# Patient Record
Sex: FEMALE | Race: WHITE | NOT HISPANIC OR LATINO | Employment: OTHER | URBAN - METROPOLITAN AREA
[De-identification: names, ages, dates, MRNs, and addresses within clinical notes are randomized per-mention and may not be internally consistent; named-entity substitution may affect disease eponyms.]

---

## 2017-03-14 ENCOUNTER — OFFICE VISIT (OUTPATIENT)
Dept: FAMILY MEDICINE | Facility: CLINIC | Age: 62
End: 2017-03-14
Payer: COMMERCIAL

## 2017-03-14 VITALS
WEIGHT: 293 LBS | HEART RATE: 84 BPM | SYSTOLIC BLOOD PRESSURE: 178 MMHG | DIASTOLIC BLOOD PRESSURE: 79 MMHG | BODY MASS INDEX: 45.99 KG/M2 | HEIGHT: 67 IN | TEMPERATURE: 97.6 F

## 2017-03-14 DIAGNOSIS — J30.1 SEASONAL ALLERGIC RHINITIS DUE TO POLLEN: Primary | ICD-10-CM

## 2017-03-14 DIAGNOSIS — R03.0 ELEVATED BLOOD PRESSURE READING WITHOUT DIAGNOSIS OF HYPERTENSION: ICD-10-CM

## 2017-03-14 DIAGNOSIS — H10.11 ALLERGIC CONJUNCTIVITIS, RIGHT: ICD-10-CM

## 2017-03-14 PROCEDURE — 99213 OFFICE O/P EST LOW 20 MIN: CPT | Performed by: INTERNAL MEDICINE

## 2017-03-14 NOTE — MR AVS SNAPSHOT
After Visit Summary   3/14/2017    Kaylene Diaz    MRN: [id number]           Patient Information     Date Of Birth          1955        Visit Information        Provider Department      3/14/2017 3:40 PM Lyndsey Washington, DO [facility]        Today's Diagnoses     Seasonal allergic rhinitis due to pollen    -  1    Allergic conjunctivitis, right        Elevated blood pressure reading without diagnosis of hypertension          Care Instructions          Thank you for choosing [facility] (formerly [facility]).  You may be receiving a survey in the mail from Rodrick Oreilly regarding your visit today.  Please take a few minutes to complete and return the survey to let us know how we are doing.      If you have questions or concerns, please contact us via [employer] or you can contact your care team at [phone].    Our Clinic hours are:  Monday 6:40 am  to 7:00 pm  Tuesday -Friday 6:40 am to 5:00 pm    The [place] outpatient lab hours are:  Monday - Friday 6:10 am to 4:45 pm  Saturdays 7:00 am to 11:00 am  Appointments are required, call [phone]    If you have clinical questions after hours or would like to schedule an appointment,  call the clinic at [phone].       [facility] - [place], [place] - [address]      1. You are due for a pap test.  Please schedule appointment before you leave today  2. You are due for a mammogram.  Please call [phone] to schedule.  3. You are due for fasting blood work (cholesterol test)  4. Your blood pressure was very high today and has been for a while.  You may need blood pressure medications.  Last visit with PCP Dr. Washington was 12/2015.    Ideally, you should schedule physical with him, and all these issues could be addressed at the visit.      BP Readings from Last 6 Encounters:   03/14/17 178/79   12/14/16 180/82   10/10/16 151/70   10/03/16 155/81   01/22/16 132/72   12/07/15 130/80       For the eye - try Artificial Tears.  For the throat - try  Flonase 1 spray each nostril twice daily.  This will help decrease the post-nasal mucus draining, therefore decreasing the irritation/pain in the throat.  For the allergies - try Zyrtec 10 mg once daily.  This will also decrease allergy symptoms overall, helping the throat heal  For throat pain - try Aleve 1-2 pills twice daily x 1 week.  Can alternate with Tylenol 1000 mg 3x day.  Try cough drops.  There is no numbing medication that will help much        Follow-ups after your visit        Your next 10 appointments already scheduled     Mar 20, 2017 10:30 AM CDT   [facility] with Rl Coleman MD   [facility]    [address] [address] [phone]              Who to contact     If you have questions or need follow up information about today's clinic visit or your schedule please contact [facility] directly at [phone].  Normal or non-critical lab and imaging results will be communicated to you by [employer]hart, letter or phone within 4 business days after the clinic has received the results. If you do not hear from us within 7 days, please contact the clinic through [employer] or phone. If you have a critical or abnormal lab result, we will notify you by phone as soon as possible.  Submit refill requests through [facility] or call your pharmacy and they will forward the refill request to us. Please allow 3 business days for your refill to be completed.          Additional Information About Your Visit        [facility] Information     [facility] gives you secure access to your electronic health record. If you see a primary care provider, you can also send messages to your care team and make appointments. If you have questions, please call your primary care clinic.  If you do not have a primary care provider, please call [phone] and they will assist you.        Care EveryWhere ID     This is your Care EveryWhere ID. This could be  "used by other organizations to access your [place] medical records  [id number]        Your Vitals Were     Pulse Temperature Height BMI (Body Mass Index)          84 97.6  F (36.4  C) (Tympanic) 5' 7\" (1.702 m) 47.77 kg/m2         Blood Pressure from Last 3 Encounters:   03/14/17 178/79   12/14/16 180/82   10/10/16 151/70    Weight from Last 3 Encounters:   03/14/17 (!) 305 lb (138.3 kg)   12/14/16 (!) 319 lb (144.7 kg)   01/19/16 283 lb 15.2 oz (128.8 kg)              Today, you had the following     No orders found for display       Primary Care Provider Office Phone # Fax #    Hitesh Washington [phone]       [facility] [address] [address]        Thank you!     Thank you for choosing [facility]  for your care. Our goal is always to provide you with excellent care. Hearing back from our patients is one way we can continue to improve our services. Please take a few minutes to complete the written survey that you may receive in the mail after your visit with us. Thank you!             Your Updated Medication List - Protect others around you: Learn how to safely use, store and throw away your medicines at www.disposemymeds.org.          This list is accurate as of: 3/14/17  4:00 PM.  Always use your most recent med list.                   Brand Name Dispense Instructions for use    * ADULT GUMMY PO      Take 1 chew tab by mouth daily VitaCrave       * HAIR/SKIN/NAILS/BIOTIN PO     100 tablet    Take 1 capsule by mouth daily.       hydroxychloroquine 200 MG tablet    PLAQUENIL    180 tablet    Take 2 tablets (400 mg) by mouth daily       lactobacillus rhamnosus (GG) capsule     60 capsule    Take 1 capsule by mouth 2 times daily       methotrexate 2.5 MG tablet CHEMO     120 tablet    Take 9 tablets (22.5 mg) by mouth once a week       vitamin B complex with vitamin C Tabs tablet      Take 1 tablet by mouth daily       * Notice:  This list has 2 " medication(s) that are the same as other medications prescribed for you. Read the directions carefully, and ask your doctor or other care provider to review them with you.

## 2017-03-14 NOTE — PROGRESS NOTES
"  SUBJECTIVE:                                                    Kaylene Diaz is a 61 year old female who presents to clinic today for the following health issues:      ENT Symptoms             Symptoms: cc Present Absent Comment   Fever/Chills   x    Fatigue  x     Muscle Aches   x    Eye Irritation  x     Sneezing   x    Nasal Arnaldo/Drg   x    Sinus Pressure/Pain   x    Loss of smell   x    Dental pain   x    Sore Throat  x     Swollen Glands  x  Left side more   Ear Pain/Fullness  x     Cough   x    Wheeze   x    Chest Pain   x    Shortness of breath   x    Rash   x    Other   x      Symptom duration: Since 2/24/17   Symptom severity:  worse just doesn't feel well.  Finished antibiotic end of Feb.   Treatments tried:  eye drops and antibiotic, allergy pill   Contacts:  brother     Chief Complaint   Patient presents with     Pharyngitis     has had several weeks.       Was visiting brother who was also sick with URI.  Symptoms started 18 days ago.  Was seen in Urgent Care in [place], given amoxicillin x 10 days, 'an antibiotic shot', and antibiotic eye ointment.  She does not think the symptoms improved with this.  She was not tested for strep, b/c provider thought it was NOT strep.  She reports symptoms are similar to allergies.  Has constant post-nasal drip, that she feels are causing her sore throat.  The antibiotic ointment did not help her eye drainage.  Has been using De-Hist and this is not helping.  Seasonal allergies are typical trees and pollen.  She believes pine trees, she often has similar symptoms in early spring.  No fevers.  No worsening of symptoms.  Eye is not stuck shut.  Mild tearing throughout the day, and just slightly redder than normal.    She wants to know if she is contagious.  She does feel fatigued.  She wants a \"numbing medicine\"      Current Outpatient Prescriptions   Medication Sig Dispense Refill     hydroxychloroquine (PLAQUENIL) 200 MG tablet Take 2 tablets (400 mg) by mouth daily " "180 tablet 3     methotrexate 2.5 MG tablet CHEMO Take 9 tablets (22.5 mg) by mouth once a week 120 tablet 3     Multiple Vitamins-Minerals (ADULT GUMMY PO) Take 1 chew tab by mouth daily VitaCrave       vitamin  B complex with vitamin C (VITAMIN  B COMPLEX) TABS Take 1 tablet by mouth daily  0     Multiple Vitamins-Minerals (HAIR/SKIN/NAILS/BIOTIN PO) Take 1 capsule by mouth daily.  100 tablet 3     Lactobacillus Rhamnosus, GG, (CULTURELL) capsule Take 1 capsule by mouth 2 times daily 60 capsule 0       Reviewed and updated as needed this visit by clinical staff  Tobacco  Allergies  Meds  Problems       Reviewed and updated as needed this visit by Provider  Allergies  Meds  Problems         ROS:  Constitutional, HEENT, cardiovascular, pulmonary, gi and gu systems are negative, except as otherwise noted.    OBJECTIVE:                                                    /79 (BP Location: Right arm, Patient Position: Chair, Cuff Size: Adult Regular)  Pulse 84  Temp 97.6  F (36.4  C) (Tympanic)  Ht 5' 7\" (1.702 m)  Wt (!) 305 lb (138.3 kg)  BMI 47.77 kg/m2  Body mass index is 47.77 kg/(m^2).  GENERAL APPEARANCE: healthy, alert, no distress and over weight  EYES: Eyes grossly normal to inspection, PERRL, conjunctivae and sclerae normal and right eye with mild conjunctival injection, no mattering  HENT: ear canals and TM's normal, tonsillar hypertrophy and tonsillar erythema, no exudate  NECK: no adenopathy, no asymmetry, masses, or scars and thyroid normal to palpation  RESP: lungs clear to auscultation - no rales, rhonchi or wheezes  CV: regular rates and rhythm, normal S1 S2, no S3 or S4 and no murmur, click or rub       ASSESSMENT/PLAN:                                                        ICD-10-CM    1. Seasonal allergic rhinitis due to pollen J30.1    2. Allergic conjunctivitis, right H10.11    3. Elevated blood pressure reading without diagnosis of hypertension R03.0          1. You are due for a pap " test.  Please schedule appointment before you leave today  2. You are due for a mammogram.  Please call [phone] to schedule.  3. You are due for fasting blood work (cholesterol test)  4. Your blood pressure was very high today and has been for a while.  You may need blood pressure medications.  Last visit with PCP Dr. Washington was 12/2015.    Ideally, you should schedule physical with him, and all these issues could be addressed at the visit.      BP Readings from Last 6 Encounters:   03/14/17 178/79   12/14/16 180/82   10/10/16 151/70   10/03/16 155/81   01/22/16 132/72   12/07/15 130/80       For the eye - try Artificial Tears.  For the throat - try Flonase 1 spray each nostril twice daily.  This will help decrease the post-nasal mucus draining, therefore decreasing the irritation/pain in the throat.  For the allergies - try Zyrtec 10 mg once daily.  This will also decrease allergy symptoms overall, helping the throat heal  For throat pain - try Aleve 1-2 pills twice daily x 1 week.  Can alternate with Tylenol 1000 mg 3x day.  Try cough drops.  There is no numbing medication that will help much      Lyndsey Washington, DO  [facility]

## 2017-03-14 NOTE — PATIENT INSTRUCTIONS
Thank you for choosing [facility].  You may be receiving a survey in the mail from Rodrick Oreilly regarding your visit today.  Please take a few minutes to complete and return the survey to let us know how we are doing.      If you have questions or concerns, please contact us via "[employer]" or you can contact your care team at [phone].    Our Clinic hours are:  Monday 6:40 am  to 7:00 pm  Tuesday -Friday 6:40 am to 5:00 pm    The [place] outpatient lab hours are:  Monday - Friday 6:10 am to 4:45 pm  Saturdays 7:00 am to 11:00 am  Appointments are required, call [phone]    If you have clinical questions after hours or would like to schedule an appointment,  call the clinic at [phone].       [facility] - [place], [place] - [address]      1. You are due for a pap test.  Please schedule appointment before you leave today  2. You are due for a mammogram.  Please call [phone] to schedule.  3. You are due for fasting blood work (cholesterol test)  4. Your blood pressure was very high today and has been for a while.  You may need blood pressure medications.  Last visit with PCP Dr. Washington was 12/2015.    Ideally, you should schedule physical with him, and all these issues could be addressed at the visit.      BP Readings from Last 6 Encounters:   03/14/17 178/79   12/14/16 180/82   10/10/16 151/70   10/03/16 155/81   01/22/16 132/72   12/07/15 130/80       For the eye - try Artificial Tears.  For the throat - try Flonase 1 spray each nostril twice daily.  This will help decrease the post-nasal mucus draining, therefore decreasing the irritation/pain in the throat.  For the allergies - try Zyrtec 10 mg once daily.  This will also decrease allergy symptoms overall, helping the throat heal  For throat pain - try Aleve 1-2 pills twice daily x 1 week.  Can alternate with Tylenol 1000 mg 3x day.  Try cough drops.  There is no numbing medication that will help much

## 2017-03-20 ENCOUNTER — OFFICE VISIT (OUTPATIENT)
Dept: RHEUMATOLOGY | Facility: CLINIC | Age: 62
End: 2017-03-20
Payer: COMMERCIAL

## 2017-03-20 VITALS
TEMPERATURE: 97.6 F | BODY MASS INDEX: 47.77 KG/M2 | SYSTOLIC BLOOD PRESSURE: 162 MMHG | RESPIRATION RATE: 16 BRPM | HEART RATE: 73 BPM | DIASTOLIC BLOOD PRESSURE: 99 MMHG | WEIGHT: 293 LBS

## 2017-03-20 DIAGNOSIS — M77.8 SHOULDER TENDINITIS, LEFT: Primary | ICD-10-CM

## 2017-03-20 DIAGNOSIS — M06.9 RHEUMATOID ARTHRITIS OF HAND, UNSPECIFIED LATERALITY, UNSPECIFIED RHEUMATOID FACTOR PRESENCE: ICD-10-CM

## 2017-03-20 LAB
ALBUMIN SERPL-MCNC: 3.9 G/DL (ref 3.4–5)
ALP SERPL-CCNC: 122 U/L (ref 40–150)
ALT SERPL W P-5'-P-CCNC: 21 U/L (ref 0–50)
ANION GAP SERPL CALCULATED.3IONS-SCNC: 7 MMOL/L (ref 3–14)
AST SERPL W P-5'-P-CCNC: 13 U/L (ref 0–45)
BASOPHILS # BLD AUTO: 0 10E9/L (ref 0–0.2)
BASOPHILS NFR BLD AUTO: 0.5 %
BILIRUB SERPL-MCNC: 0.5 MG/DL (ref 0.2–1.3)
BUN SERPL-MCNC: 16 MG/DL (ref 7–30)
CALCIUM SERPL-MCNC: 9.2 MG/DL (ref 8.5–10.1)
CHLORIDE SERPL-SCNC: 108 MMOL/L (ref 94–109)
CO2 SERPL-SCNC: 26 MMOL/L (ref 20–32)
CREAT SERPL-MCNC: 0.85 MG/DL (ref 0.52–1.04)
DIFFERENTIAL METHOD BLD: ABNORMAL
EOSINOPHIL # BLD AUTO: 0.2 10E9/L (ref 0–0.7)
EOSINOPHIL NFR BLD AUTO: 2.3 %
ERYTHROCYTE [DISTWIDTH] IN BLOOD BY AUTOMATED COUNT: 12.9 % (ref 10–15)
GFR SERPL CREATININE-BSD FRML MDRD: 68 ML/MIN/1.7M2
GLUCOSE SERPL-MCNC: 85 MG/DL (ref 70–99)
HCT VFR BLD AUTO: 40.1 % (ref 35–47)
HGB BLD-MCNC: 14.3 G/DL (ref 11.7–15.7)
LYMPHOCYTES # BLD AUTO: 1.2 10E9/L (ref 0.8–5.3)
LYMPHOCYTES NFR BLD AUTO: 14.7 %
MCH RBC QN AUTO: 33.2 PG (ref 26.5–33)
MCHC RBC AUTO-ENTMCNC: 35.7 G/DL (ref 31.5–36.5)
MCV RBC AUTO: 93 FL (ref 78–100)
MONOCYTES # BLD AUTO: 0.5 10E9/L (ref 0–1.3)
MONOCYTES NFR BLD AUTO: 6.3 %
NEUTROPHILS # BLD AUTO: 6.4 10E9/L (ref 1.6–8.3)
NEUTROPHILS NFR BLD AUTO: 76.2 %
PLATELET # BLD AUTO: 281 10E9/L (ref 150–450)
POTASSIUM SERPL-SCNC: 4.4 MMOL/L (ref 3.4–5.3)
PROT SERPL-MCNC: 8 G/DL (ref 6.8–8.8)
RBC # BLD AUTO: 4.31 10E12/L (ref 3.8–5.2)
SODIUM SERPL-SCNC: 141 MMOL/L (ref 133–144)
WBC # BLD AUTO: 8.4 10E9/L (ref 4–11)

## 2017-03-20 PROCEDURE — 85025 COMPLETE CBC W/AUTO DIFF WBC: CPT | Performed by: INTERNAL MEDICINE

## 2017-03-20 PROCEDURE — 80053 COMPREHEN METABOLIC PANEL: CPT | Performed by: INTERNAL MEDICINE

## 2017-03-20 PROCEDURE — 36415 COLL VENOUS BLD VENIPUNCTURE: CPT | Performed by: INTERNAL MEDICINE

## 2017-03-20 PROCEDURE — 20610 DRAIN/INJ JOINT/BURSA W/O US: CPT | Mod: LT | Performed by: INTERNAL MEDICINE

## 2017-03-20 NOTE — NURSING NOTE
The following medication was given:     MEDICATION: Kenalog 40  ROUTE: Intra-articular  SITE: left shoulder  DOSE: 1ML  LOT #: [id number]  :  [employer]Quan  EXPIRATION DATE:  06/2018  NDC#: 8358-4155-82  Drawn by Louisa Goldberg LPN  / Administered by Dr. Coleman/ Documented by Louisa Goldberg LPN    The following medication was given:     MEDICATION: 1% lidocaine  ROUTE: Intra-articular (given with steroid)  SITE: LEFT SHOULDER  DOSE: 2ML  LOT: [id number]  :  [email or web address]  EXPIRATION DATE:  07/2017  NDC#: 74296-266-99  Drawn by Louisa Goldberg LPN  / Administered by Dr. Coleman/ Documented by Louisa Goldberg LPN

## 2017-03-20 NOTE — NURSING NOTE
"Initial BP (!) 162/99 (BP Location: Other (Comment), Patient Position: Chair, Cuff Size: Adult Regular)  Pulse 73  Temp 97.6  F (36.4  C) (Oral)  Resp 16  Wt (!) 305 lb (138.3 kg)  BMI 47.77 kg/m2 Estimated body mass index is 47.77 kg/(m^2) as calculated from the following:    Height as of 3/14/17: 5' 7\" (1.702 m).    Weight as of this encounter: 305 lb (138.3 kg). .    Eber Melendez, URMILA    "

## 2017-03-20 NOTE — MR AVS SNAPSHOT
After Visit Summary   3/20/2017    Kaylene Diaz    MRN: [id number]           Patient Information     Date Of Birth          1955        Visit Information        Provider Department      3/20/2017 10:30 AM Rl Coleman MD [facility]        Today's Diagnoses     Shoulder tendinitis, left    -  1    Rheumatoid arthritis of hand, unspecified laterality, unspecified rheumatoid factor presence (H)           Follow-ups after your visit        Who to contact     If you have questions or need follow up information about today's clinic visit or your schedule please contact [facility] directly at [phone].  Normal or non-critical lab and imaging results will be communicated to you by [employer], letter or phone within 4 business days after the clinic has received the results. If you do not hear from us within 7 days, please contact the clinic through [employer] or phone. If you have a critical or abnormal lab result, we will notify you by phone as soon as possible.  Submit refill requests through [employer] or call your pharmacy and they will forward the refill request to us. Please allow 3 business days for your refill to be completed.          Additional Information About Your Visit        MyChart Information     [employer] gives you secure access to your electronic health record. If you see a primary care provider, you can also send messages to your care team and make appointments. If you have questions, please call your primary care clinic.  If you do not have a primary care provider, please call [phone] and they will assist you.        Care EveryWhere ID     This is your Care EveryWhere ID. This could be used by other organizations to access your [place] medical records  [id number]        Your Vitals Were     Pulse Temperature Respirations BMI (Body Mass Index)          73 97.6  F (36.4  C) (Oral) 16 47.77 kg/m2         Blood Pressure from Last 3 Encounters:    03/20/17 (!) 162/99   03/14/17 178/79   12/14/16 180/82    Weight from Last 3 Encounters:   03/20/17 (!) 138.3 kg (305 lb)   03/14/17 (!) 138.3 kg (305 lb)   12/14/16 (!) 144.7 kg (319 lb)              We Performed the Following     CBC with platelets and differential     Comprehensive metabolic panel     Kenalog 40 MG  []     Large Joint/Bursa injection and/or drainage (Shoulder, Knee)     Lidocaine 1% or 2%     []          Today's Medication Changes          These changes are accurate as of: 3/20/17 11:59 PM.  If you have any questions, ask your nurse or doctor.               Start taking these medicines.        Dose/Directions    lidocaine 1 % injection   Used for:  Shoulder tendinitis, left   Started by:  Rl Coleman MD        Dose:  2 mL   2 mLs by INTRA-ARTICULAR route once for 1 dose   Quantity:  2 mL   Refills:  0       triamcinolone acetonide 40 MG/ML injection   Commonly known as:  KENALOG   Used for:  Shoulder tendinitis, left   Started by:  Rl Coleman MD        Dose:  40 mg   1 mL (40 mg) by INTRA-ARTICULAR route once for 1 dose   Quantity:  1 mL   Refills:  0            Where to get your medicines      Some of these will need a paper prescription and others can be bought over the counter.  Ask your nurse if you have questions.     You don't need a prescription for these medications     lidocaine 1 % injection    triamcinolone acetonide 40 MG/ML injection                Primary Care Provider Office Phone # Fax #    Hitesh Washington [phone]       [facility] [address] [address]        Thank you!     Thank you for choosing [facility]  for your care. Our goal is always to provide you with excellent care. Hearing back from our patients is one way we can continue to improve our services. Please take a few minutes to complete the written survey that you may receive in the mail after your visit with us.  Thank you!             Your Updated Medication List - Protect others around you: Learn how to safely use, store and throw away your medicines at www.disposemymeds.org.          This list is accurate as of: 3/20/17 11:59 PM.  Always use your most recent med list.                   Brand Name Dispense Instructions for use    CLARITHROMYCIN PO          fluticasone 27.5 MCG/SPRAY spray    VERAMYST     Spray 1 spray into both nostrils 2 times daily       * ADULT GUMMY PO      Take 1 chew tab by mouth daily VitaCrave       * HAIR/SKIN/NAILS/BIOTIN PO     100 tablet    Take 1 capsule by mouth daily.       hydroxychloroquine 200 MG tablet    PLAQUENIL    180 tablet    Take 2 tablets (400 mg) by mouth daily       lidocaine 1 % injection     2 mL    2 mLs by INTRA-ARTICULAR route once for 1 dose       methotrexate 2.5 MG tablet CHEMO     120 tablet    Take 9 tablets (22.5 mg) by mouth once a week       triamcinolone acetonide 40 MG/ML injection    KENALOG    1 mL    1 mL (40 mg) by INTRA-ARTICULAR route once for 1 dose       vitamin B complex with vitamin C Tabs tablet      Take 1 tablet by mouth daily       * Notice:  This list has 2 medication(s) that are the same as other medications prescribed for you. Read the directions carefully, and ask your doctor or other care provider to review them with you.

## 2017-03-21 RX ORDER — LIDOCAINE HYDROCHLORIDE 10 MG/ML
2 INJECTION, SOLUTION INFILTRATION; PERINEURAL ONCE
Qty: 2 ML | Refills: 0 | OUTPATIENT
Start: 2017-03-21 | End: 2017-03-21

## 2017-03-21 RX ORDER — TRIAMCINOLONE ACETONIDE 40 MG/ML
40 INJECTION, SUSPENSION INTRA-ARTICULAR; INTRAMUSCULAR ONCE
Qty: 1 ML | Refills: 0 | OUTPATIENT
Start: 2017-03-21 | End: 2017-03-21

## 2017-04-19 ENCOUNTER — OFFICE VISIT (OUTPATIENT)
Dept: DERMATOLOGY | Facility: CLINIC | Age: 62
End: 2017-04-19
Payer: COMMERCIAL

## 2017-04-19 VITALS — OXYGEN SATURATION: 99 % | SYSTOLIC BLOOD PRESSURE: 147 MMHG | DIASTOLIC BLOOD PRESSURE: 78 MMHG | HEART RATE: 76 BPM

## 2017-04-19 DIAGNOSIS — Z85.828 HISTORY OF SKIN CANCER: Primary | ICD-10-CM

## 2017-04-19 DIAGNOSIS — L81.4 LENTIGO: ICD-10-CM

## 2017-04-19 DIAGNOSIS — L82.1 SK (SEBORRHEIC KERATOSIS): ICD-10-CM

## 2017-04-19 DIAGNOSIS — L57.0 AK (ACTINIC KERATOSIS): ICD-10-CM

## 2017-04-19 PROCEDURE — 99213 OFFICE O/P EST LOW 20 MIN: CPT | Performed by: DERMATOLOGY

## 2017-04-19 NOTE — NURSING NOTE
"Initial /78  Pulse 76  SpO2 99% Estimated body mass index is 47.77 kg/(m^2) as calculated from the following:    Height as of 3/14/17: 1.702 m (5' 7\").    Weight as of 3/20/17: 138.3 kg (305 lb). .      "

## 2017-04-19 NOTE — PATIENT INSTRUCTIONS
Proper skin care from Dr. Springer- [facility]     Eliminate harsh soaps, i.e. Dial, Zest, Mery Spring;   Use mild soaps such as Cetaphil or Dove Sensitive Skin   Avoid hot or cold showers   After showering, lightly dry off.    Aggressive use of a moisturizer (including Vanicream, Cetaphil, Aquafor or Cerave)   We recommend using a tub that needs to be scooped out, not a pump. This has more of an oil base. It will hold moisture in your skin much better than a water base moisturizer. The ones recommended are non- pore clogging.       If you have any questions call [phone] and follow the prompts to Dr. Springer's office.

## 2017-04-19 NOTE — MR AVS SNAPSHOT
After Visit Summary   4/19/2017    Kaylene Diaz    MRN: [id number]           Patient Information     Date Of Birth          1955        Visit Information        Provider Department      4/19/2017 10:45 AM Cipriano Springer MD [facility]        Today's Diagnoses     History of skin cancer    -  1    Lentigo        SK (seborrheic keratosis)          Care Instructions    Proper skin care from Dr. Springer- [facility]     Eliminate harsh soaps, i.e. Dial, Zest, Mongolian Spring;   Use mild soaps such as Cetaphil or Dove Sensitive Skin   Avoid hot or cold showers   After showering, lightly dry off.    Aggressive use of a moisturizer (including Vanicream, Cetaphil, Aquafor or Cerave)   We recommend using a tub that needs to be scooped out, not a pump. This has more of an oil base. It will hold moisture in your skin much better than a water base moisturizer. The ones recommended are non- pore clogging.       If you have any questions call [phone] and follow the prompts to Dr. Springer's office.           Follow-ups after your visit        Who to contact     If you have questions or need follow up information about today's clinic visit or your schedule please contact [facility] directly at [phone].  Normal or non-critical lab and imaging results will be communicated to you by [employer], letter or phone within 4 business days after the clinic has received the results. If you do not hear from us within 7 days, please contact the clinic through [employer] or phone. If you have a critical or abnormal lab result, we will notify you by phone as soon as possible.  Submit refill requests through [employer] or call your pharmacy and they will forward the refill request to us. Please allow 3 business days for your refill to be completed.          Additional Information About Your Visit        [employer] Information     [employer] gives you secure access to your electronic health  record. If you see a primary care provider, you can also send messages to your care team and make appointments. If you have questions, please call your primary care clinic.  If you do not have a primary care provider, please call [phone] and they will assist you.        Care EveryWhere ID     This is your Care EveryWhere ID. This could be used by other organizations to access your [facility] medical records  [id number]        Your Vitals Were     Pulse Pulse Oximetry                76 99%           Blood Pressure from Last 3 Encounters:   04/19/17 147/78   03/20/17 (!) 162/99   03/14/17 178/79    Weight from Last 3 Encounters:   03/20/17 (!) 138.3 kg (305 lb)   03/14/17 (!) 138.3 kg (305 lb)   12/14/16 (!) 144.7 kg (319 lb)              Today, you had the following     No orders found for display       Primary Care Provider Office Phone # Fax #    Hitesh Washington [phone]       [facility] [address] [address]        Thank you!     Thank you for choosing [facility]  for your care. Our goal is always to provide you with excellent care. Hearing back from our patients is one way we can continue to improve our services. Please take a few minutes to complete the written survey that you may receive in the mail after your visit with us. Thank you!             Your Updated Medication List - Protect others around you: Learn how to safely use, store and throw away your medicines at www.disposemymeds.org.          This list is accurate as of: 4/19/17 10:51 AM.  Always use your most recent med list.                   Brand Name Dispense Instructions for use    CLARITHROMYCIN PO          fluticasone 27.5 MCG/SPRAY spray    VERAMYST     Spray 1 spray into both nostrils 2 times daily       * ADULT GUMMY PO      Take 1 chew tab by mouth daily VitaCrave       * HAIR/SKIN/NAILS/BIOTIN PO     100 tablet    Take 1 capsule by mouth daily.       hydroxychloroquine 200  MG tablet    PLAQUENIL    180 tablet    Take 2 tablets (400 mg) by mouth daily       methotrexate 2.5 MG tablet CHEMO     120 tablet    Take 9 tablets (22.5 mg) by mouth once a week       vitamin B complex with vitamin C Tabs tablet      Take 1 tablet by mouth daily       * Notice:  This list has 2 medication(s) that are the same as other medications prescribed for you. Read the directions carefully, and ask your doctor or other care provider to review them with you.

## 2017-04-19 NOTE — PROGRESS NOTES
Kaylene Diaz is a 61 year old year old female patient here today for f/u hx of non-melanoma skin cancer.   .Today she notes rough spots on hands.    Patient states this has been present for months.  Patient reports the following symptoms:  none .  Patient reports the following previous treatments none.  Patient reports the following modifying factors none.  Associated symptoms: none.  Patient has no other skin complaints today.  Remainder of the HPI, Meds, PMH, Allergies, FH, and SH was reviewed in chart.    Pertinent Hx:   Non-melanoma skin cancer   Past Medical History:   Diagnosis Date     Basal cell carcinoma      RA (rheumatoid arthritis) (H)      Small bowel obstruction (H)        Past Surgical History:   Procedure Laterality Date     ARTHROSCOPY KNEE  12/10/2010    ARTHROSCOPY KNEE performed by NAVID FIERRO at [facility]     SURGICAL HISTORY OF -       IUD removal     SURGICAL HISTORY OF -       Ovarian cyst     SURGICAL HISTORY OF -       thorn removal from foot     SURGICAL HISTORY OF -       knee surgery, scope, right knee        Family History   Problem Relation Age of Onset     Prostate Cancer Father      Eye Disorder Father      mac degen     HEART DISEASE Father      HEART DISEASE Mother      a-fib     Eye Disorder Mother      C.A.D. Mother      Hypertension Mother      Alcohol/Drug Brother      Alcohol/Drug Sister      Alcohol/Drug Brother      Alcohol/Drug Brother      Alcohol/Drug Brother      Alcohol/Drug Sister      Obesity Sister      Alcohol/Drug Sister      Prostate Cancer Sister      CANCER Other      Melanoma No family hx of        Social History     Social History     Marital status:      Spouse name: N/A     Number of children: N/A     Years of education: N/A     Occupational History      Unemployed     Social History Main Topics     Smoking status: Former Smoker     Years: 30.00     Quit date: 4/18/2000     Smokeless tobacco: Never Used     Alcohol use Yes      Comment: VERY RARE      Drug use: No     Sexual activity: Yes     Partners: Male     Other Topics Concern     Parent/Sibling W/ Cabg, Mi Or Angioplasty Before 65f 55m? No     Social History Narrative       Outpatient Encounter Prescriptions as of 4/19/2017   Medication Sig Dispense Refill     CLARITHROMYCIN PO        fluticasone (VERAMYST) 27.5 MCG/SPRAY spray Spray 1 spray into both nostrils 2 times daily       hydroxychloroquine (PLAQUENIL) 200 MG tablet Take 2 tablets (400 mg) by mouth daily 180 tablet 3     methotrexate 2.5 MG tablet CHEMO Take 9 tablets (22.5 mg) by mouth once a week 120 tablet 3     Multiple Vitamins-Minerals (ADULT GUMMY PO) Take 1 chew tab by mouth daily VitaCrave       vitamin  B complex with vitamin C (VITAMIN  B COMPLEX) TABS Take 1 tablet by mouth daily  0     Multiple Vitamins-Minerals (HAIR/SKIN/NAILS/BIOTIN PO) Take 1 capsule by mouth daily.  100 tablet 3     No facility-administered encounter medications on file as of 4/19/2017.              Review Of Systems  Skin: As above  Eyes: negative  Ears/Nose/Throat: negative  Respiratory: No shortness of breath, dyspnea on exertion, cough, or hemoptysis  Cardiovascular: negative  Gastrointestinal: negative  Genitourinary: negative  Musculoskeletal: negative  Neurologic: negative  Psychiatric: negative  Hematologic/Lymphatic/Immunologic: negative  Endocrine: negative      O:   NAD, WDWN, Alert & Oriented, Mood & Affect wnl, Vitals stable   Here today alone   /78  Pulse 76  SpO2 99%   General appearance normal   Vitals stable   Alert, oriented and in no acute distress      Following lymph nodes palpated: Occipital, Cervical, Supraclavicular no lad   Stuck on papules and brown macules on trunk and ext    Gritty papule sonhands        The remainder of the full exam was unremarkable; the following areas were examined:  conjunctiva/lids, oral mucosa, neck, peripheral vascular system, abdomen, lymph nodes, digits/nails, eccrine and apocrine glands, scalp/hair,  face, neck, chest, abdomen, buttocks, back, RUE, LUE, RLE, LLE       Eyes: Conjunctivae/lids:Normal     ENT: Lips, buccal mucosa, tongue: normal    MSK:Normal    Cardiovascular: peripheral edema none    Pulm: Breathing Normal    Lymph Nodes: No Head and Neck Lymphadenopathy     Neuro/Psych: Orientation:Normal; Mood/Affect:Normal      A/P:  1. Hx of non-melanoma skin cancer, seborrheic keratosis, lentigo    2. Actinic keratosis on hands  pdt and efudex and cryo discussed with patient   She will think about  Return to clinic 6 monthsBENIGN LESIONS DISCUSSED WITH PATIENT:  I discussed the specifics of tumor, prognosis, and genetics of benign lesions.  I explained that treatment of these lesions would be purely cosmetic and not medically neccessary.  I discussed with patient different removal options including excision, cautery and /or laser.      Nature and genetics of benign skin lesions dicussed with patient.  Signs and Symptoms of skin cancer discussed with patient.  ABCDEs of melanoma reviewed with patient.  Patient encouraged to perform monthly skin exams.  UV precautions reviewed with patient.  Skin care regimen reviewed with patient: Eliminate harsh soaps, i.e. Dial, zest, irsih spring; Mild soaps such as Cetaphil or Dove sensitive skin, avoid hot or cold showers, aggressive use of emollients including vanicream, cetaphil or cerave discussed with patient.    Risks of non-melanoma skin cancer discussed with patient   Return to clinic 6 months

## 2017-04-20 ENCOUNTER — APPOINTMENT (OUTPATIENT)
Dept: CT IMAGING | Facility: CLINIC | Age: 62
DRG: 389 | End: 2017-04-20
Attending: EMERGENCY MEDICINE
Payer: COMMERCIAL

## 2017-04-20 ENCOUNTER — HOSPITAL ENCOUNTER (INPATIENT)
Facility: CLINIC | Age: 62
LOS: 1 days | Discharge: HOME | DRG: 389 | End: 2017-04-21
Attending: EMERGENCY MEDICINE | Admitting: FAMILY MEDICINE
Payer: COMMERCIAL

## 2017-04-20 DIAGNOSIS — N28.9 RENAL INSUFFICIENCY: ICD-10-CM

## 2017-04-20 DIAGNOSIS — R73.9 HYPERGLYCEMIA: ICD-10-CM

## 2017-04-20 DIAGNOSIS — K56.609 SMALL BOWEL OBSTRUCTION (H): ICD-10-CM

## 2017-04-20 PROBLEM — R73.09 ELEVATED GLUCOSE: Status: ACTIVE | Noted: 2017-04-20

## 2017-04-20 PROBLEM — N17.9 AKI (ACUTE KIDNEY INJURY) (H): Status: ACTIVE | Noted: 2017-04-20

## 2017-04-20 PROBLEM — R03.0 ELEVATED BLOOD PRESSURE READING WITHOUT DIAGNOSIS OF HYPERTENSION: Chronic | Status: ACTIVE | Noted: 2017-03-14

## 2017-04-20 LAB
ALBUMIN SERPL-MCNC: 4 G/DL (ref 3.4–5)
ALP SERPL-CCNC: 120 U/L (ref 40–150)
ALT SERPL W P-5'-P-CCNC: 24 U/L (ref 0–50)
ANION GAP SERPL CALCULATED.3IONS-SCNC: 9 MMOL/L (ref 3–14)
AST SERPL W P-5'-P-CCNC: 13 U/L (ref 0–45)
BASOPHILS # BLD AUTO: 0 10E9/L (ref 0–0.2)
BASOPHILS NFR BLD AUTO: 0.1 %
BILIRUB SERPL-MCNC: 0.4 MG/DL (ref 0.2–1.3)
BUN SERPL-MCNC: 27 MG/DL (ref 7–30)
CALCIUM SERPL-MCNC: 9.8 MG/DL (ref 8.5–10.1)
CHLORIDE SERPL-SCNC: 107 MMOL/L (ref 94–109)
CO2 SERPL-SCNC: 26 MMOL/L (ref 20–32)
CREAT SERPL-MCNC: 1.38 MG/DL (ref 0.52–1.04)
DIFFERENTIAL METHOD BLD: ABNORMAL
EOSINOPHIL # BLD AUTO: 0 10E9/L (ref 0–0.7)
EOSINOPHIL NFR BLD AUTO: 0.1 %
ERYTHROCYTE [DISTWIDTH] IN BLOOD BY AUTOMATED COUNT: 13.6 % (ref 10–15)
GFR SERPL CREATININE-BSD FRML MDRD: 39 ML/MIN/1.7M2
GLUCOSE SERPL-MCNC: 206 MG/DL (ref 70–99)
HBA1C MFR BLD: 4.8 % (ref 4.3–6)
HCT VFR BLD AUTO: 45 % (ref 35–47)
HGB BLD-MCNC: 15.3 G/DL (ref 11.7–15.7)
IMM GRANULOCYTES # BLD: 0 10E9/L (ref 0–0.4)
IMM GRANULOCYTES NFR BLD: 0.2 %
LACTATE BLD-SCNC: 1.5 MMOL/L (ref 0.7–2.1)
LIPASE SERPL-CCNC: 217 U/L (ref 73–393)
LYMPHOCYTES # BLD AUTO: 0.3 10E9/L (ref 0.8–5.3)
LYMPHOCYTES NFR BLD AUTO: 2.3 %
MCH RBC QN AUTO: 31.4 PG (ref 26.5–33)
MCHC RBC AUTO-ENTMCNC: 34 G/DL (ref 31.5–36.5)
MCV RBC AUTO: 92 FL (ref 78–100)
MONOCYTES # BLD AUTO: 1.3 10E9/L (ref 0–1.3)
MONOCYTES NFR BLD AUTO: 8.5 %
NEUTROPHILS # BLD AUTO: 13.1 10E9/L (ref 1.6–8.3)
NEUTROPHILS NFR BLD AUTO: 88.8 %
PLATELET # BLD AUTO: 343 10E9/L (ref 150–450)
POTASSIUM SERPL-SCNC: 4.6 MMOL/L (ref 3.4–5.3)
PROT SERPL-MCNC: 8.6 G/DL (ref 6.8–8.8)
RBC # BLD AUTO: 4.87 10E12/L (ref 3.8–5.2)
SODIUM SERPL-SCNC: 142 MMOL/L (ref 133–144)
WBC # BLD AUTO: 14.7 10E9/L (ref 4–11)

## 2017-04-20 PROCEDURE — 85025 COMPLETE CBC W/AUTO DIFF WBC: CPT | Performed by: EMERGENCY MEDICINE

## 2017-04-20 PROCEDURE — 83605 ASSAY OF LACTIC ACID: CPT | Performed by: EMERGENCY MEDICINE

## 2017-04-20 PROCEDURE — 25000128 H RX IP 250 OP 636: Performed by: EMERGENCY MEDICINE

## 2017-04-20 PROCEDURE — 25000128 H RX IP 250 OP 636: Performed by: PHYSICIAN ASSISTANT

## 2017-04-20 PROCEDURE — 96375 TX/PRO/DX INJ NEW DRUG ADDON: CPT

## 2017-04-20 PROCEDURE — 25000132 ZZH RX MED GY IP 250 OP 250 PS 637: Performed by: PHYSICIAN ASSISTANT

## 2017-04-20 PROCEDURE — 99285 EMERGENCY DEPT VISIT HI MDM: CPT | Performed by: EMERGENCY MEDICINE

## 2017-04-20 PROCEDURE — 12000000 ZZH R&B MED SURG/OB

## 2017-04-20 PROCEDURE — 96376 TX/PRO/DX INJ SAME DRUG ADON: CPT

## 2017-04-20 PROCEDURE — 83690 ASSAY OF LIPASE: CPT | Performed by: EMERGENCY MEDICINE

## 2017-04-20 PROCEDURE — 80053 COMPREHEN METABOLIC PANEL: CPT | Performed by: EMERGENCY MEDICINE

## 2017-04-20 PROCEDURE — 99223 1ST HOSP IP/OBS HIGH 75: CPT | Mod: AI | Performed by: FAMILY MEDICINE

## 2017-04-20 PROCEDURE — 25000125 ZZHC RX 250: Performed by: EMERGENCY MEDICINE

## 2017-04-20 PROCEDURE — 99285 EMERGENCY DEPT VISIT HI MDM: CPT | Mod: 25

## 2017-04-20 PROCEDURE — 96361 HYDRATE IV INFUSION ADD-ON: CPT

## 2017-04-20 PROCEDURE — 25800025 ZZH RX 258: Performed by: EMERGENCY MEDICINE

## 2017-04-20 PROCEDURE — 96374 THER/PROPH/DIAG INJ IV PUSH: CPT | Mod: 59

## 2017-04-20 PROCEDURE — 74177 CT ABD & PELVIS W/CONTRAST: CPT

## 2017-04-20 PROCEDURE — 83036 HEMOGLOBIN GLYCOSYLATED A1C: CPT | Performed by: EMERGENCY MEDICINE

## 2017-04-20 PROCEDURE — 25000131 ZZH RX MED GY IP 250 OP 636 PS 637: Performed by: PHYSICIAN ASSISTANT

## 2017-04-20 PROCEDURE — 25500064 ZZH RX 255 OP 636: Performed by: EMERGENCY MEDICINE

## 2017-04-20 RX ORDER — MORPHINE SULFATE 4 MG/ML
4 INJECTION, SOLUTION INTRAMUSCULAR; INTRAVENOUS
Status: DISCONTINUED | OUTPATIENT
Start: 2017-04-20 | End: 2017-04-21 | Stop reason: HOSPADM

## 2017-04-20 RX ORDER — FLUTICASONE PROPIONATE 50 MCG
1 SPRAY, SUSPENSION (ML) NASAL 2 TIMES DAILY PRN
Status: DISCONTINUED | OUTPATIENT
Start: 2017-04-20 | End: 2017-04-21 | Stop reason: HOSPADM

## 2017-04-20 RX ORDER — ACETAMINOPHEN 325 MG/1
650 TABLET ORAL EVERY 4 HOURS PRN
Status: DISCONTINUED | OUTPATIENT
Start: 2017-04-20 | End: 2017-04-21 | Stop reason: HOSPADM

## 2017-04-20 RX ORDER — HYDROXYCHLOROQUINE SULFATE 200 MG/1
400 TABLET, FILM COATED ORAL DAILY
Status: DISCONTINUED | OUTPATIENT
Start: 2017-04-20 | End: 2017-04-21 | Stop reason: HOSPADM

## 2017-04-20 RX ORDER — DEXTROSE, SODIUM CHLORIDE, SODIUM LACTATE, POTASSIUM CHLORIDE, AND CALCIUM CHLORIDE 5; .6; .31; .03; .02 G/100ML; G/100ML; G/100ML; G/100ML; G/100ML
INJECTION, SOLUTION INTRAVENOUS CONTINUOUS
Status: DISCONTINUED | OUTPATIENT
Start: 2017-04-20 | End: 2017-04-21 | Stop reason: CLARIF

## 2017-04-20 RX ORDER — ONDANSETRON 2 MG/ML
4 INJECTION INTRAMUSCULAR; INTRAVENOUS EVERY 6 HOURS PRN
Status: DISCONTINUED | OUTPATIENT
Start: 2017-04-20 | End: 2017-04-21 | Stop reason: HOSPADM

## 2017-04-20 RX ORDER — PROCHLORPERAZINE MALEATE 5 MG
5-10 TABLET ORAL EVERY 6 HOURS PRN
Status: DISCONTINUED | OUTPATIENT
Start: 2017-04-20 | End: 2017-04-21 | Stop reason: HOSPADM

## 2017-04-20 RX ORDER — NALOXONE HYDROCHLORIDE 0.4 MG/ML
.1-.4 INJECTION, SOLUTION INTRAMUSCULAR; INTRAVENOUS; SUBCUTANEOUS
Status: DISCONTINUED | OUTPATIENT
Start: 2017-04-20 | End: 2017-04-20

## 2017-04-20 RX ORDER — FLUTICASONE PROPIONATE 50 MCG
1 SPRAY, SUSPENSION (ML) NASAL 2 TIMES DAILY
Status: DISCONTINUED | OUTPATIENT
Start: 2017-04-20 | End: 2017-04-20

## 2017-04-20 RX ORDER — HYDROXYCHLOROQUINE SULFATE 200 MG/1
400 TABLET, FILM COATED ORAL DAILY
Status: DISCONTINUED | OUTPATIENT
Start: 2017-04-20 | End: 2017-04-20

## 2017-04-20 RX ORDER — PROCHLORPERAZINE 25 MG
25 SUPPOSITORY, RECTAL RECTAL EVERY 12 HOURS PRN
Status: DISCONTINUED | OUTPATIENT
Start: 2017-04-20 | End: 2017-04-21 | Stop reason: HOSPADM

## 2017-04-20 RX ORDER — SODIUM CHLORIDE, SODIUM LACTATE, POTASSIUM CHLORIDE, CALCIUM CHLORIDE 600; 310; 30; 20 MG/100ML; MG/100ML; MG/100ML; MG/100ML
INJECTION, SOLUTION INTRAVENOUS CONTINUOUS
Status: DISCONTINUED | OUTPATIENT
Start: 2017-04-20 | End: 2017-04-20

## 2017-04-20 RX ORDER — ONDANSETRON 2 MG/ML
4 INJECTION INTRAMUSCULAR; INTRAVENOUS ONCE
Status: COMPLETED | OUTPATIENT
Start: 2017-04-20 | End: 2017-04-20

## 2017-04-20 RX ORDER — NALOXONE HYDROCHLORIDE 0.4 MG/ML
.1-.4 INJECTION, SOLUTION INTRAMUSCULAR; INTRAVENOUS; SUBCUTANEOUS
Status: DISCONTINUED | OUTPATIENT
Start: 2017-04-20 | End: 2017-04-21 | Stop reason: HOSPADM

## 2017-04-20 RX ORDER — MORPHINE SULFATE 4 MG/ML
4 INJECTION, SOLUTION INTRAMUSCULAR; INTRAVENOUS EVERY 30 MIN PRN
Status: DISCONTINUED | OUTPATIENT
Start: 2017-04-20 | End: 2017-04-20

## 2017-04-20 RX ORDER — IOPAMIDOL 755 MG/ML
100 INJECTION, SOLUTION INTRAVASCULAR ONCE
Status: COMPLETED | OUTPATIENT
Start: 2017-04-20 | End: 2017-04-20

## 2017-04-20 RX ADMIN — MORPHINE SULFATE 4 MG: 4 INJECTION, SOLUTION INTRAMUSCULAR; INTRAVENOUS at 10:05

## 2017-04-20 RX ADMIN — SODIUM CHLORIDE, SODIUM LACTATE, POTASSIUM CHLORIDE, CALCIUM CHLORIDE AND DEXTROSE MONOHYDRATE: 5; 600; 310; 30; 20 INJECTION, SOLUTION INTRAVENOUS at 22:45

## 2017-04-20 RX ADMIN — MORPHINE SULFATE 4 MG: 4 INJECTION, SOLUTION INTRAMUSCULAR; INTRAVENOUS at 04:51

## 2017-04-20 RX ADMIN — SODIUM CHLORIDE, SODIUM LACTATE, POTASSIUM CHLORIDE, CALCIUM CHLORIDE AND DEXTROSE MONOHYDRATE: 5; 600; 310; 30; 20 INJECTION, SOLUTION INTRAVENOUS at 14:37

## 2017-04-20 RX ADMIN — ONDANSETRON 4 MG: 2 INJECTION INTRAMUSCULAR; INTRAVENOUS at 04:48

## 2017-04-20 RX ADMIN — IOPAMIDOL 100 ML: 755 INJECTION, SOLUTION INTRAVENOUS at 05:00

## 2017-04-20 RX ADMIN — HYDROXYCHLOROQUINE SULFATE 400 MG: 200 TABLET, FILM COATED ENTERAL at 11:20

## 2017-04-20 RX ADMIN — SODIUM CHLORIDE, POTASSIUM CHLORIDE, SODIUM LACTATE AND CALCIUM CHLORIDE 125 ML/HR: 600; 310; 30; 20 INJECTION, SOLUTION INTRAVENOUS at 06:04

## 2017-04-20 RX ADMIN — SODIUM CHLORIDE 74 ML: 9 INJECTION, SOLUTION INTRAVENOUS at 05:00

## 2017-04-20 RX ADMIN — SODIUM CHLORIDE, SODIUM LACTATE, POTASSIUM CHLORIDE, CALCIUM CHLORIDE AND DEXTROSE MONOHYDRATE 1000 ML: 5; 600; 310; 30; 20 INJECTION, SOLUTION INTRAVENOUS at 07:42

## 2017-04-20 RX ADMIN — ONDANSETRON 4 MG: 2 INJECTION INTRAMUSCULAR; INTRAVENOUS at 06:03

## 2017-04-20 RX ADMIN — SODIUM CHLORIDE, POTASSIUM CHLORIDE, SODIUM LACTATE AND CALCIUM CHLORIDE 1000 ML: 600; 310; 30; 20 INJECTION, SOLUTION INTRAVENOUS at 04:42

## 2017-04-20 RX ADMIN — PROCHLORPERAZINE EDISYLATE 10 MG: 5 INJECTION INTRAMUSCULAR; INTRAVENOUS at 11:09

## 2017-04-20 RX ADMIN — METHOTREXATE 22.5 MG: 2.5 TABLET ORAL at 12:53

## 2017-04-20 RX ADMIN — MORPHINE SULFATE 4 MG: 4 INJECTION, SOLUTION INTRAMUSCULAR; INTRAVENOUS at 06:43

## 2017-04-20 ASSESSMENT — ENCOUNTER SYMPTOMS
SHORTNESS OF BREATH: 0
LIGHT-HEADEDNESS: 0
DIARRHEA: 1
FEVER: 0
ABDOMINAL DISTENTION: 1
ABDOMINAL PAIN: 1
CHEST TIGHTNESS: 0
COUGH: 0
FATIGUE: 0
APPETITE CHANGE: 1
NAUSEA: 1
CHILLS: 0
VOMITING: 1
HEADACHES: 0

## 2017-04-20 ASSESSMENT — PAIN DESCRIPTION - DESCRIPTORS: DESCRIPTORS: CRAMPING;PRESSURE

## 2017-04-20 NOTE — PLAN OF CARE
"Problem: Goal Outcome Summary  Goal: Goal Outcome Summary  Outcome: Improving  No diarrhea or stools this day shift. Pt had return of \"pressure pain to abdomen\" and nausea. With relief of both after morphine and compazine. States comfort. P.A. Updated to pt is on plaquenil 400 mg daily and only knew this drug by hydroxychloroquine.      "

## 2017-04-20 NOTE — PROGRESS NOTES
Updated Carla GRAY to pt stating she takes Methotrexate, not plaquenil and flonase PRN. Pt. sleeping quietly with easy regular resp.

## 2017-04-20 NOTE — PLAN OF CARE
Problem: Goal Outcome Summary  Goal: Goal Outcome Summary  Outcome: Improving  Denies pain/abdominal pain, nausea and diarrhea. Bowel sounds active x4 quads. States comfort.

## 2017-04-20 NOTE — IP AVS SNAPSHOT
[facility]    [address] [address]    Phone:  [phone]    Fax:  [phone]                                       After Visit Summary   4/20/2017    Kaylene Diaz    MRN: [id number]           After Visit Summary Signature Page     I have received my discharge instructions, and my questions have been answered. I have discussed any challenges I see with this plan with the nurse or doctor.    ..........................................................................................................................................  Patient/Patient Representative Signature      ..........................................................................................................................................  Patient Representative Print Name and Relationship to Patient    ..................................................               ................................................  Date                                            Time    ..........................................................................................................................................  Reviewed by Signature/Title    ...................................................              ..............................................  Date                                                            Time

## 2017-04-20 NOTE — PROGRESS NOTES
"[place] [facility] ADMISSION NOTE    Patient admitted to room ICU 1010 at approximately 0650 via cart from emergency room. Patient was accompanied by spouse.     Verbal SBAR report received from Pat RN prior to patient arrival.     Patient ambulated to bed with stand-by assist. Patient alert and oriented X 3. The patient is not having any pain. 0-10 Pain Scale: 6. Admission vital signs: Blood pressure 140/70, temperature 98.1  F (36.7  C), temperature source Oral, resp. rate 18, height 1.702 m (5' 7\"), weight (!) 137.2 kg (302 lb 7.5 oz), SpO2 96 %. Patient was oriented to plan of care, call light, bed controls, tv, bathroom and visiting hours.     The following safety risks were identified during admission: none. Yellow risk band applied: [place] [facility]    "

## 2017-04-20 NOTE — H&P
[facility]    History and Physical  Hospital Medicine       Date of Admission:  4/20/2017  Date of Service: 4/20/2017     Assessment & Plan   Kaylene Diaz is a 61 year old female with PMH significant for recurrent SBO, hx of peritonitis s/p placement of IUD, elevated BP, HLD and RA who now presents with acute abdominal pain and nausea.      Small bowel obstruction (H)    Recurrent.  Has SBO January 2015, January 2016.  Never required surgery.  Hx of peritonitis secondary to IUD placement.  CT reveals mid to distal obstruction with proximal filling of the small bowel and distal compression  - NPO, except for meds  - not NG tube at this point in time; will need to place should vomiting become recurrent  - IVF at 125mL/hr  - encourage ambulation  - nausea control: Zofran, compazine  - pain control: dilaudid  - consider IP surgical consult should SBO not resolve hospital day 2 or 3.  Patient OK with consultation here, but should surgery become neccessary, would like to be transferred to a larger facility    BLANCA (acute kidney injury) (H)  Baseline creatinine 0.7 - 0.9.  1.38 on admission.  Likely a result of frequent emesis x8 hours.  Given 1L fluid bolus of LR in ED  - continue 0.9 NS at 125cc/hr    Leukocytosis   WBC 14.7 on arrival.  Heel strike test is negative.  There is no rebound tenderness on physical exam.  Likely secondary to stress as the patient is afebrile and does not complain of headache, cough, or dysuria.  - AM CBC with platelets    Elevated blood pressure reading without diagnosis of hypertension  Mildly hypertensive when admitted (140/70).    - recommend outpatient follow up    Elevated glucose  Glucose 206 on arrival.  Hemoglobin a1c 4.8%.   Likely secondary to stress      Hyperlipidemia LDL goal <160  Not on medication.      RA (rheumatoid arthritis) (H)  Uses methotrexate on Wednesdays.  Was unable to do so given nausea yesterday.   - attempt to get in weekly methotrexate  "today  - continue PTA plaquenil if tolerated       F: 125cc/hr 0.9NS  E: BMP in AM  N: NPO  DVT Prophylaxis: ambulatory, low risk    Code Status: Full Code    Disposition: Anticipate discharge in 2-3 days. Appropriate for inpatient care.    Case discussed with Dr. Cipriano De Santiago.  Assessment and plan as written above.    Carla Rosa PA-C  [facility]    History is obtained from the patient and review of the EMR.    Past Medical History    Past Medical History:   Diagnosis Date     Basal cell carcinoma      RA (rheumatoid arthritis) (H)      Small bowel obstruction (H)        Past Surgical History   Past Surgical History:   Procedure Laterality Date     ARTHROSCOPY KNEE  12/10/2010    ARTHROSCOPY KNEE performed by NAVID FIERRO at [facility]     SURGICAL HISTORY OF -       IUD removal     SURGICAL HISTORY OF -       Ovarian cyst     SURGICAL HISTORY OF -       thorn removal from foot     SURGICAL HISTORY OF -       knee surgery, scope, right knee       Family History    Family History   Problem Relation Age of Onset     Prostate Cancer Father      Eye Disorder Father      mac degen     HEART DISEASE Father      HEART DISEASE Mother      a-fib     Eye Disorder Mother      C.A.D. Mother      Hypertension Mother      Alcohol/Drug Brother      Alcohol/Drug Sister      Alcohol/Drug Brother      Alcohol/Drug Brother      Alcohol/Drug Brother      Alcohol/Drug Sister      Obesity Sister      Alcohol/Drug Sister      Prostate Cancer Sister      CANCER Other      Melanoma No family hx of        History of Present Illness   Kaylene Diaz is a 61 year old female with PMH significant for recurrent SBO, hx of peritonitis s/p placement of IUD, elevated BP, HLD and RA who now presents with acute abdominal pain and nausea.    The patient reports she was eating a bowel of carrots at 7pm yesterday evening.  She noted immediate onset of bloating and abdominal pain.  She reports her abdomen was \"8x too big.\"  She " "immediately attempted to take charcoal pills and water (her usual remedy for such abdominal pain and bloating), but felt very nauseated when attempting to get down the large glass of water.  At 9pm, the patient noted acute onset of nausea and emesis.  Emesis consisted of her dinner.  She denies blood.  She had one, relatively normal BM at that time. She attempted to go to sleep and was able to do so for a few hours.  At 1am, the patient woke up and was again, acutely nauseated.  She reports she threw up \"buckets\" of yellow/brown/black/grey material.  She again, threw up \"buckets\" at 2am.  At 2am, she also had one episodes of loose, watery brown diarrhea.  At 3am, the patient again had acute emesis and elected to present to ED for further evaluation.  The patient has had similar episodes in 2015 and 2016 which were deemed small bowel obstructions.  She has never needed surgery for these.  She had peritonitis which required major surgery x2 in the 1970s.  She still has her appendix, gallbladder, and uterus.  She denies EtOH.  She denies tobacco use.  She otherwise denies fever, chills, myalgias, cold-like symptoms (congestion, pharyngitis, sinus pressure, rhinorrhea), swollen glands, headaches, lightheadedness, dizziness, chest pain, palpitations/flutters, SOB, cough, wheezes, dysuria, hematuria, joint swelling, joint pain, leg swelling, and rashes within the last two weeks.      Prior to Admission Medications   Prior to Admission Medications   Prescriptions Last Dose Informant Patient Reported? Taking?   Multiple Vitamins-Minerals (ADULT GUMMY PO) Past Week at Unknown time Self Yes Yes   Sig: Take 1 chew tab by mouth daily VitaCrave   Multiple Vitamins-Minerals (HAIR/SKIN/NAILS/BIOTIN PO) Past Week at Unknown time Self Yes Yes   Sig: Take 1 capsule by mouth daily.    fluticasone (VERAMYST) 27.5 MCG/SPRAY spray Past Month at Unknown time Self Yes Yes   Sig: Spray 1 spray into both nostrils 2 times daily " "  hydroxychloroquine (PLAQUENIL) 200 MG tablet Not Taking at Unknown time Self No No   Sig: Take 2 tablets (400 mg) by mouth daily   Patient not taking: Reported on 4/20/2017   methotrexate 2.5 MG tablet CHEMO 4/12/2017 Self No Yes   Sig: Take 9 tablets (22.5 mg) by mouth once a week   vitamin  B complex with vitamin C (VITAMIN  B COMPLEX) TABS Past Week at Unknown time Self Yes Yes   Sig: Take 1 tablet by mouth daily      Facility-Administered Medications: None       Allergies   Allergies   Allergen Reactions     Metal [Yaneli] Swelling     Gold, silver  Has no problems with platinum     Latex Rash     Not all latex products       Social History   Social History     Social History     Marital status:      Spouse name: N/A     Number of children: N/A     Years of education: N/A     Occupational History      Unemployed     Social History Main Topics     Smoking status: Former Smoker     Years: 30.00     Quit date: 4/18/2000     Smokeless tobacco: Never Used     Alcohol use Yes      Comment: VERY RARE     Drug use: No     Sexual activity: Yes     Partners: Male     Other Topics Concern     Parent/Sibling W/ Cabg, Mi Or Angioplasty Before 65f 55m? No     Social History Narrative    Lives in the area.  Works a [employer].    ROS: 10 point ROS neg other than the symptoms noted above in the HPI.    Physical Exam     /70 (BP Location: Left arm)  Temp 98.1  F (36.7  C) (Oral)  Resp 18  Ht 1.702 m (5' 7\")  Wt (!) 137.2 kg (302 lb 7.5 oz)  SpO2 96%  BMI 47.37 kg/m2     Weight: 302 lbs 7.54 oz Body mass index is 47.37 kg/(m^2).     Constitutional: Alert and oriented x4.  Cooperative.  Appears older than stated age.  Appears in no acute distress. Morbidly obese  HEENT: Oropharynx is clear and moist. No evidence of cranial trauma.  Lymph/Hematologic: No occipital, submental, submandibular, anterior or posterior cervical, or supraclavicular lymphadenopathy is appreciated.  Cardiovascular: Regular rate/ rhythm.  " S1 and S2 grossly normal.  No appreciable murmur, rub, gallop.  JVP is not appreciable.  No lower extremity edema.  Respiratory: Clear to auscultation bilaterally. Equal chest expansion.  GI: Soft, no hepatosplenomegaly. Tympanic to percussion.  Largely non-tender; mild tenderness with very deep palpation just superior to the umbilicus.  Negative heel strike test.  Negative bed-shake test.  No appreciable rebound tenderness.  Bowel sounds are present, but infrequent.  Genitourinary: Deferred  Musculoskeletal: Normal muscle bulk and tone.  Skin: Warm and dry, no rashes.   Neurologic: Neck supple. Cranial nerves 3-12 are grossly intact.  is symmetric.     Data   Data reviewed today:     Recent Labs  Lab 04/20/17  0435   WBC 14.7*   HGB 15.3   MCV 92         POTASSIUM 4.6   CHLORIDE 107   CO2 26   BUN 27   CR 1.38*   ANIONGAP 9   DEAN 9.8   *   ALBUMIN 4.0   PROTTOTAL 8.6   BILITOTAL 0.4   ALKPHOS 120   ALT 24   AST 13   LIPASE 217       Recent Results (from the past 24 hour(s))   CT Abdomen Pelvis w Contrast    Narrative    CT ABDOMEN AND PELVIS WITH CONTRAST  4/20/2017 5:08 AM     HISTORY: Abdominal pain and vomiting.    COMPARISON: 1/19/2016.    TECHNIQUE: Following the uneventful administration of 100 mL  Isovue-370 intravenous contrast, helical sections were acquired from  the top of the diaphragm through the pubic symphysis. Coronal  reconstructions were generated. Radiation dose for this scan was  reduced using automated exposure control, adjustment of the mA and/or  kV according to the patient's size, or iterative reconstruction  technique.    FINDINGS:  Abdomen: 0.5 cm low attenuation lesion in the lateral segment of the  left lobe of the liver, too small to characterize. The spleen,  pancreas, adrenal glands and right kidney are unremarkable. A few  subcentimeter low attenuation lesions in the left kidney, too small to  characterize. A few small gallstones in the gallbladder. No  enlarged  lymph nodes or free fluid in the upper abdomen. Atherosclerotic  calcification in the abdominal aorta.    Scan through the lower chest is unremarkable.    Pelvis: A moderately long segment of fluid-filled mildly dilated small  bowel in the mid right hemiabdomen. The more distal small bowel and  colon are relatively decompressed. The transition from dilated to  nondilated small bowel is in the lateral aspect of the mid right  hemiabdomen. No bowel wall thickening, pneumatosis or free  intraperitoneal gas. The appendix is not definitely visualized. The  uterus is present. No enlarged lymph nodes or free fluid in the  pelvis. Large broad-based protrusion of the lateral aspect of the mid  right abdominal wall. There is a broad-based focal hernia within the  protrusion that contains small bowel and cecum. The hernia does not  cause the bowel obstruction.      Impression    IMPRESSION:   1. Mid to distal small bowel obstruction: There is a long segment of  fluid-filled mildly dilated small bowel in the right hemiabdomen and  the more distal bowel is relatively decompressed.  2. Cholelithiasis.    NOREEN YORK MD       I personally reviewed no images or EKG's today.    Carla Rosa PA-C  [facility]

## 2017-04-20 NOTE — PROGRESS NOTES
[facility] TRANSPORT NOTE  Data:   Reason for Transport:  Med/Surg pt with SBO    Kaylene Diaz was transported to [facility]00 via wheel chair at 1548.  Patient was accompanied by NST. Equipment used for transport: None. Family was aware of reason for transport: yes, called  and left a voicemail.    Action:  Report: given to Donita GONZALEZ    Response:  Patient's condition at time of transfer was: pt denies pain/abdominal pain and nausea. KAMALAS.    Denise Mcdowell

## 2017-04-20 NOTE — ED NOTES
"Ate dinner 5040-3069  approx 1900 hrs began having some abd discomfort around the mid abd  Vomiting began around 2100 hrs had a BM around 2100 then started with loose stools after that last at 0130  Has had pain and nausea/vomiting \"in waves since\"   Pt somewhat difficult to assess she does not give direct answers to questions and no expansion on statements  Presents with bag of emesis that smells strongly of stool   "

## 2017-04-20 NOTE — PLAN OF CARE
Problem: Goal Outcome Summary  Goal: Goal Outcome Summary  Outcome: Improving  Pt cont's to deny abd. pain and nausea since relief with IV compazine and IV morphine this morning and after taking po meds with sips of water. Is sleeping quietly with easy regular resp. VSS

## 2017-04-20 NOTE — ED PROVIDER NOTES
"  History     Chief Complaint   Patient presents with     Abdominal Pain     possible bowel obs     HPI  Kaylene Diaz is a 61 year old female with a history of 2 previous bowel obstructions presents for evaluation of abdominal pain with bloating, nausea, and recurrent vomiting since around 9 PM last night.  Patient reports symptoms are similar to previous bowel obstructions.  Patient reports she had moderate loose stool around 9 PM and a few episodes since but reports this has stopped and she is no longer passing stool or flatus.  Patient denies fever or chills.  Patient denies chest pain or difficulty breathing.    I have reviewed the Medications, Allergies, Past Medical and Surgical History, and Social History in the Epic system.    Review of Systems   Constitutional: Positive for appetite change. Negative for chills, fatigue and fever.   HENT: Negative for congestion.    Respiratory: Negative for cough, chest tightness and shortness of breath.    Cardiovascular: Negative for chest pain.   Gastrointestinal: Positive for abdominal distention, abdominal pain, diarrhea, nausea and vomiting.   Genitourinary: Negative for decreased urine volume.   Skin: Negative for rash.   Neurological: Negative for light-headedness and headaches.   All other systems reviewed and are negative.      Physical Exam   BP: 119/72  Heart Rate: 88  Temp: 98.1  F (36.7  C)  Resp: 20  Height: 170.2 cm (5' 7\")  Weight: 133.8 kg (295 lb)  SpO2: 93 %  Physical Exam   Constitutional: She is oriented to person, place, and time. She appears well-developed and well-nourished. No distress.   HENT:   Head: Normocephalic and atraumatic.   Eyes: Conjunctivae are normal.   Cardiovascular: Normal rate and regular rhythm.    Pulmonary/Chest: Effort normal. No respiratory distress.   Abdominal: Soft. She exhibits distension. There is no tenderness. There is no rebound and no guarding.   Musculoskeletal: Normal range of motion.   Neurological: She is alert " and oriented to person, place, and time.   Skin: Skin is warm and dry.   Psychiatric: She has a normal mood and affect.   Nursing note and vitals reviewed.      ED Course     ED Course     Procedures        Results for orders placed or performed during the hospital encounter of 04/20/17   CT Abdomen Pelvis w Contrast    Narrative    CT ABDOMEN AND PELVIS WITH CONTRAST  4/20/2017 5:08 AM     HISTORY: Abdominal pain and vomiting.    COMPARISON: 1/19/2016.    TECHNIQUE: Following the uneventful administration of 100 mL  Isovue-370 intravenous contrast, helical sections were acquired from  the top of the diaphragm through the pubic symphysis. Coronal  reconstructions were generated. Radiation dose for this scan was  reduced using automated exposure control, adjustment of the mA and/or  kV according to the patient's size, or iterative reconstruction  technique.    FINDINGS:  Abdomen: 0.5 cm low attenuation lesion in the lateral segment of the  left lobe of the liver, too small to characterize. The spleen,  pancreas, adrenal glands and right kidney are unremarkable. A few  subcentimeter low attenuation lesions in the left kidney, too small to  characterize. A few small gallstones in the gallbladder. No enlarged  lymph nodes or free fluid in the upper abdomen. Atherosclerotic  calcification in the abdominal aorta.    Scan through the lower chest is unremarkable.    Pelvis: A moderately long segment of fluid-filled mildly dilated small  bowel in the mid right hemiabdomen. The more distal small bowel and  colon are relatively decompressed. The transition from dilated to  nondilated small bowel is in the lateral aspect of the mid right  hemiabdomen. No bowel wall thickening, pneumatosis or free  intraperitoneal gas. The appendix is not definitely visualized. The  uterus is present. No enlarged lymph nodes or free fluid in the  pelvis. Large broad-based protrusion of the lateral aspect of the mid  right abdominal wall. There  is a broad-based focal hernia within the  protrusion that contains small bowel and cecum. The hernia does not  cause the bowel obstruction.      Impression    IMPRESSION:   1. Mid to distal small bowel obstruction: There is a long segment of  fluid-filled mildly dilated small bowel in the right hemiabdomen and  the more distal bowel is relatively decompressed.  2. Cholelithiasis.    NOREEN YORK MD   CBC with platelets differential   Result Value Ref Range    WBC 14.7 (H) 4.0 - 11.0 10e9/L    RBC Count 4.87 3.8 - 5.2 10e12/L    Hemoglobin 15.3 11.7 - 15.7 g/dL    Hematocrit 45.0 35.0 - 47.0 %    MCV 92 78 - 100 fl    MCH 31.4 26.5 - 33.0 pg    MCHC 34.0 31.5 - 36.5 g/dL    RDW 13.6 10.0 - 15.0 %    Platelet Count 343 150 - 450 10e9/L    Diff Method Automated Method     % Neutrophils 88.8 %    % Lymphocytes 2.3 %    % Monocytes 8.5 %    % Eosinophils 0.1 %    % Basophils 0.1 %    % Immature Granulocytes 0.2 %    Absolute Neutrophil 13.1 (H) 1.6 - 8.3 10e9/L    Absolute Lymphocytes 0.3 (L) 0.8 - 5.3 10e9/L    Absolute Monocytes 1.3 0.0 - 1.3 10e9/L    Absolute Eosinophils 0.0 0.0 - 0.7 10e9/L    Absolute Basophils 0.0 0.0 - 0.2 10e9/L    Abs Immature Granulocytes 0.0 0 - 0.4 10e9/L   Comprehensive metabolic panel   Result Value Ref Range    Sodium 142 133 - 144 mmol/L    Potassium 4.6 3.4 - 5.3 mmol/L    Chloride 107 94 - 109 mmol/L    Carbon Dioxide 26 20 - 32 mmol/L    Anion Gap 9 3 - 14 mmol/L    Glucose 206 (H) 70 - 99 mg/dL    Urea Nitrogen 27 7 - 30 mg/dL    Creatinine 1.38 (H) 0.52 - 1.04 mg/dL    GFR Estimate 39 (L) >60 mL/min/1.7m2    GFR Estimate If Black 47 (L) >60 mL/min/1.7m2    Calcium 9.8 8.5 - 10.1 mg/dL    Bilirubin Total 0.4 0.2 - 1.3 mg/dL    Albumin 4.0 3.4 - 5.0 g/dL    Protein Total 8.6 6.8 - 8.8 g/dL    Alkaline Phosphatase 120 40 - 150 U/L    ALT 24 0 - 50 U/L    AST 13 0 - 45 U/L   Lipase   Result Value Ref Range    Lipase 217 73 - 393 U/L   Lactic acid whole blood   Result Value Ref Range     Lactic Acid 1.5 0.7 - 2.1 mmol/L   Hemaglobin A1C   Result Value Ref Range    Hemoglobin A1C 4.8 4.3 - 6.0 %         5:11 AM: I personally reviewed the ct images - suggestive of acute small bowel obstruction. Formal read pending. Lactic acid normal patient does have a leukocytosis with left shift.    6:00 AM: Radiology read of CT confirms presence of a small bowel obstruction.  Paged hospitalist for admission.    6:00 AM: Discussed case with Dr Aguilera.     Assessments & Plan (with Medical Decision Making)  61-year-old female with a history of previous abdominal surgeries and 2 subsequent bowel obstructions presents for evaluation of abrupt onset of abdominal pain with nausea, vomiting, and several episodes of loose stool tonight.  Symptoms similar to previous bowel obstruction per report.  CT confirmed presence of a small bowel obstruction.  Lactic acid normal suggesting no ischemic bowel.  Creatinine slightly elevated at 1.3 suggestive renal insufficiency.  Patient hydrated with IV fluids.  Patient admitted hospital floor for continued monitoring, nothing by mouth, and IV hydration.  No NG tube was placed as patient's nausea was controlled with IV Zofran.       I have reviewed the nursing notes.    I have reviewed the findings, diagnosis, plan and need for follow up with the patient.    New Prescriptions    No medications on file       Final diagnoses:   Small bowel obstruction (H)   Renal insufficiency   Hyperglycemia       4/20/2017   [facility] EMERGENCY DEPARTMENT     Day, Daniel Cline MD  04/20/17 0606

## 2017-04-20 NOTE — IP AVS SNAPSHOT
MRN:[id number]                      After Visit Summary   4/20/2017    Kaylene Diaz    MRN: [id number]           Thank you!     Thank you for choosing [facility] for your care. Our goal is always to provide you with excellent care. Hearing back from our patients is one way we can continue to improve our services. Please take a few minutes to complete the written survey that you may receive in the mail after you visit with us. Thank you!        Patient Information     Date Of Birth          1955        Designated Caregiver       Most Recent Value    Caregiver    Will someone help with your care after discharge? yes    Name of designated caregiver Denilson Diaz    Phone number of caregiver [phone]    Caregiver address [id number] [place] [address]      About your hospital stay     You were admitted on:  April 20, 2017 You last received care in the:  [facility]    You were discharged on:  April 21, 2017       Who to Call     For medical emergencies, please call 911.  For non-urgent questions about your medical care, please call your primary care provider or clinic, [phone]          Attending Provider     Provider Specialty    Day, Daniel Cline MD Emergency Medicine    [facility], Gabriel Aj MD [facility]    [facility], Cipriano MYERS MD [facility]       Primary Care Provider Office Phone # Fax #    Hitesh Washington [phone]       [facility] [address] [place] [address]        Your next 10 appointments already scheduled     May 08, 2017 11:00 AM CDT   Office Visit with Hitesh Washington MD   [facility] ([facility]    [address] [facility] [place] [address] [phone]           Bring a current list of meds and any records pertaining to this visit.  For Physicals, please bring immunization records and any forms needing to be filled out.  Please arrive 10 minutes early to  "complete paperwork.              Further instructions from your care team       Would  follow up in clinic in the next 7 days.   Go up slowly on diet.      Pending Results     No orders found for last 3 day(s).            Statement of Approval     Ordered          04/21/17 1051  I have reviewed and agree with all the recommendations and orders detailed in this document.  EFFECTIVE NOW     Approved and electronically signed by:  Cipriano De Santiago MD             Admission Information     Date & Time Provider Department Dept. Phone    4/20/2017 Cipriano De Santiago MD [facility] [phone]      Your Vitals Were     Blood Pressure Pulse Temperature Respirations Height Weight    121/69 (BP Location: Right arm) 85 98.5  F (36.9  C) (Oral) 18 1.702 m (5' 7\") 140.4 kg (309 lb 8.4 oz)    Pulse Oximetry BMI (Body Mass Index)                93% 48.48 kg/m2          MyChart Information     [employer] gives you secure access to your electronic health record. If you see a primary care provider, you can also send messages to your care team and make appointments. If you have questions, please call your primary care clinic.  If you do not have a primary care provider, please call [phone] and they will assist you.        Care EveryWhere ID     This is your Care EveryWhere ID. This could be used by other organizations to access your [facility] medical records  [id number]           Review of your medicines      CONTINUE these medicines which have NOT CHANGED        Dose / Directions    fluticasone 27.5 MCG/SPRAY spray   Commonly known as:  VERAMYST        Dose:  1 spray   Spray 1 spray into both nostrils 2 times daily   Refills:  0       * ADULT GUMMY PO        Dose:  1 chew tab   Take 1 chew tab by mouth daily VitaCrave   Refills:  0       * HAIR/SKIN/NAILS/BIOTIN PO   Used for:  RA (rheumatoid arthritis) (H)        Dose:  1 capsule   Take 1 capsule by mouth daily.   Quantity:  100 tablet   Refills:  3       " hydroxychloroquine 200 MG tablet   Commonly known as:  PLAQUENIL   Used for:  Rheumatoid arthritis of hand, unspecified laterality, unspecified rheumatoid factor presence (H)        Dose:  400 mg   Take 2 tablets (400 mg) by mouth daily   Quantity:  180 tablet   Refills:  3       methotrexate 2.5 MG tablet CHEMO   Used for:  Rheumatoid arthritis of multiple sites without rheumatoid factor (H)        Dose:  22.5 mg   Take 9 tablets (22.5 mg) by mouth once a week   Quantity:  120 tablet   Refills:  3       vitamin B complex with vitamin C Tabs tablet   Used for:  RA (rheumatoid arthritis) (H)        Dose:  1 tablet   Take 1 tablet by mouth daily   Refills:  0       * Notice:  This list has 2 medication(s) that are the same as other medications prescribed for you. Read the directions carefully, and ask your doctor or other care provider to review them with you.             Protect others around you: Learn how to safely use, store and throw away your medicines at www.disposemymeds.org.             Medication List: This is a list of all your medications and when to take them. Check marks below indicate your daily home schedule. Keep this list as a reference.      Medications           Morning Afternoon Evening Bedtime As Needed    fluticasone 27.5 MCG/SPRAY spray   Commonly known as:  VERAMYST   Spray 1 spray into both nostrils 2 times daily                                      * ADULT GUMMY PO   Take 1 chew tab by mouth daily VitaCrave                                   * HAIR/SKIN/NAILS/BIOTIN PO   Take 1 capsule by mouth daily.                                   hydroxychloroquine 200 MG tablet   Commonly known as:  PLAQUENIL   Take 2 tablets (400 mg) by mouth daily   Last time this was given:  400 mg on 4/21/2017  8:17 AM                                   methotrexate 2.5 MG tablet CHEMO   Take 9 tablets (22.5 mg) by mouth once a week   Last time this was given:  22.5 mg on 4/20/2017 12:53 PM                                 vitamin B complex with vitamin C Tabs tablet   Take 1 tablet by mouth daily                                   * Notice:  This list has 2 medication(s) that are the same as other medications prescribed for you. Read the directions carefully, and ask your doctor or other care provider to review them with you.

## 2017-04-21 VITALS
WEIGHT: 293 LBS | TEMPERATURE: 98.5 F | BODY MASS INDEX: 45.99 KG/M2 | HEART RATE: 85 BPM | HEIGHT: 67 IN | RESPIRATION RATE: 18 BRPM | DIASTOLIC BLOOD PRESSURE: 69 MMHG | SYSTOLIC BLOOD PRESSURE: 121 MMHG | OXYGEN SATURATION: 93 %

## 2017-04-21 LAB
ANION GAP SERPL CALCULATED.3IONS-SCNC: 7 MMOL/L (ref 3–14)
BASOPHILS # BLD AUTO: 0 10E9/L (ref 0–0.2)
BASOPHILS NFR BLD AUTO: 0.2 %
BUN SERPL-MCNC: 18 MG/DL (ref 7–30)
CALCIUM SERPL-MCNC: 8.3 MG/DL (ref 8.5–10.1)
CHLORIDE SERPL-SCNC: 111 MMOL/L (ref 94–109)
CO2 SERPL-SCNC: 27 MMOL/L (ref 20–32)
CREAT SERPL-MCNC: 0.92 MG/DL (ref 0.52–1.04)
DIFFERENTIAL METHOD BLD: NORMAL
EOSINOPHIL # BLD AUTO: 0.2 10E9/L (ref 0–0.7)
EOSINOPHIL NFR BLD AUTO: 4.5 %
ERYTHROCYTE [DISTWIDTH] IN BLOOD BY AUTOMATED COUNT: 13.5 % (ref 10–15)
GFR SERPL CREATININE-BSD FRML MDRD: 62 ML/MIN/1.7M2
GLUCOSE SERPL-MCNC: 109 MG/DL (ref 70–99)
HCT VFR BLD AUTO: 37.8 % (ref 35–47)
HGB BLD-MCNC: 12.3 G/DL (ref 11.7–15.7)
IMM GRANULOCYTES # BLD: 0 10E9/L (ref 0–0.4)
IMM GRANULOCYTES NFR BLD: 0.2 %
LYMPHOCYTES # BLD AUTO: 1.5 10E9/L (ref 0.8–5.3)
LYMPHOCYTES NFR BLD AUTO: 35.5 %
MCH RBC QN AUTO: 31.1 PG (ref 26.5–33)
MCHC RBC AUTO-ENTMCNC: 32.5 G/DL (ref 31.5–36.5)
MCV RBC AUTO: 96 FL (ref 78–100)
MONOCYTES # BLD AUTO: 0.5 10E9/L (ref 0–1.3)
MONOCYTES NFR BLD AUTO: 12.2 %
NEUTROPHILS # BLD AUTO: 2 10E9/L (ref 1.6–8.3)
NEUTROPHILS NFR BLD AUTO: 47.4 %
PLATELET # BLD AUTO: 245 10E9/L (ref 150–450)
POTASSIUM SERPL-SCNC: 4 MMOL/L (ref 3.4–5.3)
RBC # BLD AUTO: 3.96 10E12/L (ref 3.8–5.2)
SODIUM SERPL-SCNC: 145 MMOL/L (ref 133–144)
WBC # BLD AUTO: 4.3 10E9/L (ref 4–11)

## 2017-04-21 PROCEDURE — 85025 COMPLETE CBC W/AUTO DIFF WBC: CPT | Performed by: PHYSICIAN ASSISTANT

## 2017-04-21 PROCEDURE — 80048 BASIC METABOLIC PNL TOTAL CA: CPT | Performed by: PHYSICIAN ASSISTANT

## 2017-04-21 PROCEDURE — 99238 HOSP IP/OBS DSCHRG MGMT 30/<: CPT | Performed by: FAMILY MEDICINE

## 2017-04-21 PROCEDURE — 36415 COLL VENOUS BLD VENIPUNCTURE: CPT | Performed by: PHYSICIAN ASSISTANT

## 2017-04-21 PROCEDURE — 25000132 ZZH RX MED GY IP 250 OP 250 PS 637: Performed by: PHYSICIAN ASSISTANT

## 2017-04-21 RX ADMIN — HYDROXYCHLOROQUINE SULFATE 400 MG: 200 TABLET, FILM COATED ENTERAL at 08:17

## 2017-04-21 NOTE — DISCHARGE SUMMARY
[facility] Discharge Summary    Kaylene Diaz MRN# [id number]   Age: 61 year old YOB: 1955     Date of Admission:  4/20/2017  Date of Discharge::  4/21/2017 12:17 PM  Admitting Physician:  Cipriano De Santiago MD  Discharge Physician:  Cipriano De Santiago MD  Primary Physician: Hitesh Washington       Home clinic: [facility]               Discharge Diagnosis:   Principle diagnosis: SBO due to adhesions     Secondary diagnoses:  BLANCA  RA      Discharge Instructions:   Would  follow up in clinic in the next 7 days.   Go up slowly on diet.      Follow up with primary care provider in 7 days        Procedures:       Results for orders placed or performed during the hospital encounter of 04/20/17   CT Abdomen Pelvis w Contrast    Narrative    CT ABDOMEN AND PELVIS WITH CONTRAST  4/20/2017 5:08 AM     HISTORY: Abdominal pain and vomiting.    COMPARISON: 1/19/2016.    TECHNIQUE: Following the uneventful administration of 100 mL  Isovue-370 intravenous contrast, helical sections were acquired from  the top of the diaphragm through the pubic symphysis. Coronal  reconstructions were generated. Radiation dose for this scan was  reduced using automated exposure control, adjustment of the mA and/or  kV according to the patient's size, or iterative reconstruction  technique.    FINDINGS:  Abdomen: 0.5 cm low attenuation lesion in the lateral segment of the  left lobe of the liver, too small to characterize. The spleen,  pancreas, adrenal glands and right kidney are unremarkable. A few  subcentimeter low attenuation lesions in the left kidney, too small to  characterize. A few small gallstones in the gallbladder. No enlarged  lymph nodes or free fluid in the upper abdomen. Atherosclerotic  calcification in the abdominal aorta.    Scan through the lower chest is unremarkable.    Pelvis: A moderately long segment of fluid-filled mildly dilated small  bowel in the mid right hemiabdomen. The more  distal small bowel and  colon are relatively decompressed. The transition from dilated to  nondilated small bowel is in the lateral aspect of the mid right  hemiabdomen. No bowel wall thickening, pneumatosis or free  intraperitoneal gas. The appendix is not definitely visualized. The  uterus is present. No enlarged lymph nodes or free fluid in the  pelvis. Large broad-based protrusion of the lateral aspect of the mid  right abdominal wall. There is a broad-based focal hernia within the  protrusion that contains small bowel and cecum. The hernia does not  cause the bowel obstruction.      Impression    IMPRESSION:   1. Mid to distal small bowel obstruction: There is a long segment of  fluid-filled mildly dilated small bowel in the right hemiabdomen and  the more distal bowel is relatively decompressed.  2. Cholelithiasis.    NOREEN YORK MD                    Allergies:      Allergies   Allergen Reactions     Metal [Yaneli] Swelling     Gold, silver  Has no problems with platinum     Latex Rash     Not all latex products                  Discharge Medications:     Current Discharge Medication List      CONTINUE these medications which have NOT CHANGED    Details   fluticasone (VERAMYST) 27.5 MCG/SPRAY spray Spray 1 spray into both nostrils 2 times daily      methotrexate 2.5 MG tablet CHEMO Take 9 tablets (22.5 mg) by mouth once a week  Qty: 120 tablet, Refills: 3    Associated Diagnoses: Rheumatoid arthritis of multiple sites without rheumatoid factor (H)      Multiple Vitamins-Minerals (ADULT GUMMY PO) Take 1 chew tab by mouth daily VitaCrave      vitamin  B complex with vitamin C (VITAMIN  B COMPLEX) TABS Take 1 tablet by mouth daily  Refills: 0    Associated Diagnoses: RA (rheumatoid arthritis) (H)      Multiple Vitamins-Minerals (HAIR/SKIN/NAILS/BIOTIN PO) Take 1 capsule by mouth daily.   Qty: 100 tablet, Refills: 3    Associated Diagnoses: RA (rheumatoid arthritis) (H)      hydroxychloroquine (PLAQUENIL)  "200 MG tablet Take 2 tablets (400 mg) by mouth daily  Qty: 180 tablet, Refills: 3    Associated Diagnoses: Rheumatoid arthritis of hand, unspecified laterality, unspecified rheumatoid factor presence (H)                   Consultations:   None           Brief History of Presenting Illness:   Kaylene Diaz is a 61 year old female with PMH significant for recurrent SBO, hx of peritonitis s/p placement of IUD, elevated BP, HLD and RA who now presents with acute abdominal pain and nausea.     The patient reports she was eating a bowel of carrots at 7pm yesterday evening. She noted immediate onset of bloating and abdominal pain. She reports her abdomen was \"8x too big.\" She immediately attempted to take charcoal pills and water (her usual remedy for such abdominal pain and bloating), but felt very nauseated when attempting to get down the large glass of water. At 9pm, the patient noted acute onset of nausea and emesis. Emesis consisted of her dinner. She denies blood. She had one, relatively normal BM at that time. She attempted to go to sleep and was able to do so for a few hours. At 1am, the patient woke up and was again, acutely nauseated. She reports she threw up \"buckets\" of yellow/brown/black/grey material. She again, threw up \"buckets\" at 2am. At 2am, she also had one episodes of loose, watery brown diarrhea. At 3am, the patient again had acute emesis and elected to present to ED for further evaluation. The patient has had similar episodes in 2015 and 2016 which were deemed small bowel obstructions. She has never needed surgery for these. She had peritonitis which required major surgery x2 in the 1970s. She still has her appendix, gallbladder, and uterus. She denies EtOH. She denies tobacco use. She otherwise denies fever, chills, myalgias, cold-like symptoms (congestion, pharyngitis, sinus pressure, rhinorrhea), swollen glands, headaches, lightheadedness, dizziness, chest pain, palpitations/flutters, SOB, cough, " "wheezes, dysuria, hematuria, joint swelling, joint pain, leg swelling, and rashes within the last two weeks.             Hospital Course:   Small bowel obstruction (H)   Recurrent. Has SBO January 2015, January 2016. Never required surgery. Hx of peritonitis secondary to IUD placement. CT reveals mid to distal obstruction with proximal filling of the small bowel and distal compression  - resolved much more quickly than she has ever done in the past.    -was able to be discharged after 36 hrs.       BLANCA (acute kidney injury) (H)  Baseline creatinine 0.7 - 0.9. 1.38 on admission. Likely a result of frequent emesis x8 hours. Given 1L fluid bolus of LR in ED  - continue 0.9 NS at 125cc/hr--resolved      Leukocytosis  WBC 14.7 on arrival. Heel strike test is negative. There is no rebound tenderness on physical exam. Likely secondary to stress as the patient is afebrile and does not complain of headache, cough, or dysuria.  - AM CBC with platelets--resolved.      Elevated blood pressure reading without diagnosis of hypertension  Mildly hypertensive when admitted (140/70).   - recommend outpatient follow up     Elevated glucose  Glucose 206 on arrival. Hemoglobin a1c 4.8%. Likely secondary to stress      Hyperlipidemia LDL goal <160  Not on medication.      RA (rheumatoid arthritis) (H)  Uses methotrexate on Wednesdays. Was unable to do so given nausea yesterday.   - attempt to get in weekly methotrexate today  - continue PTA plaquenil if tolerated            Discharge Exam:   OBJECTIVE:   /69 (BP Location: Right arm)  Pulse 85  Temp 98.5  F (36.9  C) (Oral)  Resp 18  Ht 1.702 m (5' 7\")  Wt (!) 140.4 kg (309 lb 8.4 oz)  SpO2 93%  BMI 48.48 kg/m2    GENERAL APPEARANCE:  Alert, NAD, Ox3     RESP:clear      CV: regular rates and rhythm,no murmur, no click or rub - no edema     Abdomen: soft, nontender, no liver or spleen enlargement, no masses, BSs normal   Skin: no cyanosis, pallor, or jaundice            " Pending Tests at Discharge:     Unresulted Labs Ordered in the Past 30 Days of this Admission     No orders found for last 61 day(s).                   Discharge Disposition:   Discharged to home      Attestation:  Amount of time performed on this discharge : 30 minutes.    Cipriano De Santiago MD

## 2017-04-21 NOTE — PROGRESS NOTES
[facility] DISCHARGE NOTE    Patient discharged to home at 12:20 PM via ambulation. Accompanied by spouse and staff. Discharge instructions reviewed with patient, opportunity offered to ask questions. Prescriptions - None ordered for discharge. All belongings sent with patient.    Zulay Alva

## 2017-04-21 NOTE — PROGRESS NOTES
"Pt tolerated full liquids for breakfast as well as some toast- Denies pain. Reports \"mild bloating\". BM noted this morning. Will continue to monitor.  Vandana Alva RN BSN    "

## 2017-04-21 NOTE — PLAN OF CARE
Problem: Pain, Acute (Adult)  Goal: Acceptable Pain Control/Comfort Level  Patient will demonstrate the desired outcomes by discharge/transition of care.   Outcome: Improving  Took in broth last night and drinking coffee this am. Pt voices, pain has been gone since having the BM last scot and feels that her symptoms have resolved.   +flatus this am.

## 2017-04-21 NOTE — PLAN OF CARE
Problem: Goal Outcome Summary  Goal: Goal Outcome Summary  Outcome: Improving  Pt passing some gas early this evening, then had a good bowel movement (per her report), followed by another small bowel movement.  No complaints of pain or nausea.  Pt was started on clear liquids, drinking mostly water but also had broth and lemon ice and tolerated those without complaints of pain or nausea.  Pt is up ad clari and ambulated in the rivers independently.

## 2017-04-24 ENCOUNTER — TELEPHONE (OUTPATIENT)
Dept: FAMILY MEDICINE | Facility: CLINIC | Age: 62
End: 2017-04-24

## 2017-04-24 NOTE — TELEPHONE ENCOUNTER
ED/UC/IP follow up phone call:   04/21/17  Small Bowel Obstruction    RN please call to follow up    Number of ED visits in past 12 mths:   0    Terrie [facility] [place]

## 2017-04-24 NOTE — TELEPHONE ENCOUNTER
ED / Discharge Outreach Protocol    Patient Contact    Attempt # 1    Was call answered?  No.  Left message on voicemail with information to call me back.    Oksana LEAHY Rn

## 2017-04-28 ENCOUNTER — OFFICE VISIT (OUTPATIENT)
Dept: FAMILY MEDICINE | Facility: CLINIC | Age: 62
End: 2017-04-28
Payer: COMMERCIAL

## 2017-04-28 VITALS
OXYGEN SATURATION: 95 % | DIASTOLIC BLOOD PRESSURE: 80 MMHG | TEMPERATURE: 98.2 F | SYSTOLIC BLOOD PRESSURE: 164 MMHG | WEIGHT: 293 LBS | BODY MASS INDEX: 47.09 KG/M2 | HEIGHT: 66 IN

## 2017-04-28 DIAGNOSIS — R63.5 WEIGHT GAIN: ICD-10-CM

## 2017-04-28 DIAGNOSIS — H61.23 BILATERAL IMPACTED CERUMEN: ICD-10-CM

## 2017-04-28 DIAGNOSIS — J20.9 ACUTE BRONCHITIS, UNSPECIFIED ORGANISM: Primary | ICD-10-CM

## 2017-04-28 LAB — TSH SERPL DL<=0.005 MIU/L-ACNC: 2.52 MU/L (ref 0.4–4)

## 2017-04-28 PROCEDURE — 84443 ASSAY THYROID STIM HORMONE: CPT | Performed by: INTERNAL MEDICINE

## 2017-04-28 PROCEDURE — 99214 OFFICE O/P EST MOD 30 MIN: CPT | Performed by: INTERNAL MEDICINE

## 2017-04-28 PROCEDURE — 36415 COLL VENOUS BLD VENIPUNCTURE: CPT | Performed by: INTERNAL MEDICINE

## 2017-04-28 RX ORDER — ALBUTEROL SULFATE 90 UG/1
2 AEROSOL, METERED RESPIRATORY (INHALATION) EVERY 6 HOURS PRN
Qty: 1 INHALER | Refills: 0 | Status: SHIPPED | OUTPATIENT
Start: 2017-04-28 | End: 2018-01-16

## 2017-04-28 RX ORDER — PREDNISONE 20 MG/1
40 TABLET ORAL DAILY
Qty: 10 TABLET | Refills: 0 | Status: SHIPPED | OUTPATIENT
Start: 2017-04-28 | End: 2017-05-03

## 2017-04-28 NOTE — PATIENT INSTRUCTIONS

## 2017-04-28 NOTE — NURSING NOTE
"Chief Complaint   Patient presents with     Cough     x 1 week, no fever associated      Blood Draw     patient requesting thyroid tests       Initial /80 (BP Location: Left arm, Patient Position: Chair, Cuff Size: Adult Large)  Temp 98.2  F (36.8  C) (Tympanic)  Ht 5' 6.25\" (1.683 m)  Wt (!) 305 lb (138.3 kg)  SpO2 95%  BMI 48.86 kg/m2 Estimated body mass index is 48.86 kg/(m^2) as calculated from the following:    Height as of this encounter: 5' 6.25\" (1.683 m).    Weight as of this encounter: 305 lb (138.3 kg).  Medication Reconciliation: complete   Yoselyn FARR CMA ([facility])    "

## 2017-04-28 NOTE — PROGRESS NOTES
SUBJECTIVE:                                                    Kaylene Diaz is a 61 year old female who presents to clinic today for the following health issues:  Chief Complaint   Patient presents with     Cough     x 1 week, no fever associated      Blood Draw     patient requesting thyroid tests     ENT Symptoms             Symptoms: cc Present Absent Comment   Fever/Chills   x    Fatigue  x     Muscle Aches   x    Eye Irritation   x    Sneezing   x    Nasal Arnaldo/Drg  x     Sinus Pressure/Pain   x    Loss of smell   x    Dental pain   x    Sore Throat   x    Swollen Glands   x    Ear Pain/Fullness   x    Cough x x  Coughing fits and unable to breath, productive of clear mucus   Wheeze  x     Chest Pain  x  Pressure not pain    Shortness of breath  x     Rash       Other    Having RA flare     Symptom duration:  x 1 week- started 2 days after discharge from hospital   Symptom severity:     Treatments tried:  OTC cough medication, Flonase and Claritin    Contacts:   w/ sinus infection and patient  just got out of hospital,        She is also concerned about her thyroid function since she is having weight gain.  Also has fatigue, chronic dry skin.  Reports trouble with temp regulation.  No constipation, diarrhea, numbness, or tingling.        Problem list and histories reviewed & adjusted, as indicated.  Additional history: as documented    Current Outpatient Prescriptions   Medication Sig Dispense Refill     Loratadine (CLARITIN PO)        predniSONE (DELTASONE) 20 MG tablet Take 2 tablets (40 mg) by mouth daily for 5 days 10 tablet 0     albuterol (PROAIR HFA/PROVENTIL HFA/VENTOLIN HFA) 108 (90 BASE) MCG/ACT Inhaler Inhale 2 puffs into the lungs every 6 hours as needed for shortness of breath / dyspnea or wheezing 1 Inhaler 0     fluticasone (VERAMYST) 27.5 MCG/SPRAY spray Spray 1 spray into both nostrils 2 times daily       hydroxychloroquine (PLAQUENIL) 200 MG tablet Take 2 tablets (400 mg) by mouth  "daily 180 tablet 3     methotrexate 2.5 MG tablet CHEMO Take 9 tablets (22.5 mg) by mouth once a week 120 tablet 3     Multiple Vitamins-Minerals (ADULT GUMMY PO) Take 1 chew tab by mouth daily VitaCrave       vitamin  B complex with vitamin C (VITAMIN  B COMPLEX) TABS Take 1 tablet by mouth daily  0     Multiple Vitamins-Minerals (HAIR/SKIN/NAILS/BIOTIN PO) Take 1 capsule by mouth daily.  100 tablet 3     Allergies   Allergen Reactions     Gold Rash     Latex Rash     Not all latex products       Reviewed and updated as needed this visit by clinical staff  Tobacco  Allergies  Med Hx  Surg Hx  Fam Hx  Soc Hx      Reviewed and updated as needed this visit by Provider         ROS:  Constitutional, HEENT, pulmonary, endo systems are negative, except as otherwise noted.    OBJECTIVE:                                                    /80 (BP Location: Left arm, Patient Position: Chair, Cuff Size: Adult Large)  Temp 98.2  F (36.8  C) (Tympanic)  Ht 5' 6.25\" (1.683 m)  Wt (!) 305 lb (138.3 kg)  SpO2 95%  BMI 48.86 kg/m2  Body mass index is 48.86 kg/(m^2).    GENERAL: alert and no distress  EYES: Eyes grossly normal to inspection, PERRL and conjunctivae and sclerae normal  HENT: ear canals occluded by wax, sinuses non tender to percussion, nose and mouth without ulcers or lesions, oropharynx red but no tonsillar exudates  NECK: no adenopathy, no asymmetry, masses, or scars  RESP: lungs clear to auscultation - no rales, rhonchi or wheezes  CV: regular rate and rhythm, normal S1 S2, no S3 or S4, no murmur, click or rub         ASSESSMENT/PLAN:                                                        1. Acute bronchitis, unspecified organism    Vital signs are stable and lungs are clear, so low suspicion of pneumonia.  Symptoms likely viral.  She is reporting having SOB and wheezes, so will do a course of prednisone and albuterol.  Incidentally, she is having a RA flare, so pred will hopefully be beneficial for " that as well.      - predniSONE (DELTASONE) 20 MG tablet; Take 2 tablets (40 mg) by mouth daily for 5 days  Dispense: 10 tablet; Refill: 0  - albuterol (PROAIR HFA/PROVENTIL HFA/VENTOLIN HFA) 108 (90 BASE) MCG/ACT Inhaler; Inhale 2 puffs into the lungs every 6 hours as needed for shortness of breath / dyspnea or wheezing  Dispense: 1 Inhaler; Refill: 0    2. Weight gain    She presents with some symptoms consistent with hypothyroidism, so will check TSH.      - TSH with free T4 reflex    3. Bilateral impacted cerumen    Both canals contained a fair amount of cerumen as visualized by otoscope.  The MA irrigated the canal, with good results.   Procedure time 10 minutes.       Follow-up as needed.      Darius Sanchez MD  [facility]

## 2017-04-28 NOTE — MR AVS SNAPSHOT
After Visit Summary   4/28/2017    Kaylene Diaz    MRN: [id number]           Patient Information     Date Of Birth          1955        Visit Information        Provider Department      4/28/2017 8:40 AM Darius Sanchez MD [facility]        Today's Diagnoses     Weight gain    -  1    Acute bronchitis, unspecified organism          Care Instructions      Bronchitis, Viral (Adult)    You have a viral bronchitis. Bronchitis is inflammation and swelling of the lining of the lungs. This is often caused by an infection. Symptoms include a dry, hacking cough that is worse at night. The cough may bring up yellow-green mucus. You may also feel short of breath or wheeze. Other symptoms may include tiredness, chest discomfort, and chills.  Bronchitis that is caused by a virus is not treated with antibiotics. Instead, medicines may be given to help relieve symptoms. Symptoms can last up to 2 weeks, although the cough may last much longer.  This illness is contagious during the first few days and is spread through the air by coughing and sneezing, or by direct contact (touching the sick person and then touching your own eyes, nose, or mouth).  Most viral illnesses resolve within 10 to 14 days with rest and simple home remedies, although they may sometimes last for several weeks.  Home care    If symptoms are severe, rest at home for the first 2 to 3 days. When you go back to your usual activities, don't let yourself get too tired.    Do not smoke. Also avoid being exposed to secondhand smoke.    You may use over-the-counter medicine to control fever or pain, unless another pain medicine was prescribed. (Note: If you have chronic liver or kidney disease or have ever had a stomach ulcer or gastrointestinal bleeding, talk with your healthcare provider before using these medicines. Also talk to your provider if you are taking medicine to prevent blood clots.) Aspirin should never be given to anyone  younger than 18 years of age who is ill with a viral infection or fever. It may cause severe liver or brain damage.    Your appetite may be poor, so a light diet is fine. Avoid dehydration by drinking 6 to 8 glasses of fluids per day (such as water, soft drinks, sports drinks, juices, tea, or soup). Extra fluids will help loosen secretions in the nose and lungs.    Over-the-counter cough, cold, and sore-throat medicines will not shorten the length of the illness, but they may help to reduce symptoms. (Note: Do not use decongestants if you have high blood pressure.)  Follow-up care  Follow up with your healthcare provider, or as advised.  If you had an X-ray or ECG (electrocardiogram), a specialist will review it. You will be notified of any new findings that may affect your care.  Note: If you are age 65 or older, or if you have a chronic lung disease or condition that affects your immune system, or you smoke, talk to your healthcare provider about having pneumococcal vaccinations and a yearly influenza vaccination (flu shot).  When to seek medical advice   Call your healthcare provider right away if any of these occur:    Fever of 100.4 F (38 C) or higher    Coughing up increased amounts of colored sputum    Weakness, drowsiness, headache, facial pain, ear pain, or a stiff neck  Call 911, or get immediate medical care  Contact emergency services right away if any of these occur:    Coughing up blood    Worsening weakness, drowsiness, headache, or stiff neck    Trouble breathing, wheezing, or pain with breathing    0177-8438 [employer]. [address] [place] [address]. All rights reserved. This information is not intended as a substitute for professional medical care. Always follow your healthcare professional's instructions.              Follow-ups after your visit        Your next 10 appointments already scheduled     May 08, 2017 11:00 AM CDT   Office Visit with Hitesh Washington MD  "  [facility]    [address]  [facility] [id number] [phone]           Bring a current list of meds and any records pertaining to this visit.  For Physicals, please bring immunization records and any forms needing to be filled out.  Please arrive 10 minutes early to complete paperwork.              Who to contact     If you have questions or need follow up information about today's clinic visit or your schedule please contact [facility] directly at [phone].  Normal or non-critical lab and imaging results will be communicated to you by [facility], letter or phone within 4 business days after the clinic has received the results. If you do not hear from us within 7 days, please contact the clinic through [employer] or phone. If you have a critical or abnormal lab result, we will notify you by phone as soon as possible.  Submit refill requests through [employer] or call your pharmacy and they will forward the refill request to us. Please allow 3 business days for your refill to be completed.          Additional Information About Your Visit        [facility] [employer] Information     [employer] gives you secure access to your electronic health record. If you see a primary care provider, you can also send messages to your care team and make appointments. If you have questions, please call your primary care clinic.  If you do not have a primary care provider, please call [phone] and they will assist you.        Care EveryWhere ID     This is your Care EveryWhere ID. This could be used by other organizations to access your [place] medical records  [id number]        Your Vitals Were     Temperature Height Pulse Oximetry BMI (Body Mass Index)          98.2  F (36.8  C) (Tympanic) 5' 6.25\" (1.683 m) 95% 48.86 kg/m2         Blood Pressure from Last 3 Encounters:   04/28/17 164/80   04/21/17 121/69   04/19/17 147/78    Weight from Last 3 Encounters:   04/28/17 (!) 305 " lb (138.3 kg)   04/21/17 (!) 309 lb 8.4 oz (140.4 kg)   03/20/17 (!) 305 lb (138.3 kg)              We Performed the Following     TSH with free T4 reflex          Today's Medication Changes          These changes are accurate as of: 4/28/17  9:18 AM.  If you have any questions, ask your nurse or doctor.               Start taking these medicines.        Dose/Directions    albuterol 108 (90 BASE) MCG/ACT Inhaler   Commonly known as:  PROAIR HFA/PROVENTIL HFA/VENTOLIN HFA   Used for:  Acute bronchitis, unspecified organism   Started by:  Darius Sanchez MD        Dose:  2 puff   Inhale 2 puffs into the lungs every 6 hours as needed for shortness of breath / dyspnea or wheezing   Quantity:  1 Inhaler   Refills:  0       predniSONE 20 MG tablet   Commonly known as:  DELTASONE   Used for:  Acute bronchitis, unspecified organism   Started by:  Darius Sanchez MD        Dose:  40 mg   Take 2 tablets (40 mg) by mouth daily for 5 days   Quantity:  10 tablet   Refills:  0            Where to get your medicines      These medications were sent to [facility] - [place] - [address]  [address] [place] [address]     Phone:  [phone]     albuterol 108 (90 BASE) MCG/ACT Inhaler    predniSONE 20 MG tablet                Primary Care Provider Office Phone # Fax #    Hitesh Washington [phone]       [facility] [address] [facility] [address]        Thank you!     Thank you for choosing [facility]  for your care. Our goal is always to provide you with excellent care. Hearing back from our patients is one way we can continue to improve our services. Please take a few minutes to complete the written survey that you may receive in the mail after your visit with us. Thank you!             Your Updated Medication List - Protect others around you: Learn how to safely use, store and throw away your medicines at www.disposemymeds.org.          This list is accurate as of:  4/28/17  9:18 AM.  Always use your most recent med list.                   Brand Name Dispense Instructions for use    albuterol 108 (90 BASE) MCG/ACT Inhaler    PROAIR HFA/PROVENTIL HFA/VENTOLIN HFA    1 Inhaler    Inhale 2 puffs into the lungs every 6 hours as needed for shortness of breath / dyspnea or wheezing       CLARITIN PO          fluticasone 27.5 MCG/SPRAY spray    VERAMYST     Spray 1 spray into both nostrils 2 times daily       * ADULT GUMMY PO      Take 1 chew tab by mouth daily VitaCrave       * HAIR/SKIN/NAILS/BIOTIN PO     100 tablet    Take 1 capsule by mouth daily.       hydroxychloroquine 200 MG tablet    PLAQUENIL    180 tablet    Take 2 tablets (400 mg) by mouth daily       methotrexate 2.5 MG tablet CHEMO     120 tablet    Take 9 tablets (22.5 mg) by mouth once a week       predniSONE 20 MG tablet    DELTASONE    10 tablet    Take 2 tablets (40 mg) by mouth daily for 5 days       vitamin B complex with vitamin C Tabs tablet      Take 1 tablet by mouth daily       * Notice:  This list has 2 medication(s) that are the same as other medications prescribed for you. Read the directions carefully, and ask your doctor or other care provider to review them with you.

## 2017-05-01 ENCOUNTER — MYC MEDICAL ADVICE (OUTPATIENT)
Dept: FAMILY MEDICINE | Facility: CLINIC | Age: 62
End: 2017-05-01

## 2017-05-01 DIAGNOSIS — J20.9 ACUTE BRONCHITIS WITH SYMPTOMS > 10 DAYS: Primary | ICD-10-CM

## 2017-05-01 RX ORDER — AZITHROMYCIN 250 MG/1
TABLET, FILM COATED ORAL
Qty: 6 TABLET | Refills: 0 | Status: SHIPPED | OUTPATIENT
Start: 2017-05-01 | End: 2017-05-08

## 2017-05-03 ENCOUNTER — MYC MEDICAL ADVICE (OUTPATIENT)
Dept: RHEUMATOLOGY | Facility: CLINIC | Age: 62
End: 2017-05-03

## 2017-05-03 DIAGNOSIS — J20.9 ACUTE BRONCHITIS, UNSPECIFIED ORGANISM: ICD-10-CM

## 2017-05-03 RX ORDER — PREDNISONE 10 MG/1
30 TABLET ORAL DAILY
Qty: 40 TABLET | Refills: 0 | Status: SHIPPED | OUTPATIENT
Start: 2017-05-03 | End: 2017-05-08

## 2017-05-08 ENCOUNTER — TELEPHONE (OUTPATIENT)
Dept: FAMILY MEDICINE | Facility: CLINIC | Age: 62
End: 2017-05-08

## 2017-05-08 ENCOUNTER — OFFICE VISIT (OUTPATIENT)
Dept: FAMILY MEDICINE | Facility: CLINIC | Age: 62
End: 2017-05-08
Payer: COMMERCIAL

## 2017-05-08 VITALS
HEART RATE: 68 BPM | DIASTOLIC BLOOD PRESSURE: 84 MMHG | SYSTOLIC BLOOD PRESSURE: 158 MMHG | BODY MASS INDEX: 45.99 KG/M2 | TEMPERATURE: 97.7 F | HEIGHT: 67 IN | WEIGHT: 293 LBS | OXYGEN SATURATION: 97 %

## 2017-05-08 DIAGNOSIS — E78.5 HYPERLIPIDEMIA LDL GOAL <160: Chronic | ICD-10-CM

## 2017-05-08 DIAGNOSIS — R05.9 COUGH: Primary | ICD-10-CM

## 2017-05-08 DIAGNOSIS — E66.01 MORBID OBESITY DUE TO EXCESS CALORIES (H): ICD-10-CM

## 2017-05-08 DIAGNOSIS — J31.0 CHRONIC RHINITIS: ICD-10-CM

## 2017-05-08 PROCEDURE — 99214 OFFICE O/P EST MOD 30 MIN: CPT | Performed by: FAMILY MEDICINE

## 2017-05-08 NOTE — PROGRESS NOTES
SUBJECTIVE:                                                    Kaylene Diaz is a 61 year old female who presents to clinic today for the following health issues:  Chief Complaint   Patient presents with     Hospital F/U     4/20 admit, discharge 4/21 for Small bowel obstruction. Those symptom are gone.     URI     has had severe cough with phlegm since being discharged. Dr. Sanchez put her on Prednisone. Also Z-pack finished 3 days ago. But still has clear to yellow phelgm and post nasal drainage.       Hospital Follow-up Visit:      Hospital/Nursing Home/IP Rehab Facility: [facility]  Date of Admission: 4/20/2017  Date of Discharge: 4/21/2017  Reason(s) for Admission: SBO            Problems taking medications regularly:  None       Medication changes since discharge: Has been put on Prednisone for URI, finished Z-Pack 3 days agp       Problems adhering to non-medication therapy:  None    Summary of hospitalization:  [facility] discharge summary reviewed  Diagnostic Tests/Treatments reviewed.  Follow up needed: none  Other Healthcare Providers Involved in Patient s Care:         None  Update since discharge: improved. Still has cough and pnd    Post Discharge Medication Reconciliation: discharge medications reconciled and changed, per note/orders (see AVS).  Plan of care communicated with patient     Coding guidelines for this visit:  Type of Medical   Decision Making Face-to-Face Visit       within 7 Days of discharge Face-to-Face Visit        within 14 days of discharge   Moderate Complexity 78095 [id number]   High Complexity 52379 37600                Problem list and histories reviewed & adjusted, as indicated.  Additional history: as documented        Reviewed and updated as needed this visit by clinical staff  Tobacco  Allergies  Med Hx  Surg Hx  Fam Hx  Soc Hx      Reviewed and updated as needed this visit by Provider         ROS:  CONSTITUTIONAL:NEGATIVE for fever, chills, change in  "weight  INTEGUMENTARY/SKIN: NEGATIVE for worrisome rashes, moles or lesions  ENT/MOUTH: as above  RESP:NEGATIVE for significant cough or SOB  CV: NEGATIVE for chest pain, palpitations or peripheral edema  MUSCULOSKELETAL: NEGATIVE for significant arthralgias or myalgia  NEURO: NEGATIVE for weakness, dizziness or paresthesias  PSYCHIATRIC: NEGATIVE for changes in mood or affect    OBJECTIVE:                                                    /84 (Cuff Size: Adult Large)  Pulse 68  Temp 97.7  F (36.5  C) (Tympanic)  Ht 5' 7\" (1.702 m)  Wt (!) 307 lb (139.3 kg)  SpO2 97%  BMI 48.08 kg/m2  Body mass index is 48.08 kg/(m^2).  GENERAL APPEARANCE: alert, no distress, cooperative and Nourishment morbidly obese   HENT: ear canals and TM's normal and nose and mouth without ulcers or lesions,  Has off white pnd noted on exam  NECK: no adenopathy, no asymmetry, masses, or scars and thyroid normal to palpation  RESP: lungs clear to auscultation - no rales, rhonchi or wheezes  CV: regular rates and rhythm, normal S1 S2, no S3 or S4 and no murmur, click or rub  SKIN: no suspicious lesions or rashes  NEURO: Normal strength and tone, mentation intact and speech normal  PSYCH: mentation appears normal and affect normal/bright         ASSESSMENT/PLAN:                                                    1. Cough  Patient Instructions   You are doing better from your small bowel obstruction.    Continue to drink water and high fiber diet.    You are on the maximum dose regarding allergies.    Your cough will likely need time to improve.    If you still have a cough with sputum and some post nasal drip in one week please let me known I would consider doxycycline 100 mg taking one tab twice a day for 10 days this would be for sinus infection.          Thank you for choosing [facility].  You may be receiving a survey in the mail from [employer] regarding your visit today.  Please take a few minutes to complete and return " the survey to let us know how we are doing.      If you have questions or concerns, please contact us via [employer] or you can contact your care team at [phone].    Our Clinic hours are:  Monday 6:40 am  to 7:00 pm  Tuesday -Friday 6:40 am to 5:00 pm    The [place] outpatient lab hours are:  Monday - Friday 6:10 am to 4:45 pm  Saturdays 7:00 am to 11:00 am  Appointments are required, call [phone]    If you have clinical questions after hours or would like to schedule an appointment,  call the clinic at [phone].        2. Morbid obesity due to excess calories (H)  Recommend to lose weight, stay active    3. Hyperlipidemia LDL goal <160  Need to check labs  - Lipid panel reflex to direct LDL; Future    4. Chronic rhinitis  Already on max meds with abx, nasal steroid, and oral steroids      See Patient Instructions    Hitesh Washington MD  [facility]

## 2017-05-08 NOTE — TELEPHONE ENCOUNTER
Call Regarding Preventive Health Screening Mammogram    Attempt 1    Message     Comments: patient will call back when she has time      Outreach   Tata Barajas

## 2017-05-08 NOTE — NURSING NOTE
"Chief Complaint   Patient presents with     Hospital F/U     4/20 admit, discharge 4/21 for Small bowel obstruction. Those symptom are gone.     URI     has had severe cough with phlegm since being discharged. Dr. Sanchez put her on Prednisone. Also Z-pack finished 3 days ago. But still has clear to yellow phelgm and post nasal drainage.       Initial /84 (Cuff Size: Adult Large)  Pulse 68  Temp 97.7  F (36.5  C) (Tympanic)  Ht 5' 7\" (1.702 m)  Wt (!) 307 lb (139.3 kg)  SpO2 97%  BMI 48.08 kg/m2 Estimated body mass index is 48.08 kg/(m^2) as calculated from the following:    Height as of this encounter: 5' 7\" (1.702 m).    Weight as of this encounter: 307 lb (139.3 kg).  Medication Reconciliation: complete  "

## 2017-05-08 NOTE — PATIENT INSTRUCTIONS
You are doing better from your small bowel obstruction.    Continue to drink water and high fiber diet.    You are on the maximum dose regarding allergies.    Your cough will likely need time to improve.    If you still have a cough with sputum and some post nasal drip in one week please let me known I would consider doxycycline 100 mg taking one tab twice a day for 10 days this would be for sinus infection.          Thank you for choosing [facility].  You may be receiving a survey in the mail from [facility] regarding your visit today.  Please take a few minutes to complete and return the survey to let us know how we are doing.      If you have questions or concerns, please contact us via [employer] or you can contact your care team at [phone].    Our Clinic hours are:  Monday 6:40 am  to 7:00 pm  Tuesday -Friday 6:40 am to 5:00 pm    The [place] outpatient lab hours are:  Monday - Friday 6:10 am to 4:45 pm  Saturdays 7:00 am to 11:00 am  Appointments are required, call [phone]    If you have clinical questions after hours or would like to schedule an appointment,  call the clinic at [phone].

## 2017-05-08 NOTE — MR AVS SNAPSHOT
After Visit Summary   5/8/2017    Kaylene Diaz    MRN: [id number]           Patient Information     Date Of Birth          1955        Visit Information        Provider Department      5/8/2017 11:00 AM Hitesh Washington MD [facility]        Today's Diagnoses     Cough    -  1    Chronic allergic conjunctivitis        Hyperlipidemia LDL goal <160          Care Instructions    You are doing better from your small bowel obstruction.    Continue to drink water and high fiber diet.    You are on the maximum dose regarding allergies.    Your cough will likely need time to improve.    If you still have a cough with sputum and some post nasal drip in one week please let me known I would consider doxycycline 100 mg taking one tab twice a day for 10 days this would be for sinus infection.          Thank you for choosing [facility].  You may be receiving a survey in the mail from [employer]re[employer] regarding your visit today.  Please take a few minutes to complete and return the survey to let us know how we are doing.      If you have questions or concerns, please contact us via [employer] or you can contact your care team at [phone].    Our Clinic hours are:  Monday 6:40 am  to 7:00 pm  Tuesday -Friday 6:40 am to 5:00 pm    The [place] outpatient lab hours are:  Monday - Friday 6:10 am to 4:45 pm  Saturdays 7:00 am to 11:00 am  Appointments are required, call [phone]    If you have clinical questions after hours or would like to schedule an appointment,  call the clinic at [phone].          Follow-ups after your visit        Future tests that were ordered for you today     Open Future Orders        Priority Expected Expires Ordered    Lipid panel reflex to direct LDL Routine  11/6/2017 5/8/2017            Who to contact     If you have questions or need follow up information about today's clinic visit or your schedule please contact [facility] directly at  "[phone].  Normal or non-critical lab and imaging results will be communicated to you by MyChart, letter or phone within 4 business days after the clinic has received the results. If you do not hear from us within 7 days, please contact the clinic through [facility] or phone. If you have a critical or abnormal lab result, we will notify you by phone as soon as possible.  Submit refill requests through [employer] or call your pharmacy and they will forward the refill request to us. Please allow 3 business days for your refill to be completed.          Additional Information About Your Visit        [facility] Information     [employer] gives you secure access to your electronic health record. If you see a primary care provider, you can also send messages to your care team and make appointments. If you have questions, please call your primary care clinic.  If you do not have a primary care provider, please call [phone] and they will assist you.        Care EveryWhere ID     This is your Care EveryWhere ID. This could be used by other organizations to access your [place] medical records  [id number]        Your Vitals Were     Pulse Temperature Height Pulse Oximetry BMI (Body Mass Index)       68 97.7  F (36.5  C) (Tympanic) 5' 7\" (1.702 m) 97% 48.08 kg/m2        Blood Pressure from Last 3 Encounters:   05/08/17 158/84   04/28/17 164/80   04/21/17 121/69    Weight from Last 3 Encounters:   05/08/17 (!) 307 lb (139.3 kg)   04/28/17 (!) 305 lb (138.3 kg)   04/21/17 (!) 309 lb 8.4 oz (140.4 kg)               Primary Care Provider Office Phone # Fax #    Hitesh Washington [phone]       [facility] [address] [address]        Thank you!     Thank you for choosing [facility]  for your care. Our goal is always to provide you with excellent care. Hearing back from our patients is one way we can continue to improve our services. Please take a few minutes to complete " the written survey that you may receive in the mail after your visit with us. Thank you!             Your Updated Medication List - Protect others around you: Learn how to safely use, store and throw away your medicines at www.disposemymeds.org.          This list is accurate as of: 5/8/17 11:21 AM.  Always use your most recent med list.                   Brand Name Dispense Instructions for use    albuterol 108 (90 BASE) MCG/ACT Inhaler    PROAIR HFA/PROVENTIL HFA/VENTOLIN HFA    1 Inhaler    Inhale 2 puffs into the lungs every 6 hours as needed for shortness of breath / dyspnea or wheezing       CLARITIN PO          fluticasone 27.5 MCG/SPRAY spray    VERAMYST     Spray 1 spray into both nostrils 2 times daily       * ADULT GUMMY PO      Take 1 chew tab by mouth daily VitaCrave       * HAIR/SKIN/NAILS/BIOTIN PO     100 tablet    Take 1 capsule by mouth daily.       hydroxychloroquine 200 MG tablet    PLAQUENIL    180 tablet    Take 2 tablets (400 mg) by mouth daily       methotrexate 2.5 MG tablet CHEMO     120 tablet    Take 9 tablets (22.5 mg) by mouth once a week       predniSONE 10 MG tablet    DELTASONE    40 tablet    Take 3 tablets (30 mg) by mouth daily for 5 days Then 2 tablets (20mg) daily X 5 days, and then begin to reduce by 5mg every 5 days.       vitamin B complex with vitamin C Tabs tablet      Take 1 tablet by mouth daily       * Notice:  This list has 2 medication(s) that are the same as other medications prescribed for you. Read the directions carefully, and ask your doctor or other care provider to review them with you.

## 2017-05-15 ENCOUNTER — MYC MEDICAL ADVICE (OUTPATIENT)
Dept: FAMILY MEDICINE | Facility: CLINIC | Age: 62
End: 2017-05-15

## 2017-05-15 NOTE — TELEPHONE ENCOUNTER
Patient has [employer] message for you  Looks like FYI    Routing to provider.    Oksana LEAHY Rn

## 2017-05-18 NOTE — TELEPHONE ENCOUNTER
Noted improvement,  I will hold on antibiotic usage per my last AVS handout.  Hitesh Washington MD  Family Medicine

## 2017-06-23 DIAGNOSIS — M06.9 RHEUMATOID ARTHRITIS OF HAND, UNSPECIFIED LATERALITY, UNSPECIFIED RHEUMATOID FACTOR PRESENCE: ICD-10-CM

## 2017-06-23 LAB
ALBUMIN SERPL-MCNC: 3.8 G/DL (ref 3.4–5)
ALP SERPL-CCNC: 120 U/L (ref 40–150)
ALT SERPL W P-5'-P-CCNC: 26 U/L (ref 0–50)
ANION GAP SERPL CALCULATED.3IONS-SCNC: 9 MMOL/L (ref 3–14)
AST SERPL W P-5'-P-CCNC: 22 U/L (ref 0–45)
BASOPHILS # BLD AUTO: 0 10E9/L (ref 0–0.2)
BASOPHILS NFR BLD AUTO: 0.3 %
BILIRUB SERPL-MCNC: 0.4 MG/DL (ref 0.2–1.3)
BUN SERPL-MCNC: 14 MG/DL (ref 7–30)
CALCIUM SERPL-MCNC: 9 MG/DL (ref 8.5–10.1)
CHLORIDE SERPL-SCNC: 106 MMOL/L (ref 94–109)
CO2 SERPL-SCNC: 24 MMOL/L (ref 20–32)
CREAT SERPL-MCNC: 0.75 MG/DL (ref 0.52–1.04)
DIFFERENTIAL METHOD BLD: NORMAL
EOSINOPHIL # BLD AUTO: 0.1 10E9/L (ref 0–0.7)
EOSINOPHIL NFR BLD AUTO: 1.6 %
ERYTHROCYTE [DISTWIDTH] IN BLOOD BY AUTOMATED COUNT: 13.8 % (ref 10–15)
GFR SERPL CREATININE-BSD FRML MDRD: 78 ML/MIN/1.7M2
GLUCOSE SERPL-MCNC: 115 MG/DL (ref 70–99)
HCT VFR BLD AUTO: 41.5 % (ref 35–47)
HGB BLD-MCNC: 14 G/DL (ref 11.7–15.7)
LYMPHOCYTES # BLD AUTO: 1.3 10E9/L (ref 0.8–5.3)
LYMPHOCYTES NFR BLD AUTO: 17.4 %
MCH RBC QN AUTO: 32.3 PG (ref 26.5–33)
MCHC RBC AUTO-ENTMCNC: 33.7 G/DL (ref 31.5–36.5)
MCV RBC AUTO: 96 FL (ref 78–100)
MONOCYTES # BLD AUTO: 0.5 10E9/L (ref 0–1.3)
MONOCYTES NFR BLD AUTO: 7 %
NEUTROPHILS # BLD AUTO: 5.5 10E9/L (ref 1.6–8.3)
NEUTROPHILS NFR BLD AUTO: 73.7 %
PLATELET # BLD AUTO: 274 10E9/L (ref 150–450)
POTASSIUM SERPL-SCNC: 4.4 MMOL/L (ref 3.4–5.3)
PROT SERPL-MCNC: 7.5 G/DL (ref 6.8–8.8)
RBC # BLD AUTO: 4.34 10E12/L (ref 3.8–5.2)
SODIUM SERPL-SCNC: 139 MMOL/L (ref 133–144)
WBC # BLD AUTO: 7.4 10E9/L (ref 4–11)

## 2017-06-23 PROCEDURE — 36415 COLL VENOUS BLD VENIPUNCTURE: CPT | Performed by: INTERNAL MEDICINE

## 2017-06-23 PROCEDURE — 80053 COMPREHEN METABOLIC PANEL: CPT | Performed by: INTERNAL MEDICINE

## 2017-06-23 PROCEDURE — 85025 COMPLETE CBC W/AUTO DIFF WBC: CPT | Performed by: INTERNAL MEDICINE

## 2017-08-07 ENCOUNTER — OFFICE VISIT (OUTPATIENT)
Dept: RHEUMATOLOGY | Facility: CLINIC | Age: 62
End: 2017-08-07
Payer: COMMERCIAL

## 2017-08-07 VITALS
DIASTOLIC BLOOD PRESSURE: 77 MMHG | WEIGHT: 293 LBS | RESPIRATION RATE: 16 BRPM | SYSTOLIC BLOOD PRESSURE: 142 MMHG | HEART RATE: 84 BPM | TEMPERATURE: 97.8 F | BODY MASS INDEX: 49.65 KG/M2

## 2017-08-07 DIAGNOSIS — M06.09 RHEUMATOID ARTHRITIS OF MULTIPLE SITES WITHOUT RHEUMATOID FACTOR (H): ICD-10-CM

## 2017-08-07 DIAGNOSIS — M06.9 RHEUMATOID ARTHRITIS OF HAND, UNSPECIFIED LATERALITY, UNSPECIFIED RHEUMATOID FACTOR PRESENCE: ICD-10-CM

## 2017-08-07 PROCEDURE — 99214 OFFICE O/P EST MOD 30 MIN: CPT | Performed by: INTERNAL MEDICINE

## 2017-08-07 RX ORDER — PREDNISONE 5 MG/1
TABLET ORAL
Qty: 50 TABLET | Refills: 1 | Status: SHIPPED | OUTPATIENT
Start: 2017-08-07 | End: 2018-03-26

## 2017-08-07 RX ORDER — HYDROXYCHLOROQUINE SULFATE 200 MG/1
400 TABLET, FILM COATED ORAL DAILY
Qty: 180 TABLET | Refills: 3 | Status: SHIPPED | OUTPATIENT
Start: 2017-08-07 | End: 2018-07-23

## 2017-08-07 NOTE — NURSING NOTE
"Chief Complaint   Patient presents with     Recheck Medication     RA       Initial /77 (BP Location: Left arm, Patient Position: Chair)  Pulse 84  Temp 97.8  F (36.6  C) (Oral)  Resp 16  Wt (!) 317 lb (143.8 kg)  BMI 49.65 kg/m2 Estimated body mass index is 49.65 kg/(m^2) as calculated from the following:    Height as of 5/8/17: 5' 7\" (1.702 m).    Weight as of this encounter: 317 lb (143.8 kg).  Medication Reconciliation: complete   Louisa Goldberg LPN    "

## 2017-08-07 NOTE — PROGRESS NOTES
SUBJECTIVE:  Mark Diaz is seen back in followup for her seropositive rheumatoid arthritis, for the last 6 weeks she has been having more pain and swelling involving her hands, primarily at the right third MCP joint and several of the PIP joints.  Prior to this, she did have an upper respiratory infection that lasted for quite some time and required ongoing treatment.  Otherwise, she has not missed any doses of methotrexate or Plaquenil and remains on stable doses of these medications.  At this point, she was stiff for over an hour in the morning and has difficulty with fine motor skills, manipulation types of task with her hands due to the ongoing pain, swelling and stiffness.      PHYSICAL EXAMINATION:   VITAL SIGNS:  Blood pressure 142/77, pulse 84.   LUNGS:  Clear.   HEART:  Regular.   MUCULOSKELETAL:  There is moderate to severe synovitis of the right third MCP joint, mild to moderate synovitis of bilateral 2, 3, 4 PIP joints.  Mild synovitis of the left second and third MCP joint.  No synovitis of the elbows, shoulders, knees or ankles.   SKIN:  Without lesions.      IMPRESSION:  Seropositive rheumatoid arthritis flareup.      RECOMMENDATIONS:   1.  Continue methotrexate 22.5 mg weekly.   2.  Continue Plaquenil 400 mg daily.   3.  Course of prednisone 20 mg daily for 5 days, then taper by 5 mg every 5 days.  If not improved by 5 days let me know, if the symptoms return or she is tapering then unfortunately she will need to move on to a biologic such as Humira.  Educational material provided regarding Humira.  She will let me know if things do not improve and do not stay improved.  If they do, she can remain on current regimen and she will need followup in 3 months.         RUBEN CAMARGO MD             D: 2017 14:41   T: 2017 15:51   MT: MARCUS#[id number]      Name:     MARK DIAZ   MRN:      [id number]        Account:      [id number]   :      1955           Visit Date:   2017       Document: [id number]

## 2017-08-26 ENCOUNTER — HEALTH MAINTENANCE LETTER (OUTPATIENT)
Age: 62
End: 2017-08-26

## 2017-10-31 ENCOUNTER — TELEPHONE (OUTPATIENT)
Dept: DERMATOLOGY | Facility: CLINIC | Age: 62
End: 2017-10-31

## 2017-10-31 ENCOUNTER — OFFICE VISIT (OUTPATIENT)
Dept: DERMATOLOGY | Facility: CLINIC | Age: 62
End: 2017-10-31
Payer: COMMERCIAL

## 2017-10-31 VITALS — DIASTOLIC BLOOD PRESSURE: 91 MMHG | SYSTOLIC BLOOD PRESSURE: 157 MMHG | HEART RATE: 77 BPM | HEIGHT: 67 IN

## 2017-10-31 DIAGNOSIS — C44.319 BASAL CELL CARCINOMA, FOREHEAD: ICD-10-CM

## 2017-10-31 DIAGNOSIS — L82.1 SK (SEBORRHEIC KERATOSIS): ICD-10-CM

## 2017-10-31 DIAGNOSIS — C44.519 BASAL CELL CARCINOMA OF SCAPULAR REGION: ICD-10-CM

## 2017-10-31 DIAGNOSIS — C44.01 BASAL CELL CARCINOMA, LIP: ICD-10-CM

## 2017-10-31 DIAGNOSIS — L81.4 LENTIGO: ICD-10-CM

## 2017-10-31 DIAGNOSIS — Z85.828 HISTORY OF BASAL CELL CANCER: Primary | ICD-10-CM

## 2017-10-31 DIAGNOSIS — L57.0 AK (ACTINIC KERATOSIS): ICD-10-CM

## 2017-10-31 DIAGNOSIS — M06.9 RHEUMATOID ARTHRITIS OF HAND, UNSPECIFIED LATERALITY, UNSPECIFIED RHEUMATOID FACTOR PRESENCE: ICD-10-CM

## 2017-10-31 DIAGNOSIS — C44.319 BASAL CELL CARCINOMA OF RIGHT CHEEK: ICD-10-CM

## 2017-10-31 LAB
ALBUMIN SERPL-MCNC: 3.7 G/DL (ref 3.4–5)
ALP SERPL-CCNC: 131 U/L (ref 40–150)
ALT SERPL W P-5'-P-CCNC: 24 U/L (ref 0–50)
ANION GAP SERPL CALCULATED.3IONS-SCNC: 5 MMOL/L (ref 3–14)
AST SERPL W P-5'-P-CCNC: 17 U/L (ref 0–45)
BASOPHILS # BLD AUTO: 0 10E9/L (ref 0–0.2)
BASOPHILS NFR BLD AUTO: 0.4 %
BILIRUB SERPL-MCNC: 0.4 MG/DL (ref 0.2–1.3)
BUN SERPL-MCNC: 22 MG/DL (ref 7–30)
CALCIUM SERPL-MCNC: 9.2 MG/DL (ref 8.5–10.1)
CHLORIDE SERPL-SCNC: 108 MMOL/L (ref 94–109)
CO2 SERPL-SCNC: 24 MMOL/L (ref 20–32)
CREAT SERPL-MCNC: 0.88 MG/DL (ref 0.52–1.04)
DIFFERENTIAL METHOD BLD: NORMAL
EOSINOPHIL # BLD AUTO: 0.2 10E9/L (ref 0–0.7)
EOSINOPHIL NFR BLD AUTO: 1.7 %
ERYTHROCYTE [DISTWIDTH] IN BLOOD BY AUTOMATED COUNT: 13.7 % (ref 10–15)
GFR SERPL CREATININE-BSD FRML MDRD: 65 ML/MIN/1.7M2
GLUCOSE SERPL-MCNC: 84 MG/DL (ref 70–99)
HCT VFR BLD AUTO: 42.7 % (ref 35–47)
HGB BLD-MCNC: 14.2 G/DL (ref 11.7–15.7)
LYMPHOCYTES # BLD AUTO: 1.8 10E9/L (ref 0.8–5.3)
LYMPHOCYTES NFR BLD AUTO: 19.7 %
MCH RBC QN AUTO: 31.6 PG (ref 26.5–33)
MCHC RBC AUTO-ENTMCNC: 33.3 G/DL (ref 31.5–36.5)
MCV RBC AUTO: 95 FL (ref 78–100)
MONOCYTES # BLD AUTO: 0.8 10E9/L (ref 0–1.3)
MONOCYTES NFR BLD AUTO: 8.3 %
NEUTROPHILS # BLD AUTO: 6.4 10E9/L (ref 1.6–8.3)
NEUTROPHILS NFR BLD AUTO: 69.9 %
PLATELET # BLD AUTO: 293 10E9/L (ref 150–450)
POTASSIUM SERPL-SCNC: 4.5 MMOL/L (ref 3.4–5.3)
PROT SERPL-MCNC: 8 G/DL (ref 6.8–8.8)
RBC # BLD AUTO: 4.5 10E12/L (ref 3.8–5.2)
SODIUM SERPL-SCNC: 137 MMOL/L (ref 133–144)
WBC # BLD AUTO: 9.1 10E9/L (ref 4–11)

## 2017-10-31 PROCEDURE — 85025 COMPLETE CBC W/AUTO DIFF WBC: CPT | Performed by: INTERNAL MEDICINE

## 2017-10-31 PROCEDURE — 36415 COLL VENOUS BLD VENIPUNCTURE: CPT | Performed by: INTERNAL MEDICINE

## 2017-10-31 PROCEDURE — 99213 OFFICE O/P EST LOW 20 MIN: CPT | Mod: 25 | Performed by: DERMATOLOGY

## 2017-10-31 PROCEDURE — 11101 HC BIOPSY SKIN/SUBQ/MUC MEM, EACH ADDTL LESION: CPT | Performed by: DERMATOLOGY

## 2017-10-31 PROCEDURE — 11100 HC BIOPSY SKIN/SUBQ/MUC MEM, SINGLE LESION: CPT | Performed by: DERMATOLOGY

## 2017-10-31 PROCEDURE — 88331 PATH CONSLTJ SURG 1 BLK 1SPC: CPT | Performed by: DERMATOLOGY

## 2017-10-31 PROCEDURE — 80053 COMPREHEN METABOLIC PANEL: CPT | Performed by: INTERNAL MEDICINE

## 2017-10-31 NOTE — PROGRESS NOTES
Kaylene Diaz is a 61 year old year old female patient here today for hx of non-melanoma skin cancer.  Today she notes non healing spots on forehea,d lip, cheek and hand.   .  Patient states this has been present for ?.  Patient reports the following symptoms:  Not healing./  .  Patient reports the following previous treatments none.  Patient reports the following modifying factors none.  Associated symptoms: none.  Patient has no other skin complaints today.  Remainder of the HPI, Meds, PMH, Allergies, FH, and SH was reviewed in chart.    Pertinent Hx:   Non-melanoma skin cancer   Past Medical History:   Diagnosis Date     Basal cell carcinoma      RA (rheumatoid arthritis) (H)      Small bowel obstruction        Past Surgical History:   Procedure Laterality Date     ARTHROSCOPY KNEE  12/10/2010    ARTHROSCOPY KNEE performed by NAVID FIERRO at [place] [facility]     SURGICAL HISTORY OF -       IUD removal     SURGICAL HISTORY OF -       Ovarian cyst     SURGICAL HISTORY OF -       thorn removal from foot     SURGICAL HISTORY OF -       knee surgery, scope, right knee        Family History   Problem Relation Age of Onset     Prostate Cancer Father      Eye Disorder Father      mac degen     HEART DISEASE Father      HEART DISEASE Mother      a-fib     Eye Disorder Mother      C.A.D. Mother      Hypertension Mother      Alcohol/Drug Brother      Alcohol/Drug Sister      Alcohol/Drug Brother      Alcohol/Drug Brother      Alcohol/Drug Brother      Alcohol/Drug Sister      Obesity Sister      Alcohol/Drug Sister      Prostate Cancer Sister      CANCER Other      Melanoma No family hx of        Social History     Social History     Marital status:      Spouse name: N/A     Number of children: N/A     Years of education: N/A     Occupational History      Unemployed     Social History Main Topics     Smoking status: Former Smoker     Years: 30.00     Quit date: 4/18/2000     Smokeless tobacco: Never Used     Alcohol use Yes     "  Comment: VERY RARE     Drug use: No     Sexual activity: Yes     Partners: Male     Other Topics Concern     Parent/Sibling W/ Cabg, Mi Or Angioplasty Before 65f 55m? No     Social History Narrative       Outpatient Encounter Prescriptions as of 10/31/2017   Medication Sig Dispense Refill     hydroxychloroquine (PLAQUENIL) 200 MG tablet Take 2 tablets (400 mg) by mouth daily 180 tablet 3     methotrexate 2.5 MG tablet CHEMO Take 9 tablets (22.5 mg) by mouth once a week 120 tablet 3     predniSONE (DELTASONE) 5 MG tablet 20 mg for 5 days, taper by 5 mg every 5 days 50 tablet 1     Loratadine (CLARITIN PO)        albuterol (PROAIR HFA/PROVENTIL HFA/VENTOLIN HFA) 108 (90 BASE) MCG/ACT Inhaler Inhale 2 puffs into the lungs every 6 hours as needed for shortness of breath / dyspnea or wheezing 1 Inhaler 0     fluticasone (VERAMYST) 27.5 MCG/SPRAY spray Spray 1 spray into both nostrils 2 times daily       Multiple Vitamins-Minerals (ADULT GUMMY PO) Take 1 chew tab by mouth daily VitaCrave       vitamin  B complex with vitamin C (VITAMIN  B COMPLEX) TABS Take 1 tablet by mouth daily  0     Multiple Vitamins-Minerals (HAIR/SKIN/NAILS/BIOTIN PO) Take 1 capsule by mouth daily.  100 tablet 3     No facility-administered encounter medications on file as of 10/31/2017.              Review Of Systems  Skin: As above  Eyes: negative  Ears/Nose/Throat: negative  Respiratory: No shortness of breath, dyspnea on exertion, cough, or hemoptysis  Cardiovascular: negative  Gastrointestinal: negative  Genitourinary: negative  Musculoskeletal: negative  Neurologic: negative  Psychiatric: negative  Hematologic/Lymphatic/Immunologic: negative  Endocrine: negative      O:   NAD, WDWN, Alert & Oriented, Mood & Affect wnl, Vitals stable   Here today alone   BP (!) 157/91  Pulse 77  Ht 1.702 m (5' 7\")   General appearance normal   Vitals stable   Alert, oriented and in no acute distress      Following lymph nodes palpated: Occipital, Cervical, " Supraclavicular no lad   Stuck on papules and brown macules on trunk and ext    L scapula 1.2cm scaly papule    R upper cut lip 7mm scaly papule    R zygoma 1cm scaly papule    R lat forehead 8mm scaly papule    L medial forehead 1cm scaly papule         The remainder of the full exam was unremarkable; the following areas were examined:  conjunctiva/lids, oral mucosa, neck, peripheral vascular system, abdomen, lymph nodes, digits/nails, eccrine and apocrine glands, scalp/hair, face, neck, chest, abdomen, buttocks, back, RUE, LUE, RLE, LLE       Eyes: Conjunctivae/lids:Normal     ENT: Lips, buccal mucosa, tongue: normal    MSK:Normal    Cardiovascular: peripheral edema none    Pulm: Breathing Normal    Lymph Nodes: No Head and Neck Lymphadenopathy     Neuro/Psych: Orientation:Normal; Mood/Affect:Normal      MICRO:     L scapula:Orthokeratosis of epidermis with a proliferation of nests of basaloid cells, with peripheral palisading and a haphazard arrangement in the center extending into the dermis.  The tumor cells have hyperchromatic nuclei. Poor cytoplasm and intercellular bridging.    R cupper cut lip::Orthokeratosis of epidermis with a proliferation of nests of basaloid cells, with peripheral palisading and a haphazard arrangement in the center extending into the dermis.  The tumor cells have hyperchromatic nuclei. Poor cytoplasm and intercellular bridging.    R zygoma:.:Orthokeratosis of epidermis with a proliferation of nests of basaloid cells, with peripheral palisading and a haphazard arrangement in the center extending into the dermis.  The tumor cells have hyperchromatic nuclei. Poor cytoplasm and intercellular bridging.    R lat forehead::Orthokeratosis of epidermis with a proliferation of nests of basaloid cells, with peripheral palisading and a haphazard arrangement in the center extending into the dermis.  The tumor cells have hyperchromatic nuclei. Poor cytoplasm and intercellular bridging.      L  medial forearm :There is hyperkeratosis and focal parakeratosis of the epidermis, overlying atypical keratinocytes,  by areas of orthokeratosis, there are scattered basal atypical keratinocytes: with varying degrees of overlying loss of maturation, hyperchromatism, pleomorphism, increased and abnormal mitoses, dyskeratosis.  The dermis shows a variable inflammatory infiltrate.   A/P:  1. Hx of non-melanoma skin cancer, seborrheic keratosis, lentigo  2. R/o basal cell carcinoma   TANGENTIAL BIOPSY IN HOUSE:  After consent, anesthesia with LEC and prep, tangential excision performed and dx above confirmed with frozen section histology.  No complications and routine wound care.  Patient told result   L scapula basal cell carcinoma   R upper lip basal cell carcinoma   R zygoma basal cell carcinoma   R lat forehead basal cell carcinoma   L forearm actinic keratosis  cryo       Schedule excision for basal cell carcinoma     BENIGN LESIONS DISCUSSED WITH PATIENT:  I discussed the specifics of tumor, prognosis, and genetics of benign lesions.  I explained that treatment of these lesions would be purely cosmetic and not medically neccessary.  I discussed with patient different removal options including excision, cautery and /or laser.      Nature and genetics of benign skin lesions dicussed with patient.  Signs and Symptoms of skin cancer discussed with patient.  ABCDEs of melanoma reviewed with patient.  Patient encouraged to perform monthly skin exams.  UV precautions reviewed with patient.  Skin care regimen reviewed with patient: Eliminate harsh soaps, i.e. Dial, zest, irsih spring; Mild soaps such as Cetaphil or Dove sensitive skin, avoid hot or cold showers, aggressive use of emollients including vanicream, cetaphil or cerave discussed with patient.    Risks of non-melanoma skin cancer discussed with patient   Return to clinic 6 months

## 2017-10-31 NOTE — PATIENT INSTRUCTIONS
Wound Care Instructions     FOR SUPERFICIAL WOUNDS     [facility] [phone]    [facility] [phone]  Left upper back, left forearm, Right forehead, right cheek and right upper lip.                     AFTER 24 HOURS YOU SHOULD REMOVE THE BANDAGE AND BEGIN DAILY DRESSING CHANGES AS FOLLOWS:     1) Remove Dressing.     2) Clean and dry the area with tap water using a Q-tip or sterile gauze pad.     3) Apply Vaseline, Aquaphor, Polysporin ointment or Bacitracin ointment over entire wound.  Do NOT use Neosporin ointment.     4) Cover the wound with a band-aid, or a sterile non-stick gauze pad and micropore paper tape      REPEAT THESE INSTRUCTIONS AT LEAST ONCE A DAY UNTIL THE WOUND HAS COMPLETELY HEALED.    It is an old wives tale that a wound heals better when it is exposed to air and allowed to dry out. The wound will heal faster with a better cosmetic result if it is kept moist with ointment and covered with a bandage.    **Do not let the wound dry out.**      Supplies Needed:      *Cotton tipped applicators (Q-tips)    *Polysporin Ointment or Bacitracin Ointment (NOT NEOSPORIN)    *Band-aids or non-stick gauze pads and micropore paper tape.      PATIENT INFORMATION:    During the healing process you will notice a number of changes. All wounds develop a small halo of redness surrounding the wound.  This means healing is occurring. Severe itching with extensive redness usually indicates sensitivity to the ointment or bandage tape used to dress the wound.  You should call our office if this develops.      Swelling  and/or discoloration around your surgical site is common, particularly when performed around the eye.    All wounds normally drain.  The larger the wound the more drainage there will be.  After 7-10 days, you will notice the wound beginning to shrink and new skin will begin to grow.  The wound is healed when you can see skin has formed over the entire area.  A healed  wound has a healthy, shiny look to the surface and is red to dark pink in color to normalize.  Wounds may take approximately 4-6 weeks to heal.  Larger wounds may take 6-8 weeks.  After the wound is healed you may discontinue dressing changes.    You may experience a sensation of tightness as your wound heals. This is normal and will gradually subside.    Your healed wound may be sensitive to temperature changes. This sensitivity improves with time, but if you re having a lot of discomfort, try to avoid temperature extremes.    Patients frequently experience itching after their wound appears to have healed because of the continue healing under the skin.  Plain Vaseline will help relieve the itching.        POSSIBLE COMPLICATIONS    BLEEDIN. Leave the bandage in place.  2. Use tightly rolled up gauze or a cloth to apply direct pressure over the bandage for 30  minutes.  3. Reapply pressure for an additional 30 minutes if necessary  4. Use additional gauze and tape to maintain pressure once the bleeding has stopped.

## 2017-10-31 NOTE — LETTER
10/31/2017         RE: Kaylene Diaz  [address] [place] [address]        Dear Colleague,    Thank you for referring your patient, Kaylene Diaz, to the [facility]. Please see a copy of my visit note below.    Kaylene Diaz is a 61 year old year old female patient here today for hx of non-melanoma skin cancer.  Today she notes non healing spots on forehea,d lip, cheek and hand.   .  Patient states this has been present for ?.  Patient reports the following symptoms:  Not healing./  .  Patient reports the following previous treatments none.  Patient reports the following modifying factors none.  Associated symptoms: none.  Patient has no other skin complaints today.  Remainder of the HPI, Meds, PMH, Allergies, FH, and SH was reviewed in chart.    Pertinent Hx:   Non-melanoma skin cancer   Past Medical History:   Diagnosis Date     Basal cell carcinoma      RA (rheumatoid arthritis) (H)      Small bowel obstruction        Past Surgical History:   Procedure Laterality Date     ARTHROSCOPY KNEE  12/10/2010    ARTHROSCOPY KNEE performed by NAVID FIERRO at [place] [facility]     SURGICAL HISTORY OF -       IUD removal     SURGICAL HISTORY OF -       Ovarian cyst     SURGICAL HISTORY OF -       thorn removal from foot     SURGICAL HISTORY OF -       knee surgery, scope, right knee        Family History   Problem Relation Age of Onset     Prostate Cancer Father      Eye Disorder Father      mac degen     HEART DISEASE Father      HEART DISEASE Mother      a-fib     Eye Disorder Mother      C.A.D. Mother      Hypertension Mother      Alcohol/Drug Brother      Alcohol/Drug Sister      Alcohol/Drug Brother      Alcohol/Drug Brother      Alcohol/Drug Brother      Alcohol/Drug Sister      Obesity Sister      Alcohol/Drug Sister      Prostate Cancer Sister      CANCER Other      Melanoma No family hx of        Social History     Social History     Marital status:      Spouse name: N/A     Number of children: N/A      Years of education: N/A     Occupational History      Unemployed     Social History Main Topics     Smoking status: Former Smoker     Years: 30.00     Quit date: 4/18/2000     Smokeless tobacco: Never Used     Alcohol use Yes      Comment: VERY RARE     Drug use: No     Sexual activity: Yes     Partners: Male     Other Topics Concern     Parent/Sibling W/ Cabg, Mi Or Angioplasty Before 65f 55m? No     Social History Narrative       Outpatient Encounter Prescriptions as of 10/31/2017   Medication Sig Dispense Refill     hydroxychloroquine (PLAQUENIL) 200 MG tablet Take 2 tablets (400 mg) by mouth daily 180 tablet 3     methotrexate 2.5 MG tablet CHEMO Take 9 tablets (22.5 mg) by mouth once a week 120 tablet 3     predniSONE (DELTASONE) 5 MG tablet 20 mg for 5 days, taper by 5 mg every 5 days 50 tablet 1     Loratadine (CLARITIN PO)        albuterol (PROAIR HFA/PROVENTIL HFA/VENTOLIN HFA) 108 (90 BASE) MCG/ACT Inhaler Inhale 2 puffs into the lungs every 6 hours as needed for shortness of breath / dyspnea or wheezing 1 Inhaler 0     fluticasone (VERAMYST) 27.5 MCG/SPRAY spray Spray 1 spray into both nostrils 2 times daily       Multiple Vitamins-Minerals (ADULT GUMMY PO) Take 1 chew tab by mouth daily VitaCrave       vitamin  B complex with vitamin C (VITAMIN  B COMPLEX) TABS Take 1 tablet by mouth daily  0     Multiple Vitamins-Minerals (HAIR/SKIN/NAILS/BIOTIN PO) Take 1 capsule by mouth daily.  100 tablet 3     No facility-administered encounter medications on file as of 10/31/2017.              Review Of Systems  Skin: As above  Eyes: negative  Ears/Nose/Throat: negative  Respiratory: No shortness of breath, dyspnea on exertion, cough, or hemoptysis  Cardiovascular: negative  Gastrointestinal: negative  Genitourinary: negative  Musculoskeletal: negative  Neurologic: negative  Psychiatric: negative  Hematologic/Lymphatic/Immunologic: negative  Endocrine: negative      O:   NAD, WDWN, Alert & Oriented, Mood &  "Affect wnl, Vitals stable   Here today alone   BP (!) 157/91  Pulse 77  Ht 1.702 m (5' 7\")   General appearance normal   Vitals stable   Alert, oriented and in no acute distress      Following lymph nodes palpated: Occipital, Cervical, Supraclavicular no lad   Stuck on papules and brown macules on trunk and ext    L scapula 1.2cm scaly papule    R upper cut lip 7mm scaly papule    R zygoma 1cm scaly papule    R lat forehead 8mm scaly papule    L medial forehead 1cm scaly papule         The remainder of the full exam was unremarkable; the following areas were examined:  conjunctiva/lids, oral mucosa, neck, peripheral vascular system, abdomen, lymph nodes, digits/nails, eccrine and apocrine glands, scalp/hair, face, neck, chest, abdomen, buttocks, back, RUE, LUE, RLE, LLE       Eyes: Conjunctivae/lids:Normal     ENT: Lips, buccal mucosa, tongue: normal    MSK:Normal    Cardiovascular: peripheral edema none    Pulm: Breathing Normal    Lymph Nodes: No Head and Neck Lymphadenopathy     Neuro/Psych: Orientation:Normal; Mood/Affect:Normal      MICRO:     L scapula:Orthokeratosis of epidermis with a proliferation of nests of basaloid cells, with peripheral palisading and a haphazard arrangement in the center extending into the dermis.  The tumor cells have hyperchromatic nuclei. Poor cytoplasm and intercellular bridging.    R cupper cut lip::Orthokeratosis of epidermis with a proliferation of nests of basaloid cells, with peripheral palisading and a haphazard arrangement in the center extending into the dermis.  The tumor cells have hyperchromatic nuclei. Poor cytoplasm and intercellular bridging.    R zygoma:.:Orthokeratosis of epidermis with a proliferation of nests of basaloid cells, with peripheral palisading and a haphazard arrangement in the center extending into the dermis.  The tumor cells have hyperchromatic nuclei. Poor cytoplasm and intercellular bridging.    R lat forehead::Orthokeratosis of epidermis with a " proliferation of nests of basaloid cells, with peripheral palisading and a haphazard arrangement in the center extending into the dermis.  The tumor cells have hyperchromatic nuclei. Poor cytoplasm and intercellular bridging.      L medial forearm :There is hyperkeratosis and focal parakeratosis of the epidermis, overlying atypical keratinocytes,  by areas of orthokeratosis, there are scattered basal atypical keratinocytes: with varying degrees of overlying loss of maturation, hyperchromatism, pleomorphism, increased and abnormal mitoses, dyskeratosis.  The dermis shows a variable inflammatory infiltrate.   A/P:  1. Hx of non-melanoma skin cancer, seborrheic keratosis, lentigo  2. R/o basal cell carcinoma   TANGENTIAL BIOPSY IN HOUSE:  After consent, anesthesia with LEC and prep, tangential excision performed and dx above confirmed with frozen section histology.  No complications and routine wound care.  Patient told result   L scapula basal cell carcinoma   R upper lip basal cell carcinoma   R zygoma basal cell carcinoma   R lat forehead basal cell carcinoma   L forearm actinic keratosis  cryo       Schedule excision for basal cell carcinoma     BENIGN LESIONS DISCUSSED WITH PATIENT:  I discussed the specifics of tumor, prognosis, and genetics of benign lesions.  I explained that treatment of these lesions would be purely cosmetic and not medically neccessary.  I discussed with patient different removal options including excision, cautery and /or laser.      Nature and genetics of benign skin lesions dicussed with patient.  Signs and Symptoms of skin cancer discussed with patient.  ABCDEs of melanoma reviewed with patient.  Patient encouraged to perform monthly skin exams.  UV precautions reviewed with patient.  Skin care regimen reviewed with patient: Eliminate harsh soaps, i.e. Dial, zest, irsih spring; Mild soaps such as Cetaphil or Dove sensitive skin, avoid hot or cold showers, aggressive use of  emollients including vanicream, cetaphil or cerave discussed with patient.    Risks of non-melanoma skin cancer discussed with patient   Return to clinic 6 months      Again, thank you for allowing me to participate in the care of your patient.        Sincerely,        Cipriano Springer MD

## 2017-10-31 NOTE — TELEPHONE ENCOUNTER
----- Message from Cipriano Springer MD sent at 10/31/2017 11:26 AM CDT -----  L scapula basal cell carcinoma   R upper lip basal cell carcinoma   R zygoma basal cell carcinoma   R lat forehead basal cell carcinoma   L forearm actinic keratosis  cryo       Schedule excision for basal cell carcinoma

## 2017-10-31 NOTE — NURSING NOTE
"Chief Complaint   Patient presents with     Skin Check       Vitals:    10/31/17 1031   BP: (!) 157/91   Pulse: 77   Height: 1.702 m (5' 7\")     Wt Readings from Last 1 Encounters:   08/07/17 (!) 143.8 kg (317 lb)       Michelle Washington LPN.................10/31/2017    "

## 2017-10-31 NOTE — MR AVS SNAPSHOT
After Visit Summary   10/31/2017    Kaylene Diaz    MRN: [id number]           Patient Information     Date Of Birth          1955        Visit Information        Provider Department      10/31/2017 10:15 AM Cipriano Springer MD [facility]        Care Instructions          Wound Care Instructions     FOR SUPERFICIAL WOUNDS     [facility] [phone]    [facility] [phone]  Left upper back, left forearm, Right forehead, right cheek and right upper lip.                     AFTER 24 HOURS YOU SHOULD REMOVE THE BANDAGE AND BEGIN DAILY DRESSING CHANGES AS FOLLOWS:     1) Remove Dressing.     2) Clean and dry the area with tap water using a Q-tip or sterile gauze pad.     3) Apply Vaseline, Aquaphor, Polysporin ointment or Bacitracin ointment over entire wound.  Do NOT use Neosporin ointment.     4) Cover the wound with a band-aid, or a sterile non-stick gauze pad and micropore paper tape      REPEAT THESE INSTRUCTIONS AT LEAST ONCE A DAY UNTIL THE WOUND HAS COMPLETELY HEALED.    It is an old wives tale that a wound heals better when it is exposed to air and allowed to dry out. The wound will heal faster with a better cosmetic result if it is kept moist with ointment and covered with a bandage.    **Do not let the wound dry out.**      Supplies Needed:      *Cotton tipped applicators (Q-tips)    *Polysporin Ointment or Bacitracin Ointment (NOT NEOSPORIN)    *Band-aids or non-stick gauze pads and micropore paper tape.      PATIENT INFORMATION:    During the healing process you will notice a number of changes. All wounds develop a small halo of redness surrounding the wound.  This means healing is occurring. Severe itching with extensive redness usually indicates sensitivity to the ointment or bandage tape used to dress the wound.  You should call our office if this develops.      Swelling  and/or discoloration around your surgical site is common,  particularly when performed around the eye.    All wounds normally drain.  The larger the wound the more drainage there will be.  After 7-10 days, you will notice the wound beginning to shrink and new skin will begin to grow.  The wound is healed when you can see skin has formed over the entire area.  A healed wound has a healthy, shiny look to the surface and is red to dark pink in color to normalize.  Wounds may take approximately 4-6 weeks to heal.  Larger wounds may take 6-8 weeks.  After the wound is healed you may discontinue dressing changes.    You may experience a sensation of tightness as your wound heals. This is normal and will gradually subside.    Your healed wound may be sensitive to temperature changes. This sensitivity improves with time, but if you re having a lot of discomfort, try to avoid temperature extremes.    Patients frequently experience itching after their wound appears to have healed because of the continue healing under the skin.  Plain Vaseline will help relieve the itching.        POSSIBLE COMPLICATIONS    BLEEDIN. Leave the bandage in place.  2. Use tightly rolled up gauze or a cloth to apply direct pressure over the bandage for 30  minutes.  3. Reapply pressure for an additional 30 minutes if necessary  4. Use additional gauze and tape to maintain pressure once the bleeding has stopped.            Follow-ups after your visit        Who to contact     If you have questions or need follow up information about today's clinic visit or your schedule please contact [facility] directly at [phone].  Normal or non-critical lab and imaging results will be communicated to you by MyChart, letter or phone within 4 business days after the clinic has received the results. If you do not hear from us within 7 days, please contact the clinic through MyChart or phone. If you have a critical or abnormal lab result, we will notify you by phone as soon as possible.  Submit  "refill requests through [employer] or call your pharmacy and they will forward the refill request to us. Please allow 3 business days for your refill to be completed.          Additional Information About Your Visit        [employer] Information     [employer] gives you secure access to your electronic health record. If you see a primary care provider, you can also send messages to your care team and make appointments. If you have questions, please call your primary care clinic.  If you do not have a primary care provider, please call [phone] and they will assist you.        Care EveryWhere ID     This is your Care EveryWhere ID. This could be used by other organizations to access your [facility] medical records  [id number]        Your Vitals Were     Pulse Height                77 1.702 m (5' 7\")           Blood Pressure from Last 3 Encounters:   10/31/17 (!) 157/91   08/07/17 142/77   05/08/17 158/84    Weight from Last 3 Encounters:   08/07/17 (!) 143.8 kg (317 lb)   05/08/17 (!) 139.3 kg (307 lb)   04/28/17 (!) 138.3 kg (305 lb)              Today, you had the following     No orders found for display       Primary Care Provider Office Phone # Fax #    Hitesh Washington [phone] [address] [facility] [address]        Equal Access to Services     THELMA REYES : Hadii aad ku hadasho Soomaali, waaxda luqadaha, qaybta kaalmada adeegyada, waxirena luna. So [facility] [phone].    ATENCIÓN: Si habla español, tiene a zamora disposición servicios gratuitos de asistencia lingüística. Balwinder al [phone].    We comply with applicable federal civil rights laws and [place] laws. We do not discriminate on the basis of race, color, national origin, age, disability, sex, sexual orientation, or gender identity.            Thank you!     Thank you for choosing [facility]  for your care. Our goal is always to provide you with excellent care. Hearing back from our " patients is one way we can continue to improve our services. Please take a few minutes to complete the written survey that you may receive in the mail after your visit with us. Thank you!             Your Updated Medication List - Protect others around you: Learn how to safely use, store and throw away your medicines at www.disposemymeds.org.          This list is accurate as of: 10/31/17 11:00 AM.  Always use your most recent med list.                   Brand Name Dispense Instructions for use Diagnosis    albuterol 108 (90 BASE) MCG/ACT Inhaler    PROAIR HFA/PROVENTIL HFA/VENTOLIN HFA    1 Inhaler    Inhale 2 puffs into the lungs every 6 hours as needed for shortness of breath / dyspnea or wheezing    Acute bronchitis, unspecified organism       CLARITIN PO           fluticasone 27.5 MCG/SPRAY spray    VERAMYST     Spray 1 spray into both nostrils 2 times daily        * ADULT GUMMY PO      Take 1 chew tab by mouth daily VitaCrave        * HAIR/SKIN/NAILS/BIOTIN PO     100 tablet    Take 1 capsule by mouth daily.    RA (rheumatoid arthritis) (H)       hydroxychloroquine 200 MG tablet    PLAQUENIL    180 tablet    Take 2 tablets (400 mg) by mouth daily    Rheumatoid arthritis of hand, unspecified laterality, unspecified rheumatoid factor presence (H)       methotrexate 2.5 MG tablet CHEMO     120 tablet    Take 9 tablets (22.5 mg) by mouth once a week    Rheumatoid arthritis of multiple sites without rheumatoid factor (H)       predniSONE 5 MG tablet    DELTASONE    50 tablet    20 mg for 5 days, taper by 5 mg every 5 days    Rheumatoid arthritis of multiple sites without rheumatoid factor (H)       vitamin B complex with vitamin C Tabs tablet      Take 1 tablet by mouth daily    RA (rheumatoid arthritis) (H)       * Notice:  This list has 2 medication(s) that are the same as other medications prescribed for you. Read the directions carefully, and ask your doctor or other care provider to review them with you.

## 2017-11-01 NOTE — TELEPHONE ENCOUNTER
Spoke with patient. Told results scheduled 1 additional appt. Patient said she would schedule the other two spots when she is in clinic on 11/8

## 2017-11-08 ENCOUNTER — OFFICE VISIT (OUTPATIENT)
Dept: DERMATOLOGY | Facility: CLINIC | Age: 62
End: 2017-11-08
Payer: COMMERCIAL

## 2017-11-08 VITALS — HEIGHT: 67 IN | HEART RATE: 80 BPM | SYSTOLIC BLOOD PRESSURE: 156 MMHG | DIASTOLIC BLOOD PRESSURE: 85 MMHG

## 2017-11-08 DIAGNOSIS — C44.01 BASAL CELL CARCINOMA, LIP: ICD-10-CM

## 2017-11-08 DIAGNOSIS — L57.0 AK (ACTINIC KERATOSIS): Primary | ICD-10-CM

## 2017-11-08 DIAGNOSIS — E78.5 HYPERLIPIDEMIA LDL GOAL <160: Chronic | ICD-10-CM

## 2017-11-08 LAB
CHOLEST SERPL-MCNC: 194 MG/DL
HDLC SERPL-MCNC: 47 MG/DL
LDLC SERPL CALC-MCNC: 120 MG/DL
NONHDLC SERPL-MCNC: 147 MG/DL
TRIGL SERPL-MCNC: 135 MG/DL

## 2017-11-08 PROCEDURE — 13152 CMPLX RPR E/N/E/L 2.6-7.5 CM: CPT | Mod: 59 | Performed by: DERMATOLOGY

## 2017-11-08 PROCEDURE — 17311 MOHS 1 STAGE H/N/HF/G: CPT | Performed by: DERMATOLOGY

## 2017-11-08 PROCEDURE — 80061 LIPID PANEL: CPT | Performed by: FAMILY MEDICINE

## 2017-11-08 PROCEDURE — 36415 COLL VENOUS BLD VENIPUNCTURE: CPT | Performed by: FAMILY MEDICINE

## 2017-11-08 PROCEDURE — 17000 DESTRUCT PREMALG LESION: CPT | Mod: 51 | Performed by: DERMATOLOGY

## 2017-11-08 NOTE — PROGRESS NOTES
Kaylene Diaz is a 61 year old year old female patient here today for evaluation and managment of basal cell carcinoma and actinic keratosis. Patient reports the following modifying factors none.  Associated symptoms: none.  Patient has no other skin complaints today.  Remainder of the HPI, Meds, PMH, Allergies, FH, and SH was reviewed in chart.      Past Medical History:   Diagnosis Date     Basal cell carcinoma      RA (rheumatoid arthritis) (H)      Small bowel obstruction        Past Surgical History:   Procedure Laterality Date     ARTHROSCOPY KNEE  12/10/2010    ARTHROSCOPY KNEE performed by NAVID FIERRO at [facility]     SURGICAL HISTORY OF -       IUD removal     SURGICAL HISTORY OF -       Ovarian cyst     SURGICAL HISTORY OF -       thorn removal from foot     SURGICAL HISTORY OF -       knee surgery, scope, right knee        Family History   Problem Relation Age of Onset     Prostate Cancer Father      Eye Disorder Father      mac degen     HEART DISEASE Father      HEART DISEASE Mother      a-fib     Eye Disorder Mother      C.A.D. Mother      Hypertension Mother      Alcohol/Drug Brother      Alcohol/Drug Sister      Alcohol/Drug Brother      Alcohol/Drug Brother      Alcohol/Drug Brother      Alcohol/Drug Sister      Obesity Sister      Alcohol/Drug Sister      Prostate Cancer Sister      CANCER Other      Melanoma No family hx of        Social History     Social History     Marital status:      Spouse name: N/A     Number of children: N/A     Years of education: N/A     Occupational History      Unemployed     Social History Main Topics     Smoking status: Former Smoker     Years: 30.00     Quit date: 4/18/2000     Smokeless tobacco: Never Used     Alcohol use Yes      Comment: VERY RARE     Drug use: No     Sexual activity: Yes     Partners: Male     Other Topics Concern     Parent/Sibling W/ Cabg, Mi Or Angioplasty Before 65f 55m? No     Social History Narrative       Outpatient Encounter  "Prescriptions as of 11/8/2017   Medication Sig Dispense Refill     hydroxychloroquine (PLAQUENIL) 200 MG tablet Take 2 tablets (400 mg) by mouth daily 180 tablet 3     methotrexate 2.5 MG tablet CHEMO Take 9 tablets (22.5 mg) by mouth once a week 120 tablet 3     predniSONE (DELTASONE) 5 MG tablet 20 mg for 5 days, taper by 5 mg every 5 days 50 tablet 1     Loratadine (CLARITIN PO)        albuterol (PROAIR HFA/PROVENTIL HFA/VENTOLIN HFA) 108 (90 BASE) MCG/ACT Inhaler Inhale 2 puffs into the lungs every 6 hours as needed for shortness of breath / dyspnea or wheezing 1 Inhaler 0     fluticasone (VERAMYST) 27.5 MCG/SPRAY spray Spray 1 spray into both nostrils 2 times daily       Multiple Vitamins-Minerals (ADULT GUMMY PO) Take 1 chew tab by mouth daily VitaCrave       vitamin  B complex with vitamin C (VITAMIN  B COMPLEX) TABS Take 1 tablet by mouth daily  0     Multiple Vitamins-Minerals (HAIR/SKIN/NAILS/BIOTIN PO) Take 1 capsule by mouth daily.  100 tablet 3     No facility-administered encounter medications on file as of 11/8/2017.              Review Of Systems  Skin: As above  Eyes: negative  Ears/Nose/Throat: negative  Respiratory: No shortness of breath, dyspnea on exertion, cough, or hemoptysis  Cardiovascular: negative  Gastrointestinal: negative  Genitourinary: negative  Musculoskeletal: negative  Neurologic: negative  Psychiatric: negative  Hematologic/Lymphatic/Immunologic: negative  Endocrine: negative      O:   NAD, WDWN, Alert & Oriented, Mood & Affect wnl, Vitals stable   Here today alone   /85  Pulse 80  Ht 1.702 m (5' 7\")   General appearance normal   Vitals stable   Alert, oriented and in no acute distress     L forearm gritty papule   R upper cut lip 7mm scaly papule   Eyes: Conjunctivae/lids:Normal     ENT: Lips, buccal mucosa, tongue: normal    MSK:Normal    Cardiovascular: peripheral edema none    Pulm: Breathing Normal    Neuro/Psych: Orientation:Normal; Mood/Affect:Normal      A/P:  1. " L forearm actinic keratosis   LN2:  Treated with LN2 for 5s for 1-2 cycles. Warned risks of blistering, pain, pigment change, scarring, and incomplete resolution.  Advised patient to return if lesions do not completely resolve.  Wound care sheet given.  2. R uppe rlip basal cell carcinoma   MOHS:   Location    After PGACAC discussed with patient, decision for Mohs surgery was made. Indication for Mohs was Location. Patient confirmed the site with Dr. Springer.  After anesthesia with LEC, the tumor was excised using standard Mohs technique in 1 stages(s).  CLEAR MARGINS OBTAINED and Final defect size was 1.2 cm.       REPAIR COMPLEX: Because of the tightness of the surrounding skin and Because of the size and full thickness nature of the defect, a complex closure was planned. After LEC anesthesia and prep, Burow's triangles were excised in the relaxed skin tension lines. The wound edges were widely undermined by dissection in the subcutaneous plane until adequate tissue mobility was obtained. Hemostasis was obtained. The wound edges were closed in a layered fashion using Vicryl and Fast Absorbing Plain Gut sutures. Postoperative length was 3.3 cm.   EBL minimal; complications none; wound care routine.  The patient was discharged in good condition and will return in one week for wound evaluation.    BENIGN LESIONS DISCUSSED WITH PATIENT:  I discussed the specifics of tumor, prognosis, and genetics of benign lesions.  I explained that treatment of these lesions would be purely cosmetic and not medically neccessary.  I discussed with patient different removal options including excision, cautery and /or laser.      Nature and genetics of benign skin lesions dicussed with patient.  Signs and Symptoms of skin cancer discussed with patient.  Patient encouraged to perform monthly skin exams.  UV precautions reviewed with patient.  Skin care regimen reviewed with patient: Eliminate harsh soaps, i.e. Dial, zest, irsih spring;  Mild soaps such as Cetaphil or Dove sensitive skin, avoid hot or cold showers, aggressive use of emollients including vanicream, cetaphil or cerave discussed with patient.    Risks of non-melanoma skin cancer discussed with patient   Return to clinic 6 months

## 2017-11-08 NOTE — NURSING NOTE
"Chief Complaint   Patient presents with     Derm Problem     mohs       Vitals:    11/08/17 0817   BP: 156/85   Pulse: 80   Height: 1.702 m (5' 7\")     Wt Readings from Last 1 Encounters:   08/07/17 (!) 143.8 kg (317 lb)       Michelle Washington LPN.................11/8/2017    "

## 2017-11-08 NOTE — LETTER
11/8/2017         RE: Kaylene Diaz  [address] [place] [address]        Dear Colleague,    Thank you for referring your patient, Kaylene Diaz, to the [facility]. Please see a copy of my visit note below.    Kaylene Diaz is a 61 year old year old female patient here today for evaluation and managment of basal cell carcinoma and actinic keratosis. Patient reports the following modifying factors none.  Associated symptoms: none.  Patient has no other skin complaints today.  Remainder of the HPI, Meds, PMH, Allergies, FH, and SH was reviewed in chart.      Past Medical History:   Diagnosis Date     Basal cell carcinoma      RA (rheumatoid arthritis) (H)      Small bowel obstruction        Past Surgical History:   Procedure Laterality Date     ARTHROSCOPY KNEE  12/10/2010    ARTHROSCOPY KNEE performed by NAVID FIERRO at [facility]     SURGICAL HISTORY OF -       IUD removal     SURGICAL HISTORY OF -       Ovarian cyst     SURGICAL HISTORY OF -       thorn removal from foot     SURGICAL HISTORY OF -       knee surgery, scope, right knee        Family History   Problem Relation Age of Onset     Prostate Cancer Father      Eye Disorder Father      mac degen     HEART DISEASE Father      HEART DISEASE Mother      a-fib     Eye Disorder Mother      C.A.D. Mother      Hypertension Mother      Alcohol/Drug Brother      Alcohol/Drug Sister      Alcohol/Drug Brother      Alcohol/Drug Brother      Alcohol/Drug Brother      Alcohol/Drug Sister      Obesity Sister      Alcohol/Drug Sister      Prostate Cancer Sister      CANCER Other      Melanoma No family hx of        Social History     Social History     Marital status:      Spouse name: N/A     Number of children: N/A     Years of education: N/A     Occupational History      Unemployed     Social History Main Topics     Smoking status: Former Smoker     Years: 30.00     Quit date: 4/18/2000     Smokeless tobacco: Never Used     Alcohol use Yes       "Comment: VERY RARE     Drug use: No     Sexual activity: Yes     Partners: Male     Other Topics Concern     Parent/Sibling W/ Cabg, Mi Or Angioplasty Before 65f 55m? No     Social History Narrative       Outpatient Encounter Prescriptions as of 11/8/2017   Medication Sig Dispense Refill     hydroxychloroquine (PLAQUENIL) 200 MG tablet Take 2 tablets (400 mg) by mouth daily 180 tablet 3     methotrexate 2.5 MG tablet CHEMO Take 9 tablets (22.5 mg) by mouth once a week 120 tablet 3     predniSONE (DELTASONE) 5 MG tablet 20 mg for 5 days, taper by 5 mg every 5 days 50 tablet 1     Loratadine (CLARITIN PO)        albuterol (PROAIR HFA/PROVENTIL HFA/VENTOLIN HFA) 108 (90 BASE) MCG/ACT Inhaler Inhale 2 puffs into the lungs every 6 hours as needed for shortness of breath / dyspnea or wheezing 1 Inhaler 0     fluticasone (VERAMYST) 27.5 MCG/SPRAY spray Spray 1 spray into both nostrils 2 times daily       Multiple Vitamins-Minerals (ADULT GUMMY PO) Take 1 chew tab by mouth daily VitaCrave       vitamin  B complex with vitamin C (VITAMIN  B COMPLEX) TABS Take 1 tablet by mouth daily  0     Multiple Vitamins-Minerals (HAIR/SKIN/NAILS/BIOTIN PO) Take 1 capsule by mouth daily.  100 tablet 3     No facility-administered encounter medications on file as of 11/8/2017.              Review Of Systems  Skin: As above  Eyes: negative  Ears/Nose/Throat: negative  Respiratory: No shortness of breath, dyspnea on exertion, cough, or hemoptysis  Cardiovascular: negative  Gastrointestinal: negative  Genitourinary: negative  Musculoskeletal: negative  Neurologic: negative  Psychiatric: negative  Hematologic/Lymphatic/Immunologic: negative  Endocrine: negative      O:   NAD, WDWN, Alert & Oriented, Mood & Affect wnl, Vitals stable   Here today alone   /85  Pulse 80  Ht 1.702 m (5' 7\")   General appearance normal   Vitals stable   Alert, oriented and in no acute distress     L forearm gritty papule   R upper cut lip 7mm scaly papule "   Eyes: Conjunctivae/lids:Normal     ENT: Lips, buccal mucosa, tongue: normal    MSK:Normal    Cardiovascular: peripheral edema none    Pulm: Breathing Normal    Neuro/Psych: Orientation:Normal; Mood/Affect:Normal      A/P:  1. L forearm actinic keratosis   LN2:  Treated with LN2 for 5s for 1-2 cycles. Warned risks of blistering, pain, pigment change, scarring, and incomplete resolution.  Advised patient to return if lesions do not completely resolve.  Wound care sheet given.  2. R uppe rlip basal cell carcinoma   MOHS:   Location    After PGACAC discussed with patient, decision for Mohs surgery was made. Indication for Mohs was Location. Patient confirmed the site with Dr. Springer.  After anesthesia with LEC, the tumor was excised using standard Mohs technique in 1 stages(s).  CLEAR MARGINS OBTAINED and Final defect size was 1.2 cm.       REPAIR COMPLEX: Because of the tightness of the surrounding skin and Because of the size and full thickness nature of the defect, a complex closure was planned. After LEC anesthesia and prep, Burow's triangles were excised in the relaxed skin tension lines. The wound edges were widely undermined by dissection in the subcutaneous plane until adequate tissue mobility was obtained. Hemostasis was obtained. The wound edges were closed in a layered fashion using Vicryl and Fast Absorbing Plain Gut sutures. Postoperative length was 3.3 cm.   EBL minimal; complications none; wound care routine.  The patient was discharged in good condition and will return in one week for wound evaluation.    BENIGN LESIONS DISCUSSED WITH PATIENT:  I discussed the specifics of tumor, prognosis, and genetics of benign lesions.  I explained that treatment of these lesions would be purely cosmetic and not medically neccessary.  I discussed with patient different removal options including excision, cautery and /or laser.      Nature and genetics of benign skin lesions dicussed with patient.  Signs and  Symptoms of skin cancer discussed with patient.  Patient encouraged to perform monthly skin exams.  UV precautions reviewed with patient.  Skin care regimen reviewed with patient: Eliminate harsh soaps, i.e. Dial, zest, irsih spring; Mild soaps such as Cetaphil or Dove sensitive skin, avoid hot or cold showers, aggressive use of emollients including vanicream, cetaphil or cerave discussed with patient.    Risks of non-melanoma skin cancer discussed with patient   Return to clinic 6 months      Again, thank you for allowing me to participate in the care of your patient.        Sincerely,        Cipriano Springer MD

## 2017-11-08 NOTE — MR AVS SNAPSHOT
After Visit Summary   11/8/2017    Kaylene Diaz    MRN: [id number]           Patient Information     Date Of Birth          1955        Visit Information        Provider Department      11/8/2017 8:00 AM Cipriano Springer MD [facility]        Today's Diagnoses     AK (actinic keratosis)    -  1    Basal cell carcinoma, lip          Care Instructions          Sutured Wound Care Instructions  For Lips or Chin   Right upper lip      ? No strenuous activity for 48 hours. Resume moderate activity in 48 hours. No heavy exercising until you are seen for follow up in one week.     ? Take Tylenol as needed for discomfort.                         ? Do not drink alcoholic beverages for 48 hours.     ? Keep the pressure bandage in place for 24 hours. If the bandage becomes blood tinged or loose, reinforce it with gauze and tape.       (Refer to the reverse side of this page for management of bleeding).    ? Remove bandage in 24 hours     ? Leave the flat bandage in place until your follow up appointment.    ? Keep the bandage dry. Wash around it carefully.    ? If the tape becomes soiled or starts to come off, reinforce it with additional paper tape.    ? Do not smoke for 3 weeks; smoking is detrimental to wound healing.    ? It is normal to have swelling and bruising around the surgical site. The bruising will fade in approximately 10-14 days. Elevate the area to reduce swelling.    ? Try to keep your lips / chin as immobile as possible. Refrain from laughing, smiling and yawning for 3 weeks.    ? Eat soft foods for the first 24 hours and take small bites of food for the entire three weeks.    ? When brushing your teeth, you should use a child s toothbrush or use mouth wash to prevent stretching of surgery site.    ? Numbness, itchiness and sensitivity to temperature changes can occur after surgery and may take up to 18 months to normalize.          POSSIBLE  COMPLICATIONS      BLEEDIN. Leave the bandage in place.  2. Use tightly rolled up gauze or a cloth to apply direct pressure over the bandage for 20   minutes.  3. Reapply pressure for an additional 20 minutes if necessary  4. Call the office or go to the nearest emergency room if pressure fails to stop the bleeding.  5. Use additional gauze and tape to maintain pressure once the bleeding has stopped.        PAIN:    1. Post operative pain should slowly get better, never worse.  2. A severe increase in pain may indicate a problem. Call the office if this occurs.            In case of emergency phone:  [phone]  In case of emergency phone:Dr Springer [phone]    WOUND CARE INSTRUCTIONS   FOR CRYOSURGERY   This area treated with liquid nitrogen will form a blister. You do not need to bandage the area until after the blister forms and breaks (which may be a few days). When the blister breaks, begin daily dressing changes as follows:   1) Clean and dry the area with tap water using clean Q-tip or sterile gauze pad.   2) Apply Polysporin ointment or Bacitracin ointment over entire wound. Do NOT use Neosporin ointment.   3) Cover the wound with a band-aid or sterile non-stick gauze pad and micropore paper tape.   REPEAT THESE INSTRUCTIONS AT LEAST ONCE A DAY UNTIL THE WOUND HAS COMPLETELY HEALED.   It is an old wives tale that a wound heals better when it is exposed to air and allowed to dry out. The wound will heal faster with a better cosmetic result if it is kept moist with ointment and covered with a bandage.   Do not let the wound dry out.   IMPORTANT INFORMATION ON REVERSE SIDE   Supplies Needed:   *Cotton tipped applicators (Q-tips)   *Polysporin ointment or Bacitracin ointment (NOT NEOSPORIN)   *Band-aids, or non stick gauze pads and micropore paper tape   PATIENT INFORMATION   During the healing process you will notice a number of changes. All wounds develop a small halo of redness surrounding the  wound. This means healing is occurring. Severe itching with extensive redness usually indicates sensitivity to the ointment or bandage tape used to dress the wound. You should call our office if this develops.   Swelling and/or discoloration around your surgical site is common, particularly when performed around the eye.   All wounds normally drain. The larger the wound the more drainage there will be. After 7-10 days, you will notice the wound beginning to shrink and new skin will begin to grow. The wound is healed when you can see skin has formed over the entire area. A healed wound has a healthy, shiny look to the surface and is red to dark pink in color to normalize. Wounds may take approximately 4-6 weeks to heal. Larger wounds may take 6-8 weeks. After the wound is healed you may discontinue dressing changes.   You may experience a sensation of tightness as your wound heals. This is normal and will gradually subside.   Your healed wound may be sensitive to temperature changes. This sensitivity improves with time, but if you re having a lot of discomfort, try to avoid temperature extremes.   Patients frequently experience itching after their wound appears to have healed because of the continue healing under the skin. Plain Vaseline will help relieve the itching.                 Follow-ups after your visit        Your next 10 appointments already scheduled     Nov 15, 2017  8:00 AM CST   [facility] with Cipriano Springer MD   [facility]    [address] [address]   [phone]            Dec 04, 2017 10:45 AM CST   Return Visit with Rl Coleman MD   [facility]    [address] [address]   [phone]              Who to contact     If you have questions or need follow up information about today's clinic visit or your schedule please contact [facility]  "directly at [phone].  Normal or non-critical lab and imaging results will be communicated to you by MyChart, letter or phone within 4 business days after the clinic has received the results. If you do not hear from us within 7 days, please contact the clinic through [employer] or phone. If you have a critical or abnormal lab result, we will notify you by phone as soon as possible.  Submit refill requests through [email or web address] or call your pharmacy and they will forward the refill request to us. Please allow 3 business days for your refill to be completed.          Additional Information About Your Visit        [employer] Information     [email or web address] gives you secure access to your electronic health record. If you see a primary care provider, you can also send messages to your care team and make appointments. If you have questions, please call your primary care clinic.  If you do not have a primary care provider, please call [phone] and they will assist you.        Care EveryWhere ID     This is your Care EveryWhere ID. This could be used by other organizations to access your [facility] medical records  [id number]        Your Vitals Were     Pulse Height                80 1.702 m (5' 7\")           Blood Pressure from Last 3 Encounters:   11/08/17 156/85   10/31/17 (!) 157/91   08/07/17 142/77    Weight from Last 3 Encounters:   08/07/17 (!) 143.8 kg (317 lb)   05/08/17 (!) 139.3 kg (307 lb)   04/28/17 (!) 138.3 kg (305 lb)              We Performed the Following     DESTRUCT PREMALIGNANT LESION, FIRST     MOHS HEAD/NCK/HND/FT/GEN 1ST STAGE UP T0 5 BLOCKS     REPAIR COMPLEX, WOUND LID/NOSE/EAR/LIP 2.6-7.5 CM        Primary Care Provider Office Phone # Fax #    Hitesh Washington [phone] [address] [address]        Equal Access to Services     THELMA REYES : Hever Ayala, waaxda luqadaha, qaybta kaalmacristin buchanan, kaleb westbrook ah. So wa " [phone].    ATENCIÓN: Si ramiro haque, tiene a zamora disposición servicios gratuitos de asistencia lingüística. Balwinder campbell [phone].    We comply with applicable federal civil rights laws and [place] laws. We do not discriminate on the basis of race, color, national origin, age, disability, sex, sexual orientation, or gender identity.            Thank you!     Thank you for choosing [facility]  for your care. Our goal is always to provide you with excellent care. Hearing back from our patients is one way we can continue to improve our services. Please take a few minutes to complete the written survey that you may receive in the mail after your visit with us. Thank you!             Your Updated Medication List - Protect others around you: Learn how to safely use, store and throw away your medicines at www.disposemymeds.org.          This list is accurate as of: 11/8/17 11:38 AM.  Always use your most recent med list.                   Brand Name Dispense Instructions for use Diagnosis    albuterol 108 (90 BASE) MCG/ACT Inhaler    PROAIR HFA/PROVENTIL HFA/VENTOLIN HFA    1 Inhaler    Inhale 2 puffs into the lungs every 6 hours as needed for shortness of breath / dyspnea or wheezing    Acute bronchitis, unspecified organism       CLARITIN PO           fluticasone 27.5 MCG/SPRAY spray    VERAMYST     Spray 1 spray into both nostrils 2 times daily        * ADULT GUMMY PO      Take 1 chew tab by mouth daily VitaCrave        * HAIR/SKIN/NAILS/BIOTIN PO     100 tablet    Take 1 capsule by mouth daily.    RA (rheumatoid arthritis) (H)       hydroxychloroquine 200 MG tablet    PLAQUENIL    180 tablet    Take 2 tablets (400 mg) by mouth daily    Rheumatoid arthritis of hand, unspecified laterality, unspecified rheumatoid factor presence (H)       methotrexate 2.5 MG tablet CHEMO     120 tablet    Take 9 tablets (22.5 mg) by mouth once a week    Rheumatoid arthritis of multiple sites without rheumatoid  factor (H)       predniSONE 5 MG tablet    DELTASONE    50 tablet    20 mg for 5 days, taper by 5 mg every 5 days    Rheumatoid arthritis of multiple sites without rheumatoid factor (H)       vitamin B complex with vitamin C Tabs tablet      Take 1 tablet by mouth daily    RA (rheumatoid arthritis) (H)       * Notice:  This list has 2 medication(s) that are the same as other medications prescribed for you. Read the directions carefully, and ask your doctor or other care provider to review them with you.

## 2017-11-08 NOTE — PATIENT INSTRUCTIONS
Sutured Wound Care Instructions  For Lips or Chin   Right upper lip      ? No strenuous activity for 48 hours. Resume moderate activity in 48 hours. No heavy exercising until you are seen for follow up in one week.     ? Take Tylenol as needed for discomfort.                         ? Do not drink alcoholic beverages for 48 hours.     ? Keep the pressure bandage in place for 24 hours. If the bandage becomes blood tinged or loose, reinforce it with gauze and tape.       (Refer to the reverse side of this page for management of bleeding).    ? Remove bandage in 24 hours     ? Leave the flat bandage in place until your follow up appointment.    ? Keep the bandage dry. Wash around it carefully.    ? If the tape becomes soiled or starts to come off, reinforce it with additional paper tape.    ? Do not smoke for 3 weeks; smoking is detrimental to wound healing.    ? It is normal to have swelling and bruising around the surgical site. The bruising will fade in approximately 10-14 days. Elevate the area to reduce swelling.    ? Try to keep your lips / chin as immobile as possible. Refrain from laughing, smiling and yawning for 3 weeks.    ? Eat soft foods for the first 24 hours and take small bites of food for the entire three weeks.    ? When brushing your teeth, you should use a child s toothbrush or use mouth wash to prevent stretching of surgery site.    ? Numbness, itchiness and sensitivity to temperature changes can occur after surgery and may take up to 18 months to normalize.          POSSIBLE COMPLICATIONS      BLEEDIN. Leave the bandage in place.  2. Use tightly rolled up gauze or a cloth to apply direct pressure over the bandage for 20   minutes.  3. Reapply pressure for an additional 20 minutes if necessary  4. Call the office or go to the nearest emergency room if pressure fails to stop the bleeding.  5. Use additional gauze and tape to maintain pressure once the bleeding has  stopped.        PAIN:    1. Post operative pain should slowly get better, never worse.  2. A severe increase in pain may indicate a problem. Call the office if this occurs.            In case of emergency phone:  [phone]  In case of emergency phone:Dr Springer [phone]    WOUND CARE INSTRUCTIONS   FOR CRYOSURGERY   This area treated with liquid nitrogen will form a blister. You do not need to bandage the area until after the blister forms and breaks (which may be a few days). When the blister breaks, begin daily dressing changes as follows:   1) Clean and dry the area with tap water using clean Q-tip or sterile gauze pad.   2) Apply Polysporin ointment or Bacitracin ointment over entire wound. Do NOT use Neosporin ointment.   3) Cover the wound with a band-aid or sterile non-stick gauze pad and micropore paper tape.   REPEAT THESE INSTRUCTIONS AT LEAST ONCE A DAY UNTIL THE WOUND HAS COMPLETELY HEALED.   It is an old wives tale that a wound heals better when it is exposed to air and allowed to dry out. The wound will heal faster with a better cosmetic result if it is kept moist with ointment and covered with a bandage.   Do not let the wound dry out.   IMPORTANT INFORMATION ON REVERSE SIDE   Supplies Needed:   *Cotton tipped applicators (Q-tips)   *Polysporin ointment or Bacitracin ointment (NOT NEOSPORIN)   *Band-aids, or non stick gauze pads and micropore paper tape   PATIENT INFORMATION   During the healing process you will notice a number of changes. All wounds develop a small halo of redness surrounding the wound. This means healing is occurring. Severe itching with extensive redness usually indicates sensitivity to the ointment or bandage tape used to dress the wound. You should call our office if this develops.   Swelling and/or discoloration around your surgical site is common, particularly when performed around the eye.   All wounds normally drain. The larger the wound the more drainage there will  be. After 7-10 days, you will notice the wound beginning to shrink and new skin will begin to grow. The wound is healed when you can see skin has formed over the entire area. A healed wound has a healthy, shiny look to the surface and is red to dark pink in color to normalize. Wounds may take approximately 4-6 weeks to heal. Larger wounds may take 6-8 weeks. After the wound is healed you may discontinue dressing changes.   You may experience a sensation of tightness as your wound heals. This is normal and will gradually subside.   Your healed wound may be sensitive to temperature changes. This sensitivity improves with time, but if you re having a lot of discomfort, try to avoid temperature extremes.   Patients frequently experience itching after their wound appears to have healed because of the continue healing under the skin. Plain Vaseline will help relieve the itching.

## 2017-11-15 ENCOUNTER — OFFICE VISIT (OUTPATIENT)
Dept: DERMATOLOGY | Facility: CLINIC | Age: 62
End: 2017-11-15
Payer: COMMERCIAL

## 2017-11-15 VITALS — DIASTOLIC BLOOD PRESSURE: 72 MMHG | OXYGEN SATURATION: 100 % | SYSTOLIC BLOOD PRESSURE: 134 MMHG | HEART RATE: 75 BPM

## 2017-11-15 DIAGNOSIS — C44.319 BASAL CELL CARCINOMA, FOREHEAD: Primary | ICD-10-CM

## 2017-11-15 DIAGNOSIS — C44.519 BASAL CELL CARCINOMA OF BACK: ICD-10-CM

## 2017-11-15 PROCEDURE — 88331 PATH CONSLTJ SURG 1 BLK 1SPC: CPT | Mod: 59 | Performed by: DERMATOLOGY

## 2017-11-15 PROCEDURE — 17311 MOHS 1 STAGE H/N/HF/G: CPT | Mod: 79 | Performed by: DERMATOLOGY

## 2017-11-15 PROCEDURE — 13132 CMPLX RPR F/C/C/M/N/AX/G/H/F: CPT | Mod: 51 | Performed by: DERMATOLOGY

## 2017-11-15 PROCEDURE — 11603 EXC TR-EXT MAL+MARG 2.1-3 CM: CPT | Mod: 59 | Performed by: DERMATOLOGY

## 2017-11-15 NOTE — NURSING NOTE
"Initial /72  Pulse 75  SpO2 100% Estimated body mass index is 49.65 kg/(m^2) as calculated from the following:    Height as of 5/8/17: 1.702 m (5' 7\").    Weight as of 8/7/17: 143.8 kg (317 lb). .      "

## 2017-11-15 NOTE — PATIENT INSTRUCTIONS
WOUND CARE INSTRUCTIONS  for  ONE WEEK AFTER SURGERY              Lip          (forehead on 11/22 for 1 week)      1) Leave flat bandage on your skin for one week after today s bandage change.  2) In one week when you remove the bandage, you may resume your regular skin care routine, including washing with mild soap and water, applying moisturizer, make-up and sunscreen.    3) If there are any open or bleeding areas at the incision/graft site you should begin to cover the area with a bandage daily as follows:    1) Clean and dry the area with plain tap water using a Q-tip or sterile gauze pad.  2) Apply Polysporin or Bacitracin ointment to the open area.  3) Cover the wound with a band-aid or a sterile non-stick gauze pad and micropore paper tape.             *Once the bandages are removed, the scar will be red and firm (especially in the lip/chin area). This is normal and will fade in time. It might take 6-12 months for this to happen.     *Massaging the area will help the scar soften and fade quicker. Begin to massage the area one month after the bandages have been removed. To massage apply pressure directly and firmly over the scar with the fingertips and move in a circular motion. Massage the area for a few minutes several times a day. Continue to massage the site for several months.    *Approximately 6-8 weeks after surgery it is not uncommon to see the formation of  tender pimple-like  bump along the scar. This is normal. As the scar continues to mature and the stitches underneath the skin begin to dissolve, this might occur. Do not pick or squeeze, this will resolve on it s own. Should one break open producing a small amount of drainage, apply Polysporin or Bacitracin ointment a few times a day until the wound is completely healed.    *Numbness in the surgical area is expected. It might take 12-18 months for the feeling to return to normal. During this time sensations of itchiness, tingling and occasional  sharp pains might be noted. These feelings are normal and will subside once the nerves have completely healed.         IN CASE OF EMERGENCY: Dr Sprigner [phone]     If you were seen in [place] call: [phone]    If you were seen in [place] call: [phone]      On 17 you can removed the bandage on your right forehead. Clean with water and pat dry. Apply steri strips so they over lap each other along the incision line. Then apply paper tape on top of the steri strips to cover them.       On 17 you can remove the bandage and treat like normal skin.        Sutured Wound Care     [facility]: [phone]    [facility]: [phone]      forehead    ? No strenuous activity for 48 hours. Resume moderate activity in 48 hours. No heavy exercising until you are seen for follow up in one week.     ? Take Tylenol as needed for discomfort.                         ? Do not drink alcoholic beverages for 48 hours.     ? Keep the pressure bandage in place for 24 hours. If the bandage becomes blood tinged or loose, reinforce it with gauze and tape.        (Refer to the reverse side of this page for management of bleeding).    ? Remove pressure bandage in 24 hours     ? Leave the flat bandage in place until your follow up appointment.    ? Keep the bandage dry. Wash around it carefully.    ? If the tape becomes soiled or starts to come off, reinforce it with additional paper tape.    ? Do not smoke for 3 weeks; smoking is detrimental to wound healing.    ? It is normal to have swelling and bruising around the surgical site. The bruising will fade in approximately 10-14 days. Elevate the area to reduce swelling.    ? Numbness, itchiness and sensitivity to temperature changes can occur after surgery and may take up to 18 months to normalize.      POSSIBLE COMPLICATIONS    BLEEDIN. Leave the bandage in place.  2. Use tightly rolled up gauze or a cloth to apply direct pressure over the  bandage for 20   minutes.  3. Reapply pressure for an additional 20 minutes if necessary  4. Call the office or go to the nearest emergency room if pressure fails to stop the bleeding.  5. Use additional gauze and tape to maintain pressure once the bleeding has stopped.        PAIN:    1. Post operative pain should slowly get better, never worse.  2. A severe increase in pain may indicate a problem. Call the office if this occurs.    In case of emergency phone:Dr Springer [phone]

## 2017-11-15 NOTE — PROGRESS NOTES
Kaylene Diaz is a 61 year old year old female patient here today for evaluation and managment of basal cell carcinoma on forehead and l scapula.  Lip healing well. Associated symptoms: none.  Patient has no other skin complaints today.  Remainder of the HPI, Meds, PMH, Allergies, FH, and SH was reviewed in chart.      Past Medical History:   Diagnosis Date     Basal cell carcinoma      RA (rheumatoid arthritis) (H)      Small bowel obstruction        Past Surgical History:   Procedure Laterality Date     ARTHROSCOPY KNEE  12/10/2010    ARTHROSCOPY KNEE performed by NAVID FIERRO at [facility]     SURGICAL HISTORY OF -       IUD removal     SURGICAL HISTORY OF -       Ovarian cyst     SURGICAL HISTORY OF -       thorn removal from foot     SURGICAL HISTORY OF -       knee surgery, scope, right knee        Family History   Problem Relation Age of Onset     Prostate Cancer Father      Eye Disorder Father      mac degen     HEART DISEASE Father      HEART DISEASE Mother      a-fib     Eye Disorder Mother      C.A.D. Mother      Hypertension Mother      Alcohol/Drug Brother      Alcohol/Drug Sister      Alcohol/Drug Brother      Alcohol/Drug Brother      Alcohol/Drug Brother      Alcohol/Drug Sister      Obesity Sister      Alcohol/Drug Sister      Prostate Cancer Sister      CANCER Other      Melanoma No family hx of        Social History     Social History     Marital status:      Spouse name: N/A     Number of children: N/A     Years of education: N/A     Occupational History      Unemployed     Social History Main Topics     Smoking status: Former Smoker     Years: 30.00     Quit date: 4/18/2000     Smokeless tobacco: Never Used     Alcohol use Yes      Comment: VERY RARE     Drug use: No     Sexual activity: Yes     Partners: Male     Other Topics Concern     Parent/Sibling W/ Cabg, Mi Or Angioplasty Before 65f 55m? No     Social History Narrative       Outpatient Encounter Prescriptions as of 11/15/2017    Medication Sig Dispense Refill     hydroxychloroquine (PLAQUENIL) 200 MG tablet Take 2 tablets (400 mg) by mouth daily 180 tablet 3     methotrexate 2.5 MG tablet CHEMO Take 9 tablets (22.5 mg) by mouth once a week 120 tablet 3     predniSONE (DELTASONE) 5 MG tablet 20 mg for 5 days, taper by 5 mg every 5 days 50 tablet 1     Loratadine (CLARITIN PO)        albuterol (PROAIR HFA/PROVENTIL HFA/VENTOLIN HFA) 108 (90 BASE) MCG/ACT Inhaler Inhale 2 puffs into the lungs every 6 hours as needed for shortness of breath / dyspnea or wheezing 1 Inhaler 0     fluticasone (VERAMYST) 27.5 MCG/SPRAY spray Spray 1 spray into both nostrils 2 times daily       Multiple Vitamins-Minerals (ADULT GUMMY PO) Take 1 chew tab by mouth daily VitaCrave       vitamin  B complex with vitamin C (VITAMIN  B COMPLEX) TABS Take 1 tablet by mouth daily  0     Multiple Vitamins-Minerals (HAIR/SKIN/NAILS/BIOTIN PO) Take 1 capsule by mouth daily.  100 tablet 3     No facility-administered encounter medications on file as of 11/15/2017.              Review Of Systems  Skin: As above  Eyes: negative  Ears/Nose/Throat: negative  Respiratory: No shortness of breath, dyspnea on exertion, cough, or hemoptysis  Cardiovascular: negative  Gastrointestinal: negative  Genitourinary: negative  Musculoskeletal: negative  Neurologic: negative  Psychiatric: negative  Hematologic/Lymphatic/Immunologic: negative  Endocrine: negative      O:   NAD, WDWN, Alert & Oriented, Mood & Affect wnl, Vitals stable   Here today alone   /72  Pulse 75  SpO2 100%   General appearance normal   Vitals stable   Alert, oriented and in no acute distress     R lat forehead 8mm scaly papule    L scapula 1.2cm red plaque           Eyes: Conjunctivae/lids:Normal     ENT: Lips, buccal mucosa, tongue: normal    MSK:Normal    Cardiovascular: peripheral edema none    Pulm: Breathing Normal    Neuro/Psych: Orientation:Normal; Mood/Affect:Normal      MICRO:     L scapula:Orthokeratosis  of epidermis with a proliferation of nests of basaloid cells, with peripheral palisading and a haphazard arrangement in the center extending into the dermis, forming nodules.  The tumor cells have hyperchromatic nuclei. Poor cytoplasm and intercellular bridging.      L scapula second excision:Unremarkable epidermis with parallel bundles of cellular collagen within the superficial dermis.  No concerning areas for malignancy.     A/P:  1. L scapula basal cell carcinoma   EXCISION OF BASAL CELL CARCINOMA, Margins confirmed with FROZEN SECTIONS AND Second intent: After thorough discussion of PGACAC, consent obtained, anesthesia and prep, the margins of the lesion were identified and an elliptical incision was made encompassing the lesion with 4mm margin. The incisions were made through the skin and down to and including the superficial dermis.  The lesion was removed en bloc and submitted for frozen section pathologic review. Clear margins obtained (2.2cm).  Two excision first, excision positive margin   REPAIR SECOND INTENT: We discussed the options for wound management in full with the patient including risks/benefits/possible outcomes. Decision made to allow the wound to heal by second intention. EBL minimal; complications none; wound care routine.  The patient was discharged in good condition and will return in one month or prn for wound evaluation.     2. R forehead basal cell carcinoma  MOHS:   Location    After PGACAC discussed with patient, decision for Mohs surgery was made. Indication for Mohs was Location. Patient confirmed the site with Dr. Springer.  After anesthesia with LEC, the tumor was excised using standard Mohs technique in 1 stages(s).  CLEAR MARGINS OBTAINED and Final defect size was 1.3 cm.       REPAIR COMPLEX: Because of the tightness of the surrounding skin and Because of the size and full thickness nature of the defect, a complex closure was planned. After LEC anesthesia and prep, Burow's  triangles were excised in the relaxed skin tension lines. The wound edges were widely undermined by dissection in the subcutaneous plane until adequate tissue mobility was obtained. Hemostasis was obtained. The wound edges were closed in a layered fashion using Vicryl and Fast Absorbing Plain Gut sutures. Postoperative length was 3.6 cm.   EBL minimal; complications none; wound care routine.  The patient was discharged in good condition and will return in one week for wound evaluation.          BENIGN LESIONS DISCUSSED WITH PATIENT:  I discussed the specifics of tumor, prognosis, and genetics of benign lesions.  I explained that treatment of these lesions would be purely cosmetic and not medically neccessary.  I discussed with patient different removal options including excision, cautery and /or laser.      Nature and genetics of benign skin lesions dicussed with patient.  Signs and Symptoms of skin cancer discussed with patient.  Patient encouraged to perform monthly skin exams.  UV precautions reviewed with patient.  Skin care regimen reviewed with patient: Eliminate harsh soaps, i.e. Dial, zest, irsih spring; Mild soaps such as Cetaphil or Dove sensitive skin, avoid hot or cold showers, aggressive use of emollients including vanicream, cetaphil or cerave discussed with patient.    Risks of non-melanoma skin cancer discussed with patient   Return to clinic 6 months

## 2017-11-15 NOTE — MR AVS SNAPSHOT
After Visit Summary   11/15/2017    Kaylene Diaz    MRN: [id number]           Patient Information     Date Of Birth          1955        Visit Information        Provider Department      11/15/2017 8:00 AM Cipriano Springer MD [facility]        Care Instructions    WOUND CARE INSTRUCTIONS  for  ONE WEEK AFTER SURGERY              Lip          (forehead on 11/22 for 1 week)      1) Leave flat bandage on your skin for one week after today s bandage change.  2) In one week when you remove the bandage, you may resume your regular skin care routine, including washing with mild soap and water, applying moisturizer, make-up and sunscreen.    3) If there are any open or bleeding areas at the incision/graft site you should begin to cover the area with a bandage daily as follows:    1) Clean and dry the area with plain tap water using a Q-tip or sterile gauze pad.  2) Apply Polysporin or Bacitracin ointment to the open area.  3) Cover the wound with a band-aid or a sterile non-stick gauze pad and micropore paper tape.             *Once the bandages are removed, the scar will be red and firm (especially in the lip/chin area). This is normal and will fade in time. It might take 6-12 months for this to happen.     *Massaging the area will help the scar soften and fade quicker. Begin to massage the area one month after the bandages have been removed. To massage apply pressure directly and firmly over the scar with the fingertips and move in a circular motion. Massage the area for a few minutes several times a day. Continue to massage the site for several months.    *Approximately 6-8 weeks after surgery it is not uncommon to see the formation of  tender pimple-like  bump along the scar. This is normal. As the scar continues to mature and the stitches underneath the skin begin to dissolve, this might occur. Do not pick or squeeze, this will resolve on it s own. Should one break open  producing a small amount of drainage, apply Polysporin or Bacitracin ointment a few times a day until the wound is completely healed.    *Numbness in the surgical area is expected. It might take 12-18 months for the feeling to return to normal. During this time sensations of itchiness, tingling and occasional sharp pains might be noted. These feelings are normal and will subside once the nerves have completely healed.         IN CASE OF EMERGENCY: Dr Springer [phone]     If you were seen in [place] call: [phone]    If you were seen in [place] call: [phone]      On 17 you can removed the bandage on your right forehead. Clean with water and pat dry. Apply steri strips so they over lap each other along the incision line. Then apply paper tape on top of the steri strips to cover them.       On 17 you can remove the bandage and treat like normal skin.        Sutured Wound Care     [facility]: [phone]    [facility]: [phone]      forehead    ? No strenuous activity for 48 hours. Resume moderate activity in 48 hours. No heavy exercising until you are seen for follow up in one week.     ? Take Tylenol as needed for discomfort.                         ? Do not drink alcoholic beverages for 48 hours.     ? Keep the pressure bandage in place for 24 hours. If the bandage becomes blood tinged or loose, reinforce it with gauze and tape.        (Refer to the reverse side of this page for management of bleeding).    ? Remove pressure bandage in 24 hours     ? Leave the flat bandage in place until your follow up appointment.    ? Keep the bandage dry. Wash around it carefully.    ? If the tape becomes soiled or starts to come off, reinforce it with additional paper tape.    ? Do not smoke for 3 weeks; smoking is detrimental to wound healing.    ? It is normal to have swelling and bruising around the surgical site. The bruising will fade in approximately 10-14 days. Elevate  the area to reduce swelling.    ? Numbness, itchiness and sensitivity to temperature changes can occur after surgery and may take up to 18 months to normalize.      POSSIBLE COMPLICATIONS    BLEEDIN. Leave the bandage in place.  2. Use tightly rolled up gauze or a cloth to apply direct pressure over the bandage for 20   minutes.  3. Reapply pressure for an additional 20 minutes if necessary  4. Call the office or go to the nearest emergency room if pressure fails to stop the bleeding.  5. Use additional gauze and tape to maintain pressure once the bleeding has stopped.        PAIN:    1. Post operative pain should slowly get better, never worse.  2. A severe increase in pain may indicate a problem. Call the office if this occurs.    In case of emergency phone:Dr Springer [phone]                    Follow-ups after your visit        Your next 10 appointments already scheduled     Dec 04, 2017  8:00 AM CST   [facility] with Cipriano Springer MD   [facility]    [address] [address] [phone]            Dec 04, 2017 10:45 AM CST   [facility] Visit with Rl Coleman MD   [facility]    [address] [address] [phone]              Who to contact     If you have questions or need follow up information about today's clinic visit or your schedule please contact [facility] directly at [phone].  Normal or non-critical lab and imaging results will be communicated to you by MyChart, letter or phone within 4 business days after the clinic has received the results. If you do not hear from us within 7 days, please contact the clinic through [employer] or phone. If you have a critical or abnormal lab result, we will notify you by phone as soon as possible.  Submit refill requests through [employer] or call your pharmacy and they will forward the refill request to  us. Please allow 3 business days for your refill to be completed.          Additional Information About Your Visit        MyChart Information     [employer] gives you secure access to your electronic health record. If you see a primary care provider, you can also send messages to your care team and make appointments. If you have questions, please call your primary care clinic.  If you do not have a primary care provider, please call [phone] and they will assist you.        Care EveryWhere ID     This is your Care EveryWhere ID. This could be used by other organizations to access your [place] medical records  [id number]        Your Vitals Were     Pulse Pulse Oximetry                75 100%           Blood Pressure from Last 3 Encounters:   11/15/17 134/72   11/08/17 156/85   10/31/17 (!) 157/91    Weight from Last 3 Encounters:   08/07/17 (!) 143.8 kg (317 lb)   05/08/17 (!) 139.3 kg (307 lb)   04/28/17 (!) 138.3 kg (305 lb)              Today, you had the following     No orders found for display       Primary Care Provider Office Phone # Fax #    Hitesh Washington [phone] [address] [address]        Equal Access to Services     THELMA REYES : Hadii aad ku hadasho Soomaali, waaxda luqadaha, qaybta kaalmada adeegyada, kaleb davisin hayadamn jesus luna. So [facility] [phone].    ATENCIÓN: Si habla español, tiene a zamora disposición servicios gratuitos de asistencia lingüística. Llame al [phone].    We comply with applicable federal civil rights laws and [place] laws. We do not discriminate on the basis of race, color, national origin, age, disability, sex, sexual orientation, or gender identity.            Thank you!     Thank you for choosing [facility]  for your care. Our goal is always to provide you with excellent care. Hearing back from our patients is one way we can continue to improve our services. Please take a few minutes to complete  the written survey that you may receive in the mail after your visit with us. Thank you!             Your Updated Medication List - Protect others around you: Learn how to safely use, store and throw away your medicines at www.[email or web address].          This list is accurate as of: 11/15/17 10:46 AM.  Always use your most recent med list.                   Brand Name Dispense Instructions for use Diagnosis    albuterol 108 (90 BASE) MCG/ACT Inhaler    PROAIR HFA/PROVENTIL HFA/VENTOLIN HFA    1 Inhaler    Inhale 2 puffs into the lungs every 6 hours as needed for shortness of breath / dyspnea or wheezing    Acute bronchitis, unspecified organism       CLARITIN PO           fluticasone 27.5 MCG/SPRAY spray    VERAMYST     Spray 1 spray into both nostrils 2 times daily        * ADULT GUMMY PO      Take 1 chew tab by mouth daily VitaCrave        * HAIR/SKIN/NAILS/BIOTIN PO     100 tablet    Take 1 capsule by mouth daily.    RA (rheumatoid arthritis) (H)       hydroxychloroquine 200 MG tablet    PLAQUENIL    180 tablet    Take 2 tablets (400 mg) by mouth daily    Rheumatoid arthritis of hand, unspecified laterality, unspecified rheumatoid factor presence (H)       methotrexate 2.5 MG tablet CHEMO     120 tablet    Take 9 tablets (22.5 mg) by mouth once a week    Rheumatoid arthritis of multiple sites without rheumatoid factor (H)       predniSONE 5 MG tablet    DELTASONE    50 tablet    20 mg for 5 days, taper by 5 mg every 5 days    Rheumatoid arthritis of multiple sites without rheumatoid factor (H)       vitamin B complex with vitamin C Tabs tablet      Take 1 tablet by mouth daily    RA (rheumatoid arthritis) (H)       * Notice:  This list has 2 medication(s) that are the same as other medications prescribed for you. Read the directions carefully, and ask your doctor or other care provider to review them with you.

## 2017-11-15 NOTE — LETTER
11/15/2017         RE: Kaylene Diaz  [address] [place] [address]        Dear Colleague,    Thank you for referring your patient, Kaylene Diaz, to the [facility]. Please see a copy of my visit note below.    Kaylene Diaz is a 61 year old year old female patient here today for evaluation and managment of basal cell carcinoma on forehead and l scapula.  Lip healing well. Associated symptoms: none.  Patient has no other skin complaints today.  Remainder of the HPI, Meds, PMH, Allergies, FH, and SH was reviewed in chart.      Past Medical History:   Diagnosis Date     Basal cell carcinoma      RA (rheumatoid arthritis) (H)      Small bowel obstruction        Past Surgical History:   Procedure Laterality Date     ARTHROSCOPY KNEE  12/10/2010    ARTHROSCOPY KNEE performed by NAVID FIERRO at [facility]     SURGICAL HISTORY OF -       IUD removal     SURGICAL HISTORY OF -       Ovarian cyst     SURGICAL HISTORY OF -       thorn removal from foot     SURGICAL HISTORY OF -       knee surgery, scope, right knee        Family History   Problem Relation Age of Onset     Prostate Cancer Father      Eye Disorder Father      mac degen     HEART DISEASE Father      HEART DISEASE Mother      a-fib     Eye Disorder Mother      C.A.D. Mother      Hypertension Mother      Alcohol/Drug Brother      Alcohol/Drug Sister      Alcohol/Drug Brother      Alcohol/Drug Brother      Alcohol/Drug Brother      Alcohol/Drug Sister      Obesity Sister      Alcohol/Drug Sister      Prostate Cancer Sister      CANCER Other      Melanoma No family hx of        Social History     Social History     Marital status:      Spouse name: N/A     Number of children: N/A     Years of education: N/A     Occupational History      Unemployed     Social History Main Topics     Smoking status: Former Smoker     Years: 30.00     Quit date: 4/18/2000     Smokeless tobacco: Never Used     Alcohol use Yes      Comment: VERY RARE     Drug use:  No     Sexual activity: Yes     Partners: Male     Other Topics Concern     Parent/Sibling W/ Cabg, Mi Or Angioplasty Before 65f 55m? No     Social History Narrative       Outpatient Encounter Prescriptions as of 11/15/2017   Medication Sig Dispense Refill     hydroxychloroquine (PLAQUENIL) 200 MG tablet Take 2 tablets (400 mg) by mouth daily 180 tablet 3     methotrexate 2.5 MG tablet CHEMO Take 9 tablets (22.5 mg) by mouth once a week 120 tablet 3     predniSONE (DELTASONE) 5 MG tablet 20 mg for 5 days, taper by 5 mg every 5 days 50 tablet 1     Loratadine (CLARITIN PO)        albuterol (PROAIR HFA/PROVENTIL HFA/VENTOLIN HFA) 108 (90 BASE) MCG/ACT Inhaler Inhale 2 puffs into the lungs every 6 hours as needed for shortness of breath / dyspnea or wheezing 1 Inhaler 0     fluticasone (VERAMYST) 27.5 MCG/SPRAY spray Spray 1 spray into both nostrils 2 times daily       Multiple Vitamins-Minerals (ADULT GUMMY PO) Take 1 chew tab by mouth daily VitaCrave       vitamin  B complex with vitamin C (VITAMIN  B COMPLEX) TABS Take 1 tablet by mouth daily  0     Multiple Vitamins-Minerals (HAIR/SKIN/NAILS/BIOTIN PO) Take 1 capsule by mouth daily.  100 tablet 3     No facility-administered encounter medications on file as of 11/15/2017.              Review Of Systems  Skin: As above  Eyes: negative  Ears/Nose/Throat: negative  Respiratory: No shortness of breath, dyspnea on exertion, cough, or hemoptysis  Cardiovascular: negative  Gastrointestinal: negative  Genitourinary: negative  Musculoskeletal: negative  Neurologic: negative  Psychiatric: negative  Hematologic/Lymphatic/Immunologic: negative  Endocrine: negative      O:   NAD, WDWN, Alert & Oriented, Mood & Affect wnl, Vitals stable   Here today alone   /72  Pulse 75  SpO2 100%   General appearance normal   Vitals stable   Alert, oriented and in no acute distress     R lat forehead 8mm scaly papule    L scapula 1.2cm red plaque           Eyes:  Conjunctivae/lids:Normal     ENT: Lips, buccal mucosa, tongue: normal    MSK:Normal    Cardiovascular: peripheral edema none    Pulm: Breathing Normal    Neuro/Psych: Orientation:Normal; Mood/Affect:Normal      MICRO:     L scapula:Orthokeratosis of epidermis with a proliferation of nests of basaloid cells, with peripheral palisading and a haphazard arrangement in the center extending into the dermis, forming nodules.  The tumor cells have hyperchromatic nuclei. Poor cytoplasm and intercellular bridging.      L scapula second excision:Unremarkable epidermis with parallel bundles of cellular collagen within the superficial dermis.  No concerning areas for malignancy.     A/P:  1. L scapula basal cell carcinoma   EXCISION OF BASAL CELL CARCINOMA, Margins confirmed with FROZEN SECTIONS AND Second intent: After thorough discussion of PGACAC, consent obtained, anesthesia and prep, the margins of the lesion were identified and an elliptical incision was made encompassing the lesion with 4mm margin. The incisions were made through the skin and down to and including the superficial dermis.  The lesion was removed en bloc and submitted for frozen section pathologic review. Clear margins obtained (2.2cm).  Two excision first, excision positive margin   REPAIR SECOND INTENT: We discussed the options for wound management in full with the patient including risks/benefits/possible outcomes. Decision made to allow the wound to heal by second intention. EBL minimal; complications none; wound care routine.  The patient was discharged in good condition and will return in one month or prn for wound evaluation.     2. R forehead basal cell carcinoma  MOHS:   Location    After PGACAC discussed with patient, decision for Mohs surgery was made. Indication for Mohs was Location. Patient confirmed the site with Dr. Springer.  After anesthesia with LEC, the tumor was excised using standard Mohs technique in 1 stages(s).  CLEAR MARGINS  OBTAINED and Final defect size was 1.3 cm.       REPAIR COMPLEX: Because of the tightness of the surrounding skin and Because of the size and full thickness nature of the defect, a complex closure was planned. After LEC anesthesia and prep, Burow's triangles were excised in the relaxed skin tension lines. The wound edges were widely undermined by dissection in the subcutaneous plane until adequate tissue mobility was obtained. Hemostasis was obtained. The wound edges were closed in a layered fashion using Vicryl and Fast Absorbing Plain Gut sutures. Postoperative length was 3.6 cm.   EBL minimal; complications none; wound care routine.  The patient was discharged in good condition and will return in one week for wound evaluation.          BENIGN LESIONS DISCUSSED WITH PATIENT:  I discussed the specifics of tumor, prognosis, and genetics of benign lesions.  I explained that treatment of these lesions would be purely cosmetic and not medically neccessary.  I discussed with patient different removal options including excision, cautery and /or laser.      Nature and genetics of benign skin lesions dicussed with patient.  Signs and Symptoms of skin cancer discussed with patient.  Patient encouraged to perform monthly skin exams.  UV precautions reviewed with patient.  Skin care regimen reviewed with patient: Eliminate harsh soaps, i.e. Dial, zest, irsih spring; Mild soaps such as Cetaphil or Dove sensitive skin, avoid hot or cold showers, aggressive use of emollients including vanicream, cetaphil or cerave discussed with patient.    Risks of non-melanoma skin cancer discussed with patient   Return to clinic 6 months        Again, thank you for allowing me to participate in the care of your patient.        Sincerely,        Cipriano Springer MD

## 2017-12-04 ENCOUNTER — OFFICE VISIT (OUTPATIENT)
Dept: DERMATOLOGY | Facility: CLINIC | Age: 62
End: 2017-12-04
Payer: COMMERCIAL

## 2017-12-04 ENCOUNTER — OFFICE VISIT (OUTPATIENT)
Dept: RHEUMATOLOGY | Facility: CLINIC | Age: 62
End: 2017-12-04
Payer: COMMERCIAL

## 2017-12-04 VITALS — SYSTOLIC BLOOD PRESSURE: 157 MMHG | HEART RATE: 79 BPM | DIASTOLIC BLOOD PRESSURE: 87 MMHG | OXYGEN SATURATION: 100 %

## 2017-12-04 VITALS — SYSTOLIC BLOOD PRESSURE: 169 MMHG | RESPIRATION RATE: 16 BRPM | HEART RATE: 82 BPM | DIASTOLIC BLOOD PRESSURE: 82 MMHG

## 2017-12-04 DIAGNOSIS — M06.09 RHEUMATOID ARTHRITIS OF MULTIPLE SITES WITHOUT RHEUMATOID FACTOR (H): ICD-10-CM

## 2017-12-04 DIAGNOSIS — D04.39 SQUAMOUS CELL CARCINOMA IN SITU OF SKIN OF NASAL TIP: Primary | ICD-10-CM

## 2017-12-04 DIAGNOSIS — C44.319 BASAL CELL CARCINOMA OF RIGHT CHEEK: ICD-10-CM

## 2017-12-04 DIAGNOSIS — M06.9 RHEUMATOID ARTHRITIS INVOLVING MULTIPLE SITES, UNSPECIFIED RHEUMATOID FACTOR PRESENCE: Primary | Chronic | ICD-10-CM

## 2017-12-04 PROCEDURE — 13132 CMPLX RPR F/C/C/M/N/AX/G/H/F: CPT | Mod: 51 | Performed by: DERMATOLOGY

## 2017-12-04 PROCEDURE — 17311 MOHS 1 STAGE H/N/HF/G: CPT | Performed by: DERMATOLOGY

## 2017-12-04 PROCEDURE — 11100 HC BIOPSY SKIN/SUBQ/MUC MEM, SINGLE LESION: CPT | Mod: 59 | Performed by: DERMATOLOGY

## 2017-12-04 PROCEDURE — 99213 OFFICE O/P EST LOW 20 MIN: CPT | Performed by: INTERNAL MEDICINE

## 2017-12-04 PROCEDURE — 99213 OFFICE O/P EST LOW 20 MIN: CPT | Mod: 25 | Performed by: DERMATOLOGY

## 2017-12-04 PROCEDURE — 88331 PATH CONSLTJ SURG 1 BLK 1SPC: CPT | Mod: 59 | Performed by: DERMATOLOGY

## 2017-12-04 NOTE — NURSING NOTE
Surgical Office Location :   [facility]  [address] [place] [address]

## 2017-12-04 NOTE — NURSING NOTE
"Initial /87  Pulse 79  SpO2 100% Estimated body mass index is 49.65 kg/(m^2) as calculated from the following:    Height as of 5/8/17: 1.702 m (5' 7\").    Weight as of 8/7/17: 143.8 kg (317 lb). .      "

## 2017-12-04 NOTE — LETTER
12/4/2017         RE: Kaylene Diaz  [address] [place] [address]        Dear Colleague,    Thank you for referring your patient, Kaylene Diaz, to the [facility]. Please see a copy of my visit note below.    Kaylene Diaz is a 62 year old year old female patient here today for evaluation and managment of basal cell carcinoma onright cheek.  Today she note sbleeding spot on right nose.   .  Patient states this has been present for ?.  Patient reports the following symptoms:  bleeding.  Patient reports the following previous treatments none.  Patient reports the following modifying factors none.  Associated symptoms: none.  Patient has no other skin complaints today.  Remainder of the HPI, Meds, PMH, Allergies, FH, and SH was reviewed in chart.      Past Medical History:   Diagnosis Date     Basal cell carcinoma      RA (rheumatoid arthritis) (H)      Small bowel obstruction        Past Surgical History:   Procedure Laterality Date     ARTHROSCOPY KNEE  12/10/2010    ARTHROSCOPY KNEE performed by NAVID FIERRO at [facility]     SURGICAL HISTORY OF -       IUD removal     SURGICAL HISTORY OF -       Ovarian cyst     SURGICAL HISTORY OF -       thorn removal from foot     SURGICAL HISTORY OF -       knee surgery, scope, right knee        Family History   Problem Relation Age of Onset     Prostate Cancer Father      Eye Disorder Father      mac degen     HEART DISEASE Father      HEART DISEASE Mother      a-fib     Eye Disorder Mother      C.A.D. Mother      Hypertension Mother      Alcohol/Drug Brother      Alcohol/Drug Sister      Alcohol/Drug Brother      Alcohol/Drug Brother      Alcohol/Drug Brother      Alcohol/Drug Sister      Obesity Sister      Alcohol/Drug Sister      Prostate Cancer Sister      CANCER Other      Melanoma No family hx of        Social History     Social History     Marital status:      Spouse name: N/A     Number of children: N/A     Years of education: N/A      Occupational History      Unemployed     Social History Main Topics     Smoking status: Former Smoker     Years: 30.00     Quit date: 4/18/2000     Smokeless tobacco: Never Used     Alcohol use Yes      Comment: VERY RARE     Drug use: No     Sexual activity: Yes     Partners: Male     Other Topics Concern     Parent/Sibling W/ Cabg, Mi Or Angioplasty Before 65f 55m? No     Social History Narrative       Outpatient Encounter Prescriptions as of 12/4/2017   Medication Sig Dispense Refill     hydroxychloroquine (PLAQUENIL) 200 MG tablet Take 2 tablets (400 mg) by mouth daily 180 tablet 3     predniSONE (DELTASONE) 5 MG tablet 20 mg for 5 days, taper by 5 mg every 5 days (Patient not taking: Reported on 12/4/2017) 50 tablet 1     [DISCONTINUED] methotrexate 2.5 MG tablet CHEMO Take 9 tablets (22.5 mg) by mouth once a week 120 tablet 3     Loratadine (CLARITIN PO)        albuterol (PROAIR HFA/PROVENTIL HFA/VENTOLIN HFA) 108 (90 BASE) MCG/ACT Inhaler Inhale 2 puffs into the lungs every 6 hours as needed for shortness of breath / dyspnea or wheezing 1 Inhaler 0     fluticasone (VERAMYST) 27.5 MCG/SPRAY spray Spray 1 spray into both nostrils 2 times daily       Multiple Vitamins-Minerals (ADULT GUMMY PO) Take 1 chew tab by mouth daily VitaCrave       vitamin  B complex with vitamin C (VITAMIN  B COMPLEX) TABS Take 1 tablet by mouth daily  0     Multiple Vitamins-Minerals (HAIR/SKIN/NAILS/BIOTIN PO) Take 1 capsule by mouth daily.  100 tablet 3     No facility-administered encounter medications on file as of 12/4/2017.              Review Of Systems  Skin: As above  Eyes: negative  Ears/Nose/Throat: negative  Respiratory: No shortness of breath, dyspnea on exertion, cough, or hemoptysis  Cardiovascular: negative  Gastrointestinal: negative  Genitourinary: negative  Musculoskeletal: negative  Neurologic: negative  Psychiatric: negative  Hematologic/Lymphatic/Immunologic: negative  Endocrine: negative      O:   NAD, WDWN,  Alert & Oriented, Mood & Affect wnl, Vitals stable   Here today alone   /87  Pulse 79  SpO2 100%   General appearance normal   Vitals stable   Alert, oriented and in no acute distress     R zygoma 1cm pink pearly papule    R nasal tip  Ill-=defined red scaly papule 8mm         The remainder of expanded problem focused exam was unremarkable; the following areas were examined:  scalp/hair, conjunctiva/lids, face, neck, lips      Eyes: Conjunctivae/lids:Normal     ENT: Lips, buccal mucosa, tongue: normal    MSK:Normal    Cardiovascular: peripheral edema none    Pulm: Breathing Normal    Lymph Nodes: No Head and Neck Lymphadenopathy     Neuro/Psych: Orientation:Normal; Mood/Affect:Normal      MICRO:   R nasal tip:There is hyperkeratosis & parakeratosis of the epidermis, with full thickness epidermal involvement by atypical keratinocytes with rare pale vacuolated cells.  Unremarkable dermis.    A/P:  1. R nasal tip r/o squamous cell carcinoma   TANGENTIAL BIOPSY IN HOUSE:  After consent, anesthesia with LEC and prep, tangential excision performed and dx above confirmed with frozen section histology.  No complications and routine wound care.  Patient told result squamous cell carcinoma in situ   Schedule     2. Basal cell carcinoma r cheek   .      BENIGN LESIONS DISCUSSED WITH PATIENT:  I discussed the specifics of tumor, prognosis, and genetics of benign lesions.  I explained that treatment of these lesions would be purely cosmetic and not medically neccessary.  I discussed with patient different removal options including excision, cautery and /or laser.      Nature and genetics of benign skin lesions dicussed with patient.  Signs and Symptoms of skin cancer discussed with patient.  Patient encouraged to perform monthly skin exams.  UV precautions reviewed with patient.  Skin care regimen reviewed with patient: Eliminate harsh soaps, i.e. Dial, zest, irsih spring; Mild soaps such as Cetaphil or Dove sensitive  skin, avoid hot or cold showers, aggressive use of emollients including vanicream, cetaphil or cerave discussed with patient.    Risks of non-melanoma skin cancer discussed with patient   Return to clinic 6 months      PROCEDURE NOTE  R cheek basal cell carcinoma   MOHS:   Location    After PGACAC discussed with patient, decision for Mohs surgery was made. Indication for Mohs was Location. Patient confirmed the site with Dr. Springer.  After anesthesia with LEC, the tumor was excised using standard Mohs technique in 1 stages(s).  CLEAR MARGINS OBTAINED and Final defect size was 1.4 cm.       REPAIR COMPLEX: Because of the tightness of the surrounding skin and Because of the size and full thickness nature of the defect, a complex closure was planned. After LEC anesthesia and prep, Burow's triangles were excised in the relaxed skin tension lines. The wound edges were widely undermined by dissection in the subcutaneous plane until adequate tissue mobility was obtained. Hemostasis was obtained. The wound edges were closed in a layered fashion using Vicryl and Fast Absorbing Plain Gut sutures. Postoperative length was 4 cm.   EBL minimal; complications none; wound care routine.  The patient was discharged in good condition and will return in one week for wound evaluation.            Again, thank you for allowing me to participate in the care of your patient.        Sincerely,        Cipriano Springer MD

## 2017-12-04 NOTE — PATIENT INSTRUCTIONS
Sutured Wound Care     [facility]: [phone]    [facility]: [phone]    Right Cheek      ? No strenuous activity for 48 hours. Resume moderate activity in 48 hours. No heavy exercising until you are seen for follow up in one week.     ? Take Tylenol as needed for discomfort.                         ? Do not drink alcoholic beverages for 48 hours.     ? Keep the pressure bandage in place for 24 hours. If the bandage becomes blood tinged or loose, reinforce it with gauze and tape.        (Refer to the reverse side of this page for management of bleeding).    ? Remove pressure bandage in 24 hours     ? Leave the flat bandage in place until your follow up appointment.    ? Keep the bandage dry. Wash around it carefully.    ? If the tape becomes soiled or starts to come off, reinforce it with additional paper tape.    ? Do not smoke for 3 weeks; smoking is detrimental to wound healing.    ? It is normal to have swelling and bruising around the surgical site. The bruising will fade in approximately 10-14 days. Elevate the area to reduce swelling.    ? Numbness, itchiness and sensitivity to temperature changes can occur after surgery and may take up to 18 months to normalize.      POSSIBLE COMPLICATIONS    BLEEDIN. Leave the bandage in place.  2. Use tightly rolled up gauze or a cloth to apply direct pressure over the bandage for 20   minutes.  3. Reapply pressure for an additional 20 minutes if necessary  4. Call the office or go to the nearest emergency room if pressure fails to stop the bleeding.  5. Use additional gauze and tape to maintain pressure once the bleeding has stopped.        PAIN:    1. Post operative pain should slowly get better, never worse.  2. A severe increase in pain may indicate a problem. Call the office if this occurs.    In case of emergency phone:Dr Springer [phone]          Wound Care Instructions     FOR SUPERFICIAL WOUNDS - NASAL TIP    [facility]  [place] [phone]    [facility] [phone]                       AFTER 24 HOURS YOU SHOULD REMOVE THE BANDAGE AND BEGIN DAILY DRESSING CHANGES AS FOLLOWS:     1) Remove Dressing.     2) Clean and dry the area with tap water using a Q-tip or sterile gauze pad.     3) Apply Vaseline, Aquaphor, Polysporin ointment or Bacitracin ointment over entire wound.  Do NOT use Neosporin ointment.     4) Cover the wound with a band-aid, or a sterile non-stick gauze pad and micropore paper tape      REPEAT THESE INSTRUCTIONS AT LEAST ONCE A DAY UNTIL THE WOUND HAS COMPLETELY HEALED.    It is an old wives tale that a wound heals better when it is exposed to air and allowed to dry out. The wound will heal faster with a better cosmetic result if it is kept moist with ointment and covered with a bandage.    **Do not let the wound dry out.**      Supplies Needed:      *Cotton tipped applicators (Q-tips)    *Polysporin Ointment or Bacitracin Ointment (NOT NEOSPORIN)    *Band-aids or non-stick gauze pads and micropore paper tape.      PATIENT INFORMATION:    During the healing process you will notice a number of changes. All wounds develop a small halo of redness surrounding the wound.  This means healing is occurring. Severe itching with extensive redness usually indicates sensitivity to the ointment or bandage tape used to dress the wound.  You should call our office if this develops.      Swelling  and/or discoloration around your surgical site is common, particularly when performed around the eye.    All wounds normally drain.  The larger the wound the more drainage there will be.  After 7-10 days, you will notice the wound beginning to shrink and new skin will begin to grow.  The wound is healed when you can see skin has formed over the entire area.  A healed wound has a healthy, shiny look to the surface and is red to dark pink in color to normalize.  Wounds may take approximately 4-6 weeks to heal.  Larger  wounds may take 6-8 weeks.  After the wound is healed you may discontinue dressing changes.    You may experience a sensation of tightness as your wound heals. This is normal and will gradually subside.    Your healed wound may be sensitive to temperature changes. This sensitivity improves with time, but if you re having a lot of discomfort, try to avoid temperature extremes.    Patients frequently experience itching after their wound appears to have healed because of the continue healing under the skin.  Plain Vaseline will help relieve the itching.        POSSIBLE COMPLICATIONS    BLEEDIN. Leave the bandage in place.  7. Use tightly rolled up gauze or a cloth to apply direct pressure over the bandage for 30  minutes.  8. Reapply pressure for an additional 30 minutes if necessary  9. Use additional gauze and tape to maintain pressure once the bleeding has stopped.

## 2017-12-04 NOTE — MR AVS SNAPSHOT
After Visit Summary   2017    Kaylene Diaz    MRN: [id number]           Patient Information     Date Of Birth          1955        Visit Information        Provider Department      2017 8:00 AM Cipriano Springer MD [facility]        Care Instructions      Sutured Wound Care     [facility]: [phone]    [facility]: [phone]    Right Cheek      ? No strenuous activity for 48 hours. Resume moderate activity in 48 hours. No heavy exercising until you are seen for follow up in one week.     ? Take Tylenol as needed for discomfort.                         ? Do not drink alcoholic beverages for 48 hours.     ? Keep the pressure bandage in place for 24 hours. If the bandage becomes blood tinged or loose, reinforce it with gauze and tape.        (Refer to the reverse side of this page for management of bleeding).    ? Remove pressure bandage in 24 hours     ? Leave the flat bandage in place until your follow up appointment.    ? Keep the bandage dry. Wash around it carefully.    ? If the tape becomes soiled or starts to come off, reinforce it with additional paper tape.    ? Do not smoke for 3 weeks; smoking is detrimental to wound healing.    ? It is normal to have swelling and bruising around the surgical site. The bruising will fade in approximately 10-14 days. Elevate the area to reduce swelling.    ? Numbness, itchiness and sensitivity to temperature changes can occur after surgery and may take up to 18 months to normalize.      POSSIBLE COMPLICATIONS    BLEEDIN. Leave the bandage in place.  2. Use tightly rolled up gauze or a cloth to apply direct pressure over the bandage for 20   minutes.  3. Reapply pressure for an additional 20 minutes if necessary  4. Call the office or go to the nearest emergency room if pressure fails to stop the bleeding.  5. Use additional gauze and tape to maintain pressure once the bleeding has  stopped.        PAIN:    1. Post operative pain should slowly get better, never worse.  2. A severe increase in pain may indicate a problem. Call the office if this occurs.    In case of emergency phone:Dr Springer [phone]          Wound Care Instructions     FOR SUPERFICIAL WOUNDS - NASAL TIP    [facility], [place] [phone]    [facility] [phone]                       AFTER 24 HOURS YOU SHOULD REMOVE THE BANDAGE AND BEGIN DAILY DRESSING CHANGES AS FOLLOWS:     1) Remove Dressing.     2) Clean and dry the area with tap water using a Q-tip or sterile gauze pad.     3) Apply Vaseline, Aquaphor, Polysporin ointment or Bacitracin ointment over entire wound.  Do NOT use Neosporin ointment.     4) Cover the wound with a band-aid, or a sterile non-stick gauze pad and micropore paper tape      REPEAT THESE INSTRUCTIONS AT LEAST ONCE A DAY UNTIL THE WOUND HAS COMPLETELY HEALED.    It is an old wives tale that a wound heals better when it is exposed to air and allowed to dry out. The wound will heal faster with a better cosmetic result if it is kept moist with ointment and covered with a bandage.    **Do not let the wound dry out.**      Supplies Needed:      *Cotton tipped applicators (Q-tips)    *Polysporin Ointment or Bacitracin Ointment (NOT NEOSPORIN)    *Band-aids or non-stick gauze pads and micropore paper tape.      PATIENT INFORMATION:    During the healing process you will notice a number of changes. All wounds develop a small halo of redness surrounding the wound.  This means healing is occurring. Severe itching with extensive redness usually indicates sensitivity to the ointment or bandage tape used to dress the wound.  You should call our office if this develops.      Swelling  and/or discoloration around your surgical site is common, particularly when performed around the eye.    All wounds normally drain.  The larger the wound the more drainage there will be.  After 7-10 days, you  will notice the wound beginning to shrink and new skin will begin to grow.  The wound is healed when you can see skin has formed over the entire area.  A healed wound has a healthy, shiny look to the surface and is red to dark pink in color to normalize.  Wounds may take approximately 4-6 weeks to heal.  Larger wounds may take 6-8 weeks.  After the wound is healed you may discontinue dressing changes.    You may experience a sensation of tightness as your wound heals. This is normal and will gradually subside.    Your healed wound may be sensitive to temperature changes. This sensitivity improves with time, but if you re having a lot of discomfort, try to avoid temperature extremes.    Patients frequently experience itching after their wound appears to have healed because of the continue healing under the skin.  Plain Vaseline will help relieve the itching.        POSSIBLE COMPLICATIONS    BLEEDIN. Leave the bandage in place.  7. Use tightly rolled up gauze or a cloth to apply direct pressure over the bandage for 30  minutes.  8. Reapply pressure for an additional 30 minutes if necessary  9. Use additional gauze and tape to maintain pressure once the bleeding has stopped.            Follow-ups after your visit        Your next 10 appointments already scheduled     Dec 04, 2017 10:45 AM CST   [facility] Rl Coleman MD   [facility]    [address] [address]   [phone]            Dec 20, 2017  7:30 AM CST   MOHS with Cipriano Springer MD   [facility]    [address] [address]   [phone]            Mar 26, 2018 10:00 AM CDT   New Visit with Joseph Rooney MD   [facility]    [address]  [place] [id number] [phone]              Who to contact     If you have questions or need follow up  information about today's clinic visit or your schedule please contact [facility] directly at [phone].  Normal or non-critical lab and imaging results will be communicated to you by MyChart, letter or phone within 4 business days after the clinic has received the results. If you do not hear from us within 7 days, please contact the clinic through [employer] or phone. If you have a critical or abnormal lab result, we will notify you by phone as soon as possible.  Submit refill requests through [employer] or call your pharmacy and they will forward the refill request to us. Please allow 3 business days for your refill to be completed.          Additional Information About Your Visit        [employer] Information     [employer] gives you secure access to your electronic health record. If you see a primary care provider, you can also send messages to your care team and make appointments. If you have questions, please call your primary care clinic.  If you do not have a primary care provider, please call [phone] and they will assist you.        Care EveryWhere ID     This is your Care EveryWhere ID. This could be used by other organizations to access your [facility] medical records  [id number]        Your Vitals Were     Pulse Pulse Oximetry                79 100%           Blood Pressure from Last 3 Encounters:   12/04/17 157/87   11/15/17 134/72   11/08/17 156/85    Weight from Last 3 Encounters:   08/07/17 (!) 143.8 kg (317 lb)   05/08/17 (!) 139.3 kg (307 lb)   04/28/17 (!) 138.3 kg (305 lb)              Today, you had the following     No orders found for display       Primary Care Provider Office Phone # Fax #    Hitesh Washington [phone] [address] [address]        Equal Access to Services     THELMA REYES : Hever Ayala, roderick mckenzie, kaleb pacheco. So [facility] [phone].    ATENCIÓN:  Si habmerline haque, tiene a zamora disposición servicios gratuitos de asistencia lingüística. Balwinder campbell [phone].    We comply with applicable federal civil rights laws and [place] laws. We do not discriminate on the basis of race, color, national origin, age, disability, sex, sexual orientation, or gender identity.            Thank you!     Thank you for choosing [facility]  for your care. Our goal is always to provide you with excellent care. Hearing back from our patients is one way we can continue to improve our services. Please take a few minutes to complete the written survey that you may receive in the mail after your visit with us. Thank you!             Your Updated Medication List - Protect others around you: Learn how to safely use, store and throw away your medicines at www.disposemymeds.org.          This list is accurate as of: 12/4/17 10:15 AM.  Always use your most recent med list.                   Brand Name Dispense Instructions for use Diagnosis    albuterol 108 (90 BASE) MCG/ACT Inhaler    PROAIR HFA/PROVENTIL HFA/VENTOLIN HFA    1 Inhaler    Inhale 2 puffs into the lungs every 6 hours as needed for shortness of breath / dyspnea or wheezing    Acute bronchitis, unspecified organism       CLARITIN PO           fluticasone 27.5 MCG/SPRAY spray    VERAMYST     Spray 1 spray into both nostrils 2 times daily        * ADULT GUMMY PO      Take 1 chew tab by mouth daily VitaCrave        * HAIR/SKIN/NAILS/BIOTIN PO     100 tablet    Take 1 capsule by mouth daily.    RA (rheumatoid arthritis) (H)       hydroxychloroquine 200 MG tablet    PLAQUENIL    180 tablet    Take 2 tablets (400 mg) by mouth daily    Rheumatoid arthritis of hand, unspecified laterality, unspecified rheumatoid factor presence (H)       methotrexate 2.5 MG tablet CHEMO     120 tablet    Take 9 tablets (22.5 mg) by mouth once a week    Rheumatoid arthritis of multiple sites without rheumatoid factor (H)       predniSONE 5  MG tablet    DELTASONE    50 tablet    20 mg for 5 days, taper by 5 mg every 5 days    Rheumatoid arthritis of multiple sites without rheumatoid factor (H)       vitamin B complex with vitamin C Tabs tablet      Take 1 tablet by mouth daily    RA (rheumatoid arthritis) (H)       * Notice:  This list has 2 medication(s) that are the same as other medications prescribed for you. Read the directions carefully, and ask your doctor or other care provider to review them with you.

## 2017-12-04 NOTE — NURSING NOTE
"Chief Complaint   Patient presents with     RECHECK     Medication recheck       Initial Resp 16 Estimated body mass index is 49.65 kg/(m^2) as calculated from the following:    Height as of 5/8/17: 5' 7\" (1.702 m).    Weight as of 8/7/17: 317 lb (143.8 kg).  Medication Reconciliation: complete    "

## 2017-12-04 NOTE — PROGRESS NOTES
Kaylene Diaz is a 62 year old year old female patient here today for evaluation and managment of basal cell carcinoma onright cheek.  Today she note sbleeding spot on right nose.   .  Patient states this has been present for ?.  Patient reports the following symptoms:  bleeding.  Patient reports the following previous treatments none.  Patient reports the following modifying factors none.  Associated symptoms: none.  Patient has no other skin complaints today.  Remainder of the HPI, Meds, PMH, Allergies, FH, and SH was reviewed in chart.      Past Medical History:   Diagnosis Date     Basal cell carcinoma      RA (rheumatoid arthritis) (H)      Small bowel obstruction        Past Surgical History:   Procedure Laterality Date     ARTHROSCOPY KNEE  12/10/2010    ARTHROSCOPY KNEE performed by NAVID FIERRO at [facility]     SURGICAL HISTORY OF -       IUD removal     SURGICAL HISTORY OF -       Ovarian cyst     SURGICAL HISTORY OF -       thorn removal from foot     SURGICAL HISTORY OF -       knee surgery, scope, right knee        Family History   Problem Relation Age of Onset     Prostate Cancer Father      Eye Disorder Father      mac degen     HEART DISEASE Father      HEART DISEASE Mother      a-fib     Eye Disorder Mother      C.A.D. Mother      Hypertension Mother      Alcohol/Drug Brother      Alcohol/Drug Sister      Alcohol/Drug Brother      Alcohol/Drug Brother      Alcohol/Drug Brother      Alcohol/Drug Sister      Obesity Sister      Alcohol/Drug Sister      Prostate Cancer Sister      CANCER Other      Melanoma No family hx of        Social History     Social History     Marital status:      Spouse name: N/A     Number of children: N/A     Years of education: N/A     Occupational History      Unemployed     Social History Main Topics     Smoking status: Former Smoker     Years: 30.00     Quit date: 4/18/2000     Smokeless tobacco: Never Used     Alcohol use Yes      Comment: VERY RARE     Drug use: No      Sexual activity: Yes     Partners: Male     Other Topics Concern     Parent/Sibling W/ Cabg, Mi Or Angioplasty Before 65f 55m? No     Social History Narrative       Outpatient Encounter Prescriptions as of 12/4/2017   Medication Sig Dispense Refill     hydroxychloroquine (PLAQUENIL) 200 MG tablet Take 2 tablets (400 mg) by mouth daily 180 tablet 3     predniSONE (DELTASONE) 5 MG tablet 20 mg for 5 days, taper by 5 mg every 5 days (Patient not taking: Reported on 12/4/2017) 50 tablet 1     [DISCONTINUED] methotrexate 2.5 MG tablet CHEMO Take 9 tablets (22.5 mg) by mouth once a week 120 tablet 3     Loratadine (CLARITIN PO)        albuterol (PROAIR HFA/PROVENTIL HFA/VENTOLIN HFA) 108 (90 BASE) MCG/ACT Inhaler Inhale 2 puffs into the lungs every 6 hours as needed for shortness of breath / dyspnea or wheezing 1 Inhaler 0     fluticasone (VERAMYST) 27.5 MCG/SPRAY spray Spray 1 spray into both nostrils 2 times daily       Multiple Vitamins-Minerals (ADULT GUMMY PO) Take 1 chew tab by mouth daily VitaCrave       vitamin  B complex with vitamin C (VITAMIN  B COMPLEX) TABS Take 1 tablet by mouth daily  0     Multiple Vitamins-Minerals (HAIR/SKIN/NAILS/BIOTIN PO) Take 1 capsule by mouth daily.  100 tablet 3     No facility-administered encounter medications on file as of 12/4/2017.              Review Of Systems  Skin: As above  Eyes: negative  Ears/Nose/Throat: negative  Respiratory: No shortness of breath, dyspnea on exertion, cough, or hemoptysis  Cardiovascular: negative  Gastrointestinal: negative  Genitourinary: negative  Musculoskeletal: negative  Neurologic: negative  Psychiatric: negative  Hematologic/Lymphatic/Immunologic: negative  Endocrine: negative      O:   NAD, WDWN, Alert & Oriented, Mood & Affect wnl, Vitals stable   Here today alone   /87  Pulse 79  SpO2 100%   General appearance normal   Vitals stable   Alert, oriented and in no acute distress     R zygoma 1cm pink pearly papule    R nasal tip   Ill-=defined red scaly papule 8mm         The remainder of expanded problem focused exam was unremarkable; the following areas were examined:  scalp/hair, conjunctiva/lids, face, neck, lips      Eyes: Conjunctivae/lids:Normal     ENT: Lips, buccal mucosa, tongue: normal    MSK:Normal    Cardiovascular: peripheral edema none    Pulm: Breathing Normal    Lymph Nodes: No Head and Neck Lymphadenopathy     Neuro/Psych: Orientation:Normal; Mood/Affect:Normal      MICRO:   R nasal tip:There is hyperkeratosis & parakeratosis of the epidermis, with full thickness epidermal involvement by atypical keratinocytes with rare pale vacuolated cells.  Unremarkable dermis.    A/P:  1. R nasal tip r/o squamous cell carcinoma   TANGENTIAL BIOPSY IN HOUSE:  After consent, anesthesia with LEC and prep, tangential excision performed and dx above confirmed with frozen section histology.  No complications and routine wound care.  Patient told result squamous cell carcinoma in situ   Schedule     2. Basal cell carcinoma r cheek   .      BENIGN LESIONS DISCUSSED WITH PATIENT:  I discussed the specifics of tumor, prognosis, and genetics of benign lesions.  I explained that treatment of these lesions would be purely cosmetic and not medically neccessary.  I discussed with patient different removal options including excision, cautery and /or laser.      Nature and genetics of benign skin lesions dicussed with patient.  Signs and Symptoms of skin cancer discussed with patient.  Patient encouraged to perform monthly skin exams.  UV precautions reviewed with patient.  Skin care regimen reviewed with patient: Eliminate harsh soaps, i.e. Dial, zest, irsih spring; Mild soaps such as Cetaphil or Dove sensitive skin, avoid hot or cold showers, aggressive use of emollients including vanicream, cetaphil or cerave discussed with patient.    Risks of non-melanoma skin cancer discussed with patient   Return to clinic 6 months      PROCEDURE NOTE  R cheek  basal cell carcinoma   MOHS:   Location    After PGACAC discussed with patient, decision for Mohs surgery was made. Indication for Mohs was Location. Patient confirmed the site with Dr. Springer.  After anesthesia with LEC, the tumor was excised using standard Mohs technique in 1 stages(s).  CLEAR MARGINS OBTAINED and Final defect size was 1.4 cm.       REPAIR COMPLEX: Because of the tightness of the surrounding skin and Because of the size and full thickness nature of the defect, a complex closure was planned. After LEC anesthesia and prep, Burow's triangles were excised in the relaxed skin tension lines. The wound edges were widely undermined by dissection in the subcutaneous plane until adequate tissue mobility was obtained. Hemostasis was obtained. The wound edges were closed in a layered fashion using Vicryl and Fast Absorbing Plain Gut sutures. Postoperative length was 4 cm.   EBL minimal; complications none; wound care routine.  The patient was discharged in good condition and will return in one week for wound evaluation.

## 2017-12-05 NOTE — PROGRESS NOTES
SUBJECTIVE:  Ms. Mark Brown is seen back in followup for her rheumatoid arthritis.  She has recently had 5 basal cell carcinomas removed from her face by Dr. Springer and is recovering.  When I point out to her that there clearly is swelling in her hand, she does not think it is really a problem as nothing is hurting.  She thinks she is doing fairly well on the methotrexate 22.5 mg weekly and Plaquenil and prednisone, although after much discussion, she is amenable to increasing the methotrexate to 25 mg weekly.  She denies having any obvious side effects such as nausea, vomiting, diarrhea, stomatitis.  There is no progressive dyspnea or chronic cough.  She is not having any chest pain or shortness of breath.  There is no unexplained fevers, chills, or sweats.  No other real complaints are noted today.      PHYSICAL EXAMINATION:   VITAL SIGNS:  Blood pressure 169/82, pulse 82.   NECK:  Supple, without lymphadenopathy.   LUNGS:  Clear.   HEART:  Regular.   JOINT EXAM:  There is moderate synovitis of the right 3rd MCP region, moderate to severe.  There is mild synovitis of the right 2nd, 3rd and 4th PIP joint.   SKIN:  Without lesions.      IMPRESSION:  Seropositive rheumatoid arthritis.      RECOMMENDATIONS:   1.  Increase methotrexate to 25 mg weekly.   2.  Continue Plaquenil 400 mg daily.   3.  Continue prednisone 5 mg daily.   4.  Check labs every 3 months.   5.  Follow up with Dr. Rooney in 3-6 months.   6.  If no further improvement occurs, she really should consider being on a biologic.         RUBEN CAMARGO MD             D: 2017 12:23   T: 2017 22:04   MT:       Name:     MARK BROWN   MRN:      [id number]        Account:      [id number]   :      1955           Visit Date:   2017      Document: [id number]

## 2017-12-20 ENCOUNTER — OFFICE VISIT (OUTPATIENT)
Dept: DERMATOLOGY | Facility: CLINIC | Age: 62
End: 2017-12-20
Payer: COMMERCIAL

## 2017-12-20 VITALS — DIASTOLIC BLOOD PRESSURE: 82 MMHG | OXYGEN SATURATION: 98 % | SYSTOLIC BLOOD PRESSURE: 182 MMHG | HEART RATE: 74 BPM

## 2017-12-20 DIAGNOSIS — D04.39 SQUAMOUS CELL CARCINOMA IN SITU OF SKIN OF NASAL TIP: Primary | ICD-10-CM

## 2017-12-20 PROCEDURE — 11313 SHAVE SKIN LESION >2.0 CM: CPT | Mod: 51 | Performed by: DERMATOLOGY

## 2017-12-20 PROCEDURE — 17311 MOHS 1 STAGE H/N/HF/G: CPT | Performed by: DERMATOLOGY

## 2017-12-20 NOTE — MR AVS SNAPSHOT
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 directly at [phone].  Normal or non-critical lab and imaging results will be communicated to you by MyChart, letter or phone within 4 business days after the clinic has received the results. If you do not hear from us within 7 days, please contact the clinic through MyChart or phone. If you have a critical or abnormal lab result, we will notify you by phone as soon as possible.  Submit refill requests through [employer] or call your pharmacy and they will forward the refill request to us. Please allow 3 business days for your refill to be completed.          Additional Information About Your Visit        MyChart Information     [employer] gives you secure access to your electronic health record. If you see a primary care provider, you can also send messages to your care team and make appointments. If you have questions, please call your primary care clinic.  If you do not have a primary care provider, please call [phone] and they will assist you.        Care EveryWhere ID     This is your Care EveryWhere ID. This could be used by other organizations to access your [place] medical records  [id number]        Your Vitals  Were     Pulse Pulse Oximetry                74 98%           Blood Pressure from Last 3 Encounters:   12/20/17 182/82   12/04/17 169/82   12/04/17 157/87    Weight from Last 3 Encounters:   08/07/17 (!) 143.8 kg (317 lb)   05/08/17 (!) 139.3 kg (307 lb)   04/28/17 (!) 138.3 kg (305 lb)              We Performed the Following     MOHS HEAD/NCK/HND/FT/GEN 1ST STAGE UP T0 5 BLOCKS     SHAV SKIN LES >21MM FACE,FACIAL        Primary Care Provider Office Phone # Fax #    Hitesh Washington [phone] [address] [place] [address]        Equal Access to Services     THELMA REYES : Hadii aad ku hadasho Sohoney, waaxda luqadaha, qaybta kaalmada adeegyada, waxay ryanin sailaja luna. So [facility] [phone].    ATENCIÓN: Si habla español, tiene a zamora disposición servicios gratuitos de asistencia lingüística. Llame al [phone].    We comply with applicable federal civil rights laws and [place] laws. We do not discriminate on the basis of race, color, national origin, age, disability, sex, sexual orientation, or gender identity.            Thank you!     Thank you for choosing [facility]  for your care. Our goal is always to provide you with excellent care. Hearing back from our patients is one way we can continue to improve our services. Please take a few minutes to complete the written survey that you may receive in the mail after your visit with us. Thank you!             Your Updated Medication List - Protect others around you: Learn how to safely use, store and throw away your medicines at www.disposemymeds.org.          This list is accurate as of: 12/20/17  8:01 AM.  Always use your most recent med list.                   Brand Name Dispense Instructions for use Diagnosis    albuterol 108 (90 BASE) MCG/ACT Inhaler    PROAIR HFA/PROVENTIL HFA/VENTOLIN HFA    1 Inhaler    Inhale 2 puffs into the lungs every 6 hours as needed for shortness of breath / dyspnea  or wheezing    Acute bronchitis, unspecified organism       CLARITIN PO           fluticasone 27.5 MCG/SPRAY spray    VERAMYST     Spray 1 spray into both nostrils 2 times daily        * ADULT GUMMY PO      Take 1 chew tab by mouth daily VitaCrave        * HAIR/SKIN/NAILS/BIOTIN PO     100 tablet    Take 1 capsule by mouth daily.    RA (rheumatoid arthritis) (H)       hydroxychloroquine 200 MG tablet    PLAQUENIL    180 tablet    Take 2 tablets (400 mg) by mouth daily    Rheumatoid arthritis of hand, unspecified laterality, unspecified rheumatoid factor presence (H)       methotrexate 2.5 MG tablet CHEMO     120 tablet    Take 10 tablets (25 mg) by mouth once a week    Rheumatoid arthritis of multiple sites without rheumatoid factor (H)       predniSONE 5 MG tablet    DELTASONE    50 tablet    20 mg for 5 days, taper by 5 mg every 5 days    Rheumatoid arthritis of multiple sites without rheumatoid factor (H)       vitamin B complex with vitamin C Tabs tablet      Take 1 tablet by mouth daily    RA (rheumatoid arthritis) (H)       * Notice:  This list has 2 medication(s) that are the same as other medications prescribed for you. Read the directions carefully, and ask your doctor or other care provider to review them with you.

## 2017-12-20 NOTE — LETTER
12/20/2017         RE: Kaylene Diaz  [address] [place] [address]        Dear Colleague,    Thank you for referring your patient, Kaylene Diaz, to the [facility]. Please see a copy of my visit note below.    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  Patient reports the following modifying factors none.  Associated symptoms: none.  Patient has no other skin complaints today.  Remainder of the HPI, Meds, PMH, Allergies, FH, and SH was reviewed in chart.      Past Medical History:   Diagnosis Date     Basal cell carcinoma      RA (rheumatoid arthritis) (H)      Small bowel obstruction        Past Surgical History:   Procedure Laterality Date     ARTHROSCOPY KNEE  12/10/2010    ARTHROSCOPY KNEE performed by ANVID FIERRO at [facility]     SURGICAL HISTORY OF -       IUD removal     SURGICAL HISTORY OF -       Ovarian cyst     SURGICAL HISTORY OF -       thorn removal from foot     SURGICAL HISTORY OF -       knee surgery, scope, right knee        Family History   Problem Relation Age of Onset     Prostate Cancer Father      Eye Disorder Father      mac degen     HEART DISEASE Father      HEART DISEASE Mother      a-fib     Eye Disorder Mother      C.A.D. Mother      Hypertension Mother      Alcohol/Drug Brother      Alcohol/Drug Sister      Alcohol/Drug Brother      Alcohol/Drug Brother      Alcohol/Drug Brother      Alcohol/Drug Sister      Obesity Sister      Alcohol/Drug Sister      Prostate Cancer Sister      CANCER Other      Melanoma No family hx of        Social History     Social History     Marital status:      Spouse name: N/A     Number of children: N/A     Years of education: N/A     Occupational History      Unemployed     Social History Main Topics     Smoking status: Former Smoker     Years: 30.00     Quit date: 4/18/2000     Smokeless tobacco: Never Used     Alcohol use  Yes      Comment: VERY RARE     Drug use: No     Sexual activity: Yes     Partners: Male     Other Topics Concern     Parent/Sibling W/ Cabg, Mi Or Angioplasty Before 65f 55m? No     Social History Narrative       Outpatient Encounter Prescriptions as of 12/20/2017   Medication Sig Dispense Refill     methotrexate 2.5 MG tablet CHEMO Take 10 tablets (25 mg) by mouth once a week 120 tablet 3     hydroxychloroquine (PLAQUENIL) 200 MG tablet Take 2 tablets (400 mg) by mouth daily 180 tablet 3     Loratadine (CLARITIN PO)        albuterol (PROAIR HFA/PROVENTIL HFA/VENTOLIN HFA) 108 (90 BASE) MCG/ACT Inhaler Inhale 2 puffs into the lungs every 6 hours as needed for shortness of breath / dyspnea or wheezing 1 Inhaler 0     fluticasone (VERAMYST) 27.5 MCG/SPRAY spray Spray 1 spray into both nostrils 2 times daily       Multiple Vitamins-Minerals (ADULT GUMMY PO) Take 1 chew tab by mouth daily VitaCrave       vitamin  B complex with vitamin C (VITAMIN  B COMPLEX) TABS Take 1 tablet by mouth daily  0     Multiple Vitamins-Minerals (HAIR/SKIN/NAILS/BIOTIN PO) Take 1 capsule by mouth daily.  100 tablet 3     predniSONE (DELTASONE) 5 MG tablet 20 mg for 5 days, taper by 5 mg every 5 days (Patient not taking: Reported on 12/4/2017) 50 tablet 1     No facility-administered encounter medications on file as of 12/20/2017.              Review Of Systems  Skin: As above  Eyes: negative  Ears/Nose/Throat: negative  Respiratory: No shortness of breath, dyspnea on exertion, cough, or hemoptysis  Cardiovascular: negative  Gastrointestinal: negative  Genitourinary: negative  Musculoskeletal: negative  Neurologic: negative  Psychiatric: negative  Hematologic/Lymphatic/Immunologic: negative  Endocrine: negative      O:   NAD, WDWN, Alert & Oriented, Mood & Affect wnl, Vitals stable   Here today alone   /82  Pulse 74  SpO2 98%   General appearance normal   Vitals stable   Alert, oriented and in no acute distress      Following lymph  nodes palpated: Occipital, Cervical, Supraclavicular n olad   r nasal tip ill-defined 8mm scaly papule       Eyes: Conjunctivae/lids:Normal     ENT: Lips, buccal mucosa, tongue: normal    MSK:Normal    Cardiovascular: peripheral edema none    Pulm: Breathing Normal    Lymph Nodes: No Head and Neck Lymphadenopathy     Neuro/Psych: Orientation:Normal; Mood/Affect:Normal      A/P:  1. Nose squamous cell carcinoma in situ  MOHS:   Ill-defined margins    After PGACAC discussed with patient, decision for Mohs surgery was made. Indication for Mohs was Ill-defined margins. Patient confirmed the site with Dr. Springer.  After anesthesia with LEC, the tumor was excised using standard Mohs technique in 1 stages(s).  CLEAR MARGINS OBTAINED and Final defect size was 1.2 cm.       DERMABRASION: After PGACAC discussed with patient, decision for tangential excision and dermabrasion was made. After anesthesia with Lido/Epi/Clinda and prep with hibiclens, hypertrophic areas were tangentially excised and entire cosmetic unit was smoothed 2.3cm. Hemostasis was obtained with pressure. Patient tolerated procedure well. There were no complications and EBL minimal. Patient advised to keep abraded surfaces covered with generous ointment until healed, approximately 2 weeks. Return to office in 3 months or prn.    BENIGN LESIONS DISCUSSED WITH PATIENT:  I discussed the specifics of tumor, prognosis, and genetics of benign lesions.  I explained that treatment of these lesions would be purely cosmetic and not medically neccessary.  I discussed with patient different removal options including excision, cautery and /or laser.      Nature and genetics of benign skin lesions dicussed with patient.  Signs and Symptoms of skin cancer discussed with patient.  Patient encouraged to perform monthly skin exams.  UV precautions reviewed with patient.  Skin care regimen reviewed with patient: Eliminate harsh soaps, i.e. Dial, zest, irsih spring; Mild soaps  such as Cetaphil or Dove sensitive skin, avoid hot or cold showers, aggressive use of emollients including vanicream, cetaphil or cerave discussed with patient.    Risks of non-melanoma skin cancer discussed with patient   Return to clinic 6 months      Again, thank you for allowing me to participate in the care of your patient.        Sincerely,        Cipriano Sprigner MD

## 2017-12-20 NOTE — PROGRESS NOTES
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  Patient reports the following modifying factors none.  Associated symptoms: none.  Patient has no other skin complaints today.  Remainder of the HPI, Meds, PMH, Allergies, FH, and SH was reviewed in chart.      Past Medical History:   Diagnosis Date     Basal cell carcinoma      RA (rheumatoid arthritis) (H)      Small bowel obstruction        Past Surgical History:   Procedure Laterality Date     ARTHROSCOPY KNEE  12/10/2010    ARTHROSCOPY KNEE performed by NAVID FIERRO at [facility]     SURGICAL HISTORY OF -       IUD removal     SURGICAL HISTORY OF -       Ovarian cyst     SURGICAL HISTORY OF -       thorn removal from foot     SURGICAL HISTORY OF -       knee surgery, scope, right knee        Family History   Problem Relation Age of Onset     Prostate Cancer Father      Eye Disorder Father      mac degen     HEART DISEASE Father      HEART DISEASE Mother      a-fib     Eye Disorder Mother      C.A.D. Mother      Hypertension Mother      Alcohol/Drug Brother      Alcohol/Drug Sister      Alcohol/Drug Brother      Alcohol/Drug Brother      Alcohol/Drug Brother      Alcohol/Drug Sister      Obesity Sister      Alcohol/Drug Sister      Prostate Cancer Sister      CANCER Other      Melanoma No family hx of        Social History     Social History     Marital status:      Spouse name: N/A     Number of children: N/A     Years of education: N/A     Occupational History      Unemployed     Social History Main Topics     Smoking status: Former Smoker     Years: 30.00     Quit date: 4/18/2000     Smokeless tobacco: Never Used     Alcohol use Yes      Comment: VERY RARE     Drug use: No     Sexual activity: Yes     Partners: Male     Other Topics Concern     Parent/Sibling W/ Cabg, Mi Or Angioplasty Before 65f 55m? No     Social History Narrative       Outpatient  Encounter Prescriptions as of 12/20/2017   Medication Sig Dispense Refill     methotrexate 2.5 MG tablet CHEMO Take 10 tablets (25 mg) by mouth once a week 120 tablet 3     hydroxychloroquine (PLAQUENIL) 200 MG tablet Take 2 tablets (400 mg) by mouth daily 180 tablet 3     Loratadine (CLARITIN PO)        albuterol (PROAIR HFA/PROVENTIL HFA/VENTOLIN HFA) 108 (90 BASE) MCG/ACT Inhaler Inhale 2 puffs into the lungs every 6 hours as needed for shortness of breath / dyspnea or wheezing 1 Inhaler 0     fluticasone (VERAMYST) 27.5 MCG/SPRAY spray Spray 1 spray into both nostrils 2 times daily       Multiple Vitamins-Minerals (ADULT GUMMY PO) Take 1 chew tab by mouth daily VitaCrave       vitamin  B complex with vitamin C (VITAMIN  B COMPLEX) TABS Take 1 tablet by mouth daily  0     Multiple Vitamins-Minerals (HAIR/SKIN/NAILS/BIOTIN PO) Take 1 capsule by mouth daily.  100 tablet 3     predniSONE (DELTASONE) 5 MG tablet 20 mg for 5 days, taper by 5 mg every 5 days (Patient not taking: Reported on 12/4/2017) 50 tablet 1     No facility-administered encounter medications on file as of 12/20/2017.              Review Of Systems  Skin: As above  Eyes: negative  Ears/Nose/Throat: negative  Respiratory: No shortness of breath, dyspnea on exertion, cough, or hemoptysis  Cardiovascular: negative  Gastrointestinal: negative  Genitourinary: negative  Musculoskeletal: negative  Neurologic: negative  Psychiatric: negative  Hematologic/Lymphatic/Immunologic: negative  Endocrine: negative      O:   NAD, WDWN, Alert & Oriented, Mood & Affect wnl, Vitals stable   Here today alone   /82  Pulse 74  SpO2 98%   General appearance normal   Vitals stable   Alert, oriented and in no acute distress      Following lymph nodes palpated: Occipital, Cervical, Supraclavicular n olad   r nasal tip ill-defined 8mm scaly papule       Eyes: Conjunctivae/lids:Normal     ENT: Lips, buccal mucosa, tongue: normal    MSK:Normal    Cardiovascular:  peripheral edema none    Pulm: Breathing Normal    Lymph Nodes: No Head and Neck Lymphadenopathy     Neuro/Psych: Orientation:Normal; Mood/Affect:Normal      A/P:  1. Nose squamous cell carcinoma in situ  MOHS:   Ill-defined margins    After PGACAC discussed with patient, decision for Mohs surgery was made. Indication for Mohs was Ill-defined margins. Patient confirmed the site with Dr. Springer.  After anesthesia with LEC, the tumor was excised using standard Mohs technique in 1 stages(s).  CLEAR MARGINS OBTAINED and Final defect size was 1.2 cm.       DERMABRASION: After PGACAC discussed with patient, decision for tangential excision and dermabrasion was made. After anesthesia with Lido/Epi/Clinda and prep with hibiclens, hypertrophic areas were tangentially excised and entire cosmetic unit was smoothed 2.3cm. Hemostasis was obtained with pressure. Patient tolerated procedure well. There were no complications and EBL minimal. Patient advised to keep abraded surfaces covered with generous ointment until healed, approximately 2 weeks. Return to office in 3 months or prn.    BENIGN LESIONS DISCUSSED WITH PATIENT:  I discussed the specifics of tumor, prognosis, and genetics of benign lesions.  I explained that treatment of these lesions would be purely cosmetic and not medically neccessary.  I discussed with patient different removal options including excision, cautery and /or laser.      Nature and genetics of benign skin lesions dicussed with patient.  Signs and Symptoms of skin cancer discussed with patient.  Patient encouraged to perform monthly skin exams.  UV precautions reviewed with patient.  Skin care regimen reviewed with patient: Eliminate harsh soaps, i.e. Dial, zest, irsih spring; Mild soaps such as Cetaphil or Dove sensitive skin, avoid hot or cold showers, aggressive use of emollients including vanicream, cetaphil or cerave discussed with patient.    Risks of non-melanoma skin cancer discussed with  patient   Return to clinic 6 months

## 2018-01-07 ENCOUNTER — HOSPITAL ENCOUNTER (EMERGENCY)
Facility: CLINIC | Age: 63
Discharge: HOME | End: 2018-01-07
Attending: PHYSICIAN ASSISTANT | Admitting: PHYSICIAN ASSISTANT
Payer: COMMERCIAL

## 2018-01-07 ENCOUNTER — APPOINTMENT (OUTPATIENT)
Dept: GENERAL RADIOLOGY | Facility: CLINIC | Age: 63
End: 2018-01-07
Attending: PHYSICIAN ASSISTANT
Payer: COMMERCIAL

## 2018-01-07 VITALS
TEMPERATURE: 97.8 F | WEIGHT: 293 LBS | HEART RATE: 87 BPM | OXYGEN SATURATION: 98 % | SYSTOLIC BLOOD PRESSURE: 189 MMHG | RESPIRATION RATE: 14 BRPM | BODY MASS INDEX: 50.12 KG/M2 | DIASTOLIC BLOOD PRESSURE: 90 MMHG

## 2018-01-07 DIAGNOSIS — R09.81 NASAL CONGESTION: ICD-10-CM

## 2018-01-07 DIAGNOSIS — J20.9 ACUTE BRONCHITIS, UNSPECIFIED ORGANISM: ICD-10-CM

## 2018-01-07 PROCEDURE — 99213 OFFICE O/P EST LOW 20 MIN: CPT | Performed by: PHYSICIAN ASSISTANT

## 2018-01-07 PROCEDURE — 94640 AIRWAY INHALATION TREATMENT: CPT

## 2018-01-07 PROCEDURE — G0463 HOSPITAL OUTPT CLINIC VISIT: HCPCS | Mod: 25

## 2018-01-07 PROCEDURE — 71046 X-RAY EXAM CHEST 2 VIEWS: CPT

## 2018-01-07 PROCEDURE — 25000125 ZZHC RX 250: Performed by: PHYSICIAN ASSISTANT

## 2018-01-07 RX ORDER — BENZONATATE 200 MG/1
200 CAPSULE ORAL 3 TIMES DAILY PRN
Qty: 21 CAPSULE | Refills: 0 | Status: SHIPPED | OUTPATIENT
Start: 2018-01-07 | End: 2018-01-16

## 2018-01-07 RX ORDER — ALBUTEROL SULFATE 0.83 MG/ML
2.5 SOLUTION RESPIRATORY (INHALATION) ONCE
Status: COMPLETED | OUTPATIENT
Start: 2018-01-07 | End: 2018-01-07

## 2018-01-07 RX ORDER — FLUTICASONE PROPIONATE 50 MCG
1-2 SPRAY, SUSPENSION (ML) NASAL DAILY
Qty: 1 BOTTLE | Refills: 11 | Status: SHIPPED | OUTPATIENT
Start: 2018-01-07 | End: 2019-07-15

## 2018-01-07 RX ORDER — ALBUTEROL SULFATE 90 UG/1
1-2 AEROSOL, METERED RESPIRATORY (INHALATION) EVERY 4 HOURS PRN
Qty: 1 INHALER | Refills: 0 | Status: SHIPPED | OUTPATIENT
Start: 2018-01-07 | End: 2019-07-15

## 2018-01-07 RX ORDER — DOXYCYCLINE 100 MG/1
100 CAPSULE ORAL 2 TIMES DAILY
Qty: 20 CAPSULE | Refills: 0 | Status: SHIPPED | OUTPATIENT
Start: 2018-01-07 | End: 2018-01-17

## 2018-01-07 RX ORDER — PREDNISONE 20 MG/1
TABLET ORAL
Qty: 10 TABLET | Refills: 0 | Status: SHIPPED | OUTPATIENT
Start: 2018-01-07 | End: 2018-03-26

## 2018-01-07 RX ADMIN — ALBUTEROL SULFATE 2.5 MG: 2.5 SOLUTION RESPIRATORY (INHALATION) at 12:39

## 2018-01-07 NOTE — ED AVS SNAPSHOT
[facility] Emergency Department    [address] [address]    Phone:  [phone]    Fax:  [phone]                                       Kaylene Diaz   MRN: [id number]    Department:  [facility] Emergency Department   Date of Visit:  1/7/2018           After Visit Summary Signature Page     I have received my discharge instructions, and my questions have been answered. I have discussed any challenges I see with this plan with the nurse or doctor.    ..........................................................................................................................................  Patient/Patient Representative Signature      ..........................................................................................................................................  Patient Representative Print Name and Relationship to Patient    ..................................................               ................................................  Date                                            Time    ..........................................................................................................................................  Reviewed by Signature/Title    ...................................................              ..............................................  Date                                                            Time

## 2018-01-07 NOTE — ED PROVIDER NOTES
History     Chief Complaint   Patient presents with     Nasal Congestion     cough and cold sx for 2 days.      HPI  Kaylene Diaz is a 62 year old female who presents with ENT symptoms for the past 1 week the wheezing and worsening symptoms for the past 2 days.                Symptoms: cc Present Absent Comment   Fever/Chills   x    Fatigue   x    Muscle Aches   x    Eye Irritation   x    Sneezing   x    Nasal Arnaldo/Drg  x     Sinus Pressure/Pain   x    Loss of smell   x    Dental pain   x    Sore Throat  x  Scratchy    Swollen Glands   x    Ear Pain/Fullness  x  Right ear pressure    Cough  x     Wheeze  x     Chest Pain   x    Shortness of breath  x  With exertion    Rash   x    Other    x      Symptom duration:  1 week with respiratory symptoms worsening over past 2 days.    Symptom severity:  mild and getting worse    Treatments tried:  over the counter decongestants, tylenol    Contacts:  nephew with URI symptoms.      Patient has tried her inhaler last night with some improvement.     Problem list, Medication list, Allergies, and Medical/Social/Surgical histories reviewed in EPIC and updated as appropriate.      Problem List:    Patient Active Problem List    Diagnosis Date Noted     BLANCA (acute kidney injury) (H) 04/20/2017     Priority: Medium     Elevated glucose 04/20/2017     Priority: Medium     SBO (small bowel obstruction) 04/20/2017     Priority: Medium     Elevated blood pressure reading without diagnosis of hypertension 03/14/2017     Priority: Medium     Small bowel obstruction 01/19/2016     Priority: Medium     Morbid obesity due to excess calories (H) 12/07/2015     Priority: Medium     High risk medications (not anticoagulants) long-term use 11/07/2013     Priority: Medium     Kidney stone 10/15/2013     Priority: Medium     Seen at \[facility]\"" in [place], [place]       RA (rheumatoid arthritis) (H) 09/11/2013     Priority: Medium     Osteoarthritis 09/02/2013     Priority: Medium     Hyperlipidemia LDL goal  <160 01/04/2012     Priority: Medium     ASCUS on Pap smear 12/27/2011     Priority: Medium     12/27/11: Pap - ASCUS, Neg HPV. Plan cotesting in 3 yrs.   3/21/14: Pap - NIL, Neg HPV. Routine screening         Tear meniscus knee 12/08/2010     Priority: Medium     Osteopenia 12/08/2010     Priority: Medium     Testing done at chiropracter office - told she has decreased density       Carpal tunnel syndrome 05/16/2007     Priority: Medium     Advanced directives, counseling/discussion 12/27/2011     Priority: Low     Advance Directive Problem List Overview:   Name Relationship Phone    Primary Health Care Agent            Alternative Health Care Agent          Advance Directive received and scanned. Click on Code in the patient header to view. 12/27/2011           Past Medical History:    Past Medical History:   Diagnosis Date     Basal cell carcinoma      RA (rheumatoid arthritis) (H)      Small bowel obstruction        Past Surgical History:    Past Surgical History:   Procedure Laterality Date     ARTHROSCOPY KNEE  12/10/2010    ARTHROSCOPY KNEE performed by NAVID FIERRO at [facility]     SURGICAL HISTORY OF -       IUD removal     SURGICAL HISTORY OF -       Ovarian cyst     SURGICAL HISTORY OF -       thorn removal from foot     SURGICAL HISTORY OF -       knee surgery, scope, right knee       Family History:    Family History   Problem Relation Age of Onset     Prostate Cancer Father      Eye Disorder Father      mac degen     HEART DISEASE Father      HEART DISEASE Mother      a-fib     Eye Disorder Mother      C.A.D. Mother      Hypertension Mother      Alcohol/Drug Brother      Alcohol/Drug Sister      Alcohol/Drug Brother      Alcohol/Drug Brother      Alcohol/Drug Brother      Alcohol/Drug Sister      Obesity Sister      Alcohol/Drug Sister      Prostate Cancer Sister      CANCER Other      Melanoma No family hx of        Social History:  Marital Status:   [2]  Social History   Substance Use Topics      Smoking status: Former Smoker     Years: 30.00     Quit date: 4/18/2000     Smokeless tobacco: Never Used     Alcohol use Yes      Comment: VERY RARE        Medications:      predniSONE (DELTASONE) 20 MG tablet   benzonatate (TESSALON) 200 MG capsule   doxycycline (VIBRAMYCIN) 100 MG capsule   albuterol (PROAIR HFA/PROVENTIL HFA/VENTOLIN HFA) 108 (90 BASE) MCG/ACT Inhaler   fluticasone (FLONASE) 50 MCG/ACT spray   methotrexate 2.5 MG tablet CHEMO   hydroxychloroquine (PLAQUENIL) 200 MG tablet   predniSONE (DELTASONE) 5 MG tablet   Loratadine (CLARITIN PO)   albuterol (PROAIR HFA/PROVENTIL HFA/VENTOLIN HFA) 108 (90 BASE) MCG/ACT Inhaler   fluticasone (VERAMYST) 27.5 MCG/SPRAY spray   Multiple Vitamins-Minerals (ADULT GUMMY PO)   vitamin  B complex with vitamin C (VITAMIN  B COMPLEX) TABS   Multiple Vitamins-Minerals (HAIR/SKIN/NAILS/BIOTIN PO)         Review of Systems   Constitutional: Positive for activity change.   HENT: Positive for congestion, ear pain, rhinorrhea and sore throat.    Respiratory: Positive for cough and wheezing. Negative for chest tightness and stridor.    Cardiovascular: Negative for chest pain.   Gastrointestinal: Negative for abdominal pain.   Skin: Negative for rash.   All other systems reviewed and are negative.      Physical Exam   BP: 189/90  Pulse: 87  Temp: 97.8  F (36.6  C)  Resp: 14  Weight: (!) 145.2 kg (320 lb)  SpO2: 98 %      Physical Exam     Constitutional: healthy, alert, no distress and cooperative   Cardiovascular: RRR. No murmurs, clicks gallops or rub  Respiratory: positive findings: wheezing throughout bilaterally, with course breath sounds.   ENT: bilateral TM fluid noted, neck has bilateral anterior cervical nodes enlarged, throat normal without erythema or exudate, post nasal drip noted and nasal mucosa congested  Abdomen: Abdomen soft, non-tender. BS normal. No masses, organomegaly  SKIN: no suspicious lesions or rashes    Neb treatment done in office today; post neb  treatment increase air movement throughout, with decreased wheezing and course breath sounds, but due to slight course breath sounds present chest x-ray was obtained in office today.   Chest x-ray: negative     No results found for this or any previous visit (from the past 24 hour(s)).        ED Course     ED Course     Procedures              Critical Care time:  none               Labs Ordered and Resulted from Time of ED Arrival Up to the Time of Departure from the ED - No data to display    Assessments & Plan (with Medical Decision Making)     I have reviewed the nursing notes.    I have reviewed the findings, diagnosis, plan and need for follow up with the patient.    Use Medication as directed; fill doxycycline in 2 days if no improvement with prednisone, inhaler, and nasal spray (do not spray towards center of nose). If patient starts doxycycline patient to hold all Multivitamins while taking antibiotic.   Discussed with patient that symptoms appear viral at this time, but due to immunosuppressant medications that she takes she is at an increased risk for bacterial infections. Patient aware and will start antibiotic if no improvement in 2 days.   Patient to follow up with primary care provider for recheck in 4-5 days.     Chest x-ray negative in office today for pneumonia.   Increase fluids, rest, warm compresses over sinuses.   Monitor blood pressure and record: patient to return to Emergency Room if chest pain, worst headache, dizziness, visual changes occur.   Patient to avoid NSAIDs and decongestants that could elevate blood pressure.        Discharge Medication List as of 1/7/2018  1:35 PM      START taking these medications    Details   !! predniSONE (DELTASONE) 20 MG tablet Take two tablets (= 40mg) each day for 5 (five) days, Disp-10 tablet, R-0, E-Prescribe      benzonatate (TESSALON) 200 MG capsule Take 1 capsule (200 mg) by mouth 3 times daily as needed for cough, Disp-21 capsule, R-0, E-Prescribe       doxycycline (VIBRAMYCIN) 100 MG capsule Take 1 capsule (100 mg) by mouth 2 times daily for 10 days, Disp-20 capsule, R-0, Local Print      !! albuterol (PROAIR HFA/PROVENTIL HFA/VENTOLIN HFA) 108 (90 BASE) MCG/ACT Inhaler Inhale 1-2 puffs into the lungs every 4 hours as needed for shortness of breath / dyspnea or wheezing, Disp-1 Inhaler, R-0, E-Prescribe       !! - Potential duplicate medications found. Please discuss with provider.          Final diagnoses:   Acute bronchitis, unspecified organism   Nasal congestion       1/7/2018   [facility] EMERGENCY DEPARTMENT     Jena Hobbs PA-C  01/08/18 [id number]

## 2018-01-07 NOTE — DISCHARGE INSTRUCTIONS
Use Medication as directed; fill doxycycline in 2 days if no improvement with prednisone, inhaler, and nasal spray (do not spray towards center of nose). If patient starts doxycycline patient to hold all Multivitamins while taking antibiotic.   Patient to follow up with primary care provider for recheck in 4-5 days.     Chest x-ray negative in office today for pneumonia.   Increase fluids, rest, warm compresses over sinuses.   Monitor blood pressure and record: patient to return to Emergency Room if chest pain, worst headache, dizziness, visual changes occur.   Patient to avoid NSAIDs and decongestants that could elevate blood pressure.     Hydrate with fluids, rest, cool humidifier.  May use acetaminophen prn.    For your Cough   The Buckwheat Honey Dose: Given   hour Prior to Bedtime  For children age under 1 year -Do not use due to botulism risk     2-5 years -  tsp (2.5 mL)    6-11 years -1 tsp (5 mL)    12-18 years -2 tsp (10 mL)     Guaifenesin     Adult dose -Not to exceed 2.4 g (2400mg) per day    Child age 6-12 years -100 mg every 4 hr, not to exceed 1.2 g (1200mg) per day     Pediatric, 2-6 years -50 mg every 4 hr as needed, not to exceed 600 mg per day    Cough medications is not recommended in children under 2 years.  With use of cough medications have combination medications be aware of products in the cough medication you are using to avoid overdose    Follow up with PCP in 4-5 days.    Go to Emergency Room if sx worsen or change, Shortness of breath, chest pain, persistent fevers, or painful breathing occur.     Patient voiced understanding of instructions given.

## 2018-01-08 ASSESSMENT — ENCOUNTER SYMPTOMS
ACTIVITY CHANGE: 1
SORE THROAT: 1
RHINORRHEA: 1
WHEEZING: 1
STRIDOR: 0
COUGH: 1
ABDOMINAL PAIN: 0
CHEST TIGHTNESS: 0

## 2018-01-16 ENCOUNTER — OFFICE VISIT (OUTPATIENT)
Dept: FAMILY MEDICINE | Facility: CLINIC | Age: 63
End: 2018-01-16
Payer: COMMERCIAL

## 2018-01-16 VITALS
TEMPERATURE: 98.2 F | HEART RATE: 91 BPM | OXYGEN SATURATION: 96 % | SYSTOLIC BLOOD PRESSURE: 126 MMHG | HEIGHT: 66 IN | DIASTOLIC BLOOD PRESSURE: 63 MMHG | WEIGHT: 293 LBS | BODY MASS INDEX: 47.09 KG/M2

## 2018-01-16 DIAGNOSIS — R05.9 COUGH: Primary | ICD-10-CM

## 2018-01-16 DIAGNOSIS — J20.9 ACUTE BRONCHITIS, UNSPECIFIED ORGANISM: ICD-10-CM

## 2018-01-16 PROCEDURE — 99213 OFFICE O/P EST LOW 20 MIN: CPT | Performed by: FAMILY MEDICINE

## 2018-01-16 RX ORDER — BENZONATATE 200 MG/1
200 CAPSULE ORAL 3 TIMES DAILY PRN
Qty: 21 CAPSULE | Refills: 0 | Status: SHIPPED | OUTPATIENT
Start: 2018-01-16 | End: 2019-07-15

## 2018-01-16 NOTE — PROGRESS NOTES
SUBJECTIVE:   Kaylene Diaz is a 62 year old female who presents to clinic today for the following health issues:  Chief Complaint   Patient presents with     ER F/U     Pt here for recent e/r f/u.     ED/UC Followup:    Facility:  [facility]  Date of visit: 1/7/17  Reason for visit: cough, congestion  Current Status: better but not gone     ENT Symptoms             Symptoms: cc Present Absent Comment   Fever/Chills   x    Fatigue  x     Muscle Aches  x  Mid back   Eye Irritation   x    Sneezing   x    Nasal Arnaldo/Drg x      Sinus Pressure/Pain   x    Loss of smell   x    Dental pain   x    Sore Throat   x    Swollen Glands   x    Ear Pain/Fullness   x    Cough x      Wheeze  x     Chest Pain  x     Shortness of breath x      Rash   x    Other         Symptom duration:  16 days   Symptom severity:  moderate   Treatments tried:  doxycycline, prednisone, tessalon, abluterol inhaler    Contacts:  none over the past wk     Verified above history with patient.    Patient reports remaining symptoms include cough with some phlegm coming from throat, mild nasal congestion, and mild (much better) tightness in breathing.  Patient took 5 days of prednisone 40 mg.      Problem list and histories reviewed & adjusted, as indicated.  Additional history: as documented    Patient Active Problem List   Diagnosis     Carpal tunnel syndrome     Tear meniscus knee     Osteopenia     Advanced directives, counseling/discussion     Hyperlipidemia LDL goal <160     Osteoarthritis     RA (rheumatoid arthritis) (H)     High risk medications (not anticoagulants) long-term use     Kidney stone     ASCUS on Pap smear     Morbid obesity due to excess calories (H)     Small bowel obstruction     Elevated blood pressure reading without diagnosis of hypertension     BLANCA (acute kidney injury) (H)     Elevated glucose     SBO (small bowel obstruction)     Past Surgical History:   Procedure Laterality Date     ARTHROSCOPY KNEE  12/10/2010    ARTHROSCOPY  KNEE performed by NAVID FIERRO at [facility]     SURGICAL HISTORY OF -       IUD removal     SURGICAL HISTORY OF -       Ovarian cyst     SURGICAL HISTORY OF -       thorn removal from foot     SURGICAL HISTORY OF -       knee surgery, scope, right knee       Social History   Substance Use Topics     Smoking status: Former Smoker     Years: 30.00     Quit date: 4/18/2000     Smokeless tobacco: Never Used     Alcohol use Yes      Comment: VERY RARE     Family History   Problem Relation Age of Onset     Prostate Cancer Father      Eye Disorder Father      mac degen     HEART DISEASE Father      HEART DISEASE Mother      a-fib     Eye Disorder Mother      C.A.D. Mother      Hypertension Mother      Alcohol/Drug Brother      Alcohol/Drug Sister      Alcohol/Drug Brother      Alcohol/Drug Brother      Alcohol/Drug Brother      Alcohol/Drug Sister      Obesity Sister      Alcohol/Drug Sister      Prostate Cancer Sister      CANCER Other      Melanoma No family hx of          Current Outpatient Prescriptions   Medication Sig Dispense Refill     benzonatate (TESSALON) 200 MG capsule Take 1 capsule (200 mg) by mouth 3 times daily as needed for cough 21 capsule 0     doxycycline (VIBRAMYCIN) 100 MG capsule Take 1 capsule (100 mg) by mouth 2 times daily for 10 days 20 capsule 0     albuterol (PROAIR HFA/PROVENTIL HFA/VENTOLIN HFA) 108 (90 BASE) MCG/ACT Inhaler Inhale 1-2 puffs into the lungs every 4 hours as needed for shortness of breath / dyspnea or wheezing 1 Inhaler 0     fluticasone (FLONASE) 50 MCG/ACT spray Spray 1-2 sprays into both nostrils daily For 1 week then as needed 1 Bottle 11     methotrexate 2.5 MG tablet CHEMO Take 10 tablets (25 mg) by mouth once a week 120 tablet 3     hydroxychloroquine (PLAQUENIL) 200 MG tablet Take 2 tablets (400 mg) by mouth daily 180 tablet 3     Loratadine (CLARITIN PO)        fluticasone (VERAMYST) 27.5 MCG/SPRAY spray Spray 1 spray into both nostrils 2 times daily       vitamin  B  "complex with vitamin C (VITAMIN  B COMPLEX) TABS Take 1 tablet by mouth daily  0     predniSONE (DELTASONE) 20 MG tablet Take two tablets (= 40mg) each day for 5 (five) days (Patient not taking: Reported on 1/16/2018) 10 tablet 0     predniSONE (DELTASONE) 5 MG tablet 20 mg for 5 days, taper by 5 mg every 5 days (Patient not taking: Reported on 12/4/2017) 50 tablet 1     [DISCONTINUED] albuterol (PROAIR HFA/PROVENTIL HFA/VENTOLIN HFA) 108 (90 BASE) MCG/ACT Inhaler Inhale 2 puffs into the lungs every 6 hours as needed for shortness of breath / dyspnea or wheezing 1 Inhaler 0     Multiple Vitamins-Minerals (ADULT GUMMY PO) Take 1 chew tab by mouth daily [employer]       Multiple Vitamins-Minerals (HAIR/SKIN/NAILS/BIOTIN PO) Take 1 capsule by mouth daily.  100 tablet 3     Allergies   Allergen Reactions     Gold Rash     Latex Rash     Not all latex products         Reviewed and updated as needed this visit by clinical staff  Tobacco  Allergies  Meds  Problems  Med Hx  Surg Hx  Fam Hx  Soc Hx        Reviewed and updated as needed this visit by Provider  Allergies  Meds  Problems         ROS:  C: NEGATIVE for fever, chills, or change in weight  I: NEGATIVE for worrisome rashes, moles or lesions  E: NEGATIVE for vision changes or irritation  ENT/MOUTH: see above  RESP:as above  CV: NEGATIVE for chest pain, palpitations or peripheral edema  GI: NEGATIVE for nausea, abdominal pain, heartburn, or change in bowel habits  M: NEGATIVE for significant arthralgias or myalgia    OBJECTIVE:                                                    /63  Pulse 91  Temp 98.2  F (36.8  C) (Tympanic)  Ht 5' 6\" (1.676 m)  Wt (!) 325 lb 9.6 oz (147.7 kg)  SpO2 96%  BMI 52.55 kg/m2  Body mass index is 52.55 kg/(m^2).  GENERAL:  alert and no distress, coughs intermittently   EYES: pink conjunctivae, no icterus  NECK: mild cervical adenopathy, nontender  HEENT: tympanic membrane intact and pearly bilaterally, nose with mild " congestion, no sinus tenderness, throat mildly erythematous, no exudates, no oral ulcers  RESP: lungs clear to auscultation - no rales, no rhonchi, no wheezes  CV: regular rates and rhythm, normal S1 S2, no S3 or S4 and no murmur  SKIN:  Good turgor, no rashes    Diagnostic test results:  Diagnostic Test Results:  Results for orders placed or performed during the hospital encounter of 01/07/18   Chest XR,  PA & LAT    Narrative    XR CHEST 2 VW   1/7/2018 1:14 PM     HISTORY: cough, course breath sounds throughout; rule out; pneumonia.;      COMPARISON: Film dated 3/11/2015    FINDINGS: Patient has a large body habitus. The heart is negative.   The lungs are clear. The pulmonary vasculature is normal.  The bones  and soft tissues are unremarkable.      Impression    IMPRESSION: No active infiltrate.        TAMIKO MERCHANT MD          ASSESSMENT/PLAN:                                                        ICD-10-CM    1. Cough R05 benzonatate (TESSALON) 200 MG capsule - refilled for another week as patient still coughs frequent enough by her report. Discussed with her appropriate use.  Discussed expect gradual improvement over next several days.  Return precautions discussed and given to patient.     2. Acute bronchitis, unspecified organism J20.9 Resolving.  Complete doxy course by tomorrow.  No wheezing to indicate repeat of prednisone course.  Albuterol use discussed with patient in detail.  Push oral fluids.  Return precautions discussed and given to patient.         Follow up with Provider - 5-7 days if no improvement or worse.   Patient Instructions   No wheezing or crackles in lungs today.  No need to restart prednisone at this time.  Albuterol inhaler 2 puffs every 4 hrs as needed for wheezing or coughing spells or tightness in breathing while active.  Take benzonatate perles as prescribed only for coughing fits.        Thank you for choosing [facility].  You may be receiving a survey in the mail from [employer] regarding your visit today.  Please take a few minutes to complete and return the survey to let us know how we are doing.      If you have questions or concerns, please contact us via [facility] or you can contact your care team at [phone].    Our Clinic hours are:  Monday 6:40 am  to 7:00 pm  Tuesday -Friday 6:40 am to 5:00 pm    The [place] outpatient lab hours are:  Monday - Friday 6:10 am to 4:45 pm  Saturdays 7:00 am to 11:00 am  Appointments are required, call [phone]    If you have clinical questions after hours or would like to schedule an appointment,  call the clinic at [phone].    Bronchitis with Wheezing (Viral or Bacterial: Adult)    Bronchitis is an infection of the air passages. It often occurs during a cold and is usually caused by a virus. Symptoms include cough with mucus (phlegm) and low-grade fever. This illness is contagious during the first few days and is spread through the air by coughing and sneezing, or by direct contact (touching the sick person and then touching your own eyes, nose, or mouth).  If there is a lot of inflammation, air flow is restricted. The air passages may also go into spasm, especially if you have asthma. This causes wheezing and difficulty breathing even in people who do not have asthma.  Bronchitis usually lasts 7 to 14 days. The wheezing should improve with treatment during the first week. An inhaler is often prescribed to relax the air passages and stop wheezing. Antibiotics will be prescribed if your doctor thinks there is also a secondary bacterial infection.  Home care    If symptoms are severe, rest at home for the first 2 to 3 days. When you go back to your usual activities, don't let yourself get too tired.    Do not smoke. Also avoid being exposed to secondhand smoke.    You may use over-the-counter medicine to control fever or pain, unless another medicine was prescribed. Note: If you have chronic liver or kidney disease or have ever had a  stomach ulcer or gastrointestinal bleeding, talk with your healthcare provider before using these medicines. Also talk to your provider if you are taking medicine to prevent blood clots.) Aspirin should never be given to anyone younger than 18 years of age who is ill with a viral infection or fever. It may cause severe liver or brain damage.    Your appetite may be poor, so a light diet is fine. Avoid dehydration by drinking 6 to 8 glasses of fluids per day (such as water, soft drinks, sports drinks, juices, tea, or soup). Extra fluids will help loosen secretions in the nose and lungs.    Over-the-counter cough, cold, and sore-throat medicines will not shorten the length of the illness, but they may be helpful to reduce symptoms. (Note: Do not use decongestants if you have high blood pressure.)    If you were given an inhaler, use it exactly as directed. If you need to use it more often than prescribed, your condition may be worsening. If this happens, contact your healthcare provider.    If prescribed, finish all antibiotic medicine, even if you are feeling better after only a few days.  Follow-up care  Follow up with your healthcare provider, or as advised. If you had an X-ray or ECG (electrocardiogram), a specialist will review it. You will be notified of any new findings that may affect your care.  Note: If you are age 65 or older, or if you have a chronic lung disease or condition that affects your immune system, or you smoke, talk to your healthcare provider about having a pneumococcal vaccinations and a yearly influenza vaccination (flu shot).  When to seek medical advice  Call your healthcare provider right away if any of these occur:    Fever of 100.4 F (38 C) or higher    Coughing up increasing amounts of colored sputum    Weakness, drowsiness, headache, facial pain, ear pain, or a stiff neck  Call 911, or get immediate medical care  Contact emergency services right away if any of these occur.    Coughing  up blood    Worsening weakness, drowsiness, headache, or stiff neck    Increased wheezing not helped with medication, shortness of breath, or pain with breathing    Increase in phlegm after symptoms have improved  Date Last Reviewed: 9/13/2015 2000-2017 [employer]. [address] [place] [id number]. All rights reserved. This information is not intended as a substitute for professional medical care. Always follow your healthcare professional's instructions.            Jamshid Can MD  [facility]

## 2018-01-16 NOTE — MR AVS SNAPSHOT
After Visit Summary   1/16/2018    Kaylene Diaz    MRN: [id number]           Patient Information     Date Of Birth          1955        Visit Information        Provider Department      1/16/2018 1:00 PM Jamshid Can MD [facility]        Today's Diagnoses     Cough    -  1    Acute bronchitis, unspecified organism          Care Instructions    No wheezing or crackles in lungs today.  No need to restart prednisone at this time.  Albuterol inhaler 2 puffs every 4 hrs as needed for wheezing or coughing spells or tightness in breathing while active.  Take benzonatate perles as prescribed only for coughing fits.        Thank you for choosing [facility].  You may be receiving a survey in the mail from [employer] regarding your visit today.  Please take a few minutes to complete and return the survey to let us know how we are doing.      If you have questions or concerns, please contact us via [email or web address] or you can contact your care team at [phone].    Our Clinic hours are:  Monday 6:40 am  to 7:00 pm  Tuesday -Friday 6:40 am to 5:00 pm    The [place] outpatient lab hours are:  Monday - Friday 6:10 am to 4:45 pm  Saturdays 7:00 am to 11:00 am  Appointments are required, call [phone]    If you have clinical questions after hours or would like to schedule an appointment,  call the clinic at [phone].    Bronchitis with Wheezing (Viral or Bacterial: Adult)    Bronchitis is an infection of the air passages. It often occurs during a cold and is usually caused by a virus. Symptoms include cough with mucus (phlegm) and low-grade fever. This illness is contagious during the first few days and is spread through the air by coughing and sneezing, or by direct contact (touching the sick person and then touching your own eyes, nose, or mouth).  If there is a lot of inflammation, air flow is restricted. The air passages may also go into spasm, especially if you have  asthma. This causes wheezing and difficulty breathing even in people who do not have asthma.  Bronchitis usually lasts 7 to 14 days. The wheezing should improve with treatment during the first week. An inhaler is often prescribed to relax the air passages and stop wheezing. Antibiotics will be prescribed if your doctor thinks there is also a secondary bacterial infection.  Home care    If symptoms are severe, rest at home for the first 2 to 3 days. When you go back to your usual activities, don't let yourself get too tired.    Do not smoke. Also avoid being exposed to secondhand smoke.    You may use over-the-counter medicine to control fever or pain, unless another medicine was prescribed. Note: If you have chronic liver or kidney disease or have ever had a stomach ulcer or gastrointestinal bleeding, talk with your healthcare provider before using these medicines. Also talk to your provider if you are taking medicine to prevent blood clots.) Aspirin should never be given to anyone younger than 18 years of age who is ill with a viral infection or fever. It may cause severe liver or brain damage.    Your appetite may be poor, so a light diet is fine. Avoid dehydration by drinking 6 to 8 glasses of fluids per day (such as water, soft drinks, sports drinks, juices, tea, or soup). Extra fluids will help loosen secretions in the nose and lungs.    Over-the-counter cough, cold, and sore-throat medicines will not shorten the length of the illness, but they may be helpful to reduce symptoms. (Note: Do not use decongestants if you have high blood pressure.)    If you were given an inhaler, use it exactly as directed. If you need to use it more often than prescribed, your condition may be worsening. If this happens, contact your healthcare provider.    If prescribed, finish all antibiotic medicine, even if you are feeling better after only a few days.  Follow-up care  Follow up with your healthcare provider, or as advised. If  you had an X-ray or ECG (electrocardiogram), a specialist will review it. You will be notified of any new findings that may affect your care.  Note: If you are age 65 or older, or if you have a chronic lung disease or condition that affects your immune system, or you smoke, talk to your healthcare provider about having a pneumococcal vaccinations and a yearly influenza vaccination (flu shot).  When to seek medical advice  Call your healthcare provider right away if any of these occur:    Fever of 100.4 F (38 C) or higher    Coughing up increasing amounts of colored sputum    Weakness, drowsiness, headache, facial pain, ear pain, or a stiff neck  Call 911, or get immediate medical care  Contact emergency services right away if any of these occur.    Coughing up blood    Worsening weakness, drowsiness, headache, or stiff neck    Increased wheezing not helped with medication, shortness of breath, or pain with breathing    Increase in phlegm after symptoms have improved  Date Last Reviewed: 9/13/2015 2000-2017 [employer]. [address] [place] [address]. All rights reserved. This information is not intended as a substitute for professional medical care. Always follow your healthcare professional's instructions.                Follow-ups after your visit        Follow-up notes from your care team     Return if symptoms worsen or fail to improve.      Your next 10 appointments already scheduled     Mar 26, 2018 10:00 AM CDT   New Visit with Joseph Rooney MD   [facility] ([facility])    [address] [address]   [phone]            Jun 20, 2018  9:00 AM CDT   Return Visit with Cipriano Springer MD   [facility] ([facility]    [address] [address]   [phone]              Who to contact     If you have questions or need follow up information about today's clinic visit or your  "schedule please contact [facility] directly at [phone].  Normal or non-critical lab and imaging results will be communicated to you by MyChart, letter or phone within 4 business days after the clinic has received the results. If you do not hear from us within 7 days, please contact the clinic through [email or web address] or phone. If you have a critical or abnormal lab result, we will notify you by phone as soon as possible.  Submit refill requests through [employer] or call your pharmacy and they will forward the refill request to us. Please allow 3 business days for your refill to be completed.          Additional Information About Your Visit        [email or web address] Information     [employer] gives you secure access to your electronic health record. If you see a primary care provider, you can also send messages to your care team and make appointments. If you have questions, please call your primary care clinic.  If you do not have a primary care provider, please call [phone] and they will assist you.        Care EveryWhere ID     This is your Care EveryWhere ID. This could be used by other organizations to access your [facility] medical records  [id number]        Your Vitals Were     Pulse Temperature Height Pulse Oximetry BMI (Body Mass Index)       91 98.2  F (36.8  C) (Tympanic) 5' 6\" (1.676 m) 96% 52.55 kg/m2        Blood Pressure from Last 3 Encounters:   01/16/18 126/63   01/07/18 189/90   12/20/17 182/82    Weight from Last 3 Encounters:   01/16/18 (!) 325 lb 9.6 oz (147.7 kg)   01/07/18 (!) 320 lb (145.2 kg)   08/07/17 (!) 317 lb (143.8 kg)              Today, you had the following     No orders found for display         Where to get your medicines      These medications were sent to [facility] [place] - [address]  [address] [address]     Phone:  [phone]     benzonatate 200 MG capsule          Primary Care Provider Office Phone # Fax #    Hitesh Washington [phone] " [phone]       [address] [address]        Equal Access to Services     THELMA REYES : Hadii aad ku hadraúleliz Sohoney, waaxda luqadaha, qaybta kaalmada mario albertodanelle, waxirena pamella gildafrancis llaneslala deckerjevon luna. So [facility] [phone].    ATENCIÓN: Si habla español, tiene a zamora disposición servicios gratuitos de asistencia lingüística. Llame al [phone].    We comply with applicable federal civil rights laws and [place] laws. We do not discriminate on the basis of race, color, national origin, age, disability, sex, sexual orientation, or gender identity.            Thank you!     Thank you for choosing [facility]  for your care. Our goal is always to provide you with excellent care. Hearing back from our patients is one way we can continue to improve our services. Please take a few minutes to complete the written survey that you may receive in the mail after your visit with us. Thank you!             Your Updated Medication List - Protect others around you: Learn how to safely use, store and throw away your medicines at www.disposemymeds.org.          This list is accurate as of: 1/16/18  1:39 PM.  Always use your most recent med list.                   Brand Name Dispense Instructions for use Diagnosis    albuterol 108 (90 BASE) MCG/ACT Inhaler    PROAIR HFA/PROVENTIL HFA/VENTOLIN HFA    1 Inhaler    Inhale 1-2 puffs into the lungs every 4 hours as needed for shortness of breath / dyspnea or wheezing        benzonatate 200 MG capsule    TESSALON    21 capsule    Take 1 capsule (200 mg) by mouth 3 times daily as needed for cough    Cough       CLARITIN PO           doxycycline 100 MG capsule    VIBRAMYCIN    20 capsule    Take 1 capsule (100 mg) by mouth 2 times daily for 10 days        fluticasone 27.5 MCG/SPRAY spray    VERAMYST     Spray 1 spray into both nostrils 2 times daily        fluticasone 50 MCG/ACT spray    FLONASE    1 Bottle    Spray 1-2 sprays into both nostrils daily For  1 week then as needed        * ADULT GUMMY PO      Take 1 chew tab by mouth daily [employer]        * HAIR/SKIN/NAILS/BIOTIN PO     100 tablet    Take 1 capsule by mouth daily.    RA (rheumatoid arthritis) (H)       hydroxychloroquine 200 MG tablet    PLAQUENIL    180 tablet    Take 2 tablets (400 mg) by mouth daily    Rheumatoid arthritis of hand, unspecified laterality, unspecified rheumatoid factor presence (H)       methotrexate 2.5 MG tablet CHEMO     120 tablet    Take 10 tablets (25 mg) by mouth once a week    Rheumatoid arthritis of multiple sites without rheumatoid factor (H)       * predniSONE 5 MG tablet    DELTASONE    50 tablet    20 mg for 5 days, taper by 5 mg every 5 days    Rheumatoid arthritis of multiple sites without rheumatoid factor (H)       * predniSONE 20 MG tablet    DELTASONE    10 tablet    Take two tablets (= 40mg) each day for 5 (five) days        vitamin B complex with vitamin C Tabs tablet      Take 1 tablet by mouth daily    RA (rheumatoid arthritis) (H)       * Notice:  This list has 4 medication(s) that are the same as other medications prescribed for you. Read the directions carefully, and ask your doctor or other care provider to review them with you.

## 2018-01-16 NOTE — PATIENT INSTRUCTIONS
No wheezing or crackles in lungs today.  No need to restart prednisone at this time.  Albuterol inhaler 2 puffs every 4 hrs as needed for wheezing or coughing spells or tightness in breathing while active.  Take benzonatate perles as prescribed only for coughing fits.        Thank you for choosing [facility].  You may be receiving a survey in the mail from [employer] regarding your visit today.  Please take a few minutes to complete and return the survey to let us know how we are doing.      If you have questions or concerns, please contact us via [employer] or you can contact your care team at [phone].    Our Clinic hours are:  Monday 6:40 am  to 7:00 pm  Tuesday -Friday 6:40 am to 5:00 pm    The [place] outpatient lab hours are:  Monday - Friday 6:10 am to 4:45 pm  Saturdays 7:00 am to 11:00 am  Appointments are required, call [phone]    If you have clinical questions after hours or would like to schedule an appointment,  call the clinic at [phone].    Bronchitis with Wheezing (Viral or Bacterial: Adult)    Bronchitis is an infection of the air passages. It often occurs during a cold and is usually caused by a virus. Symptoms include cough with mucus (phlegm) and low-grade fever. This illness is contagious during the first few days and is spread through the air by coughing and sneezing, or by direct contact (touching the sick person and then touching your own eyes, nose, or mouth).  If there is a lot of inflammation, air flow is restricted. The air passages may also go into spasm, especially if you have asthma. This causes wheezing and difficulty breathing even in people who do not have asthma.  Bronchitis usually lasts 7 to 14 days. The wheezing should improve with treatment during the first week. An inhaler is often prescribed to relax the air passages and stop wheezing. Antibiotics will be prescribed if your doctor thinks there is also a secondary bacterial infection.  Home care    If  symptoms are severe, rest at home for the first 2 to 3 days. When you go back to your usual activities, don't let yourself get too tired.    Do not smoke. Also avoid being exposed to secondhand smoke.    You may use over-the-counter medicine to control fever or pain, unless another medicine was prescribed. Note: If you have chronic liver or kidney disease or have ever had a stomach ulcer or gastrointestinal bleeding, talk with your healthcare provider before using these medicines. Also talk to your provider if you are taking medicine to prevent blood clots.) Aspirin should never be given to anyone younger than 18 years of age who is ill with a viral infection or fever. It may cause severe liver or brain damage.    Your appetite may be poor, so a light diet is fine. Avoid dehydration by drinking 6 to 8 glasses of fluids per day (such as water, soft drinks, sports drinks, juices, tea, or soup). Extra fluids will help loosen secretions in the nose and lungs.    Over-the-counter cough, cold, and sore-throat medicines will not shorten the length of the illness, but they may be helpful to reduce symptoms. (Note: Do not use decongestants if you have high blood pressure.)    If you were given an inhaler, use it exactly as directed. If you need to use it more often than prescribed, your condition may be worsening. If this happens, contact your healthcare provider.    If prescribed, finish all antibiotic medicine, even if you are feeling better after only a few days.  Follow-up care  Follow up with your healthcare provider, or as advised. If you had an X-ray or ECG (electrocardiogram), a specialist will review it. You will be notified of any new findings that may affect your care.  Note: If you are age 65 or older, or if you have a chronic lung disease or condition that affects your immune system, or you smoke, talk to your healthcare provider about having a pneumococcal vaccinations and a yearly influenza vaccination (flu  shot).  When to seek medical advice  Call your healthcare provider right away if any of these occur:    Fever of 100.4 F (38 C) or higher    Coughing up increasing amounts of colored sputum    Weakness, drowsiness, headache, facial pain, ear pain, or a stiff neck  Call 911, or get immediate medical care  Contact emergency services right away if any of these occur.    Coughing up blood    Worsening weakness, drowsiness, headache, or stiff neck    Increased wheezing not helped with medication, shortness of breath, or pain with breathing    Increase in phlegm after symptoms have improved  Date Last Reviewed: 9/13/2015 2000-2017 [employer]. [address] [place] [address], [place] [address]. All rights reserved. This information is not intended as a substitute for professional medical care. Always follow your healthcare professional's instructions.

## 2018-03-26 ENCOUNTER — RADIANT APPOINTMENT (OUTPATIENT)
Dept: GENERAL RADIOLOGY | Facility: CLINIC | Age: 63
End: 2018-03-26
Attending: INTERNAL MEDICINE
Payer: COMMERCIAL

## 2018-03-26 ENCOUNTER — OFFICE VISIT (OUTPATIENT)
Dept: RHEUMATOLOGY | Facility: CLINIC | Age: 63
End: 2018-03-26
Payer: COMMERCIAL

## 2018-03-26 ENCOUNTER — TELEPHONE (OUTPATIENT)
Dept: RHEUMATOLOGY | Facility: CLINIC | Age: 63
End: 2018-03-26

## 2018-03-26 VITALS
TEMPERATURE: 97.8 F | RESPIRATION RATE: 14 BRPM | HEART RATE: 74 BPM | SYSTOLIC BLOOD PRESSURE: 165 MMHG | BODY MASS INDEX: 52.62 KG/M2 | DIASTOLIC BLOOD PRESSURE: 85 MMHG | OXYGEN SATURATION: 97 % | WEIGHT: 293 LBS

## 2018-03-26 DIAGNOSIS — L65.9 HAIR THINNING: ICD-10-CM

## 2018-03-26 DIAGNOSIS — E66.01 MORBID OBESITY WITH BMI OF 50.0-59.9, ADULT (H): ICD-10-CM

## 2018-03-26 DIAGNOSIS — Z79.899 HIGH RISK MEDICATION USE: ICD-10-CM

## 2018-03-26 DIAGNOSIS — M05.79 RHEUMATOID ARTHRITIS INVOLVING MULTIPLE SITES WITH POSITIVE RHEUMATOID FACTOR (H): Chronic | ICD-10-CM

## 2018-03-26 DIAGNOSIS — M05.79 RHEUMATOID ARTHRITIS INVOLVING MULTIPLE SITES WITH POSITIVE RHEUMATOID FACTOR (H): Primary | Chronic | ICD-10-CM

## 2018-03-26 DIAGNOSIS — M25.571 RIGHT ANKLE PAIN, UNSPECIFIED CHRONICITY: ICD-10-CM

## 2018-03-26 LAB
ALBUMIN SERPL-MCNC: 3.9 G/DL (ref 3.4–5)
ALP SERPL-CCNC: 122 U/L (ref 40–150)
ALT SERPL W P-5'-P-CCNC: 22 U/L (ref 0–50)
AST SERPL W P-5'-P-CCNC: 18 U/L (ref 0–45)
BASOPHILS # BLD AUTO: 0 10E9/L (ref 0–0.2)
BASOPHILS NFR BLD AUTO: 0.3 %
BILIRUB DIRECT SERPL-MCNC: <0.1 MG/DL (ref 0–0.2)
BILIRUB SERPL-MCNC: 0.4 MG/DL (ref 0.2–1.3)
CREAT SERPL-MCNC: 0.99 MG/DL (ref 0.52–1.04)
CRP SERPL-MCNC: 6.8 MG/L (ref 0–8)
DIFFERENTIAL METHOD BLD: NORMAL
EOSINOPHIL # BLD AUTO: 0.1 10E9/L (ref 0–0.7)
EOSINOPHIL NFR BLD AUTO: 2.2 %
ERYTHROCYTE [DISTWIDTH] IN BLOOD BY AUTOMATED COUNT: 13.6 % (ref 10–15)
ERYTHROCYTE [SEDIMENTATION RATE] IN BLOOD BY WESTERGREN METHOD: 14 MM/H (ref 0–30)
GFR SERPL CREATININE-BSD FRML MDRD: 57 ML/MIN/1.7M2
HCT VFR BLD AUTO: 40.1 % (ref 35–47)
HGB BLD-MCNC: 13.5 G/DL (ref 11.7–15.7)
LYMPHOCYTES # BLD AUTO: 1.3 10E9/L (ref 0.8–5.3)
LYMPHOCYTES NFR BLD AUTO: 21.6 %
MCH RBC QN AUTO: 32.1 PG (ref 26.5–33)
MCHC RBC AUTO-ENTMCNC: 33.7 G/DL (ref 31.5–36.5)
MCV RBC AUTO: 95 FL (ref 78–100)
MONOCYTES # BLD AUTO: 0.4 10E9/L (ref 0–1.3)
MONOCYTES NFR BLD AUTO: 6.6 %
NEUTROPHILS # BLD AUTO: 4 10E9/L (ref 1.6–8.3)
NEUTROPHILS NFR BLD AUTO: 69.3 %
PLATELET # BLD AUTO: 244 10E9/L (ref 150–450)
PROT SERPL-MCNC: 7.6 G/DL (ref 6.8–8.8)
RBC # BLD AUTO: 4.21 10E12/L (ref 3.8–5.2)
WBC # BLD AUTO: 5.8 10E9/L (ref 4–11)

## 2018-03-26 PROCEDURE — 36415 COLL VENOUS BLD VENIPUNCTURE: CPT | Performed by: INTERNAL MEDICINE

## 2018-03-26 PROCEDURE — 85025 COMPLETE CBC W/AUTO DIFF WBC: CPT | Performed by: INTERNAL MEDICINE

## 2018-03-26 PROCEDURE — 82565 ASSAY OF CREATININE: CPT | Performed by: INTERNAL MEDICINE

## 2018-03-26 PROCEDURE — 80076 HEPATIC FUNCTION PANEL: CPT | Performed by: INTERNAL MEDICINE

## 2018-03-26 PROCEDURE — 73630 X-RAY EXAM OF FOOT: CPT | Mod: RT

## 2018-03-26 PROCEDURE — 73130 X-RAY EXAM OF HAND: CPT | Mod: LT

## 2018-03-26 PROCEDURE — 86140 C-REACTIVE PROTEIN: CPT | Performed by: INTERNAL MEDICINE

## 2018-03-26 PROCEDURE — 85652 RBC SED RATE AUTOMATED: CPT | Performed by: INTERNAL MEDICINE

## 2018-03-26 PROCEDURE — 99214 OFFICE O/P EST MOD 30 MIN: CPT | Performed by: INTERNAL MEDICINE

## 2018-03-26 RX ORDER — PREDNISONE 5 MG/1
5 TABLET ORAL DAILY PRN
Qty: 30 TABLET | Refills: 0 | Status: SHIPPED | OUTPATIENT
Start: 2018-03-26 | End: 2019-07-15

## 2018-03-26 RX ORDER — METHOTREXATE 2.5 MG/1
25 TABLET ORAL WEEKLY
Qty: 140 TABLET | Refills: 2 | Status: SHIPPED | OUTPATIENT
Start: 2018-03-26 | End: 2018-12-03

## 2018-03-26 RX ORDER — FOLIC ACID 1 MG/1
1 TABLET ORAL DAILY
Qty: 100 TABLET | Refills: 3 | Status: SHIPPED | OUTPATIENT
Start: 2018-03-26 | End: 2018-07-23

## 2018-03-26 RX ORDER — METHOTREXATE 2.5 MG/1
25 TABLET ORAL WEEKLY
Qty: 140 TABLET | Refills: 2 | Status: SHIPPED | OUTPATIENT
Start: 2018-03-26 | End: 2018-03-26

## 2018-03-26 ASSESSMENT — PAIN SCALES - GENERAL: PAINLEVEL: MILD PAIN (3)

## 2018-03-26 NOTE — NURSING NOTE
"Chief Complaint   Patient presents with     Arthritis     Rheumatoid arthritis Dr. Coleman patient        Initial /85  Pulse 74  Temp 97.8  F (36.6  C) (Oral)  Resp 14  Wt 147.9 kg (326 lb)  SpO2 97%  BMI 52.62 kg/m2 Estimated body mass index is 52.62 kg/(m^2) as calculated from the following:    Height as of 1/16/18: 1.676 m (5' 6\").    Weight as of this encounter: 147.9 kg (326 lb).  Medication Reconciliation: complete     Selam Mercer MA      "

## 2018-03-26 NOTE — TELEPHONE ENCOUNTER
"Called pharmacy, spoke with Martha, that the directions on the script say to \"Take 25 mg by mouth once a week . Take all 8 tablets on the same day of each week.\"., but 8 tabs are not 25mg. To Dr. oRoney, please clarify.    Jyoti Harris, CMA on 3/26/2018 at 12:31 PM    "

## 2018-03-26 NOTE — TELEPHONE ENCOUNTER
1. Rheumatoid arthritis involving multiple sites with positive rheumatoid factor (H)  - methotrexate sodium 2.5 MG TABS; Take 25 mg by mouth once a week . Take all 10 tablets on the same day of each week.  Dispense: 140 tablet; Refill: 2    New Rx sent.  Please call pharmacy to confirm receipt of correction.    Joseph Rooney MD  3/26/2018 5:10 PM

## 2018-03-26 NOTE — MR AVS SNAPSHOT
After Visit Summary   3/26/2018    Kaylene Diaz    MRN: [id number]           Patient Information     Date Of Birth          1955        Visit Information        Provider Department      3/26/2018 10:00 AM Joseph Rooney MD [facility]        Today's Diagnoses     Rheumatoid arthritis involving multiple sites with positive rheumatoid factor (H)    -  1    High risk medication use           Follow-ups after your visit        Additional Services     OPHTHALMOLOGY ADULT REFERRAL       Your provider has referred you to:  FMG: [facility] [place] [phone]   [email or web address]    Reason for referral: Hydroxychloroquine (plaquenil) toxicity monitoring.     Please be aware that coverage of these services is subject to the terms and limitations of your health insurance plan.  Call member services at your health plan with any benefit or coverage questions.      Please bring the following to your appointment:  >>   Any x-rays, CTs or MRIs which have been performed.  Contact the facility where they were done to arrange for  prior to your scheduled appointment.  Any new CT, MRI or other procedures ordered by your specialist must be performed at a [place] facility or coordinated by your clinic's referral office.    >>   List of current medications   >>   This referral request   >>   Any documents/labs given to you for this referral                  Your next 10 appointments already scheduled     Jun 20, 2018  9:00 AM CDT   Return Visit with Cipriano Springer MD   [facility]    [address] [address]   [phone]            Jul 19, 2018  9:30 AM CDT   LAB with [facility]   [facility]    [address] [place] [id number] [phone]           Please do not eat 10-12 hours before your appointment if you are coming in  fasting for labs on lipids, cholesterol, or glucose (sugar). This does not apply to pregnant women. Water, hot tea and black coffee (with nothing added) are okay. Do not drink other fluids, diet soda or chew gum.            Jul 23, 2018 10:00 AM CDT   Return Visit with Joseph Rooney MD   [facility] ([facility] [place])    [address]  [place] [address]   [phone]              Future tests that were ordered for you today     Open Future Orders        Priority Expected Expires Ordered    XR Hand Bilateral G/E 3 Views Routine 3/26/2018 3/26/2019 3/26/2018    XR Foot Bilateral G/E 3 Views Routine 3/26/2018 3/26/2019 3/26/2018    Hepatic panel Routine 6/20/2018 7/9/2018 3/26/2018    Erythrocyte sedimentation rate auto Routine 6/20/2018 7/9/2018 3/26/2018    CRP inflammation Routine 6/20/2018 7/9/2018 3/26/2018    CBC with platelets differential Routine 6/20/2018 7/9/2018 3/26/2018    Creatinine Routine 6/20/2018 7/9/2018 3/26/2018            Who to contact     If you have questions or need follow up information about today's clinic visit or your schedule please contact [facility] directly at [phone].  Normal or non-critical lab and imaging results will be communicated to you by Xopikhart, letter or phone within 4 business days after the clinic has received the results. If you do not hear from us within 7 days, please contact the clinic through Xopikhart or phone. If you have a critical or abnormal lab result, we will notify you by phone as soon as possible.  Submit refill requests through RentJuice or call your pharmacy and they will forward the refill request to us. Please allow 3 business days for your refill to be completed.          Additional Information About Your Visit        Xopikhart Information     R[employer]ice gives you secure access to your electronic health record. If you see a primary care provider, you can also send messages to your care team and make  appointments. If you have questions, please call your primary care clinic.  If you do not have a primary care provider, please call [phone] and they will assist you.        Care EveryWhere ID     This is your Care EveryWhere ID. This could be used by other organizations to access your [facility] medical records  [id number]        Your Vitals Were     Pulse Temperature Respirations Pulse Oximetry BMI (Body Mass Index)       74 97.8  F (36.6  C) (Oral) 14 97% 52.62 kg/m2        Blood Pressure from Last 3 Encounters:   03/26/18 165/85   01/16/18 126/63   01/07/18 189/90    Weight from Last 3 Encounters:   03/26/18 147.9 kg (326 lb)   01/16/18 (!) 147.7 kg (325 lb 9.6 oz)   01/07/18 (!) 145.2 kg (320 lb)              We Performed the Following     CBC with platelets differential     Creatinine     CRP inflammation     Erythrocyte sedimentation rate auto     Hepatic panel     OPHTHALMOLOGY ADULT REFERRAL          Today's Medication Changes          These changes are accurate as of 3/26/18 10:26 AM.  If you have any questions, ask your nurse or doctor.               Start taking these medicines.        Dose/Directions    folic acid 1 MG tablet   Commonly known as:  FOLVITE   Used for:  Rheumatoid arthritis involving multiple sites with positive rheumatoid factor (H)   Started by:  [place] Rooney MD        Dose:  1 mg   Take 1 tablet (1 mg) by mouth daily   Quantity:  100 tablet   Refills:  3         These medicines have changed or have updated prescriptions.        Dose/Directions    methotrexate sodium 2.5 MG Tabs   This may have changed:  additional instructions   Used for:  Rheumatoid arthritis involving multiple sites with positive rheumatoid factor (H)   Changed by:  Joseph Rooney MD        Dose:  25 mg   Take 25 mg by mouth once a week . Take all 8 tablets on the same day of each week.   Quantity:  140 tablet   Refills:  2       predniSONE 5 MG tablet   Commonly known as:  DELTASONE   This may have changed:     - how much to take  - how to take this  - when to take this  - reasons to take this  - additional instructions  - Another medication with the same name was removed. Continue taking this medication, and follow the directions you see here.   Used for:  Rheumatoid arthritis involving multiple sites with positive rheumatoid factor (H)   Changed by:  [place] Rooney MD        Dose:  5 mg   Take 1 tablet (5 mg) by mouth daily as needed for rheumatoid arthritis; use sparingly   Quantity:  30 tablet   Refills:  0            Where to get your medicines      These medications were sent to [id number] [place] - [address]  [address] [place] [address]     Phone:  [phone]     folic acid 1 MG tablet    methotrexate sodium 2.5 MG Tabs    predniSONE 5 MG tablet                Primary Care Provider Office Phone # Fax #    Hitesh Washington [phone] [address] [facility] [facility] [address]        Equal Access to Services     [facility]: Hadii ninoska ku hadasho Soomaali, waaxda luqadaha, qaybta kaalmada adeegyada, waxay ryanin hayadamn [place] . So [facility] [phone].    ATENCIÓN: Si habla español, tiene a zamora disposición servicios gratuitos de asistencia lingüística. [facility] [phone].    We comply with applicable federal civil rights laws and [place] laws. We do not discriminate on the basis of race, color, national origin, age, disability, sex, sexual orientation, or gender identity.            Thank you!     Thank you for choosing [facility]  for your care. Our goal is always to provide you with excellent care. Hearing back from our patients is one way we can continue to improve our services. Please take a few minutes to complete the written survey that you may receive in the mail after your visit with us. Thank you!             Your Updated Medication List - Protect others around you: Learn how to safely use, store and throw away your medicines at  www.disposemymeds.org.          This list is accurate as of 3/26/18 10:26 AM.  Always use your most recent med list.                   Brand Name Dispense Instructions for use Diagnosis    albuterol 108 (90 BASE) MCG/ACT Inhaler    PROAIR HFA/PROVENTIL HFA/VENTOLIN HFA    1 Inhaler    Inhale 1-2 puffs into the lungs every 4 hours as needed for shortness of breath / dyspnea or wheezing        benzonatate 200 MG capsule    TESSALON    21 capsule    Take 1 capsule (200 mg) by mouth 3 times daily as needed for cough    Cough       CLARITIN PO           fluticasone 27.5 MCG/SPRAY spray    VERAMYST     Spray 1 spray into both nostrils 2 times daily        fluticasone 50 MCG/ACT spray    FLONASE    1 Bottle    Spray 1-2 sprays into both nostrils daily For 1 week then as needed        folic acid 1 MG tablet    FOLVITE    100 tablet    Take 1 tablet (1 mg) by mouth daily    Rheumatoid arthritis involving multiple sites with positive rheumatoid factor (H)       * ADULT GUMMY PO      Take 1 chew tab by mouth daily VitaCrave        * HAIR/SKIN/NAILS/BIOTIN PO     100 tablet    Take 1 capsule by mouth daily.    RA (rheumatoid arthritis) (H)       hydroxychloroquine 200 MG tablet    PLAQUENIL    180 tablet    Take 2 tablets (400 mg) by mouth daily    Rheumatoid arthritis of hand, unspecified laterality, unspecified rheumatoid factor presence (H)       methotrexate sodium 2.5 MG Tabs     140 tablet    Take 25 mg by mouth once a week . Take all 8 tablets on the same day of each week.    Rheumatoid arthritis involving multiple sites with positive rheumatoid factor (H)       predniSONE 5 MG tablet    DELTASONE    30 tablet    Take 1 tablet (5 mg) by mouth daily as needed for rheumatoid arthritis; use sparingly    Rheumatoid arthritis involving multiple sites with positive rheumatoid factor (H)       vitamin B complex with vitamin C Tabs tablet      Take 1 tablet by mouth daily    RA (rheumatoid arthritis) (H)       * Notice:  This  list has 2 medication(s) that are the same as other medications prescribed for you. Read the directions carefully, and ask your doctor or other care provider to review them with you.

## 2018-03-26 NOTE — PROGRESS NOTES
Rheumatology Clinic Visit      Kaylene Diaz MRN# [id number]   YOB: 1955 Age: 62 year old      Date of visit: 3/26/18   PCP: Dr. Hitesh [place]    Chief Complaint   Patient presents with:  Arthritis: Rheumatoid arthritis Dr. Coleman patient     Assessment and Plan     1.  Seropositive rheumatoid arthritis (RF 32, CCP >250): Currently on methotrexate 25 mg once weekly and hydroxychloroquine 400 mg daily.  She has prednisone on hand if needed for a flare but has not required prednisone for rheumatoid arthritis in the last year.  X-rays today to establish a new baseline in evaluating for erosive disease.  - Continue methotrexate 25 mg once weekly  - Continue hydroxychloroquine 400 mg daily  - Ophthalmology referral for hydroxychloroquine toxicity monitoring  - Start folic acid 1 mg daily  - Continue prednisone 5 mg daily as needed for rheumatoid arthritis symptoms; use sparingly.  - x-rays today: bilateral hands/feet  - Labs today and in 3 months: CBC, Creatinine, Hepatic Panel, ESR, CRP    2. Elevated blood pressure: Blood pressure checked this clinic visit and it was elevated. I advised that she f/u with her PCP for management.     3.  Morbid obesity, BMI 52.62: Encouraged weight loss and discussed the health benefits.    4.  Right ankle pain: Posterior to the lateral malleolus.  Suspect related to weight and would benefit from stretching exercises and weight loss.  Duration of about 3 weeks.  If this does not improve over time then may consider formal physical therapy.    5.  Basal cell carcinoma and squamous cell carcinoma: Several lesions removed by dermatology.  She follows with dermatology every 6 months    6.  Hair thinning: Possibly related to methotrexate.  Start folic acid as noted in #1.    Ms. Diaz verbalized agreement with and understanding of the rational for the diagnosis and treatment plan.  All questions were answered to best of my ability and the patient's satisfaction. Ms. Diaz was  advised to contact the clinic with any questions that may arise after the clinic visit.      Thank you for involving me in the care of the patient    Return to clinic: 3 months      HPI   Kaylene Diaz is a 62 year old female with a past medical history significant for hyperlipidemia, osteoarthritis, osteopenia, meniscal tear, squamous cell carcinoma, basal cell carcinoma, and rheumatoid arthritis who presents for follow-up of rheumatoid arthritis.  Note that this is Ms. Diaz's first clinic visit with me.      12/4/2017 rheumatology clinic note by Dr. Rl Coleman documents rheumatoid arthritis treated with methotrexate 22.5 mg once weekly, Plaquenil, and prednisone.  Methotrexate was increased to 25 mg once weekly.  Plaquenil was continued at 400 mg daily.      Today, Ms. Diaz reports that she has been doing well with methotrexate and hydroxychloroquine.  She has prednisone on hand in case of rheumatoid arthritis flare but has not required in the last year for rheumatoid arthritis.  Increased dose of methotrexate was effective.  Chronic swelling of her knuckles but there is no pain.  She does have some pain of the right ankle just posterior to the lateral malleolus.  Continues to get skin cancer cut off.    Denies fevers, chills, nausea, vomiting, constipation, diarrhea. No abdominal pain. No chest pain/pressure, palpitations, or shortness of breath. No LE swelling. No neck pain. No oral or nasal sores.  No rash. No sicca symptoms.       Tobacco: Quit in 2000  EtOH: Rare  Drugs: None    ROS   GEN: No fevers, chills, night sweats; trying to lose weight  SKIN: No itching, rashes, sores; recurrent skin cancer that is followed by dermatology  HEENT: No oral or nasal ulcers.  CV: No chest pain, pressure, palpitations, or dyspnea on exertion.  PULM: No SOB, wheeze, cough.  GI: No nausea, vomiting, constipation, diarrhea. No blood in stool. No abdominal pain.  : No blood in urine.  MSK: See HPI.  NEURO: No numbness,  tingling, or weakness.  EXT: No LE swelling  PSYCH: Negative    Active Problem List     Patient Active Problem List   Diagnosis     Carpal tunnel syndrome     Tear meniscus knee     Osteopenia     Advanced directives, counseling/discussion     Hyperlipidemia LDL goal <160     Osteoarthritis     RA (rheumatoid arthritis) (H)     High risk medications (not anticoagulants) long-term use     Kidney stone     ASCUS on Pap smear     Morbid obesity due to excess calories (H)     Small bowel obstruction     Elevated blood pressure reading without diagnosis of hypertension     BLANCA (acute kidney injury) (H)     Elevated glucose     SBO (small bowel obstruction)     Past Medical History     Past Medical History:   Diagnosis Date     Basal cell carcinoma      RA (rheumatoid arthritis) (H)      Small bowel obstruction      Past Surgical History     Past Surgical History:   Procedure Laterality Date     ARTHROSCOPY KNEE  12/10/2010    ARTHROSCOPY KNEE performed by NAVID FIERRO at [facility]     SURGICAL HISTORY OF -       IUD removal     SURGICAL HISTORY OF -       Ovarian cyst     SURGICAL HISTORY OF -       thorn removal from foot     SURGICAL HISTORY OF -       knee surgery, scope, right knee     Allergy     Allergies   Allergen Reactions     Gold Rash     Latex Rash     Not all latex products     Current Medication List     Current Outpatient Prescriptions   Medication Sig     benzonatate (TESSALON) 200 MG capsule Take 1 capsule (200 mg) by mouth 3 times daily as needed for cough     predniSONE (DELTASONE) 20 MG tablet Take two tablets (= 40mg) each day for 5 (five) days     albuterol (PROAIR HFA/PROVENTIL HFA/VENTOLIN HFA) 108 (90 BASE) MCG/ACT Inhaler Inhale 1-2 puffs into the lungs every 4 hours as needed for shortness of breath / dyspnea or wheezing     fluticasone (FLONASE) 50 MCG/ACT spray Spray 1-2 sprays into both nostrils daily For 1 week then as needed     hydroxychloroquine (PLAQUENIL) 200 MG tablet Take 2 tablets  "(400 mg) by mouth daily     Loratadine (CLARITIN PO)      fluticasone (VERAMYST) 27.5 MCG/SPRAY spray Spray 1 spray into both nostrils 2 times daily     Multiple Vitamins-Minerals (ADULT GUMMY PO) Take 1 chew tab by mouth daily VitaCrave     vitamin  B complex with vitamin C (VITAMIN  B COMPLEX) TABS Take 1 tablet by mouth daily     Multiple Vitamins-Minerals (HAIR/SKIN/NAILS/BIOTIN PO) Take 1 capsule by mouth daily.      No current facility-administered medications for this visit.          Social History   See HPI    Family History     Family History   Problem Relation Age of Onset     Prostate Cancer Father      Eye Disorder Father      mac degen     HEART DISEASE Father      HEART DISEASE Mother      a-fib     Eye Disorder Mother      C.A.D. Mother      Hypertension Mother      Alcohol/Drug Brother      Alcohol/Drug Sister      Alcohol/Drug Brother      Alcohol/Drug Brother      Alcohol/Drug Brother      Alcohol/Drug Sister      Obesity Sister      Alcohol/Drug Sister      Prostate Cancer Sister      CANCER Other      Melanoma No family hx of        Physical Exam     Temp Readings from Last 3 Encounters:   03/26/18 97.8  F (36.6  C) (Oral)   01/16/18 98.2  F (36.8  C) (Tympanic)   01/07/18 97.8  F (36.6  C) (Oral)     BP Readings from Last 5 Encounters:   03/26/18 165/85   01/16/18 126/63   01/07/18 189/90   12/20/17 182/82   12/04/17 169/82     Pulse Readings from Last 1 Encounters:   03/26/18 74     Resp Readings from Last 1 Encounters:   03/26/18 14     Estimated body mass index is 52.62 kg/(m^2) as calculated from the following:    Height as of 1/16/18: 1.676 m (5' 6\").    Weight as of this encounter: 147.9 kg (326 lb).    GEN: NAD; obese  HEENT: MMM. No oral lesions. Anicteric, noninjected sclera  CV: S1, S2. RRR. No m/r/g.  PULM: CTA bilaterally. No w/c.  ABD: +BS.  MSK: Synovial swelling of the bilateral second-fourth MCPs that were nontender to palpation.  PIPs, wrists, elbows, shoulders, knees, ankles, " and MTPs without swelling or tenderness to palpation.  Just posterior to the right lateral malleolus is where she says that she has some ankle pain but it is not hurting right now and there is no swelling or increased warmth.  Normal right ankle range of motion.     NEURO: UE and LE strengths 5/5 and equal bilaterally.   SKIN: No rash  EXT: No LE edema  PSYCH: Alert. Appropriate.    Labs / Imaging (select studies)     RF/CCP  Recent Labs   Lab Test  08/26/13   1611  07/12/13   1015  03/27/13   1351   CCPABY  >250 Strongly Positive*   --    --    RHF   --   32*  24*     HOMERO  Recent Labs   Lab Test  03/27/13   1351   TIFFANY  <1.0 Interpretation:  Negative     ANCA  Recent Labs   Lab Test  08/26/13   1611   ANCA  <1:20 Reference range: <1:20 (Note) The ANCA IFA is <1:20; therefore, no further testing will be performed. INTERPRETIVE INFORMATION: Anti-Neutrophil Cyto Ab, IgG  Neutrophil Cytoplasmic Antibodies (C-ANCA = granular cytoplasmic staining, P-ANCA = perinuclear staining) are found in the serum of over 90 percent of patients with certain necrotizing systemic vasculitides, and usually in less than 5 percent of patients with collagen vascular disease or arthritis. Performed by [facility], [address] [place] [id number] [phone] [email or web address], Kings Azevedo MD, Lab. Director     CBC  Recent Labs   Lab Test  10/31/17   1110  06/23/17   0817  04/21/17   0615   WBC  9.1  7.4  4.3   RBC  4.50  4.34  3.96   HGB  14.2  14.0  12.3   HCT  42.7  41.5  37.8   MCV  95  96  96   RDW  13.7  13.8  13.5   PLT  293  274  245   MCH  31.6  32.3  31.1   MCHC  33.3  33.7  32.5   NEUTROPHIL  69.9  73.7  47.4   LYMPH  19.7  17.4  35.5   MONOCYTE  8.3  7.0  12.2   EOSINOPHIL  1.7  1.6  4.5   BASOPHIL  0.4  0.3  0.2   ANEU  6.4  5.5  2.0   ALYM  1.8  1.3  1.5   PEEWEE  0.8  0.5  0.5   AEOS  0.2  0.1  0.2   ABAS  0.0  0.0  0.0     CMP  Recent Labs   Lab Test  10/31/17   1110  06/23/17   0817  04/21/17   0615  04/20/17   0435   03/20/17   1032  12/14/16   1144   NA  137  139  145*  142  141  139   POTASSIUM  4.5  4.4  4.0  4.6  4.4  4.1   CHLORIDE  108  106  111*  107  108  106   CO2  24  24  27  26  26  24   ANIONGAP  5  9  7  9  7  9   GLC  84  115*  109*  206*  85  86   BUN  22  14  18  27  16  17   CR  0.88  0.75  0.92  1.38*  0.85  0.77   GFRESTIMATED  65  78  62  39*  68  76   GFRESTBLACK  79  >90   GFR Calc    75  47*  82  >90   GFR Calc     DEAN  9.2  9.0  8.3*  9.8  9.2  8.9   BILITOTAL  0.4  0.4   --   0.4  0.5  0.5   ALBUMIN  3.7  3.8   --   4.0  3.9  3.7   PROTTOTAL  8.0  7.5   --   8.6  8.0  7.7   ALKPHOS  131  120   --   120  122  128   AST  17  22   --   13  13  17   ALT  24  26   --   24  21  23     HgA1c  Recent Labs   Lab Test  04/20/17   0435  01/04/12   0859   A1C  4.8  5.3     Calcium/VitaminD  Recent Labs   Lab Test  10/31/17   1110  06/23/17   0817  04/21/17   0615   DEAN  9.2  9.0  8.3*     ESR/CRP  Recent Labs   Lab Test  07/12/13   1015  03/27/13   1351   SED  19  13   CRP  13.8*  8.6*     TSH/T4  Recent Labs   Lab Test  04/28/17   0931  07/12/13   1015  01/04/12   0859   TSH  2.52  4.50  3.80     Hepatitis B  Recent Labs   Lab Test  09/11/13   1531   HBCAB  Negative   HEPBANG  Negative     Hepatitis C  Recent Labs   Lab Test  09/11/13   1531   HCVAB  Negative     Lyme confirmation testing by Western Blot  Recent Labs   Lab Test  07/12/13   1015   LYWG  Negative Reference range: Negative (Note) Band(s) present: NONE (Insufficient number of bands for positive result) INTERPRETIVE INFORMATION: Borrelia Burgdorferi Ab, IgG Western Blot  For this assay, a positive result is reported when any 5 or more of the following 10 bands are present: 18, 23, 28, 30, 39, 41, 45, 58, 66, or 93 kDa.  All other banding patterns are reported as negative.   LYWM  Negative Reference range: Negative (Note) Band(s) present: 41 kDa (Insufficient number of bands for positive result) INTERPRETIVE INFORMATION:  Borrelia Burgdorferi Antibody, IgM Western Blot  For this assay, a positive result is reported when any 2 or more of the following bands are present: 23, 39, or 41 kDa. All other banding patterns are reported as negative. Performed by [employer], [address] [place] [id number] [phone] [email or web address], Kings Azevedo MD, Lab. Director     Tuberculosis Screening  Recent Labs   Lab Test  09/11/13   1532   TBRSLT  Negative   TBAGN  0.00     Immunization History     Immunization History   Administered Date(s) Administered     Pneumococcal 23 valent 10/24/2013     TDAP Vaccine (Adacel) 06/16/2010          Chart documentation done in part with Dragon Voice recognition Software. Although reviewed after completion, some word and grammatical error may remain.    [place] Rooney MD

## 2018-03-27 NOTE — TELEPHONE ENCOUNTER
Called and verified with pharmacy that correct script has been received.     Arlyn Kulkarni, CMA

## 2018-06-11 ENCOUNTER — TELEPHONE (OUTPATIENT)
Dept: FAMILY MEDICINE | Facility: CLINIC | Age: 63
End: 2018-06-11

## 2018-06-11 NOTE — TELEPHONE ENCOUNTER
Panel Management Review        Composite cancer screening  Chart review shows that this patient is due/due soon for the following Pap Smear, Mammogram and Colonoscopy  Summary:    Patient is due/failing the following:   COLONOSCOPY, MAMMOGRAM and PAP    Action needed:   Patient needs office visit for pap smear. and Patient needs referral/order: mammogram, colonoscopy    Type of outreach:    Sent letter.    Questions for provider review:    None                                                                                                                                    Angela Avila CMA

## 2018-06-11 NOTE — LETTER
June 11, 2018      Kaylene Joe  [address] [place] [address]            Dr. Can's Care Team has been reviewing your chart and it appears that there are aspects of your care that could be improved. At this time you are due for a mammogram, pap smear and colonoscopy.  Your last pap smear was done on 3/21/14.  Last mammogram was done on 12/7/15 and last colonoscopy on 1/18/08.  If you could plan to do that in the near future it would be very helpful.  We are trying to help our patients achieve their health care goals.    If you have any questions, please feel free to call the clinic at [phone].              Sincerely,        Jamshid Can MD

## 2018-06-20 ENCOUNTER — OFFICE VISIT (OUTPATIENT)
Dept: DERMATOLOGY | Facility: CLINIC | Age: 63
End: 2018-06-20
Payer: COMMERCIAL

## 2018-06-20 VITALS — SYSTOLIC BLOOD PRESSURE: 165 MMHG | OXYGEN SATURATION: 99 % | DIASTOLIC BLOOD PRESSURE: 78 MMHG | HEART RATE: 64 BPM

## 2018-06-20 DIAGNOSIS — Z85.828 HISTORY OF SKIN CANCER: Primary | ICD-10-CM

## 2018-06-20 DIAGNOSIS — L81.4 LENTIGO: ICD-10-CM

## 2018-06-20 DIAGNOSIS — L82.1 SK (SEBORRHEIC KERATOSIS): ICD-10-CM

## 2018-06-20 DIAGNOSIS — D18.00 ANGIOMA: ICD-10-CM

## 2018-06-20 PROCEDURE — 99213 OFFICE O/P EST LOW 20 MIN: CPT | Performed by: DERMATOLOGY

## 2018-06-20 NOTE — MR AVS SNAPSHOT
After Visit Summary   6/20/2018    Kaylene Diaz    MRN: [id number]           Patient Information     Date Of Birth          1955        Visit Information        Provider Department      6/20/2018 9:00 AM Cipriano Springer MD [facility]        Today's Diagnoses     History of skin cancer    -  1    Lentigo        SK (seborrheic keratosis)        Angioma           Follow-ups after your visit        Your next 10 appointments already scheduled     Jun 29, 2018  9:00 AM CDT   New Visit with Luiz Powers MD   [facility]    [address] [address]   [phone]            Jul 18, 2018  9:00 AM CDT   LAB with [facility] ([facility])    [address] [address]   [phone]           Please do not eat 10-12 hours before your appointment if you are coming in fasting for labs on lipids, cholesterol, or glucose (sugar). This does not apply to pregnant women. Water, hot tea and black coffee (with nothing added) are okay. Do not drink other fluids, diet soda or chew gum.            Jul 18, 2018  9:30 AM CDT   MA SCREENING DIGITAL BILATERAL with [address] [facility] ([facility])    [address] [address]   [phone]           Do not use any powder, lotion or deodorant under your arms or on your breast. If you do, we will ask you to remove it before your exam.  Wear comfortable, two-piece clothing.  If you have any allergies, tell your care team.  Bring any previous mammograms from other facilities or have them mailed to the breast center. Three-dimensional (3D) mammograms are available at [place] locations in [facility], [facility], [facility], and [place]. -[facility] locations include [facility] in [place]. Benefits of 3D mammograms include:  "- Improved rate of cancer detection - Decreases your chance of having to go back for more tests, which means fewer: - \"False-positive\" results (This means that there is an abnormal area but it isn't cancer.) - Invasive testing procedures, such as a biopsy or surgery - Can provide clearer images of the breast if you have dense breast tissue. 3D mammography is an optional exam that anyone can have with a 2D mammogram. It doesn't replace or take the place of a 2D mammogram. 2D mammograms remain an effective screening test for all women.  Not all insurance companies cover the cost of a 3D mammogram. Check with your insurance.            Jul 23, 2018 10:00 AM CDT   Return Visit with Joseph Rooney MD   [facility] ([facility])    [address] [place] [address]   [phone]            Jun 18, 2019  9:45 AM CDT   Return Visit with Cipriano Springer MD   [facility] ([facility])    [address] [address] [phone]              Who to contact     If you have questions or need follow up information about today's clinic visit or your schedule please contact [facility] directly at [phone].  Normal or non-critical lab and imaging results will be communicated to you by MyChart, letter or phone within 4 business days after the clinic has received the results. If you do not hear from us within 7 days, please contact the clinic through MyChart or phone. If you have a critical or abnormal lab result, we will notify you by phone as soon as possible.  Submit refill requests through [employer] or call your pharmacy and they will forward the refill request to us. Please allow 3 business days for your refill to be completed.          Additional Information About Your Visit        [employer] Information     [employer] gives you secure access to your electronic health record. If you see a primary care provider, you can also " send messages to your care team and make appointments. If you have questions, please call your primary care clinic.  If you do not have a primary care provider, please call [phone] and they will assist you.        Care EveryWhere ID     This is your Care EveryWhere ID. This could be used by other organizations to access your [place] medical records  [id number]        Your Vitals Were     Pulse Pulse Oximetry                64 99%           Blood Pressure from Last 3 Encounters:   06/20/18 165/78   03/26/18 165/85   01/16/18 126/63    Weight from Last 3 Encounters:   03/26/18 147.9 kg (326 lb)   01/16/18 (!) 147.7 kg (325 lb 9.6 oz)   01/07/18 (!) 145.2 kg (320 lb)              Today, you had the following     No orders found for display       Primary Care Provider Office Phone # Fax #    Hitesh [place] [phone] [address] [address]        Equal Access to Services     THELMA REYES AH: Hadii aad ku hadasho Soomaali, waaxda luqadaha, qaybta kaalmada adeegyada, waxay idiin hayadamn jesus [place] . So [facility] [phone].    ATENCIÓN: Si habla español, tiene a zamora disposición servicios gratuitos de asistencia lingüística. [facility] [phone].    We comply with applicable federal civil rights laws and [place] laws. We do not discriminate on the basis of race, color, national origin, age, disability, sex, sexual orientation, or gender identity.            Thank you!     Thank you for choosing [facility]  for your care. Our goal is always to provide you with excellent care. Hearing back from our patients is one way we can continue to improve our services. Please take a few minutes to complete the written survey that you may receive in the mail after your visit with us. Thank you!             Your Updated Medication List - Protect others around you: Learn how to safely use, store and throw away your medicines at www.disposemymeds.org.          This list  is accurate as of 6/20/18  9:37 AM.  Always use your most recent med list.                   Brand Name Dispense Instructions for use Diagnosis    albuterol 108 (90 Base) MCG/ACT Inhaler    PROAIR HFA/PROVENTIL HFA/VENTOLIN HFA    1 Inhaler    Inhale 1-2 puffs into the lungs every 4 hours as needed for shortness of breath / dyspnea or wheezing        benzonatate 200 MG capsule    TESSALON    21 capsule    Take 1 capsule (200 mg) by mouth 3 times daily as needed for cough    Cough       CLARITIN PO           fluticasone 27.5 MCG/SPRAY spray    VERAMYST     Spray 1 spray into both nostrils 2 times daily        fluticasone 50 MCG/ACT spray    FLONASE    1 Bottle    Spray 1-2 sprays into both nostrils daily For 1 week then as needed        folic acid 1 MG tablet    FOLVITE    100 tablet    Take 1 tablet (1 mg) by mouth daily    Rheumatoid arthritis involving multiple sites with positive rheumatoid factor (H)       * ADULT GUMMY PO      Take 1 chew tab by mouth daily VitaCrave        * HAIR/SKIN/NAILS/BIOTIN PO     100 tablet    Take 1 capsule by mouth daily.    RA (rheumatoid arthritis) (H)       hydroxychloroquine 200 MG tablet    PLAQUENIL    180 tablet    Take 2 tablets (400 mg) by mouth daily    Rheumatoid arthritis of hand, unspecified laterality, unspecified rheumatoid factor presence (H)       methotrexate sodium 2.5 MG Tabs     140 tablet    Take 25 mg by mouth once a week . Take all 10 tablets on the same day of each week.    Rheumatoid arthritis involving multiple sites with positive rheumatoid factor (H)       predniSONE 5 MG tablet    DELTASONE    30 tablet    Take 1 tablet (5 mg) by mouth daily as needed for rheumatoid arthritis; use sparingly    Rheumatoid arthritis involving multiple sites with positive rheumatoid factor (H)       vitamin B complex with vitamin C Tabs tablet      Take 1 tablet by mouth daily    RA (rheumatoid arthritis) (H)       * Notice:  This list has 2 medication(s) that are the same  as other medications prescribed for you. Read the directions carefully, and ask your doctor or other care provider to review them with you.

## 2018-06-20 NOTE — LETTER
6/20/2018         RE: Kaylene Diaz  [address] [place] [address]        Dear Colleague,    Thank you for referring your patient, Kaylene Diaz, to the [facility]. Please see a copy of my visit note below.    Kaylene Diaz is a 62 year old year old female patient here today for f/u hx of non-melanoma skin cancer.  She dneies any new or changing skin lesions.  Patient reports the following modifying factors none.  Associated symptoms: none.  Patient has no other skin complaints today.  Remainder of the HPI, Meds, PMH, Allergies, FH, and SH was reviewed in chart.      Past Medical History:   Diagnosis Date     Basal cell carcinoma      RA (rheumatoid arthritis) (H)      Small bowel obstruction      Squamous cell carcinoma        Past Surgical History:   Procedure Laterality Date     ARTHROSCOPY KNEE  12/10/2010    ARTHROSCOPY KNEE performed by NAVID FIERRO at [facility]     SURGICAL HISTORY OF -       IUD removal     SURGICAL HISTORY OF -       Ovarian cyst     SURGICAL HISTORY OF -       thorn removal from foot     SURGICAL HISTORY OF -       knee surgery, scope, right knee        Family History   Problem Relation Age of Onset     Prostate Cancer Father      Eye Disorder Father      mac degen     HEART DISEASE Father      HEART DISEASE Mother      a-fib     Eye Disorder Mother      C.A.D. Mother      Hypertension Mother      Alcohol/Drug Brother      Alcohol/Drug Sister      Alcohol/Drug Brother      Alcohol/Drug Brother      Alcohol/Drug Brother      Alcohol/Drug Sister      Obesity Sister      Alcohol/Drug Sister      Prostate Cancer Sister      Cancer Other      Melanoma No family hx of        Social History     Social History     Marital status:      Spouse name: N/A     Number of children: N/A     Years of education: N/A     Occupational History      Unemployed     Social History Main Topics     Smoking status: Former Smoker     Years: 30.00     Quit date: 4/18/2000     Smokeless  tobacco: Never Used     Alcohol use Yes      Comment: VERY RARE     Drug use: No     Sexual activity: Yes     Partners: Male     Other Topics Concern     Parent/Sibling W/ Cabg, Mi Or Angioplasty Before 65f 55m? No     Social History Narrative       Outpatient Encounter Prescriptions as of 6/20/2018   Medication Sig Dispense Refill     albuterol (PROAIR HFA/PROVENTIL HFA/VENTOLIN HFA) 108 (90 BASE) MCG/ACT Inhaler Inhale 1-2 puffs into the lungs every 4 hours as needed for shortness of breath / dyspnea or wheezing 1 Inhaler 0     benzonatate (TESSALON) 200 MG capsule Take 1 capsule (200 mg) by mouth 3 times daily as needed for cough 21 capsule 0     fluticasone (FLONASE) 50 MCG/ACT spray Spray 1-2 sprays into both nostrils daily For 1 week then as needed 1 Bottle 11     fluticasone (VERAMYST) 27.5 MCG/SPRAY spray Spray 1 spray into both nostrils 2 times daily       folic acid (FOLVITE) 1 MG tablet Take 1 tablet (1 mg) by mouth daily 100 tablet 3     hydroxychloroquine (PLAQUENIL) 200 MG tablet Take 2 tablets (400 mg) by mouth daily 180 tablet 3     Loratadine (CLARITIN PO)        methotrexate sodium 2.5 MG TABS Take 25 mg by mouth once a week . Take all 10 tablets on the same day of each week. 140 tablet 2     Multiple Vitamins-Minerals (ADULT GUMMY PO) Take 1 chew tab by mouth daily VitaCrave       Multiple Vitamins-Minerals (HAIR/SKIN/NAILS/BIOTIN PO) Take 1 capsule by mouth daily.  100 tablet 3     predniSONE (DELTASONE) 5 MG tablet Take 1 tablet (5 mg) by mouth daily as needed for rheumatoid arthritis; use sparingly 30 tablet 0     vitamin  B complex with vitamin C (VITAMIN  B COMPLEX) TABS Take 1 tablet by mouth daily  0     No facility-administered encounter medications on file as of 6/20/2018.              Review Of Systems  Skin: As above  Eyes: negative  Ears/Nose/Throat: negative  Respiratory: No shortness of breath, dyspnea on exertion, cough, or hemoptysis  Cardiovascular: negative  Gastrointestinal:  negative  Genitourinary: negative  Musculoskeletal: negative  Neurologic: negative  Psychiatric: negative  Hematologic/Lymphatic/Immunologic: negative  Endocrine: negative      O:   NAD, WDWN, Alert & Oriented, Mood & Affect wnl, Vitals stable   Here today alone   /78 (BP Location: Left arm, Cuff Size: Adult Large)  Pulse 64  SpO2 99%   General appearance normal   Vitals stable   Alert, oriented and in no acute distress      Following lymph nodes palpated: Occipital, Cervical, Supraclavicular no lad   Stuck on papules and brown macules on trunk and ext    Red papules on trunk         The remainder of the full exam was unremarkable; the following areas were examined:  conjunctiva/lids, oral mucosa, neck, peripheral vascular system, abdomen, lymph nodes, digits/nails, eccrine and apocrine glands, scalp/hair, face, neck, chest, abdomen, buttocks, back, RUE, LUE, RLE, LLE       Eyes: Conjunctivae/lids:Normal     ENT: Lips, buccal mucosa, tongue: normal    MSK:Normal    Cardiovascular: peripheral edema none    Pulm: Breathing Normal    Lymph Nodes: No Head and Neck Lymphadenopathy     Neuro/Psych: Orientation:Normal; Mood/Affect:Normal      A/P:  1. Hx of non-melanoma skin cancer, seborrheic keratosis, eltnigo, angioma  BENIGN LESIONS DISCUSSED WITH PATIENT:  I discussed the specifics of tumor, prognosis, and genetics of benign lesions.  I explained that treatment of these lesions would be purely cosmetic and not medically neccessary.  I discussed with patient different removal options including excision, cautery and /or laser.      Nature and genetics of benign skin lesions dicussed with patient.  Signs and Symptoms of skin cancer discussed with patient.  ABCDEs of melanoma reviewed with patient.  Patient encouraged to perform monthly skin exams.  UV precautions reviewed with patient.  Patient to follow up with Primary Care provider regarding elevated blood pressure.  Skin care regimen reviewed with patient:  Eliminate harsh soaps, i.e. Dial, zest, irsih spring; Mild soaps such as Cetaphil or Dove sensitive skin, avoid hot or cold showers, aggressive use of emollients including vanicream, cetaphil or cerave discussed with patient.    Risks of non-melanoma skin cancer discussed with patient   Return to clinic 6 months      Again, thank you for allowing me to participate in the care of your patient.        Sincerely,        Cipriano Springer MD

## 2018-06-20 NOTE — NURSING NOTE
"Initial /78 (BP Location: Left arm, Cuff Size: Adult Large)  Pulse 64  SpO2 99% Estimated body mass index is 52.62 kg/(m^2) as calculated from the following:    Height as of 1/16/18: 1.676 m (5' 6\").    Weight as of 3/26/18: 147.9 kg (326 lb). .      "

## 2018-06-20 NOTE — PROGRESS NOTES
Kaylene Diaz is a 62 year old year old female patient here today for f/u hx of non-melanoma skin cancer.  She dneies any new or changing skin lesions.  Patient reports the following modifying factors none.  Associated symptoms: none.  Patient has no other skin complaints today.  Remainder of the HPI, Meds, PMH, Allergies, FH, and SH was reviewed in chart.      Past Medical History:   Diagnosis Date     Basal cell carcinoma      RA (rheumatoid arthritis) (H)      Small bowel obstruction      Squamous cell carcinoma        Past Surgical History:   Procedure Laterality Date     ARTHROSCOPY KNEE  12/10/2010    ARTHROSCOPY KNEE performed by NAVID FIERRO at [facility]     SURGICAL HISTORY OF -       IUD removal     SURGICAL HISTORY OF -       Ovarian cyst     SURGICAL HISTORY OF -       thorn removal from foot     SURGICAL HISTORY OF -       knee surgery, scope, right knee        Family History   Problem Relation Age of Onset     Prostate Cancer Father      Eye Disorder Father      mac degen     HEART DISEASE Father      HEART DISEASE Mother      a-fib     Eye Disorder Mother      C.A.D. Mother      Hypertension Mother      Alcohol/Drug Brother      Alcohol/Drug Sister      Alcohol/Drug Brother      Alcohol/Drug Brother      Alcohol/Drug Brother      Alcohol/Drug Sister      Obesity Sister      Alcohol/Drug Sister      Prostate Cancer Sister      Cancer Other      Melanoma No family hx of        Social History     Social History     Marital status:      Spouse name: N/A     Number of children: N/A     Years of education: N/A     Occupational History      Unemployed     Social History Main Topics     Smoking status: Former Smoker     Years: 30.00     Quit date: 4/18/2000     Smokeless tobacco: Never Used     Alcohol use Yes      Comment: VERY RARE     Drug use: No     Sexual activity: Yes     Partners: Male     Other Topics Concern     Parent/Sibling W/ Cabg, Mi Or Angioplasty Before 65f 55m? No     Social History  Narrative       Outpatient Encounter Prescriptions as of 6/20/2018   Medication Sig Dispense Refill     albuterol (PROAIR HFA/PROVENTIL HFA/VENTOLIN HFA) 108 (90 BASE) MCG/ACT Inhaler Inhale 1-2 puffs into the lungs every 4 hours as needed for shortness of breath / dyspnea or wheezing 1 Inhaler 0     benzonatate (TESSALON) 200 MG capsule Take 1 capsule (200 mg) by mouth 3 times daily as needed for cough 21 capsule 0     fluticasone (FLONASE) 50 MCG/ACT spray Spray 1-2 sprays into both nostrils daily For 1 week then as needed 1 Bottle 11     fluticasone (VERAMYST) 27.5 MCG/SPRAY spray Spray 1 spray into both nostrils 2 times daily       folic acid (FOLVITE) 1 MG tablet Take 1 tablet (1 mg) by mouth daily 100 tablet 3     hydroxychloroquine (PLAQUENIL) 200 MG tablet Take 2 tablets (400 mg) by mouth daily 180 tablet 3     Loratadine (CLARITIN PO)        methotrexate sodium 2.5 MG TABS Take 25 mg by mouth once a week . Take all 10 tablets on the same day of each week. 140 tablet 2     Multiple Vitamins-Minerals (ADULT GUMMY PO) Take 1 chew tab by mouth daily VitaCrave       Multiple Vitamins-Minerals (HAIR/SKIN/NAILS/BIOTIN PO) Take 1 capsule by mouth daily.  100 tablet 3     predniSONE (DELTASONE) 5 MG tablet Take 1 tablet (5 mg) by mouth daily as needed for rheumatoid arthritis; use sparingly 30 tablet 0     vitamin  B complex with vitamin C (VITAMIN  B COMPLEX) TABS Take 1 tablet by mouth daily  0     No facility-administered encounter medications on file as of 6/20/2018.              Review Of Systems  Skin: As above  Eyes: negative  Ears/Nose/Throat: negative  Respiratory: No shortness of breath, dyspnea on exertion, cough, or hemoptysis  Cardiovascular: negative  Gastrointestinal: negative  Genitourinary: negative  Musculoskeletal: negative  Neurologic: negative  Psychiatric: negative  Hematologic/Lymphatic/Immunologic: negative  Endocrine: negative      O:   NAD, WDWN, Alert & Oriented, Mood & Affect wnl, Vitals  stable   Here today alone   /78 (BP Location: Left arm, Cuff Size: Adult Large)  Pulse 64  SpO2 99%   General appearance normal   Vitals stable   Alert, oriented and in no acute distress      Following lymph nodes palpated: Occipital, Cervical, Supraclavicular no lad   Stuck on papules and brown macules on trunk and ext    Red papules on trunk         The remainder of the full exam was unremarkable; the following areas were examined:  conjunctiva/lids, oral mucosa, neck, peripheral vascular system, abdomen, lymph nodes, digits/nails, eccrine and apocrine glands, scalp/hair, face, neck, chest, abdomen, buttocks, back, RUE, LUE, RLE, LLE       Eyes: Conjunctivae/lids:Normal     ENT: Lips, buccal mucosa, tongue: normal    MSK:Normal    Cardiovascular: peripheral edema none    Pulm: Breathing Normal    Lymph Nodes: No Head and Neck Lymphadenopathy     Neuro/Psych: Orientation:Normal; Mood/Affect:Normal      A/P:  1. Hx of non-melanoma skin cancer, seborrheic keratosis, eltnigo, angioma  BENIGN LESIONS DISCUSSED WITH PATIENT:  I discussed the specifics of tumor, prognosis, and genetics of benign lesions.  I explained that treatment of these lesions would be purely cosmetic and not medically neccessary.  I discussed with patient different removal options including excision, cautery and /or laser.      Nature and genetics of benign skin lesions dicussed with patient.  Signs and Symptoms of skin cancer discussed with patient.  ABCDEs of melanoma reviewed with patient.  Patient encouraged to perform monthly skin exams.  UV precautions reviewed with patient.  Patient to follow up with Primary Care provider regarding elevated blood pressure.  Skin care regimen reviewed with patient: Eliminate harsh soaps, i.e. Dial, zest, irsih spring; Mild soaps such as Cetaphil or Dove sensitive skin, avoid hot or cold showers, aggressive use of emollients including vanicream, cetaphil or cerave discussed with patient.    Risks of  non-melanoma skin cancer discussed with patient   Return to clinic 6 months

## 2018-06-29 ENCOUNTER — OFFICE VISIT (OUTPATIENT)
Dept: OPHTHALMOLOGY | Facility: CLINIC | Age: 63
End: 2018-06-29
Payer: COMMERCIAL

## 2018-06-29 DIAGNOSIS — H43.813 POSTERIOR VITREOUS DETACHMENT OF BOTH EYES: ICD-10-CM

## 2018-06-29 DIAGNOSIS — M05.79 RHEUMATOID ARTHRITIS INVOLVING MULTIPLE SITES WITH POSITIVE RHEUMATOID FACTOR (H): ICD-10-CM

## 2018-06-29 DIAGNOSIS — Z79.899 HIGH RISK MEDICATIONS (NOT ANTICOAGULANTS) LONG-TERM USE: Primary | ICD-10-CM

## 2018-06-29 DIAGNOSIS — H52.4 PRESBYOPIA OF BOTH EYES: ICD-10-CM

## 2018-06-29 PROCEDURE — 92134 CPTRZ OPH DX IMG PST SGM RTA: CPT | Performed by: STUDENT IN AN ORGANIZED HEALTH CARE EDUCATION/TRAINING PROGRAM

## 2018-06-29 PROCEDURE — 92015 DETERMINE REFRACTIVE STATE: CPT | Performed by: STUDENT IN AN ORGANIZED HEALTH CARE EDUCATION/TRAINING PROGRAM

## 2018-06-29 PROCEDURE — 92083 EXTENDED VISUAL FIELD XM: CPT | Performed by: STUDENT IN AN ORGANIZED HEALTH CARE EDUCATION/TRAINING PROGRAM

## 2018-06-29 PROCEDURE — 92004 COMPRE OPH EXAM NEW PT 1/>: CPT | Performed by: STUDENT IN AN ORGANIZED HEALTH CARE EDUCATION/TRAINING PROGRAM

## 2018-06-29 ASSESSMENT — REFRACTION_MANIFEST
OS_ADD: +2.50
OS_CYLINDER: +0.50
OD_SPHERE: -0.75
OS_SPHERE: PLANO
OD_ADD: +2.50
OD_AXIS: 093
OD_CYLINDER: +1.25
OS_AXIS: 148

## 2018-06-29 ASSESSMENT — VISUAL ACUITY
OD_SC: 20/30
OS_SC+: -2
OD_SC+: +1
OS_SC: 20/20
METHOD: SNELLEN - LINEAR

## 2018-06-29 ASSESSMENT — TONOMETRY
OD_IOP_MMHG: 17
IOP_METHOD: APPLANATION
OS_IOP_MMHG: 17

## 2018-06-29 ASSESSMENT — CONF VISUAL FIELD
OS_NORMAL: 1
OD_NORMAL: 1

## 2018-06-29 ASSESSMENT — SLIT LAMP EXAM - LIDS
COMMENTS: 1+ DCH
COMMENTS: 1+ DCH

## 2018-06-29 ASSESSMENT — EXTERNAL EXAM - RIGHT EYE: OD_EXAM: NORMAL

## 2018-06-29 ASSESSMENT — CUP TO DISC RATIO
OD_RATIO: 0.3
OS_RATIO: 0.3

## 2018-06-29 NOTE — PROGRESS NOTES
Patient is on Plaquenil 400 mg once a day(2 200 mg tablets daily).  Has been taking this for about four years now for RA.  She just started seeing Dr. Rooney.  Previously had a VF in [place] with Dr. Ruiz about 2-3 years ago.  Lots of tiny eye movements with the left eye today while doing the VF.
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
## 2018-06-29 NOTE — MR AVS SNAPSHOT
After Visit Summary   6/29/2018    Kaylene Diaz    MRN: [id number]           Patient Information     Date Of Birth          1955        Visit Information        Provider Department      6/29/2018 9:00 AM Luiz Powers MD [facility]        Today's Diagnoses     High risk medications (not anticoagulants) long-term use    -  1    RA (rheumatoid arthritis) (H)        Presbyopia of both eyes          Care Instructions    Plaquenil eye tests are normal today  Continue over the counter readers    Luiz Powers MD  [phone]            Follow-ups after your visit        Follow-up notes from your care team     Return in about 1 year (around 6/29/2019) for Plaquenil 10-2 HVF and OCT.      Your next 10 appointments already scheduled     Jul 18, 2018  9:00 AM CDT   LAB with [facility] ([facility])    [address] [address]   [phone]           Please do not eat 10-12 hours before your appointment if you are coming in fasting for labs on lipids, cholesterol, or glucose (sugar). This does not apply to pregnant women. Water, hot tea and black coffee (with nothing added) are okay. Do not drink other fluids, diet soda or chew gum.            Jul 18, 2018  9:30 AM CDT   MA SCREENING DIGITAL BILATERAL with [address] [facility] ([facility])    [address] [address]   [phone]           Do not use any powder, lotion or deodorant under your arms or on your breast. If you do, we will ask you to remove it before your exam.  Wear comfortable, two-piece clothing.  If you have any allergies, tell your care team.  Bring any previous mammograms from other facilities or have them mailed to the breast center. Three-dimensional (3D) mammograms are available at [place] locations in [facility], [facility], [facility], [facility], and [place]. [employer]  "locations include [facility] in [place]. Benefits of 3D mammograms include: - Improved rate of cancer detection - Decreases your chance of having to go back for more tests, which means fewer: - \"False-positive\" results (This means that there is an abnormal area but it isn't cancer.) - Invasive testing procedures, such as a biopsy or surgery - Can provide clearer images of the breast if you have dense breast tissue. 3D mammography is an optional exam that anyone can have with a 2D mammogram. It doesn't replace or take the place of a 2D mammogram. 2D mammograms remain an effective screening test for all women.  Not all insurance companies cover the cost of a 3D mammogram. Check with your insurance.            Jul 23, 2018 10:00 AM CDT   [facility] Joseph [facility] MD   [facility]    [address] [address]   [phone]            Jun 18, 2019  9:45 AM CDT   Return Visit with Cipriano Springer MD   [facility] ([facility]    [address] [address] [phone]              Who to contact     If you have questions or need follow up information about today's clinic visit or your schedule please contact [facility] directly at [phone].  Normal or non-critical lab and imaging results will be communicated to you by MyChart, letter or phone within 4 business days after the clinic has received the results. If you do not hear from us within 7 days, please contact the clinic through [employer] or phone. If you have a critical or abnormal lab result, we will notify you by phone as soon as possible.  Submit refill requests through [employer] or call your pharmacy and they will forward the refill request to us. Please allow 3 business days for your refill to be completed.          Additional Information About Your Visit        MyChart Information     MyChart " gives you secure access to your electronic health record. If you see a primary care provider, you can also send messages to your care team and make appointments. If you have questions, please call your primary care clinic.  If you do not have a primary care provider, please call [phone] and they will assist you.        Care EveryWhere ID     This is your Care EveryWhere ID. This could be used by other organizations to access your [facility] medical records  [id number]         Blood Pressure from Last 3 Encounters:   06/20/18 165/78   03/26/18 165/85   01/16/18 126/63    Weight from Last 3 Encounters:   03/26/18 147.9 kg (326 lb)   01/16/18 (!) 147.7 kg (325 lb 9.6 oz)   01/07/18 (!) 145.2 kg (320 lb)              Today, you had the following     No orders found for display       Primary Care Provider Office Phone # Fax #    Hitesh Washington [phone] [address] [facility] [address]        Equal Access to Services     [facility]: Hadii aad ku hadasho Soomaali, waaxda luqadaha, qaybta kaalmada adeegyada, waxay pamella hayalexia [place] . So [facility] [phone].    ATENCIÓN: Si habla español, tiene a zamora disposición servicios gratuitos de asistencia lingüística. Llame al [phone].    We comply with applicable federal civil rights laws and [place] laws. We do not discriminate on the basis of race, color, national origin, age, disability, sex, sexual orientation, or gender identity.            Thank you!     Thank you for choosing [facility]  for your care. Our goal is always to provide you with excellent care. Hearing back from our patients is one way we can continue to improve our services. Please take a few minutes to complete the written survey that you may receive in the mail after your visit with us. Thank you!             Your Updated Medication List - Protect others around you: Learn how to safely use, store and throw away your medicines at  www.disposemymeds.org.          This list is accurate as of 6/29/18 10:40 AM.  Always use your most recent med list.                   Brand Name Dispense Instructions for use Diagnosis    albuterol 108 (90 Base) MCG/ACT Inhaler    PROAIR HFA/PROVENTIL HFA/VENTOLIN HFA    1 Inhaler    Inhale 1-2 puffs into the lungs every 4 hours as needed for shortness of breath / dyspnea or wheezing        benzonatate 200 MG capsule    TESSALON    21 capsule    Take 1 capsule (200 mg) by mouth 3 times daily as needed for cough    Cough       CLARITIN PO           fluticasone 27.5 MCG/SPRAY spray    VERAMYST     Spray 1 spray into both nostrils 2 times daily        fluticasone 50 MCG/ACT spray    FLONASE    1 Bottle    Spray 1-2 sprays into both nostrils daily For 1 week then as needed        folic acid 1 MG tablet    FOLVITE    100 tablet    Take 1 tablet (1 mg) by mouth daily    Rheumatoid arthritis involving multiple sites with positive rheumatoid factor (H)       * ADULT GUMMY PO      Take 1 chew tab by mouth daily VitaCrave        * HAIR/SKIN/NAILS/BIOTIN PO     100 tablet    Take 1 capsule by mouth daily.    RA (rheumatoid arthritis) (H)       hydroxychloroquine 200 MG tablet    PLAQUENIL    180 tablet    Take 2 tablets (400 mg) by mouth daily    Rheumatoid arthritis of hand, unspecified laterality, unspecified rheumatoid factor presence (H)       methotrexate sodium 2.5 MG Tabs     140 tablet    Take 25 mg by mouth once a week . Take all 10 tablets on the same day of each week.    Rheumatoid arthritis involving multiple sites with positive rheumatoid factor (H)       predniSONE 5 MG tablet    DELTASONE    30 tablet    Take 1 tablet (5 mg) by mouth daily as needed for rheumatoid arthritis; use sparingly    Rheumatoid arthritis involving multiple sites with positive rheumatoid factor (H)       vitamin B complex with vitamin C Tabs tablet      Take 1 tablet by mouth daily    RA (rheumatoid arthritis) (H)       * Notice:  This  list has 2 medication(s) that are the same as other medications prescribed for you. Read the directions carefully, and ask your doctor or other care provider to review them with you.

## 2018-06-29 NOTE — PATIENT INSTRUCTIONS
Plaquenil eye tests are normal today  Continue over the counter readers    Luiz Powers MD  [phone]
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
## 2018-07-05 ENCOUNTER — MYC MEDICAL ADVICE (OUTPATIENT)
Dept: FAMILY MEDICINE | Facility: CLINIC | Age: 63
End: 2018-07-05

## 2018-07-05 DIAGNOSIS — Z12.11 SPECIAL SCREENING FOR MALIGNANT NEOPLASMS, COLON: Primary | ICD-10-CM

## 2018-07-05 NOTE — TELEPHONE ENCOUNTER
Patient is requesting colonoscopy  Last done 2008, on Health maintenance list  Referral cued for review and completion if appropriate    Routing to provider.    Oksana LEAHY Rn

## 2018-07-05 NOTE — TELEPHONE ENCOUNTER
Patient notified of colonoscopy ordered  Patient requested prep instructions to be sent to her in the mail  Miralax prep instructions sent in the mail today to address in demographics, which was verified with patient  Transferred patient to appt line to schedule procedure    Oksana LEAHY Rn

## 2018-07-18 ENCOUNTER — HOSPITAL ENCOUNTER (OUTPATIENT)
Dept: MAMMOGRAPHY | Facility: CLINIC | Age: 63
Discharge: HOME | End: 2018-07-18
Attending: FAMILY MEDICINE | Admitting: FAMILY MEDICINE
Payer: COMMERCIAL

## 2018-07-18 DIAGNOSIS — M05.79 RHEUMATOID ARTHRITIS INVOLVING MULTIPLE SITES WITH POSITIVE RHEUMATOID FACTOR (H): Chronic | ICD-10-CM

## 2018-07-18 DIAGNOSIS — Z79.899 HIGH RISK MEDICATION USE: ICD-10-CM

## 2018-07-18 DIAGNOSIS — Z12.31 VISIT FOR SCREENING MAMMOGRAM: ICD-10-CM

## 2018-07-18 LAB
ALBUMIN SERPL-MCNC: 3.8 G/DL (ref 3.4–5)
ALP SERPL-CCNC: 121 U/L (ref 40–150)
ALT SERPL W P-5'-P-CCNC: 23 U/L (ref 0–50)
AST SERPL W P-5'-P-CCNC: 17 U/L (ref 0–45)
BASOPHILS # BLD AUTO: 0 10E9/L (ref 0–0.2)
BASOPHILS NFR BLD AUTO: 0.4 %
BILIRUB DIRECT SERPL-MCNC: <0.1 MG/DL (ref 0–0.2)
BILIRUB SERPL-MCNC: 0.5 MG/DL (ref 0.2–1.3)
CREAT SERPL-MCNC: 0.87 MG/DL (ref 0.52–1.04)
CRP SERPL-MCNC: 7.3 MG/L (ref 0–8)
DIFFERENTIAL METHOD BLD: NORMAL
EOSINOPHIL # BLD AUTO: 0.2 10E9/L (ref 0–0.7)
EOSINOPHIL NFR BLD AUTO: 2.9 %
ERYTHROCYTE [DISTWIDTH] IN BLOOD BY AUTOMATED COUNT: 13.4 % (ref 10–15)
ERYTHROCYTE [SEDIMENTATION RATE] IN BLOOD BY WESTERGREN METHOD: 12 MM/H (ref 0–30)
GFR SERPL CREATININE-BSD FRML MDRD: 66 ML/MIN/1.7M2
HCT VFR BLD AUTO: 40.2 % (ref 35–47)
HGB BLD-MCNC: 13.5 G/DL (ref 11.7–15.7)
LYMPHOCYTES # BLD AUTO: 1.3 10E9/L (ref 0.8–5.3)
LYMPHOCYTES NFR BLD AUTO: 18.5 %
MCH RBC QN AUTO: 31.8 PG (ref 26.5–33)
MCHC RBC AUTO-ENTMCNC: 33.6 G/DL (ref 31.5–36.5)
MCV RBC AUTO: 95 FL (ref 78–100)
MONOCYTES # BLD AUTO: 0.6 10E9/L (ref 0–1.3)
MONOCYTES NFR BLD AUTO: 8.2 %
NEUTROPHILS # BLD AUTO: 4.9 10E9/L (ref 1.6–8.3)
NEUTROPHILS NFR BLD AUTO: 70 %
PLATELET # BLD AUTO: 255 10E9/L (ref 150–450)
PROT SERPL-MCNC: 7.7 G/DL (ref 6.8–8.8)
RBC # BLD AUTO: 4.24 10E12/L (ref 3.8–5.2)
WBC # BLD AUTO: 6.9 10E9/L (ref 4–11)

## 2018-07-18 PROCEDURE — 86140 C-REACTIVE PROTEIN: CPT | Performed by: INTERNAL MEDICINE

## 2018-07-18 PROCEDURE — 85652 RBC SED RATE AUTOMATED: CPT | Performed by: INTERNAL MEDICINE

## 2018-07-18 PROCEDURE — 36415 COLL VENOUS BLD VENIPUNCTURE: CPT | Performed by: INTERNAL MEDICINE

## 2018-07-18 PROCEDURE — 82565 ASSAY OF CREATININE: CPT | Performed by: INTERNAL MEDICINE

## 2018-07-18 PROCEDURE — 80076 HEPATIC FUNCTION PANEL: CPT | Performed by: INTERNAL MEDICINE

## 2018-07-18 PROCEDURE — 77067 SCR MAMMO BI INCL CAD: CPT

## 2018-07-18 PROCEDURE — 85025 COMPLETE CBC W/AUTO DIFF WBC: CPT | Performed by: INTERNAL MEDICINE

## 2018-07-23 ENCOUNTER — OFFICE VISIT (OUTPATIENT)
Dept: RHEUMATOLOGY | Facility: CLINIC | Age: 63
End: 2018-07-23
Payer: COMMERCIAL

## 2018-07-23 VITALS
HEIGHT: 66 IN | HEART RATE: 70 BPM | DIASTOLIC BLOOD PRESSURE: 78 MMHG | RESPIRATION RATE: 16 BRPM | BODY MASS INDEX: 47.09 KG/M2 | SYSTOLIC BLOOD PRESSURE: 124 MMHG | WEIGHT: 293 LBS | OXYGEN SATURATION: 99 %

## 2018-07-23 DIAGNOSIS — G89.29 CHRONIC MIDLINE LOW BACK PAIN, WITH SCIATICA PRESENCE UNSPECIFIED: ICD-10-CM

## 2018-07-23 DIAGNOSIS — M54.5 CHRONIC MIDLINE LOW BACK PAIN, WITH SCIATICA PRESENCE UNSPECIFIED: ICD-10-CM

## 2018-07-23 DIAGNOSIS — M06.9 RHEUMATOID ARTHRITIS OF HAND, UNSPECIFIED LATERALITY, UNSPECIFIED RHEUMATOID FACTOR PRESENCE: Primary | ICD-10-CM

## 2018-07-23 PROCEDURE — 99214 OFFICE O/P EST MOD 30 MIN: CPT | Performed by: INTERNAL MEDICINE

## 2018-07-23 RX ORDER — SULFASALAZINE 500 MG/1
TABLET, DELAYED RELEASE ORAL
Qty: 120 TABLET | Refills: 3 | Status: SHIPPED | OUTPATIENT
Start: 2018-07-23 | End: 2018-11-26

## 2018-07-23 RX ORDER — LIDOCAINE 50 MG/G
PATCH TOPICAL
Qty: 30 PATCH | Refills: 0 | Status: SHIPPED | OUTPATIENT
Start: 2018-07-23 | End: 2019-07-15

## 2018-07-23 RX ORDER — HYDROXYCHLOROQUINE SULFATE 200 MG/1
400 TABLET, FILM COATED ORAL DAILY
Qty: 180 TABLET | Refills: 3 | Status: SHIPPED | OUTPATIENT
Start: 2018-07-23 | End: 2018-12-03

## 2018-07-23 NOTE — PROGRESS NOTES
Rheumatology Clinic Visit      Kaylene Diaz MRN# [id number]   YOB: 1955 Age: 62 year old      Date of visit: 7/23/18   PCP: Dr. Hitesh Washington    Chief Complaint   Patient presents with:  Arthritis: RA, patient states she is doing good.    Assessment and Plan     1.  Seropositive rheumatoid arthritis (RF 32, CCP >250): Currently on methotrexate 25 mg once weekly and hydroxychloroquine 400 mg daily.  Did not tolerate folic acid because of associated increased sunburns.  She has prednisone on hand if needed for a flare but has not required prednisone for rheumatoid arthritis in the last year.  Arthritis is doing well except for the bilateral Achilles tendon insertion sites that could be RA related and will therefore add sulfasalazine.  - Continue methotrexate 25 mg once weekly  - Continue hydroxychloroquine 400 mg daily (last eye exam on 6/29/2018)  - Continue prednisone 5 mg daily as needed for rheumatoid arthritis symptoms; use sparingly.  - Start sulfasalazine 500mg BID x7days, then 1000mg BID thereafter  - labs monthly x2mo: CBC, Cr, hepatic panel  - Labs in 3 months: CBC, Creatinine, Hepatic Panel, ESR, CRP    # Sulfasalazine Risks and Benefits: The risks and benefits of sulfasalazine were discussed in detail and the patient verbalized understanding.  The risks discussed include, but are not limited to, the risk for hypersensitivity, anaphylaxis, anaphylactoid reactions, infections, bone marrow suppression,  hepatotoxicity, nausea, vomiting, and GI upset.  Oligospermia may occur in males.  I encouraged reviewing the package insert and asking any questions about the medication.      2.  Basal cell carcinoma and squamous cell carcinoma: Several lesions removed by dermatology.  She follows with dermatology every 6 months    3.  Chronic lower back pain: Has significantly improved with lidocaine patches that have been used no more than 10 times in the past 1 year.  She has been using her mother's  lidocaine patches.  Okay to continue lidocaine patches as needed, using infrequently.  Defer further management to her PCP.    Ms. Diaz verbalized agreement with and understanding of the rational for the diagnosis and treatment plan.  All questions were answered to best of my ability and the patient's satisfaction. Ms. Diaz was advised to contact the clinic with any questions that may arise after the clinic visit.      Thank you for involving me in the care of the patient    Return to clinic: 3 months      HPI   Kaylene Diaz is a 62 year old female with a past medical history significant for hyperlipidemia, osteoarthritis, osteopenia, meniscal tear, squamous cell carcinoma, basal cell carcinoma, and rheumatoid arthritis who presents for follow-up of rheumatoid arthritis.    Today, Ms. Diaz reports that she is doing well except for pain at the bilateral Achilles entheses.  No other joint pain or morning stiffness.  Bilateral Achilles theses pain is present in the morning, worse with activity, but also better with activity.  Did not tolerate folic acid because of associated increased sunburns.  She would like Lidocaine patches for her back because she has chronic back pain that resolves with lidocaine patches that she gets from her mother; has used no more than 10 in the last 1 year.    Denies fevers, chills, nausea, vomiting, constipation, diarrhea. No abdominal pain. No chest pain/pressure, palpitations, or shortness of breath. No LE swelling. No neck pain. No oral or nasal sores.  No rash. No sicca symptoms.       Tobacco: Quit in 2000  EtOH: Rare  Drugs: None    ROS   GEN: No fevers, chills, night sweats; trying to lose weight  SKIN: No itching, rashes, sores; recurrent skin cancer that is followed by dermatology  HEENT: No oral or nasal ulcers.  CV: No chest pain, pressure, palpitations, or dyspnea on exertion.  PULM: No SOB, wheeze, cough.  GI: No nausea, vomiting, constipation, diarrhea. No blood in stool.  No abdominal pain.  : No blood in urine.  MSK: See HPI.  NEURO: No numbness, tingling, or weakness.  EXT: No LE swelling  PSYCH: Negative    Active Problem List     Patient Active Problem List   Diagnosis     Carpal tunnel syndrome     Tear meniscus knee     Osteopenia     Advanced directives, counseling/discussion     Hyperlipidemia LDL goal <160     Osteoarthritis     RA (rheumatoid arthritis) (H)     High risk medications (not anticoagulants) long-term use     Kidney stone     ASCUS on Pap smear     Morbid obesity due to excess calories (H)     Small bowel obstruction     Elevated blood pressure reading without diagnosis of hypertension     BLANCA (acute kidney injury) (H)     Elevated glucose     SBO (small bowel obstruction)     Rheumatoid arthritis involving multiple sites with positive rheumatoid factor (H)     Past Medical History     Past Medical History:   Diagnosis Date     Basal cell carcinoma      RA (rheumatoid arthritis) (H)      Small bowel obstruction      Squamous cell carcinoma      Past Surgical History     Past Surgical History:   Procedure Laterality Date     ARTHROSCOPY KNEE  12/10/2010    ARTHROSCOPY KNEE performed by NAVID FIERRO at [facility]     SURGICAL HISTORY OF -       IUD removal     SURGICAL HISTORY OF -       Ovarian cyst     SURGICAL HISTORY OF -       thorn removal from foot     SURGICAL HISTORY OF -       knee surgery, scope, right knee     Allergy     Allergies   Allergen Reactions     Gold Rash     Latex Rash     Not all latex products     Current Medication List     Current Outpatient Prescriptions   Medication Sig     albuterol (PROAIR HFA/PROVENTIL HFA/VENTOLIN HFA) 108 (90 BASE) MCG/ACT Inhaler Inhale 1-2 puffs into the lungs every 4 hours as needed for shortness of breath / dyspnea or wheezing     fluticasone (FLONASE) 50 MCG/ACT spray Spray 1-2 sprays into both nostrils daily For 1 week then as needed     hydroxychloroquine (PLAQUENIL) 200 MG tablet Take 2 tablets (400 mg) by  mouth daily     Loratadine (CLARITIN PO)      methotrexate sodium 2.5 MG TABS Take 25 mg by mouth once a week . Take all 10 tablets on the same day of each week.     Multiple Vitamins-Minerals (ADULT GUMMY PO) Take 1 chew tab by mouth daily VitaCrave     Multiple Vitamins-Minerals (HAIR/SKIN/NAILS/BIOTIN PO) Take 1 capsule by mouth daily.      predniSONE (DELTASONE) 5 MG tablet Take 1 tablet (5 mg) by mouth daily as needed for rheumatoid arthritis; use sparingly     vitamin  B complex with vitamin C (VITAMIN  B COMPLEX) TABS Take 1 tablet by mouth daily     benzonatate (TESSALON) 200 MG capsule Take 1 capsule (200 mg) by mouth 3 times daily as needed for cough (Patient not taking: Reported on 7/23/2018)     fluticasone (VERAMYST) 27.5 MCG/SPRAY spray Spray 1 spray into both nostrils 2 times daily     folic acid (FOLVITE) 1 MG tablet Take 1 tablet (1 mg) by mouth daily (Patient not taking: Reported on 7/23/2018)     No current facility-administered medications for this visit.          Social History   See HPI    Family History     Family History   Problem Relation Age of Onset     Prostate Cancer Father      Eye Disorder Father      mac degen     HEART DISEASE Father      Macular Degeneration Father      HEART DISEASE Mother      a-fib     Eye Disorder Mother      C.A.D. Mother      Hypertension Mother      Alcohol/Drug Brother      Alcohol/Drug Sister      Alcohol/Drug Brother      Alcohol/Drug Brother      Alcohol/Drug Brother      Alcohol/Drug Sister      Obesity Sister      Alcohol/Drug Sister      Prostate Cancer Sister      Cancer Other      Melanoma No family hx of        Physical Exam     Temp Readings from Last 3 Encounters:   03/26/18 97.8  F (36.6  C) (Oral)   01/16/18 98.2  F (36.8  C) (Tympanic)   01/07/18 97.8  F (36.6  C) (Oral)     BP Readings from Last 5 Encounters:   07/23/18 124/78   06/20/18 165/78   03/26/18 165/85   01/16/18 126/63   01/07/18 189/90     Pulse Readings from Last 1 Encounters:  "  07/23/18 70     Resp Readings from Last 1 Encounters:   07/23/18 16     Estimated body mass index is 49.58 kg/(m^2) as calculated from the following:    Height as of this encounter: 1.676 m (5' 6\").    Weight as of this encounter: 139.3 kg (307 lb 3.2 oz).    GEN: NAD; obese  HEENT: MMM. No oral lesions. Anicteric, noninjected sclera  CV: S1, S2. RRR. No m/r/g.  PULM: CTA bilaterally. No w/c.  ABD: +BS.  MSK: MCPs, PIPs, wrists, elbows, shoulders, knees, ankles, and MTPs without swelling or tenderness to palpation.  Bony hypertrophy and tenderness to palpation but without increased warmth, overlying erythema, or soft tissue swelling at the bilateral Achilles entheses.      NEURO: UE and LE strengths 5/5 and equal bilaterally.   SKIN: No rash  EXT: No LE edema  PSYCH: Alert. Appropriate.    Labs / Imaging (select studies)   RF/CCP  Recent Labs   Lab Test  08/26/13   1611  07/12/13   1015  03/27/13   1351   CCPABY  >250 Strongly Positive*   --    --    RHF   --   32*  24*     CBC  Recent Labs   Lab Test  07/18/18   0849  03/26/18   1034  10/31/17   1110   WBC  6.9  5.8  9.1   RBC  4.24  4.21  4.50   HGB  13.5  13.5  14.2   HCT  40.2  40.1  42.7   MCV  95  95  95   RDW  13.4  13.6  13.7   PLT  255  244  293   MCH  31.8  32.1  31.6   MCHC  33.6  33.7  33.3   NEUTROPHIL  70.0  69.3  69.9   LYMPH  18.5  21.6  19.7   MONOCYTE  8.2  6.6  8.3   EOSINOPHIL  2.9  2.2  1.7   BASOPHIL  0.4  0.3  0.4   ANEU  4.9  4.0  6.4   ALYM  1.3  1.3  1.8   PEEWEE  0.6  0.4  0.8   AEOS  0.2  0.1  0.2   ABAS  0.0  0.0  0.0     CMP  Recent Labs   Lab Test  07/18/18   0849  03/26/18   1034  10/31/17   1110  06/23/17   0817  04/21/17   0615   NA   --    --   137  139  145*   POTASSIUM   --    --   4.5  4.4  4.0   CHLORIDE   --    --   108  106  111*   CO2   --    --   24  24  27   ANIONGAP   --    --   5  9  7   GLC   --    --   84  115*  109*   BUN   --    --   22  14  18   CR  0.87  0.99  0.88  0.75  0.92   GFRESTIMATED  66  57*  65  78  62 "   GFRESTBLACK  79  68  79  >90   GFR Calc    75   DEAN   --    --   9.2  9.0  8.3*   BILITOTAL  0.5  0.4  0.4  0.4   --    ALBUMIN  3.8  3.9  3.7  3.8   --    PROTTOTAL  7.7  7.6  8.0  7.5   --    ALKPHOS  121  122  131  120   --    AST  17  18  17  22   --    ALT  23  22  24  26   --      Calcium/VitaminD  Recent Labs   Lab Test  10/31/17   1110  06/23/17   0817  04/21/17   0615   DEAN  9.2  9.0  8.3*     ESR/CRP  Recent Labs   Lab Test  07/18/18   0849  03/26/18   1034  07/12/13   1015   SED  12  14  19   CRP  7.3  6.8  13.8*     Hepatitis B  Recent Labs   Lab Test  09/11/13   1531   HBCAB  Negative   HEPBANG  Negative     Hepatitis C  Recent Labs   Lab Test  09/11/13   1531   HCVAB  Negative     Lyme confirmation testing by Western Blot  Recent Labs   Lab Test  07/12/13   1015   LYWG  Negative Reference range: Negative (Note) Band(s) present: NONE (Insufficient number of bands for positive result) INTERPRETIVE INFORMATION: Borrelia Burgdorferi Ab, IgG Western Blot  For this assay, a positive result is reported when any 5 or more of the following 10 bands are present: 18, 23, 28, 30, 39, 41, 45, 58, 66, or 93 kDa.  All other banding patterns are reported as negative.   LYWM  Negative Reference range: Negative (Note) Band(s) present: 41 kDa (Insufficient number of bands for positive result) INTERPRETIVE INFORMATION: Borrelia Burgdorferi Antibody, IgM Western Blot  For this assay, a positive result is reported when any 2 or more of the following bands are present: 23, 39, or 41 kDa. All other banding patterns are reported as negative. Performed by [employer] [facility] [address] [place] [id number] [phone] [email or web address], Kings Azevedo MD, Lab. Director     Tuberculosis Screening  Recent Labs   Lab Test  09/11/13   1532   TBRSLT  Negative   TBAGN  0.00     Immunization History     Immunization History   Administered Date(s) Administered     Pneumococcal 23 valent 10/24/2013     TDAP Vaccine  (Adacel) 06/16/2010          Chart documentation done in part with Dragon Voice recognition Software. Although reviewed after completion, some word and grammatical error may remain.    Joseph Rooney MD

## 2018-07-23 NOTE — MR AVS SNAPSHOT
After Visit Summary   7/23/2018    Kaylene Diaz    MRN: [id number]           Patient Information     Date Of Birth          1955        Visit Information        Provider Department      7/23/2018 10:00 AM Joseph Rooney MD [facility]        Today's Diagnoses     Rheumatoid arthritis of hand, unspecified laterality, unspecified rheumatoid factor presence (H)    -  1    Chronic midline low back pain, with sciatica presence unspecified          Care Instructions    Rheumatology    [facility]   (Monday)  [address]  [place] [address]       [facility]   (Tuesday)  [address] [place] [address]    [facility]   (Wed., Thurs., and Friday)  [address]  [place] [address]    Phone number: [phone]  Thank you for choosing [facility].  Keke Finnegan CMA            Follow-ups after your visit        Your next 10 appointments already scheduled     Aug 09, 2018   Procedure with Luke Kaye MD   [facility] ([facility])    [address] [place] [address]   [phone]           The medical center is located at [address] [place] [address]. (between [address] in [place], [place] north of [place]).            Aug 21, 2018 10:00 AM CDT   LAB with [facility] ([facility])    [address] [place] [address]   [phone]           Please do not eat 10-12 hours before your appointment if you are coming in fasting for labs on lipids, cholesterol, or glucose (sugar). This does not apply to pregnant women. Water, hot tea and black coffee (with nothing added) are okay. Do not drink other fluids, diet soda or chew gum.            Sep 18, 2018 10:00 AM CDT   LAB with [facility] ([facility])    [facility] [address]   [phone]           Please do not eat 10-12 hours  before your appointment if you are coming in fasting for labs on lipids, cholesterol, or glucose (sugar). This does not apply to pregnant women. Water, hot tea and black coffee (with nothing added) are okay. Do not drink other fluids, diet soda or chew gum.            Oct 23, 2018 10:05 AM CDT   LAB with [facility] ([facility])    [address] [facility] [address]   [phone]           Please do not eat 10-12 hours before your appointment if you are coming in fasting for labs on lipids, cholesterol, or glucose (sugar). This does not apply to pregnant women. Water, hot tea and black coffee (with nothing added) are okay. Do not drink other fluids, diet soda or chew gum.            Dec 03, 2018 11:20 AM CST   [facility]    [address] [place] [address]   [phone]            Jun 18, 2019  9:45 AM CDT   Return Visit with Cipriano Springer MD   [facility] ([facility])    [address] [facility] [address]   [phone]              Future tests that were ordered for you today     Open Standing Orders        Priority Remaining Interval Expires Ordered    CBC with platelets differential Routine 2/2 Every 4 Weeks 1/19/2019 7/23/2018    Creatinine Routine 2/2 Every 4 Weeks 1/19/2019 7/23/2018    Hepatic panel Routine 2/2 Every 4 Weeks 1/19/2019 7/23/2018          Open Future Orders        Priority Expected Expires Ordered    CBC with platelets differential Routine 10/17/2018 11/20/2018 7/23/2018    Creatinine Routine 10/17/2018 11/20/2018 7/23/2018    Erythrocyte sedimentation rate auto Routine 10/17/2018 11/20/2018 7/23/2018    CRP inflammation Routine 10/17/2018 11/20/2018 7/23/2018    Hepatic panel Routine 10/17/2018 11/20/2018 7/23/2018            Who to contact     If you have questions or need follow up information about  "today's clinic visit or your schedule please contact [facility] directly at [phone].  Normal or non-critical lab and imaging results will be communicated to you by [employer], letter or phone within 4 business days after the clinic has received the results. If you do not hear from us within 7 days, please contact the clinic through [employer] or phone. If you have a critical or abnormal lab result, we will notify you by phone as soon as possible.  Submit refill requests through [employer] or call your pharmacy and they will forward the refill request to us. Please allow 3 business days for your refill to be completed.          Additional Information About Your Visit        [employer] Information     [employer] gives you secure access to your electronic health record. If you see a primary care provider, you can also send messages to your care team and make appointments. If you have questions, please call your primary care clinic.  If you do not have a primary care provider, please call [phone] and they will assist you.        Care EveryWhere ID     This is your Care EveryWhere ID. This could be used by other organizations to access your [facility] medical records  [id number]        Your Vitals Were     Pulse Respirations Height Pulse Oximetry BMI (Body Mass Index)       70 16 1.676 m (5' 6\") 99% 49.58 kg/m2        Blood Pressure from Last 3 Encounters:   07/23/18 124/78   06/20/18 165/78   03/26/18 165/85    Weight from Last 3 Encounters:   07/23/18 139.3 kg (307 lb 3.2 oz)   03/26/18 147.9 kg (326 lb)   01/16/18 (!) 147.7 kg (325 lb 9.6 oz)                 Today's Medication Changes          These changes are accurate as of 7/23/18 10:26 AM.  If you have any questions, ask your nurse or doctor.               Start taking these medicines.        Dose/Directions    lidocaine 5 % Patch   Commonly known as:  LIDODERM   Used for:  Chronic midline low back pain, with sciatica presence unspecified   Started by:  " Joseph Rooney MD        Apply to the skin for lower back pain as needed   Quantity:  30 patch   Refills:  0       sulfaSALAzine  MG EC tablet   Commonly known as:  AZULFIDINE EN   Used for:  Rheumatoid arthritis of hand, unspecified laterality, unspecified rheumatoid factor presence (H)   Started by:  Joseph Rooney MD        500mg BID for 7 days, then increase to 1000mg BID and continue 1000mg BID thereafter.   Quantity:  120 tablet   Refills:  3            Where to get your medicines      These medications were sent to [id number] - [place], [place] - [address]  [address] [place] [address]     Phone:  [phone]     hydroxychloroquine 200 MG tablet    lidocaine 5 % Patch    sulfaSALAzine  MG EC tablet                Primary Care Provider Office Phone # Fax #    Hitesh Washington [phone] [address] [facility] [address]        Equal Access to Services     [facility]: Hadii aad ku hadasho Soomaali, waaxda luqadaha, qaybta kaalmada adeegyada, waxay ryanin hayaan jesus [place] . So [facility] [phone].    ATENCIÓN: Si habla español, tiene a zamora disposición servicios gratuitos de asistencia lingüística. Llame al [phone].    We comply with applicable federal civil rights laws and [place] laws. We do not discriminate on the basis of race, color, national origin, age, disability, sex, sexual orientation, or gender identity.            Thank you!     Thank you for choosing [facility]  for your care. Our goal is always to provide you with excellent care. Hearing back from our patients is one way we can continue to improve our services. Please take a few minutes to complete the written survey that you may receive in the mail after your visit with us. Thank you!             Your Updated Medication List - Protect others around you: Learn how to safely use, store and throw away your medicines at www.disposemymeds.org.          This list is accurate  as of 7/23/18 10:26 AM.  Always use your most recent med list.                   Brand Name Dispense Instructions for use Diagnosis    albuterol 108 (90 Base) MCG/ACT Inhaler    PROAIR HFA/PROVENTIL HFA/VENTOLIN HFA    1 Inhaler    Inhale 1-2 puffs into the lungs every 4 hours as needed for shortness of breath / dyspnea or wheezing        benzonatate 200 MG capsule    TESSALON    21 capsule    Take 1 capsule (200 mg) by mouth 3 times daily as needed for cough    Cough       CLARITIN PO           fluticasone 27.5 MCG/SPRAY spray    VERAMYST     Spray 1 spray into both nostrils 2 times daily        fluticasone 50 MCG/ACT spray    FLONASE    1 Bottle    Spray 1-2 sprays into both nostrils daily For 1 week then as needed        folic acid 1 MG tablet    FOLVITE    100 tablet    Take 1 tablet (1 mg) by mouth daily    Rheumatoid arthritis involving multiple sites with positive rheumatoid factor (H)       * ADULT GUMMY PO      Take 1 chew tab by mouth daily [employer]        * HAIR/SKIN/NAILS/BIOTIN PO     100 tablet    Take 1 capsule by mouth daily.    RA (rheumatoid arthritis) (H)       hydroxychloroquine 200 MG tablet    PLAQUENIL    180 tablet    Take 2 tablets (400 mg) by mouth daily    Rheumatoid arthritis of hand, unspecified laterality, unspecified rheumatoid factor presence (H)       lidocaine 5 % Patch    LIDODERM    30 patch    Apply to the skin for lower back pain as needed    Chronic midline low back pain, with sciatica presence unspecified       methotrexate sodium 2.5 MG Tabs     140 tablet    Take 25 mg by mouth once a week . Take all 10 tablets on the same day of each week.    Rheumatoid arthritis involving multiple sites with positive rheumatoid factor (H)       predniSONE 5 MG tablet    DELTASONE    30 tablet    Take 1 tablet (5 mg) by mouth daily as needed for rheumatoid arthritis; use sparingly    Rheumatoid arthritis involving multiple sites with positive rheumatoid factor (H)       sulfaSALAzine ER  500 MG EC tablet    AZULFIDINE EN    120 tablet    500mg BID for 7 days, then increase to 1000mg BID and continue 1000mg BID thereafter.    Rheumatoid arthritis of hand, unspecified laterality, unspecified rheumatoid factor presence (H)       vitamin B complex with vitamin C Tabs tablet      Take 1 tablet by mouth daily    RA (rheumatoid arthritis) (H)       * Notice:  This list has 2 medication(s) that are the same as other medications prescribed for you. Read the directions carefully, and ask your doctor or other care provider to review them with you.

## 2018-07-23 NOTE — PATIENT INSTRUCTIONS
Rheumatology    Dr. Joseph Rooney         [place] [facility]   (Monday)  [address]  [place] [address]       [facility]   (Tuesday)  [address]  [place] [address]    [facility]   (Wed., Thurs., and Friday)  [address] [place] [address]    Phone number: [phone]  Thank you for choosing [facility].  Keke Finnegan CMA

## 2018-08-06 RX ORDER — FOLIC ACID 1 MG/1
1000 TABLET ORAL DAILY
Refills: 3 | COMMUNITY
Start: 2018-04-18 | End: 2018-12-03

## 2018-08-06 RX ORDER — PREDNISONE 20 MG/1
TABLET ORAL
COMMUNITY
Start: 2018-01-07 | End: 2018-12-03

## 2018-08-06 RX ORDER — HYDROXYCHLOROQUINE SULFATE 200 MG/1
TABLET, FILM COATED ORAL
COMMUNITY
Start: 2016-03-18 | End: 2018-12-03

## 2018-08-07 ENCOUNTER — ANESTHESIA EVENT (OUTPATIENT)
Dept: GASTROENTEROLOGY | Facility: CLINIC | Age: 63
End: 2018-08-07
Payer: COMMERCIAL

## 2018-08-07 ASSESSMENT — LIFESTYLE VARIABLES: TOBACCO_USE: 1

## 2018-08-07 NOTE — ANESTHESIA PREPROCEDURE EVALUATION
Anesthesia Evaluation     . Pt has had prior anesthetic. Type: General and MAC    No history of anesthetic complications          ROS/MED HX    ENT/Pulmonary:     (+)tobacco use, Past use , . .    Neurologic:  - neg neurologic ROS     Cardiovascular:     (+) Dyslipidemia, hypertension----. : . . . :. . Previous cardiac testing date:results:date: results:ECG reviewed date: results:Sinus Rhythm   WITHIN NORMAL LIMITS   date: results:          METS/Exercise Tolerance:  3 - Able to walk 1-2 blocks without stopping   Hematologic:  - neg hematologic  ROS       Musculoskeletal:   (+) arthritis, , , other musculoskeletal- rheumatoid      GI/Hepatic:  - neg GI/hepatic ROS       Renal/Genitourinary:     (+) Nephrolithiasis ,       Endo:     (+) Obesity, Other Endocrine Disorder mobid obesity.      Psychiatric:  - neg psychiatric ROS       Infectious Disease:  - neg infectious disease ROS       Malignancy:   (+) Malignancy History of Skin  Skin CA Remission status post Surgery,         Other:    - neg other ROS                 Physical Exam  Normal systems: cardiovascular, pulmonary and dental    Airway   Mallampati: II  TM distance: >3 FB  Neck ROM: full    Dental     Cardiovascular       Pulmonary                     Anesthesia Plan      History & Physical Review  History and physical reviewed and following examination; no interval change.    ASA Status:  3 .    NPO Status:  > 4 hours    Plan for MAC with Propofol induction. Maintenance will be Balanced.  Reason for MAC:  Deep or markedly invasive procedure (G8)         Postoperative Care      Consents  Anesthetic plan, risks, benefits and alternatives discussed with:  Patient and Spouse..                          .

## 2018-08-09 ENCOUNTER — ANESTHESIA (OUTPATIENT)
Dept: GASTROENTEROLOGY | Facility: CLINIC | Age: 63
End: 2018-08-09
Payer: COMMERCIAL

## 2018-08-09 ENCOUNTER — SURGERY (OUTPATIENT)
Age: 63
End: 2018-08-09

## 2018-08-09 ENCOUNTER — HOSPITAL ENCOUNTER (OUTPATIENT)
Facility: CLINIC | Age: 63
Discharge: HOME | End: 2018-08-09
Attending: SURGERY | Admitting: SURGERY
Payer: COMMERCIAL

## 2018-08-09 VITALS
SYSTOLIC BLOOD PRESSURE: 103 MMHG | BODY MASS INDEX: 45.99 KG/M2 | WEIGHT: 293 LBS | HEART RATE: 77 BPM | DIASTOLIC BLOOD PRESSURE: 56 MMHG | HEIGHT: 67 IN | TEMPERATURE: 97.8 F | RESPIRATION RATE: 16 BRPM | OXYGEN SATURATION: 97 %

## 2018-08-09 LAB — COLONOSCOPY: NORMAL

## 2018-08-09 PROCEDURE — 25000125 ZZHC RX 250: Performed by: NURSE ANESTHETIST, CERTIFIED REGISTERED

## 2018-08-09 PROCEDURE — 25000128 H RX IP 250 OP 636: Performed by: NURSE ANESTHETIST, CERTIFIED REGISTERED

## 2018-08-09 PROCEDURE — 25000128 H RX IP 250 OP 636: Performed by: SURGERY

## 2018-08-09 PROCEDURE — G0121 COLON CA SCRN NOT HI RSK IND: HCPCS | Performed by: SURGERY

## 2018-08-09 PROCEDURE — 45378 DIAGNOSTIC COLONOSCOPY: CPT | Performed by: SURGERY

## 2018-08-09 PROCEDURE — 37000008 ZZH ANESTHESIA TECHNICAL FEE, 1ST 30 MIN: Performed by: SURGERY

## 2018-08-09 RX ORDER — LIDOCAINE HYDROCHLORIDE 10 MG/ML
INJECTION, SOLUTION INFILTRATION; PERINEURAL PRN
Status: DISCONTINUED | OUTPATIENT
Start: 2018-08-09 | End: 2018-08-09

## 2018-08-09 RX ORDER — LIDOCAINE 40 MG/G
CREAM TOPICAL
Status: DISCONTINUED | OUTPATIENT
Start: 2018-08-09 | End: 2018-08-09 | Stop reason: HOSPADM

## 2018-08-09 RX ORDER — PROPOFOL 10 MG/ML
INJECTION, EMULSION INTRAVENOUS PRN
Status: DISCONTINUED | OUTPATIENT
Start: 2018-08-09 | End: 2018-08-09

## 2018-08-09 RX ORDER — GLYCOPYRROLATE 0.2 MG/ML
INJECTION, SOLUTION INTRAMUSCULAR; INTRAVENOUS PRN
Status: DISCONTINUED | OUTPATIENT
Start: 2018-08-09 | End: 2018-08-09

## 2018-08-09 RX ORDER — ONDANSETRON 2 MG/ML
4 INJECTION INTRAMUSCULAR; INTRAVENOUS
Status: DISCONTINUED | OUTPATIENT
Start: 2018-08-09 | End: 2018-08-09 | Stop reason: HOSPADM

## 2018-08-09 RX ORDER — SODIUM CHLORIDE, SODIUM LACTATE, POTASSIUM CHLORIDE, CALCIUM CHLORIDE 600; 310; 30; 20 MG/100ML; MG/100ML; MG/100ML; MG/100ML
INJECTION, SOLUTION INTRAVENOUS CONTINUOUS
Status: DISCONTINUED | OUTPATIENT
Start: 2018-08-09 | End: 2018-08-09 | Stop reason: HOSPADM

## 2018-08-09 RX ORDER — PROPOFOL 10 MG/ML
INJECTION, EMULSION INTRAVENOUS CONTINUOUS PRN
Status: DISCONTINUED | OUTPATIENT
Start: 2018-08-09 | End: 2018-08-09

## 2018-08-09 RX ADMIN — PROPOFOL 20 MG: 10 INJECTION, EMULSION INTRAVENOUS at 09:16

## 2018-08-09 RX ADMIN — GLYCOPYRROLATE 0.2 MG: 0.2 INJECTION, SOLUTION INTRAMUSCULAR; INTRAVENOUS at 09:08

## 2018-08-09 RX ADMIN — PROPOFOL 50 MG: 10 INJECTION, EMULSION INTRAVENOUS at 09:09

## 2018-08-09 RX ADMIN — LIDOCAINE HYDROCHLORIDE 50 MG: 10 INJECTION, SOLUTION INFILTRATION; PERINEURAL at 09:08

## 2018-08-09 RX ADMIN — PROPOFOL 200 MCG/KG/MIN: 10 INJECTION, EMULSION INTRAVENOUS at 09:08

## 2018-08-09 RX ADMIN — SODIUM CHLORIDE, POTASSIUM CHLORIDE, SODIUM LACTATE AND CALCIUM CHLORIDE: 600; 310; 30; 20 INJECTION, SOLUTION INTRAVENOUS at 09:07

## 2018-08-09 NOTE — BRIEF OP NOTE
[facility]   Brief Operative Note    Pre-operative diagnosis: screening   Post-operative diagnosis normal colon   Procedure: Procedure(s):  colonoscopy - Wound Class: II-Clean Contaminated   Surgeon(s): Surgeon(s) and Role:     * Luke Kaye MD - Primary   Estimated blood loss: * No values recorded between 8/9/2018 12:00 AM and 8/9/2018  9:21 AM *    Specimens: * No specimens in log *   Findings: Normal colon

## 2018-08-09 NOTE — ANESTHESIA CARE TRANSFER NOTE
Patient: Kaylene Diaz    Procedure(s):  colonoscopy - Wound Class: II-Clean Contaminated    Diagnosis: screening  Diagnosis Additional Information: No value filed.    Anesthesia Type:   MAC     Note:  Airway :Room Air  Patient transferred to:Phase II  Handoff Report: Identifed the Patient, Identified the Reponsible Provider, Reviewed the pertinent medical history, Discussed the surgical course, Reviewed Intra-OP anesthesia mangement and issues during anesthesia, Set expectations for post-procedure period and Allowed opportunity for questions and acknowledgement of understanding      Vitals: (Last set prior to Anesthesia Care Transfer)    CRNA VITALS  8/9/2018 0855 - 8/9/2018 0925      8/9/2018             Pulse: 89    SpO2: (!)  75 %                Electronically Signed By: TYRA Garza CRNA  August 9, 2018  9:25 AM

## 2018-08-09 NOTE — ANESTHESIA POSTPROCEDURE EVALUATION
Patient: Kaylene Diaz    Procedure(s):  colonoscopy - Wound Class: II-Clean Contaminated    Diagnosis:screening  Diagnosis Additional Information: No value filed.    Anesthesia Type:  MAC    Note:  Anesthesia Post Evaluation    Patient location during evaluation: Phase 2  Patient participation: Able to fully participate in evaluation  Level of consciousness: awake and alert  Pain management: adequate  Airway patency: patent  Cardiovascular status: acceptable and hemodynamically stable  Respiratory status: acceptable, room air and spontaneous ventilation  Hydration status: acceptable  PONV: none     Anesthetic complications: None          Last vitals:  Vitals:    08/09/18 0732   BP: 138/64   Pulse: 70   Resp: 18   Temp: 36.6  C (97.8  F)   SpO2: 100%         Electronically Signed By: TYRA Garza CRNA  August 9, 2018  9:26 AM

## 2018-08-09 NOTE — ADDENDUM NOTE
Addendum  created 08/09/18 1400 by Nidia Shrestha APRN CRNA    Anesthesia Event edited, Procedure Event Log accessed

## 2018-08-21 DIAGNOSIS — M06.9 RHEUMATOID ARTHRITIS OF HAND, UNSPECIFIED LATERALITY, UNSPECIFIED RHEUMATOID FACTOR PRESENCE: ICD-10-CM

## 2018-08-21 LAB
ALBUMIN SERPL-MCNC: 3.6 G/DL (ref 3.4–5)
ALP SERPL-CCNC: 127 U/L (ref 40–150)
ALT SERPL W P-5'-P-CCNC: 24 U/L (ref 0–50)
AST SERPL W P-5'-P-CCNC: 26 U/L (ref 0–45)
BASOPHILS # BLD AUTO: 0 10E9/L (ref 0–0.2)
BASOPHILS NFR BLD AUTO: 0.3 %
BILIRUB DIRECT SERPL-MCNC: <0.1 MG/DL (ref 0–0.2)
BILIRUB SERPL-MCNC: 0.5 MG/DL (ref 0.2–1.3)
CREAT SERPL-MCNC: 0.79 MG/DL (ref 0.52–1.04)
DIFFERENTIAL METHOD BLD: NORMAL
EOSINOPHIL # BLD AUTO: 0.1 10E9/L (ref 0–0.7)
EOSINOPHIL NFR BLD AUTO: 1.9 %
ERYTHROCYTE [DISTWIDTH] IN BLOOD BY AUTOMATED COUNT: 13.5 % (ref 10–15)
GFR SERPL CREATININE-BSD FRML MDRD: 73 ML/MIN/1.7M2
HCT VFR BLD AUTO: 40.6 % (ref 35–47)
HGB BLD-MCNC: 13.4 G/DL (ref 11.7–15.7)
LYMPHOCYTES # BLD AUTO: 1.3 10E9/L (ref 0.8–5.3)
LYMPHOCYTES NFR BLD AUTO: 19 %
MCH RBC QN AUTO: 31.8 PG (ref 26.5–33)
MCHC RBC AUTO-ENTMCNC: 33 G/DL (ref 31.5–36.5)
MCV RBC AUTO: 96 FL (ref 78–100)
MONOCYTES # BLD AUTO: 0.5 10E9/L (ref 0–1.3)
MONOCYTES NFR BLD AUTO: 7.4 %
NEUTROPHILS # BLD AUTO: 4.9 10E9/L (ref 1.6–8.3)
NEUTROPHILS NFR BLD AUTO: 71.4 %
PLATELET # BLD AUTO: 194 10E9/L (ref 150–450)
PROT SERPL-MCNC: 7.6 G/DL (ref 6.8–8.8)
RBC # BLD AUTO: 4.21 10E12/L (ref 3.8–5.2)
WBC # BLD AUTO: 6.8 10E9/L (ref 4–11)

## 2018-08-21 PROCEDURE — 82565 ASSAY OF CREATININE: CPT | Performed by: INTERNAL MEDICINE

## 2018-08-21 PROCEDURE — 85025 COMPLETE CBC W/AUTO DIFF WBC: CPT | Performed by: INTERNAL MEDICINE

## 2018-08-21 PROCEDURE — 36415 COLL VENOUS BLD VENIPUNCTURE: CPT | Performed by: INTERNAL MEDICINE

## 2018-08-21 PROCEDURE — 80076 HEPATIC FUNCTION PANEL: CPT | Performed by: INTERNAL MEDICINE

## 2018-09-17 DIAGNOSIS — M06.9 RHEUMATOID ARTHRITIS OF HAND, UNSPECIFIED LATERALITY, UNSPECIFIED RHEUMATOID FACTOR PRESENCE: ICD-10-CM

## 2018-09-17 LAB
ALBUMIN SERPL-MCNC: 3.8 G/DL (ref 3.4–5)
ALP SERPL-CCNC: 127 U/L (ref 40–150)
ALT SERPL W P-5'-P-CCNC: 26 U/L (ref 0–50)
AST SERPL W P-5'-P-CCNC: 17 U/L (ref 0–45)
BASOPHILS # BLD AUTO: 0 10E9/L (ref 0–0.2)
BASOPHILS NFR BLD AUTO: 0.5 %
BILIRUB DIRECT SERPL-MCNC: 0.1 MG/DL (ref 0–0.2)
BILIRUB SERPL-MCNC: 0.4 MG/DL (ref 0.2–1.3)
CREAT SERPL-MCNC: 0.99 MG/DL (ref 0.52–1.04)
DIFFERENTIAL METHOD BLD: NORMAL
EOSINOPHIL # BLD AUTO: 0.1 10E9/L (ref 0–0.7)
EOSINOPHIL NFR BLD AUTO: 1.8 %
ERYTHROCYTE [DISTWIDTH] IN BLOOD BY AUTOMATED COUNT: 13.7 % (ref 10–15)
GFR SERPL CREATININE-BSD FRML MDRD: 57 ML/MIN/1.7M2
HCT VFR BLD AUTO: 39.8 % (ref 35–47)
HGB BLD-MCNC: 13.5 G/DL (ref 11.7–15.7)
LYMPHOCYTES # BLD AUTO: 1.3 10E9/L (ref 0.8–5.3)
LYMPHOCYTES NFR BLD AUTO: 21 %
MCH RBC QN AUTO: 33 PG (ref 26.5–33)
MCHC RBC AUTO-ENTMCNC: 33.9 G/DL (ref 31.5–36.5)
MCV RBC AUTO: 97 FL (ref 78–100)
MONOCYTES # BLD AUTO: 0.4 10E9/L (ref 0–1.3)
MONOCYTES NFR BLD AUTO: 6.4 %
NEUTROPHILS # BLD AUTO: 4.3 10E9/L (ref 1.6–8.3)
NEUTROPHILS NFR BLD AUTO: 70.3 %
PLATELET # BLD AUTO: 276 10E9/L (ref 150–450)
PROT SERPL-MCNC: 8 G/DL (ref 6.8–8.8)
RBC # BLD AUTO: 4.09 10E12/L (ref 3.8–5.2)
WBC # BLD AUTO: 6.1 10E9/L (ref 4–11)

## 2018-09-17 PROCEDURE — 80076 HEPATIC FUNCTION PANEL: CPT | Performed by: INTERNAL MEDICINE

## 2018-09-17 PROCEDURE — 85025 COMPLETE CBC W/AUTO DIFF WBC: CPT | Performed by: INTERNAL MEDICINE

## 2018-09-17 PROCEDURE — 82565 ASSAY OF CREATININE: CPT | Performed by: INTERNAL MEDICINE

## 2018-09-17 PROCEDURE — 36415 COLL VENOUS BLD VENIPUNCTURE: CPT | Performed by: INTERNAL MEDICINE

## 2018-10-23 DIAGNOSIS — M06.9 RHEUMATOID ARTHRITIS OF HAND, UNSPECIFIED LATERALITY, UNSPECIFIED RHEUMATOID FACTOR PRESENCE: ICD-10-CM

## 2018-10-23 LAB
ALBUMIN SERPL-MCNC: 3.6 G/DL (ref 3.4–5)
ALP SERPL-CCNC: 121 U/L (ref 40–150)
ALT SERPL W P-5'-P-CCNC: 24 U/L (ref 0–50)
AST SERPL W P-5'-P-CCNC: 17 U/L (ref 0–45)
BASOPHILS # BLD AUTO: 0 10E9/L (ref 0–0.2)
BASOPHILS NFR BLD AUTO: 0.3 %
BILIRUB DIRECT SERPL-MCNC: <0.1 MG/DL (ref 0–0.2)
BILIRUB SERPL-MCNC: 0.5 MG/DL (ref 0.2–1.3)
CREAT SERPL-MCNC: 0.81 MG/DL (ref 0.52–1.04)
CRP SERPL-MCNC: 4.7 MG/L (ref 0–8)
DIFFERENTIAL METHOD BLD: ABNORMAL
EOSINOPHIL # BLD AUTO: 0.1 10E9/L (ref 0–0.7)
EOSINOPHIL NFR BLD AUTO: 1.5 %
ERYTHROCYTE [DISTWIDTH] IN BLOOD BY AUTOMATED COUNT: 13.6 % (ref 10–15)
ERYTHROCYTE [SEDIMENTATION RATE] IN BLOOD BY WESTERGREN METHOD: 14 MM/H (ref 0–30)
GFR SERPL CREATININE-BSD FRML MDRD: 71 ML/MIN/1.7M2
HCT VFR BLD AUTO: 38.2 % (ref 35–47)
HGB BLD-MCNC: 12.7 G/DL (ref 11.7–15.7)
LYMPHOCYTES # BLD AUTO: 1.1 10E9/L (ref 0.8–5.3)
LYMPHOCYTES NFR BLD AUTO: 18.7 %
MCH RBC QN AUTO: 33.4 PG (ref 26.5–33)
MCHC RBC AUTO-ENTMCNC: 33.2 G/DL (ref 31.5–36.5)
MCV RBC AUTO: 101 FL (ref 78–100)
MONOCYTES # BLD AUTO: 0.6 10E9/L (ref 0–1.3)
MONOCYTES NFR BLD AUTO: 9.8 %
NEUTROPHILS # BLD AUTO: 4.3 10E9/L (ref 1.6–8.3)
NEUTROPHILS NFR BLD AUTO: 69.7 %
PLATELET # BLD AUTO: 273 10E9/L (ref 150–450)
PROT SERPL-MCNC: 7.8 G/DL (ref 6.8–8.8)
RBC # BLD AUTO: 3.8 10E12/L (ref 3.8–5.2)
WBC # BLD AUTO: 6.1 10E9/L (ref 4–11)

## 2018-10-23 PROCEDURE — 82565 ASSAY OF CREATININE: CPT | Performed by: INTERNAL MEDICINE

## 2018-10-23 PROCEDURE — 80076 HEPATIC FUNCTION PANEL: CPT | Performed by: INTERNAL MEDICINE

## 2018-10-23 PROCEDURE — 36415 COLL VENOUS BLD VENIPUNCTURE: CPT | Performed by: INTERNAL MEDICINE

## 2018-10-23 PROCEDURE — 85652 RBC SED RATE AUTOMATED: CPT | Performed by: INTERNAL MEDICINE

## 2018-10-23 PROCEDURE — 85025 COMPLETE CBC W/AUTO DIFF WBC: CPT | Performed by: INTERNAL MEDICINE

## 2018-10-23 PROCEDURE — 86140 C-REACTIVE PROTEIN: CPT | Performed by: INTERNAL MEDICINE

## 2018-11-26 ENCOUNTER — MYC MEDICAL ADVICE (OUTPATIENT)
Dept: RHEUMATOLOGY | Facility: CLINIC | Age: 63
End: 2018-11-26

## 2018-11-26 DIAGNOSIS — M06.9 RHEUMATOID ARTHRITIS OF HAND, UNSPECIFIED LATERALITY, UNSPECIFIED RHEUMATOID FACTOR PRESENCE: ICD-10-CM

## 2018-11-26 NOTE — TELEPHONE ENCOUNTER
Routing refill request to provider for review/approval because:  Drug not on the [facility] refill protocol       Rachelle Mahan RN - BC

## 2018-11-26 NOTE — TELEPHONE ENCOUNTER
Requested Prescriptions   Pending Prescriptions Disp Refills     sulfaSALAzine ER (AZULFIDINE EN) 500 MG EC tablet 120 tablet 3     Simg BID for 7 days, then increase to 1000mg BID and continue 1000mg BID thereafter.    There is no refill protocol information for this order        Last Written Prescription Date:  2018  Last Fill Quantity: 120,  # refills: 3   Last office visit: 2018 with prescribing provider:  Pamella   Future Office Visit:   Next 5 appointments (look out 90 days)     Dec 03, 2018 11:20 AM CST   Return Visit with Joseph Rooney MD   [facility] [place] ([facility] [place])    [address] [facility]  [place] [address] [phone]

## 2018-11-27 RX ORDER — SULFASALAZINE 500 MG/1
1000 TABLET, DELAYED RELEASE ORAL 2 TIMES DAILY
Qty: 360 TABLET | Refills: 0 | Status: SHIPPED | OUTPATIENT
Start: 2018-11-27 | End: 2018-12-03

## 2018-11-28 NOTE — TELEPHONE ENCOUNTER
Rheumatology team: Please call to notify Ms. Diaz that sulfasalazine has been refilled.     Joseph Rooney MD  11/27/2018 11:07 PM

## 2018-12-03 ENCOUNTER — OFFICE VISIT (OUTPATIENT)
Dept: RHEUMATOLOGY | Facility: CLINIC | Age: 63
End: 2018-12-03
Payer: COMMERCIAL

## 2018-12-03 VITALS
WEIGHT: 293 LBS | OXYGEN SATURATION: 96 % | BODY MASS INDEX: 50.5 KG/M2 | DIASTOLIC BLOOD PRESSURE: 82 MMHG | SYSTOLIC BLOOD PRESSURE: 136 MMHG | TEMPERATURE: 97.8 F | HEART RATE: 78 BPM

## 2018-12-03 DIAGNOSIS — Z23 NEED FOR PNEUMOCOCCAL VACCINATION: ICD-10-CM

## 2018-12-03 DIAGNOSIS — Z23 NEED FOR SHINGLES VACCINE: ICD-10-CM

## 2018-12-03 DIAGNOSIS — M05.79 RHEUMATOID ARTHRITIS INVOLVING MULTIPLE SITES WITH POSITIVE RHEUMATOID FACTOR (H): Primary | Chronic | ICD-10-CM

## 2018-12-03 PROCEDURE — 99213 OFFICE O/P EST LOW 20 MIN: CPT | Mod: 25 | Performed by: INTERNAL MEDICINE

## 2018-12-03 PROCEDURE — 90670 PCV13 VACCINE IM: CPT | Performed by: INTERNAL MEDICINE

## 2018-12-03 PROCEDURE — 90750 HZV VACC RECOMBINANT IM: CPT | Performed by: INTERNAL MEDICINE

## 2018-12-03 PROCEDURE — 90472 IMMUNIZATION ADMIN EACH ADD: CPT | Performed by: INTERNAL MEDICINE

## 2018-12-03 PROCEDURE — 90471 IMMUNIZATION ADMIN: CPT | Performed by: INTERNAL MEDICINE

## 2018-12-03 RX ORDER — METHOTREXATE 2.5 MG/1
25 TABLET ORAL WEEKLY
Qty: 120 TABLET | Refills: 2 | Status: SHIPPED | OUTPATIENT
Start: 2018-12-03 | End: 2019-06-03

## 2018-12-03 RX ORDER — SULFASALAZINE 500 MG/1
1000 TABLET, DELAYED RELEASE ORAL 2 TIMES DAILY
Qty: 360 TABLET | Refills: 2 | Status: SHIPPED | OUTPATIENT
Start: 2018-12-03 | End: 2019-06-03

## 2018-12-03 RX ORDER — HYDROXYCHLOROQUINE SULFATE 200 MG/1
400 TABLET, FILM COATED ORAL DAILY
Qty: 180 TABLET | Refills: 3 | Status: SHIPPED | OUTPATIENT
Start: 2018-12-03 | End: 2019-06-03

## 2018-12-03 ASSESSMENT — PAIN SCALES - GENERAL: PAINLEVEL: NO PAIN (0)

## 2018-12-03 NOTE — MR AVS SNAPSHOT
After Visit Summary   12/3/2018    Kaylene Diaz    MRN: [id number]           Patient Information     Date Of Birth          1955        Visit Information        Provider Department      12/3/2018 11:20 AM Joseph Rooney MD [facility] [place]        Today's Diagnoses     Rheumatoid arthritis involving multiple sites with positive rheumatoid factor (H)    -  1      Care Instructions    Please call to schedule your next labs to be in late January and then every 3 months thereafter          Follow-ups after your visit        Your next 10 appointments already scheduled     Jun 03, 2019  9:20 AM CDT   Return Visit with Joseph Rooney MD   [facility] ([facility])    [address]  [place] [address] [phone]            Jun 18, 2019  9:45 AM CDT   Return Visit with Cipriano Springer MD   [facility] ([facility]    [address] [address] [phone]              Future tests that were ordered for you today     Open Standing Orders        Priority Remaining Interval Expires Ordered    CBC with platelets differential Routine 4/4 Every 3 Months 11/28/2019 12/3/2018    Creatinine Routine 4/4 Every 3 Months 11/28/2019 12/3/2018    Hepatic panel Routine 4/4 Every 3 Months 11/28/2019 12/3/2018            Who to contact     If you have questions or need follow up information about today's clinic visit or your schedule please contact [facility] directly at [phone].  Normal or non-critical lab and imaging results will be communicated to you by MyChart, letter or phone within 4 business days after the clinic has received the results. If you do not hear from us within 7 days, please contact the clinic through [employer] or phone. If you have a critical or abnormal lab result, we will notify you by phone as soon as possible.  Submit refill requests through [employer] or call your pharmacy and  they will forward the refill request to us. Please allow 3 business days for your refill to be completed.          Additional Information About Your Visit        [employer] Information     [facility] gives you secure access to your electronic health record. If you see a primary care provider, you can also send messages to your care team and make appointments. If you have questions, please call your primary care clinic.  If you do not have a primary care provider, please call [phone] and they will assist you.        Care EveryWhere ID     This is your Care EveryWhere ID. This could be used by other organizations to access your [facility] medical records  [id number]        Your Vitals Were     Pulse Temperature Pulse Oximetry BMI (Body Mass Index)          78 97.8  F (36.6  C) (Oral) 96% 50.5 kg/m2         Blood Pressure from Last 3 Encounters:   12/03/18 136/82   08/09/18 103/56   07/23/18 124/78    Weight from Last 3 Encounters:   12/03/18 146.2 kg (322 lb 6.6 oz)   08/09/18 135.6 kg (299 lb)   07/23/18 139.3 kg (307 lb 3.2 oz)                 Today's Medication Changes          These changes are accurate as of 12/3/18 12:06 PM.  If you have any questions, ask your nurse or doctor.               These medicines have changed or have updated prescriptions.        Dose/Directions    hydroxychloroquine 200 MG tablet   Commonly known as:  PLAQUENIL   This may have changed:  Another medication with the same name was removed. Continue taking this medication, and follow the directions you see here.   Used for:  Rheumatoid arthritis involving multiple sites with positive rheumatoid factor (H)   Changed by:  Joseph Rooney MD        Dose:  400 mg   Take 2 tablets (400 mg) by mouth daily   Quantity:  180 tablet   Refills:  3       methotrexate sodium 2.5 MG Tabs   This may have changed:  Another medication with the same name was removed. Continue taking this medication, and follow the directions you see here.   Used for:   Rheumatoid arthritis involving multiple sites with positive rheumatoid factor (H)   Changed by:  Joseph Rooney MD        Dose:  25 mg   Take 10 tablets (25 mg) by mouth once a week . Take all 10 tablets on the same day of each week.   Quantity:  120 tablet   Refills:  2       predniSONE 5 MG tablet   Commonly known as:  DELTASONE   This may have changed:  Another medication with the same name was removed. Continue taking this medication, and follow the directions you see here.   Used for:  Rheumatoid arthritis involving multiple sites with positive rheumatoid factor (H)   Changed by:  Joseph Rooney MD        Dose:  5 mg   Take 1 tablet (5 mg) by mouth daily as needed for rheumatoid arthritis; use sparingly   Quantity:  30 tablet   Refills:  0         Stop taking these medicines if you haven't already. Please contact your care team if you have questions.     folic acid 1 MG tablet   Commonly known as:  FOLVITE   Stopped by:  Joseph Rooney MD                Where to get your medicines      These medications were sent to [id number] - [place], [place] - [address]  [address] [place] [address]     Phone:  [phone]     hydroxychloroquine 200 MG tablet    methotrexate sodium 2.5 MG Tabs    sulfaSALAzine  MG EC tablet                Primary Care Provider Office Phone # Fax #    Hitesh Washington [phone] [address] [address]        Equal Access to Services     [facility] AH: Hadii ninoska ku hadasho Soomaali, waaxda luqadaha, qaybta kaalmada adeegyada, kaleb luna. So [facility] [phone].    ATENCIÓN: Si habla español, tiene a zamora disposición servicios gratuitos de asistencia lingüística. Llame al [phone].    We comply with applicable federal civil rights laws and [place] laws. We do not discriminate on the basis of race, color, national origin, age, disability, sex, sexual orientation, or gender identity.            Thank you!      Thank you for choosing [facility]  for your care. Our goal is always to provide you with excellent care. Hearing back from our patients is one way we can continue to improve our services. Please take a few minutes to complete the written survey that you may receive in the mail after your visit with us. Thank you!             Your Updated Medication List - Protect others around you: Learn how to safely use, store and throw away your medicines at www.disposemymeds.org.          This list is accurate as of 12/3/18 12:06 PM.  Always use your most recent med list.                   Brand Name Dispense Instructions for use Diagnosis    albuterol 108 (90 Base) MCG/ACT inhaler    PROAIR HFA/PROVENTIL HFA/VENTOLIN HFA    1 Inhaler    Inhale 1-2 puffs into the lungs every 4 hours as needed for shortness of breath / dyspnea or wheezing        benzonatate 200 MG capsule    TESSALON    21 capsule    Take 1 capsule (200 mg) by mouth 3 times daily as needed for cough    Cough       fluticasone 27.5 MCG/SPRAY nasal spray    VERAMYST     Spray 1 spray into both nostrils 2 times daily        fluticasone 50 MCG/ACT nasal spray    FLONASE    1 Bottle    Spray 1-2 sprays into both nostrils daily For 1 week then as needed        * ADULT GUMMY PO      Take 1 chew tab by mouth daily [employer]ve        * HAIR/SKIN/NAILS/BIOTIN PO     100 tablet    Take 1 capsule by mouth daily.    RA (rheumatoid arthritis) (H)       hydroxychloroquine 200 MG tablet    PLAQUENIL    180 tablet    Take 2 tablets (400 mg) by mouth daily    Rheumatoid arthritis involving multiple sites with positive rheumatoid factor (H)       lidocaine 5 % patch    LIDODERM    30 patch    Apply to the skin for lower back pain as needed    Chronic midline low back pain, with sciatica presence unspecified       methotrexate sodium 2.5 MG Tabs     120 tablet    Take 10 tablets (25 mg) by mouth once a week . Take all 10 tablets on the same day of each week.    Rheumatoid  arthritis involving multiple sites with positive rheumatoid factor (H)       predniSONE 5 MG tablet    DELTASONE    30 tablet    Take 1 tablet (5 mg) by mouth daily as needed for rheumatoid arthritis; use sparingly    Rheumatoid arthritis involving multiple sites with positive rheumatoid factor (H)       sulfaSALAzine  MG EC tablet    AZULFIDINE EN    360 tablet    Take 2 tablets (1,000 mg) by mouth 2 times daily    Rheumatoid arthritis involving multiple sites with positive rheumatoid factor (H)       vitamin B complex with vitamin C tablet      Take 1 tablet by mouth daily    RA (rheumatoid arthritis) (H)       * Notice:  This list has 2 medication(s) that are the same as other medications prescribed for you. Read the directions carefully, and ask your doctor or other care provider to review them with you.

## 2018-12-03 NOTE — PROGRESS NOTES
"Rheumatology Clinic Visit      Kaylene Diaz MRN# [id number]   YOB: 1955 Age: 63 year old      Date of visit: 12/03/18   PCP: Dr. Hitesh Washington    Chief Complaint   Patient presents with:  Arthritis: things going \"good\". No flare ups. Asking for standing orders for lab work.     Assessment and Plan     1.  Seropositive rheumatoid arthritis (RF 32, CCP >250): Currently on methotrexate 25 mg once weekly, hydroxychloroquine 400 mg daily, and sulfasalazine 1000 mg twice daily.  Does not tolerate folic acid because of associated increased sunburns.  Not requiring prednisone since adding sulfasalazine.  Doing well today.  No change to her treatment plan at this time.    - Continue methotrexate 25 mg once weekly  - Continue hydroxychloroquine 400 mg daily (last eye exam on 6/29/2018)  - Continue prednisone 5 mg daily as needed for rheumatoid arthritis symptoms; use sparingly.  - Continue sulfasalazine 1000mg BID thereafter  - Labs every 3 months: CBC, Creatinine, Hepatic Panel    2.  Basal cell carcinoma and squamous cell carcinoma: Several lesions removed by dermatology.  She follows with dermatology every 6 months.  Advised that she see dermatology for the scaly lesion on the dorsal aspect of her right hand    3.  Chronic lower back pain: Has significantly improved with lidocaine patches that have been used no more than 10 times in the past 1 year.  She has been using her mother's lidocaine patches.  Defer further management to her PCP.    4.  Vaccinations: Vaccinations reviewed with Ms. Diaz.  Risks and benefits of vaccinations were discussed. Data and lack of data for shingrix reviewed.  CDC stance on shingrix when on moderate to high immunosuppression reviewed.  I explained that Shingrix is used off label when under 50 years old and that the safety and efficacy of the vaccine has not been tested in people younger than 50 years old.   - Influenza: encouraged yearly vaccination  - [id number]: will " receive today  - [id number]: to receive at least 8 weeks after [id number] is administered  - Shingrix: 1st dose today; second dose to be in 2-6 months; 2nd dose may be done at a follow-up clinic visit, at the pharmacy, with her PCP, or as an ancillary visit     Ms. Diaz verbalized agreement with and understanding of the rational for the diagnosis and treatment plan.  All questions were answered to best of my ability and the patient's satisfaction. Ms. Diaz was advised to contact the clinic with any questions that may arise after the clinic visit.      Thank you for involving me in the care of the patient    Return to clinic: 3-4 months      HPI   Kaylene Diaz is a 63 year old female with a past medical history significant for hyperlipidemia, osteoarthritis, osteopenia, meniscal tear, squamous cell carcinoma, basal cell carcinoma, and rheumatoid arthritis who presents for follow-up of rheumatoid arthritis.    Today, Ms. Diaz reports that she is doing well.  Minimal-no joint pain at this time.  She is not sure if the sulfasalazine was helping or if the lack of gardening was helping.  Tolerating medications well.  Happy with how she is doing.  Morning stiffness for less than 30 minutes.      Denies fevers, chills, nausea, vomiting, constipation, diarrhea. No abdominal pain. No chest pain/pressure, palpitations, or shortness of breath. No LE swelling. No neck pain. No oral or nasal sores.  No rash. No sicca symptoms.       Tobacco: Quit in 2000  EtOH: Rare  Drugs: None    ROS   GEN: No fevers, chills, night sweats; trying to lose weight  SKIN: No itching, rashes, sores; recurrent skin cancer that is followed by dermatology  HEENT: No oral or nasal ulcers.  CV: No chest pain, pressure, palpitations, or dyspnea on exertion.  PULM: No SOB, wheeze, cough.  GI: No nausea, vomiting, constipation, diarrhea. No blood in stool. No abdominal pain.  : No blood in urine.  MSK: See HPI.  NEURO: No numbness, tingling, or  weakness.  EXT: No LE swelling  PSYCH: Negative    Active Problem List     Patient Active Problem List   Diagnosis     Carpal tunnel syndrome     Tear meniscus knee     Osteopenia     Advanced directives, counseling/discussion     Hyperlipidemia LDL goal <160     Osteoarthritis     RA (rheumatoid arthritis) (H)     High risk medications (not anticoagulants) long-term use     Kidney stone     ASCUS on Pap smear     Morbid obesity due to excess calories (H)     Small bowel obstruction (H)     Elevated blood pressure reading without diagnosis of hypertension     LBANCA (acute kidney injury) (H)     Elevated glucose     SBO (small bowel obstruction) (H)     Rheumatoid arthritis involving multiple sites with positive rheumatoid factor (H)     Past Medical History     Past Medical History:   Diagnosis Date     Basal cell carcinoma      RA (rheumatoid arthritis) (H)      Small bowel obstruction (H)      Squamous cell carcinoma      Past Surgical History     Past Surgical History:   Procedure Laterality Date     ARTHROSCOPY KNEE  12/10/2010    ARTHROSCOPY KNEE performed by NAVID FIERRO at [facility]     COLONOSCOPY N/A 8/9/2018    Procedure: COLONOSCOPY;  colonoscopy;  Surgeon: Luke Kaye MD;  Location: [facility]     SURGICAL HISTORY OF -       IUD removal     SURGICAL HISTORY OF -       Ovarian cyst     SURGICAL HISTORY OF -       thorn removal from foot     SURGICAL HISTORY OF -       knee surgery, scope, right knee     Allergy     Allergies   Allergen Reactions     Folic Acid      Gold Rash     Latex Rash     Not all latex products     Current Medication List     Current Outpatient Prescriptions   Medication Sig     hydroxychloroquine (PLAQUENIL) 200 MG tablet Take 2 tablets (400 mg) by mouth daily     methotrexate sodium 2.5 MG TABS Take 25 mg by mouth once a week . Take all 10 tablets on the same day of each week.     Multiple Vitamins-Minerals (ADULT GUMMY PO) Take 1 chew tab by mouth daily VitaCrave     Multiple  Vitamins-Minerals (HAIR/SKIN/NAILS/BIOTIN PO) Take 1 capsule by mouth daily.      sulfaSALAzine ER (AZULFIDINE EN) 500 MG EC tablet Take 2 tablets (1,000 mg) by mouth 2 times daily     vitamin  B complex with vitamin C (VITAMIN  B COMPLEX) TABS Take 1 tablet by mouth daily     albuterol (PROAIR HFA/PROVENTIL HFA/VENTOLIN HFA) 108 (90 BASE) MCG/ACT Inhaler Inhale 1-2 puffs into the lungs every 4 hours as needed for shortness of breath / dyspnea or wheezing (Patient not taking: Reported on 12/3/2018)     benzonatate (TESSALON) 200 MG capsule Take 1 capsule (200 mg) by mouth 3 times daily as needed for cough (Patient not taking: Reported on 7/23/2018)     fluticasone (FLONASE) 50 MCG/ACT spray Spray 1-2 sprays into both nostrils daily For 1 week then as needed (Patient not taking: Reported on 12/3/2018)     fluticasone (VERAMYST) 27.5 MCG/SPRAY spray Spray 1 spray into both nostrils 2 times daily     folic acid (FOLVITE) 1 MG tablet Take 1,000 mcg by mouth daily     hydroxychloroquine (PLAQUENIL) 200 MG tablet      lidocaine (LIDODERM) 5 % Patch Apply to the skin for lower back pain as needed (Patient not taking: Reported on 12/3/2018)     methotrexate 2.5 MG tablet CHEMO Take 2.5 mg by mouth     predniSONE (DELTASONE) 20 MG tablet      predniSONE (DELTASONE) 5 MG tablet Take 1 tablet (5 mg) by mouth daily as needed for rheumatoid arthritis; use sparingly (Patient not taking: Reported on 12/3/2018)     No current facility-administered medications for this visit.          Social History   See HPI    Family History     Family History   Problem Relation Age of Onset     Prostate Cancer Father      Eye Disorder Father      mac degen     Heart Disease Father      Macular Degeneration Father      Heart Disease Mother      a-fib     Eye Disorder Mother      C.A.D. Mother      Hypertension Mother      Alcohol/Drug Brother      Alcohol/Drug Sister      Alcohol/Drug Brother      Alcohol/Drug Brother      Alcohol/Drug Brother   "    Alcohol/Drug Sister      Obesity Sister      Alcohol/Drug Sister      Prostate Cancer Sister      Cancer Other      Melanoma No family hx of        Physical Exam     Temp Readings from Last 3 Encounters:   12/03/18 97.8  F (36.6  C) (Oral)   08/09/18 97.8  F (36.6  C) (Oral)   03/26/18 97.8  F (36.6  C) (Oral)     BP Readings from Last 5 Encounters:   12/03/18 169/88   08/09/18 103/56   07/23/18 124/78   06/20/18 165/78   03/26/18 165/85     Pulse Readings from Last 1 Encounters:   12/03/18 78     Resp Readings from Last 1 Encounters:   08/09/18 16     Estimated body mass index is 50.5 kg/(m^2) as calculated from the following:    Height as of 8/9/18: 1.702 m (5' 7\").    Weight as of this encounter: 146.2 kg (322 lb 6.6 oz).    GEN: NAD; obese  HEENT: MMM. No oral lesions. Anicteric, noninjected sclera  CV: S1, S2. RRR. No m/r/g.  PULM: CTA bilaterally. No w/c.  MSK: Right MCPs look swollen by visual inspection, but the joint spaces are easily palpated and the joints are nontender to palpation and without increased warmth or overlying erythema.  Other MCPs, PIPs, wrists, elbows, shoulders, knees, ankles, and MTPs without swelling or tenderness to palpation.  Achilles entheses nontender to palpation.       NEURO: UE and LE strengths 5/5 and equal bilaterally.   SKIN: Dime-sized scaly lesion without surrounding erythema on the dorsal aspect of her right hand  EXT: No LE edema  PSYCH: Alert. Appropriate.    Labs / Imaging (select studies)   RF/CCP  Recent Labs   Lab Test  08/26/13   1611  07/12/13   1015  03/27/13   1351   CCPABY  >250 Strongly Positive*   --    --    RHF   --   32*  24*     CBC  Recent Labs   Lab Test  10/23/18   1000  09/17/18   0936  08/21/18   0941   WBC  6.1  6.1  6.8   RBC  3.80  4.09  4.21   HGB  12.7  13.5  13.4   HCT  38.2  39.8  40.6   MCV  101*  97  96   RDW  13.6  13.7  13.5   PLT  273  276  194   MCH  33.4*  33.0  31.8   MCHC  33.2  33.9  33.0   NEUTROPHIL  69.7  70.3  71.4   LYMPH  " 18.7  21.0  19.0   MONOCYTE  9.8  6.4  7.4   EOSINOPHIL  1.5  1.8  1.9   BASOPHIL  0.3  0.5  0.3   ANEU  4.3  4.3  4.9   ALYM  1.1  1.3  1.3   PEEWEE  0.6  0.4  0.5   AEOS  0.1  0.1  0.1   ABAS  0.0  0.0  0.0     CMP  Recent Labs   Lab Test  10/23/18   1000  09/17/18   0936  08/21/18   0941   10/31/17   1110  06/23/17   0817  04/21/17   0615   NA   --    --    --    --   137  139  145*   POTASSIUM   --    --    --    --   4.5  4.4  4.0   CHLORIDE   --    --    --    --   108  106  111*   CO2   --    --    --    --   24  24  27   ANIONGAP   --    --    --    --   5  9  7   GLC   --    --    --    --   84  115*  109*   BUN   --    --    --    --   22  14  18   CR  0.81  0.99  0.79   < >  0.88  0.75  0.92   GFRESTIMATED  71  57*  73   < >  65  78  62   GFRESTBLACK  86  69  89   < >  79  >90   GFR Calc    75   DEAN   --    --    --    --   9.2  9.0  8.3*   BILITOTAL  0.5  0.4  0.5   < >  0.4  0.4   --    ALBUMIN  3.6  3.8  3.6   < >  3.7  3.8   --    PROTTOTAL  7.8  8.0  7.6   < >  8.0  7.5   --    ALKPHOS  121  127  127   < >  131  120   --    AST  17  17  26   < >  17  22   --    ALT  24  26  24   < >  24  26   --     < > = values in this interval not displayed.     Calcium/VitaminD  Recent Labs   Lab Test  10/31/17   1110  06/23/17   0817  04/21/17   0615   DEAN  9.2  9.0  8.3*     ESR/CRP  Recent Labs   Lab Test  10/23/18   1000  07/18/18   0849  03/26/18   1034   SED  14  12  14   CRP  4.7  7.3  6.8     Lipid Panel  Recent Labs   Lab Test  11/08/17   0726  01/04/12   0859  12/08/10   1032   CHOL  194  296*  218*   TRIG  135  130   --    HDL  47*  41*  39*   LDL  120*  229*  152*   VLDL   --   26   --    CHOLHDLRATIO   --   7.0*   --    NHDL  147*   --    --      Hepatitis B  Recent Labs   Lab Test  09/11/13   1531   HBCAB  Negative   HEPBANG  Negative     Hepatitis C  Recent Labs   Lab Test  09/11/13   1531   HCVAB  Negative     Lyme confirmation testing by Western Blot  Recent Labs   Lab Test   07/12/13   1015   LYWG  Negative Reference range: Negative (Note) Band(s) present: NONE (Insufficient number of bands for positive result) INTERPRETIVE INFORMATION: Borrelia Burgdorferi Ab, IgG Western Blot  For this assay, a positive result is reported when any 5 or more of the following 10 bands are present: 18, 23, 28, 30, 39, 41, 45, 58, 66, or 93 kDa.  All other banding patterns are reported as negative.   LYWM  Negative Reference range: Negative (Note) Band(s) present: 41 kDa (Insufficient number of bands for positive result) INTERPRETIVE INFORMATION: Borrelia Burgdorferi Antibody, IgM Western Blot  For this assay, a positive result is reported when any 2 or more of the following bands are present: 23, 39, or 41 kDa. All other banding patterns are reported as negative. Performed by [facility], [address] [place] [address] [phone] [email or web address] Kings Azevedo MD, Lab. Director     Tuberculosis Screening  Recent Labs   Lab Test  09/11/13   1532   TBRSLT  Negative   TBAGN  0.00     Immunization History     Immunization History   Administered Date(s) Administered     Pneumo Conj 13-V (2010&after) 12/03/2018     Pneumococcal 23 valent 10/24/2013     TDAP Vaccine (Adacel) 06/16/2010     Zoster vaccine recombinant adjuvanted (SHINGRIX) 12/03/2018          Chart documentation done in part with Dragon Voice recognition Software. Although reviewed after completion, some word and grammatical error may remain.    Joseph Rooney MD

## 2019-01-15 DIAGNOSIS — M05.79 RHEUMATOID ARTHRITIS INVOLVING MULTIPLE SITES WITH POSITIVE RHEUMATOID FACTOR (H): Chronic | ICD-10-CM

## 2019-01-15 LAB
ALBUMIN SERPL-MCNC: 4.1 G/DL (ref 3.4–5)
ALP SERPL-CCNC: 122 U/L (ref 40–150)
ALT SERPL W P-5'-P-CCNC: 23 U/L (ref 0–50)
AST SERPL W P-5'-P-CCNC: 20 U/L (ref 0–45)
BASOPHILS # BLD AUTO: 0 10E9/L (ref 0–0.2)
BASOPHILS NFR BLD AUTO: 0.4 %
BILIRUB DIRECT SERPL-MCNC: 0.1 MG/DL (ref 0–0.2)
BILIRUB SERPL-MCNC: 0.4 MG/DL (ref 0.2–1.3)
CREAT SERPL-MCNC: 0.94 MG/DL (ref 0.52–1.04)
DIFFERENTIAL METHOD BLD: NORMAL
EOSINOPHIL # BLD AUTO: 0.1 10E9/L (ref 0–0.7)
EOSINOPHIL NFR BLD AUTO: 1.6 %
ERYTHROCYTE [DISTWIDTH] IN BLOOD BY AUTOMATED COUNT: 12.4 % (ref 10–15)
GFR SERPL CREATININE-BSD FRML MDRD: 64 ML/MIN/{1.73_M2}
HCT VFR BLD AUTO: 41.3 % (ref 35–47)
HGB BLD-MCNC: 13.6 G/DL (ref 11.7–15.7)
LYMPHOCYTES # BLD AUTO: 1 10E9/L (ref 0.8–5.3)
LYMPHOCYTES NFR BLD AUTO: 18.3 %
MCH RBC QN AUTO: 32.9 PG (ref 26.5–33)
MCHC RBC AUTO-ENTMCNC: 32.9 G/DL (ref 31.5–36.5)
MCV RBC AUTO: 100 FL (ref 78–100)
MONOCYTES # BLD AUTO: 0.5 10E9/L (ref 0–1.3)
MONOCYTES NFR BLD AUTO: 9 %
NEUTROPHILS # BLD AUTO: 3.9 10E9/L (ref 1.6–8.3)
NEUTROPHILS NFR BLD AUTO: 70.7 %
PLATELET # BLD AUTO: 240 10E9/L (ref 150–450)
PROT SERPL-MCNC: 8.1 G/DL (ref 6.8–8.8)
RBC # BLD AUTO: 4.13 10E12/L (ref 3.8–5.2)
WBC # BLD AUTO: 5.5 10E9/L (ref 4–11)

## 2019-01-15 PROCEDURE — 36415 COLL VENOUS BLD VENIPUNCTURE: CPT | Performed by: INTERNAL MEDICINE

## 2019-01-15 PROCEDURE — 85025 COMPLETE CBC W/AUTO DIFF WBC: CPT | Performed by: INTERNAL MEDICINE

## 2019-01-15 PROCEDURE — 80076 HEPATIC FUNCTION PANEL: CPT | Performed by: INTERNAL MEDICINE

## 2019-01-15 PROCEDURE — 82565 ASSAY OF CREATININE: CPT | Performed by: INTERNAL MEDICINE

## 2019-05-28 DIAGNOSIS — M05.79 RHEUMATOID ARTHRITIS INVOLVING MULTIPLE SITES WITH POSITIVE RHEUMATOID FACTOR (H): Chronic | ICD-10-CM

## 2019-05-28 LAB
ALBUMIN SERPL-MCNC: 3.6 G/DL (ref 3.4–5)
ALP SERPL-CCNC: 125 U/L (ref 40–150)
ALT SERPL W P-5'-P-CCNC: 26 U/L (ref 0–50)
AST SERPL W P-5'-P-CCNC: 19 U/L (ref 0–45)
BASOPHILS # BLD AUTO: 0 10E9/L (ref 0–0.2)
BASOPHILS NFR BLD AUTO: 0.5 %
BILIRUB DIRECT SERPL-MCNC: 0.1 MG/DL (ref 0–0.2)
BILIRUB SERPL-MCNC: 0.5 MG/DL (ref 0.2–1.3)
CREAT SERPL-MCNC: 0.84 MG/DL (ref 0.52–1.04)
DIFFERENTIAL METHOD BLD: NORMAL
EOSINOPHIL # BLD AUTO: 0.1 10E9/L (ref 0–0.7)
EOSINOPHIL NFR BLD AUTO: 1.7 %
ERYTHROCYTE [DISTWIDTH] IN BLOOD BY AUTOMATED COUNT: 12.6 % (ref 10–15)
GFR SERPL CREATININE-BSD FRML MDRD: 73 ML/MIN/{1.73_M2}
HCT VFR BLD AUTO: 37.5 % (ref 35–47)
HGB BLD-MCNC: 12.5 G/DL (ref 11.7–15.7)
LYMPHOCYTES # BLD AUTO: 1.2 10E9/L (ref 0.8–5.3)
LYMPHOCYTES NFR BLD AUTO: 19.1 %
MCH RBC QN AUTO: 31.8 PG (ref 26.5–33)
MCHC RBC AUTO-ENTMCNC: 33.3 G/DL (ref 31.5–36.5)
MCV RBC AUTO: 95 FL (ref 78–100)
MONOCYTES # BLD AUTO: 0.5 10E9/L (ref 0–1.3)
MONOCYTES NFR BLD AUTO: 7.5 %
NEUTROPHILS # BLD AUTO: 4.6 10E9/L (ref 1.6–8.3)
NEUTROPHILS NFR BLD AUTO: 71.2 %
PLATELET # BLD AUTO: 232 10E9/L (ref 150–450)
PROT SERPL-MCNC: 7.2 G/DL (ref 6.8–8.8)
RBC # BLD AUTO: 3.93 10E12/L (ref 3.8–5.2)
WBC # BLD AUTO: 6.5 10E9/L (ref 4–11)

## 2019-05-28 PROCEDURE — 36415 COLL VENOUS BLD VENIPUNCTURE: CPT | Performed by: INTERNAL MEDICINE

## 2019-05-28 PROCEDURE — 80076 HEPATIC FUNCTION PANEL: CPT | Performed by: INTERNAL MEDICINE

## 2019-05-28 PROCEDURE — 85025 COMPLETE CBC W/AUTO DIFF WBC: CPT | Performed by: INTERNAL MEDICINE

## 2019-05-28 PROCEDURE — 82565 ASSAY OF CREATININE: CPT | Performed by: INTERNAL MEDICINE

## 2019-06-03 ENCOUNTER — OFFICE VISIT (OUTPATIENT)
Dept: RHEUMATOLOGY | Facility: CLINIC | Age: 64
End: 2019-06-03
Payer: COMMERCIAL

## 2019-06-03 VITALS
HEART RATE: 83 BPM | BODY MASS INDEX: 45.99 KG/M2 | HEIGHT: 67 IN | DIASTOLIC BLOOD PRESSURE: 82 MMHG | SYSTOLIC BLOOD PRESSURE: 138 MMHG | OXYGEN SATURATION: 97 % | WEIGHT: 293 LBS

## 2019-06-03 DIAGNOSIS — M05.79 RHEUMATOID ARTHRITIS INVOLVING MULTIPLE SITES WITH POSITIVE RHEUMATOID FACTOR (H): Primary | Chronic | ICD-10-CM

## 2019-06-03 DIAGNOSIS — Z79.899 HIGH RISK MEDICATIONS (NOT ANTICOAGULANTS) LONG-TERM USE: ICD-10-CM

## 2019-06-03 DIAGNOSIS — M25.571 RIGHT ANKLE PAIN, UNSPECIFIED CHRONICITY: ICD-10-CM

## 2019-06-03 DIAGNOSIS — M62.838 MUSCLE SPASM: ICD-10-CM

## 2019-06-03 PROCEDURE — 99214 OFFICE O/P EST MOD 30 MIN: CPT | Performed by: INTERNAL MEDICINE

## 2019-06-03 RX ORDER — CYCLOBENZAPRINE HCL 5 MG
5 TABLET ORAL
Qty: 30 TABLET | Refills: 2 | Status: SHIPPED | OUTPATIENT
Start: 2019-06-03 | End: 2019-10-28

## 2019-06-03 RX ORDER — METHOTREXATE 2.5 MG/1
25 TABLET ORAL WEEKLY
Qty: 120 TABLET | Refills: 2 | Status: SHIPPED | OUTPATIENT
Start: 2019-06-03 | End: 2019-10-28

## 2019-06-03 RX ORDER — HYDROXYCHLOROQUINE SULFATE 200 MG/1
400 TABLET, FILM COATED ORAL DAILY
Qty: 180 TABLET | Refills: 3 | Status: SHIPPED | OUTPATIENT
Start: 2019-06-03 | End: 2020-03-09

## 2019-06-03 ASSESSMENT — MIFFLIN-ST. JEOR: SCORE: 2026.43

## 2019-06-03 NOTE — PATIENT INSTRUCTIONS
Rheumatology    Dr. Joseph Rooney         [place] [facility]   (Monday)  [address]  [place] [address]       [facility]   (Tuesday)  [address]  [place] [address]    [facility]   (Wed., Thurs., and Friday)  [address] [place] [address]    Phone number: [phone]  Thank you for choosing [place].  Keke Finnegan CMA

## 2019-06-03 NOTE — PROGRESS NOTES
"Rheumatology Clinic Visit      Kaylene Diaz MRN# [id number]   YOB: 1955 Age: 63 year old      Date of visit: 6/03/19   PCP: Dr. Hitesh Washington    Chief Complaint   Patient presents with:  Arthritis: RA, patient is having stiffness all over.  Right ankle is painful, would like x-ray. Hips are not good either. Prednisone for 3 days 25mg, 20 mg then 15mg (did on her own) helped swelling in right hand and hips then.    Assessment and Plan     1.  Seropositive rheumatoid arthritis (RF 32, CCP >250): Currently on methotrexate 25 mg once weekly, hydroxychloroquine 400 mg daily.  Previously on sulfasalazine that was discontinued when she found no benefit from it; but she also did not require prednisone while on sulfasalazine; questioning if the \"benefit\" from sulfasalazine was actually the \"benefit\" of not gardening.  Does not tolerate folic acid because of associated increased sunburns.    Most of her current joint pain is related to her gardening activities and is not present when she is not gardening.  She verbalized understanding of this relation.  No synovitis on exam today.  We discussed the option of going to physical therapy but she was not interested in doing so  - Continue methotrexate 25 mg once weekly  - Continue hydroxychloroquine 400 mg daily (last eye exam on 6/29/2018)  - Continue prednisone 5 mg daily as needed for rheumatoid arthritis symptoms; use sparingly.  - Labs every 3 months: CBC, Creatinine, Hepatic Panel    2.  Basal cell carcinoma and squamous cell carcinoma: Several lesions removed by dermatology.  She follows with dermatology every 6 months.  Advised that she see dermatology for the scaly lesion on the dorsal aspect of her right hand    3.  Chronic lower back pain: Has significantly improved with lidocaine patches that have been used no more than 10 times in the past 1 year.  She has been using her mother's lidocaine patches.  Management per PCP    4.  Right ankle pain: She " says that on occasion it hurts.  No reproducible pain on exam today.  Normal range of motion.  No swelling.  She asked for an x-ray but I do not see an indication to get an x-ray at this time.  We discussed the option of physical therapy or potentially seeing podiatry.  She would like to see podiatry; and in fact had already researched which podiatrist she would like to see.  - Podiatry referral    5.  Muscle spasms: She took a friend's muscle relaxant and found that it helped with muscle spasms at night when they were present and to help to sleep quality improve dramatically.  We discussed the risks and side effects of cyclobenzaprine in detail today and she would like to use it as needed at night.  - Start cyclobenzaprine 5 mg nightly as needed for muscle spasms    6.  Vaccinations: Vaccinations reviewed with Ms. Diaz.  Risks and benefits of vaccinations were discussed.   CDC stance on shingrix when on moderate to high immunosuppression reviewed.   - Influenza: encouraged yearly vaccination  - Fibgieb90: up to date  - [id number]: She plans to receive when she is 64 years old  - Shingrix: Second dose as needed and I advised that she go to the pharmacy to receive as this clinic is currently out of stock    Ms. Diaz verbalized agreement with and understanding of the rational for the diagnosis and treatment plan.  All questions were answered to best of my ability and the patient's satisfaction. Ms. Diaz was advised to contact the clinic with any questions that may arise after the clinic visit.      Thank you for involving me in the care of the patient    Return to clinic: 3-4 months      HPI   Kaylene Diaz is a 63 year old female with a past medical history significant for hyperlipidemia, osteoarthritis, osteopenia, meniscal tear, squamous cell carcinoma, basal cell carcinoma, and rheumatoid arthritis who presents for follow-up of rheumatoid arthritis.    Today, Ms. Diaz reports that she is doing okay.  She did  great until she started gardening again and after gardening she has pain in her hands and some in her knees.  As long she does not garden she feels okay.  She is not on sulfasalazine.  She would like to continue her current medications because they are working well for her.  Morning stiffness for less than 30 minutes.  No joint swelling.  She then notes that her right ankle feels like it has been pulled and sometimes bothers her when she is walking but currently does not bother her.  No swelling of the right ankle.  She reports having looked up different podiatrist and would like to see a podiatrist.  She also has for an x-ray of her right ankle to look for soft tissue injury.    Denies fevers, chills, nausea, vomiting, constipation, diarrhea. No abdominal pain. No chest pain/pressure, palpitations, or shortness of breath. No LE swelling. No neck pain. No oral or nasal sores.  No rash. No sicca symptoms.       Tobacco: Quit in 2000  EtOH: Rare  Drugs: None    ROS   GEN: No fevers, chills, night sweats; trying to lose weight  SKIN: No itching, rashes, sores; recurrent skin cancer that is followed by dermatology  HEENT: No oral or nasal ulcers.  CV: No chest pain, pressure, palpitations, or dyspnea on exertion.  PULM: No SOB, wheeze, cough.  GI: No nausea, vomiting, constipation, diarrhea. No blood in stool. No abdominal pain.  : No blood in urine.  MSK: See HPI.  NEURO: No numbness, tingling, or weakness.  EXT: No LE swelling  PSYCH: Negative    Active Problem List     Patient Active Problem List   Diagnosis     Carpal tunnel syndrome     Tear meniscus knee     Osteopenia     Advanced directives, counseling/discussion     Hyperlipidemia LDL goal <160     Osteoarthritis     RA (rheumatoid arthritis) (H)     High risk medications (not anticoagulants) long-term use     Kidney stone     ASCUS on Pap smear     Morbid obesity due to excess calories (H)     Small bowel obstruction (H)     Elevated blood pressure reading  without diagnosis of hypertension     BLANCA (acute kidney injury) (H)     Elevated glucose     SBO (small bowel obstruction) (H)     Rheumatoid arthritis involving multiple sites with positive rheumatoid factor (H)     Past Medical History     Past Medical History:   Diagnosis Date     Basal cell carcinoma      RA (rheumatoid arthritis) (H)      Small bowel obstruction (H)      Squamous cell carcinoma      Past Surgical History     Past Surgical History:   Procedure Laterality Date     ARTHROSCOPY KNEE  12/10/2010    ARTHROSCOPY KNEE performed by NAVID FIERRO at [facility]     COLONOSCOPY N/A 8/9/2018    Procedure: COLONOSCOPY;  colonoscopy;  Surgeon: Luke Kaye MD;  Location: [facility]     SURGICAL HISTORY OF -       IUD removal     SURGICAL HISTORY OF -       Ovarian cyst     SURGICAL HISTORY OF -       thorn removal from foot     SURGICAL HISTORY OF -       knee surgery, scope, right knee     Allergy     Allergies   Allergen Reactions     Folic Acid      Gold Rash     Latex Rash     Not all latex products     Current Medication List     Current Outpatient Medications   Medication Sig     fluticasone (VERAMYST) 27.5 MCG/SPRAY spray Spray 1 spray into both nostrils 2 times daily     hydroxychloroquine (PLAQUENIL) 200 MG tablet Take 2 tablets (400 mg) by mouth daily     methotrexate sodium 2.5 MG TABS Take 10 tablets (25 mg) by mouth once a week . Take all 10 tablets on the same day of each week.     Multiple Vitamins-Minerals (ADULT GUMMY PO) Take 1 chew tab by mouth daily VitaCrave     Multiple Vitamins-Minerals (HAIR/SKIN/NAILS/BIOTIN PO) Take 1 capsule by mouth daily.      predniSONE (DELTASONE) 5 MG tablet Take 1 tablet (5 mg) by mouth daily as needed for rheumatoid arthritis; use sparingly     vitamin  B complex with vitamin C (VITAMIN  B COMPLEX) TABS Take 1 tablet by mouth daily     albuterol (PROAIR HFA/PROVENTIL HFA/VENTOLIN HFA) 108 (90 BASE) MCG/ACT Inhaler Inhale 1-2 puffs into the lungs every 4 hours as  "needed for shortness of breath / dyspnea or wheezing (Patient not taking: Reported on 12/3/2018)     benzonatate (TESSALON) 200 MG capsule Take 1 capsule (200 mg) by mouth 3 times daily as needed for cough (Patient not taking: Reported on 7/23/2018)     fluticasone (FLONASE) 50 MCG/ACT spray Spray 1-2 sprays into both nostrils daily For 1 week then as needed (Patient not taking: Reported on 12/3/2018)     lidocaine (LIDODERM) 5 % Patch Apply to the skin for lower back pain as needed (Patient not taking: Reported on 12/3/2018)     sulfaSALAzine ER (AZULFIDINE EN) 500 MG EC tablet Take 2 tablets (1,000 mg) by mouth 2 times daily (Patient not taking: Reported on 6/3/2019)     No current facility-administered medications for this visit.          Social History   See HPI    Family History     Family History   Problem Relation Age of Onset     Prostate Cancer Father      Eye Disorder Father         mac degen     Heart Disease Father      Macular Degeneration Father      Heart Disease Mother         a-fib     Eye Disorder Mother      C.A.D. Mother      Hypertension Mother      Alcohol/Drug Brother      Alcohol/Drug Sister      Alcohol/Drug Brother      Alcohol/Drug Brother      Alcohol/Drug Brother      Alcohol/Drug Sister      Obesity Sister      Alcohol/Drug Sister      Prostate Cancer Sister      Cancer Other      Melanoma No family hx of        Physical Exam     Temp Readings from Last 3 Encounters:   12/03/18 97.8  F (36.6  C) (Oral)   08/09/18 97.8  F (36.6  C) (Oral)   03/26/18 97.8  F (36.6  C) (Oral)     BP Readings from Last 5 Encounters:   12/03/18 136/82   08/09/18 103/56   07/23/18 124/78   06/20/18 165/78   03/26/18 165/85     Pulse Readings from Last 1 Encounters:   06/03/19 83     Resp Readings from Last 1 Encounters:   08/09/18 16     Estimated body mass index is 49.85 kg/m  as calculated from the following:    Height as of this encounter: 1.702 m (5' 7\").    Weight as of this encounter: 144.4 kg (318 lb " 4.8 oz).    GEN: NAD; obese  HEENT: MMM. No oral lesions. Anicteric, noninjected sclera  CV: S1, S2. RRR. No m/r/g.  PULM: CTA bilaterally. No w/c.  MSK: Fullness of the right second-fourth MCPs that were nontender to palpation and without increased warmth or overlying erythema.   Other MCPs, PIPs, wrists, elbows, shoulders, knees, ankles, and MTPs without swelling or tenderness to palpation.  Achilles entheses nontender to palpation.     Right ankle without swelling; normal range of motion; nontender to palpation.  NEURO: UE and LE strengths 5/5 and equal bilaterally.   SKIN: Dime-sized scaly lesion without surrounding erythema on the dorsal aspect of her right wrist; when examining this she tells me that she is planning to see her dermatologist soon to have this looked at   EXT: No LE edema  PSYCH: Alert. Appropriate.    Labs / Imaging (select studies)   RF/CCP  Recent Labs   Lab Test 08/26/13  1611 07/12/13  1015 03/27/13  1351   CCPABY >250 Strongly Positive*  --   --    RHF  --  32* 24*     CBC  Recent Labs   Lab Test 05/28/19  1057 01/15/19  1059 10/23/18  1000   WBC 6.5 5.5 6.1   RBC 3.93 4.13 3.80   HGB 12.5 13.6 12.7   HCT 37.5 41.3 38.2   MCV 95 100 101*   RDW 12.6 12.4 13.6    240 273   MCH 31.8 32.9 33.4*   MCHC 33.3 32.9 33.2   NEUTROPHIL 71.2 70.7 69.7   LYMPH 19.1 18.3 18.7   MONOCYTE 7.5 9.0 9.8   EOSINOPHIL 1.7 1.6 1.5   BASOPHIL 0.5 0.4 0.3   ANEU 4.6 3.9 4.3   ALYM 1.2 1.0 1.1   PEEWEE 0.5 0.5 0.6   AEOS 0.1 0.1 0.1   ABAS 0.0 0.0 0.0     CMP  Recent Labs   Lab Test 05/28/19  1057 01/15/19  1059 10/23/18  1000  10/31/17  1110 06/23/17  0817 04/21/17  0615   NA  --   --   --   --  137 139 145*   POTASSIUM  --   --   --   --  4.5 4.4 4.0   CHLORIDE  --   --   --   --  108 106 111*   CO2  --   --   --   --  24 24 27   ANIONGAP  --   --   --   --  5 9 7   GLC  --   --   --   --  84 115* 109*   BUN  --   --   --   --  22 14 18   CR 0.84 0.94 0.81   < > 0.88 0.75 0.92   GFRESTIMATED 73 64 71   < >  65 78 62   GFRESTBLACK 85 75 86   < > 79 >90   GFR Calc   75   DEAN  --   --   --   --  9.2 9.0 8.3*   BILITOTAL 0.5 0.4 0.5   < > 0.4 0.4  --    ALBUMIN 3.6 4.1 3.6   < > 3.7 3.8  --    PROTTOTAL 7.2 8.1 7.8   < > 8.0 7.5  --    ALKPHOS 125 122 121   < > 131 120  --    AST 19 20 17   < > 17 22  --    ALT 26 23 24   < > 24 26  --     < > = values in this interval not displayed.     Calcium/VitaminD  Recent Labs   Lab Test 10/31/17  1110 06/23/17  0817 04/21/17  0615   DEAN 9.2 9.0 8.3*     ESR/CRP  Recent Labs   Lab Test 10/23/18  1000 07/18/18  0849 03/26/18  1034   SED 14 12 14   CRP 4.7 7.3 6.8       Hepatitis B  Recent Labs   Lab Test 09/11/13  1531   HBCAB Negative   HEPBANG Negative     Hepatitis C  Recent Labs   Lab Test 09/11/13  1531   HCVAB Negative       Lyme confirmation testing by Western Blot  Recent Labs   Lab Test 07/12/13  1015   LYWG Negative Reference range: Negative (Note) Band(s) present: NONE (Insufficient number of bands for positive result) INTERPRETIVE INFORMATION: Borrelia Burgdorferi Ab, IgG Western Blot  For this assay, a positive result is reported when any 5 or more of the following 10 bands are present: 18, 23, 28, 30, 39, 41, 45, 58, 66, or 93 kDa.  All other banding patterns are reported as negative.   LYWM Negative Reference range: Negative (Note) Band(s) present: 41 kDa (Insufficient number of bands for positive result) INTERPRETIVE INFORMATION: Borrelia Burgdorferi Antibody, IgM Western Blot  For this assay, a positive result is reported when any 2 or more of the following bands are present: 23, 39, or 41 kDa. All other banding patterns are reported as negative. Performed by [facility], [address] [place] [id number] [phone] [email or web address], Kings Azevedo MD, Lab. Director     Tuberculosis Screening  Recent Labs   Lab Test 09/11/13  1532   TBRSLT Negative   TBAGN 0.00     Immunization History     Immunization History   Administered Date(s) Administered      Pneumo Conj 13-V (2010&after) 12/03/2018     Pneumococcal 23 valent 10/24/2013     TDAP Vaccine (Adacel) 06/16/2010     Zoster vaccine recombinant adjuvanted (SHINGRIX) 12/03/2018          Chart documentation done in part with Dragon Voice recognition Software. Although reviewed after completion, some word and grammatical error may remain.    Joseph Rooney MD

## 2019-06-04 ENCOUNTER — TELEPHONE (OUTPATIENT)
Dept: FAMILY MEDICINE | Facility: CLINIC | Age: 64
End: 2019-06-04

## 2019-06-04 DIAGNOSIS — M25.571 RIGHT ANKLE PAIN, UNSPECIFIED CHRONICITY: Primary | ICD-10-CM

## 2019-06-04 NOTE — TELEPHONE ENCOUNTER
I have not seen her in over 2 years.  I will need to see her for documentation and why.  Hitesh Washington MD  Family Medicine

## 2019-06-04 NOTE — TELEPHONE ENCOUNTER
Reason for Call:  Other referral    Detailed comments: Patient is asking for a referral to see Podiatry in [facility].    Phone Number Patient can be reached at: Home number on file [phone] (home)    Best Time: any    Can we leave a detailed message on this number? YES    Call taken on 6/4/2019 at 9:34 AM by Tabitha Adames

## 2019-06-04 NOTE — TELEPHONE ENCOUNTER
Patient notified. Patient verbalizes understanding and agreement. Scheduled clinic visit.      ROÁMN DuffN, RN

## 2019-06-04 NOTE — TELEPHONE ENCOUNTER
"Patient asking for referral for Podiatry from PCP. Dr. Rooney wrote referral yesterday for \"Right ankle pain, unspecified chronicity\" but patient needs from PCP.     Advised patient that her last appointment with Dr. Washington was 2017. Advised that she would need visit prior to referral. Patient states \"frankly, I think he would sign if he knew it was coming from me\".      ROMÁN DfufN, RN    "

## 2019-06-18 ENCOUNTER — OFFICE VISIT (OUTPATIENT)
Dept: DERMATOLOGY | Facility: CLINIC | Age: 64
End: 2019-06-18
Payer: COMMERCIAL

## 2019-06-18 VITALS — SYSTOLIC BLOOD PRESSURE: 149 MMHG | OXYGEN SATURATION: 97 % | HEART RATE: 78 BPM | DIASTOLIC BLOOD PRESSURE: 74 MMHG

## 2019-06-18 DIAGNOSIS — C44.629 SQUAMOUS CELL CANCER OF SKIN OF LEFT HAND: ICD-10-CM

## 2019-06-18 DIAGNOSIS — L57.0 AK (ACTINIC KERATOSIS): Primary | ICD-10-CM

## 2019-06-18 DIAGNOSIS — L82.1 SEBORRHEIC KERATOSIS: ICD-10-CM

## 2019-06-18 DIAGNOSIS — L81.4 LENTIGO: ICD-10-CM

## 2019-06-18 DIAGNOSIS — D18.01 HEMANGIOMA OF SKIN: ICD-10-CM

## 2019-06-18 DIAGNOSIS — Z85.828 HISTORY OF SKIN CANCER: ICD-10-CM

## 2019-06-18 PROCEDURE — 99213 OFFICE O/P EST LOW 20 MIN: CPT | Mod: 25 | Performed by: DERMATOLOGY

## 2019-06-18 PROCEDURE — 17000 DESTRUCT PREMALG LESION: CPT | Mod: 59 | Performed by: DERMATOLOGY

## 2019-06-18 PROCEDURE — 17003 DESTRUCT PREMALG LES 2-14: CPT | Performed by: DERMATOLOGY

## 2019-06-18 PROCEDURE — 88331 PATH CONSLTJ SURG 1 BLK 1SPC: CPT | Mod: 59 | Performed by: DERMATOLOGY

## 2019-06-18 PROCEDURE — 11106 INCAL BX SKN SINGLE LES: CPT | Mod: 59 | Performed by: DERMATOLOGY

## 2019-06-18 PROCEDURE — 17311 MOHS 1 STAGE H/N/HF/G: CPT | Performed by: DERMATOLOGY

## 2019-06-18 NOTE — PROGRESS NOTES
Kaylene Diaz is a 63 year old year old female patient here today for spot on left hand .  Patient states this has been present for a while.  Patient reports the following symptoms:  tender.  Patient reports the following previous treatments none.  Patient reports the following modifying factors none.  Associated symptoms: none.  Patient has no other skin complaints today.  Remainder of the HPI, Meds, PMH, Allergies, FH, and SH was reviewed in chart.      Past Medical History:   Diagnosis Date     Basal cell carcinoma      RA (rheumatoid arthritis) (H)      Small bowel obstruction (H)      Squamous cell carcinoma        Past Surgical History:   Procedure Laterality Date     ARTHROSCOPY KNEE  12/10/2010    ARTHROSCOPY KNEE performed by NAVID FIERRO at [facility]     COLONOSCOPY N/A 8/9/2018    Procedure: COLONOSCOPY;  colonoscopy;  Surgeon: Luke Kaye MD;  Location: [facility]     SURGICAL HISTORY OF -       IUD removal     SURGICAL HISTORY OF -       Ovarian cyst     SURGICAL HISTORY OF -       thorn removal from foot     SURGICAL HISTORY OF -       knee surgery, scope, right knee        Family History   Problem Relation Age of Onset     Prostate Cancer Father      Eye Disorder Father         mac degen     Heart Disease Father      Macular Degeneration Father      Heart Disease Mother         a-fib     Eye Disorder Mother      C.A.D. Mother      Hypertension Mother      Alcohol/Drug Brother      Alcohol/Drug Sister      Alcohol/Drug Brother      Alcohol/Drug Brother      Alcohol/Drug Brother      Alcohol/Drug Sister      Obesity Sister      Alcohol/Drug Sister      Prostate Cancer Sister      Cancer Other      Melanoma No family hx of        Social History     Socioeconomic History     Marital status:      Spouse name: Not on file     Number of children: Not on file     Years of education: Not on file     Highest education level: Not on file   Occupational History     Employer: UNEMPLOYED   Social Needs      Financial resource strain: Not on file     Food insecurity:     Worry: Not on file     Inability: Not on file     Transportation needs:     Medical: Not on file     Non-medical: Not on file   Tobacco Use     Smoking status: Former Smoker     Years: 30.00     Last attempt to quit: 2000     Years since quittin.1     Smokeless tobacco: Never Used   Substance and Sexual Activity     Alcohol use: Yes     Comment: VERY RARE     Drug use: No     Sexual activity: Yes     Partners: Male   Lifestyle     Physical activity:     Days per week: Not on file     Minutes per session: Not on file     Stress: Not on file   Relationships     Social connections:     Talks on phone: Not on file     Gets together: Not on file     Attends Muslim service: Not on file     Active member of club or organization: Not on file     Attends meetings of clubs or organizations: Not on file     Relationship status: Not on file     Intimate partner violence:     Fear of current or ex partner: Not on file     Emotionally abused: Not on file     Physically abused: Not on file     Forced sexual activity: Not on file   Other Topics Concern     Parent/sibling w/ CABG, MI or angioplasty before 65F 55M? No   Social History Narrative     Not on file       Outpatient Encounter Medications as of 2019   Medication Sig Dispense Refill     albuterol (PROAIR HFA/PROVENTIL HFA/VENTOLIN HFA) 108 (90 BASE) MCG/ACT Inhaler Inhale 1-2 puffs into the lungs every 4 hours as needed for shortness of breath / dyspnea or wheezing (Patient not taking: Reported on 12/3/2018) 1 Inhaler 0     benzonatate (TESSALON) 200 MG capsule Take 1 capsule (200 mg) by mouth 3 times daily as needed for cough (Patient not taking: Reported on 2018) 21 capsule 0     cyclobenzaprine (FLEXERIL) 5 MG tablet Take 1 tablet (5 mg) by mouth nightly as needed for muscle spasms 30 tablet 2     fluticasone (FLONASE) 50 MCG/ACT spray Spray 1-2 sprays into both nostrils daily For 1  week then as needed (Patient not taking: Reported on 12/3/2018) 1 Bottle 11     fluticasone (VERAMYST) 27.5 MCG/SPRAY spray Spray 1 spray into both nostrils 2 times daily       hydroxychloroquine (PLAQUENIL) 200 MG tablet Take 2 tablets (400 mg) by mouth daily 180 tablet 3     lidocaine (LIDODERM) 5 % Patch Apply to the skin for lower back pain as needed (Patient not taking: Reported on 12/3/2018) 30 patch 0     methotrexate sodium 2.5 MG TABS Take 10 tablets (25 mg) by mouth once a week . Take all 10 tablets on the same day of each week. 120 tablet 2     Multiple Vitamins-Minerals (ADULT GUMMY PO) Take 1 chew tab by mouth daily VitaCrave       Multiple Vitamins-Minerals (HAIR/SKIN/NAILS/BIOTIN PO) Take 1 capsule by mouth daily.  100 tablet 3     predniSONE (DELTASONE) 5 MG tablet Take 1 tablet (5 mg) by mouth daily as needed for rheumatoid arthritis; use sparingly 30 tablet 0     vitamin  B complex with vitamin C (VITAMIN  B COMPLEX) TABS Take 1 tablet by mouth daily  0     No facility-administered encounter medications on file as of 6/18/2019.              Review Of Systems  Skin: As above  Eyes: negative  Ears/Nose/Throat: negative  Respiratory: No shortness of breath, dyspnea on exertion, cough, or hemoptysis  Cardiovascular: negative  Gastrointestinal: negative  Genitourinary: negative  Musculoskeletal: negative  Neurologic: negative  Psychiatric: negative  Hematologic/Lymphatic/Immunologic: negative  Endocrine: negative      O:   NAD, WDWN, Alert & Oriented, Mood & Affect wnl, Vitals stable   Here today alone   /74   Pulse 78   SpO2 97%    General appearance normal   Vitals stable   Alert, oriented and in no acute distress      Following lymph nodes palpated: Occipital, Cervical, Supraclavicular n lad   R upper lip crusty scaly papule , Chest scaly papule , L cheek scaly papule    L dorsal hand 6mm      Stuck on papules and brown macules on trunk and ext   Red papules on trunk  Flesh colored papules on  trunk     The remainder of the full exam was unremarkable; the following areas were examined:  conjunctiva/lids, oral mucosa, neck, peripheral vascular system, abdomen, lymph nodes, digits/nails, eccrine and apocrine glands, scalp/hair, face, neck, chest, abdomen, buttocks, back, RUE, LUE, RLE, LLE       Eyes: Conjunctivae/lids:Normal     ENT: Lips, buccal mucosa, tongue: normal    MSK:Normal    Cardiovascular: peripheral edema none    Pulm: Breathing Normal    Lymph Nodes: No Head and Neck Lymphadenopathy     Neuro/Psych: Orientation:Normal; Mood/Affect:Normal      MICRO:   L dorsal hand:There is hyperkeratosis & parakeratosis of the epidermis, with full thickness epidermal involvement by atypical keratinocytes with rare pale vacuolated cells invading dermis.    A/P:  1. Seborrheic keratosis, lentigo, angioma, dermal nevus, hx of non-melanoma skin cancer   2. Actinic keratosis   LN2:  Treated with LN2 for 5s for 1-2 cycles. Warned risks of blistering, pain, pigment change, scarring, and incomplete resolution.  Advised patient to return if lesions do not completely resolve.  Wound care sheet given.  R upper lip crusty scaly papule , Chest scaly papule , L cheek scaly papule   3. L dorsal hand r/o squamous cell carcinoma   Incisional BIOPSY IN HOUSE:  After consent, anesthesia with LEC and prep, incision performed and dx above confirmed with frozen section histology.  No complications and routine wound care.  Patient told result squamous cell carcinoma .      BENIGN LESIONS DISCUSSED WITH PATIENT:  I discussed the specifics of tumor, prognosis, and genetics of benign lesions.  I explained that treatment of these lesions would be purely cosmetic and not medically neccessary.  I discussed with patient different removal options including excision, cautery and /or laser.      Nature and genetics of benign skin lesions dicussed with patient.  Signs and Symptoms of skin cancer discussed with patient.  ABCDEs of melanoma  reviewed with patient.  Patient encouraged to perform monthly skin exams.  UV precautions reviewed with patient.  Patient to follow up with Primary Care provider regarding elevated blood pressure.  Skin care regimen reviewed with patient: Eliminate harsh soaps, i.e. Dial, zest, irsih spring; Mild soaps such as Cetaphil or Dove sensitive skin, avoid hot or cold showers, aggressive use of emollients including vanicream, cetaphil or cerave discussed with patient.    Risks of non-melanoma skin cancer discussed with patient   Return to clinic 6 months    PROCEDURE NOTe  L dorsal hand squamous cell carcinoma   MOHS:   Location    After PGACAC discussed with patient, decision for Mohs surgery was made. Indication for Mohs was Location. Patient confirmed the site with Dr. Springer.  After anesthesia with LEC, the tumor was excised using standard Mohs technique in 1 stages(s).  CLEAR MARGINS OBTAINED and Final defect size was 1.1 x 1.1 cm.       REPAIR SECOND INTENT: We discussed the options for wound management in full with the patient including risks/benefits/possible outcomes. Decision made to allow the wound to heal by second intention. EBL minimal; complications none; wound care routine.  The patient was discharged in good condition and will return in one month or prn for wound evaluation.

## 2019-06-18 NOTE — LETTER
6/18/2019         RE: Kaylene Diaz  [id number] [place] [address]        Dear Colleague,    Thank you for referring your patient, Kaylene Diaz, to the [facility]. Please see a copy of my visit note below.    Kaylene Diaz is a 63 year old year old female patient here today for spot on left hand .  Patient states this has been present for a while.  Patient reports the following symptoms:  tender.  Patient reports the following previous treatments none.  Patient reports the following modifying factors none.  Associated symptoms: none.  Patient has no other skin complaints today.  Remainder of the HPI, Meds, PMH, Allergies, FH, and SH was reviewed in chart.      Past Medical History:   Diagnosis Date     Basal cell carcinoma      RA (rheumatoid arthritis) (H)      Small bowel obstruction (H)      Squamous cell carcinoma        Past Surgical History:   Procedure Laterality Date     ARTHROSCOPY KNEE  12/10/2010    ARTHROSCOPY KNEE performed by NAVID FIERRO at [facility]     COLONOSCOPY N/A 8/9/2018    Procedure: COLONOSCOPY;  colonoscopy;  Surgeon: Luke Kaye MD;  Location: [facility]     SURGICAL HISTORY OF -       IUD removal     SURGICAL HISTORY OF -       Ovarian cyst     SURGICAL HISTORY OF -       thorn removal from foot     SURGICAL HISTORY OF -       knee surgery, scope, right knee        Family History   Problem Relation Age of Onset     Prostate Cancer Father      Eye Disorder Father         mac degen     Heart Disease Father      Macular Degeneration Father      Heart Disease Mother         a-fib     Eye Disorder Mother      C.A.D. Mother      Hypertension Mother      Alcohol/Drug Brother      Alcohol/Drug Sister      Alcohol/Drug Brother      Alcohol/Drug Brother      Alcohol/Drug Brother      Alcohol/Drug Sister      Obesity Sister      Alcohol/Drug Sister      Prostate Cancer Sister      Cancer Other      Melanoma No family hx of        Social History     Socioeconomic History      Marital status:      Spouse name: Not on file     Number of children: Not on file     Years of education: Not on file     Highest education level: Not on file   Occupational History     Employer: UNEMPLOYED   Social Needs     Financial resource strain: Not on file     Food insecurity:     Worry: Not on file     Inability: Not on file     Transportation needs:     Medical: Not on file     Non-medical: Not on file   Tobacco Use     Smoking status: Former Smoker     Years: 30.00     Last attempt to quit: 2000     Years since quittin.1     Smokeless tobacco: Never Used   Substance and Sexual Activity     Alcohol use: Yes     Comment: VERY RARE     Drug use: No     Sexual activity: Yes     Partners: Male   Lifestyle     Physical activity:     Days per week: Not on file     Minutes per session: Not on file     Stress: Not on file   Relationships     Social connections:     Talks on phone: Not on file     Gets together: Not on file     Attends Uatsdin service: Not on file     Active member of club or organization: Not on file     Attends meetings of clubs or organizations: Not on file     Relationship status: Not on file     Intimate partner violence:     Fear of current or ex partner: Not on file     Emotionally abused: Not on file     Physically abused: Not on file     Forced sexual activity: Not on file   Other Topics Concern     Parent/sibling w/ CABG, MI or angioplasty before 65F 55M? No   Social History Narrative     Not on file       Outpatient Encounter Medications as of 2019   Medication Sig Dispense Refill     albuterol (PROAIR HFA/PROVENTIL HFA/VENTOLIN HFA) 108 (90 BASE) MCG/ACT Inhaler Inhale 1-2 puffs into the lungs every 4 hours as needed for shortness of breath / dyspnea or wheezing (Patient not taking: Reported on 12/3/2018) 1 Inhaler 0     benzonatate (TESSALON) 200 MG capsule Take 1 capsule (200 mg) by mouth 3 times daily as needed for cough (Patient not taking: Reported on  7/23/2018) 21 capsule 0     cyclobenzaprine (FLEXERIL) 5 MG tablet Take 1 tablet (5 mg) by mouth nightly as needed for muscle spasms 30 tablet 2     fluticasone (FLONASE) 50 MCG/ACT spray Spray 1-2 sprays into both nostrils daily For 1 week then as needed (Patient not taking: Reported on 12/3/2018) 1 Bottle 11     fluticasone (VERAMYST) 27.5 MCG/SPRAY spray Spray 1 spray into both nostrils 2 times daily       hydroxychloroquine (PLAQUENIL) 200 MG tablet Take 2 tablets (400 mg) by mouth daily 180 tablet 3     lidocaine (LIDODERM) 5 % Patch Apply to the skin for lower back pain as needed (Patient not taking: Reported on 12/3/2018) 30 patch 0     methotrexate sodium 2.5 MG TABS Take 10 tablets (25 mg) by mouth once a week . Take all 10 tablets on the same day of each week. 120 tablet 2     Multiple Vitamins-Minerals (ADULT GUMMY PO) Take 1 chew tab by mouth daily VitaCrave       Multiple Vitamins-Minerals (HAIR/SKIN/NAILS/BIOTIN PO) Take 1 capsule by mouth daily.  100 tablet 3     predniSONE (DELTASONE) 5 MG tablet Take 1 tablet (5 mg) by mouth daily as needed for rheumatoid arthritis; use sparingly 30 tablet 0     vitamin  B complex with vitamin C (VITAMIN  B COMPLEX) TABS Take 1 tablet by mouth daily  0     No facility-administered encounter medications on file as of 6/18/2019.              Review Of Systems  Skin: As above  Eyes: negative  Ears/Nose/Throat: negative  Respiratory: No shortness of breath, dyspnea on exertion, cough, or hemoptysis  Cardiovascular: negative  Gastrointestinal: negative  Genitourinary: negative  Musculoskeletal: negative  Neurologic: negative  Psychiatric: negative  Hematologic/Lymphatic/Immunologic: negative  Endocrine: negative      O:   NAD, WDWN, Alert & Oriented, Mood & Affect wnl, Vitals stable   Here today alone   /74   Pulse 78   SpO2 97%    General appearance normal   Vitals stable   Alert, oriented and in no acute distress      Following lymph nodes palpated: Occipital,  Cervical, Supraclavicular n lad   R upper lip crusty scaly papule , Chest scaly papule , L cheek scaly papule    L dorsal hand 6mm      Stuck on papules and brown macules on trunk and ext   Red papules on trunk  Flesh colored papules on trunk     The remainder of the full exam was unremarkable; the following areas were examined:  conjunctiva/lids, oral mucosa, neck, peripheral vascular system, abdomen, lymph nodes, digits/nails, eccrine and apocrine glands, scalp/hair, face, neck, chest, abdomen, buttocks, back, RUE, LUE, RLE, LLE       Eyes: Conjunctivae/lids:Normal     ENT: Lips, buccal mucosa, tongue: normal    MSK:Normal    Cardiovascular: peripheral edema none    Pulm: Breathing Normal    Lymph Nodes: No Head and Neck Lymphadenopathy     Neuro/Psych: Orientation:Normal; Mood/Affect:Normal      MICRO:   L dorsal hand:There is hyperkeratosis & parakeratosis of the epidermis, with full thickness epidermal involvement by atypical keratinocytes with rare pale vacuolated cells invading dermis.    A/P:  1. Seborrheic keratosis, lentigo, angioma, dermal nevus, hx of non-melanoma skin cancer   2. Actinic keratosis   LN2:  Treated with LN2 for 5s for 1-2 cycles. Warned risks of blistering, pain, pigment change, scarring, and incomplete resolution.  Advised patient to return if lesions do not completely resolve.  Wound care sheet given.  R upper lip crusty scaly papule , Chest scaly papule , L cheek scaly papule   3. L dorsal hand r/o squamous cell carcinoma   Incisional BIOPSY IN HOUSE:  After consent, anesthesia with LEC and prep, incision performed and dx above confirmed with frozen section histology.  No complications and routine wound care.  Patient told result squamous cell carcinoma .      BENIGN LESIONS DISCUSSED WITH PATIENT:  I discussed the specifics of tumor, prognosis, and genetics of benign lesions.  I explained that treatment of these lesions would be purely cosmetic and not medically neccessary.  I  discussed with patient different removal options including excision, cautery and /or laser.      Nature and genetics of benign skin lesions dicussed with patient.  Signs and Symptoms of skin cancer discussed with patient.  ABCDEs of melanoma reviewed with patient.  Patient encouraged to perform monthly skin exams.  UV precautions reviewed with patient.  Patient to follow up with Primary Care provider regarding elevated blood pressure.  Skin care regimen reviewed with patient: Eliminate harsh soaps, i.e. Dial, zest, irsih spring; Mild soaps such as Cetaphil or Dove sensitive skin, avoid hot or cold showers, aggressive use of emollients including vanicream, cetaphil or cerave discussed with patient.    Risks of non-melanoma skin cancer discussed with patient   Return to clinic 6 months    PROCEDURE NOTe  L dorsal hand squamous cell carcinoma   MOHS:   Location    After PGACAC discussed with patient, decision for Mohs surgery was made. Indication for Mohs was Location. Patient confirmed the site with Dr. Springer.  After anesthesia with LEC, the tumor was excised using standard Mohs technique in 1 stages(s).  CLEAR MARGINS OBTAINED and Final defect size was 1.1 x 1.1 cm.       REPAIR SECOND INTENT: We discussed the options for wound management in full with the patient including risks/benefits/possible outcomes. Decision made to allow the wound to heal by second intention. EBL minimal; complications none; wound care routine.  The patient was discharged in good condition and will return in one month or prn for wound evaluation.      Again, thank you for allowing me to participate in the care of your patient.        Sincerely,        Cipriano Springer MD

## 2019-06-18 NOTE — PATIENT INSTRUCTIONS
Open Wound Care     for __HAND____________        ? No strenuous activity for 48 hours    ? Take Tylenol as needed for discomfort.                                                .         ? Do not drink alcoholic beverages for 48 hours.    ? Keep the pressure bandage in place for 24 hours. If the bandage becomes blood tinged or loose, reinforce it with gauze and tape.        (Refer to the reverse side of this page for management of bleeding).    ? Remove bandage in 24 hours and begin wound care as follows:     1. Clean area with tap water using a Q tip or gauze pad, (shower / bathe normally)  2. Dry wound with Q tip or gauze pad  3. Apply Aquaphor, Vaseline, Polysporin or Bacitracin Ointment with a Q tip    Do NOT use Neosporin Ointment *  4. Cover the wound with a band-aid or nonstick gauze pad and paper tape.  5. Repeat wound care once a day until wound is completely healed.    It is an old wives tale that a wound heals better when it is exposed to air and allowed to dry out. The wound will heal faster with a better cosmetic result if it is kept moist with ointment and covered with a bandage.  Do not let the wound dry out.      Supplies Needed:                Qtips or gauze pads                Polysporin or Bacitracin Ointment                Bandaids or nonstick gauze pads and paper tape    Wound care kits and brown paper tape are available for purchase at   the pharmacy.    BLEEDIN. Use tightly rolled up gauze or cloth to apply direct pressure over the bandage for 20   minutes.  2. Reapply pressure for an additional 20 minutes if necessary  3. Call the office or go to the nearest emergency room if pressure fails to stop the bleeding.  4. Use additional gauze and tape to maintain pressure once the bleeding has stopped.  5. Begin wound care 24 hours after surgery as directed.                  WOUND HEALING    1. One week after surgery a pink / red halo will form around the outside of the wound.   This is new  skin.  2. The center of the wound will appear yellowish white and produce some drainage.  3. The pink halo will slowly migrate in toward the center of the wound until the wound is covered with new shiny pink skin.  4. There will be no more drainage when the wound is completely healed.  5. It will take six months to one year for the redness to fade.  6. The scar may be itchy, tight and sensitive to extreme temperatures for a year after the surgery.  7. Massaging the area several times a day for several minutes after the wound is completely healed will help the scar soften and normalize faster. Begin massage only after healing is complete.      In case of emergency call: Dr Springer: [phone]     [facility]: [phone]    [facility]: [phone]      WOUND CARE INSTRUCTIONS   FOR CRYOSURGERY   This area treated with liquid nitrogen should form a blister (areas treated may or may not blister-skin may just turn dark and slough off). You do not need to bandage the area unless a blister forms and breaks (which may be a few days). When the blister breaks, begin daily dressing changes as follows:  1) Clean and dry the area with tap water using clean Q-tip or sterile gauze pad.   2) Apply Polysporin ointment or Bacitracin ointment over entire wound. Do NOT use Neosporin ointment.   3) Cover the wound with a band-aid or sterile non-stick gauze pad and micropore paper tape.   REPEAT THESE INSTRUCTIONS AT LEAST ONCE A DAY UNTIL THE WOUND HAS COMPLETELY HEALED.   It is an old wives tale that a wound heals better when it is exposed to air and allowed to dry out. The wound will heal faster with a better cosmetic result if it is kept moist with ointment and covered with a bandage.   Do not let the wound dry out.   IMPORTANT INFORMATION ON REVERSE SIDE   Supplies Needed:   *Cotton tipped applicators (Q-tips)   *Polysporin ointment or Bacitracin ointment (NOT NEOSPORIN)   *Band-aids, or non stick gauze pads  and micropore paper tape   PATIENT INFORMATION   During the healing process you will notice a number of changes. All wounds develop a small halo of redness surrounding the wound. This means healing is occurring. Severe itching with extensive redness usually indicates sensitivity to the ointment or bandage tape used to dress the wound. You should call our office if this develops.   Swelling and/or discoloration around your surgical site is common, particularly when performed around the eye.   All wounds normally drain. The larger the wound the more drainage there will be. After 7-10 days, you will notice the wound beginning to shrink and new skin will begin to grow. The wound is healed when you can see skin has formed over the entire area. A healed wound has a healthy, shiny look to the surface and is red to dark pink in color to normalize. Wounds may take approximately 4-6 weeks to heal. Larger wounds may take 6-8 weeks. After the wound is healed you may discontinue dressing changes.   You may experience a sensation of tightness as your wound heals. This is normal and will gradually subside.   Your healed wound may be sensitive to temperature changes. This sensitivity improves with time, but if you re having a lot of discomfort, try to avoid temperature extremes.   Patients frequently experience itching after their wound appears to have healed because of the continue healing under the skin. Plain Vaseline will help relieve the itching.

## 2019-06-18 NOTE — NURSING NOTE
"Initial /74   Pulse 78   SpO2 97%  Estimated body mass index is 49.85 kg/m  as calculated from the following:    Height as of 6/3/19: 1.702 m (5' 7\").    Weight as of 6/3/19: 144.4 kg (318 lb 4.8 oz). .    Dayan Boland LPN    "

## 2019-06-19 ENCOUNTER — ALLIED HEALTH/NURSE VISIT (OUTPATIENT)
Dept: FAMILY MEDICINE | Facility: OTHER | Age: 64
End: 2019-06-19
Payer: COMMERCIAL

## 2019-06-19 ENCOUNTER — OFFICE VISIT (OUTPATIENT)
Dept: PODIATRY | Facility: OTHER | Age: 64
End: 2019-06-19
Attending: INTERNAL MEDICINE
Payer: COMMERCIAL

## 2019-06-19 VITALS
SYSTOLIC BLOOD PRESSURE: 130 MMHG | WEIGHT: 293 LBS | HEIGHT: 67 IN | BODY MASS INDEX: 45.99 KG/M2 | DIASTOLIC BLOOD PRESSURE: 76 MMHG

## 2019-06-19 DIAGNOSIS — Z23 NEED FOR VACCINATION: Primary | ICD-10-CM

## 2019-06-19 DIAGNOSIS — M77.31 CALCANEAL SPUR OF RIGHT FOOT: ICD-10-CM

## 2019-06-19 DIAGNOSIS — M05.79 RHEUMATOID ARTHRITIS INVOLVING MULTIPLE SITES WITH POSITIVE RHEUMATOID FACTOR (H): Primary | ICD-10-CM

## 2019-06-19 DIAGNOSIS — M76.61 ACHILLES TENDINITIS OF RIGHT LOWER EXTREMITY: ICD-10-CM

## 2019-06-19 PROCEDURE — 90471 IMMUNIZATION ADMIN: CPT

## 2019-06-19 PROCEDURE — 90750 HZV VACC RECOMBINANT IM: CPT

## 2019-06-19 PROCEDURE — 99243 OFF/OP CNSLTJ NEW/EST LOW 30: CPT | Performed by: PODIATRIST

## 2019-06-19 ASSESSMENT — MIFFLIN-ST. JEOR: SCORE: 2011.46

## 2019-06-19 ASSESSMENT — PAIN SCALES - GENERAL: PAINLEVEL: MODERATE PAIN (5)

## 2019-06-19 NOTE — PROGRESS NOTES
HPI:  Kaylene Diaz is a 63 year old female who is seen in consultation at the request of Joseph Rooney MD    Pt presents for eval of:   (Onset, Location, L/R, Character, Treatments, Injury if yes)    XR Left and Right foot 3/26/2018    4/2013 - Left achilles tendonitis, calcaneal spur, treated by Gabriel Norman DPM     1. Onset early April 2019, posterior Right achilles pain and lump. No injury noted. Presents today with unsupportive slip on shoes.   Constant dull ache   Intermittent, sharp, stabbing, throbbing, tightness with first steps after lying or sitting, pain 5-9 worse when stretching in garden.  Stretching, elevate    2. Ongoing Left and Right discolored, thick toenails and dry feet.    Retired from [employer].    Weight management plan: Patient was referred to their PCP to discuss a diet and exercise plan.     Patient to follow up with Primary Care provider regarding elevated blood pressure.    ROS:  10 point ROS neg other than the symptoms noted above in the HPI.    Patient Active Problem List   Diagnosis     Carpal tunnel syndrome     Tear meniscus knee     Osteopenia     Advanced directives, counseling/discussion     Hyperlipidemia LDL goal <160     Osteoarthritis     RA (rheumatoid arthritis) (H)     High risk medications (not anticoagulants) long-term use     Kidney stone     ASCUS on Pap smear     Morbid obesity due to excess calories (H)     Small bowel obstruction (H)     Elevated blood pressure reading without diagnosis of hypertension     BLANCA (acute kidney injury) (H)     Elevated glucose     SBO (small bowel obstruction) (H)     Rheumatoid arthritis involving multiple sites with positive rheumatoid factor (H)       PAST MEDICAL HISTORY:   Past Medical History:   Diagnosis Date     Basal cell carcinoma      RA (rheumatoid arthritis) (H)      Small bowel obstruction (H)      Squamous cell carcinoma         PAST SURGICAL HISTORY:   Past Surgical History:   Procedure Laterality Date     ARTHROSCOPY KNEE   12/10/2010    ARTHROSCOPY KNEE performed by NAVID FIERRO at [facility]     COLONOSCOPY N/A 8/9/2018    Procedure: COLONOSCOPY;  colonoscopy;  Surgeon: Luke Kaye MD;  Location: [facility]     SURGICAL HISTORY OF -       IUD removal     SURGICAL HISTORY OF -       Ovarian cyst     SURGICAL HISTORY OF -       thorn removal from foot     SURGICAL HISTORY OF -       knee surgery, scope, right knee        MEDICATIONS:   Current Outpatient Medications:      albuterol (PROAIR HFA/PROVENTIL HFA/VENTOLIN HFA) 108 (90 BASE) MCG/ACT Inhaler, Inhale 1-2 puffs into the lungs every 4 hours as needed for shortness of breath / dyspnea or wheezing, Disp: 1 Inhaler, Rfl: 0     cyclobenzaprine (FLEXERIL) 5 MG tablet, Take 1 tablet (5 mg) by mouth nightly as needed for muscle spasms, Disp: 30 tablet, Rfl: 2     fluticasone (FLONASE) 50 MCG/ACT spray, Spray 1-2 sprays into both nostrils daily For 1 week then as needed, Disp: 1 Bottle, Rfl: 11     fluticasone (VERAMYST) 27.5 MCG/SPRAY spray, Spray 1 spray into both nostrils 2 times daily, Disp: , Rfl:      hydroxychloroquine (PLAQUENIL) 200 MG tablet, Take 2 tablets (400 mg) by mouth daily, Disp: 180 tablet, Rfl: 3     lidocaine (LIDODERM) 5 % Patch, Apply to the skin for lower back pain as needed, Disp: 30 patch, Rfl: 0     methotrexate sodium 2.5 MG TABS, Take 10 tablets (25 mg) by mouth once a week . Take all 10 tablets on the same day of each week., Disp: 120 tablet, Rfl: 2     Multiple Vitamins-Minerals (ADULT GUMMY PO), Take 1 chew tab by mouth daily VitaCrave, Disp: , Rfl:      Multiple Vitamins-Minerals (HAIR/SKIN/NAILS/BIOTIN PO), Take 1 capsule by mouth daily. , Disp: 100 tablet, Rfl: 3     predniSONE (DELTASONE) 5 MG tablet, Take 1 tablet (5 mg) by mouth daily as needed for rheumatoid arthritis; use sparingly, Disp: 30 tablet, Rfl: 0     vitamin  B complex with vitamin C (VITAMIN  B COMPLEX) TABS, Take 1 tablet by mouth daily, Disp: , Rfl: 0     benzonatate (TESSALON) 200 MG  capsule, Take 1 capsule (200 mg) by mouth 3 times daily as needed for cough, Disp: 21 capsule, Rfl: 0     ALLERGIES:    Allergies   Allergen Reactions     Folic Acid      Gold Rash     Latex Rash     Not all latex products        SOCIAL HISTORY:   Social History     Socioeconomic History     Marital status:      Spouse name: Not on file     Number of children: Not on file     Years of education: Not on file     Highest education level: Not on file   Occupational History     Employer: UNEMPLOYED   Social Needs     Financial resource strain: Not on file     Food insecurity:     Worry: Not on file     Inability: Not on file     Transportation needs:     Medical: Not on file     Non-medical: Not on file   Tobacco Use     Smoking status: Former Smoker     Years: 30     Last attempt to quit: 2000     Years since quittin.1     Smokeless tobacco: Never Used   Substance and Sexual Activity     Alcohol use: Yes     Comment: VERY RARE     Drug use: No     Sexual activity: Yes     Partners: Male   Lifestyle     Physical activity:     Days per week: Not on file     Minutes per session: Not on file     Stress: Not on file   Relationships     Social connections:     Talks on phone: Not on file     Gets together: Not on file     Attends [employer] service: Not on file     Active member of club or organization: Not on file     Attends meetings of clubs or organizations: Not on file     Relationship status: Not on file     Intimate partner violence:     Fear of current or ex partner: Not on file     Emotionally abused: Not on file     Physically abused: Not on file     Forced sexual activity: Not on file   Other Topics Concern     Parent/sibling w/ CABG, MI or angioplasty before 65F 55M? No   Social History Narrative     Not on file        FAMILY HISTORY:   Family History   Problem Relation Age of Onset     Prostate Cancer Father      Eye Disorder Father         mac degen     Heart Disease Father      Macular  "Degeneration Father      Heart Disease Mother         a-fib     Eye Disorder Mother      CARL Mother      Hypertension Mother      Alcohol/Drug Brother      Alcohol/Drug Sister      Alcohol/Drug Brother      Alcohol/Drug Brother      Alcohol/Drug Brother      Alcohol/Drug Sister      Obesity Sister      Alcohol/Drug Sister      Prostate Cancer Sister      Cancer Other      Melanoma No family hx of         EXAM:Vitals: /76 (BP Location: Right arm, Cuff Size: Adult Large)   Ht 1.702 m (5' 7\")   Wt 142.9 kg (315 lb)   BMI 49.34 kg/m    BMI= Body mass index is 49.34 kg/m .    General appearance: Patient is alert and fully cooperative with history & exam.  No sign of distress is noted during the visit.     Psychiatric: Affect is pleasant & appropriate.  Patient appears motivated to improve health.     Respiratory: Breathing is regular & unlabored while sitting.     HEENT: Hearing is intact to spoken word.  Speech is clear.  No gross evidence of visual impairment that would impact ambulation.     Vascular: DP & PT pulses are intact & regular bilaterally.  No significant edema or varicosities noted.  CFT and skin temperature is normal to both lower extremities.     Neurologic: Lower extremity sensation is intact to light touch.  No evidence of weakness or contracture in the lower extremities.  No evidence of neuropathy.    Dermatologic: Skin is intact to both lower extremities with adequate texture, turgor and tone about the integument.  No paronychia or evidence of soft tissue infection is noted.     Musculoskeletal: Patient is ambulatory without assistive device or brace.  Gross prominence is noted about the posterior calcaneus at the insertion of the Achilles tendon bilateral right much greater than left.  Bony prominence is noted not just edema.  There is also edema throughout the insertion.  Some discomfort throughout the insertion and watershed.  No erythema or heat.  About 0 degrees of ankle joint " dorsiflexion noted on the right and slightly more on the left.  No painful range of motion throughout the ankle subtalar midtarsal metatarsal phalangeal joints.  More than 0 degrees ankle joint dorsiflexion noted with flexion of the knee.    Radiographs: Previous imaging demonstrates insertional osteophyte about the Achilles tendon without degenerative changes of the ankle and subtalar joint.     ASSESSMENT:       ICD-10-CM    1. Rheumatoid arthritis involving multiple sites with positive rheumatoid factor (H) M05.79 PHYSICAL THERAPY REFERRAL   2. Achilles tendinitis of right lower extremity M76.61 PHYSICAL THERAPY REFERRAL   3. Calcaneal spur of right foot M77.31 PHYSICAL THERAPY REFERRAL        PLAN:  Reviewed patient's chart in [facility].      6/19/2019   Conservative options include sturdy supportive shoe gear that do not compress or aggravate the abnormal bone morphology of the posterior calcaneus.  However flexible flimsy shoes will increase recruitment of the Achilles tendon and exacerbate her condition therefore recommended appropriate shoe fitters that could accommodate her deformity.  Recommended custom molded orthotics to provide more support and keep her foot more square throughout ambulation and reduce recruitment of the Achilles tendon.  Most important tool for her at this time would be night splint to reduce post static dyskinesia.  This was dispensed today and instructed how to use it.  Also order to begin physical therapy today in [place], multiple modalities to begin regular stretching routine progressing to home routines as well as ultrasound or and or iontophoresis and other modalities to decrease symptoms and edema.  Long-term goal for physical therapy should include home stretching routine for this patient daily.    Recommended weight loss and discussed how current weight increases load and recruitment of the Achilles tendon and will worsen this problem.  Motivational interviewing today regarding  weight loss.    Also discussed surgical reconstruction.  We reviewed expected outcome and postoperative needs.  This would provide more definitive treatment for her however due to her age she would likely have some level of recurrence over time.  Also this would require mostly nonweightbearing during the first month or very minimal activities followed by another month or 2 of the restrictions.  All questions were answered.    Follow-up in a couple of months after maximizing the conservative options above.    Written instructions dispensed today.    Jacky Guallpa DPM

## 2019-06-19 NOTE — LETTER
6/19/2019         RE: Kaylene Diaz  [id number] [place] [id number]        Dear Colleague,    Thank you for referring your patient, Kaylene Diaz, to the [facility]. Please see a copy of my visit note below.    HPI:  Kaylene Diaz is a 63 year old female who is seen in consultation at the request of Joseph Rooney MD    Pt presents for eval of:   (Onset, Location, L/R, Character, Treatments, Injury if yes)    XR Left and Right foot 3/26/2018    4/2013 - Left achilles tendonitis, calcaneal spur, treated by Gabriel Norman DPM     1. Onset early April 2019, posterior Right achilles pain and lump. No injury noted. Presents today with unsupportive slip on shoes.   Constant dull ache   Intermittent, sharp, stabbing, throbbing, tightness with first steps after lying or sitting, pain 5-9 worse when stretching in garden.  Stretching, elevate    2. Ongoing Left and Right discolored, thick toenails and dry feet.    Retired from [employer].    Weight management plan: Patient was referred to their PCP to discuss a diet and exercise plan.     Patient to follow up with Primary Care provider regarding elevated blood pressure.    ROS:  10 point ROS neg other than the symptoms noted above in the HPI.    Patient Active Problem List   Diagnosis     Carpal tunnel syndrome     Tear meniscus knee     Osteopenia     Advanced directives, counseling/discussion     Hyperlipidemia LDL goal <160     Osteoarthritis     RA (rheumatoid arthritis) (H)     High risk medications (not anticoagulants) long-term use     Kidney stone     ASCUS on Pap smear     Morbid obesity due to excess calories (H)     Small bowel obstruction (H)     Elevated blood pressure reading without diagnosis of hypertension     BLANCA (acute kidney injury) (H)     Elevated glucose     SBO (small bowel obstruction) (H)     Rheumatoid arthritis involving multiple sites with positive rheumatoid factor (H)       PAST MEDICAL HISTORY:   Past Medical History:   Diagnosis  Date     Basal cell carcinoma      RA (rheumatoid arthritis) (H)      Small bowel obstruction (H)      Squamous cell carcinoma         PAST SURGICAL HISTORY:   Past Surgical History:   Procedure Laterality Date     ARTHROSCOPY KNEE  12/10/2010    ARTHROSCOPY KNEE performed by NAVID FIERRO at [facility]     COLONOSCOPY N/A 8/9/2018    Procedure: COLONOSCOPY;  colonoscopy;  Surgeon: Luke Kaye MD;  Location: [facility]     SURGICAL HISTORY OF -       IUD removal     SURGICAL HISTORY OF -       Ovarian cyst     SURGICAL HISTORY OF -       thorn removal from foot     SURGICAL HISTORY OF -       knee surgery, scope, right knee        MEDICATIONS:   Current Outpatient Medications:      albuterol (PROAIR HFA/PROVENTIL HFA/VENTOLIN HFA) 108 (90 BASE) MCG/ACT Inhaler, Inhale 1-2 puffs into the lungs every 4 hours as needed for shortness of breath / dyspnea or wheezing, Disp: 1 Inhaler, Rfl: 0     cyclobenzaprine (FLEXERIL) 5 MG tablet, Take 1 tablet (5 mg) by mouth nightly as needed for muscle spasms, Disp: 30 tablet, Rfl: 2     fluticasone (FLONASE) 50 MCG/ACT spray, Spray 1-2 sprays into both nostrils daily For 1 week then as needed, Disp: 1 Bottle, Rfl: 11     fluticasone (VERAMYST) 27.5 MCG/SPRAY spray, Spray 1 spray into both nostrils 2 times daily, Disp: , Rfl:      hydroxychloroquine (PLAQUENIL) 200 MG tablet, Take 2 tablets (400 mg) by mouth daily, Disp: 180 tablet, Rfl: 3     lidocaine (LIDODERM) 5 % Patch, Apply to the skin for lower back pain as needed, Disp: 30 patch, Rfl: 0     methotrexate sodium 2.5 MG TABS, Take 10 tablets (25 mg) by mouth once a week . Take all 10 tablets on the same day of each week., Disp: 120 tablet, Rfl: 2     Multiple Vitamins-Minerals (ADULT GUMMY PO), Take 1 chew tab by mouth daily VitaCrave, Disp: , Rfl:      Multiple Vitamins-Minerals (HAIR/SKIN/NAILS/BIOTIN PO), Take 1 capsule by mouth daily. , Disp: 100 tablet, Rfl: 3     predniSONE (DELTASONE) 5 MG tablet, Take 1 tablet (5 mg) by  mouth daily as needed for rheumatoid arthritis; use sparingly, Disp: 30 tablet, Rfl: 0     vitamin  B complex with vitamin C (VITAMIN  B COMPLEX) TABS, Take 1 tablet by mouth daily, Disp: , Rfl: 0     benzonatate (TESSALON) 200 MG capsule, Take 1 capsule (200 mg) by mouth 3 times daily as needed for cough, Disp: 21 capsule, Rfl: 0     ALLERGIES:    Allergies   Allergen Reactions     Folic Acid      Gold Rash     Latex Rash     Not all latex products        SOCIAL HISTORY:   Social History     Socioeconomic History     Marital status:      Spouse name: Not on file     Number of children: Not on file     Years of education: Not on file     Highest education level: Not on file   Occupational History     Employer: UNEMPLOYED   Social Needs     Financial resource strain: Not on file     Food insecurity:     Worry: Not on file     Inability: Not on file     Transportation needs:     Medical: Not on file     Non-medical: Not on file   Tobacco Use     Smoking status: Former Smoker     Years: 30.00     Last attempt to quit: 2000     Years since quittin.1     Smokeless tobacco: Never Used   Substance and Sexual Activity     Alcohol use: Yes     Comment: VERY RARE     Drug use: No     Sexual activity: Yes     Partners: Male   Lifestyle     Physical activity:     Days per week: Not on file     Minutes per session: Not on file     Stress: Not on file   Relationships     Social connections:     Talks on phone: Not on file     Gets together: Not on file     Attends [employer] service: Not on file     Active member of club or organization: Not on file     Attends meetings of clubs or organizations: Not on file     Relationship status: Not on file     Intimate partner violence:     Fear of current or ex partner: Not on file     Emotionally abused: Not on file     Physically abused: Not on file     Forced sexual activity: Not on file   Other Topics Concern     Parent/sibling w/ CABG, MI or angioplasty before 65F 55M?  "No   Social History Narrative     Not on file        FAMILY HISTORY:   Family History   Problem Relation Age of Onset     Prostate Cancer Father      Eye Disorder Father         mac degen     Heart Disease Father      Macular Degeneration Father      Heart Disease Mother         a-fib     Eye Disorder Mother      C.A.D. Mother      Hypertension Mother      Alcohol/Drug Brother      Alcohol/Drug Sister      Alcohol/Drug Brother      Alcohol/Drug Brother      Alcohol/Drug Brother      Alcohol/Drug Sister      Obesity Sister      Alcohol/Drug Sister      Prostate Cancer Sister      Cancer Other      Melanoma No family hx of         EXAM:Vitals: /76 (BP Location: Right arm, Cuff Size: Adult Large)   Ht 1.702 m (5' 7\")   Wt 142.9 kg (315 lb)   BMI 49.34 kg/m     BMI= Body mass index is 49.34 kg/m .    General appearance: Patient is alert and fully cooperative with history & exam.  No sign of distress is noted during the visit.     Psychiatric: Affect is pleasant & appropriate.  Patient appears motivated to improve health.     Respiratory: Breathing is regular & unlabored while sitting.     HEENT: Hearing is intact to spoken word.  Speech is clear.  No gross evidence of visual impairment that would impact ambulation.     Vascular: DP & PT pulses are intact & regular bilaterally.  No significant edema or varicosities noted.  CFT and skin temperature is normal to both lower extremities.     Neurologic: Lower extremity sensation is intact to light touch.  No evidence of weakness or contracture in the lower extremities.  No evidence of neuropathy.    Dermatologic: Skin is intact to both lower extremities with adequate texture, turgor and tone about the integument.  No paronychia or evidence of soft tissue infection is noted.     Musculoskeletal: Patient is ambulatory without assistive device or brace.  Gross prominence is noted about the posterior calcaneus at the insertion of the Achilles tendon bilateral right much " greater than left.  Bony prominence is noted not just edema.  There is also edema throughout the insertion.  Some discomfort throughout the insertion and watershed.  No erythema or heat.  About 0 degrees of ankle joint dorsiflexion noted on the right and slightly more on the left.  No painful range of motion throughout the ankle subtalar midtarsal metatarsal phalangeal joints.  More than 0 degrees ankle joint dorsiflexion noted with flexion of the knee.    Radiographs: Previous imaging demonstrates insertional osteophyte about the Achilles tendon without degenerative changes of the ankle and subtalar joint.     ASSESSMENT:       ICD-10-CM    1. Rheumatoid arthritis involving multiple sites with positive rheumatoid factor (H) M05.79 PHYSICAL THERAPY REFERRAL   2. Achilles tendinitis of right lower extremity M76.61 PHYSICAL THERAPY REFERRAL   3. Calcaneal spur of right foot M77.31 PHYSICAL THERAPY REFERRAL        PLAN:  Reviewed patient's chart in [facility].      6/19/2019   Conservative options include sturdy supportive shoe gear that do not compress or aggravate the abnormal bone morphology of the posterior calcaneus.  However flexible flimsy shoes will increase recruitment of the Achilles tendon and exacerbate her condition therefore recommended appropriate shoe fitters that could accommodate her deformity.  Recommended custom molded orthotics to provide more support and keep her foot more square throughout ambulation and reduce recruitment of the Achilles tendon.  Most important tool for her at this time would be night splint to reduce post static dyskinesia.  This was dispensed today and instructed how to use it.  Also order to begin physical therapy today in [place], multiple modalities to begin regular stretching routine progressing to home routines as well as ultrasound or and or iontophoresis and other modalities to decrease symptoms and edema.  Long-term goal for physical therapy should include home stretching  routine for this patient daily.    Recommended weight loss and discussed how current weight increases load and recruitment of the Achilles tendon and will worsen this problem.  Motivational interviewing today regarding weight loss.    Also discussed surgical reconstruction.  We reviewed expected outcome and postoperative needs.  This would provide more definitive treatment for her however due to her age she would likely have some level of recurrence over time.  Also this would require mostly nonweightbearing during the first month or very minimal activities followed by another month or 2 of the restrictions.  All questions were answered.    Follow-up in a couple of months after maximizing the conservative options above.    Written instructions dispensed today.    Jacky Guallpa DPM      Again, thank you for allowing me to participate in the care of your patient.        Sincerely,        Jacky Guallpa DPM

## 2019-06-19 NOTE — NURSING NOTE
Dispensed 1 Dorsal (Anterior) Night Splint, Size S/M, with [facility] agreement signed by patient. Miya Montelongo CMA, June 19, 2019

## 2019-06-19 NOTE — PROGRESS NOTES
Screening Questionnaire for Adult Immunization    Are you sick today?   No   Do you have allergies to medications, food, a vaccine component or latex?   Yes- Folic acid, latex and gold   Have you ever had a serious reaction after receiving a vaccination?   No   Do you have a long-term health problem with heart disease, lung disease, asthma, kidney disease, metabolic disease (e.g. diabetes), anemia, or other blood disorder?   No   Do you have cancer, leukemia, HIV/AIDS, or any other immune system problem?   Yes- Skin cancer   In the past 3 months, have you taken medications that affect  your immune system, such as prednisone, other steroids, or anticancer drugs; drugs for the treatment of rheumatoid arthritis, Crohn s disease, or psoriasis; or have you had radiation treatments?   Yes- Methotrexate and prednisone.    Have you had a seizure, or a brain or other nervous system problem?   No   During the past year, have you received a transfusion of blood or blood     products, or been given immune (gamma) globulin or antiviral drug?   No   For women: Are you pregnant or is there a chance you could become        pregnant during the next month?   No   Have you received any vaccinations in the past 4 weeks?   No     Immunization questionnaire was positive for at least one answer.  Notified Veronica Lloyd .        Per orders of Veronica Lloyd , injection of Shingrix  given by Gabriella Mccain. Patient instructed to remain in clinic for 15 minutes afterwards, and to report any adverse reaction to me immediately.       Screening performed by Gabriella Mccain on 6/19/2019 at 10:33 AM.

## 2019-06-19 NOTE — PATIENT INSTRUCTIONS
Reliable shoe stores: To maximize your experience and provide the best possible fit.  Be sure to show them your foot concerns and tell them Dr. Guallpa sent you.      Stores listed in bold have only athletic shoes, and stores that are not bold are mostly casual or variety of shoes    [employer]  [address] [place] [address]  [phone]    [facility] [employer] [facility] [place]  [address] [place] [address]  [phone]     [facility]  [address] [place] [address]  [phone]    [employer]  [address] [facility]  [facility] [address]  [phone]    [employer]  [address] [place], [place] [address] [phone]    [employer]  [address] [place] [address]  [phone]                         [employer] Locations:     [address] [place] [address]   [phone]     [address] [place] [address] [address] [place] [address]   [phone]     [address] [place] [address]   [phone]     [address] [facility] [place] [address]   [phone]     [address] [facility] [place] [place] [address]   [phone]     [address] [place] [address]   [phone]     [address] [facility] [address]   [place] [address]   [phone]     [address] [facility]   [place] [address]   [phone]             How to find reasonable shoes          The correct width    Correct Fitting    Correct Length      Foot Distortion    Posture Distortion                          Torsional Rigidity      Grasp behind the heel and underneath the foot and twist      Bad    Excessive torsion/twist in midfoot     Less torsion/twist in midfoot is better                   Heel Counter Rigidity      Grasp just above   midsole and squeeze      Bad    Soft heel counter      Good    Rigid Heel Counter      Flexion Rigidity      Grasp shoe and bend from forefoot to rearfoot                Nail Debridement    A high quality instrument  "makes trimming toenails MUCH easier.  Search ebay for any 5\" nail nipper manufactured by reliable brands such as Miltex, Integra or Jarit as these quality instruments will help manage difficult nails more effectively and comfortably. We use Miltex -SS.  A physician is not necessary to trim nails even if you are taking blood thinners or are diabetic.  Your family or care givers may help manage your toenails.      Trim or sand the nails once weekly.  Do not wait until they are long and painful or trimming will become too difficult and painful and will increase your risk of complications or infection.  A course file or 120 grit sandpaper on a sanding block can be helpful.  For very thick nails many people prefer battery operated mackey such as an Amope', Personal Pedi and Emjoi for regular use or heavy painful callouses or thick toenails.    Trim or skive any portion of nail that is thick, loose, crumbling, or not well attached. Do not tear the nail away, but rather cut them with a nail nipperor sand or sand them down.  You may follow up with your Podiatric Physician if you have pain, bleeding, infection, questions or other concerns.      You may also contact the following Registered Nurses for further help with nail debridement and minor hygiene concnerns.  They may come to your home or meet them at their clinic to trim your toenails and soak your feet, as well as monitor for any complications that would require evaluation by a Physician.      [facility]  Fidelia Galvin RN  Office [phone]    [facility]  Terri Luna RN  [phone]   Call or text for appointment  Some home visits and has a clinic at:  [address] [place] [id number]    [employer] [facility]  Eze Zavala RN  [phone]    [employer]  [phone]  [place]    [employer] [facility] [facility]  [phone]  [email or web address] " [facility]    For up to date list and to find foot care nurses in other communities visit American [employer] Nurses Association website:  afcna.org.     Calluses, Corns, IPKs, Porokeratosis    When there is excessive friction or pressure on the skin, the body responds by making the skin thicker.  While this may protect the deeper structures, the thickened skin can take up more space and thus increase pressure over a bony prominence or become an open sore or skin ulcer as this skin becomes less flexible.    Flat, diffuse thickening are simple calluses and they are usually caused by friction.  Often these are the result of rubbing on a shoe or going barefoot.    Calluses with a central core between the toes are called corns.  These often result from prominent joints on adjacent toes rubbing together.  Theses are often a symptom of bone malalignment and will usually recur unless the underlying bones are addressed.    Many of these lesions can be kept comfortable with routine maintenance. This consists of filing them with a Ped Egg, callus file, or 120 grit sandpaper on a block, every day during your bath or shower.  Most people prefer battery operated mackey such as an Amope', Personal Pedi and Emjoi for regular use or heavy painful callouses.  Heavy creams or ointments can be applied 1-2 times every day to keep them soft. Toe spacers can be used for corns, gel pads can be used for other lesions on the bottom of the foot. If there is a deformity noted, such as a prominent bone, often this can be addressed to minimize recurrence. However, sometimes the pressure and lesion simply migrates to another spot after surgery, so it is not a guaranteed cure.     If you have severe callouses and cracking, you may apply heavy ointments that you scoop up such as Cetaphil cream, Eucerin, Aquaphor or Vaseline.  Be sure to obtain cream or ointment in these brands and not lotion (lotion is water based and not durable enough for  feet). For more aggressive help apply heavy creams or ointment under occlusive dressings such as Saran Wrap or Jelly Feet while sleeping.   Jelly Feet can be obtained at [email or web address].     To be successful with managing hyperkeratotic skin, you must manage hygiene daily.  Apply the cream once or twice EVERY day.  At your bath or shower time is the easiest time to work on this when skin is most soft.  There is no medical or surgical treatment that will absolutely eliminate many of these symptoms.      [employer] is a reliable source for all sorts of foot pads, cushions, or interdigital spacers and foot appliances. Go to www.[email or web address] or request a catalog at 6-178-Violet Grey.        Please call with any additional questions.

## 2019-06-20 ENCOUNTER — TELEPHONE (OUTPATIENT)
Dept: PODIATRY | Facility: CLINIC | Age: 64
End: 2019-06-20

## 2019-06-20 NOTE — TELEPHONE ENCOUNTER
[facility]  @ [phone]. Miya Montelongo CMA, June 20, 2019      Sp[facility]. We agreed to fax the current order to them and they will let us know if there is anything further they need to get services covered for this patient.   [facility]  Phone: [phone]  Fax: [phone]  Miya Montelongo CMA, June 20, 2019

## 2019-06-20 NOTE — TELEPHONE ENCOUNTER
Reason for Call: Request for an order or referral:    Order or referral being requested: order/referral, pt would like to go to [facility] for PT as it is closer to home for her.     Date needed: as soon as possible, pt will call back with fax #    Has the patient been seen by the PCP for this problem? YES    Additional comments: this needs to state PT orders for, how many , ect........ Please call pt with any questions     Phone number Patient can be reached at:  Cell number on file:    Telephone Information:   Mobile [phone]       Best Time:  any    Can we leave a detailed message on this number?  YES    Call taken on 6/20/2019 at 8:58 AM by Elli Ryan

## 2019-07-15 ENCOUNTER — OFFICE VISIT (OUTPATIENT)
Dept: FAMILY MEDICINE | Facility: CLINIC | Age: 64
End: 2019-07-15
Payer: COMMERCIAL

## 2019-07-15 VITALS
DIASTOLIC BLOOD PRESSURE: 80 MMHG | WEIGHT: 293 LBS | SYSTOLIC BLOOD PRESSURE: 134 MMHG | RESPIRATION RATE: 16 BRPM | BODY MASS INDEX: 47.09 KG/M2 | HEART RATE: 81 BPM | OXYGEN SATURATION: 99 % | TEMPERATURE: 98.6 F | HEIGHT: 66 IN

## 2019-07-15 DIAGNOSIS — J02.9 PHARYNGITIS, UNSPECIFIED ETIOLOGY: ICD-10-CM

## 2019-07-15 DIAGNOSIS — J01.90 ACUTE SINUSITIS WITH SYMPTOMS > 10 DAYS: Primary | ICD-10-CM

## 2019-07-15 LAB
DEPRECATED S PYO AG THROAT QL EIA: NORMAL
SPECIMEN SOURCE: NORMAL

## 2019-07-15 PROCEDURE — 99213 OFFICE O/P EST LOW 20 MIN: CPT | Performed by: NURSE PRACTITIONER

## 2019-07-15 PROCEDURE — 87880 STREP A ASSAY W/OPTIC: CPT | Performed by: NURSE PRACTITIONER

## 2019-07-15 PROCEDURE — 87081 CULTURE SCREEN ONLY: CPT | Performed by: NURSE PRACTITIONER

## 2019-07-15 ASSESSMENT — MIFFLIN-ST. JEOR: SCORE: 1924.82

## 2019-07-15 NOTE — PROGRESS NOTES
"Kaylene Diaz is a 63 year old female who presents to clinic today for the following health issues:    HPI   RESPIRATORY SYMPTOMS      Duration: X 5 days    Description  rhinorrhea, sore throat, cough, fever, fatigue/malaise and hoarse voice    Severity: moderate    Accompanying signs and symptoms: None    History (predisposing factors):  none    Precipitating or alleviating factors: None    Therapies tried and outcome:  Gargling with salt water, \"is better than nothing\"    Mild congestion, phlegm is a lot, sore throat like a razor blade since it started, fatigued, low grade fever but is usually lower than normal, and cough that is mild.   Phlegm was green at first and the last day is clear but still green.  No shortness of breath or chest pain.  No rashes on skin. Eating and drinking okay.      Patient Active Problem List   Diagnosis     Carpal tunnel syndrome     Tear meniscus knee     Osteopenia     Advanced directives, counseling/discussion     Hyperlipidemia LDL goal <160     Osteoarthritis     RA (rheumatoid arthritis) (H)     High risk medications (not anticoagulants) long-term use     Kidney stone     ASCUS on Pap smear     Morbid obesity due to excess calories (H)     Small bowel obstruction (H)     Elevated blood pressure reading without diagnosis of hypertension     BLANCA (acute kidney injury) (H)     Elevated glucose     SBO (small bowel obstruction) (H)     Rheumatoid arthritis involving multiple sites with positive rheumatoid factor (H)     Past Surgical History:   Procedure Laterality Date     ARTHROSCOPY KNEE  12/10/2010    ARTHROSCOPY KNEE performed by NAVID FIERRO at [facility]     COLONOSCOPY N/A 8/9/2018    Procedure: COLONOSCOPY;  colonoscopy;  Surgeon: Luke Kaye MD;  Location: [facility]     SURGICAL HISTORY OF -       IUD removal     SURGICAL HISTORY OF -       Ovarian cyst     SURGICAL HISTORY OF -       thorn removal from foot     SURGICAL HISTORY OF -       knee surgery, scope, right knee     "   Social History     Tobacco Use     Smoking status: Former Smoker     Years: 30.00     Last attempt to quit: 2000     Years since quittin.2     Smokeless tobacco: Never Used   Substance Use Topics     Alcohol use: Yes     Comment: VERY RARE     Family History   Problem Relation Age of Onset     Prostate Cancer Father      Eye Disorder Father         mac degen     Heart Disease Father      Macular Degeneration Father      Heart Disease Mother         a-fib     Eye Disorder Mother      C.A.D. Mother      Hypertension Mother      Alcohol/Drug Brother      Alcohol/Drug Sister      Alcohol/Drug Brother      Alcohol/Drug Brother      Alcohol/Drug Brother      Alcohol/Drug Sister      Obesity Sister      Alcohol/Drug Sister      Prostate Cancer Sister      Cancer Other      Melanoma No family hx of          Current Outpatient Medications   Medication Sig Dispense Refill     cyclobenzaprine (FLEXERIL) 5 MG tablet Take 1 tablet (5 mg) by mouth nightly as needed for muscle spasms 30 tablet 2     hydroxychloroquine (PLAQUENIL) 200 MG tablet Take 2 tablets (400 mg) by mouth daily 180 tablet 3     methotrexate sodium 2.5 MG TABS Take 10 tablets (25 mg) by mouth once a week . Take all 10 tablets on the same day of each week. 120 tablet 2     Multiple Vitamins-Minerals (ADULT GUMMY PO) Take 1 chew tab by mouth daily VitaCrave       Multiple Vitamins-Minerals (HAIR/SKIN/NAILS/BIOTIN PO) Take 1 capsule by mouth daily.  100 tablet 3     vitamin  B complex with vitamin C (VITAMIN  B COMPLEX) TABS Take 1 tablet by mouth daily  0     fluticasone (VERAMYST) 27.5 MCG/SPRAY spray Spray 1 spray into both nostrils 2 times daily       Allergies   Allergen Reactions     Folic Acid      Gold Rash     Latex Rash     Not all latex products       Reviewed and updated as needed this visit by Provider  Tobacco  Allergies  Meds  Problems  Med Hx  Surg Hx  Fam Hx         Review of Systems   ROS COMP: CONSTITUTIONAL:POSITIVE  for  "fever reported low grade fevers; lightheaded when bending over  INTEGUMENTARY/SKIN: NEGATIVE for worrisome rashes, moles or lesions  ENT/MOUTH: POSITIVE for mild nasal congestion, postnasal drainage which has been going on longer for about a month and sore throat on and off per patient  RESP:POSITIVE for cough-productive and sputum clear/green  CV: NEGATIVE for chest pain, palpitations or peripheral edema  GI: NEGATIVE for nausea, abdominal pain, heartburn, or change in bowel habits  PSYCHIATRIC: NEGATIVE for changes in mood or affect  ROS otherwise negative      Objective    /80   Pulse 81   Temp 98.6  F (37  C) (Tympanic)   Resp 16   Ht 1.676 m (5' 6\")   Wt 135.8 kg (299 lb 6.4 oz)   SpO2 99%   BMI 48.32 kg/m    Body mass index is 48.32 kg/m .  Physical Exam   GENERAL: healthy, alert and no distress  EYES: Eyes grossly normal to inspection, PERRL and conjunctivae and sclerae normal  HENT: normal cephalic/atraumatic, ear canals and TM's normal, nasal mucosa edematous , oropharynx clear, oral mucous membranes moist and peripheral oropharyngeal erythema  NECK: no adenopathy and no asymmetry, masses, or scars  RESP: lungs clear to auscultation - no rales, rhonchi or wheezes  CV: regular rate and rhythm, normal S1 S2, no S3 or S4, no murmur, click or rub, no peripheral edema and peripheral pulses strong  PSYCH: mentation appears normal, affect normal/bright    Diagnostic Test Results:  Labs reviewed in Epic        Assessment & Plan     1. Acute sinusitis with symptoms > 10 days  Ordering Augmentin for treatment of sinus infection.  Recommended dayquil/nyquil or Mucinex/Robitussin as needed for symptoms.  Information provided on symptom management for sinuses and sore throat.  Recommend follow-up in 1 week if any persistent symptoms despite treatment or sooner if worsening.  - amoxicillin-clavulanate (AUGMENTIN) 875-125 MG tablet; Take 1 tablet by mouth 2 times daily for 7 days  Dispense: 14 tablet; Refill: " 0    2. Pharyngitis, unspecified etiology  Rapid strep negative, culture pending.  Discussed with patient that if culture is positive she would be contacted.  She verbalized understanding.  - Strep, Rapid Screen  - Beta strep group A culture     See Patient Instructions    Return in about 1 week (around 7/22/2019), or if symptoms worsen or fail to improve.    Ariana Freed NP  [facility] [place]

## 2019-07-15 NOTE — PATIENT INSTRUCTIONS
1.  Try dayquil/nyquil or Mucinex/Robitussin for symptoms  2.  Information provided on sinusitis and sore throat symptom management.  3.  Take antibiotic as directed.  4.  Follow-up in 1 week if no improvement in symptoms.    Sinusitis (Antibiotic Treatment)    The sinuses are air-filled spaces within the bones of the face. They connect to the inside of the nose. Sinusitis is an inflammation of the tissue that lines the sinuses. Sinusitis can occur during a cold. It can also happen due to allergies to pollens and other particles in the air. Sinusitis can cause symptoms of sinus congestion and a feeling of fullness. A sinus infection causes fever, headache, and facial pain. There is often green or yellow fluid draining from the nose or into the back of the throat (post-nasal drip). You have been given antibiotics to treat this condition.  Home care    Take the full course of antibiotics as instructed. Do not stop taking them, even when you feel better.    Drink plenty of water, hot tea, and other liquids. This may help thin nasal mucus. It also may help your sinuses drain fluids.    Heat may help soothe painful areas of your face. Use a towel soaked in hot water. Or,  the shower and direct the warm spray onto your face. Using a vaporizer along with a menthol rub at night may also help soothe symptoms.     An expectorant with guaifenesin may help thin nasal mucus and help your sinuses drain fluids.    You can use an over-the-counter decongestant, unless a similar medicine was prescribed to you. Nasal sprays work the fastest. Use one that contains phenylephrine or oxymetazoline. First blow your nose gently. Then use the spray. Do not use these medicines more often than directed on the label. If you do, your symptoms may get worse. You may also take pills that contain pseudoephedrine. Don t use products that combine multiple medicines. This is because side effects may be increased. Read labels. You can also ask  the pharmacist for help. (People with high blood pressure should not use decongestants. They can raise blood pressure.)    Over-the-counter antihistamines may help if allergies contributed to your sinusitis.      Do not use nasal rinses or irrigation during an acute sinus infection, unless your healthcare provider tells you to. Rinsing may spread the infection to other areas in your sinuses.    Use acetaminophen or ibuprofen to control pain, unless another pain medicine was prescribed to you. If you have chronic liver or kidney disease or ever had a stomach ulcer, talk with your healthcare provider before using these medicines. (Aspirin should never be taken by anyone under age 18 who is ill with a fever. It may cause severe liver damage.)    Don't smoke. This can make symptoms worse.  Follow-up care  Follow up with your healthcare provider or our staff if you are not better in 1 week.  When to seek medical advice  Call your healthcare provider if any of these occur:    Facial pain or headache that gets worse    Stiff neck    Unusual drowsiness or confusion    Swelling of your forehead or eyelids    Vision problems, such as blurred or double vision    Fever of 100.4 F (38 C) or higher, or as directed by your healthcare provider    Seizure    Breathing problems    Symptoms don't go away in 10 days  Prevention  Here are steps you can take to help prevent an infection:    Keep good hand washing habits.    Don t have close contact with people who have sore throats, colds, or other upper respiratory infections.    Don t smoke, and stay away from secondhand smoke.    Stay up to date with of your vaccines.  Date Last Reviewed: 11/1/2017 2000-2018 [employer]. [address] [place] [address], [place] [address]. All rights reserved. This information is not intended as a substitute for professional medical care. Always follow your healthcare professional's instructions.           Patient Education     Self-Care for  Sore Throats    Sore throats happen for many reasons, such as colds, allergies, and infections caused by viruses or bacteria. In any case, your throat becomes red and sore. Your goal for self-care is to reduce your discomfort while giving your throat a chance to heal.  Moisten and soothe your throat  Tips include the following:    Try a sip of water first thing after waking up.    Keep your throat moist by drinking 6 or more glasses of clear liquids every day.    Run a cool-air humidifier in your room overnight.    Avoid cigarette smoke.     Suck on throat lozenges, cough drops, hard candy, ice chips, or frozen fruit-juice bars. Use the sugar-free versions if your diet or medical condition requires them.  Gargle to ease irritation  Gargling every hour or 2 can ease irritation. Try gargling with 1 of these solutions:    1/4 teaspoon of salt in 1/2 cup of warm water    An over-the-counter anesthetic gargle  Use medicine for more relief  Over-the-counter medicine can reduce sore throat symptoms. Ask your pharmacist if you have questions about which medicine to use:    Ease pain with anesthetic sprays. Aspirin or an aspirin substitute also helps. Remember, never give aspirin to anyone 18 or younger, or if you are already taking blood thinners.     For sore throats caused by allergies, try antihistamines to block the allergic reaction.    Remember: unless a sore throat is caused by a bacterial infection, antibiotics won t help you.  Prevent future sore throats  Prevention tips include the following:    Stop smoking or reduce contact with secondhand smoke. Smoke irritates the tender throat lining.    Limit contact with pets and with allergy-causing substances, such as pollen and mold.    When you re around someone with a sore throat or cold, wash your hands often to keep viruses or bacteria from spreading.    Don t strain your vocal cords.  Call your healthcare provider  Contact your healthcare provider if you have:    A  temperature over 101 F (38.3 C)    White spots on the throat    Great difficulty swallowing    Trouble breathing    A skin rash    Recent exposure to someone else with strep bacteria    Severe hoarseness and swollen glands in the neck or jaw   Date Last Reviewed: 8/1/2016 2000-2018 [employer]. [address] [place] [address]. All rights reserved. This information is not intended as a substitute for professional medical care. Always follow your healthcare professional's instructions.

## 2019-07-16 LAB
BACTERIA SPEC CULT: NORMAL
SPECIMEN SOURCE: NORMAL

## 2019-07-23 ENCOUNTER — OFFICE VISIT (OUTPATIENT)
Dept: FAMILY MEDICINE | Facility: CLINIC | Age: 64
End: 2019-07-23
Payer: COMMERCIAL

## 2019-07-23 VITALS
HEIGHT: 66 IN | WEIGHT: 293 LBS | BODY MASS INDEX: 47.09 KG/M2 | OXYGEN SATURATION: 99 % | HEART RATE: 96 BPM | RESPIRATION RATE: 20 BRPM | TEMPERATURE: 97.9 F | SYSTOLIC BLOOD PRESSURE: 132 MMHG | DIASTOLIC BLOOD PRESSURE: 68 MMHG

## 2019-07-23 DIAGNOSIS — J32.9 SINOBRONCHITIS: Primary | ICD-10-CM

## 2019-07-23 DIAGNOSIS — J40 SINOBRONCHITIS: Primary | ICD-10-CM

## 2019-07-23 PROCEDURE — 99214 OFFICE O/P EST MOD 30 MIN: CPT | Performed by: PHYSICIAN ASSISTANT

## 2019-07-23 RX ORDER — PREDNISONE 20 MG/1
20 TABLET ORAL DAILY
Qty: 5 TABLET | Refills: 0 | Status: SHIPPED | OUTPATIENT
Start: 2019-07-23 | End: 2019-07-28

## 2019-07-23 RX ORDER — DOXYCYCLINE 100 MG/1
100 CAPSULE ORAL 2 TIMES DAILY
Qty: 20 CAPSULE | Refills: 0 | Status: SHIPPED | OUTPATIENT
Start: 2019-07-23 | End: 2019-08-02

## 2019-07-23 RX ORDER — ALBUTEROL SULFATE 90 UG/1
AEROSOL, METERED RESPIRATORY (INHALATION)
Qty: 18 G | Refills: 0 | Status: SHIPPED | OUTPATIENT
Start: 2019-07-23 | End: 2020-07-29

## 2019-07-23 RX ORDER — CODEINE PHOSPHATE AND GUAIFENESIN 10; 100 MG/5ML; MG/5ML
1-2 SOLUTION ORAL EVERY 6 HOURS PRN
Qty: 120 ML | Refills: 0 | Status: SHIPPED | OUTPATIENT
Start: 2019-07-23 | End: 2020-04-22

## 2019-07-23 ASSESSMENT — ENCOUNTER SYMPTOMS
DIARRHEA: 0
MYALGIAS: 0
VOMITING: 0
HEADACHES: 0
COUGH: 1
BLURRED VISION: 0
SINUS PAIN: 1
CHILLS: 0
FEVER: 0
WHEEZING: 1
ABDOMINAL PAIN: 0
SHORTNESS OF BREATH: 0
EYE REDNESS: 0
SORE THROAT: 0
PALPITATIONS: 0
NAUSEA: 0
EYE DISCHARGE: 0

## 2019-07-23 ASSESSMENT — MIFFLIN-ST. JEOR: SCORE: 1909.4

## 2019-07-23 NOTE — PROGRESS NOTES
Subjective     Kaylene Diaz is a 63 year old female who presents to clinic today for the following health issues:    HPI   Sinus problem       Duration: 1 month     Description (location/character/radiation): Sinus problem     Intensity:  moderate    Accompanying signs and symptoms: sore throat, thick phlegm, cough is productive with thick mucus, cough sounds deep, shortness of breath when walking, fatigue, no ear pain, sinus pressure and headache.     History (similar episodes/previous evaluation): 7/15/19- diagnosed with sinusitis     Precipitating or alleviating factors: None    Therapies tried and outcome: amoxicillin- finished yesterday. Nyquil         Patient Active Problem List   Diagnosis     Carpal tunnel syndrome     Tear meniscus knee     Osteopenia     Advanced directives, counseling/discussion     Hyperlipidemia LDL goal <160     Osteoarthritis     RA (rheumatoid arthritis) (H)     High risk medications (not anticoagulants) long-term use     Kidney stone     ASCUS on Pap smear     Morbid obesity due to excess calories (H)     Small bowel obstruction (H)     Elevated blood pressure reading without diagnosis of hypertension     BLANCA (acute kidney injury) (H)     Elevated glucose     SBO (small bowel obstruction) (H)     Rheumatoid arthritis involving multiple sites with positive rheumatoid factor (H)     Past Surgical History:   Procedure Laterality Date     ARTHROSCOPY KNEE  12/10/2010    ARTHROSCOPY KNEE performed by NAVID FIERRO at [facility]     COLONOSCOPY N/A 8/9/2018    Procedure: COLONOSCOPY;  colonoscopy;  Surgeon: Luke Kaye MD;  Location: [facility]     SURGICAL HISTORY OF -       IUD removal     SURGICAL HISTORY OF -       Ovarian cyst     SURGICAL HISTORY OF -       thorn removal from foot     SURGICAL HISTORY OF -       knee surgery, scope, right knee       Social History     Tobacco Use     Smoking status: Former Smoker     Years: 30.00     Last attempt to quit: 4/18/2000     Years since  quittin.2     Smokeless tobacco: Never Used   Substance Use Topics     Alcohol use: Yes     Comment: VERY RARE     Family History   Problem Relation Age of Onset     Prostate Cancer Father      Eye Disorder Father         mac degen     Heart Disease Father      Macular Degeneration Father      Heart Disease Mother         a-fib     Eye Disorder Mother      C.A.D. Mother      Hypertension Mother      Alcohol/Drug Brother      Alcohol/Drug Sister      Alcohol/Drug Brother      Alcohol/Drug Brother      Alcohol/Drug Brother      Alcohol/Drug Sister      Obesity Sister      Alcohol/Drug Sister      Prostate Cancer Sister      Cancer Other      Melanoma No family hx of          Current Outpatient Medications   Medication Sig Dispense Refill     albuterol (PROAIR HFA/PROVENTIL HFA/VENTOLIN HFA) 108 (90 Base) MCG/ACT inhaler Inhale 2 puffs every 4-6 hours as needed for cough, wheezing, or shortness of breath 18 g 0     cyclobenzaprine (FLEXERIL) 5 MG tablet Take 1 tablet (5 mg) by mouth nightly as needed for muscle spasms 30 tablet 2     doxycycline monohydrate (MONODOX) 100 MG capsule Take 1 capsule (100 mg) by mouth 2 times daily for 10 days 20 capsule 0     fluticasone (VERAMYST) 27.5 MCG/SPRAY spray Spray 1 spray into both nostrils 2 times daily       guaiFENesin-codeine (ROBITUSSIN AC) 100-10 MG/5ML solution Take 5-10 mLs by mouth every 6 hours as needed for cough 120 mL 0     hydroxychloroquine (PLAQUENIL) 200 MG tablet Take 2 tablets (400 mg) by mouth daily 180 tablet 3     methotrexate sodium 2.5 MG TABS Take 10 tablets (25 mg) by mouth once a week . Take all 10 tablets on the same day of each week. 120 tablet 2     Multiple Vitamins-Minerals (ADULT GUMMY PO) Take 1 chew tab by mouth daily VitaCrave       Multiple Vitamins-Minerals (HAIR/SKIN/NAILS/BIOTIN PO) Take 1 capsule by mouth daily.  100 tablet 3     predniSONE (DELTASONE) 20 MG tablet Take 1 tablet (20 mg) by mouth daily for 5 days 5 tablet 0      "vitamin  B complex with vitamin C (VITAMIN  B COMPLEX) TABS Take 1 tablet by mouth daily  0     Allergies   Allergen Reactions     Folic Acid      Gold Rash     Latex Rash     Not all latex products         Reviewed and updated as needed this visit by Provider  Tobacco  Allergies  Meds  Problems  Med Hx  Surg Hx  Fam Hx         Review of Systems   Constitutional: Positive for malaise/fatigue. Negative for chills and fever.   HENT: Positive for congestion and sinus pain. Negative for ear pain and sore throat.    Eyes: Negative for blurred vision, discharge and redness.   Respiratory: Positive for cough and wheezing. Negative for shortness of breath.    Cardiovascular: Negative for chest pain and palpitations.   Gastrointestinal: Negative for abdominal pain, diarrhea, nausea and vomiting.   Musculoskeletal: Negative for joint pain and myalgias.   Skin: Negative for rash.   Neurological: Negative for headaches.           Objective    /68   Pulse 96   Temp 97.9  F (36.6  C) (Tympanic)   Resp 20   Ht 1.676 m (5' 6\")   Wt 134.3 kg (296 lb)   SpO2 99%   BMI 47.78 kg/m    Body mass index is 47.78 kg/m .    Physical Exam   Constitutional: She appears well-developed and well-nourished.   HENT:   Head: Normocephalic.   Right Ear: Tympanic membrane and ear canal normal.   Left Ear: Tympanic membrane and ear canal normal.   Nose: Mucosal edema present.   Mouth/Throat: Posterior oropharyngeal erythema present.   Eyes: Pupils are equal, round, and reactive to light. Conjunctivae are normal.   Cardiovascular: Normal rate and normal heart sounds.   Pulmonary/Chest: Effort normal and breath sounds normal.   Frequent productive sounding cough   Skin: Skin is warm and dry. No rash noted.   Psychiatric: She has a normal mood and affect. Her behavior is normal.         Diagnostic Test Results:  none         Assessment & Plan       1. Sinobronchitis  Will treat with doxycyline 100mg two times daily x 10 days. Also " prescribed prednisone 20mg once daily x 5 days, albuterol 2 puffs every 4-6hrs as needed, and guaifenesin/codeine  5-10mL every 6hrs as needed. Discussed how to take/use these medications and what to expect. Get plenty of rest and push fluids. Can use Tylenol and/or ibuprofen as needed for pain and/or fever control. Return to clinic if symptoms worsen or do not improve; otherwise follow up as needed.        - doxycycline monohydrate (MONODOX) 100 MG capsule; Take 1 capsule (100 mg) by mouth 2 times daily for 10 days  Dispense: 20 capsule; Refill: 0  - predniSONE (DELTASONE) 20 MG tablet; Take 1 tablet (20 mg) by mouth daily for 5 days  Dispense: 5 tablet; Refill: 0  - albuterol (PROAIR HFA/PROVENTIL HFA/VENTOLIN HFA) 108 (90 Base) MCG/ACT inhaler; Inhale 2 puffs every 4-6 hours as needed for cough, wheezing, or shortness of breath  Dispense: 18 g; Refill: 0  - guaiFENesin-codeine (ROBITUSSIN AC) 100-10 MG/5ML solution; Take 5-10 mLs by mouth every 6 hours as needed for cough  Dispense: 120 mL; Refill: 0     Sadaf Gillis PA-C  [facility]

## 2019-08-13 ENCOUNTER — MYC MEDICAL ADVICE (OUTPATIENT)
Dept: FAMILY MEDICINE | Facility: CLINIC | Age: 64
End: 2019-08-13

## 2019-08-29 DIAGNOSIS — M05.79 RHEUMATOID ARTHRITIS INVOLVING MULTIPLE SITES WITH POSITIVE RHEUMATOID FACTOR (H): Chronic | ICD-10-CM

## 2019-08-29 LAB
ALBUMIN SERPL-MCNC: 3.6 G/DL (ref 3.4–5)
ALP SERPL-CCNC: 118 U/L (ref 40–150)
ALT SERPL W P-5'-P-CCNC: 23 U/L (ref 0–50)
AST SERPL W P-5'-P-CCNC: 21 U/L (ref 0–45)
BASOPHILS # BLD AUTO: 0 10E9/L (ref 0–0.2)
BASOPHILS NFR BLD AUTO: 0.3 %
BILIRUB DIRECT SERPL-MCNC: 0.1 MG/DL (ref 0–0.2)
BILIRUB SERPL-MCNC: 0.5 MG/DL (ref 0.2–1.3)
CREAT SERPL-MCNC: 0.75 MG/DL (ref 0.52–1.04)
CRP SERPL-MCNC: 7.3 MG/L (ref 0–8)
DIFFERENTIAL METHOD BLD: NORMAL
EOSINOPHIL # BLD AUTO: 0.1 10E9/L (ref 0–0.7)
EOSINOPHIL NFR BLD AUTO: 1.9 %
ERYTHROCYTE [DISTWIDTH] IN BLOOD BY AUTOMATED COUNT: 14 % (ref 10–15)
ERYTHROCYTE [SEDIMENTATION RATE] IN BLOOD BY WESTERGREN METHOD: 11 MM/H (ref 0–30)
GFR SERPL CREATININE-BSD FRML MDRD: 84 ML/MIN/{1.73_M2}
HCT VFR BLD AUTO: 43.2 % (ref 35–47)
HGB BLD-MCNC: 14.2 G/DL (ref 11.7–15.7)
LYMPHOCYTES # BLD AUTO: 0.9 10E9/L (ref 0.8–5.3)
LYMPHOCYTES NFR BLD AUTO: 15.9 %
MCH RBC QN AUTO: 30.9 PG (ref 26.5–33)
MCHC RBC AUTO-ENTMCNC: 32.9 G/DL (ref 31.5–36.5)
MCV RBC AUTO: 94 FL (ref 78–100)
MONOCYTES # BLD AUTO: 0.5 10E9/L (ref 0–1.3)
MONOCYTES NFR BLD AUTO: 8.7 %
NEUTROPHILS # BLD AUTO: 4.3 10E9/L (ref 1.6–8.3)
NEUTROPHILS NFR BLD AUTO: 73.2 %
PLATELET # BLD AUTO: 240 10E9/L (ref 150–450)
PROT SERPL-MCNC: 7.6 G/DL (ref 6.8–8.8)
RBC # BLD AUTO: 4.6 10E12/L (ref 3.8–5.2)
WBC # BLD AUTO: 5.8 10E9/L (ref 4–11)

## 2019-08-29 PROCEDURE — 80076 HEPATIC FUNCTION PANEL: CPT | Performed by: INTERNAL MEDICINE

## 2019-08-29 PROCEDURE — 82565 ASSAY OF CREATININE: CPT | Performed by: INTERNAL MEDICINE

## 2019-08-29 PROCEDURE — 85652 RBC SED RATE AUTOMATED: CPT | Performed by: INTERNAL MEDICINE

## 2019-08-29 PROCEDURE — 85025 COMPLETE CBC W/AUTO DIFF WBC: CPT | Performed by: INTERNAL MEDICINE

## 2019-08-29 PROCEDURE — 86140 C-REACTIVE PROTEIN: CPT | Performed by: INTERNAL MEDICINE

## 2019-08-29 PROCEDURE — 36415 COLL VENOUS BLD VENIPUNCTURE: CPT | Performed by: INTERNAL MEDICINE

## 2019-10-28 ENCOUNTER — OFFICE VISIT (OUTPATIENT)
Dept: RHEUMATOLOGY | Facility: CLINIC | Age: 64
End: 2019-10-28
Payer: COMMERCIAL

## 2019-10-28 VITALS
HEART RATE: 87 BPM | DIASTOLIC BLOOD PRESSURE: 74 MMHG | HEIGHT: 66 IN | BODY MASS INDEX: 43.49 KG/M2 | OXYGEN SATURATION: 100 % | SYSTOLIC BLOOD PRESSURE: 132 MMHG | WEIGHT: 270.6 LBS

## 2019-10-28 DIAGNOSIS — Z79.899 HIGH RISK MEDICATION USE: ICD-10-CM

## 2019-10-28 DIAGNOSIS — M05.79 RHEUMATOID ARTHRITIS INVOLVING MULTIPLE SITES WITH POSITIVE RHEUMATOID FACTOR (H): Primary | Chronic | ICD-10-CM

## 2019-10-28 LAB
ALBUMIN SERPL-MCNC: 3.7 G/DL (ref 3.4–5)
ALP SERPL-CCNC: 109 U/L (ref 40–150)
ALT SERPL W P-5'-P-CCNC: 28 U/L (ref 0–50)
AST SERPL W P-5'-P-CCNC: 19 U/L (ref 0–45)
BASOPHILS # BLD AUTO: 0 10E9/L (ref 0–0.2)
BASOPHILS NFR BLD AUTO: 0.2 %
BILIRUB DIRECT SERPL-MCNC: 0.1 MG/DL (ref 0–0.2)
BILIRUB SERPL-MCNC: 0.5 MG/DL (ref 0.2–1.3)
CREAT SERPL-MCNC: 0.72 MG/DL (ref 0.52–1.04)
CRP SERPL-MCNC: 5.1 MG/L (ref 0–8)
DIFFERENTIAL METHOD BLD: NORMAL
EOSINOPHIL # BLD AUTO: 0.1 10E9/L (ref 0–0.7)
EOSINOPHIL NFR BLD AUTO: 1.7 %
ERYTHROCYTE [DISTWIDTH] IN BLOOD BY AUTOMATED COUNT: 14.4 % (ref 10–15)
ERYTHROCYTE [SEDIMENTATION RATE] IN BLOOD BY WESTERGREN METHOD: 15 MM/H (ref 0–30)
GFR SERPL CREATININE-BSD FRML MDRD: 88 ML/MIN/{1.73_M2}
HCT VFR BLD AUTO: 42.1 % (ref 35–47)
HGB BLD-MCNC: 13.9 G/DL (ref 11.7–15.7)
LYMPHOCYTES # BLD AUTO: 1.1 10E9/L (ref 0.8–5.3)
LYMPHOCYTES NFR BLD AUTO: 19.9 %
MCH RBC QN AUTO: 31.3 PG (ref 26.5–33)
MCHC RBC AUTO-ENTMCNC: 33 G/DL (ref 31.5–36.5)
MCV RBC AUTO: 95 FL (ref 78–100)
MONOCYTES # BLD AUTO: 0.4 10E9/L (ref 0–1.3)
MONOCYTES NFR BLD AUTO: 7.2 %
NEUTROPHILS # BLD AUTO: 3.8 10E9/L (ref 1.6–8.3)
NEUTROPHILS NFR BLD AUTO: 71 %
PLATELET # BLD AUTO: 225 10E9/L (ref 150–450)
PROT SERPL-MCNC: 7.6 G/DL (ref 6.8–8.8)
RBC # BLD AUTO: 4.44 10E12/L (ref 3.8–5.2)
WBC # BLD AUTO: 5.3 10E9/L (ref 4–11)

## 2019-10-28 PROCEDURE — 80076 HEPATIC FUNCTION PANEL: CPT | Performed by: INTERNAL MEDICINE

## 2019-10-28 PROCEDURE — 85652 RBC SED RATE AUTOMATED: CPT | Performed by: INTERNAL MEDICINE

## 2019-10-28 PROCEDURE — 36415 COLL VENOUS BLD VENIPUNCTURE: CPT | Performed by: INTERNAL MEDICINE

## 2019-10-28 PROCEDURE — 86140 C-REACTIVE PROTEIN: CPT | Performed by: INTERNAL MEDICINE

## 2019-10-28 PROCEDURE — 82565 ASSAY OF CREATININE: CPT | Performed by: INTERNAL MEDICINE

## 2019-10-28 PROCEDURE — 99213 OFFICE O/P EST LOW 20 MIN: CPT | Performed by: INTERNAL MEDICINE

## 2019-10-28 PROCEDURE — 85025 COMPLETE CBC W/AUTO DIFF WBC: CPT | Performed by: INTERNAL MEDICINE

## 2019-10-28 RX ORDER — METHOTREXATE 2.5 MG/1
22.5 TABLET ORAL WEEKLY
Qty: 108 TABLET | Refills: 2 | Status: SHIPPED | OUTPATIENT
Start: 2019-10-28 | End: 2020-03-09

## 2019-10-28 ASSESSMENT — MIFFLIN-ST. JEOR: SCORE: 1794.18

## 2019-10-28 NOTE — PROGRESS NOTES
"Rheumatology Clinic Visit      Kaylene Diaz MRN# [id number]   YOB: 1955 Age: 63 year old      Date of visit: 10/28/19   PCP: Dr. Hitesh Washington    Chief Complaint   Patient presents with:  Arthritis: RA. Patient is doing great. Patient is doing 9 tablets of methotrexte (not 10)     Assessment and Plan     1.  Seropositive rheumatoid arthritis (RF 32, CCP >250): Currently on methotrexate 22.5 mg once weekly, hydroxychloroquine 400 mg daily.  Previously on sulfasalazine that was discontinued when she found no benefit from it; but she also did not require prednisone while on sulfasalazine; questioning if the \"benefit\" from sulfasalazine was actually the \"benefit\" of not gardening.  Does not tolerate folic acid because of associated increased sunburns.    Currently doing well.   - Continue methotrexate 22.5 mg once weekly (okay to reduce to 20mg once weekly if symptoms tolerate)   - Continue hydroxychloroquine 400 mg daily (last eye exam on 6/29/2018; reminded her to get a yearly eye exam)  - Continue prednisone 5 mg daily as needed for rheumatoid arthritis symptoms; use sparingly (she says that she hasn't used for several months)  - Labs today and in 3 months: CBC, Creatinine, Hepatic Panel, ESR, CRP (printed orders for the labs in 3 months to be done at an outside facility)    2.  Basal cell carcinoma and squamous cell carcinoma: Several lesions removed by dermatology in the past.  Continue to follow with dermatology.    3.  Chronic lower back pain: Has significantly improved with lidocaine patches that have been used no more than 10 times in the past 1 year.  She has been using her mother's lidocaine patches.  Management per PCP    4.  Right Achilles tendinitis history: Was evaluated by podiatry who diagnosed her with Achilles tendinitis and her symptoms resolved with a boot that she wore at night.     5.  Muscle spasms: She took a friend's muscle relaxant and found that it helped with muscle spasms " at night when they were present and to help to sleep quality improve dramatically.  Therefore previously she was given cyclobenzaprine but she found that this was ineffective.  She still does not recall what her friend's muscle relaxant was.  She says that she will follow-up with her PCP regarding this issue.     6.  Vaccinations: Vaccinations reviewed with Ms. Diaz.  Risks and benefits of vaccinations were discussed.     - Influenza: Refused by patient because she has significant illness after every flu shot  - [id number]: up to date  - [id number]: She plans to receive when she is 64 years old  - Shingrix: up to date    Ms. Diaz verbalized agreement with and understanding of the rational for the diagnosis and treatment plan.  All questions were answered to best of my ability and the patient's satisfaction. Ms. Diaz was advised to contact the clinic with any questions that may arise after the clinic visit.      Thank you for involving me in the care of the patient    Return to clinic: 3-4 months      HPI   Kaylene Diaz is a 63 year old female with a past medical history significant for hyperlipidemia, osteoarthritis, osteopenia, meniscal tear, squamous cell carcinoma, basal cell carcinoma, and rheumatoid arthritis who presents for follow-up of rheumatoid arthritis.    Today, Ms. Diaz reports that she is doing great.  She saw a podiatrist who diagnosed her with right Achilles tendinitis and gave her a boot to wear at night.  Her symptoms resolved so she reduce methotrexate to 9 tablets once weekly and has felt the same.  She says that she has some chronic swelling at her right MCPs that is usually worse when she has been doing a lot of pruning.  No change in symptoms since reducing the methotrexate dose.  Morning stiffness for no more than 5 minutes.  No pain.  Able to do all of her daily activities without issue.    Denies fevers, chills, nausea, vomiting, constipation, diarrhea. No abdominal pain. No chest  pain/pressure, palpitations, or shortness of breath. No LE swelling. No neck pain. No oral or nasal sores.  No rash. No sicca symptoms.       Tobacco: Quit in 2000  EtOH: Rare  Drugs: None    ROS   GEN: No fevers, chills, night sweats; intentionally losing weight  SKIN: No itching or rashes.  Follows with dermatology regularly  HEENT: No oral or nasal ulcers.  CV: No chest pain, pressure, palpitations, or dyspnea on exertion.  PULM: No SOB, wheeze, cough.  GI: No nausea, vomiting, constipation, diarrhea. No blood in stool. No abdominal pain.  : No blood in urine.  MSK: See HPI.  NEURO: No numbness, tingling, or weakness.  EXT: No LE swelling  PSYCH: Negative    Active Problem List     Patient Active Problem List   Diagnosis     Carpal tunnel syndrome     Tear meniscus knee     Osteopenia     Advanced directives, counseling/discussion     Hyperlipidemia LDL goal <160     Osteoarthritis     RA (rheumatoid arthritis) (H)     High risk medications (not anticoagulants) long-term use     Kidney stone     ASCUS on Pap smear     Morbid obesity due to excess calories (H)     Small bowel obstruction (H)     Elevated blood pressure reading without diagnosis of hypertension     BLANCA (acute kidney injury) (H)     Elevated glucose     SBO (small bowel obstruction) (H)     Rheumatoid arthritis involving multiple sites with positive rheumatoid factor (H)     Past Medical History     Past Medical History:   Diagnosis Date     Basal cell carcinoma      RA (rheumatoid arthritis) (H)      Small bowel obstruction (H)      Squamous cell carcinoma      Past Surgical History     Past Surgical History:   Procedure Laterality Date     ARTHROSCOPY KNEE  12/10/2010    ARTHROSCOPY KNEE performed by NAVID FIERRO at [facility]     COLONOSCOPY N/A 8/9/2018    Procedure: COLONOSCOPY;  colonoscopy;  Surgeon: Luke Kaye MD;  Location: [facility]     SURGICAL HISTORY OF -       IUD removal     SURGICAL HISTORY OF -       Ovarian cyst     SURGICAL  HISTORY OF -       thorn removal from foot     SURGICAL HISTORY OF -       knee surgery, scope, right knee     Allergy     Allergies   Allergen Reactions     Folic Acid      Gold Rash     Latex Rash     Not all latex products     Current Medication List     Current Outpatient Medications   Medication Sig     albuterol (PROAIR HFA/PROVENTIL HFA/VENTOLIN HFA) 108 (90 Base) MCG/ACT inhaler Inhale 2 puffs every 4-6 hours as needed for cough, wheezing, or shortness of breath     hydroxychloroquine (PLAQUENIL) 200 MG tablet Take 2 tablets (400 mg) by mouth daily     methotrexate sodium 2.5 MG TABS Take 9 tablets (22.5 mg) by mouth once a week . Take all 9 tablets on the same day of each week.     Multiple Vitamins-Minerals (ADULT GUMMY PO) Take 1 chew tab by mouth daily VitaCrave     Multiple Vitamins-Minerals (HAIR/SKIN/NAILS/BIOTIN PO) Take 1 capsule by mouth daily.      vitamin  B complex with vitamin C (VITAMIN  B COMPLEX) TABS Take 1 tablet by mouth daily     fluticasone (VERAMYST) 27.5 MCG/SPRAY spray Spray 1 spray into both nostrils 2 times daily     guaiFENesin-codeine (ROBITUSSIN AC) 100-10 MG/5ML solution Take 5-10 mLs by mouth every 6 hours as needed for cough (Patient not taking: Reported on 10/28/2019)     No current facility-administered medications for this visit.          Social History   See HPI    Family History     Family History   Problem Relation Age of Onset     Prostate Cancer Father      Eye Disorder Father         mac degen     Heart Disease Father      Macular Degeneration Father      Heart Disease Mother         a-fib     Eye Disorder Mother      C.A.D. Mother      Hypertension Mother      Alcohol/Drug Brother      Alcohol/Drug Sister      Alcohol/Drug Brother      Alcohol/Drug Brother      Alcohol/Drug Brother      Alcohol/Drug Sister      Obesity Sister      Alcohol/Drug Sister      Prostate Cancer Sister      Cancer Other      Melanoma No family hx of        Physical Exam     Temp Readings  "from Last 3 Encounters:   07/23/19 97.9  F (36.6  C) (Tympanic)   07/15/19 98.6  F (37  C) (Tympanic)   12/03/18 97.8  F (36.6  C) (Oral)     BP Readings from Last 5 Encounters:   10/28/19 132/74   07/23/19 132/68   07/15/19 134/80   06/19/19 130/76   06/18/19 149/74     Pulse Readings from Last 1 Encounters:   10/28/19 87     Resp Readings from Last 1 Encounters:   07/23/19 20     Estimated body mass index is 43.68 kg/m  as calculated from the following:    Height as of this encounter: 1.676 m (5' 6\").    Weight as of this encounter: 122.7 kg (270 lb 9.6 oz).    GEN: NAD; obese  HEENT: MMM. No oral lesions. Anicteric, noninjected sclera  CV: S1, S2. RRR. No m/r/g.  PULM: CTA bilaterally. No w/c.  MSK: Fullness of the right second-fourth MCPs that were nontender to palpation and without increased warmth or overlying erythema.   Other MCPs, PIPs, wrists, elbows, shoulders, knees, ankles, and MTPs without swelling or tenderness to palpation.  Achilles entheses nontender to palpation.       NEURO: UE and LE strengths 5/5 and equal bilaterally.   SKIN: No rash  EXT: No LE edema  PSYCH: Alert. Appropriate.    Labs / Imaging (select studies)   RF/CCP  Recent Labs   Lab Test 08/26/13  1611 07/12/13  1015 03/27/13  1351   CCPABY >250 Strongly Positive*  --   --    RHF  --  32* 24*     CBC  Recent Labs   Lab Test 08/29/19  0855 05/28/19  1057 01/15/19  1059   WBC 5.8 6.5 5.5   RBC 4.60 3.93 4.13   HGB 14.2 12.5 13.6   HCT 43.2 37.5 41.3   MCV 94 95 100   RDW 14.0 12.6 12.4    232 240   MCH 30.9 31.8 32.9   MCHC 32.9 33.3 32.9   NEUTROPHIL 73.2 71.2 70.7   LYMPH 15.9 19.1 18.3   MONOCYTE 8.7 7.5 9.0   EOSINOPHIL 1.9 1.7 1.6   BASOPHIL 0.3 0.5 0.4   ANEU 4.3 4.6 3.9   ALYM 0.9 1.2 1.0   PEEWEE 0.5 0.5 0.5   AEOS 0.1 0.1 0.1   ABAS 0.0 0.0 0.0     CMP  Recent Labs   Lab Test 08/29/19  0855 05/28/19  1057 01/15/19  1059  10/31/17  1110 06/23/17  0817 04/21/17  0615   NA  --   --   --   --  137 139 145*   POTASSIUM  --   --  "  --   --  4.5 4.4 4.0   CHLORIDE  --   --   --   --  108 106 111*   CO2  --   --   --   --  24 24 27   ANIONGAP  --   --   --   --  5 9 7   GLC  --   --   --   --  84 115* 109*   BUN  --   --   --   --  22 14 18   CR 0.75 0.84 0.94   < > 0.88 0.75 0.92   GFRESTIMATED 84 73 64   < > 65 78 62   GFRESTBLACK >90 85 75   < > 79 >90   GFR Calc   75   DEAN  --   --   --   --  9.2 9.0 8.3*   BILITOTAL 0.5 0.5 0.4   < > 0.4 0.4  --    ALBUMIN 3.6 3.6 4.1   < > 3.7 3.8  --    PROTTOTAL 7.6 7.2 8.1   < > 8.0 7.5  --    ALKPHOS 118 125 122   < > 131 120  --    AST 21 19 20   < > 17 22  --    ALT 23 26 23   < > 24 26  --     < > = values in this interval not displayed.     Calcium/VitaminD  Recent Labs   Lab Test 10/31/17  1110 06/23/17  0817 04/21/17  0615   DEAN 9.2 9.0 8.3*     ESR/CRP  Recent Labs   Lab Test 08/29/19  0855 10/23/18  1000 07/18/18  0849   SED 11 14 12   CRP 7.3 4.7 7.3     Hepatitis B  Recent Labs   Lab Test 09/11/13  1531   HBCAB Negative   HEPBANG Negative     Hepatitis C  Recent Labs   Lab Test 09/11/13  1531   HCVAB Negative     Tuberculosis Screening  Recent Labs   Lab Test 09/11/13  1532   TBRSLT Negative   TBAGN 0.00       Immunization History     Immunization History   Administered Date(s) Administered     Pneumo Conj 13-V (2010&after) 12/03/2018     Pneumococcal 23 valent 10/24/2013     TDAP Vaccine (Adacel) 06/16/2010     Zoster vaccine recombinant adjuvanted (SHINGRIX) 12/03/2018, 06/19/2019          Chart documentation done in part with Dragon Voice recognition Software. Although reviewed after completion, some word and grammatical error may remain.    Joseph Rooney MD

## 2019-10-28 NOTE — NURSING NOTE
RAPID3 (0-30) Cumulative Score  1.3          RAPID3 Weighted Score (divide #4 by 3 and that is the weighted score)  0.33     Keke Finnegan [facility] Rheumatology  10/28/2019 10:31 AM

## 2019-11-07 ENCOUNTER — HEALTH MAINTENANCE LETTER (OUTPATIENT)
Age: 64
End: 2019-11-07

## 2020-02-17 ENCOUNTER — HEALTH MAINTENANCE LETTER (OUTPATIENT)
Age: 65
End: 2020-02-17

## 2020-03-04 ENCOUNTER — TELEPHONE (OUTPATIENT)
Dept: RHEUMATOLOGY | Facility: CLINIC | Age: 65
End: 2020-03-04

## 2020-03-04 DIAGNOSIS — M05.79 RHEUMATOID ARTHRITIS INVOLVING MULTIPLE SITES WITH POSITIVE RHEUMATOID FACTOR (H): ICD-10-CM

## 2020-03-04 DIAGNOSIS — Z79.899 HIGH RISK MEDICATION USE: Primary | ICD-10-CM

## 2020-03-04 NOTE — TELEPHONE ENCOUNTER
Reason for call:  Order   Order or referral being requested: labs   Reason for request: labs  Date needed: as soon as possible  Has the patient been seen by the PCP for this problem? YES    Additional comments: has appt to see you on Monday. Lab order is . Please reissue.     Phone number to reach patient:  Home number on file [phone] (home)    Best Time:   Any     Can we leave a detailed message on this number?  YES    Travel screening: Not Applicable

## 2020-03-04 NOTE — TELEPHONE ENCOUNTER
Per Dr. Rooney's last office visit notes from 10/28/19, patient was to get a CBC, Creatinine, Hepatic Panel, ESR, and CRP in 3 months. These orders have  as patient is overdue for her labs. Placed new orders and faxed them to [facility]/clinic in [place], f: [phone] per patient's request.    Kenney Stokes RN....3/4/2020 9:35 AM

## 2020-03-05 ENCOUNTER — TRANSFERRED RECORDS (OUTPATIENT)
Dept: HEALTH INFORMATION MANAGEMENT | Facility: CLINIC | Age: 65
End: 2020-03-05

## 2020-03-05 LAB
ALT SERPL-CCNC: 28 IU/L (ref 12–65)
AST SERPL-CCNC: 18 IU/L (ref 15–37)
CREAT SERPL-MCNC: 0.94 MG/DL (ref 0.6–1.3)
GFR SERPL CREATININE-BSD FRML MDRD: 60 ML/MIN/1.73M2

## 2020-03-09 ENCOUNTER — OFFICE VISIT (OUTPATIENT)
Dept: RHEUMATOLOGY | Facility: CLINIC | Age: 65
End: 2020-03-09
Payer: MEDICARE

## 2020-03-09 VITALS
DIASTOLIC BLOOD PRESSURE: 77 MMHG | SYSTOLIC BLOOD PRESSURE: 133 MMHG | OXYGEN SATURATION: 98 % | WEIGHT: 270 LBS | HEART RATE: 73 BPM | BODY MASS INDEX: 43.39 KG/M2 | HEIGHT: 66 IN

## 2020-03-09 DIAGNOSIS — Z23 NEED FOR VACCINATION: ICD-10-CM

## 2020-03-09 DIAGNOSIS — Z79.899 HIGH RISK MEDICATION USE: ICD-10-CM

## 2020-03-09 DIAGNOSIS — M05.79 RHEUMATOID ARTHRITIS INVOLVING MULTIPLE SITES WITH POSITIVE RHEUMATOID FACTOR (H): Primary | Chronic | ICD-10-CM

## 2020-03-09 PROCEDURE — 99213 OFFICE O/P EST LOW 20 MIN: CPT | Mod: 25 | Performed by: INTERNAL MEDICINE

## 2020-03-09 PROCEDURE — G0009 ADMIN PNEUMOCOCCAL VACCINE: HCPCS | Performed by: INTERNAL MEDICINE

## 2020-03-09 PROCEDURE — 90732 PPSV23 VACC 2 YRS+ SUBQ/IM: CPT | Performed by: INTERNAL MEDICINE

## 2020-03-09 RX ORDER — METHOTREXATE 2.5 MG/1
22.5 TABLET ORAL WEEKLY
Qty: 108 TABLET | Refills: 2 | Status: SHIPPED | OUTPATIENT
Start: 2020-03-09 | End: 2020-10-05

## 2020-03-09 RX ORDER — HYDROXYCHLOROQUINE SULFATE 200 MG/1
400 TABLET, FILM COATED ORAL DAILY
Qty: 180 TABLET | Refills: 3 | Status: SHIPPED | OUTPATIENT
Start: 2020-03-09 | End: 2020-10-05

## 2020-03-09 ASSESSMENT — MIFFLIN-ST. JEOR: SCORE: 1786.46

## 2020-03-09 NOTE — PROGRESS NOTES
"Rheumatology Clinic Visit      Kaylene Diaz MRN# [id number]   YOB: 1955 Age: 64 year old      Date of visit: 3/09/20   PCP: Dr. Hitesh Washington    Chief Complaint   Patient presents with:  RECHECK: f/u    Assessment and Plan     1.  Seropositive rheumatoid arthritis (RF 32, CCP >250): Currently on methotrexate 22.5 mg once weekly, hydroxychloroquine 400 mg daily.  Previously on sulfasalazine that was discontinued when she found no benefit from it; but she also did not require prednisone while on sulfasalazine; questioning if the \"benefit\" from sulfasalazine was actually the \"benefit\" of not gardening.  Does not tolerate folic acid because of associated increased sunburns.    Currently doing well.   - Continue methotrexate 22.5 mg once weekly (okay to reduce to 20mg once weekly if symptoms tolerate)   - Continue hydroxychloroquine 400 mg daily (last eye exam on 6/29/2018; reminded her to get a yearly eye exam)  - Continue prednisone 5 mg daily as needed for rheumatoid arthritis symptoms; use sparingly (she says that she hasn't used for over 6 months)  - Labs in 3 months: CBC, Creatinine, Hepatic Panel  (printed orders for the labs in 3 months to be done at an outside facility)  - Labs in 6 months: CBC, Creatinine, Hepatic Panel, ESR, CRP  (printed orders for the labs in 3 months to be done at an outside facility)    2.  Basal cell carcinoma and squamous cell carcinoma: Several lesions removed by dermatology in the past.  Continue to follow with dermatology.  Planning to have Dr. Springer assess the scaly lesion on the dorsal hand    3.  Chronic lower back pain: Has significantly improved with lidocaine patches that have been used no more than 10 times in the past 1 year.  She has been using her mother's lidocaine patches.  Management per PCP    4.  Right Achilles tendinitis history: Was evaluated by podiatry who diagnosed her with Achilles tendinitis and her symptoms resolved with a boot that she wore " at night. Mild recurrence recently so advised that she wear the boot that was previously effective, and do stretching exercises.    5.  Vaccinations: Vaccinations reviewed with Ms. Diaz.  Risks and benefits of vaccinations were discussed.     - Influenza: Refused by patient because she has significant illness after every flu shot  - [id number]: up to date  - [id number]: Will receive today  - Shingrix: up to date  - Tetanus: plans to receive from her PCP    Ms. Diaz verbalized agreement with and understanding of the rational for the diagnosis and treatment plan.  All questions were answered to best of my ability and the patient's satisfaction. Ms. Diaz was advised to contact the clinic with any questions that may arise after the clinic visit.      Thank you for involving me in the care of the patient    Return to clinic: 6 months      HPI   Kaylene Diaz is a 64 year old female with a past medical history significant for hyperlipidemia, osteoarthritis, osteopenia, meniscal tear, squamous cell carcinoma, basal cell carcinoma, and rheumatoid arthritis who presents for follow-up of rheumatoid arthritis.    Today, Ms. Diaz reports that she is doing great with regard to rheumatoid arthritis.  Tolerating medications well.  Morning stiffness for no more than 5 minutes.  No gelling phenomenon.  Right Achilles tendinitis has recurred, less severe than what it was in the past; plans to wear the boot from podiatry that was previously helpful.    Denies fevers, chills, nausea, vomiting, constipation, diarrhea. No abdominal pain. No chest pain/pressure, palpitations, or shortness of breath. No LE swelling. No neck pain. No oral or nasal sores.  No rash. No sicca symptoms.       Tobacco: Quit in 2000  EtOH: Rare  Drugs: None    ROS   GEN: No fevers, chills, night sweats; intentionally losing weight  SKIN: No itching or rashes.  Follows with dermatology regularly  HEENT: No oral or nasal ulcers.  CV: No chest pain, pressure,  palpitations, or dyspnea on exertion.  PULM: No SOB, wheeze, cough.  GI: No nausea, vomiting, constipation, diarrhea. No blood in stool. No abdominal pain.  : No blood in urine.  MSK: See HPI.  NEURO: No numbness, tingling, or weakness.  EXT: No LE swelling  PSYCH: Negative    Active Problem List     Patient Active Problem List   Diagnosis     Carpal tunnel syndrome     Tear meniscus knee     Osteopenia     Advanced directives, counseling/discussion     Hyperlipidemia LDL goal <160     Osteoarthritis     RA (rheumatoid arthritis) (H)     High risk medications (not anticoagulants) long-term use     Kidney stone     ASCUS on Pap smear     Morbid obesity due to excess calories (H)     Small bowel obstruction (H)     Elevated blood pressure reading without diagnosis of hypertension     BLANCA (acute kidney injury) (H)     Elevated glucose     SBO (small bowel obstruction) (H)     Rheumatoid arthritis involving multiple sites with positive rheumatoid factor (H)     Past Medical History     Past Medical History:   Diagnosis Date     Basal cell carcinoma      RA (rheumatoid arthritis) (H)      Small bowel obstruction (H)      Squamous cell carcinoma      Past Surgical History     Past Surgical History:   Procedure Laterality Date     ARTHROSCOPY KNEE  12/10/2010    ARTHROSCOPY KNEE performed by NAVID FIERRO at [facility]     COLONOSCOPY N/A 8/9/2018    Procedure: COLONOSCOPY;  colonoscopy;  Surgeon: Luke Kaye MD;  Location: [facility]     SURGICAL HISTORY OF -       IUD removal     SURGICAL HISTORY OF -       Ovarian cyst     SURGICAL HISTORY OF -       thorn removal from foot     SURGICAL HISTORY OF -       knee surgery, scope, right knee     Allergy     Allergies   Allergen Reactions     Folic Acid      Gold Rash     Latex Rash     Not all latex products     Current Medication List     Current Outpatient Medications   Medication Sig     albuterol (PROAIR HFA/PROVENTIL HFA/VENTOLIN HFA) 108 (90 Base) MCG/ACT inhaler Inhale 2  puffs every 4-6 hours as needed for cough, wheezing, or shortness of breath     fluticasone (VERAMYST) 27.5 MCG/SPRAY spray Spray 1 spray into both nostrils 2 times daily     guaiFENesin-codeine (ROBITUSSIN AC) 100-10 MG/5ML solution Take 5-10 mLs by mouth every 6 hours as needed for cough (Patient not taking: Reported on 10/28/2019)     hydroxychloroquine (PLAQUENIL) 200 MG tablet Take 2 tablets (400 mg) by mouth daily     methotrexate sodium 2.5 MG TABS Take 9 tablets (22.5 mg) by mouth once a week . Take all 9 tablets on the same day of each week.     Multiple Vitamins-Minerals (ADULT GUMMY PO) Take 1 chew tab by mouth daily VitaCrave     Multiple Vitamins-Minerals (HAIR/SKIN/NAILS/BIOTIN PO) Take 1 capsule by mouth daily.      vitamin  B complex with vitamin C (VITAMIN  B COMPLEX) TABS Take 1 tablet by mouth daily     No current facility-administered medications for this visit.          Social History   See HPI    Family History     Family History   Problem Relation Age of Onset     Prostate Cancer Father      Eye Disorder Father         mac degen     Heart Disease Father      Macular Degeneration Father      Heart Disease Mother         a-fib     Eye Disorder Mother      C.A.D. Mother      Hypertension Mother      Alcohol/Drug Brother      Alcohol/Drug Sister      Alcohol/Drug Brother      Alcohol/Drug Brother      Alcohol/Drug Brother      Alcohol/Drug Sister      Obesity Sister      Alcohol/Drug Sister      Prostate Cancer Sister      Cancer Other      Melanoma No family hx of        Physical Exam     Temp Readings from Last 3 Encounters:   07/23/19 97.9  F (36.6  C) (Tympanic)   07/15/19 98.6  F (37  C) (Tympanic)   12/03/18 97.8  F (36.6  C) (Oral)     BP Readings from Last 5 Encounters:   03/09/20 133/77   10/28/19 132/74   07/23/19 132/68   07/15/19 134/80   06/19/19 130/76     Pulse Readings from Last 1 Encounters:   03/09/20 73     Resp Readings from Last 1 Encounters:   07/23/19 20     Estimated body  "mass index is 43.58 kg/m  as calculated from the following:    Height as of this encounter: 1.676 m (5' 6\").    Weight as of this encounter: 122.5 kg (270 lb).    GEN: NAD; obese  HEENT: MMM. No oral lesions. Anicteric, noninjected sclera  CV: S1, S2. RRR. No m/r/g.  PULM: CTA bilaterally. No w/c.  MSK: Fullness of the right second-fourth MCPs that were nontender to palpation and without increased warmth or overlying erythema.   Other MCPs, PIPs, wrists, elbows, shoulders, knees, ankles, and MTPs without swelling or tenderness to palpation.  Achilles entheses nontender to palpation.       NEURO: UE and LE strengths 5/5 and equal bilaterally.   SKIN: Scaly lesion on the dorsal hand that she says she plans to see Dr. Springer for soon  EXT: No LE edema  PSYCH: Alert. Appropriate.    Labs / Imaging (select studies)     3/5/2020 [facility] labs reviewed    RF/CCP  Recent Labs   Lab Test 08/26/13  1611 07/12/13  1015 03/27/13  1351   CCPABY >250 Strongly Positive*  --   --    RHF  --  32* 24*     CBC  Recent Labs   Lab Test 10/28/19  1052 08/29/19  0855 05/28/19  1057   WBC 5.3 5.8 6.5   RBC 4.44 4.60 3.93   HGB 13.9 14.2 12.5   HCT 42.1 43.2 37.5   MCV 95 94 95   RDW 14.4 14.0 12.6    240 232   MCH 31.3 30.9 31.8   MCHC 33.0 32.9 33.3   NEUTROPHIL 71.0 73.2 71.2   LYMPH 19.9 15.9 19.1   MONOCYTE 7.2 8.7 7.5   EOSINOPHIL 1.7 1.9 1.7   BASOPHIL 0.2 0.3 0.5   ANEU 3.8 4.3 4.6   ALYM 1.1 0.9 1.2   PEEWEE 0.4 0.5 0.5   AEOS 0.1 0.1 0.1   ABAS 0.0 0.0 0.0     CMP  Recent Labs   Lab Test 10/28/19  1052 08/29/19  0855 05/28/19  1057  10/31/17  1110 06/23/17  0817 04/21/17  0615   NA  --   --   --   --  137 139 145*   POTASSIUM  --   --   --   --  4.5 4.4 4.0   CHLORIDE  --   --   --   --  108 106 111*   CO2  --   --   --   --  24 24 27   ANIONGAP  --   --   --   --  5 9 7   GLC  --   --   --   --  84 115* 109*   BUN  --   --   --   --  22 14 18   CR 0.72 0.75 0.84   < > 0.88 0.75 0.92   GFRESTIMATED 88 84 73   < > 65 78 62 "   GFRESTBLACK >90 >90 85   < > 79 >90   GFR Calc   75   DEAN  --   --   --   --  9.2 9.0 8.3*   BILITOTAL 0.5 0.5 0.5   < > 0.4 0.4  --    ALBUMIN 3.7 3.6 3.6   < > 3.7 3.8  --    PROTTOTAL 7.6 7.6 7.2   < > 8.0 7.5  --    ALKPHOS 109 118 125   < > 131 120  --    AST 19 21 19   < > 17 22  --    ALT 28 23 26   < > 24 26  --     < > = values in this interval not displayed.     Calcium/VitaminD  Recent Labs   Lab Test 10/31/17  1110 06/23/17  0817 04/21/17  0615   DEAN 9.2 9.0 8.3*     ESR/CRP  Recent Labs   Lab Test 10/28/19  1052 08/29/19  0855 10/23/18  1000   SED 15 11 14   CRP 5.1 7.3 4.7     Lipid Panel  Recent Labs   Lab Test 11/08/17  0726 01/04/12  0859   CHOL 194 296*   TRIG 135 130   HDL 47* 41*   * 229*   VLDL  --  26   CHOLHDLRATIO  --  7.0*   NHDL 147*  --      Hepatitis B  Recent Labs   Lab Test 09/11/13  1531   HBCAB Negative   HEPBANG Negative     Hepatitis C  Recent Labs   Lab Test 09/11/13  1531   HCVAB Negative     Lyme ab screening  No results for input(s): LYMEGM in the last 22744 hours.  Lyme confirmation testing by Western Blot  Recent Labs   Lab Test 07/12/13  1015   LYWG Negative Reference range: Negative (Note) Band(s) present: NONE (Insufficient number of bands for positive result) INTERPRETIVE INFORMATION: Borrelia Burgdorferi Ab, IgG Western Blot  For this assay, a positive result is reported when any 5 or more of the following 10 bands are present: 18, 23, 28, 30, 39, 41, 45, 58, 66, or 93 kDa.  All other banding patterns are reported as negative.   LYWM Negative Reference range: Negative (Note) Band(s) present: 41 kDa (Insufficient number of bands for positive result) INTERPRETIVE INFORMATION: Borrelia Burgdorferi Antibody, IgM Western Blot  For this assay, a positive result is reported when any 2 or more of the following bands are present: 23, 39, or 41 kDa. All other banding patterns are reported as negative. Performed by [facility], [address] [place] [address]  [id number] [phone] [email or web address], Kings Azevedo MD, Lab. Director     Tuberculosis Screening  Recent Labs   Lab Test 09/11/13  1532   TBRSLT Negative   TBAGN 0.00       Immunization History     Immunization History   Administered Date(s) Administered     Pneumo Conj 13-V (2010&after) 12/03/2018     Pneumococcal 23 valent 10/24/2013, 03/09/2020     TDAP Vaccine (Adacel) 06/16/2010     Zoster vaccine recombinant adjuvanted (SHINGRIX) 12/03/2018, 06/19/2019          Chart documentation done in part with Dragon Voice recognition Software. Although reviewed after completion, some word and grammatical error may remain.    Joseph Rooney MD

## 2020-03-16 ENCOUNTER — OFFICE VISIT (OUTPATIENT)
Dept: DERMATOLOGY | Facility: CLINIC | Age: 65
End: 2020-03-16
Payer: MEDICARE

## 2020-03-16 VITALS — SYSTOLIC BLOOD PRESSURE: 142 MMHG | DIASTOLIC BLOOD PRESSURE: 78 MMHG | OXYGEN SATURATION: 98 % | HEART RATE: 78 BPM

## 2020-03-16 DIAGNOSIS — D18.01 ANGIOMA OF SKIN: ICD-10-CM

## 2020-03-16 DIAGNOSIS — C44.622 SQUAMOUS CELL CANCER OF SKIN OF RIGHT HAND: Primary | ICD-10-CM

## 2020-03-16 DIAGNOSIS — L81.4 LENTIGO: ICD-10-CM

## 2020-03-16 DIAGNOSIS — Z85.828 HISTORY OF SKIN CANCER: ICD-10-CM

## 2020-03-16 DIAGNOSIS — D23.9 DERMAL NEVUS: ICD-10-CM

## 2020-03-16 DIAGNOSIS — L82.1 SEBORRHEIC KERATOSIS: ICD-10-CM

## 2020-03-16 PROCEDURE — 11102 TANGNTL BX SKIN SINGLE LES: CPT | Mod: 59 | Performed by: DERMATOLOGY

## 2020-03-16 PROCEDURE — 17311 MOHS 1 STAGE H/N/HF/G: CPT | Performed by: DERMATOLOGY

## 2020-03-16 PROCEDURE — 88331 PATH CONSLTJ SURG 1 BLK 1SPC: CPT | Mod: 59 | Performed by: DERMATOLOGY

## 2020-03-16 PROCEDURE — 99214 OFFICE O/P EST MOD 30 MIN: CPT | Mod: 25 | Performed by: DERMATOLOGY

## 2020-03-16 NOTE — LETTER
3/16/2020         RE: Kaylene Diaz  [id number] [place] [id number]        Dear Colleague,    Thank you for referring your patient, Kaylene Diaz, to the [facility]. Please see a copy of my visit note below.    Kaylene Diaz is a 64 year old year old female patient here today for growth on right hand.   .  Patient states this has been present for a while.  Patient reports the following symptoms:  painful.  Patient reports the following previous treatments grew after cryo.  These treatments did not work.  Patient reports the following modifying factors none.  Associated symptoms: none.  Patient has no other skin complaints today.  Remainder of the HPI, Meds, PMH, Allergies, FH, and SH was reviewed in chart.      Past Medical History:   Diagnosis Date     Basal cell carcinoma      RA (rheumatoid arthritis) (H)      Small bowel obstruction (H)      Squamous cell carcinoma        Past Surgical History:   Procedure Laterality Date     ARTHROSCOPY KNEE  12/10/2010    ARTHROSCOPY KNEE performed by NAVID FIERRO at [facility]     COLONOSCOPY N/A 8/9/2018    Procedure: COLONOSCOPY;  colonoscopy;  Surgeon: Luke Kaye MD;  Location: [facility]     SURGICAL HISTORY OF -       IUD removal     SURGICAL HISTORY OF -       Ovarian cyst     SURGICAL HISTORY OF -       thorn removal from foot     SURGICAL HISTORY OF -       knee surgery, scope, right knee        Family History   Problem Relation Age of Onset     Prostate Cancer Father      Eye Disorder Father         mac degen     Heart Disease Father      Macular Degeneration Father      Heart Disease Mother         a-fib     Eye Disorder Mother      C.A.D. Mother      Hypertension Mother      Alcohol/Drug Brother      Alcohol/Drug Sister      Alcohol/Drug Brother      Alcohol/Drug Brother      Alcohol/Drug Brother      Alcohol/Drug Sister      Obesity Sister      Alcohol/Drug Sister      Prostate Cancer Sister      Cancer Other      Melanoma No family hx of         Social History     Socioeconomic History     Marital status:      Spouse name: Not on file     Number of children: Not on file     Years of education: Not on file     Highest education level: Not on file   Occupational History     Employer: UNEMPLOYED   Social Needs     Financial resource strain: Not on file     Food insecurity     Worry: Not on file     Inability: Not on file     Transportation needs     Medical: Not on file     Non-medical: Not on file   Tobacco Use     Smoking status: Former Smoker     Years: 30.00     Last attempt to quit: 2000     Years since quittin.9     Smokeless tobacco: Never Used   Substance and Sexual Activity     Alcohol use: Yes     Comment: VERY RARE     Drug use: No     Sexual activity: Yes     Partners: Male   Lifestyle     Physical activity     Days per week: Not on file     Minutes per session: Not on file     Stress: Not on file   Relationships     Social connections     Talks on phone: Not on file     Gets together: Not on file     Attends [employer] service: Not on file     Active member of club or organization: Not on file     Attends meetings of clubs or organizations: Not on file     Relationship status: Not on file     Intimate partner violence     Fear of current or ex partner: Not on file     Emotionally abused: Not on file     Physically abused: Not on file     Forced sexual activity: Not on file   Other Topics Concern     Parent/sibling w/ CABG, MI or angioplasty before 65F 55M? No   Social History Narrative     Not on file       Outpatient Encounter Medications as of 3/16/2020   Medication Sig Dispense Refill     albuterol (PROAIR HFA/PROVENTIL HFA/VENTOLIN HFA) 108 (90 Base) MCG/ACT inhaler Inhale 2 puffs every 4-6 hours as needed for cough, wheezing, or shortness of breath 18 g 0     fluticasone (VERAMYST) 27.5 MCG/SPRAY spray Spray 1 spray into both nostrils 2 times daily       guaiFENesin-codeine (ROBITUSSIN AC) 100-10 MG/5ML solution Take  5-10 mLs by mouth every 6 hours as needed for cough 120 mL 0     hydroxychloroquine (PLAQUENIL) 200 MG tablet Take 2 tablets (400 mg) by mouth daily 180 tablet 3     methotrexate sodium 2.5 MG TABS Take 9 tablets (22.5 mg) by mouth once a week . Take all 9 tablets on the same day of each week. 108 tablet 2     Multiple Vitamins-Minerals (ADULT GUMMY PO) Take 1 chew tab by mouth daily VitaCrave       Multiple Vitamins-Minerals (HAIR/SKIN/NAILS/BIOTIN PO) Take 1 capsule by mouth daily.  100 tablet 3     vitamin  B complex with vitamin C (VITAMIN  B COMPLEX) TABS Take 1 tablet by mouth daily  0     No facility-administered encounter medications on file as of 3/16/2020.              Review Of Systems  Skin: As above  Eyes: negative  Ears/Nose/Throat: negative  Respiratory: No shortness of breath, dyspnea on exertion, cough, or hemoptysis  Cardiovascular: negative  Gastrointestinal: negative  Genitourinary: negative  Musculoskeletal: negative  Neurologic: negative  Psychiatric: negative  Hematologic/Lymphatic/Immunologic: negative  Endocrine: negative      O:   NAD, WDWN, Alert & Oriented, Mood & Affect wnl, Vitals stable   Here today alone   BP (!) 142/78   Pulse 78   SpO2 98%    General appearance normal   Vitals stable   Alert, oriented and in no acute distress      Following lymph nodes palpated: Occipital, Cervical, Supraclavicular , antecubital no lad   R dorsal hand 1.3cm keratotic nodule       Stuck on papules and brown macules on trunk and ext   Red papules on trunk  Flesh colored papules on trunk     The remainder of the full exam was normal; the following areas were examined:  conjunctiva/lids, oral mucosa, neck, peripheral vascular system, abdomen, lymph nodes, digits/nails, eccrine and apocrine glands, scalp/hair, face, neck, chest, abdomen, buttocks, back, RUE, LUE, RLE, LLE       Eyes: Conjunctivae/lids:Normal     ENT: Lips, buccal mucosa, tongue: normal    MSK:Normal    Cardiovascular: peripheral edema  none    Pulm: Breathing Normal    Lymph Nodes: No Head and Neck Lymphadenopathy     Neuro/Psych: Orientation:Alert and Orientedx3 ; Mood/Affect:normal       MICRO:   R dorsal hand:There is a proliferation of irregular nests of abnormal squamous cells arising from the epidermis and invading the dermis. These are well differentiated. The dermis shows a variable superficial perivascular inflammatory infiltrate.   A/P:  1. Seborrheic keratosis, lentigo, angioma, dermal nevus, hx of non-melanoma skin cancer   2. R dorsal hand r/o squamous cell carcinoma   TANGENTIAL BIOPSY IN HOUSE:  After consent, anesthesia with LEC and prep, tangential excision performed and dx above confirmed with frozen section histology.  No complications and routine wound care.      I have personally reviewed all specimens and/or slides and used them with my medical judgement to determine or confirm the final diagnosis.     Patient told result squamous cell carcinoma .      BENIGN LESIONS DISCUSSED WITH PATIENT:  I discussed the specifics of tumor, prognosis, and genetics of benign lesions.  I explained that treatment of these lesions would be purely cosmetic and not medically neccessary.  I discussed with patient different removal options including excision, cautery and /or laser.      Nature and genetics of benign skin lesions dicussed with patient.  Signs and Symptoms of skin cancer discussed with patient.  ABCDEs of melanoma reviewed with patient.  Patient encouraged to perform monthly skin exams.  UV precautions reviewed with patient.  Patient to follow up with Primary Care provider regarding elevated blood pressure.  Skin care regimen reviewed with patient: Eliminate harsh soaps, i.e. Dial, zest, irsih spring; Mild soaps such as Cetaphil or Dove sensitive skin, avoid hot or cold showers, aggressive use of emollients including vanicream, cetaphil or cerave discussed with patient.    Risks of non-melanoma skin cancer discussed with patient    Return to clinic 6 months  PROCEDURE NOTE  R hand squamous cell carcinoma   MOHS:   Location    After PGACAC discussed with patient, decision for Mohs surgery was made. Indication for Mohs was Location. Patient confirmed the site with Dr. Springer.  After anesthesia with LEC, the tumor was excised using standard Mohs technique in 1 stages(s).  CLEAR MARGINS OBTAINED and Final defect size was 1.6 x 1.7 cm.       REPAIR SECOND INTENT: We discussed the options for wound management in full with the patient including risks/benefits/possible outcomes. Decision made to allow the wound to heal by second intention. EBL minimal; complications none; wound care routine.  The patient was discharged in good condition and will return in one month or prn for wound evaluation.      Again, thank you for allowing me to participate in the care of your patient.        Sincerely,        Cipriano Springer MD

## 2020-03-16 NOTE — PROGRESS NOTES
Kaylene Diaz is a 64 year old year old female patient here today for growth on right hand.   .  Patient states this has been present for a while.  Patient reports the following symptoms:  painful.  Patient reports the following previous treatments grew after cryo.  These treatments did not work.  Patient reports the following modifying factors none.  Associated symptoms: none.  Patient has no other skin complaints today.  Remainder of the HPI, Meds, PMH, Allergies, FH, and SH was reviewed in chart.      Past Medical History:   Diagnosis Date     Basal cell carcinoma      RA (rheumatoid arthritis) (H)      Small bowel obstruction (H)      Squamous cell carcinoma        Past Surgical History:   Procedure Laterality Date     ARTHROSCOPY KNEE  12/10/2010    ARTHROSCOPY KNEE performed by NAVID FIERRO at [facility]     COLONOSCOPY N/A 8/9/2018    Procedure: COLONOSCOPY;  colonoscopy;  Surgeon: Luke Kaye MD;  Location: [facility]     SURGICAL HISTORY OF -       IUD removal     SURGICAL HISTORY OF -       Ovarian cyst     SURGICAL HISTORY OF -       thorn removal from foot     SURGICAL HISTORY OF -       knee surgery, scope, right knee        Family History   Problem Relation Age of Onset     Prostate Cancer Father      Eye Disorder Father         mac degen     Heart Disease Father      Macular Degeneration Father      Heart Disease Mother         a-fib     Eye Disorder Mother      C.A.D. Mother      Hypertension Mother      Alcohol/Drug Brother      Alcohol/Drug Sister      Alcohol/Drug Brother      Alcohol/Drug Brother      Alcohol/Drug Brother      Alcohol/Drug Sister      Obesity Sister      Alcohol/Drug Sister      Prostate Cancer Sister      Cancer Other      Melanoma No family hx of        Social History     Socioeconomic History     Marital status:      Spouse name: Not on file     Number of children: Not on file     Years of education: Not on file     Highest education level: Not on file   Occupational History      Employer: UNEMPLOYED   Social Needs     Financial resource strain: Not on file     Food insecurity     Worry: Not on file     Inability: Not on file     Transportation needs     Medical: Not on file     Non-medical: Not on file   Tobacco Use     Smoking status: Former Smoker     Years: 30.00     Last attempt to quit: 2000     Years since quittin.9     Smokeless tobacco: Never Used   Substance and Sexual Activity     Alcohol use: Yes     Comment: VERY RARE     Drug use: No     Sexual activity: Yes     Partners: Male   Lifestyle     Physical activity     Days per week: Not on file     Minutes per session: Not on file     Stress: Not on file   Relationships     Social connections     Talks on phone: Not on file     Gets together: Not on file     Attends Mandaen service: Not on file     Active member of club or organization: Not on file     Attends meetings of clubs or organizations: Not on file     Relationship status: Not on file     Intimate partner violence     Fear of current or ex partner: Not on file     Emotionally abused: Not on file     Physically abused: Not on file     Forced sexual activity: Not on file   Other Topics Concern     Parent/sibling w/ CABG, MI or angioplasty before 65F 55M? No   Social History Narrative     Not on file       Outpatient Encounter Medications as of 3/16/2020   Medication Sig Dispense Refill     albuterol (PROAIR HFA/PROVENTIL HFA/VENTOLIN HFA) 108 (90 Base) MCG/ACT inhaler Inhale 2 puffs every 4-6 hours as needed for cough, wheezing, or shortness of breath 18 g 0     fluticasone (VERAMYST) 27.5 MCG/SPRAY spray Spray 1 spray into both nostrils 2 times daily       guaiFENesin-codeine (ROBITUSSIN AC) 100-10 MG/5ML solution Take 5-10 mLs by mouth every 6 hours as needed for cough 120 mL 0     hydroxychloroquine (PLAQUENIL) 200 MG tablet Take 2 tablets (400 mg) by mouth daily 180 tablet 3     methotrexate sodium 2.5 MG TABS Take 9 tablets (22.5 mg) by mouth once a week  . Take all 9 tablets on the same day of each week. 108 tablet 2     Multiple Vitamins-Minerals (ADULT GUMMY PO) Take 1 chew tab by mouth daily VitaCrave       Multiple Vitamins-Minerals (HAIR/SKIN/NAILS/BIOTIN PO) Take 1 capsule by mouth daily.  100 tablet 3     vitamin  B complex with vitamin C (VITAMIN  B COMPLEX) TABS Take 1 tablet by mouth daily  0     No facility-administered encounter medications on file as of 3/16/2020.              Review Of Systems  Skin: As above  Eyes: negative  Ears/Nose/Throat: negative  Respiratory: No shortness of breath, dyspnea on exertion, cough, or hemoptysis  Cardiovascular: negative  Gastrointestinal: negative  Genitourinary: negative  Musculoskeletal: negative  Neurologic: negative  Psychiatric: negative  Hematologic/Lymphatic/Immunologic: negative  Endocrine: negative      O:   NAD, WDWN, Alert & Oriented, Mood & Affect wnl, Vitals stable   Here today alone   BP (!) 142/78   Pulse 78   SpO2 98%    General appearance normal   Vitals stable   Alert, oriented and in no acute distress      Following lymph nodes palpated: Occipital, Cervical, Supraclavicular , antecubital no lad   R dorsal hand 1.3cm keratotic nodule       Stuck on papules and brown macules on trunk and ext   Red papules on trunk  Flesh colored papules on trunk     The remainder of the full exam was normal; the following areas were examined:  conjunctiva/lids, oral mucosa, neck, peripheral vascular system, abdomen, lymph nodes, digits/nails, eccrine and apocrine glands, scalp/hair, face, neck, chest, abdomen, buttocks, back, RUE, LUE, RLE, LLE       Eyes: Conjunctivae/lids:Normal     ENT: Lips, buccal mucosa, tongue: normal    MSK:Normal    Cardiovascular: peripheral edema none    Pulm: Breathing Normal    Lymph Nodes: No Head and Neck Lymphadenopathy     Neuro/Psych: Orientation:Alert and Orientedx3 ; Mood/Affect:normal       MICRO:   R dorsal hand:There is a proliferation of irregular nests of abnormal squamous  cells arising from the epidermis and invading the dermis. These are well differentiated. The dermis shows a variable superficial perivascular inflammatory infiltrate.   A/P:  1. Seborrheic keratosis, lentigo, angioma, dermal nevus, hx of non-melanoma skin cancer   2. R dorsal hand r/o squamous cell carcinoma   TANGENTIAL BIOPSY IN HOUSE:  After consent, anesthesia with LEC and prep, tangential excision performed and dx above confirmed with frozen section histology.  No complications and routine wound care.      I have personally reviewed all specimens and/or slides and used them with my medical judgement to determine or confirm the final diagnosis.     Patient told result squamous cell carcinoma .      BENIGN LESIONS DISCUSSED WITH PATIENT:  I discussed the specifics of tumor, prognosis, and genetics of benign lesions.  I explained that treatment of these lesions would be purely cosmetic and not medically neccessary.  I discussed with patient different removal options including excision, cautery and /or laser.      Nature and genetics of benign skin lesions dicussed with patient.  Signs and Symptoms of skin cancer discussed with patient.  ABCDEs of melanoma reviewed with patient.  Patient encouraged to perform monthly skin exams.  UV precautions reviewed with patient.  Patient to follow up with Primary Care provider regarding elevated blood pressure.  Skin care regimen reviewed with patient: Eliminate harsh soaps, i.e. Dial, zest, irsih spring; Mild soaps such as Cetaphil or Dove sensitive skin, avoid hot or cold showers, aggressive use of emollients including vanicream, cetaphil or cerave discussed with patient.    Risks of non-melanoma skin cancer discussed with patient   Return to clinic 6 months  PROCEDURE NOTE  R hand squamous cell carcinoma   MOHS:   Location    After PGACAC discussed with patient, decision for Mohs surgery was made. Indication for Mohs was Location. Patient confirmed the site with   Gian.  After anesthesia with LEC, the tumor was excised using standard Mohs technique in 1 stages(s).  CLEAR MARGINS OBTAINED and Final defect size was 1.6 x 1.7 cm.       REPAIR SECOND INTENT: We discussed the options for wound management in full with the patient including risks/benefits/possible outcomes. Decision made to allow the wound to heal by second intention. EBL minimal; complications none; wound care routine.  The patient was discharged in good condition and will return in one month or prn for wound evaluation.

## 2020-03-16 NOTE — PATIENT INSTRUCTIONS
Open Wound Care     for __right hand____        ? No strenuous activity for 48 hours    ? Take Tylenol as needed for discomfort.                                                .         ? Do not drink alcoholic beverages for 48 hours.    ? Keep the pressure bandage in place for 24 hours. If the bandage becomes blood tinged or loose, reinforce it with gauze and tape.        (Refer to the reverse side of this page for management of bleeding).    ? Remove bandage in 24 hours and begin wound care as follows:     1. Clean area with tap water using a Q tip or gauze pad, (shower / bathe normally)  2. Dry wound with Q tip or gauze pad  3. Apply Aquaphor, Vaseline, Polysporin or Bacitracin Ointment with a Q tip    Do NOT use Neosporin Ointment *  4. Cover the wound with a band-aid or nonstick gauze pad and paper tape.  5. Repeat wound care once a day until wound is completely healed.    It is an old wives tale that a wound heals better when it is exposed to air and allowed to dry out. The wound will heal faster with a better cosmetic result if it is kept moist with ointment and covered with a bandage.  Do not let the wound dry out.      Supplies Needed:                Qtips or gauze pads                Polysporin or Bacitracin Ointment                Bandaids or nonstick gauze pads and paper tape    Wound care kits and brown paper tape are available for purchase at   the pharmacy.    BLEEDIN. Use tightly rolled up gauze or cloth to apply direct pressure over the bandage for 20   minutes.  2. Reapply pressure for an additional 20 minutes if necessary  3. Call the office or go to the nearest emergency room if pressure fails to stop the bleeding.  4. Use additional gauze and tape to maintain pressure once the bleeding has stopped.  5. Begin wound care 24 hours after surgery as directed.                  WOUND HEALING    1. One week after surgery a pink / red halo will form around the outside of the wound.   This is new  skin.  2. The center of the wound will appear yellowish white and produce some drainage.  3. The pink halo will slowly migrate in toward the center of the wound until the wound is covered with new shiny pink skin.  4. There will be no more drainage when the wound is completely healed.  5. It will take six months to one year for the redness to fade.  6. The scar may be itchy, tight and sensitive to extreme temperatures for a year after the surgery.  7. Massaging the area several times a day for several minutes after the wound is completely healed will help the scar soften and normalize faster. Begin massage only after healing is complete.      In case of emergency call: Dr Springer: [phone]     [facility]: [phone]    [facility]: [phone]

## 2020-03-25 NOTE — NURSING NOTE
"Chief Complaint   Patient presents with     Pharyngitis     has had several weeks.         Initial /79 (BP Location: Right arm, Patient Position: Chair, Cuff Size: Adult Regular)  Pulse 84  Temp 97.6  F (36.4  C) (Tympanic)  Ht 5' 7\" (1.702 m)  Wt (!) 305 lb (138.3 kg)  BMI 47.77 kg/m2 Estimated body mass index is 47.77 kg/(m^2) as calculated from the following:    Height as of this encounter: 5' 7\" (1.702 m).    Weight as of this encounter: 305 lb (138.3 kg).  Medication Reconciliation: complete  " 

## 2020-04-18 ENCOUNTER — TELEPHONE (OUTPATIENT)
Dept: FAMILY MEDICINE | Facility: CLINIC | Age: 65
End: 2020-04-18

## 2020-04-19 ENCOUNTER — MYC MEDICAL ADVICE (OUTPATIENT)
Dept: FAMILY MEDICINE | Facility: CLINIC | Age: 65
End: 2020-04-19

## 2020-04-19 NOTE — TELEPHONE ENCOUNTER
Reason for call:  Other   Patient called regarding (reason for call): appointment  Additional comments: Patient was advised by ER doctor to follow up with her pcp on Monday for some issues with her lungs. She would like to see if Dr. Washington can work her in for a telephone visit on Monday.    Phone number to reach patient:  Home number on file [phone] (home)    Best Time:  As soon as possible    Can we leave a detailed message on this number?  YES    Travel screening: Not Applicable     Reema GUIDO  Central Scheduler

## 2020-04-20 NOTE — TELEPHONE ENCOUNTER
I see in this next message she needs to come in.  Please do a double book on Wednesday at 10:40 am, I can see her in person at [place], we will get a chest xray per the note.  I will plan on seeing her at 11:00 am.  Hitesh Washington MD  Family Medicine

## 2020-04-20 NOTE — TELEPHONE ENCOUNTER
Patient notified. Patient verbalizes understanding and agreement. Appointment scheduled.      Lia SNELL, BSN, RN

## 2020-04-20 NOTE — TELEPHONE ENCOUNTER
Dr. Washington,    This is the second message about an appt. With you.  Patient seen in ER on 4/18/2020.  Dx with RLL Emboli and started on Xarelto.  Also Left lung pleural effusion.  See you or someone else?  Please advise. Sissy BLAND RN

## 2020-04-20 NOTE — TELEPHONE ENCOUNTER
You could double book her at 10:40 on Wednesday letter her know that her appt will be at 11:00 since I am doing a double book.  Hitesh Washington MD  Family Medicine

## 2020-04-20 NOTE — TELEPHONE ENCOUNTER
Dr. Washington,    Patient calls wanting or needing a VV and would like it to be with you.  Was seen in the ER on 4/18/2020.  Dx RLL Emboli and Left lung  pleural effusion.  Started on Xarelto.  You have no openings all week.  Please advise. Sissy BLAND RN

## 2020-04-22 ENCOUNTER — ANCILLARY PROCEDURE (OUTPATIENT)
Dept: GENERAL RADIOLOGY | Facility: CLINIC | Age: 65
End: 2020-04-22
Attending: FAMILY MEDICINE
Payer: MEDICARE

## 2020-04-22 ENCOUNTER — OFFICE VISIT (OUTPATIENT)
Dept: FAMILY MEDICINE | Facility: CLINIC | Age: 65
End: 2020-04-22
Payer: MEDICARE

## 2020-04-22 VITALS
TEMPERATURE: 97.1 F | BODY MASS INDEX: 45.34 KG/M2 | SYSTOLIC BLOOD PRESSURE: 127 MMHG | WEIGHT: 282.13 LBS | OXYGEN SATURATION: 98 % | DIASTOLIC BLOOD PRESSURE: 62 MMHG | HEART RATE: 86 BPM | RESPIRATION RATE: 18 BRPM | HEIGHT: 66 IN

## 2020-04-22 DIAGNOSIS — I26.99 OTHER ACUTE PULMONARY EMBOLISM, UNSPECIFIED WHETHER ACUTE COR PULMONALE PRESENT (H): ICD-10-CM

## 2020-04-22 DIAGNOSIS — J90 PLEURAL EFFUSION: ICD-10-CM

## 2020-04-22 DIAGNOSIS — K80.80 BILIARY CALCULUS OF OTHER SITE WITHOUT OBSTRUCTION: ICD-10-CM

## 2020-04-22 DIAGNOSIS — I26.99 OTHER ACUTE PULMONARY EMBOLISM, UNSPECIFIED WHETHER ACUTE COR PULMONALE PRESENT (H): Primary | ICD-10-CM

## 2020-04-22 DIAGNOSIS — E66.01 MORBID OBESITY WITH BMI OF 50.0-59.9, ADULT (H): ICD-10-CM

## 2020-04-22 DIAGNOSIS — R14.2 BURPING: ICD-10-CM

## 2020-04-22 LAB
ALBUMIN SERPL-MCNC: 3.6 G/DL (ref 3.4–5)
ALP SERPL-CCNC: 118 U/L (ref 40–150)
ALT SERPL W P-5'-P-CCNC: 22 U/L (ref 0–50)
ANION GAP SERPL CALCULATED.3IONS-SCNC: 4 MMOL/L (ref 3–14)
AST SERPL W P-5'-P-CCNC: 13 U/L (ref 0–45)
BASOPHILS # BLD AUTO: 0 10E9/L (ref 0–0.2)
BASOPHILS NFR BLD AUTO: 0.4 %
BILIRUB SERPL-MCNC: 0.4 MG/DL (ref 0.2–1.3)
BUN SERPL-MCNC: 22 MG/DL (ref 7–30)
CALCIUM SERPL-MCNC: 9.6 MG/DL (ref 8.5–10.1)
CHLORIDE SERPL-SCNC: 107 MMOL/L (ref 94–109)
CO2 SERPL-SCNC: 27 MMOL/L (ref 20–32)
CREAT SERPL-MCNC: 0.74 MG/DL (ref 0.52–1.04)
DIFFERENTIAL METHOD BLD: NORMAL
EOSINOPHIL # BLD AUTO: 0.1 10E9/L (ref 0–0.7)
EOSINOPHIL NFR BLD AUTO: 1.5 %
ERYTHROCYTE [DISTWIDTH] IN BLOOD BY AUTOMATED COUNT: 12.8 % (ref 10–15)
GFR SERPL CREATININE-BSD FRML MDRD: 85 ML/MIN/{1.73_M2}
GLUCOSE SERPL-MCNC: 81 MG/DL (ref 70–99)
HCT VFR BLD AUTO: 44.1 % (ref 35–47)
HGB BLD-MCNC: 14.4 G/DL (ref 11.7–15.7)
LYMPHOCYTES # BLD AUTO: 1.3 10E9/L (ref 0.8–5.3)
LYMPHOCYTES NFR BLD AUTO: 16.3 %
MCH RBC QN AUTO: 31.4 PG (ref 26.5–33)
MCHC RBC AUTO-ENTMCNC: 32.7 G/DL (ref 31.5–36.5)
MCV RBC AUTO: 96 FL (ref 78–100)
MONOCYTES # BLD AUTO: 0.9 10E9/L (ref 0–1.3)
MONOCYTES NFR BLD AUTO: 11.2 %
NEUTROPHILS # BLD AUTO: 5.8 10E9/L (ref 1.6–8.3)
NEUTROPHILS NFR BLD AUTO: 70.6 %
PLATELET # BLD AUTO: 312 10E9/L (ref 150–450)
POTASSIUM SERPL-SCNC: 4.2 MMOL/L (ref 3.4–5.3)
PROT SERPL-MCNC: 8.1 G/DL (ref 6.8–8.8)
RBC # BLD AUTO: 4.59 10E12/L (ref 3.8–5.2)
SODIUM SERPL-SCNC: 138 MMOL/L (ref 133–144)
WBC # BLD AUTO: 8.2 10E9/L (ref 4–11)

## 2020-04-22 PROCEDURE — 36415 COLL VENOUS BLD VENIPUNCTURE: CPT | Performed by: FAMILY MEDICINE

## 2020-04-22 PROCEDURE — 85025 COMPLETE CBC W/AUTO DIFF WBC: CPT | Performed by: FAMILY MEDICINE

## 2020-04-22 PROCEDURE — 71046 X-RAY EXAM CHEST 2 VIEWS: CPT

## 2020-04-22 PROCEDURE — 80053 COMPREHEN METABOLIC PANEL: CPT | Performed by: FAMILY MEDICINE

## 2020-04-22 PROCEDURE — 99215 OFFICE O/P EST HI 40 MIN: CPT | Performed by: FAMILY MEDICINE

## 2020-04-22 RX ORDER — RIVAROXABAN 15 MG-20MG
15 KIT ORAL DAILY
COMMUNITY
Start: 2020-04-19 | End: 2020-07-29

## 2020-04-22 ASSESSMENT — MIFFLIN-ST. JEOR: SCORE: 1841.46

## 2020-04-22 NOTE — PROGRESS NOTES
"Subjective     Kaylene Diaz is a 64 year old female who presents to clinic today for the following health issues:    HPI   ED/UC Followup:    Facility:  [facility]   Date of visit: 4/18/2020  Reason for visit: left lung PE, pleural effusion; opacity of lung on imaging study  Current Status: today, Kaylene feels pretty good after starting Tums chewables yesterday and today.  Still c/o burping.       Following up for a few items.    Went to ED due to burping and pain in chest.  Seemed to associated with food.  She was there and had evaluation done, chest CT noted PE in right low lobe.  Interestingly her sister had a blood clot last summer. No other family h/o of blood clots or sudden death.  She had no provoking issues.  Patient also has known cholelithiasis.  She has been taking some tums for gerd and she feels much better.    She was to have a chest xray due to some fluid in her lungs and to recheck her bp since she was hypertensive.  She feels good.    She has started the xarelto she knows how to take the medication and when to go to the 20 mg a day and she reports no side effects          Reviewed and updated as needed this visit by Provider  Tobacco  Allergies  Meds  Problems  Med Hx  Surg Hx  Fam Hx         Review of Systems   Review Of Systems  Skin: negative  Eyes:   Ears/Nose/Throat: negative  Respiratory: No shortness of breath, dyspnea on exertion, cough, or hemoptysis  Cardiovascular: negative  Gastrointestinal: no further gerd symptoms at this time  Genitourinary:   Musculoskeletal: negative  Neurologic: negative  Psychiatric: negative  Hematologic/Lymphatic/Immunologic: negative  Endocrine:         Objective    /62   Pulse 86   Temp 97.1  F (36.2  C) (Tympanic)   Resp 18   Ht 1.676 m (5' 6\")   Wt 128 kg (282 lb 2 oz)   SpO2 98%   BMI 45.54 kg/m    Body mass index is 45.54 kg/m .  Physical Exam   GENERAL APPEARANCE: alert, no distress and cooperative  EYES: Eyes grossly normal to " inspection, PERRL and conjunctivae and sclerae normal  RESP: lungs clear to auscultation - no rales, rhonchi or wheezes  CV: regular rates and rhythm, normal S1 S2, no S3 or S4 and no murmur, click or rub  ABDOMEN: soft, nontender, without hepatosplenomegaly or masses and bowel sounds normal  MS: extremities normal- no gross deformities noted  SKIN: no suspicious lesions or rashes  NEURO: Normal strength and tone, mentation intact and speech normal  PSYCH: mentation appears normal and affect normal/bright    I have personally reviewed the xray and my interpretation is the following:  CXR is clear for the most part          Assessment & Plan     (I26.99) Other acute pulmonary embolism, unspecified whether acute cor pulmonale present (H)  (primary encounter diagnosis)  Comment: discussed med and follow up plan with repeat CT scan, if negative for PE get hematology consult, if positive continue med for 3 more months and recheck CT scan.  Hematology referral will be to check for genetic factors to see if she needs to have life long therapy  Plan: rivaroxaban ANTICOAGULANT (XARELTO         ANTICOAGULANT) 20 MG TABS tablet, XR Chest 2         Views, CT Chest w Contrast            (E66.01,  Z68.43) Morbid obesity with BMI of 50.0-59.9, adult (H)  Comment: working on diet  Plan:     (J90) Pleural effusion  Comment: resolved  Plan: XR Chest 2 Views            (R14.2) Burping  Comment: could be due to biliary colic that actually set off her ED visit.  The PE could be a secondary finding due to family history now.  Get a general surgery consult for cholelithiasis  Plan: Comprehensive metabolic panel, CBC with         platelets differential, GENERAL SURG ADULT         REFERRAL            (K80.80) Biliary calculus of other site without obstruction  Comment:   Plan: Comprehensive metabolic panel, CBC with         platelets differential, GENERAL SURG ADULT         REFERRAL               BMI:   Estimated body mass index is 45.54  "kg/m  as calculated from the following:    Height as of this encounter: 1.676 m (5' 6\").    Weight as of this encounter: 128 kg (282 lb 2 oz).   Weight management plan: diet        See Patient Instructions    Return in about 3 months (around 7/22/2020) for get a CT scan for your blood clot in your lung.    Hitesh Washington MD  [facility]    Counseling/Coordination of care is over 20 min in 40 min appt.    "

## 2020-05-08 ENCOUNTER — TELEPHONE (OUTPATIENT)
Dept: PHARMACY | Facility: CLINIC | Age: 65
End: 2020-05-08

## 2020-05-08 NOTE — TELEPHONE ENCOUNTER
Med Rec:                                                    Chart audited for medication reconciliation regarding the following medications:   Xarelto starter pack      Pharmacy contacted: No  Patient contacted: No  Discrepancies found: No  Action taken: Reviewed chart - [facility] Pharmacist verified coverage on 4/19 and order was placed under Fadi Winters. Xarelto 20 mg dose ordered by patient's PCP (Dr. Washington) on 4/22/20.     No discrepancies/changes    Time spent: 5 minutes    Iglesia Briceño, SantosD, BCACP  Medication Therapy Management Pharmacist  Pager: [phone]

## 2020-07-20 ENCOUNTER — HOSPITAL ENCOUNTER (OUTPATIENT)
Dept: CT IMAGING | Facility: CLINIC | Age: 65
Discharge: HOME | End: 2020-07-20
Attending: FAMILY MEDICINE | Admitting: FAMILY MEDICINE
Payer: MEDICARE

## 2020-07-20 DIAGNOSIS — I26.99 OTHER ACUTE PULMONARY EMBOLISM, UNSPECIFIED WHETHER ACUTE COR PULMONALE PRESENT (H): ICD-10-CM

## 2020-07-20 PROCEDURE — 25000125 ZZHC RX 250: Performed by: RADIOLOGY

## 2020-07-20 PROCEDURE — 25000128 H RX IP 250 OP 636: Performed by: RADIOLOGY

## 2020-07-20 PROCEDURE — 71275 CT ANGIOGRAPHY CHEST: CPT

## 2020-07-20 RX ORDER — IOPAMIDOL 755 MG/ML
96 INJECTION, SOLUTION INTRAVASCULAR ONCE
Status: COMPLETED | OUTPATIENT
Start: 2020-07-20 | End: 2020-07-20

## 2020-07-20 RX ADMIN — SODIUM CHLORIDE 100 ML: 9 INJECTION, SOLUTION INTRAVENOUS at 09:23

## 2020-07-20 RX ADMIN — IOPAMIDOL 96 ML: 755 INJECTION, SOLUTION INTRAVENOUS at 09:22

## 2020-07-28 ASSESSMENT — ENCOUNTER SYMPTOMS
NERVOUS/ANXIOUS: 0
CHILLS: 0
HEARTBURN: 0
PALPITATIONS: 0
PARESTHESIAS: 0
EYE PAIN: 0
HEMATURIA: 0
SORE THROAT: 0
JOINT SWELLING: 0
BREAST MASS: 0
COUGH: 0
HEADACHES: 0
DYSURIA: 0
CONSTIPATION: 0
ARTHRALGIAS: 0
SHORTNESS OF BREATH: 0
FEVER: 0
ABDOMINAL PAIN: 0
HEMATOCHEZIA: 0
FREQUENCY: 0
DIARRHEA: 0
MYALGIAS: 0
DIZZINESS: 0
NAUSEA: 0
WEAKNESS: 0

## 2020-07-28 ASSESSMENT — ACTIVITIES OF DAILY LIVING (ADL): CURRENT_FUNCTION: NO ASSISTANCE NEEDED

## 2020-07-29 ENCOUNTER — OFFICE VISIT (OUTPATIENT)
Dept: FAMILY MEDICINE | Facility: CLINIC | Age: 65
End: 2020-07-29
Payer: MEDICARE

## 2020-07-29 VITALS
RESPIRATION RATE: 17 BRPM | HEIGHT: 66 IN | DIASTOLIC BLOOD PRESSURE: 70 MMHG | WEIGHT: 293 LBS | BODY MASS INDEX: 47.09 KG/M2 | OXYGEN SATURATION: 98 % | HEART RATE: 68 BPM | SYSTOLIC BLOOD PRESSURE: 110 MMHG | TEMPERATURE: 98 F

## 2020-07-29 DIAGNOSIS — Z00.00 WELCOME TO MEDICARE PREVENTIVE VISIT: Primary | ICD-10-CM

## 2020-07-29 DIAGNOSIS — Z82.49 FAMILY HISTORY OF BLOOD CLOTS: ICD-10-CM

## 2020-07-29 DIAGNOSIS — E78.5 HYPERLIPIDEMIA LDL GOAL <100: ICD-10-CM

## 2020-07-29 DIAGNOSIS — I26.99 PULMONARY EMBOLISM, UNSPECIFIED CHRONICITY, UNSPECIFIED PULMONARY EMBOLISM TYPE, UNSPECIFIED WHETHER ACUTE COR PULMONALE PRESENT (H): ICD-10-CM

## 2020-07-29 DIAGNOSIS — Z12.31 ENCOUNTER FOR SCREENING MAMMOGRAM FOR BREAST CANCER: ICD-10-CM

## 2020-07-29 LAB
CHOLEST SERPL-MCNC: 191 MG/DL
HDLC SERPL-MCNC: 53 MG/DL
LDLC SERPL CALC-MCNC: 119 MG/DL
NONHDLC SERPL-MCNC: 138 MG/DL
TRIGL SERPL-MCNC: 95 MG/DL

## 2020-07-29 PROCEDURE — G0402 INITIAL PREVENTIVE EXAM: HCPCS | Performed by: FAMILY MEDICINE

## 2020-07-29 PROCEDURE — 80061 LIPID PANEL: CPT | Performed by: FAMILY MEDICINE

## 2020-07-29 PROCEDURE — 99213 OFFICE O/P EST LOW 20 MIN: CPT | Mod: 25 | Performed by: FAMILY MEDICINE

## 2020-07-29 PROCEDURE — 36415 COLL VENOUS BLD VENIPUNCTURE: CPT | Performed by: FAMILY MEDICINE

## 2020-07-29 ASSESSMENT — ENCOUNTER SYMPTOMS
HEADACHES: 0
COUGH: 0
ARTHRALGIAS: 0
WEAKNESS: 0
DIZZINESS: 0
HEMATURIA: 0
JOINT SWELLING: 0
HEMATOCHEZIA: 0
NAUSEA: 0
FREQUENCY: 0
SHORTNESS OF BREATH: 0
FEVER: 0
PARESTHESIAS: 0
DIARRHEA: 0
DYSURIA: 0
PALPITATIONS: 0
MYALGIAS: 0
ABDOMINAL PAIN: 0
EYE PAIN: 0
CONSTIPATION: 0
NERVOUS/ANXIOUS: 0
BREAST MASS: 0
HEARTBURN: 0
CHILLS: 0
SORE THROAT: 0

## 2020-07-29 ASSESSMENT — ACTIVITIES OF DAILY LIVING (ADL): CURRENT_FUNCTION: NO ASSISTANCE NEEDED

## 2020-07-29 ASSESSMENT — MIFFLIN-ST. JEOR: SCORE: 1945.22

## 2020-07-29 NOTE — PROGRESS NOTES
"SUBJECTIVE:   Kaylene Diaz is a 64 year old female who presents for Preventive Visit.    Are you in the first 12 months of your Medicare coverage?  Yes,  Visual Acuity:  Right Eye: 20/40   Left Eye: 20/32  Both Eyes: 20/32    Healthy Habits:     In general, how would you rate your overall health?  Good    Frequency of exercise:  4-5 days/week    Duration of exercise:  N/A    Do you usually eat at least 4 servings of fruit and vegetables a day, include whole grains    & fiber and avoid regularly eating high fat or \"junk\" foods?  Yes    Taking medications regularly:  Yes    Medication side effects:  Not applicable    Ability to successfully perform activities of daily living:  No assistance needed    Home Safety:  No safety concerns identified    Hearing Impairment:  No hearing concerns    In the past 6 months, have you been bothered by leaking of urine?  No    In general, how would you rate your overall mental or emotional health?  Excellent      PHQ-2 Total Score: 0    Additional concerns today:  No    Do you feel safe in your environment? Yes    Have you ever done Advance Care Planning? (For example, a Health Directive, POLST, or a discussion with a medical provider or your loved ones about your wishes): Yes, advance care planning is on file.      Fall risk  Fallen 2 or more times in the past year?: No  Any fall with injury in the past year?: No    Cognitive Screening   1) Repeat 3 items (Leader, Season, Table)    2) Clock draw: NORMAL  3) 3 item recall: Recalls 1 object   Results: NORMAL clock, 1-2 items recalled: COGNITIVE IMPAIRMENT LESS LIKELY    Mini-CogTM Copyright RONALDO Huertas. Licensed by the author for use in [facility]; reprinted with permission (morro@.[facility]). All rights reserved.      Do you have sleep apnea, excessive snoring or daytime drowsiness?: yes, excessive snoring and daytime drowsiness    Reviewed and updated as needed this visit by clinical staff  Tobacco  Allergies  Med Hx  Surg " Hx  Fam Hx  Soc Hx      We discussed her h/o pap smears, recommendation to stop at age 64 if they are normal and appropriate time frame.  She is late on her last pap.  She has declined to get one today since all of her paps have been normal throughout her life.    Reviewed and updated as needed this visit by Provider        Social History     Tobacco Use     Smoking status: Former Smoker     Years: 30.00     Last attempt to quit: 2000     Years since quittin.2     Smokeless tobacco: Never Used   Substance Use Topics     Alcohol use: Yes     Comment: VERY RARE     If you drink alcohol do you typically have >3 drinks per day or >7 drinks per week? No    Alcohol Use 2020   Prescreen: >3 drinks/day or >7 drinks/week? -   Prescreen: >3 drinks/day or >7 drinks/week? No           Current providers sharing in care for this patient include:   Patient Care Team:  Hitesh Washington MD as PCP - General  Hitesh Washington MD as Assigned PCP  Mitch Briceño RPH as Pharmacist (Pharmacist Clinician- Clinical Pharmacy Specialist)    The following health maintenance items are reviewed in Epic and correct as of today:  Health Maintenance   Topic Date Due     DEXA  1955     HIV SCREENING  1969     ADVANCE CARE PLANNING  2016     MEDICARE ANNUAL WELLNESS VISIT  2019     FALL RISK ASSESSMENT  2019     DTAP/TDAP/TD IMMUNIZATION (2 - Td) 2020     MAMMO SCREENING  2020     INFLUENZA VACCINE (1) 2020     LIPID  2022     COLORECTAL CANCER SCREENING  2028     HEPATITIS C SCREENING  Completed     PHQ-2  Completed     PNEUMOCOCCAL IMMUNIZATION 65+ LOW/MEDIUM RISK  Completed     ZOSTER IMMUNIZATION  Completed     IPV IMMUNIZATION  Aged Out     MENINGITIS IMMUNIZATION  Aged Out     HEPATITIS B IMMUNIZATION  Aged Out           Review of Systems   Constitutional: Negative for chills and fever.   HENT: Negative for congestion, ear pain, hearing loss and sore  "throat.    Eyes: Negative for pain and visual disturbance.   Respiratory: Negative for cough and shortness of breath.    Cardiovascular: Negative for chest pain, palpitations and peripheral edema.   Gastrointestinal: Negative for abdominal pain, constipation, diarrhea, heartburn, hematochezia and nausea.   Breasts:  Negative for tenderness, breast mass and discharge.   Genitourinary: Positive for urgency and vaginal discharge. Negative for dysuria, frequency, genital sores, hematuria, pelvic pain and vaginal bleeding.   Musculoskeletal: Negative for arthralgias, joint swelling and myalgias.   Skin: Negative for rash.   Neurological: Negative for dizziness, weakness, headaches and paresthesias.   Psychiatric/Behavioral: Negative for mood changes. The patient is not nervous/anxious.      Has slight urinary leakage.   Not bothering.    OBJECTIVE:   /70   Pulse 68   Temp 98  F (36.7  C) (Tympanic)   Resp 17   Ht 1.676 m (5' 6\")   Wt 138.3 kg (305 lb)   SpO2 98%   BMI 49.23 kg/m   Estimated body mass index is 49.23 kg/m  as calculated from the following:    Height as of this encounter: 1.676 m (5' 6\").    Weight as of this encounter: 138.3 kg (305 lb).  Physical Exam  GENERAL: healthy, alert and no distress  EYES: Eyes grossly normal to inspection, PERRL and conjunctivae and sclerae normal  HENT: ear canals and TM's normal, nose and mouth without ulcers or lesions  NECK: no adenopathy, no asymmetry, masses, or scars and thyroid normal to palpation  RESP: lungs clear to auscultation - no rales, rhonchi or wheezes  CV: regular rate and rhythm, normal S1 S2, no S3 or S4, no murmur, click or rub, no peripheral edema and peripheral pulses strong  ABDOMEN: soft, nontender, no hepatosplenomegaly, no masses and bowel sounds normal  MS: no gross musculoskeletal defects noted, no edema  SKIN: no suspicious lesions or rashes  NEURO: Normal strength and tone, mentation intact and speech normal  PSYCH: mentation appears " "normal, affect normal/bright  LYMPH: anterior cervical: no adenopathy  posterior cervical: no adenopathy        ASSESSMENT / PLAN:   (Z00.00) Encounter for Medicare annual wellness exam  (primary encounter diagnosis)  Comment: Discussed healthy lifestyle and preventative cares.    Plan:     (Z00.00) Welcome to Medicare preventive visit  Comment: Discussed healthy lifestyle and preventative cares.    Plan:     (I26.99) Pulmonary embolism, unspecified chronicity, unspecified pulmonary embolism type, unspecified whether acute cor pulmonale present (H)  Comment: needs to have follow up with hematology  Plan: Oncology/Hematology Adult Referral            (Z82.49) Family history of blood clots  Comment:   Plan: Oncology/Hematology Adult Referral            (Z12.31) Encounter for screening mammogram for breast cancer  Comment: needs to get this done  Plan: MA Screen Bilateral w/Santi            (E78.5) Hyperlipidemia LDL goal <100  Comment: check lab today  Plan: Lipid panel reflex to direct LDL Fasting              COUNSELING:  Reviewed preventive health counseling, as reflected in patient instructions       Regular exercise       Healthy diet/nutrition       Vision screening       Hearing screening    Estimated body mass index is 49.23 kg/m  as calculated from the following:    Height as of this encounter: 1.676 m (5' 6\").    Weight as of this encounter: 138.3 kg (305 lb).    Weight management plan: diet     reports that she quit smoking about 20 years ago. She quit after 30.00 years of use. She has never used smokeless tobacco.      Appropriate preventive services were discussed with this patient, including applicable screening as appropriate for cardiovascular disease, diabetes, osteopenia/osteoporosis, and glaucoma.  As appropriate for age/gender, discussed screening for colorectal cancer, prostate cancer, breast cancer, and cervical cancer. Checklist reviewing preventive services available has been given to the " patient.    Reviewed patients plan of care and provided an AVS. The Basic Care Plan (routine screening as documented in Health Maintenance) for Kaylene meets the Care Plan requirement. This Care Plan has been established and reviewed with the Patient.    Counseling Resources:  ATP IV Guidelines  Pooled Cohorts Equation Calculator  Breast Cancer Risk Calculator  FRAX Risk Assessment  ICSI Preventive Guidelines  Dietary Guidelines for Americans, 2010  [employer] MyPlate  ASA Prophylaxis  Lung CA Screening    Hitesh Washington MD  [facility]    Identified Health Risks:

## 2020-07-29 NOTE — PATIENT INSTRUCTIONS
Please go to lab.    Please get your mammogram done.    Please see hematology to discuss your blood clots and plan due to your family history.    Please see general surgery regarding you gallstones.            Thank you for choosing [facility].  You may be receiving an email and/or telephone survey request from [facility] Customer Experience regarding your visit today.  Please take a few minutes to respond to the survey to let us know how we are doing.      If you have questions or concerns, please contact us via S[employer] or you can contact your care team at [phone].    Our Clinic hours are:  Monday 6:40 am  to 7:00 pm  Tuesday -Friday 6:40 am to 5:00 pm    The [place] outpatient lab hours are:  Monday - Friday 6:10 am to 4:45 pm  Saturdays 7:00 am to 11:00 am  Appointments are required, call [phone]    If you have clinical questions after hours or would like to schedule an appointment,  call the clinic at [phone].      Patient Education   Personalized Prevention Plan  You are due for the preventive services outlined below.  Your care team is available to assist you in scheduling these services.  If you have already completed any of these items, please share that information with your care team to update in your medical record.  Health Maintenance Due   Topic Date Due     Osteoporosis Screening  1955     HIV Screening  11/22/1969     Discuss Advance Care Planning  12/27/2016     Annual Wellness Visit  11/22/2019     FALL RISK ASSESSMENT  11/22/2019     Diptheria Tetanus Pertussis (DTAP/TDAP/TD) Vaccine (2 - Td) 06/16/2020     Mammogram  07/18/2020        Patient Education   Personalized Prevention Plan  You are due for the preventive services outlined below.  Your care team is available to assist you in scheduling these services.  If you have already completed any of these items, please share that information with your care team to update in your medical record.  Health Maintenance Due    Topic Date Due     Osteoporosis Screening  1955     HIV Screening  11/22/1969     Discuss Advance Care Planning  12/27/2016     Annual Wellness Visit  11/22/2019     FALL RISK ASSESSMENT  11/22/2019     Diptheria Tetanus Pertussis (DTAP/TDAP/TD) Vaccine (2 - Td) 06/16/2020     Mammogram  07/18/2020        Patient Education   Personalized Prevention Plan  You are due for the preventive services outlined below.  Your care team is available to assist you in scheduling these services.  If you have already completed any of these items, please share that information with your care team to update in your medical record.  Health Maintenance Due   Topic Date Due     Osteoporosis Screening  1955     HIV Screening  11/22/1969     Discuss Advance Care Planning  12/27/2016     Annual Wellness Visit  11/22/2019     FALL RISK ASSESSMENT  11/22/2019     Diptheria Tetanus Pertussis (DTAP/TDAP/TD) Vaccine (2 - Td) 06/16/2020     Mammogram  07/18/2020

## 2020-07-30 NOTE — TELEPHONE ENCOUNTER
ONCOLOGY INTAKE: Records Information      APPT INFORMATION:  Referring provider:  Dr. Hitesh Washington MD  Referring provider s clinic:  [facility]  Reason for visit/diagnosis: Pulmonary embolism, unspecified chronicity, unspecified pulmonary embolism type,...  Family history of blood clots  Has patient been notified of appointment date and time?: Yes    RECORDS INFORMATION:  Were the records received with the referral (via Rightfax)? No,Internal Referral      Has patient been seen for any external appt for this diagnosis? No    If yes, where? NA      ADDITIONAL INFORMATION:  None

## 2020-07-31 NOTE — TELEPHONE ENCOUNTER
RECORDS STATUS - ALL OTHER DIAGNOSIS      RECORDS RECEIVED FROM: [facility]   DATE RECEIVED: 8/14/2020    NOTES STATUS DETAILS   OFFICE NOTE from referring provider Complete Hitesh Washington MD    OFFICE NOTE from medical oncologist N/A    DISCHARGE SUMMARY from hospital N/A    DISCHARGE REPORT from the ER Complete CE- [facility] 4/18/2020 PE    OPERATIVE REPORT N/A    MEDICATION LIST Complete [facility]   CLINICAL TRIAL TREATMENTS TO DATE     LABS     PATHOLOGY REPORTS N/A    ANYTHING RELATED TO DIAGNOSIS Complete Labs last updated on 7/29/2020    GENONOMIC TESTING     TYPE:     IMAGING (NEED IMAGES & REPORT)     CT SCANS Complete -2 CTs and one Xray Chest to be mailed on an [employer] disc     Ph: [phone] Opt 1  Fax: [phone]     Mailing Address:   [address] [place] [address] [address]   [place] [address]    Tracking Number:[id number]    CT Chest Pulmonary 7/20/2020     Requested- [facility]/ [facility]  CT abdomen pelvis 4/18/2020    CT Chest 4/18/2020    MRI     Xray Chest Complete- [facility] in [place], [place]  4/22/2020 more in PACS      Requested- [facility]   Xray Chest 4/18/2020    MAMMO     ULTRASOUND     PET       Action    Action Taken 7/31/2020 1:55pm   I called Tiffanie [phone] - no imaging with [facility]     [facility]  In [place], [place] Ph: [phone]- they are going to push three images to PACS.     8/5/2020 11:02am   I have been keeping an eye out for any imaging from [facility] but I don't see any imaging in PACs.     I called [facility] to follow up on image request from 7/31/2020 - I spoke to Darya in the IMG Dept and she said they can't push to PACS.   I faxed over a request for an [employer] disc to be mailed out.     I called pt Kaylene - her phone went to .     8/7/2020 3:09pm   I call kailash Maurice again - all outside records have been accounted for, imaging is in pACS.

## 2020-08-06 NOTE — TELEPHONE ENCOUNTER
Action 08/06/20 1:47 PM - [place]   Action Taken  Imaging disc received from [facility] and sent to [facility] to be uploaded into PACs.

## 2020-08-14 ENCOUNTER — PRE VISIT (OUTPATIENT)
Dept: ONCOLOGY | Facility: CLINIC | Age: 65
End: 2020-08-14

## 2020-08-26 ENCOUNTER — VIRTUAL VISIT (OUTPATIENT)
Dept: ONCOLOGY | Facility: CLINIC | Age: 65
End: 2020-08-26
Attending: FAMILY MEDICINE
Payer: MEDICARE

## 2020-08-26 DIAGNOSIS — Z82.49 FAMILY HISTORY OF BLOOD CLOTS: ICD-10-CM

## 2020-08-26 DIAGNOSIS — M05.79 RHEUMATOID ARTHRITIS INVOLVING MULTIPLE SITES WITH POSITIVE RHEUMATOID FACTOR (H): Chronic | ICD-10-CM

## 2020-08-26 DIAGNOSIS — I26.99 PULMONARY EMBOLISM, UNSPECIFIED CHRONICITY, UNSPECIFIED PULMONARY EMBOLISM TYPE, UNSPECIFIED WHETHER ACUTE COR PULMONALE PRESENT (H): Primary | ICD-10-CM

## 2020-08-26 DIAGNOSIS — R03.0 ELEVATED BLOOD PRESSURE READING WITHOUT DIAGNOSIS OF HYPERTENSION: Chronic | ICD-10-CM

## 2020-08-26 PROCEDURE — 99204 OFFICE O/P NEW MOD 45 MIN: CPT | Mod: 95 | Performed by: INTERNAL MEDICINE

## 2020-08-26 PROCEDURE — 40001009 ZZH VIDEO/TELEPHONE VISIT; NO CHARGE

## 2020-08-26 NOTE — LETTER
"    8/26/2020         RE: Kaylene Diaz  [id number] [place] [address]        Dear Colleague,    Thank you for referring your patient, Kaylene Diaz, to the [facility]. Please see a copy of my visit note below.    Kaylene Diaz is a 64 year old female who is being evaluated via a billable video visit.      The patient has been notified of following:     \"This video visit will be conducted via a call between you and your physician/provider. We have found that certain health care needs can be provided without the need for an in-person physical exam.  This service lets us provide the care you need with a video conversation.  If a prescription is necessary we can send it directly to your pharmacy.  If lab work is needed we can place an order for that and you can then stop by our lab to have the test done at a later time.    Video visits are billed at different rates depending on your insurance coverage.  Please reach out to your insurance provider with any questions.    If during the course of the call the physician/provider feels a video visit is not appropriate, you will not be charged for this service.\"    Patient has given verbal consent for Video visit? Yes  How would you like to obtain your AVS? MyChart  If you are dropped from the video visit, the video invite should be resent to: Send to e-mail at: [email or web address]       [phone]  Will anyone else be joining your video visit? No        Video-Visit Details    Type of service:  Video Visit    Originating Location (pt. Location): Home    Distant Location (provider location):  [facility]     Aliyah Carrasco CMA            Hematology/ Oncology virtual video visit:  Aug 26, 2020    Due to the concerns around COVID-19 and adhering to social distancing we conducted this visit via video visit.      Reason for Visit:   Chief Complaint   Patient presents with     Hematology     New patient - Pulmonary embolism, unspecified chronicity, unspecified " Occupational Health Nurse/MA/Tech visit only.      pulmonary embolism type, unspecified whether acute cor pulmonale present        History of present illness  Kaylene Diaz is a 64-year-old female patient who presented to the emergency room in April 2020 with increasing shortness of breath and left chest pain.  Subsequently she went on to have full work-up including a d-dimer which came back elevated.  A CT scan of the chest was done showing pulmonary embolism.  Patient was started on Xarelto.  She has been on Xarelto.  She has been having no recent chest pain or shortness of breath.  She denies any weight loss or night sweats or fever or chills.  She is here today to discuss further management plan.    Review of systems:  Pertinent positives have been included in HPI; remainder of detailed complete 20-point ROS was negative.    Past medical, social, surgical, and family histories reviewed.    Allergies:  Allergies as of 08/26/2020 - Reviewed 08/26/2020   Allergen Reaction Noted     Folic acid  12/03/2018     Gold Rash 04/28/2017     Latex Rash 03/20/2017       Current Medications:  Current Outpatient Medications   Medication Sig Dispense Refill     fluticasone (VERAMYST) 27.5 MCG/SPRAY spray Spray 1 spray into both nostrils 2 times daily       hydroxychloroquine (PLAQUENIL) 200 MG tablet Take 2 tablets (400 mg) by mouth daily 180 tablet 3     methotrexate sodium 2.5 MG TABS Take 9 tablets (22.5 mg) by mouth once a week . Take all 9 tablets on the same day of each week. 108 tablet 2     Multiple Vitamins-Minerals (ADULT GUMMY PO) Take 1 chew tab by mouth daily VitaCrave       Multiple Vitamins-Minerals (HAIR/SKIN/NAILS/BIOTIN PO) Take 1 capsule by mouth daily.  100 tablet 3     rivaroxaban ANTICOAGULANT (XARELTO ANTICOAGULANT) 20 MG TABS tablet Take 1 tablet (20 mg) by mouth daily (with dinner) Continue with this 90 tablet 1     vitamin  B complex with vitamin C (VITAMIN  B COMPLEX) TABS Take 1 tablet by mouth daily  0        Physical examination:  Physical Exam as  observed via telehealth:         Constitutional - General appearance, and body habitus are within normal range         Eyes -there is no redness, or discharge seen.         Respiratory -there is no cough, or labored breathing observed         Musculoskeletal -full range of motion is observed         Skin -there is no visible discoloration, or visible lesions         Neurological -there is tremors is observed         Psychiatric -the patient is alert & oriented.    The rest of a comprehensive physical examination is deferred due to PHE (public health emergency) video visit restrictions.    Laboratory/Imaging Studies:  No visits with results within 2 Week(s) from this visit.   Latest known visit with results is:   Office Visit on 07/29/2020   Component Date Value Ref Range Status     Cholesterol 07/29/2020 191  <200 mg/dL Final     Triglycerides 07/29/2020 95  <150 mg/dL Final    Fasting specimen     HDL Cholesterol 07/29/2020 53  >49 mg/dL Final     LDL Cholesterol Calculated 07/29/2020 119* <100 mg/dL Final    Comment: Above desirable:  100-129 mg/dl  Borderline High:  130-159 mg/dL  High:             160-189 mg/dL  Very high:       >189 mg/dl       Non HDL Cholesterol 07/29/2020 138* <130 mg/dL Final    Comment: Above Desirable:  130-159 mg/dl  Borderline high:  160-189 mg/dl  High:             190-219 mg/dl  Very high:       >219 mg/dl          No results found for this or any previous visit (from the past 744 hour(s)).    Assessment and plan:  (I26.99) Pulmonary embolism, unspecified chronicity, unspecified pulmonary embolism type, unspecified whether acute cor pulmonale present (H)  (primary encounter diagnosis)  (Z82.49) Family history of blood clots  This is a 64-year-old female patient with unprovoked pulmonary embolism.  Patient has a family history of blood clots.  I discussed with the patient today the underlying and principles and factor for thromboembolic disease.  I would recommend the patient to  continue on anticoagulation for at least 6 months.  We will arrange for hypercoagulability work-up including   Antithrombin III, Cardiolipin Edyta IgG and IgM, F2 prothrombin 32739C Mut Anal, Factor 5 leiden mutation analysis, Protein C chromogenic, Protein S Antigen Free.  Bilateral Doppler ultrasound of both lower extremities as well.  I will see the patient when these results are available to discuss further management plan.      The patient is ready to learn, no apparent learning barriers were identified.  Diagnosis and treatment plans were explained to the patient. The patient expressed understanding of the content. The patient asked appropriate questions. The patient questions were answered to her satisfaction.    This is started 8 AM  Visit ended 8:30 AM    Emilee Estrella MD    Chart documentation with Dragon Voice recognition Software. Although reviewed after completion, some words and grammatical errors may remain.      Again, thank you for allowing me to participate in the care of your patient.        Sincerely,        Emilee Estrella MD

## 2020-08-26 NOTE — PATIENT INSTRUCTIONS
Arrange for bilateral Doppler ultrasound left lower extremity.  Arrange for laboratory tests.  Follow-up to review the results

## 2020-08-26 NOTE — PROGRESS NOTES
"Kaylene Diaz is a 64 year old female who is being evaluated via a billable video visit.      The patient has been notified of following:     \"This video visit will be conducted via a call between you and your physician/provider. We have found that certain health care needs can be provided without the need for an in-person physical exam.  This service lets us provide the care you need with a video conversation.  If a prescription is necessary we can send it directly to your pharmacy.  If lab work is needed we can place an order for that and you can then stop by our lab to have the test done at a later time.    Video visits are billed at different rates depending on your insurance coverage.  Please reach out to your insurance provider with any questions.    If during the course of the call the physician/provider feels a video visit is not appropriate, you will not be charged for this service.\"    Patient has given verbal consent for Video visit? Yes  How would you like to obtain your AVS? MyChart  If you are dropped from the video visit, the video invite should be resent to: Send to e-mail at: [email or web address]       [phone]  Will anyone else be joining your video visit? No        Video-Visit Details    Type of service:  Video Visit    Originating Location (pt. Location): Home    Distant Location (provider location):  [facility]     Aliyah Carrasco CMA        "

## 2020-08-26 NOTE — LETTER
"    8/26/2020         RE: Kaylene Diaz  [address] [place] [address]        Dear Colleague,    Thank you for referring your patient, Kaylene Diaz, to the [facility] Please see a copy of my visit note below.    Kaylene Diaz is a 64 year old female who is being evaluated via a billable video visit.      The patient has been notified of following:     \"This video visit will be conducted via a call between you and your physician/provider. We have found that certain health care needs can be provided without the need for an in-person physical exam.  This service lets us provide the care you need with a video conversation.  If a prescription is necessary we can send it directly to your pharmacy.  If lab work is needed we can place an order for that and you can then stop by our lab to have the test done at a later time.    Video visits are billed at different rates depending on your insurance coverage.  Please reach out to your insurance provider with any questions.    If during the course of the call the physician/provider feels a video visit is not appropriate, you will not be charged for this service.\"    Patient has given verbal consent for Video visit? Yes  How would you like to obtain your AVS? MyChart  If you are dropped from the video visit, the video invite should be resent to: Send to e-mail at: [email or web address]       [phone]  Will anyone else be joining your video visit? No        Video-Visit Details    Type of service:  Video Visit    Originating Location (pt. Location): Home    Distant Location (provider location):  [facility]     Aliyah Carrasco CMA            Hematology/ Oncology virtual video visit:  Aug 26, 2020    Due to the concerns around COVID-19 and adhering to social distancing we conducted this visit via video visit.      Reason for Visit:   Chief Complaint   Patient presents with     Hematology     New patient - Pulmonary embolism, unspecified chronicity, unspecified " pulmonary embolism type, unspecified whether acute cor pulmonale present        History of present illness  Kaylene Diaz is a 64-year-old female patient who presented to the emergency room in April 2020 with increasing shortness of breath and left chest pain.  Subsequently she went on to have full work-up including a d-dimer which came back elevated.  A CT scan of the chest was done showing pulmonary embolism.  Patient was started on Xarelto.  She has been on Xarelto.  She has been having no recent chest pain or shortness of breath.  She denies any weight loss or night sweats or fever or chills.  She is here today to discuss further management plan.    Review of systems:  Pertinent positives have been included in HPI; remainder of detailed complete 20-point ROS was negative.    Past medical, social, surgical, and family histories reviewed.    Allergies:  Allergies as of 08/26/2020 - Reviewed 08/26/2020   Allergen Reaction Noted     Folic acid  12/03/2018     Gold Rash 04/28/2017     Latex Rash 03/20/2017       Current Medications:  Current Outpatient Medications   Medication Sig Dispense Refill     fluticasone (VERAMYST) 27.5 MCG/SPRAY spray Spray 1 spray into both nostrils 2 times daily       hydroxychloroquine (PLAQUENIL) 200 MG tablet Take 2 tablets (400 mg) by mouth daily 180 tablet 3     methotrexate sodium 2.5 MG TABS Take 9 tablets (22.5 mg) by mouth once a week . Take all 9 tablets on the same day of each week. 108 tablet 2     Multiple Vitamins-Minerals (ADULT GUMMY PO) Take 1 chew tab by mouth daily VitaCrave       Multiple Vitamins-Minerals (HAIR/SKIN/NAILS/BIOTIN PO) Take 1 capsule by mouth daily.  100 tablet 3     rivaroxaban ANTICOAGULANT (XARELTO ANTICOAGULANT) 20 MG TABS tablet Take 1 tablet (20 mg) by mouth daily (with dinner) Continue with this 90 tablet 1     vitamin  B complex with vitamin C (VITAMIN  B COMPLEX) TABS Take 1 tablet by mouth daily  0        Physical examination:  Physical Exam as  observed via telehealth:         Constitutional - General appearance, and body habitus are within normal range         Eyes -there is no redness, or discharge seen.         Respiratory -there is no cough, or labored breathing observed         Musculoskeletal -full range of motion is observed         Skin -there is no visible discoloration, or visible lesions         Neurological -there is tremors is observed         Psychiatric -the patient is alert & oriented.    The rest of a comprehensive physical examination is deferred due to PHE (public health emergency) video visit restrictions.    Laboratory/Imaging Studies:  No visits with results within 2 Week(s) from this visit.   Latest known visit with results is:   Office Visit on 07/29/2020   Component Date Value Ref Range Status     Cholesterol 07/29/2020 191  <200 mg/dL Final     Triglycerides 07/29/2020 95  <150 mg/dL Final    Fasting specimen     HDL Cholesterol 07/29/2020 53  >49 mg/dL Final     LDL Cholesterol Calculated 07/29/2020 119* <100 mg/dL Final    Comment: Above desirable:  100-129 mg/dl  Borderline High:  130-159 mg/dL  High:             160-189 mg/dL  Very high:       >189 mg/dl       Non HDL Cholesterol 07/29/2020 138* <130 mg/dL Final    Comment: Above Desirable:  130-159 mg/dl  Borderline high:  160-189 mg/dl  High:             190-219 mg/dl  Very high:       >219 mg/dl          No results found for this or any previous visit (from the past 744 hour(s)).    Assessment and plan:  (I26.99) Pulmonary embolism, unspecified chronicity, unspecified pulmonary embolism type, unspecified whether acute cor pulmonale present (H)  (primary encounter diagnosis)  (Z82.49) Family history of blood clots  This is a 64-year-old female patient with unprovoked pulmonary embolism.  Patient has a family history of blood clots.  I discussed with the patient today the underlying and principles and factor for thromboembolic disease.  I would recommend the patient to  continue on anticoagulation for at least 6 months.  We will arrange for hypercoagulability work-up including   Antithrombin III, Cardiolipin Edyta IgG and IgM, F2 prothrombin 49990Y Mut Anal, Factor 5 leiden mutation analysis, Protein C chromogenic, Protein S Antigen Free.  Bilateral Doppler ultrasound of both lower extremities as well.  I will see the patient when these results are available to discuss further management plan.      The patient is ready to learn, no apparent learning barriers were identified.  Diagnosis and treatment plans were explained to the patient. The patient expressed understanding of the content. The patient asked appropriate questions. The patient questions were answered to her satisfaction.    This is started 8 AM  Visit ended 8:30 AM    Emilee Estrella MD    Chart documentation with Dragon Voice recognition Software. Although reviewed after completion, some words and grammatical errors may remain.      Again, thank you for allowing me to participate in the care of your patient.        Sincerely,        Emilee Estrella MD

## 2020-09-03 NOTE — PROGRESS NOTES
Hematology/ Oncology virtual video visit:  Aug 26, 2020    Due to the concerns around COVID-19 and adhering to social distancing we conducted this visit via video visit.      Reason for Visit:   Chief Complaint   Patient presents with     Hematology     New patient - Pulmonary embolism, unspecified chronicity, unspecified pulmonary embolism type, unspecified whether acute cor pulmonale present        History of present illness  Kaylene Diaz is a 64-year-old female patient who presented to the emergency room in April 2020 with increasing shortness of breath and left chest pain.  Subsequently she went on to have full work-up including a d-dimer which came back elevated.  A CT scan of the chest was done showing pulmonary embolism.  Patient was started on Xarelto.  She has been on Xarelto.  She has been having no recent chest pain or shortness of breath.  She denies any weight loss or night sweats or fever or chills.  She is here today to discuss further management plan.    Review of systems:  Pertinent positives have been included in HPI; remainder of detailed complete 20-point ROS was negative.    Past medical, social, surgical, and family histories reviewed.    Allergies:  Allergies as of 08/26/2020 - Reviewed 08/26/2020   Allergen Reaction Noted     Folic acid  12/03/2018     Gold Rash 04/28/2017     Latex Rash 03/20/2017       Current Medications:  Current Outpatient Medications   Medication Sig Dispense Refill     fluticasone (VERAMYST) 27.5 MCG/SPRAY spray Spray 1 spray into both nostrils 2 times daily       hydroxychloroquine (PLAQUENIL) 200 MG tablet Take 2 tablets (400 mg) by mouth daily 180 tablet 3     methotrexate sodium 2.5 MG TABS Take 9 tablets (22.5 mg) by mouth once a week . Take all 9 tablets on the same day of each week. 108 tablet 2     Multiple Vitamins-Minerals (ADULT GUMMY PO) Take 1 chew tab by mouth daily VitaCrave       Multiple Vitamins-Minerals (HAIR/SKIN/NAILS/BIOTIN PO) Take 1 capsule by  mouth daily.  100 tablet 3     rivaroxaban ANTICOAGULANT (XARELTO ANTICOAGULANT) 20 MG TABS tablet Take 1 tablet (20 mg) by mouth daily (with dinner) Continue with this 90 tablet 1     vitamin  B complex with vitamin C (VITAMIN  B COMPLEX) TABS Take 1 tablet by mouth daily  0        Physical examination:  Physical Exam as observed via telehealth:         Constitutional - General appearance, and body habitus are within normal range         Eyes -there is no redness, or discharge seen.         Respiratory -there is no cough, or labored breathing observed         Musculoskeletal -full range of motion is observed         Skin -there is no visible discoloration, or visible lesions         Neurological -there is tremors is observed         Psychiatric -the patient is alert & oriented.    The rest of a comprehensive physical examination is deferred due to PHE (public health emergency) video visit restrictions.    Laboratory/Imaging Studies:  No visits with results within 2 Week(s) from this visit.   Latest known visit with results is:   Office Visit on 07/29/2020   Component Date Value Ref Range Status     Cholesterol 07/29/2020 191  <200 mg/dL Final     Triglycerides 07/29/2020 95  <150 mg/dL Final    Fasting specimen     HDL Cholesterol 07/29/2020 53  >49 mg/dL Final     LDL Cholesterol Calculated 07/29/2020 119* <100 mg/dL Final    Comment: Above desirable:  100-129 mg/dl  Borderline High:  130-159 mg/dL  High:             160-189 mg/dL  Very high:       >189 mg/dl       Non HDL Cholesterol 07/29/2020 138* <130 mg/dL Final    Comment: Above Desirable:  130-159 mg/dl  Borderline high:  160-189 mg/dl  High:             190-219 mg/dl  Very high:       >219 mg/dl          No results found for this or any previous visit (from the past 744 hour(s)).    Assessment and plan:  (I26.99) Pulmonary embolism, unspecified chronicity, unspecified pulmonary embolism type, unspecified whether acute cor pulmonale present (H)  (primary  encounter diagnosis)  (Z82.49) Family history of blood clots  This is a 64-year-old female patient with unprovoked pulmonary embolism.  Patient has a family history of blood clots.  I discussed with the patient today the underlying and principles and factor for thromboembolic disease.  I would recommend the patient to continue on anticoagulation for at least 6 months.  We will arrange for hypercoagulability work-up including   Antithrombin III, Cardiolipin Edyta IgG and IgM, F2 prothrombin [id number] Mut Anal, Factor 5 leiden mutation analysis, Protein C chromogenic, Protein S Antigen Free.  Bilateral Doppler ultrasound of both lower extremities as well.  I will see the patient when these results are available to discuss further management plan.      The patient is ready to learn, no apparent learning barriers were identified.  Diagnosis and treatment plans were explained to the patient. The patient expressed understanding of the content. The patient asked appropriate questions. The patient questions were answered to her satisfaction.    This is started 8 AM  Visit ended 8:30 AM    Emilee Estrella MD    Chart documentation with Dragon Voice recognition Software. Although reviewed after completion, some words and grammatical errors may remain.

## 2020-09-09 ENCOUNTER — HOSPITAL ENCOUNTER (OUTPATIENT)
Dept: MAMMOGRAPHY | Facility: CLINIC | Age: 65
Discharge: HOME | End: 2020-09-09
Attending: FAMILY MEDICINE | Admitting: FAMILY MEDICINE
Payer: MEDICARE

## 2020-09-09 DIAGNOSIS — Z12.31 ENCOUNTER FOR SCREENING MAMMOGRAM FOR BREAST CANCER: ICD-10-CM

## 2020-09-09 PROCEDURE — 77067 SCR MAMMO BI INCL CAD: CPT

## 2020-09-11 ENCOUNTER — MYC MEDICAL ADVICE (OUTPATIENT)
Dept: RHEUMATOLOGY | Facility: CLINIC | Age: 65
End: 2020-09-11

## 2020-09-16 ENCOUNTER — PATIENT OUTREACH (OUTPATIENT)
Dept: ONCOLOGY | Facility: CLINIC | Age: 65
End: 2020-09-16

## 2020-09-16 ENCOUNTER — HOSPITAL ENCOUNTER (OUTPATIENT)
Facility: CLINIC | Age: 65
Setting detail: SPECIMEN
Discharge: HOME | End: 2020-09-16
Admitting: INTERNAL MEDICINE
Payer: MEDICARE

## 2020-09-16 ENCOUNTER — HOSPITAL ENCOUNTER (OUTPATIENT)
Dept: LAB | Facility: CLINIC | Age: 65
End: 2020-09-16
Attending: INTERNAL MEDICINE
Payer: MEDICARE

## 2020-09-16 ENCOUNTER — HOSPITAL ENCOUNTER (OUTPATIENT)
Dept: ULTRASOUND IMAGING | Facility: CLINIC | Age: 65
End: 2020-09-16
Attending: INTERNAL MEDICINE
Payer: MEDICARE

## 2020-09-16 DIAGNOSIS — Z82.49 FAMILY HISTORY OF BLOOD CLOTS: ICD-10-CM

## 2020-09-16 DIAGNOSIS — I26.99 PULMONARY EMBOLISM, UNSPECIFIED CHRONICITY, UNSPECIFIED PULMONARY EMBOLISM TYPE, UNSPECIFIED WHETHER ACUTE COR PULMONALE PRESENT (H): ICD-10-CM

## 2020-09-16 PROCEDURE — 36415 COLL VENOUS BLD VENIPUNCTURE: CPT | Performed by: INTERNAL MEDICINE

## 2020-09-16 PROCEDURE — 85303 CLOT INHIBIT PROT C ACTIVITY: CPT | Performed by: INTERNAL MEDICINE

## 2020-09-16 PROCEDURE — 86147 CARDIOLIPIN ANTIBODY EA IG: CPT | Performed by: INTERNAL MEDICINE

## 2020-09-16 PROCEDURE — 81241 F5 GENE: CPT | Performed by: INTERNAL MEDICINE

## 2020-09-16 PROCEDURE — 85306 CLOT INHIBIT PROT S FREE: CPT | Performed by: INTERNAL MEDICINE

## 2020-09-16 PROCEDURE — 85300 ANTITHROMBIN III ACTIVITY: CPT | Performed by: INTERNAL MEDICINE

## 2020-09-16 PROCEDURE — 93970 EXTREMITY STUDY: CPT

## 2020-09-16 PROCEDURE — 81240 F2 GENE: CPT | Performed by: INTERNAL MEDICINE

## 2020-09-16 NOTE — PROGRESS NOTES
Patient here today to have labs drawn per Dr. Estrella. Consent forms signed for Factor 5 leiden mutation and F2 prothrombin. Consents sent to scanning. Yenifer Urbina RN on 9/16/2020 at 8:56 AM

## 2020-09-17 LAB
CARDIOLIPIN ANTIBODY IGG: <1.6 GPL-U/ML (ref 0–19.9)
CARDIOLIPIN ANTIBODY IGM: 0.4 MPL-U/ML (ref 0–19.9)

## 2020-09-18 ENCOUNTER — TRANSFERRED RECORDS (OUTPATIENT)
Dept: HEALTH INFORMATION MANAGEMENT | Facility: CLINIC | Age: 65
End: 2020-09-18

## 2020-09-18 LAB
AT III ACT/NOR PPP CHRO: 106 % (ref 85–135)
PROT C ACT/NOR PPP CHRO: 109 % (ref 70–170)
PROT S FREE AG ACT/NOR PPP IA: 162 % (ref 55–125)

## 2020-09-21 LAB — COPATH REPORT: NORMAL

## 2020-09-23 ENCOUNTER — VIRTUAL VISIT (OUTPATIENT)
Dept: ONCOLOGY | Facility: CLINIC | Age: 65
End: 2020-09-23
Attending: INTERNAL MEDICINE
Payer: MEDICARE

## 2020-09-23 DIAGNOSIS — I26.99 OTHER ACUTE PULMONARY EMBOLISM, UNSPECIFIED WHETHER ACUTE COR PULMONALE PRESENT (H): ICD-10-CM

## 2020-09-23 PROCEDURE — 99214 OFFICE O/P EST MOD 30 MIN: CPT | Mod: 95 | Performed by: INTERNAL MEDICINE

## 2020-09-23 PROCEDURE — 40001009 ZZH VIDEO/TELEPHONE VISIT; NO CHARGE

## 2020-09-23 NOTE — PROGRESS NOTES
"Kaylene Diaz is a 64 year old female who is being evaluated via a billable video visit.      The patient has been notified of following:     \"This video visit will be conducted via a call between you and your physician/provider. We have found that certain health care needs can be provided without the need for an in-person physical exam.  This service lets us provide the care you need with a video conversation.  If a prescription is necessary we can send it directly to your pharmacy.  If lab work is needed we can place an order for that and you can then stop by our lab to have the test done at a later time.    Video visits are billed at different rates depending on your insurance coverage.  Please reach out to your insurance provider with any questions.    If during the course of the call the physician/provider feels a video visit is not appropriate, you will not be charged for this service.\"    Patient has given verbal consent for Video visit? Yes  How would you like to obtain your AVS? MyChart  If you are dropped from the video visit, the video invite should be resent to: Text to cell phone: [phone] Use [employer] first  Will anyone else be joining your video visit? No        Video-Visit Details    Type of service:  Video Visit    Originating Location (pt. Location): Home    Distant Location (provider location):  [facility]     Platform used for Video Visit: Miguel Angel Carrasco CMA                  "

## 2020-09-23 NOTE — LETTER
"    9/23/2020         RE: Kaylene Diaz  [address] [place] [address]        Dear Colleague,    Thank you for referring your patient, Kaylene Diaz, to the [facility]. Please see a copy of my visit note below.    Kaylene Diaz is a 64 year old female who is being evaluated via a billable video visit.      The patient has been notified of following:     \"This video visit will be conducted via a call between you and your physician/provider. We have found that certain health care needs can be provided without the need for an in-person physical exam.  This service lets us provide the care you need with a video conversation.  If a prescription is necessary we can send it directly to your pharmacy.  If lab work is needed we can place an order for that and you can then stop by our lab to have the test done at a later time.    Video visits are billed at different rates depending on your insurance coverage.  Please reach out to your insurance provider with any questions.    If during the course of the call the physician/provider feels a video visit is not appropriate, you will not be charged for this service.\"    Patient has given verbal consent for Video visit? Yes  How would you like to obtain your AVS? MyChart  If you are dropped from the video visit, the video invite should be resent to: Text to cell phone: [phone] Use [employer] first  Will anyone else be joining your video visit? No        Video-Visit Details    Type of service:  Video Visit    Originating Location (pt. Location): Home    Distant Location (provider location):  [facility]     Platform used for Video Visit: Miguel Angel Carrasco [facility]                      Hematology/ Oncology virtual video visit:  Sep 23, 2020    Due to the concerns around COVID-19 and adhering to social distancing we conducted this visit via video visit.      Reason for Visit:   Chief Complaint   Patient presents with     Hematology     Return Pulmonary " embolism, unspecified chronicity, unspecified pulmonary embolism type, unspecified whether acute cor pulmonale present, review US and labs        Oncologic History:  Kaylene Diaz presented to the emergency room in April 2020 with increasing shortness of breath and left chest pain.  Subsequently she went on to have full work-up including a d-dimer which came back elevated.  A CT scan of the chest was done showing pulmonary embolism.  Patient was started on Xarelto.     Interval History:  Patient has been feeling well without any recent shortness of breath or cough or wheezing.  Denies any chest pain.  She denies any bruising or bleeding.    Review of systems:  Pertinent positives have been included in HPI; remainder of detailed complete 20-point ROS was negative.    Past medical, social, surgical, and family histories reviewed.    Allergies:  Allergies as of 09/23/2020 - Reviewed 09/23/2020   Allergen Reaction Noted     Folic acid  12/03/2018     Gold Rash 04/28/2017     Latex Rash 03/20/2017       Current Medications:  Current Outpatient Medications   Medication Sig Dispense Refill     fluticasone (VERAMYST) 27.5 MCG/SPRAY spray Spray 1 spray into both nostrils 2 times daily       hydroxychloroquine (PLAQUENIL) 200 MG tablet Take 2 tablets (400 mg) by mouth daily 180 tablet 3     methotrexate sodium 2.5 MG TABS Take 9 tablets (22.5 mg) by mouth once a week . Take all 9 tablets on the same day of each week. 108 tablet 2     Multiple Vitamins-Minerals (ADULT GUMMY PO) Take 1 chew tab by mouth daily VitaCrave       Multiple Vitamins-Minerals (HAIR/SKIN/NAILS/BIOTIN PO) Take 1 capsule by mouth daily.  100 tablet 3     rivaroxaban ANTICOAGULANT (XARELTO ANTICOAGULANT) 20 MG TABS tablet Take 1 tablet (20 mg) by mouth daily (with dinner) Continue with this 90 tablet 1     vitamin  B complex with vitamin C (VITAMIN  B COMPLEX) TABS Take 1 tablet by mouth daily  0        Physical examination:  Physical Exam as observed via  telehealth:         Constitutional - General appearance, and body habitus are within normal range         Eyes -there is no redness, or discharge seen.         Respiratory -there is no cough, or labored breathing observed         Musculoskeletal -full range of motion is observed         Skin -there is no visible discoloration, or visible lesions         Neurological -there is tremors is observed         Psychiatric -the patient is alert & oriented.    The rest of a comprehensive physical examination is deferred due to PHE (public health emergency) video visit restrictions.    Laboratory/Imaging Studies:  No visits with results within 2 Week(s) from this visit.   Latest known visit with results is:   Office Visit on 07/29/2020   Component Date Value Ref Range Status     Cholesterol 07/29/2020 191  <200 mg/dL Final     Triglycerides 07/29/2020 95  <150 mg/dL Final    Fasting specimen     HDL Cholesterol 07/29/2020 53  >49 mg/dL Final     LDL Cholesterol Calculated 07/29/2020 119* <100 mg/dL Final    Comment: Above desirable:  100-129 mg/dl  Borderline High:  130-159 mg/dL  High:             160-189 mg/dL  Very high:       >189 mg/dl       Non HDL Cholesterol 07/29/2020 138* <130 mg/dL Final    Comment: Above Desirable:  130-159 mg/dl  Borderline high:  160-189 mg/dl  High:             190-219 mg/dl  Very high:       >219 mg/dl          Recent Results (from the past 744 hour(s))   MA Screening Digital Bilateral    Narrative    MA SCREENING DIGITAL BILATERAL 9/9/2020 10:18 AM    HISTORY:  Screening.  No new breast complaints.    COMPARISON:  7/18/18, 01/02/12    TECHNIQUE:  Digital mammography with CAD is performed.    BREAST DENSITY: Almost entirely fat.    COMMENTS: No findings of suspicion for malignancy.       Impression    IMPRESSION: BI-RADS CATEGORY: 1 -  NEGATIVE.    RECOMMENDED FOLLOW-UP: Annual Mammography.    MIKKI MC MD   US Lower Extremity Venous Duplex Bilateral    Narrative    ULTRASOUND LOWER  EXTREMITY VENOUS DUPLEX BILATERAL 9/16/2020 10:19 AM    HISTORY: Bilateral leg pain.    TECHNIQUE: Imaged deep venous structures of each lower extremity  include the common femoral veins, superficial femoral veins, popliteal  veins, and visualized posterior deep calf veins. Color flow and  spectral Doppler with waveform analysis performed.    COMPARISON: None.    FINDINGS: No DVT is demonstrated in either lower extremity. There is  an elongated right popliteal cyst measuring 7.7 x 1.5 x 3.6 cm. There  is a left popliteal cyst measuring 2.7 x 0.9 x 2.4 cm.      Impression    IMPRESSION:   1. No evidence of DVT in either lower extremity.  2. Popliteal cysts bilaterally.    SHERRIE BEAULIEU MD       Assessment and plan:    (I26.99) Other acute pulmonary embolism, unspecified whether acute cor pulmonale present (H)  I reviewed with the patient today most recent laboratory test.  Doppler ultrasound also lower extremities came back negative for deep venous thrombosis.  Antithrombin, protein C, protein S all within normal.  Factor V Leiden, prothrombin gene mutation are within normal range.  Anticardiolipin antibodies were normal as well.  I have informed the patient because she has unprovoked pulmonary embolism she is a candidate for long-term anticoagulation with Xarelto.  The patient will continue on Xarelto 20 mg orally daily.  I have not scheduled patient for any further appointments I will see her in future if there are new developments or concerns.    The patient is ready to learn, no apparent learning barriers were identified.  Diagnosis and treatment plans were explained to the patient. The patient expressed understanding of the content. The patient asked appropriate questions. The patient questions were answered to her satisfaction.    This is started 2:15 PM  Visit ended 2:45 PM    Emilee Estrella MD    Chart documentation with Dragon Voice recognition Software. Although reviewed after completion, some  words and grammatical errors may remain.    Again, thank you for allowing me to participate in the care of your patient.        Sincerely,        Emilee Estrella MD

## 2020-09-23 NOTE — LETTER
"    9/23/2020         RE: Kaylene Diaz  [id number] [place] [id number]        Dear Colleague,    Thank you for referring your patient, Kaylene Diaz, to the [facility]. Please see a copy of my visit note below.    Kaylene Diaz is a 64 year old female who is being evaluated via a billable video visit.      The patient has been notified of following:     \"This video visit will be conducted via a call between you and your physician/provider. We have found that certain health care needs can be provided without the need for an in-person physical exam.  This service lets us provide the care you need with a video conversation.  If a prescription is necessary we can send it directly to your pharmacy.  If lab work is needed we can place an order for that and you can then stop by our lab to have the test done at a later time.    Video visits are billed at different rates depending on your insurance coverage.  Please reach out to your insurance provider with any questions.    If during the course of the call the physician/provider feels a video visit is not appropriate, you will not be charged for this service.\"    Patient has given verbal consent for Video visit? Yes  How would you like to obtain your AVS? MyChart  If you are dropped from the video visit, the video invite should be resent to: Text to cell phone: [phone] Use Doximity first  Will anyone else be joining your video visit? No        Video-Visit Details    Type of service:  Video Visit    Originating Location (pt. Location): Home    Distant Location (provider location):  [facility]     Platform used for Video Visit: [facility]                      Hematology/ Oncology virtual video visit:  Sep 23, 2020    Due to the concerns around COVID-19 and adhering to social distancing we conducted this visit via video visit.      Reason for Visit:   Chief Complaint   Patient presents with     Hematology     Return Pulmonary " embolism, unspecified chronicity, unspecified pulmonary embolism type, unspecified whether acute cor pulmonale present, review US and labs        Oncologic History:  Kaylene Diaz presented to the emergency room in April 2020 with increasing shortness of breath and left chest pain.  Subsequently she went on to have full work-up including a d-dimer which came back elevated.  A CT scan of the chest was done showing pulmonary embolism.  Patient was started on Xarelto.     Interval History:  Patient has been feeling well without any recent shortness of breath or cough or wheezing.  Denies any chest pain.  She denies any bruising or bleeding.    Review of systems:  Pertinent positives have been included in HPI; remainder of detailed complete 20-point ROS was negative.    Past medical, social, surgical, and family histories reviewed.    Allergies:  Allergies as of 09/23/2020 - Reviewed 09/23/2020   Allergen Reaction Noted     Folic acid  12/03/2018     Gold Rash 04/28/2017     Latex Rash 03/20/2017       Current Medications:  Current Outpatient Medications   Medication Sig Dispense Refill     fluticasone (VERAMYST) 27.5 MCG/SPRAY spray Spray 1 spray into both nostrils 2 times daily       hydroxychloroquine (PLAQUENIL) 200 MG tablet Take 2 tablets (400 mg) by mouth daily 180 tablet 3     methotrexate sodium 2.5 MG TABS Take 9 tablets (22.5 mg) by mouth once a week . Take all 9 tablets on the same day of each week. 108 tablet 2     Multiple Vitamins-Minerals (ADULT GUMMY PO) Take 1 chew tab by mouth daily VitaCrave       Multiple Vitamins-Minerals (HAIR/SKIN/NAILS/BIOTIN PO) Take 1 capsule by mouth daily.  100 tablet 3     rivaroxaban ANTICOAGULANT (XARELTO ANTICOAGULANT) 20 MG TABS tablet Take 1 tablet (20 mg) by mouth daily (with dinner) Continue with this 90 tablet 1     vitamin  B complex with vitamin C (VITAMIN  B COMPLEX) TABS Take 1 tablet by mouth daily  0        Physical examination:  Physical Exam as observed via  telehealth:         Constitutional - General appearance, and body habitus are within normal range         Eyes -there is no redness, or discharge seen.         Respiratory -there is no cough, or labored breathing observed         Musculoskeletal -full range of motion is observed         Skin -there is no visible discoloration, or visible lesions         Neurological -there is tremors is observed         Psychiatric -the patient is alert & oriented.    The rest of a comprehensive physical examination is deferred due to PHE (public health emergency) video visit restrictions.    Laboratory/Imaging Studies:  No visits with results within 2 Week(s) from this visit.   Latest known visit with results is:   Office Visit on 07/29/2020   Component Date Value Ref Range Status     Cholesterol 07/29/2020 191  <200 mg/dL Final     Triglycerides 07/29/2020 95  <150 mg/dL Final    Fasting specimen     HDL Cholesterol 07/29/2020 53  >49 mg/dL Final     LDL Cholesterol Calculated 07/29/2020 119* <100 mg/dL Final    Comment: Above desirable:  100-129 mg/dl  Borderline High:  130-159 mg/dL  High:             160-189 mg/dL  Very high:       >189 mg/dl       Non HDL Cholesterol 07/29/2020 138* <130 mg/dL Final    Comment: Above Desirable:  130-159 mg/dl  Borderline high:  160-189 mg/dl  High:             190-219 mg/dl  Very high:       >219 mg/dl          Recent Results (from the past 744 hour(s))   MA Screening Digital Bilateral    Narrative    MA SCREENING DIGITAL BILATERAL 9/9/2020 10:18 AM    HISTORY:  Screening.  No new breast complaints.    COMPARISON:  7/18/18, 01/02/12    TECHNIQUE:  Digital mammography with CAD is performed.    BREAST DENSITY: Almost entirely fat.    COMMENTS: No findings of suspicion for malignancy.       Impression    IMPRESSION: BI-RADS CATEGORY: 1 -  NEGATIVE.    RECOMMENDED FOLLOW-UP: Annual Mammography.    MIKKI MC MD   US Lower Extremity Venous Duplex Bilateral    Narrative    ULTRASOUND LOWER  EXTREMITY VENOUS DUPLEX BILATERAL 9/16/2020 10:19 AM    HISTORY: Bilateral leg pain.    TECHNIQUE: Imaged deep venous structures of each lower extremity  include the common femoral veins, superficial femoral veins, popliteal  veins, and visualized posterior deep calf veins. Color flow and  spectral Doppler with waveform analysis performed.    COMPARISON: None.    FINDINGS: No DVT is demonstrated in either lower extremity. There is  an elongated right popliteal cyst measuring 7.7 x 1.5 x 3.6 cm. There  is a left popliteal cyst measuring 2.7 x 0.9 x 2.4 cm.      Impression    IMPRESSION:   1. No evidence of DVT in either lower extremity.  2. Popliteal cysts bilaterally.    SHERRIE BEAULIEU MD       Assessment and plan:    (I26.99) Other acute pulmonary embolism, unspecified whether acute cor pulmonale present (H)  I reviewed with the patient today most recent laboratory test.  Doppler ultrasound also lower extremities came back negative for deep venous thrombosis.  Antithrombin, protein C, protein S all within normal.  Factor V Leiden, prothrombin gene mutation are within normal range.  Anticardiolipin antibodies were normal as well.  I have informed the patient because she has unprovoked pulmonary embolism she is a candidate for long-term anticoagulation with Xarelto.  The patient will continue on Xarelto 20 mg orally daily.  I have not scheduled patient for any further appointments I will see her in future if there are new developments or concerns.    The patient is ready to learn, no apparent learning barriers were identified.  Diagnosis and treatment plans were explained to the patient. The patient expressed understanding of the content. The patient asked appropriate questions. The patient questions were answered to her satisfaction.    This is started 2:15 PM  Visit ended 2:45 PM    Emilee Estrella MD    Chart documentation with Dragon Voice recognition Software. Although reviewed after completion, some  words and grammatical errors may remain.    Again, thank you for allowing me to participate in the care of your patient.        Sincerely,        Emilee Estrella MD

## 2020-09-23 NOTE — PROGRESS NOTES
Hematology/ Oncology virtual video visit:  Sep 23, 2020    Due to the concerns around COVID-19 and adhering to social distancing we conducted this visit via video visit.      Reason for Visit:   Chief Complaint   Patient presents with     Hematology     Return Pulmonary embolism, unspecified chronicity, unspecified pulmonary embolism type, unspecified whether acute cor pulmonale present, review US and labs        Oncologic History:  Kaylene iDaz presented to the emergency room in April 2020 with increasing shortness of breath and left chest pain.  Subsequently she went on to have full work-up including a d-dimer which came back elevated.  A CT scan of the chest was done showing pulmonary embolism.  Patient was started on Xarelto.     Interval History:  Patient has been feeling well without any recent shortness of breath or cough or wheezing.  Denies any chest pain.  She denies any bruising or bleeding.    Review of systems:  Pertinent positives have been included in HPI; remainder of detailed complete 20-point ROS was negative.    Past medical, social, surgical, and family histories reviewed.    Allergies:  Allergies as of 09/23/2020 - Reviewed 09/23/2020   Allergen Reaction Noted     Folic acid  12/03/2018     Gold Rash 04/28/2017     Latex Rash 03/20/2017       Current Medications:  Current Outpatient Medications   Medication Sig Dispense Refill     fluticasone (VERAMYST) 27.5 MCG/SPRAY spray Spray 1 spray into both nostrils 2 times daily       hydroxychloroquine (PLAQUENIL) 200 MG tablet Take 2 tablets (400 mg) by mouth daily 180 tablet 3     methotrexate sodium 2.5 MG TABS Take 9 tablets (22.5 mg) by mouth once a week . Take all 9 tablets on the same day of each week. 108 tablet 2     Multiple Vitamins-Minerals (ADULT GUMMY PO) Take 1 chew tab by mouth daily VitaCrave       Multiple Vitamins-Minerals (HAIR/SKIN/NAILS/BIOTIN PO) Take 1 capsule by mouth daily.  100 tablet 3     rivaroxaban ANTICOAGULANT (XARELTO  ANTICOAGULANT) 20 MG TABS tablet Take 1 tablet (20 mg) by mouth daily (with dinner) Continue with this 90 tablet 1     vitamin  B complex with vitamin C (VITAMIN  B COMPLEX) TABS Take 1 tablet by mouth daily  0        Physical examination:  Physical Exam as observed via telehealth:         Constitutional - General appearance, and body habitus are within normal range         Eyes -there is no redness, or discharge seen.         Respiratory -there is no cough, or labored breathing observed         Musculoskeletal -full range of motion is observed         Skin -there is no visible discoloration, or visible lesions         Neurological -there is tremors is observed         Psychiatric -the patient is alert & oriented.    The rest of a comprehensive physical examination is deferred due to PHE (public health emergency) video visit restrictions.    Laboratory/Imaging Studies:  No visits with results within 2 Week(s) from this visit.   Latest known visit with results is:   Office Visit on 07/29/2020   Component Date Value Ref Range Status     Cholesterol 07/29/2020 191  <200 mg/dL Final     Triglycerides 07/29/2020 95  <150 mg/dL Final    Fasting specimen     HDL Cholesterol 07/29/2020 53  >49 mg/dL Final     LDL Cholesterol Calculated 07/29/2020 119* <100 mg/dL Final    Comment: Above desirable:  100-129 mg/dl  Borderline High:  130-159 mg/dL  High:             160-189 mg/dL  Very high:       >189 mg/dl       Non HDL Cholesterol 07/29/2020 138* <130 mg/dL Final    Comment: Above Desirable:  130-159 mg/dl  Borderline high:  160-189 mg/dl  High:             190-219 mg/dl  Very high:       >219 mg/dl          Recent Results (from the past 744 hour(s))   MA Screening Digital Bilateral    Narrative    MA SCREENING DIGITAL BILATERAL 9/9/2020 10:18 AM    HISTORY:  Screening.  No new breast complaints.    COMPARISON:  7/18/18, 01/02/12    TECHNIQUE:  Digital mammography with CAD is performed.    BREAST DENSITY: Almost entirely  fat.    COMMENTS: No findings of suspicion for malignancy.       Impression    IMPRESSION: BI-RADS CATEGORY: 1 -  NEGATIVE.    RECOMMENDED FOLLOW-UP: Annual Mammography.    MIKKI MC MD   US Lower Extremity Venous Duplex Bilateral    Narrative    ULTRASOUND LOWER EXTREMITY VENOUS DUPLEX BILATERAL 9/16/2020 10:19 AM    HISTORY: Bilateral leg pain.    TECHNIQUE: Imaged deep venous structures of each lower extremity  include the common femoral veins, superficial femoral veins, popliteal  veins, and visualized posterior deep calf veins. Color flow and  spectral Doppler with waveform analysis performed.    COMPARISON: None.    FINDINGS: No DVT is demonstrated in either lower extremity. There is  an elongated right popliteal cyst measuring 7.7 x 1.5 x 3.6 cm. There  is a left popliteal cyst measuring 2.7 x 0.9 x 2.4 cm.      Impression    IMPRESSION:   1. No evidence of DVT in either lower extremity.  2. Popliteal cysts bilaterally.    SHERRIE BEAULIEU MD       Assessment and plan:    (I26.99) Other acute pulmonary embolism, unspecified whether acute cor pulmonale present (H)  I reviewed with the patient today most recent laboratory test.  Doppler ultrasound also lower extremities came back negative for deep venous thrombosis.  Antithrombin, protein C, protein S all within normal.  Factor V Leiden, prothrombin gene mutation are within normal range.  Anticardiolipin antibodies were normal as well.  I have informed the patient because she has unprovoked pulmonary embolism she is a candidate for long-term anticoagulation with Xarelto.  The patient will continue on Xarelto 20 mg orally daily.  I have not scheduled patient for any further appointments I will see her in future if there are new developments or concerns.    The patient is ready to learn, no apparent learning barriers were identified.  Diagnosis and treatment plans were explained to the patient. The patient expressed understanding of the content. The patient  asked appropriate questions. The patient questions were answered to her satisfaction.    This is started 2:15 PM  Visit ended 2:45 PM    Emilee Estrella MD    Chart documentation with Dragon Voice recognition Software. Although reviewed after completion, some words and grammatical errors may remain.

## 2020-10-05 ENCOUNTER — VIRTUAL VISIT (OUTPATIENT)
Dept: RHEUMATOLOGY | Facility: CLINIC | Age: 65
End: 2020-10-05
Payer: MEDICARE

## 2020-10-05 DIAGNOSIS — Z79.899 HIGH RISK MEDICATION USE: ICD-10-CM

## 2020-10-05 DIAGNOSIS — M05.79 RHEUMATOID ARTHRITIS INVOLVING MULTIPLE SITES WITH POSITIVE RHEUMATOID FACTOR (H): Primary | Chronic | ICD-10-CM

## 2020-10-05 PROCEDURE — 99213 OFFICE O/P EST LOW 20 MIN: CPT | Mod: 95 | Performed by: INTERNAL MEDICINE

## 2020-10-05 RX ORDER — METHOTREXATE 2.5 MG/1
22.5 TABLET ORAL WEEKLY
Qty: 108 TABLET | Refills: 2 | Status: SHIPPED | OUTPATIENT
Start: 2020-10-05 | End: 2021-04-05

## 2020-10-05 RX ORDER — HYDROXYCHLOROQUINE SULFATE 200 MG/1
400 TABLET, FILM COATED ORAL DAILY
Qty: 180 TABLET | Refills: 2 | Status: SHIPPED | OUTPATIENT
Start: 2020-10-05 | End: 2021-04-05

## 2020-10-05 NOTE — PROGRESS NOTES
"Kaylene Diaz is a 64 year old female who is being evaluated via a billable video visit.      The patient has been notified of following:     \"This video visit will be conducted via a call between you and your physician/provider. We have found that certain health care needs can be provided without the need for an in-person physical exam.  This service lets us provide the care you need with a video conversation.  If a prescription is necessary we can send it directly to your pharmacy.  If lab work is needed we can place an order for that and you can then stop by our lab to have the test done at a later time.    Video visits are billed at different rates depending on your insurance coverage.  Please reach out to your insurance provider with any questions.    If during the course of the call the physician/provider feels a video visit is not appropriate, you will not be charged for this service.\"    Patient has given verbal consent for Video visit? Yes  How would you like to obtain your AVS? MyChart  If you are dropped from the video visit, the video invite should be resent to: Text to cell phone: [phone]  Will anyone else be joining your video visit? No    Rheumatology Video Visit      Kaylene Diaz MRN# [id number]   YOB: 1955 Age: 64 year old      Date of visit: 10/05/20   PCP: Dr. Hitesh Washington    Chief Complaint   Patient presents with:  Arthritis: RA.    Assessment and Plan     1.  Seropositive rheumatoid arthritis (RF 32, CCP >250): Currently on methotrexate 22.5 mg once weekly, hydroxychloroquine 400 mg daily.  Previously on sulfasalazine that was discontinued when she found no benefit from it; but she also did not require prednisone while on sulfasalazine; questioning if the \"benefit\" from sulfasalazine was actually the \"benefit\" of not gardening.  Does not tolerate folic acid because of associated increased sunburns.    Currently doing well.   - Continue methotrexate 22.5 mg once weekly " (okay to reduce to 20mg once weekly if symptoms tolerate)   - Continue hydroxychloroquine 400 mg daily (last eye exam on 6/29/2018; reminded her to get a yearly eye exam)  - Continue prednisone 5 mg daily as needed for rheumatoid arthritis symptoms; use sparingly (she says that she hasn't used for over 6 months)  - Labs in 3 months: CBC, Creatinine, Hepatic Panel  (printed orders for the labs in 3 months to be done at [facility])  - Labs in 6 months: CBC, Creatinine, Hepatic Panel, ESR, CRP  (printed orders for the labs in 3 months to be done at [facility])    2.  Basal cell carcinoma and squamous cell carcinoma: Several lesions removed by dermatology in the past.  Continue to follow with dermatology.     3.  Chronic lower back pain: Has significantly improved with lidocaine patches that have been used no more than 10 times in the past 1 year.  She has been using her mother's lidocaine patches.  Management per PCP    4.  Right Achilles tendinitis history: Was evaluated by podiatry who diagnosed her with Achilles tendinitis and her symptoms resolved with a boot that she wore at night. Mild recurrence recently so advised that she wear the boot that was previously effective, and do stretching exercises.    5.  Vaccinations: Vaccinations reviewed with Ms. Diaz.    - Influenza: Refused by patient because she has significant illness after every flu shot    6. Bone Health: advised DEXA; she'd like to wait until after COVID19 risk reduced and this is reasonable.     # Relevant labs and imaging were reviewed with the patient    # High risk medication toxicity monitoring: discussion and labs reviewed; appropriate labs ordered. See above.  Instructed that if confirmed to have COVID-19 or exposure to someone with confirmed COVID-19 to call this clinic for directions on DMARD management.    # Note that this is a virtual visit to reduce the risk of COVID-19 exposure during this current pandemic.      # Considered to be at high risk of  complications from the COVID-19 virus.  It is recommended to limit contact with other people and if possible to work remotely or provide a leave of absence to reduce the risk for COVID-19.      Ms. Diaz verbalized agreement with and understanding of the rational for the diagnosis and treatment plan.  All questions were answered to best of my ability and the patient's satisfaction. Ms. Diaz was advised to contact the clinic with any questions that may arise after the clinic visit.      Thank you for involving me in the care of the patient    Return to clinic: 6 months      HPI   Kaylene Diaz is a 64 year old female with a past medical history significant for hyperlipidemia, osteoarthritis, osteopenia, PE (2020) on chronic anticoagulation, meniscal tear, squamous cell carcinoma, basal cell carcinoma, and rheumatoid arthritis who presents for follow-up of rheumatoid arthritis.    Today, 10/5/2020: doing well with regard to RA.  No joint swelling/pain. No morning stiffness or gelling phenomenon. PE and incidentally found gallstones since last seen; no reason found for the PE; on chronic anticoagulation now.     Denies fevers, chills, nausea, vomiting, constipation, diarrhea. No abdominal pain. No chest pain/pressure, palpitations, or shortness of breath. No LE swelling. No neck pain. No oral or nasal sores.  No rash. No sicca symptoms.       Tobacco: Quit in 2000  EtOH: Rare  Drugs: None    ROS   GEN: No fevers, chills, night sweats; intentionally trying to lose weight  SKIN: No itching or rashes.  Follows with dermatology regularly  HEENT: No oral or nasal ulcers.  CV: No chest pain, pressure, palpitations, or dyspnea on exertion.  PULM: No SOB, wheeze, cough.  GI: No nausea, vomiting, constipation, diarrhea. No blood in stool. No abdominal pain.  : No blood in urine.  MSK: See HPI.  NEURO: No numbness, tingling, or weakness.  EXT: No LE swelling  PSYCH: Negative    Active Problem List     Patient Active Problem  List   Diagnosis     Carpal tunnel syndrome     Tear meniscus knee     Osteopenia     Advanced directives, counseling/discussion     Hyperlipidemia LDL goal <160     Osteoarthritis     RA (rheumatoid arthritis) (H)     High risk medications (not anticoagulants) long-term use     Kidney stone     ASCUS on Pap smear     Morbid obesity due to excess calories (H)     Small bowel obstruction (H)     Elevated blood pressure reading without diagnosis of hypertension     BLANCA (acute kidney injury) (H)     Elevated glucose     SBO (small bowel obstruction) (H)     Rheumatoid arthritis involving multiple sites with positive rheumatoid factor (H)     Past Medical History     Past Medical History:   Diagnosis Date     Basal cell carcinoma      RA (rheumatoid arthritis) (H)      Small bowel obstruction (H)      Squamous cell carcinoma      Past Surgical History     Past Surgical History:   Procedure Laterality Date     ARTHROSCOPY KNEE  12/10/2010    ARTHROSCOPY KNEE performed by NAVID FIERRO at [facility]     COLONOSCOPY N/A 8/9/2018    Procedure: COLONOSCOPY;  colonoscopy;  Surgeon: Luke Kaye MD;  Location: [facility]     SURGICAL HISTORY OF -       IUD removal     SURGICAL HISTORY OF -       Ovarian cyst     SURGICAL HISTORY OF -       thorn removal from foot     SURGICAL HISTORY OF -       knee surgery, scope, right knee     Allergy     Allergies   Allergen Reactions     Folic Acid      Gold Rash     Latex Rash     Not all latex products     Current Medication List     Current Outpatient Medications   Medication Sig     fluticasone (VERAMYST) 27.5 MCG/SPRAY spray Spray 1 spray into both nostrils 2 times daily     hydroxychloroquine (PLAQUENIL) 200 MG tablet Take 2 tablets (400 mg) by mouth daily     methotrexate sodium 2.5 MG TABS Take 9 tablets (22.5 mg) by mouth once a week . Take all 9 tablets on the same day of each week.     Multiple Vitamins-Minerals (ADULT GUMMY PO) Take 1 chew tab by mouth daily VitaCrave     Multiple  "Vitamins-Minerals (HAIR/SKIN/NAILS/BIOTIN PO) Take 1 capsule by mouth daily.      rivaroxaban ANTICOAGULANT (XARELTO ANTICOAGULANT) 20 MG TABS tablet Take 1 tablet (20 mg) by mouth daily (with dinner) Continue with this     vitamin  B complex with vitamin C (VITAMIN  B COMPLEX) TABS Take 1 tablet by mouth daily     No current facility-administered medications for this visit.          Social History   See HPI    Family History     Family History   Problem Relation Age of Onset     Prostate Cancer Father      Eye Disorder Father         mac degen     Heart Disease Father      Macular Degeneration Father      Heart Disease Mother         a-fib     Eye Disorder Mother      C.A.D. Mother      Hypertension Mother      Alcohol/Drug Brother      Alcohol/Drug Sister      Alcohol/Drug Brother      Alcohol/Drug Brother      Alcohol/Drug Brother      Alcohol/Drug Sister      Obesity Sister      Alcohol/Drug Sister      Prostate Cancer Sister      Cancer Other      Melanoma No family hx of        Physical Exam     Temp Readings from Last 3 Encounters:   07/29/20 98  F (36.7  C) (Tympanic)   04/22/20 97.1  F (36.2  C) (Tympanic)   07/23/19 97.9  F (36.6  C) (Tympanic)     BP Readings from Last 5 Encounters:   07/29/20 110/70   04/22/20 127/62   03/16/20 (!) 142/78   03/09/20 133/77   10/28/19 132/74     Pulse Readings from Last 1 Encounters:   07/29/20 68     Resp Readings from Last 1 Encounters:   07/29/20 17     Estimated body mass index is 49.23 kg/m  as calculated from the following:    Height as of 7/29/20: 1.676 m (5' 6\").    Weight as of 7/29/20: 138.3 kg (305 lb).      GEN: NAD.   HEENT: MMM.  Anicteric, noninjected sclera. No obvious external lesions of the ear and nose. Hearing intact.  PULM: No increased work of breathing  MSK:  Hands and wrists without swelling. Able to make a fist with each hand.   SKIN: No rash or jaundice seen  PSYCH: Alert. Appropriate.        Labs / Imaging (select studies)     9/18/2020 [facility] " labs reviewed    RF/CCP  Recent Labs   Lab Test 08/26/13  1611 07/12/13  1015 03/27/13  1351   CCPABY >250 Strongly Positive*  --   --    RHF  --  32* 24*     HOMERO  Recent Labs   Lab Test 03/27/13  1351   TIFFANY <1.0 Interpretation:  Negative       Antiphospholipid Antibodies  Recent Labs   Lab Test 09/16/20  0853   CARDG <1.6   CARDM 0.4     ANCA  Recent Labs   Lab Test 08/26/13  1611   ANCA <1:20 Reference range: <1:20 (Note) The ANCA IFA is <1:20; therefore, no further testing will be performed. INTERPRETIVE INFORMATION: Anti-Neutrophil Cyto Ab, IgG  Neutrophil Cytoplasmic Antibodies (C-ANCA = granular cytoplasmic staining, P-ANCA = perinuclear staining) are found in the serum of over 90 percent of patients with certain necrotizing systemic vasculitides, and usually in less than 5 percent of patients with collagen vascular disease or arthritis. Performed by [facility], [address] [place] [id number] [phone] [email or web address], Kings Azevedo MD, Lab. Director     CBC  Recent Labs   Lab Test 04/22/20  1156 10/28/19  1052 08/29/19  0855   WBC 8.2 5.3 5.8   RBC 4.59 4.44 4.60   HGB 14.4 13.9 14.2   HCT 44.1 42.1 43.2   MCV 96 95 94   RDW 12.8 14.4 14.0    225 240   MCH 31.4 31.3 30.9   MCHC 32.7 33.0 32.9   NEUTROPHIL 70.6 71.0 73.2   LYMPH 16.3 19.9 15.9   MONOCYTE 11.2 7.2 8.7   EOSINOPHIL 1.5 1.7 1.9   BASOPHIL 0.4 0.2 0.3   ANEU 5.8 3.8 4.3   ALYM 1.3 1.1 0.9   PEEWEE 0.9 0.4 0.5   AEOS 0.1 0.1 0.1   ABAS 0.0 0.0 0.0     CMP  Recent Labs   Lab Test 04/22/20  1156 03/05/20 10/28/19  1052 08/29/19  0855 05/28/19  1057 10/31/17  1110 10/31/17  1110 06/23/17  0817 04/21/17  0615 04/20/17  0435 03/20/17  1032     --   --   --   --   --  137 139 145* 142 141   POTASSIUM 4.2  --   --   --   --   --  4.5 4.4 4.0 4.6 4.4   CHLORIDE 107  --   --   --   --   --  108 106 111* 107 108   CO2 27  --   --   --   --   --  24 24 27 26 26   ANIONGAP 4  --   --   --   --   --  5 9 7 9 7   GLC 81  --   --   --    --   --  84 115* 109* 206* 85   BUN 22  --   --   --   --   --  22 14 18 27 16   CR 0.74 0.94 0.72 0.75 0.84   < > 0.88 0.75 0.92 1.38* 0.85   GFRESTIMATED 85 60* 88 84 73   < > 65 78 62 39* 68   GFRESTBLACK >90 >60 >90 >90 85   < > 79 >90   GFR Calc   75 47* 82   DEAN 9.6  --   --   --   --   --  9.2 9.0 8.3* 9.8 9.2   BILITOTAL 0.4  --  0.5 0.5 0.5   < > 0.4 0.4  --  0.4 0.5   ALBUMIN 3.6  --  3.7 3.6 3.6   < > 3.7 3.8  --  4.0 3.9   PROTTOTAL 8.1  --  7.6 7.6 7.2   < > 8.0 7.5  --  8.6 8.0   ALKPHOS 118  --  109 118 125   < > 131 120  --  120 122   AST 13 18 19 21 19   < > 17 22  --  13 13   ALT 22 28 28 23 26   < > 24 26  --  24 21    < > = values in this interval not displayed.       HgA1c  Recent Labs   Lab Test 04/20/17  0435   A1C 4.8       Calcium/VitaminD  Recent Labs   Lab Test 04/22/20  1156 10/31/17  1110 06/23/17  0817   DEAN 9.6 9.2 9.0     ESR/CRP  Recent Labs   Lab Test 10/28/19  1052 08/29/19  0855 10/23/18  1000   SED 15 11 14   CRP 5.1 7.3 4.7     TSH/T4  Recent Labs   Lab Test 04/28/17  0931 07/12/13  1015   TSH 2.52 4.50     Lipid Panel  Recent Labs   Lab Test 07/29/20  1115 11/08/17  0726   CHOL 191 194   TRIG 95 135   HDL 53 47*   * 120*   NHDL 138* 147*     Hepatitis B  Recent Labs   Lab Test 09/11/13  1531   HBCAB Negative   HEPBANG Negative     Hepatitis C  Recent Labs   Lab Test 09/11/13  1531   HCVAB Negative     Lyme confirmation testing by Western Blot  Recent Labs   Lab Test 07/12/13  1015   LYWG Negative Reference range: Negative (Note) Band(s) present: NONE (Insufficient number of bands for positive result) INTERPRETIVE INFORMATION: Borrelia Burgdorferi Ab, IgG Western Blot  For this assay, a positive result is reported when any 5 or more of the following 10 bands are present: 18, 23, 28, 30, 39, 41, 45, 58, 66, or 93 kDa.  All other banding patterns are reported as negative.   LYWM Negative Reference range: Negative (Note) Band(s) present: 41 kDa (Insufficient  number of bands for positive result) INTERPRETIVE INFORMATION: Borrelia Burgdorferi Antibody, IgM Western Blot  For this assay, a positive result is reported when any 2 or more of the following bands are present: 23, 39, or 41 kDa. All other banding patterns are reported as negative. Performed by [facility] [employer] [facility], [address] [place] [id number] [phone] [email or web address], Kings Azevedo MD, Lab. Director     Tuberculosis Screening  Recent Labs   Lab Test 09/11/13  1532   TBRSLT Negative   TBAGN 0.00     UA  Recent Labs   Lab Test 01/19/16  0648 03/14/14  1320   COLOR Dark Yellow Yellow   APPEARANCE Slightly Cloudy Slightly Cloudy   URINEGLC Negative Negative   URINEBILI Negative Negative   SG 1.024 1.023   URINEPH 5.5 5.5   PROTEIN 30* 30*   NITRITE Negative Negative   UBLD Negative Negative   LEUKEST Trace* Negative   WBCU 3* 3*   RBCU 17* 1   MUCOUS Present* Present*     Urine Microscopic  Recent Labs   Lab Test 01/19/16  0648 03/14/14  1320   WBCU 3* 3*   RBCU 17* 1   MUCOUS Present* Present*       Immunization History     Immunization History   Administered Date(s) Administered     Pneumo Conj 13-V (2010&after) 12/03/2018     Pneumococcal 23 valent 10/24/2013, 03/09/2020     TDAP Vaccine (Adacel) 06/16/2010     Zoster vaccine recombinant adjuvanted (SHINGRIX) 12/03/2018, 06/19/2019          Chart documentation done in part with Dragon Voice recognition Software. Although reviewed after completion, some word and grammatical error may remain.      Video-Visit Details    Type of service:  Video Visit    Video Start Time: 10:05 AM  Video End Time: 10:20 AM    Originating Location (pt. Location): Home, in [place]    Distant Location (provider location):  Home    Platform used for Video Visit: Gloria Rooney MD

## 2020-10-05 NOTE — Clinical Note
Rheumatology team: Please mail the lab orders to Kaylene Diaz.  She plans to have done at [facility].     Joseph Rooney MD  10/5/2020 12:12 PM

## 2020-10-28 ENCOUNTER — OFFICE VISIT (OUTPATIENT)
Dept: DERMATOLOGY | Facility: CLINIC | Age: 65
End: 2020-10-28
Payer: MEDICARE

## 2020-10-28 ENCOUNTER — ALLIED HEALTH/NURSE VISIT (OUTPATIENT)
Dept: FAMILY MEDICINE | Facility: CLINIC | Age: 65
End: 2020-10-28
Payer: MEDICARE

## 2020-10-28 VITALS — SYSTOLIC BLOOD PRESSURE: 161 MMHG | HEART RATE: 78 BPM | OXYGEN SATURATION: 97 % | DIASTOLIC BLOOD PRESSURE: 84 MMHG

## 2020-10-28 DIAGNOSIS — L82.1 SEBORRHEIC KERATOSIS: ICD-10-CM

## 2020-10-28 DIAGNOSIS — D23.9 DERMAL NEVUS: ICD-10-CM

## 2020-10-28 DIAGNOSIS — D18.01 ANGIOMA OF SKIN: ICD-10-CM

## 2020-10-28 DIAGNOSIS — L81.4 LENTIGO: Primary | ICD-10-CM

## 2020-10-28 DIAGNOSIS — Z85.828 HISTORY OF SKIN CANCER: ICD-10-CM

## 2020-10-28 DIAGNOSIS — Z23 NEED FOR VACCINATION: Primary | ICD-10-CM

## 2020-10-28 PROCEDURE — 90714 TD VACC NO PRESV 7 YRS+ IM: CPT

## 2020-10-28 PROCEDURE — 99213 OFFICE O/P EST LOW 20 MIN: CPT | Performed by: DERMATOLOGY

## 2020-10-28 PROCEDURE — 90471 IMMUNIZATION ADMIN: CPT

## 2020-10-28 PROCEDURE — 99207 PR NO CHARGE NURSE ONLY: CPT

## 2020-10-28 NOTE — LETTER
10/28/2020         RE: Kaylene iDaz  [id number] [place] [id number]        Dear Colleague,    Thank you for referring your patient, Kaylene Diaz, to the [facility]. Please see a copy of my visit note below.    Kaylene Diaz is a 64 year old year old female patient here today for hx of non-melanoma skin cancer.  She denies any new or changing skin lesions.    Patient reports the following symptoms:  none.  Patient reports the following previous treatments none.  Patient reports the following modifying factors none.  Associated symptoms: none.  Patient has no other skin complaints today.  Remainder of the HPI, Meds, PMH, Allergies, FH, and SH was reviewed in chart.      Past Medical History:   Diagnosis Date     Basal cell carcinoma      RA (rheumatoid arthritis) (H)      Small bowel obstruction (H)      Squamous cell carcinoma        Past Surgical History:   Procedure Laterality Date     ARTHROSCOPY KNEE  12/10/2010    ARTHROSCOPY KNEE performed by NAVID FIERRO at [facility]     COLONOSCOPY N/A 8/9/2018    Procedure: COLONOSCOPY;  colonoscopy;  Surgeon: Luke Kaye MD;  Location: [facility]     SURGICAL HISTORY OF -       IUD removal     SURGICAL HISTORY OF -       Ovarian cyst     SURGICAL HISTORY OF -       thorn removal from foot     SURGICAL HISTORY OF -       knee surgery, scope, right knee        Family History   Problem Relation Age of Onset     Prostate Cancer Father      Eye Disorder Father         mac degen     Heart Disease Father      Macular Degeneration Father      Heart Disease Mother         a-fib     Eye Disorder Mother      C.A.D. Mother      Hypertension Mother      Alcohol/Drug Brother      Alcohol/Drug Sister      Alcohol/Drug Brother      Alcohol/Drug Brother      Alcohol/Drug Brother      Alcohol/Drug Sister      Obesity Sister      Alcohol/Drug Sister      Prostate Cancer Sister      Cancer Other      Melanoma No family hx of        Social History      Socioeconomic History     Marital status:      Spouse name: Not on file     Number of children: Not on file     Years of education: Not on file     Highest education level: Not on file   Occupational History     Employer: UNEMPLOYED   Social Needs     Financial resource strain: Not on file     Food insecurity     Worry: Not on file     Inability: Not on file     Transportation needs     Medical: Not on file     Non-medical: Not on file   Tobacco Use     Smoking status: Former Smoker     Years: 30.00     Quit date: 2000     Years since quittin.5     Smokeless tobacco: Never Used   Substance and Sexual Activity     Alcohol use: Yes     Comment: VERY RARE     Drug use: No     Sexual activity: Yes     Partners: Male   Lifestyle     Physical activity     Days per week: Not on file     Minutes per session: Not on file     Stress: Not on file   Relationships     Social connections     Talks on phone: Not on file     Gets together: Not on file     Attends Tenriism service: Not on file     Active member of club or organization: Not on file     Attends meetings of clubs or organizations: Not on file     Relationship status: Not on file     Intimate partner violence     Fear of current or ex partner: Not on file     Emotionally abused: Not on file     Physically abused: Not on file     Forced sexual activity: Not on file   Other Topics Concern     Parent/sibling w/ CABG, MI or angioplasty before 65F 55M? No   Social History Narrative     Not on file       Outpatient Encounter Medications as of 10/28/2020   Medication Sig Dispense Refill     fluticasone (VERAMYST) 27.5 MCG/SPRAY spray Spray 1 spray into both nostrils 2 times daily       hydroxychloroquine (PLAQUENIL) 200 MG tablet Take 2 tablets (400 mg) by mouth daily 180 tablet 2     methotrexate sodium 2.5 MG TABS Take 9 tablets (22.5 mg) by mouth once a week . Take all 9 tablets on the same day of each week. 108 tablet 2     Multiple Vitamins-Minerals  (ADULT GUMMY PO) Take 1 chew tab by mouth daily VitaCrave       Multiple Vitamins-Minerals (HAIR/SKIN/NAILS/BIOTIN PO) Take 1 capsule by mouth daily.  100 tablet 3     rivaroxaban ANTICOAGULANT (XARELTO ANTICOAGULANT) 20 MG TABS tablet Take 1 tablet (20 mg) by mouth daily (with dinner) Continue with this 90 tablet 1     vitamin  B complex with vitamin C (VITAMIN  B COMPLEX) TABS Take 1 tablet by mouth daily  0     No facility-administered encounter medications on file as of 10/28/2020.              Review Of Systems  Skin: As above  Eyes: negative  Ears/Nose/Throat: negative  Respiratory: No shortness of breath, dyspnea on exertion, cough, or hemoptysis  Cardiovascular: negative  Gastrointestinal: negative  Genitourinary: negative  Musculoskeletal: negative  Neurologic: negative  Psychiatric: negative  Hematologic/Lymphatic/Immunologic: negative  Endocrine: negative      O:   NAD, WDWN, Alert & Oriented, Mood & Affect wnl, Vitals stable   Here today alone   BP (!) 161/84 (BP Location: Left arm, Patient Position: Sitting)   Pulse 78   SpO2 97%    General appearance normal   Vitals stable   Alert, oriented and in no acute distress        Stuck on papules and brown macules on trunk and ext   Red papules on trunk  Flesh colored papules on trunk     The remainder of the full exam was normal; the following areas were examined:  conjunctiva/lids, oral mucosa, neck, peripheral vascular system, abdomen, lymph nodes, digits/nails, eccrine and apocrine glands, scalp/hair, face, neck, chest, abdomen, buttocks, back, RUE, LUE, RLE, LLE       Eyes: Conjunctivae/lids:Normal     ENT: Lips, buccal mucosa, tongue: normal    MSK:Normal    Cardiovascular: peripheral edema none    Pulm: Breathing Normal    Lymph Nodes: No Head and Neck Lymphadenopathy     Neuro/Psych: Orientation:Alert and Orientedx3 ; Mood/Affect:normal       A/P:  1. Seborrheic keratosis, lentigo, angioma, dermal nevus, hx of non-melanoma skin cancer   BENIGN  LESIONS DISCUSSED WITH PATIENT:  I discussed the specifics of tumor, prognosis, and genetics of benign lesions.  I explained that treatment of these lesions would be purely cosmetic and not medically neccessary.  I discussed with patient different removal options including excision, cautery and /or laser.      Nature and genetics of benign skin lesions dicussed with patient.  Signs and Symptoms of skin cancer discussed with patient.  Patient encouraged to perform monthly skin exams.  UV precautions reviewed with patient.  Skin care regimen reviewed with patient: Eliminate harsh soaps, i.e. Dial, zest, irsih spring; Mild soaps such as Cetaphil or Dove sensitive skin, avoid hot or cold showers, aggressive use of emollients including vanicream, cetaphil or cerave discussed with patient.    Risks of non-melanoma skin cancer discussed with patient   Return to clinic 12 months        Again, thank you for allowing me to participate in the care of your patient.        Sincerely,        Cipriano Springer MD

## 2020-10-28 NOTE — NURSING NOTE
"Initial BP (!) 161/84 (BP Location: Left arm, Patient Position: Sitting)   Pulse 78   SpO2 97%  Estimated body mass index is 49.23 kg/m  as calculated from the following:    Height as of 7/29/20: 1.676 m (5' 6\").    Weight as of 7/29/20: 138.3 kg (305 lb). .    Patient declines retake due to not able to do a manual blood pressure.  "

## 2020-10-28 NOTE — PROGRESS NOTES
Kaylene Diaz is a 64 year old year old female patient here today for hx of non-melanoma skin cancer.  She denies any new or changing skin lesions.    Patient reports the following symptoms:  none.  Patient reports the following previous treatments none.  Patient reports the following modifying factors none.  Associated symptoms: none.  Patient has no other skin complaints today.  Remainder of the HPI, Meds, PMH, Allergies, FH, and SH was reviewed in chart.      Past Medical History:   Diagnosis Date     Basal cell carcinoma      RA (rheumatoid arthritis) (H)      Small bowel obstruction (H)      Squamous cell carcinoma        Past Surgical History:   Procedure Laterality Date     ARTHROSCOPY KNEE  12/10/2010    ARTHROSCOPY KNEE performed by NAVID FIERRO at [facility]     COLONOSCOPY N/A 8/9/2018    Procedure: COLONOSCOPY;  colonoscopy;  Surgeon: Luke Kaye MD;  Location: [facility]     SURGICAL HISTORY OF -       IUD removal     SURGICAL HISTORY OF -       Ovarian cyst     SURGICAL HISTORY OF -       thorn removal from foot     SURGICAL HISTORY OF -       knee surgery, scope, right knee        Family History   Problem Relation Age of Onset     Prostate Cancer Father      Eye Disorder Father         mac degen     Heart Disease Father      Macular Degeneration Father      Heart Disease Mother         a-fib     Eye Disorder Mother      C.A.D. Mother      Hypertension Mother      Alcohol/Drug Brother      Alcohol/Drug Sister      Alcohol/Drug Brother      Alcohol/Drug Brother      Alcohol/Drug Brother      Alcohol/Drug Sister      Obesity Sister      Alcohol/Drug Sister      Prostate Cancer Sister      Cancer Other      Melanoma No family hx of        Social History     Socioeconomic History     Marital status:      Spouse name: Not on file     Number of children: Not on file     Years of education: Not on file     Highest education level: Not on file   Occupational History     Employer: UNEMPLOYED   Social Needs      Financial resource strain: Not on file     Food insecurity     Worry: Not on file     Inability: Not on file     Transportation needs     Medical: Not on file     Non-medical: Not on file   Tobacco Use     Smoking status: Former Smoker     Years: 30.00     Quit date: 2000     Years since quittin.5     Smokeless tobacco: Never Used   Substance and Sexual Activity     Alcohol use: Yes     Comment: VERY RARE     Drug use: No     Sexual activity: Yes     Partners: Male   Lifestyle     Physical activity     Days per week: Not on file     Minutes per session: Not on file     Stress: Not on file   Relationships     Social connections     Talks on phone: Not on file     Gets together: Not on file     Attends [employer] service: Not on file     Active member of club or organization: Not on file     Attends meetings of clubs or organizations: Not on file     Relationship status: Not on file     Intimate partner violence     Fear of current or ex partner: Not on file     Emotionally abused: Not on file     Physically abused: Not on file     Forced sexual activity: Not on file   Other Topics Concern     Parent/sibling w/ CABG, MI or angioplasty before 65F 55M? No   Social History Narrative     Not on file       Outpatient Encounter Medications as of 10/28/2020   Medication Sig Dispense Refill     fluticasone (VERAMYST) 27.5 MCG/SPRAY spray Spray 1 spray into both nostrils 2 times daily       hydroxychloroquine (PLAQUENIL) 200 MG tablet Take 2 tablets (400 mg) by mouth daily 180 tablet 2     methotrexate sodium 2.5 MG TABS Take 9 tablets (22.5 mg) by mouth once a week . Take all 9 tablets on the same day of each week. 108 tablet 2     Multiple Vitamins-Minerals (ADULT GUMMY PO) Take 1 chew tab by mouth daily VitaCrave       Multiple Vitamins-Minerals (HAIR/SKIN/NAILS/BIOTIN PO) Take 1 capsule by mouth daily.  100 tablet 3     rivaroxaban ANTICOAGULANT (XARELTO ANTICOAGULANT) 20 MG TABS tablet Take 1 tablet (20 mg)  by mouth daily (with dinner) Continue with this 90 tablet 1     vitamin  B complex with vitamin C (VITAMIN  B COMPLEX) TABS Take 1 tablet by mouth daily  0     No facility-administered encounter medications on file as of 10/28/2020.              Review Of Systems  Skin: As above  Eyes: negative  Ears/Nose/Throat: negative  Respiratory: No shortness of breath, dyspnea on exertion, cough, or hemoptysis  Cardiovascular: negative  Gastrointestinal: negative  Genitourinary: negative  Musculoskeletal: negative  Neurologic: negative  Psychiatric: negative  Hematologic/Lymphatic/Immunologic: negative  Endocrine: negative      O:   NAD, WDWN, Alert & Oriented, Mood & Affect wnl, Vitals stable   Here today alone   BP (!) 161/84 (BP Location: Left arm, Patient Position: Sitting)   Pulse 78   SpO2 97%    General appearance normal   Vitals stable   Alert, oriented and in no acute distress        Stuck on papules and brown macules on trunk and ext   Red papules on trunk  Flesh colored papules on trunk     The remainder of the full exam was normal; the following areas were examined:  conjunctiva/lids, oral mucosa, neck, peripheral vascular system, abdomen, lymph nodes, digits/nails, eccrine and apocrine glands, scalp/hair, face, neck, chest, abdomen, buttocks, back, RUE, LUE, RLE, LLE       Eyes: Conjunctivae/lids:Normal     ENT: Lips, buccal mucosa, tongue: normal    MSK:Normal    Cardiovascular: peripheral edema none    Pulm: Breathing Normal    Lymph Nodes: No Head and Neck Lymphadenopathy     Neuro/Psych: Orientation:Alert and Orientedx3 ; Mood/Affect:normal       A/P:  1. Seborrheic keratosis, lentigo, angioma, dermal nevus, hx of non-melanoma skin cancer   BENIGN LESIONS DISCUSSED WITH PATIENT:  I discussed the specifics of tumor, prognosis, and genetics of benign lesions.  I explained that treatment of these lesions would be purely cosmetic and not medically neccessary.  I discussed with patient different removal options  including excision, cautery and /or laser.      Nature and genetics of benign skin lesions dicussed with patient.  Signs and Symptoms of skin cancer discussed with patient.  Patient encouraged to perform monthly skin exams.  UV precautions reviewed with patient.  Skin care regimen reviewed with patient: Eliminate harsh soaps, i.e. Dial, zest, irsih spring; Mild soaps such as Cetaphil or Dove sensitive skin, avoid hot or cold showers, aggressive use of emollients including vanicream, cetaphil or cerave discussed with patient.    Risks of non-melanoma skin cancer discussed with patient   Return to clinic 12 months

## 2020-10-28 NOTE — NURSING NOTE
Prior to immunization administration, verified patients identity using patient s name and date of birth. Please see Immunization Activity for additional information.     Screening Questionnaire for Adult Immunization    Are you sick today?   No   Do you have allergies to medications, food, a vaccine component or latex?   Yes   Have you ever had a serious reaction after receiving a vaccination?   No   Do you have a long-term health problem with heart, lung, kidney, or metabolic disease (e.g., diabetes), asthma, a blood disorder, no spleen, complement component deficiency, a cochlear implant, or a spinal fluid leak?  Are you on long-term aspirin therapy?   No   Do you have cancer, leukemia, HIV/AIDS, or any other immune system problem?   No   Do you have a parent, brother, or sister with an immune system problem?   No   In the past 3 months, have you taken medications that affect  your immune system, such as prednisone, other steroids, or anticancer drugs; drugs for the treatment of rheumatoid arthritis, Crohn s disease, or psoriasis; or have you had radiation treatments?   No   Have you had a seizure, or a brain or other nervous system problem?   No   During the past year, have you received a transfusion of blood or blood    products, or been given immune (gamma) globulin or antiviral drug?   No   For women: Are you pregnant or is there a chance you could become       pregnant during the next month?   No   Have you received any vaccinations in the past 4 weeks?   No     Immunization questionnaire was positive for at least one answer.  Ok per guidelines for TD.        . Patient instructed to remain in clinic for 15 minutes afterwards, and to report any adverse reaction to me immediately.       Screening performed by Jack Calloway CMA on 10/28/2020 at 10:52 AM.

## 2020-11-02 ENCOUNTER — OFFICE VISIT (OUTPATIENT)
Dept: OPHTHALMOLOGY | Facility: CLINIC | Age: 65
End: 2020-11-02
Attending: INTERNAL MEDICINE
Payer: MEDICARE

## 2020-11-02 DIAGNOSIS — M05.79 RHEUMATOID ARTHRITIS INVOLVING MULTIPLE SITES WITH POSITIVE RHEUMATOID FACTOR (H): ICD-10-CM

## 2020-11-02 DIAGNOSIS — Z79.899 HIGH RISK MEDICATION USE: Primary | ICD-10-CM

## 2020-11-02 PROCEDURE — 92134 CPTRZ OPH DX IMG PST SGM RTA: CPT | Performed by: STUDENT IN AN ORGANIZED HEALTH CARE EDUCATION/TRAINING PROGRAM

## 2020-11-02 PROCEDURE — 92012 INTRM OPH EXAM EST PATIENT: CPT | Performed by: STUDENT IN AN ORGANIZED HEALTH CARE EDUCATION/TRAINING PROGRAM

## 2020-11-02 PROCEDURE — 92083 EXTENDED VISUAL FIELD XM: CPT | Performed by: STUDENT IN AN ORGANIZED HEALTH CARE EDUCATION/TRAINING PROGRAM

## 2020-11-02 ASSESSMENT — EXTERNAL EXAM - RIGHT EYE: OD_EXAM: NORMAL

## 2020-11-02 ASSESSMENT — CUP TO DISC RATIO
OS_RATIO: 0.3
OD_RATIO: 0.3

## 2020-11-02 ASSESSMENT — VISUAL ACUITY
OD_SC+: -2
OD_SC: 20/25
METHOD: SNELLEN - LINEAR
OS_SC: 20/20

## 2020-11-02 ASSESSMENT — SLIT LAMP EXAM - LIDS
COMMENTS: 1+ DCH
COMMENTS: 1+ DCH

## 2020-11-02 NOTE — PROGRESS NOTES
Current Eye Medications:  None.     Subjective:  Follow up plaquenil.  Patient is here for a visual field and OCT.  She has been taking Plaquenil since 2014.  Patient complains of increased floaters right eye > left eye, in February.  Most of them have resolved, but a few remain.    She also describes 3 episodes in the past year of ocular migraine symptoms (flashing,moving, colored lights that interrupt her vision of each eye), remaining for about 20 minutes.    Last eye exam was with Dr. Powers in 2018.     Objective:  See Ophthalmology Exam.       Assessment:  Kaylene Diaz is a 64 year old female who presents with:   Encounter Diagnoses   Name Primary?     High risk medication use Started 1/23/2014 per records. Almost 6 years now.    Macular SD-OCT within normal limits both eyes.    Aguilar visual field (HVF) 10-2 within normal limits both eyes.    No signs of Plaquenil retinal toxicity at present.        Rheumatoid arthritis involving multiple sites with positive rheumatoid factor (H)        Plan:  Normal Plaquenil eye tests today.    Luiz Powers MD  [phone]

## 2020-11-02 NOTE — LETTER
11/2/2020       RE: Kaylene Diaz  [id number] [place] [address]      Dear Dr. Rooney    Thank you for referring your patient, Kaylene Diaz, to the [facility]. Normal Plaquenil eye tests today. Her Plaquenil eye tests were normal today. Plan to repeat tests in 1 year.  Please see a copy of my visit note below.     Current Eye Medications:  None.     Subjective:  Follow up plaquenil.  Patient is here for a visual field and OCT.  She has been taking Plaquenil since 2014.  Patient complains of increased floaters right eye > left eye, in February.  Most of them have resolved, but a few remain.    She also describes 3 episodes in the past year of ocular migraine symptoms (flashing,moving, colored lights that interrupt her vision of each eye), remaining for about 20 minutes.    Last eye exam was with Dr. Powers in 2018.     Objective:  See Ophthalmology Exam.       Assessment:  Kaylene Diaz is a 64 year old female who presents with:   Encounter Diagnoses   Name Primary?     High risk medication use Started 1/23/2014 per records. Almost 6 years now.    Macular SD-OCT within normal limits both eyes.    Aguilar visual field (HVF) 10-2 within normal limits both eyes.    No signs of Plaquenil retinal toxicity at present.        Rheumatoid arthritis involving multiple sites with positive rheumatoid factor (H)        Plan:  Normal Plaquenil eye tests today.    Luiz Powers MD  [phone]          Again, thank you for allowing me to participate in the care of your patient.        Sincerely,        Luiz Powers MD

## 2020-11-29 ENCOUNTER — HEALTH MAINTENANCE LETTER (OUTPATIENT)
Age: 65
End: 2020-11-29

## 2020-12-22 ENCOUNTER — TRANSFERRED RECORDS (OUTPATIENT)
Dept: HEALTH INFORMATION MANAGEMENT | Facility: CLINIC | Age: 65
End: 2020-12-22

## 2020-12-22 LAB
ALT SERPL-CCNC: 28 IU/L (ref 12–65)
AST SERPL-CCNC: 15 IU/L (ref 15–37)
CREAT SERPL-MCNC: 0.98 MG/DL (ref 0.6–1.3)
GFR SERPL CREATININE-BSD FRML MDRD: 57 ML/MIN/1.73M2

## 2020-12-27 ENCOUNTER — TELEPHONE (OUTPATIENT)
Dept: RHEUMATOLOGY | Facility: CLINIC | Age: 65
End: 2020-12-27

## 2020-12-27 NOTE — TELEPHONE ENCOUNTER
RN: Please call to notify Kaylene Diaz that the 12/22/2020 labs performed at [facility] did not show evidence of medication toxicity.    Joseph Rooney MD  12/27/2020 4:59 PM

## 2020-12-28 NOTE — TELEPHONE ENCOUNTER
Left message for patient to return call to clinic.    Kenney Stokes RN....12/28/2020 11:46 AM

## 2020-12-28 NOTE — TELEPHONE ENCOUNTER
Patient returned the call and was informed of results.  She verbalized understanding and has no questions.    Kenney Stokes RN....12/28/2020 12:04 PM

## 2021-02-12 ENCOUNTER — TELEPHONE (OUTPATIENT)
Dept: RHEUMATOLOGY | Facility: CLINIC | Age: 66
End: 2021-02-12

## 2021-02-12 NOTE — TELEPHONE ENCOUNTER
Rheumatology team: Please call to notify Ms. Diaz that toxicity monitoring labs are due.  These labs may be done at any [facility] lab.    Joseph Rooney MD  2/12/2021 1:27 PM

## 2021-02-12 NOTE — TELEPHONE ENCOUNTER
Called and informed patient that she is due for labs. Patient is reporting she was told to get labs done in March but will get them done now if Dr. Rooney wants them sooner. Lab orders were printed and given to her at her last visit. She will bring these orders to [facility].    Kenney PRABHAKAR RN....2/12/2021 3:28 PM

## 2021-03-09 ENCOUNTER — TRANSFERRED RECORDS (OUTPATIENT)
Dept: HEALTH INFORMATION MANAGEMENT | Facility: CLINIC | Age: 66
End: 2021-03-09

## 2021-03-09 LAB
ALT SERPL-CCNC: 26 IU/L (ref 12–65)
AST SERPL-CCNC: 15 IU/L (ref 15–37)
CREAT SERPL-MCNC: 1.01 MG/DL (ref 0.6–1.3)
GFR SERPL CREATININE-BSD FRML MDRD: 55 ML/MIN/1.73M2

## 2021-04-05 ENCOUNTER — VIRTUAL VISIT (OUTPATIENT)
Dept: RHEUMATOLOGY | Facility: CLINIC | Age: 66
End: 2021-04-05
Payer: MEDICARE

## 2021-04-05 DIAGNOSIS — Z79.899 HIGH RISK MEDICATION USE: ICD-10-CM

## 2021-04-05 DIAGNOSIS — M05.79 RHEUMATOID ARTHRITIS INVOLVING MULTIPLE SITES WITH POSITIVE RHEUMATOID FACTOR (H): Primary | Chronic | ICD-10-CM

## 2021-04-05 PROCEDURE — 99214 OFFICE O/P EST MOD 30 MIN: CPT | Mod: 95 | Performed by: INTERNAL MEDICINE

## 2021-04-05 RX ORDER — HYDROXYCHLOROQUINE SULFATE 200 MG/1
400 TABLET, FILM COATED ORAL DAILY
Qty: 180 TABLET | Refills: 2 | Status: SHIPPED | OUTPATIENT
Start: 2021-04-05 | End: 2021-10-05

## 2021-04-05 RX ORDER — METHOTREXATE 25 MG/ML
25 INJECTION, SOLUTION INTRA-ARTERIAL; INTRAMUSCULAR; INTRAVENOUS
Qty: 8 ML | Refills: 3 | Status: SHIPPED | OUTPATIENT
Start: 2021-04-05 | End: 2021-07-06

## 2021-04-05 RX ORDER — PREDNISONE 5 MG/1
TABLET ORAL
Qty: 42 TABLET | Refills: 1 | Status: SHIPPED | OUTPATIENT
Start: 2021-04-05 | End: 2021-10-05

## 2021-04-05 RX ORDER — SYRING-NEEDL,DISP,INSUL,0.3 ML 28GX1/2"
SYRINGE, EMPTY DISPOSABLE MISCELLANEOUS
Qty: 13 EACH | Refills: 1 | Status: SHIPPED | OUTPATIENT
Start: 2021-04-05 | End: 2021-07-06

## 2021-04-05 NOTE — Clinical Note
"Rheumatology team: please fax the orders entered on 4/5/2021 to the [facility] lab. Please put on the coversheet:   \"- Labs monthly [id number]: CBC, Cr, Hepatic Panel  - Labs in 3 months: CBC, Creatinine, Hepatic Panel, ESR, CRP\"  Thanks!  Joseph Rooney MD  4/5/2021 9:47 PM  "

## 2021-04-05 NOTE — PATIENT INSTRUCTIONS
RHEUMATOLOGY    Dr. Joseph Rooney    [facility]  [address] [place] [address]    Our new phone number for Rheumatology is [phone], this number will be able to help you schedule appointments for Dr. Rooney or if you have any message you would like sent to us.    Thank you for choosing [facility]!    Keke Finnegan [facility] Rheumatology

## 2021-04-05 NOTE — PROGRESS NOTES
"Kaylene Diaz  is a 65 year old year old female who is being evaluated via a billable video visit.      How would you like to obtain your AVS? MyChart  If the video visit is dropped, the invitation should be resent by: Text to cell phone: [phone]  Will anyone else be joining your video visit? No    Rheumatology Video Visit      Kayelne Diaz MRN# [id number]   YOB: 1955 Age: 65 year old      Date of visit: 4/05/21   PCP: Dr. Hitesh Washington    Chief Complaint   Patient presents with:  Arthritis: RA    Assessment and Plan     1.  Seropositive rheumatoid arthritis (RF 32, CCP >250): Currently on methotrexate 25 mg once weekly, hydroxychloroquine 400 mg daily.  Previously on sulfasalazine that was discontinued when she found no benefit from it; but she also did not require prednisone while on sulfasalazine; questioning if the \"benefit\" from sulfasalazine was actually the \"benefit\" of not gardening.  Does not tolerate folic acid because of associated increased sunburns.    More active dz today and discussed tx options; change MTX to SQ to increase bioavailability. Outside labs performed on 3/9/2021 available in CareEverywhere reviewed.  Chronic illness, progressive.    - Change methotrexate from 25mg PO once weekly to 25mg SQ once weekly   - Continue hydroxychloroquine 400 mg daily (last eye exam on 11/2/2020)  - Continue prednisone 5 mg daily as needed for rheumatoid arthritis symptoms; use sparingly (she says that she hasn't used for over 6 months)  - Labs monthly [id number]: CBC, Cr, Hepatic Panel (lab orders to be sent to [facility])  - Labs in 3 months: CBC, Creatinine, Hepatic Panel, ESR, CRP (lab orders to be sent to [facility])    High risk medication requiring intensive toxicity monitoring at least quarterly: labs ordered include CBC, Creatinine, Hepatic panel to monitor for cytopenia and hepatotoxicity; checking creatinine as it affects clearance of medication.      2.  Basal cell carcinoma and squamous " cell carcinoma: Several lesions removed by dermatology in the past.  Continue to follow with dermatology.     3.  Chronic lower back pain: Has significantly improved with lidocaine patches that have been used no more than 10 times in the past 1 year.  She has been using her mother's lidocaine patches.  Management per PCP    4.  Right Achilles tendinitis history: Was evaluated by podiatry who diagnosed her with Achilles tendinitis and her symptoms resolved with a boot that she wore at night. Mild recurrence recently so advised that she wear the boot that was previously effective, and do stretching exercises.    5. Bone Health: advised DEXA; she'd like to wait until after COVID19 risk reduced and this is reasonable.     # At this time the COVID-19 vaccine (currently available from Pfizer, Moderna, and Evgen) is recommended based on our knowledge of this vaccine and vaccines in the past.  Based on our current knowledge there is no preference for one COVID-19 vaccine over another. Note that there is no data currently available to specifically establish safety and efficacy for these vaccines in immunocompromised people.    Based on our current understanding (and this may change over time as we learn more), the following adjustments are recommended around the time of COVID-19 vaccination:  - Methotrexate should be held 1 week after each COVID19 vaccine dose    # This is a virtual visit to reduce the risk of COVID-19 exposure during this current pandemic.      Total minutes spent in evaluation with patient, documentation, , and review of pertinent studies and chart notes: 26       Ms. Diaz verbalized agreement with and understanding of the rational for the diagnosis and treatment plan.  All questions were answered to best of my ability and the patient's satisfaction. Ms. Diaz was advised to contact the clinic with any questions that may arise after the clinic visit.      Thank you for involving me in  the care of the patient    Return to clinic: 3 months      HPI   Kaylene Diaz is a 65 year old female with a past medical history significant for hyperlipidemia, osteoarthritis, osteopenia, PE (2020) on chronic anticoagulation, meniscal tear, squamous cell carcinoma, basal cell carcinoma, and rheumatoid arthritis who presents for follow-up of rheumatoid arthritis.    Today, 4/5/2021: pain across the MCPs and PIPs that is worse in the AM and improves with time and activity.  Tolerating medications well.     Denies fevers, chills, nausea, vomiting, constipation, diarrhea. No abdominal pain. No chest pain/pressure, palpitations, or shortness of breath. No LE swelling. No neck pain. No oral or nasal sores.  No rash. No sicca symptoms.   Trying to lose weight.     Tobacco: Quit in 2000  EtOH: Rare  Drugs: None    ROS   12 point review of system was completed and negative except as noted in the HPI     Active Problem List     Patient Active Problem List   Diagnosis     Carpal tunnel syndrome     Tear meniscus knee     Osteopenia     Advanced directives, counseling/discussion     Hyperlipidemia LDL goal <160     Osteoarthritis     RA (rheumatoid arthritis) (H)     High risk medications (not anticoagulants) long-term use     Kidney stone     ASCUS on Pap smear     Morbid obesity due to excess calories (H)     Small bowel obstruction (H)     Elevated blood pressure reading without diagnosis of hypertension     BLANCA (acute kidney injury) (H)     Elevated glucose     SBO (small bowel obstruction) (H)     Rheumatoid arthritis involving multiple sites with positive rheumatoid factor (H)     Past Medical History     Past Medical History:   Diagnosis Date     Basal cell carcinoma      RA (rheumatoid arthritis) (H)      Small bowel obstruction (H)      Squamous cell carcinoma      Past Surgical History     Past Surgical History:   Procedure Laterality Date     ARTHROSCOPY KNEE  12/10/2010    ARTHROSCOPY KNEE performed by VADIM  NAVID at [facility]     COLONOSCOPY N/A 8/9/2018    Procedure: COLONOSCOPY;  colonoscopy;  Surgeon: Luke Kaye MD;  Location: [facility]     SURGICAL HISTORY OF -       IUD removal     SURGICAL HISTORY OF -       Ovarian cyst     SURGICAL HISTORY OF -       thorn removal from foot     SURGICAL HISTORY OF -       knee surgery, scope, right knee     Allergy     Allergies   Allergen Reactions     Folic Acid      Gold Rash     Latex Rash     Not all latex products     Current Medication List     Current Outpatient Medications   Medication Sig     cyclobenzaprine (FLEXERIL) 5 MG tablet Take 1 tablet (5 mg) by mouth nightly as needed for muscle spasms     fluticasone (VERAMYST) 27.5 MCG/SPRAY spray Spray 1 spray into both nostrils 2 times daily     hydroxychloroquine (PLAQUENIL) 200 MG tablet Take 2 tablets (400 mg) by mouth daily     methotrexate sodium 2.5 MG TABS Take 9 tablets (22.5 mg) by mouth once a week . Take all 9 tablets on the same day of each week.     Multiple Vitamins-Minerals (ADULT GUMMY PO) Take 1 chew tab by mouth daily Vita[employer]ve     Multiple Vitamins-Minerals (HAIR/SKIN/NAILS/BIOTIN PO) Take 1 capsule by mouth daily.      rivaroxaban ANTICOAGULANT (XARELTO ANTICOAGULANT) 20 MG TABS tablet Take 1 tablet (20 mg) by mouth daily (with dinner) Continue with this     vitamin  B complex with vitamin C (VITAMIN  B COMPLEX) TABS Take 1 tablet by mouth daily     No current facility-administered medications for this visit.          Social History   See HPI    Family History     Family History   Problem Relation Age of Onset     Prostate Cancer Father      Eye Disorder Father         mac degen     Heart Disease Father      Macular Degeneration Father      Heart Disease Mother         a-fib     Eye Disorder Mother      C.A.D. Mother      Hypertension Mother      Alcohol/Drug Brother      Alcohol/Drug Sister      Alcohol/Drug Brother      Alcohol/Drug Brother      Alcohol/Drug Brother      Alcohol/Drug Sister       "Obesity Sister      Alcohol/Drug Sister      Prostate Cancer Sister      Cancer Other      Melanoma No family hx of        Physical Exam     Temp Readings from Last 3 Encounters:   07/29/20 98  F (36.7  C) (Tympanic)   04/22/20 97.1  F (36.2  C) (Tympanic)   07/23/19 97.9  F (36.6  C) (Tympanic)     BP Readings from Last 5 Encounters:   10/28/20 (!) 161/84   07/29/20 110/70   04/22/20 127/62   03/16/20 (!) 142/78   03/09/20 133/77     Pulse Readings from Last 1 Encounters:   10/28/20 78     Resp Readings from Last 1 Encounters:   07/29/20 17     Estimated body mass index is 49.23 kg/m  as calculated from the following:    Height as of 7/29/20: 1.676 m (5' 6\").    Weight as of 7/29/20: 138.3 kg (305 lb).      GEN: NAD.   HEENT: MMM.  Anicteric, noninjected sclera. No obvious external lesions of the ear and nose. Hearing intact.  PULM: No increased work of breathing  MSK:  Swelling across the bilateral 2nd-4th MCPs and PIPs. DIPs and wrists without swelling.   SKIN: No rash or jaundice seen  PSYCH: Alert. Appropriate.      Labs / Imaging (select studies)       RF/CCP  Recent Labs   Lab Test 08/26/13  1611 07/12/13  1015 03/27/13  1351   CCPABY >250 Strongly Positive*  --   --    RHF  --  32* 24*     CBC  Recent Labs   Lab Test 04/22/20  1156 10/28/19  1052 08/29/19  0855   WBC 8.2 5.3 5.8   RBC 4.59 4.44 4.60   HGB 14.4 13.9 14.2   HCT 44.1 42.1 43.2   MCV 96 95 94   RDW 12.8 14.4 14.0    225 240   MCH 31.4 31.3 30.9   MCHC 32.7 33.0 32.9   NEUTROPHIL 70.6 71.0 73.2   LYMPH 16.3 19.9 15.9   MONOCYTE 11.2 7.2 8.7   EOSINOPHIL 1.5 1.7 1.9   BASOPHIL 0.4 0.2 0.3   ANEU 5.8 3.8 4.3   ALYM 1.3 1.1 0.9   PEEWEE 0.9 0.4 0.5   AEOS 0.1 0.1 0.1   ABAS 0.0 0.0 0.0     CMP  Recent Labs   Lab Test 03/09/21 12/22/20 04/22/20  1156 10/28/19  1052 10/28/19  1052 08/29/19  0855 10/31/17  1110 10/31/17  1110 06/23/17  0817   NA  --   --  138  --   --   --   --  137 139   POTASSIUM  --   --  4.2  --   --   --   --  4.5 4.4 "   CHLORIDE  --   --  107  --   --   --   --  108 106   CO2  --   --  27  --   --   --   --  24 24   ANIONGAP  --   --  4  --   --   --   --  5 9   GLC  --   --  81  --   --   --   --  84 115*   BUN  --   --  22  --   --   --   --  22 14   CR 1.01 0.98 0.74   < > 0.72 0.75   < > 0.88 0.75   GFRESTIMATED 55* 57* 85   < > 88 84   < > 65 78   GFRESTBLACK >60 >60 >90   < > >90 >90   < > 79 >90   GFR Calc     DEAN  --   --  9.6  --   --   --   --  9.2 9.0   BILITOTAL  --   --  0.4  --  0.5 0.5   < > 0.4 0.4   ALBUMIN  --   --  3.6  --  3.7 3.6   < > 3.7 3.8   PROTTOTAL  --   --  8.1  --  7.6 7.6   < > 8.0 7.5   ALKPHOS  --   --  118  --  109 118   < > 131 120   AST 15 15 13   < > 19 21   < > 17 22   ALT 26 28 22   < > 28 23   < > 24 26    < > = values in this interval not displayed.     Calcium/VitaminD  Recent Labs   Lab Test 04/22/20  1156 10/31/17  1110 06/23/17  0817   DEAN 9.6 9.2 9.0     ESR/CRP  Recent Labs   Lab Test 10/28/19  1052 08/29/19  0855 10/23/18  1000   SED 15 11 14   CRP 5.1 7.3 4.7     Hepatitis B  Recent Labs   Lab Test 09/11/13  1531   HBCAB Negative   HEPBANG Negative     Hepatitis C  Recent Labs   Lab Test 09/11/13  1531   HCVAB Negative       Lyme confirmation testing by Western Blot  Recent Labs   Lab Test 07/12/13  1015   LYWG Negative Reference range: Negative (Note) Band(s) present: NONE (Insufficient number of bands for positive result) INTERPRETIVE INFORMATION: Borrelia Burgdorferi Ab, IgG Western Blot  For this assay, a positive result is reported when any 5 or more of the following 10 bands are present: 18, 23, 28, 30, 39, 41, 45, 58, 66, or 93 kDa.  All other banding patterns are reported as negative.   LYWM Negative Reference range: Negative (Note) Band(s) present: 41 kDa (Insufficient number of bands for positive result) INTERPRETIVE INFORMATION: Borrelia Burgdorferi Antibody, IgM Western Blot  For this assay, a positive result is reported when any 2 or more of the  following bands are present: 23, 39, or 41 kDa. All other banding patterns are reported as negative. Performed by [facility], [address] [place] [place] [address] [phone] [email or web address], Kings Azevedo MD, Lab. Director     Tuberculosis Screening  Recent Labs   Lab Test 09/11/13  1532   TBRSLT Negative   TBAGN 0.00     Immunization History     Immunization History   Administered Date(s) Administered     Pneumo Conj 13-V (2010&after) 12/03/2018     Pneumococcal 23 valent 10/24/2013, 03/09/2020     TD (ADULT, 7+) 10/28/2020     TDAP Vaccine (Adacel) 06/16/2010     Zoster vaccine recombinant adjuvanted (SHINGRIX) 12/03/2018, 06/19/2019          Chart documentation done in part with Dragon Voice recognition Software. Although reviewed after completion, some word and grammatical error may remain.      Video-Visit Details    Type of service:  Video Visit    Video Start Time: 10:14 AM    Video End Time: 10:24 AM    Originating Location (pt. Location): [facility] in [place], [place]    Distant Location (provider location):  Home    Platform used for Video Visit: Miguel Angel Rooney MD

## 2021-04-22 DIAGNOSIS — I26.99 OTHER ACUTE PULMONARY EMBOLISM, UNSPECIFIED WHETHER ACUTE COR PULMONALE PRESENT (H): ICD-10-CM

## 2021-04-22 NOTE — PROGRESS NOTES
Refill request for patients Xarelto today. Patient is to continue long term follow up now with her PCP. Let her know we will fill this for her today, but in the future, she will continue long term follow up and refills of Xarelto with her PCP. Patient verbalized understanding and agreement to plan.   Yenifer Urbina RN on 4/22/2021 at 8:44 AM      request received from patient requesting a refill of Xarelto on behalf of Dr. Estrella.  Last refill:   9/23/20 with 90 tablets and 1 refills at [facility] pharmacy.  Last office visit:  9/23/20  Next office visit:  Patient will continue follow up in the future with her PCP    Yenifer Urbina RN

## 2021-06-28 ENCOUNTER — TRANSFERRED RECORDS (OUTPATIENT)
Dept: HEALTH INFORMATION MANAGEMENT | Facility: CLINIC | Age: 66
End: 2021-06-28

## 2021-07-06 ENCOUNTER — VIRTUAL VISIT (OUTPATIENT)
Dept: RHEUMATOLOGY | Facility: CLINIC | Age: 66
End: 2021-07-06
Payer: MEDICARE

## 2021-07-06 DIAGNOSIS — M05.79 RHEUMATOID ARTHRITIS INVOLVING MULTIPLE SITES WITH POSITIVE RHEUMATOID FACTOR (H): Primary | Chronic | ICD-10-CM

## 2021-07-06 DIAGNOSIS — Z79.899 HIGH RISK MEDICATION USE: ICD-10-CM

## 2021-07-06 PROCEDURE — 99214 OFFICE O/P EST MOD 30 MIN: CPT | Mod: 95 | Performed by: INTERNAL MEDICINE

## 2021-07-06 RX ORDER — METHOTREXATE 25 MG/ML
25 INJECTION, SOLUTION INTRA-ARTERIAL; INTRAMUSCULAR; INTRAVENOUS
Qty: 8 ML | Refills: 3 | Status: SHIPPED | OUTPATIENT
Start: 2021-07-06 | End: 2021-10-05

## 2021-07-06 RX ORDER — SYRING-NEEDL,DISP,INSUL,0.3 ML 28GX1/2"
SYRINGE, EMPTY DISPOSABLE MISCELLANEOUS
Qty: 13 EACH | Refills: 1 | Status: SHIPPED | OUTPATIENT
Start: 2021-07-06 | End: 2021-10-05

## 2021-07-06 NOTE — PROGRESS NOTES
"Kaylene Diaz  is a 65 year old year old female who is being evaluated via a billable video visit.      What state will you be in during your video visit? [place]  How would you like to obtain your AVS? [place]  If the video visit is dropped, the invitation should be resent by: Text to cell phone: [phone]  Will anyone else be joining your video visit? No    Rheumatology Video Visit      Kaylene Diaz MRN# [id number]   YOB: 1955 Age: 65 year old      Date of visit: 7/06/21   PCP: Dr. Hitesh Washington    Chief Complaint   Patient presents with:  Arthritis: RA    Assessment and Plan     1.  Seropositive rheumatoid arthritis (RF 32, CCP >250): Currently on methotrexate 25 mg once weekly, hydroxychloroquine 400 mg daily.  Previously on sulfasalazine that was discontinued when she found no benefit from it; but she also did not require prednisone while on sulfasalazine; questioning if the \"benefit\" from sulfasalazine was actually the \"benefit\" of not gardening.  Does not tolerate folic acid because of associated increased sunburns.  More active disease when seen last time and this had significantly improved with changing methotrexate from oral to subcutaneous.  Toxicity monitoring labs performed at an outside labs were reviewed in CareEverywhere  - Continue methotrexate 25mg SQ once weekly   - Continue hydroxychloroquine 400 mg daily (last eye exam on 11/2/2020)  - Continue prednisone 5 mg daily as needed for rheumatoid arthritis symptoms; use sparingly (she says that she hasn't used for over 6 months)  - Labs in 3 months: CBC, Creatinine, Hepatic Panel, ESR, CRP (lab orders to be sent to [facility])    High risk medication requiring intensive toxicity monitoring at least quarterly: labs ordered include CBC, Creatinine, Hepatic panel to monitor for cytopenia and hepatotoxicity; checking creatinine as it affects clearance of medication.       2.  Basal cell carcinoma and squamous cell carcinoma: Several " lesions removed by dermatology in the past.  Continue to follow with dermatology.     3.  Chronic lower back pain: Has significantly improved with lidocaine patches that have been used no more than 10 times in the past 1 year.  She has been using her mother's lidocaine patches.  Management per PCP    4.  Right Achilles tendinitis history: Was evaluated by podiatry who diagnosed her with Achilles tendinitis and her symptoms resolved with a boot that she wore at night. Mild recurrence recently so advised that she wear the boot that was previously effective, and do stretching exercises.    5. Bone Health: advised DEXA; she'd like to wait until after COVID19 risk reduced and this is reasonable.     6.  3 weeks of productive cough: Has been seen by providers who gave her an antibiotic that caused a rash under her arms.  She says that she has significant postnasal drip.  [place] trees are blooming now in her yard, per patient.  I suspect that this is allergy related.  She has been using Claritin and Flonase for the past 1 week.  Advised changing Claritin to Zyrtec or Allegra.  Less likely heartburn related but could try omeprazole 20 mg daily, 30 minutes before meal to see if this helps.  She is also going to see her primary care provider tomorrow for this.  No fevers or chills.  No chest pain/pressure or shortness of breath.  Less likely methotrexate related but this is possible; if so then could hold methotrexate to see if there is improvement.    - COVID-19: has received the Pfizer COVID-19 vaccine on 3/5/2021 and 3/26/2021    Total minutes spent in evaluation with patient, documentation, , and review of pertinent studies and chart notes: 26       Ms. Diaz verbalized agreement with and understanding of the rational for the diagnosis and treatment plan.  All questions were answered to best of my ability and the patient's satisfaction. Ms. Diaz was advised to contact the clinic with any questions that may  arise after the clinic visit.      Thank you for involving me in the care of the patient    Return to clinic: 3 months      HPI   Kaylene Diaz is a 65 year old female with a past medical history significant for hyperlipidemia, osteoarthritis, osteopenia, PE (2020) on chronic anticoagulation, meniscal tear, squamous cell carcinoma, basal cell carcinoma, and rheumatoid arthritis who presents for follow-up of rheumatoid arthritis.    Today, 7/6/2021: respiratory issues with increased phlegm and then dripping down her throat associated with coughing; resolves with Mucinex.  Claritin didn't help. No GERD tx.  Duration of 3 weeks.  Abx didn't help, caused a rash. Using flonase daily.  [place] trees blooming in her yard.  She suspects allergies.  No GERD symptoms.  Joints are significantly improved with changing methotrexate from oral to subcutaneous.  Swelling has significantly improved at the MCPs.    Denies fevers, chills, nausea, vomiting, constipation, diarrhea. No abdominal pain. No chest pain/pressure, palpitations, or shortness of breath. No LE swelling. No neck pain. No oral or nasal sores.  No rash. No sicca symptoms.   Trying to lose weight.     Tobacco: Quit in 2000  EtOH: Rare  Drugs: None    ROS   12 point review of system was completed and negative except as noted in the HPI     Active Problem List     Patient Active Problem List   Diagnosis     Carpal tunnel syndrome     Tear meniscus knee     Osteopenia     Advanced directives, counseling/discussion     Hyperlipidemia LDL goal <160     Osteoarthritis     RA (rheumatoid arthritis) (H)     High risk medications (not anticoagulants) long-term use     Kidney stone     ASCUS on Pap smear     Morbid obesity due to excess calories (H)     Small bowel obstruction (H)     Elevated blood pressure reading without diagnosis of hypertension     BLANCA (acute kidney injury) (H)     Elevated glucose     SBO (small bowel obstruction) (H)     Rheumatoid arthritis involving  "multiple sites with positive rheumatoid factor (H)     Past Medical History     Past Medical History:   Diagnosis Date     Basal cell carcinoma      RA (rheumatoid arthritis) (H)      Small bowel obstruction (H)      Squamous cell carcinoma      Past Surgical History     Past Surgical History:   Procedure Laterality Date     ARTHROSCOPY KNEE  12/10/2010    ARTHROSCOPY KNEE performed by NAVID FIERRO at [facility]     COLONOSCOPY N/A 8/9/2018    Procedure: COLONOSCOPY;  colonoscopy;  Surgeon: Luke Kaye MD;  Location: [facility]     SURGICAL HISTORY OF -       IUD removal     SURGICAL HISTORY OF -       Ovarian cyst     SURGICAL HISTORY OF -       thorn removal from foot     SURGICAL HISTORY OF -       knee surgery, scope, right knee     Allergy     Allergies   Allergen Reactions     Folic Acid      Gold Rash     Latex Rash     Not all latex products     Current Medication List     Current Outpatient Medications   Medication Sig     cyclobenzaprine (FLEXERIL) 5 MG tablet Take 1 tablet (5 mg) by mouth nightly as needed for muscle spasms     fluticasone (VERAMYST) 27.5 MCG/SPRAY spray Spray 1 spray into both nostrils 2 times daily     hydroxychloroquine (PLAQUENIL) 200 MG tablet Take 2 tablets (400 mg) by mouth daily     methotrexate 50 MG/2ML injection Inject 1 mL (25 mg) Subcutaneous every 7 days     Multiple Vitamins-Minerals (ADULT GUMMY PO) Take 1 chew tab by mouth daily VitaCrave     Multiple Vitamins-Minerals (HAIR/SKIN/NAILS/BIOTIN PO) Take 1 capsule by mouth daily.      rivaroxaban ANTICOAGULANT (XARELTO ANTICOAGULANT) 20 MG TABS tablet Take 1 tablet (20 mg) by mouth daily (with dinner) Continue with this     vitamin  B complex with vitamin C (VITAMIN  B COMPLEX) TABS Take 1 tablet by mouth daily     Insulin Syringe-Needle U-100 (BD INSULIN SYRINGE) 27G X 1/2\" 1 ML MISC For once weekly methotrexate administration.     predniSONE (DELTASONE) 5 MG tablet For rheumatoid arthritis flare only: 10mg daily [id number], then " "5mg daily [id number], then stop.     No current facility-administered medications for this visit.          Social History   See HPI    Family History     Family History   Problem Relation Age of Onset     Prostate Cancer Father      Eye Disorder Father         mac degen     Heart Disease Father      Macular Degeneration Father      Heart Disease Mother         a-fib     Eye Disorder Mother      C.A.D. Mother      Hypertension Mother      Alcohol/Drug Brother      Alcohol/Drug Sister      Alcohol/Drug Brother      Alcohol/Drug Brother      Alcohol/Drug Brother      Alcohol/Drug Sister      Obesity Sister      Alcohol/Drug Sister      Prostate Cancer Sister      Cancer Other      Melanoma No family hx of        Physical Exam     Temp Readings from Last 3 Encounters:   07/29/20 98  F (36.7  C) (Tympanic)   04/22/20 97.1  F (36.2  C) (Tympanic)   07/23/19 97.9  F (36.6  C) (Tympanic)     BP Readings from Last 5 Encounters:   10/28/20 (!) 161/84   07/29/20 110/70   04/22/20 127/62   03/16/20 (!) 142/78   03/09/20 133/77     Pulse Readings from Last 1 Encounters:   10/28/20 78     Resp Readings from Last 1 Encounters:   07/29/20 17     Estimated body mass index is 49.23 kg/m  as calculated from the following:    Height as of 7/29/20: 1.676 m (5' 6\").    Weight as of 7/29/20: 138.3 kg (305 lb).      GEN: NAD.   HEENT: MMM.  Anicteric, noninjected sclera. No obvious external lesions of the ear and nose. Hearing intact.  PULM: No increased work of breathing  MSK: Mild ulnar deviation at the MCPs; mild swelling at the MCPs but no overlying erythema.  PIPs and DIPs without swelling.  Wrists without swelling.  SKIN: No rash or jaundice seen  PSYCH: Alert. Appropriate.      Labs / Imaging (select studies)     RF/CCP  Recent Labs   Lab Test 08/26/13  1611 07/12/13  1015   CCPABY >250 Strongly Positive*  --    RHF  --  32*     CBC  Recent Labs   Lab Test 04/22/20  1156 10/28/19  1052 08/29/19  0855   WBC 8.2 5.3 5.8   RBC 4.59 4.44 " 4.60   HGB 14.4 13.9 14.2   HCT 44.1 42.1 43.2   MCV 96 95 94   RDW 12.8 14.4 14.0    225 240   MCH 31.4 31.3 30.9   MCHC 32.7 33.0 32.9   NEUTROPHIL 70.6 71.0 73.2   LYMPH 16.3 19.9 15.9   MONOCYTE 11.2 7.2 8.7   EOSINOPHIL 1.5 1.7 1.9   BASOPHIL 0.4 0.2 0.3   ANEU 5.8 3.8 4.3   ALYM 1.3 1.1 0.9   PEEWEE 0.9 0.4 0.5   AEOS 0.1 0.1 0.1   ABAS 0.0 0.0 0.0     CMP  Recent Labs   Lab Test 03/09/21 12/22/20 04/22/20  1156 10/28/19  1052 10/28/19  1052 08/29/19  0855 10/31/17  1110 10/31/17  1110 06/23/17  0817   NA  --   --  138  --   --   --   --  137 139   POTASSIUM  --   --  4.2  --   --   --   --  4.5 4.4   CHLORIDE  --   --  107  --   --   --   --  108 106   CO2  --   --  27  --   --   --   --  24 24   ANIONGAP  --   --  4  --   --   --   --  5 9   GLC  --   --  81  --   --   --   --  84 115*   BUN  --   --  22  --   --   --   --  22 14   CR 1.01 0.98 0.74   < > 0.72 0.75   < > 0.88 0.75   GFRESTIMATED 55* 57* 85   < > 88 84   < > 65 78   GFRESTBLACK >60 >60 >90   < > >90 >90   < > 79 >90   GFR Calc     DEAN  --   --  9.6  --   --   --   --  9.2 9.0   BILITOTAL  --   --  0.4  --  0.5 0.5   < > 0.4 0.4   ALBUMIN  --   --  3.6  --  3.7 3.6   < > 3.7 3.8   PROTTOTAL  --   --  8.1  --  7.6 7.6   < > 8.0 7.5   ALKPHOS  --   --  118  --  109 118   < > 131 120   AST 15 15 13   < > 19 21   < > 17 22   ALT 26 28 22   < > 28 23   < > 24 26    < > = values in this interval not displayed.     Calcium/VitaminD  Recent Labs   Lab Test 04/22/20  1156 10/31/17  1110 06/23/17  0817   DEAN 9.6 9.2 9.0     ESR/CRP  Recent Labs   Lab Test 10/28/19  1052 08/29/19  0855 10/23/18  1000   SED 15 11 14   CRP 5.1 7.3 4.7     Hepatitis B  Recent Labs   Lab Test 09/11/13  1531   HBCAB Negative   HEPBANG Negative     Hepatitis C  Recent Labs   Lab Test 09/11/13  1531   HCVAB Negative       Lyme confirmation testing by Western Blot  Recent Labs   Lab Test 07/12/13  1015   LYWG Negative Reference range: Negative (Note) Band(s)  present: NONE (Insufficient number of bands for positive result) INTERPRETIVE INFORMATION: Borrelia Burgdorferi Ab, IgG Western Blot  For this assay, a positive result is reported when any 5 or more of the following 10 bands are present: 18, 23, 28, 30, 39, 41, 45, 58, 66, or 93 kDa.  All other banding patterns are reported as negative.   LYWM Negative Reference range: Negative (Note) Band(s) present: 41 kDa (Insufficient number of bands for positive result) INTERPRETIVE INFORMATION: Borrelia Burgdorferi Antibody, IgM Western Blot  For this assay, a positive result is reported when any 2 or more of the following bands are present: 23, 39, or 41 kDa. All other banding patterns are reported as negative. Performed by [email or web address], [address] [place] [id number] [phone] [email or web address], Kings Azevedo MD, Lab. Director     Tuberculosis Screening  Recent Labs   Lab Test 09/11/13  1532   TBRSLT Negative   TBAGN 0.00     Immunization History     Immunization History   Administered Date(s) Administered     Pneumo Conj 13-V (2010&after) 12/03/2018     Pneumococcal 23 valent 10/24/2013, 03/09/2020     TD (ADULT, 7+) 10/28/2020     TDAP Vaccine (Adacel) 06/16/2010     Zoster vaccine recombinant adjuvanted (SHINGRIX) 12/03/2018, 06/19/2019          Chart documentation done in part with Dragon Voice recognition Software. Although reviewed after completion, some word and grammatical error may remain.      Video-Visit Details    Type of service:  Video Visit    Video Start Time: 8:48 AM    Video End Time:9:09 AM    Originating Location (pt. Location): [place], [place]    Distant Location (provider location):  Home    Platform used for Video Visit: Miguel Angel Rooney MD

## 2021-07-06 NOTE — Clinical Note
Rheumatology team: Please send a lab orders that were entered today, 7/6/2021, to [facility].     Joseph Rooney MD  7/6/2021

## 2021-07-06 NOTE — PATIENT INSTRUCTIONS
RHEUMATOLOGY    Dr. Joseph Rooney    [facility]  [address] [place] [address]  [place] [address]    Our new phone number for Rheumatology is [phone], this number will be able to help you schedule appointments for Dr. Rooney or if you have any message you would like sent to us.    Thank you for choosing [facility]!    Keke Finnegan CMA Rheumatology    -------------------------------------    Please try a different oral antihistamine: either Zyrtec or Allegra     Continue flonase intranasal spray    Start omeprazole 20mg daily, 30 minutes before a meal    Continue with plan to see your primary care provider for the cough

## 2021-07-07 ENCOUNTER — OFFICE VISIT (OUTPATIENT)
Dept: FAMILY MEDICINE | Facility: CLINIC | Age: 66
End: 2021-07-07
Payer: MEDICARE

## 2021-07-07 VITALS
HEART RATE: 59 BPM | HEIGHT: 66 IN | DIASTOLIC BLOOD PRESSURE: 90 MMHG | TEMPERATURE: 97.3 F | OXYGEN SATURATION: 100 % | SYSTOLIC BLOOD PRESSURE: 138 MMHG | WEIGHT: 293 LBS | BODY MASS INDEX: 47.09 KG/M2

## 2021-07-07 DIAGNOSIS — R05.9 COUGH: ICD-10-CM

## 2021-07-07 DIAGNOSIS — R09.82 POSTNASAL DRIP: Primary | ICD-10-CM

## 2021-07-07 DIAGNOSIS — B37.2 CANDIDAL INTERTRIGO: ICD-10-CM

## 2021-07-07 PROCEDURE — 99214 OFFICE O/P EST MOD 30 MIN: CPT | Performed by: FAMILY MEDICINE

## 2021-07-07 RX ORDER — FLUTICASONE PROPIONATE 50 MCG
1 SPRAY, SUSPENSION (ML) NASAL DAILY
COMMUNITY
End: 2023-01-16

## 2021-07-07 RX ORDER — ALBUTEROL SULFATE 90 UG/1
2 AEROSOL, METERED RESPIRATORY (INHALATION) EVERY 6 HOURS
COMMUNITY
End: 2023-04-21

## 2021-07-07 RX ORDER — BENZONATATE 200 MG/1
200 CAPSULE ORAL 3 TIMES DAILY PRN
Qty: 30 CAPSULE | Refills: 0 | Status: SHIPPED | OUTPATIENT
Start: 2021-07-07 | End: 2021-10-25

## 2021-07-07 RX ORDER — NYSTATIN 100000 U/G
OINTMENT TOPICAL 2 TIMES DAILY
Qty: 30 G | Refills: 1 | Status: SHIPPED | OUTPATIENT
Start: 2021-07-07 | End: 2021-10-25

## 2021-07-07 ASSESSMENT — MIFFLIN-ST. JEOR: SCORE: 2062.69

## 2021-07-07 NOTE — PATIENT INSTRUCTIONS
Continue flonase daily.  Continue loratadine 10 mg daily  May take otc mucinex as you have been as neeed for chest congestion only.    Albuterol inhaler 2 puffs every 4 hrs as needed for wheezing or coughing spells or tightness in breathing while active.    Drink plenty of oral fluids.    See doctor again if with worsening cough or other respiratory symptoms.    No sign of bacterial infection today.    Patient Education     Candida Skin Infection (Adult)   Candida is a type of yeast. It grows naturally on the skin and in the mouth. If it grows out of control, it can cause an infection. Candida can cause infections in the genital area, skin folds, in the mouth, and under the breasts. Anyone can get this infection. It is more common in a person with a weak immune system, such as from diabetes, HIV, or cancer. It s also more common in someone who has been on antibiotic therapy. And it s more common in people who are overweight or who have incontinence. Wearing tight-fitting clothing and taking part in activities with lots of skin-to-skin contact can also put you at risk.  Candida causes the skin to become bright red and inflamed. The border of the infected part of the skin is often raised. The infection causes pain and itching. Sometimes the skin peels and bleeds. In the mouth, candida is called thrush, and may cause white thickened areas.  A Candida rash is most often treated with an antifungal cream, gel, or powder. . The rash will clear a few days after starting the medicine. Infections that don t go away may need a prescription medicine. In rare cases, a bacterial infection can also occur.  Home care  Your healthcare provider will advise using an antifungal cream, powder, or gel for the rash. He or she may also prescribe a medicine for the itch. Follow all instructions for using these medicines.  General care    Keep your skin clean by washing the area twice a day.    Use the medicine as directed until your rash is  gone. Once the skin has healed, keep it dry to prevent another infection.     If you are overweight, talk with your healthcare provider about a plan to lose extra weight.    Don't wear tight-fitting clothes.    Follow-up care  Follow up with your healthcare provider, or as advised. Your rash will clear in 7 to 14 days. Call your provider if the rash is not gone after 14 days.  When to get medical advice  Call your healthcare provider right away if any of these occur:    Pain or redness that gets worse or spreads    Fluid coming from the skin    Yellow crusts on the skin    Fever of 100.4 F (38 C) or higher, or as directed by your provider  [employer]oc[employer] last reviewed this educational content on 10/1/2019    2010-5477 [employer]. All rights reserved. This information is not intended as a substitute for professional medical care. Always follow your healthcare professional's instructions.

## 2021-07-07 NOTE — PROGRESS NOTES
Assessment & Plan     Postnasal drip  Patient was advised on causes, course, treatment and possible complication of condition.  Discussed in detail current guidelines and first line treatment of the condition.  Patient agreed to continue flonase and loratadine.  Observe for triggers and avoid if possible.  Return precautions discussed and given to patient.     Cough  Advised patient no sign of LRTI, bacterial infection nor respiratory compromise.  Discussed safe use of antitussives. Patient preferred to try med below to help reduce coughing spells.  Return precautions discussed and given to patient.   - benzonatate (TESSALON) 200 MG capsule  Dispense: 30 capsule; Refill: 0    Candidal intertrigo  Likely due to recent antibiotics use.  Advised proper use of nystatin topical.  Return precautions discussed and given to patient.   General hygiene advised to patient.  - nystatin (MYCOSTATIN) 090980 UNIT/GM external ointment  Dispense: 30 g; Refill: 1    Patient Instructions   Continue flonase daily.  Continue loratadine 10 mg daily  May take otc mucinex as you have been as neeed for chest congestion only.    Albuterol inhaler 2 puffs every 4 hrs as needed for wheezing or coughing spells or tightness in breathing while active.    Drink plenty of oral fluids.    See doctor again if with worsening cough or other respiratory symptoms.    No sign of bacterial infection today.    Patient Education     Candida Skin Infection (Adult)   Candida is a type of yeast. It grows naturally on the skin and in the mouth. If it grows out of control, it can cause an infection. Candida can cause infections in the genital area, skin folds, in the mouth, and under the breasts. Anyone can get this infection. It is more common in a person with a weak immune system, such as from diabetes, HIV, or cancer. It s also more common in someone who has been on antibiotic therapy. And it s more common in people who are overweight or who have  incontinence. Wearing tight-fitting clothing and taking part in activities with lots of skin-to-skin contact can also put you at risk.  Candida causes the skin to become bright red and inflamed. The border of the infected part of the skin is often raised. The infection causes pain and itching. Sometimes the skin peels and bleeds. In the mouth, candida is called thrush, and may cause white thickened areas.  A Candida rash is most often treated with an antifungal cream, gel, or powder. . The rash will clear a few days after starting the medicine. Infections that don t go away may need a prescription medicine. In rare cases, a bacterial infection can also occur.  Home care  Your healthcare provider will advise using an antifungal cream, powder, or gel for the rash. He or she may also prescribe a medicine for the itch. Follow all instructions for using these medicines.  General care    Keep your skin clean by washing the area twice a day.    Use the medicine as directed until your rash is gone. Once the skin has healed, keep it dry to prevent another infection.     If you are overweight, talk with your healthcare provider about a plan to lose extra weight.    Don't wear tight-fitting clothes.    Follow-up care  Follow up with your healthcare provider, or as advised. Your rash will clear in 7 to 14 days. Call your provider if the rash is not gone after 14 days.  When to get medical advice  Call your healthcare provider right away if any of these occur:    Pain or redness that gets worse or spreads    Fluid coming from the skin    Yellow crusts on the skin    Fever of 100.4 F (38 C) or higher, or as directed by your provider  [employer] last reviewed this educational content on 10/1/2019    8610-0148 [employer]. All rights reserved. This information is not intended as a substitute for professional medical care. Always follow your healthcare professional's instructions.               No follow-ups on  "file.    Jamshid Can MD  [facility]    Fletcher FLORES is a 65 year old who presents for the following health issues:    HPI     ED/UC Followup:    Facility:  [facility] # [phone] - or [phone]  Date of visit: 06/23/21  Reason for visit: cough  Current Status: not feeling any better     Started out feeling like seasonal allergies that got wors.  Got treated twice with antibiotics with no relief yet.    Currently, still experiences recurrent \"gagging\" due to clear phlegm that she coughs out. Feels mild sore throat.    Taking loratadine, flonase, prn albuterol inhaler, and otc guaifenasin.    Denies fever, dyspnea at rest or ear pain.  Reports could be shrot of breath with moderate exertion.    No dx of asthma.  Former smoker - quit in 2000.  No dx of COPD.    Has RA - not on chronic prednisone; on methotrexate and hydroxychloroquine.      Patient Active Problem List   Diagnosis     Carpal tunnel syndrome     Tear meniscus knee     Osteopenia     Advanced directives, counseling/discussion     Hyperlipidemia LDL goal <160     Osteoarthritis     RA (rheumatoid arthritis) (H)     High risk medications (not anticoagulants) long-term use     Kidney stone     ASCUS on Pap smear     Morbid obesity due to excess calories (H)     Small bowel obstruction (H)     Elevated blood pressure reading without diagnosis of hypertension     BLANCA (acute kidney injury) (H)     Elevated glucose     SBO (small bowel obstruction) (H)     Rheumatoid arthritis involving multiple sites with positive rheumatoid factor (H)     Past Surgical History:   Procedure Laterality Date     ARTHROSCOPY KNEE  12/10/2010    ARTHROSCOPY KNEE performed by NAVID FIERRO at [facility]     COLONOSCOPY N/A 8/9/2018    Procedure: COLONOSCOPY;  colonoscopy;  Surgeon: Luke Kaye MD;  Location: [facility]     SURGICAL HISTORY OF -       IUD removal     SURGICAL HISTORY OF -       Ovarian cyst     SURGICAL HISTORY OF -       " "thorn removal from foot     SURGICAL HISTORY OF -       knee surgery, scope, right knee       Social History     Tobacco Use     Smoking status: Former Smoker     Years: 30.00     Quit date: 2000     Years since quittin.2     Smokeless tobacco: Never Used   Substance Use Topics     Alcohol use: Yes     Comment: VERY RARE     Family History   Problem Relation Age of Onset     Prostate Cancer Father      Eye Disorder Father         mac degen     Heart Disease Father      Macular Degeneration Father      Heart Disease Mother         a-fib     Eye Disorder Mother      C.A.D. Mother      Hypertension Mother      Alcohol/Drug Brother      Alcohol/Drug Sister      Alcohol/Drug Brother      Alcohol/Drug Brother      Alcohol/Drug Brother      Alcohol/Drug Sister      Obesity Sister      Alcohol/Drug Sister      Prostate Cancer Sister      Cancer Other      Melanoma No family hx of          Current Outpatient Medications   Medication Sig Dispense Refill     albuterol (PROAIR HFA/PROVENTIL HFA/VENTOLIN HFA) 108 (90 Base) MCG/ACT inhaler Inhale 2 puffs into the lungs every 6 hours       benzonatate (TESSALON) 200 MG capsule Take 1 capsule (200 mg) by mouth 3 times daily as needed for cough 30 capsule 0     cyclobenzaprine (FLEXERIL) 5 MG tablet Take 1 tablet (5 mg) by mouth nightly as needed for muscle spasms 30 tablet 2     fluticasone (FLONASE) 50 MCG/ACT nasal spray Spray 1 spray into both nostrils daily       hydroxychloroquine (PLAQUENIL) 200 MG tablet Take 2 tablets (400 mg) by mouth daily 180 tablet 2     Insulin Syringe-Needle U-100 (BD INSULIN SYRINGE) 27G X 1/2\" 1 ML MISC For once weekly methotrexate administration. 13 each 1     methotrexate 50 MG/2ML injection Inject 1 mL (25 mg) Subcutaneous every 7 days 8 mL 3     Multiple Vitamins-Minerals (ADULT GUMMY PO) Take 1 chew tab by mouth daily VitaCrave       Multiple Vitamins-Minerals (HAIR/SKIN/NAILS/BIOTIN PO) Take 1 capsule by mouth daily.  100 tablet 3 " "    nystatin (MYCOSTATIN) 198412 UNIT/GM external ointment Apply topically 2 times daily 30 g 1     predniSONE (DELTASONE) 5 MG tablet For rheumatoid arthritis flare only: 10mg daily [id number], then 5mg daily [id number], then stop. 42 tablet 1     rivaroxaban ANTICOAGULANT (XARELTO ANTICOAGULANT) 20 MG TABS tablet Take 1 tablet (20 mg) by mouth daily (with dinner) Continue with this 90 tablet 0     vitamin  B complex with vitamin C (VITAMIN  B COMPLEX) TABS Take 1 tablet by mouth daily  0     Allergies   Allergen Reactions     Folic Acid      Gold Rash     Latex Rash     Not all latex products       Review of Systems   C: NEGATIVE for fever, chills, change in weight  I: NEGATIVE for worrisome rashes, moles or lesions  E: NEGATIVE for vision changes or irritation  ENT/MOUTH: see above  RESP:as above  CV: NEGATIVE for chest pain, palpitations or peripheral edema  GI: NEGATIVE for nausea, abdominal pain, heartburn, or change in bowel habits  M: NEGATIVE for significant arthralgias or myalgia      Objective    BP (!) 138/90 (BP Location: Right arm, Patient Position: Sitting, Cuff Size: Adult Large)   Pulse 59   Temp 97.3  F (36.3  C) (Tympanic)   Ht 1.676 m (5' 6\")   Wt (!) 150.6 kg (332 lb)   SpO2 100%   Breastfeeding No   BMI 53.59 kg/m    Body mass index is 53.59 kg/m .  Physical Exam   GENERAL: morbidly obese, alert and no distress  EYES: pink conjunctivae, no icterus  NECK: mild cervical adenopathy, nontender  HEENT: tympanic membrane intact and pearly bilaterally, nose with mild congestion, no sinus tenderness, throat mildly erythematous, no exudates, no oral ulcers  RESP: lungs clear to auscultation - no rales, no rhonchi, no wheezes  CV: regular rates and rhythm, normal S1 S2, no S3 or S4 and no murmur  SKIN:  Good turgor; patchy erythematous plaques and coalescent papules on intertriginal areas under breasts              "

## 2021-07-29 ENCOUNTER — PATIENT OUTREACH (OUTPATIENT)
Dept: ONCOLOGY | Facility: CLINIC | Age: 66
End: 2021-07-29

## 2021-07-29 DIAGNOSIS — I26.99 OTHER ACUTE PULMONARY EMBOLISM, UNSPECIFIED WHETHER ACUTE COR PULMONALE PRESENT (H): ICD-10-CM

## 2021-07-29 NOTE — PROGRESS NOTES
Received refill request for patients xarelto. Patient is no longer seeing Dr. Estrella in follow up and this will need to be filled by patients PCP for long term management.. Called [facility] and they will forward this on to Dr. Washington. Yenifer Urbina RN on 7/29/2021 at 12:43 PM

## 2021-07-30 NOTE — TELEPHONE ENCOUNTER
Requested Prescriptions   Pending Prescriptions Disp Refills     rivaroxaban ANTICOAGULANT (XARELTO ANTICOAGULANT) 20 MG TABS tablet 90 tablet 0     Sig: Take 1 tablet (20 mg) by mouth daily (with dinner) Continue with this       Direct Oral Anticoagulant Agents Failed - 7/29/2021  9:44 AM        Failed - Normal Platelets on file in past 12 months     Recent Labs   Lab Test 04/22/20  1156                  Failed - Creatinine Clearance greater than 50 ml/min on file in past 3 mos     No lab results found.          Failed - Serum creatinine less than or equal to 1.4 on file in past 3 mos     Recent Labs   Lab Test 03/09/21  0000   CR 1.01       Ok to refill medication if creatinine is low          Passed - Medication is active on med list        Passed - Patient is 18 years of age or older        Passed - No active pregnancy on record        Passed - No positive pregnancy test within past 12 months        Passed - Recent (6 mo) or future (30 days) visit within the authorizing provider's specialty

## 2021-09-22 ENCOUNTER — TRANSFERRED RECORDS (OUTPATIENT)
Dept: HEALTH INFORMATION MANAGEMENT | Facility: CLINIC | Age: 66
End: 2021-09-22

## 2021-09-22 LAB
ALT SERPL-CCNC: 25 LU/L (ref 12–65)
AST SERPL-CCNC: 17 LU/L (ref 15–37)
CREATININE (EXTERNAL): 1.07 MG/DL (ref 0.6–1.3)
GFR ESTIMATED (EXTERNAL): 51 ML/MIN/1.73M2
GFR ESTIMATED (IF AFRICAN AMERICAN) (EXTERNAL): >60 ML/MIN/1.73M2

## 2021-09-25 ENCOUNTER — HEALTH MAINTENANCE LETTER (OUTPATIENT)
Age: 66
End: 2021-09-25

## 2021-10-04 NOTE — PATIENT INSTRUCTIONS
RHEUMATOLOGY    Dr. Joseph Rooney    [facility]  [address] [place] [address] [place] [address]  Phone number: [phone]  Fax number: [phone]    Thank you for choosing [facility]!    Keke Finnegan CMA Rheumatology    -----------------------------------------    A single additional dose of the Pfizer or Moderna COVID-19 vaccine is recommended at least 28 days after the completion of the 2-dose mRNA vaccine series for patients receiving any immunosuppressive or immunomodulatory therapy. Attempts should be made to match the additional mRNA dose type to the type given in the mRNA primary series; however, if that is not feasible, a booster dose with the alternative mRNA vaccine is permitted.    Based on our current understanding (and this may change over time as we learn more), medications should be adjusted as noted below, if disease activity allows:  - NSAIDs and Acetaminophen: hold for 24 hours prior to vaccination if able to do so  - Methotrexate should be held for 2 weeks after the mRNA COVID-19 booster vaccine

## 2021-10-04 NOTE — PROGRESS NOTES
"Kaylene Diaz  is a 65 year old year old female who is being evaluated via a billable video visit.      How would you like to obtain your AVS? MyChart  If the video visit is dropped, the invitation should be resent by: Text to cell phone: [phone]  Will anyone else be joining your video visit? No    Rheumatology Video Visit      Kaylene Diaz MRN# [id number]   YOB: 1955 Age: 65 year old      Date of visit: 10/05/21   PCP: Dr. Hitesh Washington    Chief Complaint   Patient presents with:  Rheumatoid Arthritis    Assessment and Plan     1.  Seropositive rheumatoid arthritis (RF 32, CCP >250): Currently on methotrexate 25 mg once weekly, hydroxychloroquine 400 mg daily.  Previously on sulfasalazine that was discontinued when she found no benefit from it; but she also did not require prednisone while on sulfasalazine; questioning if the \"benefit\" from sulfasalazine was actually the \"benefit\" of not gardening.  Does not tolerate folic acid because of associated increased sunburns.  More active disease when seen last time and this had significantly improved with changing methotrexate from oral to subcutaneous.  9/20/2021 toxicity monitoring labs performed at an outside labs were reviewed in [facility].  Chronic illness, stable.    - Continue methotrexate 25mg SQ once weekly   - Continue hydroxychloroquine 400 mg daily (last eye exam on 11/2/2020)  - Continue prednisone 5 mg daily as needed for rheumatoid arthritis symptoms; use sparingly (reportedly hasn't used for over 9 months)  - Labs in 3 months: CBC, Creatinine, Hepatic Panel, ESR, CRP (lab orders to be faxed to [facility] in [place])    High risk medication requiring intensive toxicity monitoring at least quarterly: labs ordered include CBC, Creatinine, Hepatic panel to monitor for cytopenia and hepatotoxicity; checking creatinine as it affects clearance of medication.       2.  Basal cell carcinoma and squamous cell carcinoma: Several lesions removed by " dermatology in the past.  Continue to follow with dermatology.     3.  Chronic lower back pain: Has significantly improved with lidocaine patches that have been used no more than 10 times in the past 1 year.  She has been using her mother's lidocaine patches.  Management per PCP    4.  Right Achilles tendinitis history: Was evaluated by podiatry who diagnosed her with Achilles tendinitis and her symptoms resolved with a boot that she wore at night.  Not an issue today.    5. Bone Health: advised DEXA; she'd like to wait until after COVID19 risk reduced and this is reasonable.  This was not discussed again today    6.  3 weeks of productive cough: Has been seen by providers who gave her an antibiotic that caused a rash under her arms.  She says that she has significant postnasal drip.   She has tried Flonase and oral antihistamines without improvement.  I previously and again advised omeprazole 20 mg daily, 30 minutes before a meal to see if this helps.  She is first going to see ENT for her chronic productive cough.  No shortness of breath.  No chest pain.      7.  Muscle spasms: Primarily affecting the lower back.  Improved from infrequent cyclobenzaprine 5 mg nightly as needed.  Using infrequently     - COVID-19: has received the Pfizer COVID-19 vaccine on 3/5/2021 and 3/26/2021    A single additional dose of the Pfizer or Moderna COVID-19 vaccine is recommended at least 28 days after the completion of the 2-dose mRNA vaccine series for patients receiving any immunosuppressive or immunomodulatory therapy. Attempts should be made to match the additional mRNA dose type to the type given in the mRNA primary series; however, if that is not feasible, a booster dose with the alternative mRNA vaccine is permitted.    Based on our current understanding (and this may change over time as we learn more), medications should be adjusted as noted below, if disease activity allows:  - NSAIDs and Acetaminophen: hold for 24 hours  prior to vaccination if able to do so  - Methotrexate should be held for 2 weeks after the mRNA COVID-19 booster vaccine     Total minutes spent in evaluation with patient, documentation, , and review of pertinent studies and chart notes: 20     Ms. Diaz verbalized agreement with and understanding of the rational for the diagnosis and treatment plan.  All questions were answered to best of my ability and the patient's satisfaction. Ms. Diaz was advised to contact the clinic with any questions that may arise after the clinic visit.      Thank you for involving me in the care of the patient    Return to clinic: 3-4 months      HPI   Kaylene Diaz is a 65 year old female with a past medical history significant for hyperlipidemia, osteoarthritis, osteopenia, PE (2020) on chronic anticoagulation, meniscal tear, squamous cell carcinoma, basal cell carcinoma, and rheumatoid arthritis who presents for follow-up of rheumatoid arthritis.    Today, 10/5/2021: She reports that RA is controlled.  Tolerating medications.  Morning stiffness for about 30 minutes to the point that she does not want to do anything for this 30 minutes, but she says that that is tolerable and she is able to do most of her daily activities.  Still working in the garden for hours each day, but not as much as she would like to.  Productive cough has persisted; she has tried Flonase and oral antihistamines without benefit.  She is going to see ENT later this month.  She has not tried a PPI.  Denies GERD symptoms.    Denies fevers, chills, nausea, vomiting, constipation, diarrhea. No abdominal pain. No chest pain/pressure, palpitations, or shortness of breath.  Cough as noted above.  No LE swelling. No neck pain. No oral or nasal sores.  No rash. No sicca symptoms.   Trying to lose weight.     Tobacco: Quit in 2000  EtOH: Rare  Drugs: None    ROS   12 point review of system was completed and negative except as noted in the HPI     Active Problem  List     Patient Active Problem List   Diagnosis     Carpal tunnel syndrome     Tear meniscus knee     Osteopenia     Advanced directives, counseling/discussion     Hyperlipidemia LDL goal <160     Osteoarthritis     RA (rheumatoid arthritis) (H)     High risk medications (not anticoagulants) long-term use     Kidney stone     ASCUS on Pap smear     Morbid obesity due to excess calories (H)     Small bowel obstruction (H)     Elevated blood pressure reading without diagnosis of hypertension     BLANCA (acute kidney injury) (H)     Elevated glucose     SBO (small bowel obstruction) (H)     Rheumatoid arthritis involving multiple sites with positive rheumatoid factor (H)     Past Medical History     Past Medical History:   Diagnosis Date     Basal cell carcinoma      RA (rheumatoid arthritis) (H)      Small bowel obstruction (H)      Squamous cell carcinoma      Past Surgical History     Past Surgical History:   Procedure Laterality Date     ARTHROSCOPY KNEE  12/10/2010    ARTHROSCOPY KNEE performed by NAVID FIERRO at [facility]     COLONOSCOPY N/A 8/9/2018    Procedure: COLONOSCOPY;  colonoscopy;  Surgeon: Luke Kaye MD;  Location: [facility]     SURGICAL HISTORY OF -       IUD removal     SURGICAL HISTORY OF -       Ovarian cyst     SURGICAL HISTORY OF -       thorn removal from foot     SURGICAL HISTORY OF -       knee surgery, scope, right knee     Allergy     Allergies   Allergen Reactions     Folic Acid      Gold Rash     Latex Rash     Not all latex products     Current Medication List     Current Outpatient Medications   Medication Sig     albuterol (PROAIR HFA/PROVENTIL HFA/VENTOLIN HFA) 108 (90 Base) MCG/ACT inhaler Inhale 2 puffs into the lungs every 6 hours     cyclobenzaprine (FLEXERIL) 5 MG tablet Take 1 tablet (5 mg) by mouth nightly as needed for muscle spasms     fluticasone (FLONASE) 50 MCG/ACT nasal spray Spray 1 spray into both nostrils daily     hydroxychloroquine (PLAQUENIL) 200 MG tablet Take 2  "tablets (400 mg) by mouth daily     Insulin Syringe-Needle U-100 (BD INSULIN SYRINGE) 27G X 1/2\" 1 ML MISC For once weekly methotrexate administration.     methotrexate 50 MG/2ML injection Inject 1 mL (25 mg) Subcutaneous every 7 days     Multiple Vitamins-Minerals (ADULT GUMMY PO) Take 1 chew tab by mouth daily VitaCrave     Multiple Vitamins-Minerals (HAIR/SKIN/NAILS/BIOTIN PO) Take 1 capsule by mouth daily.      predniSONE (DELTASONE) 5 MG tablet For rheumatoid arthritis flare only: 10mg daily [id number], then 5mg daily [id number], then stop.     rivaroxaban ANTICOAGULANT (XARELTO ANTICOAGULANT) 20 MG TABS tablet Take 1 tablet (20 mg) by mouth daily (with dinner) Continue with this     vitamin  B complex with vitamin C (VITAMIN  B COMPLEX) TABS Take 1 tablet by mouth daily     benzonatate (TESSALON) 200 MG capsule Take 1 capsule (200 mg) by mouth 3 times daily as needed for cough     nystatin (MYCOSTATIN) 641103 UNIT/GM external ointment Apply topically 2 times daily     No current facility-administered medications for this visit.         Social History   See HPI    Family History     Family History   Problem Relation Age of Onset     Prostate Cancer Father      Eye Disorder Father         mac degen     Heart Disease Father      Macular Degeneration Father      Heart Disease Mother         a-fib     Eye Disorder Mother      C.A.D. Mother      Hypertension Mother      Alcohol/Drug Brother      Alcohol/Drug Sister      Alcohol/Drug Brother      Alcohol/Drug Brother      Alcohol/Drug Brother      Alcohol/Drug Sister      Obesity Sister      Alcohol/Drug Sister      Prostate Cancer Sister      Cancer Other      Melanoma No family hx of        Physical Exam     Temp Readings from Last 3 Encounters:   07/07/21 97.3  F (36.3  C) (Tympanic)   07/29/20 98  F (36.7  C) (Tympanic)   04/22/20 97.1  F (36.2  C) (Tympanic)     BP Readings from Last 5 Encounters:   07/07/21 (!) 138/90   10/28/20 (!) 161/84   07/29/20 110/70 " "  04/22/20 127/62   03/16/20 (!) 142/78     Pulse Readings from Last 1 Encounters:   07/07/21 59     Resp Readings from Last 1 Encounters:   07/29/20 17     Estimated body mass index is 53.59 kg/m  as calculated from the following:    Height as of 7/7/21: 1.676 m (5' 6\").    Weight as of 7/7/21: 150.6 kg (332 lb).      GEN: NAD.   HEENT: MMM.  Anicteric, noninjected sclera. No obvious external lesions of the ear and nose. Hearing intact.  PULM: No increased work of breathing  MSK: Mild ulnar deviation at the MCPs; mild chronic swelling at the MCPs that appears unchanged; no overlying erythema.  PIPs and DIPs without swelling.  Wrists without swelling.  SKIN: No rash or jaundice seen  PSYCH: Alert. Appropriate.      Labs / Imaging (select studies)     RF/CCP  Recent Labs   Lab Test 08/26/13  1611   CCPABY >250 Strongly Positive*     CBC  Recent Labs   Lab Test 04/22/20  1156 10/28/19  1052 08/29/19  0855   WBC 8.2 5.3 5.8   RBC 4.59 4.44 4.60   HGB 14.4 13.9 14.2   HCT 44.1 42.1 43.2   MCV 96 95 94   RDW 12.8 14.4 14.0    225 240   MCH 31.4 31.3 30.9   MCHC 32.7 33.0 32.9   NEUTROPHIL 70.6 71.0 73.2   LYMPH 16.3 19.9 15.9   MONOCYTE 11.2 7.2 8.7   EOSINOPHIL 1.5 1.7 1.9   BASOPHIL 0.4 0.2 0.3   ANEU 5.8 3.8 4.3   ALYM 1.3 1.1 0.9   PEEWEE 0.9 0.4 0.5   AEOS 0.1 0.1 0.1   ABAS 0.0 0.0 0.0     CMP  Recent Labs   Lab Test 03/09/21  0000 12/22/20  0000 04/22/20  1156 03/05/20  0000 10/28/19  1052 08/29/19  0855 08/29/19  0855 03/26/18  1034 10/31/17  1110 06/23/17 0817 06/23/17 0817   NA  --   --  138  --   --   --   --   --  137  --  139   POTASSIUM  --   --  4.2  --   --   --   --   --  4.5  --  4.4   CHLORIDE  --   --  107  --   --   --   --   --  108  --  106   CO2  --   --  27  --   --   --   --   --  24  --  24   ANIONGAP  --   --  4  --   --   --   --   --  5  --  9   GLC  --   --  81  --   --   --   --   --  84  --  115*   BUN  --   --  22  --   --   --   --   --  22  --  14   CR 1.01 0.98 0.74   < > 0.72  "  < > 0.75   < > 0.88   < > 0.75   GFRESTIMATED 55* 57* 85   < > 88   < > 84   < > 65   < > 78   GFRESTBLACK >60 >60 >90   < > >90   < > >90   < > 79   < > >90   GFR Calc     DEAN  --   --  9.6  --   --   --   --   --  9.2  --  9.0   BILITOTAL  --   --  0.4  --  0.5  --  0.5   < > 0.4   < > 0.4   ALBUMIN  --   --  3.6  --  3.7  --  3.6   < > 3.7   < > 3.8   PROTTOTAL  --   --  8.1  --  7.6  --  7.6   < > 8.0   < > 7.5   ALKPHOS  --   --  118  --  109  --  118   < > 131   < > 120   AST 15 15 13   < > 19   < > 21   < > 17   < > 22   ALT 26 28 22   < > 28   < > 23   < > 24   < > 26    < > = values in this interval not displayed.     Calcium/VitaminD  Recent Labs   Lab Test 04/22/20  1156 10/31/17  1110 06/23/17  0817   DEAN 9.6 9.2 9.0     ESR/CRP  Recent Labs   Lab Test 10/28/19  1052 08/29/19  0855 10/23/18  1000   SED 15 11 14   CRP 5.1 7.3 4.7     Hepatitis B  Recent Labs   Lab Test 09/11/13  1531   HBCAB Negative   HEPBANG Negative     Hepatitis C  Recent Labs   Lab Test 09/11/13  1531   HCVAB Negative     Tuberculosis Screening  Recent Labs   Lab Test 09/11/13  1532   TBRSLT Negative   TBAGN 0.00     Immunization History     Immunization History   Administered Date(s) Administered     COVID-19,PF,Pfizer 03/05/2021, 03/26/2021     Pneumo Conj 13-V (2010&after) 12/03/2018     Pneumococcal 23 valent 10/24/2013, 03/09/2020     TD (ADULT, 7+) 10/28/2020     TDAP Vaccine (Adacel) 06/16/2010     Zoster vaccine recombinant adjuvanted (SHINGRIX) 12/03/2018, 06/19/2019          Chart documentation done in part with Dragon Voice recognition Software. Although reviewed after completion, some word and grammatical error may remain.      Video-Visit Details    Type of service:  Video Visit    Video Start Time: 9:15 AM    Video End Time:9:27 AM    Originating Location (pt. Location): [place], [place]    Distant Location (provider location):  Home    Platform used for Video Visit: Miguel Angel Rooney MD

## 2021-10-05 ENCOUNTER — VIRTUAL VISIT (OUTPATIENT)
Dept: RHEUMATOLOGY | Facility: CLINIC | Age: 66
End: 2021-10-05
Payer: MEDICARE

## 2021-10-05 ENCOUNTER — OFFICE VISIT (OUTPATIENT)
Dept: DERMATOLOGY | Facility: CLINIC | Age: 66
End: 2021-10-05
Payer: MEDICARE

## 2021-10-05 VITALS — BODY MASS INDEX: 45.99 KG/M2 | HEIGHT: 67 IN | WEIGHT: 293 LBS | HEART RATE: 91 BPM

## 2021-10-05 DIAGNOSIS — D18.01 ANGIOMA OF SKIN: ICD-10-CM

## 2021-10-05 DIAGNOSIS — Z79.899 HIGH RISK MEDICATION USE: ICD-10-CM

## 2021-10-05 DIAGNOSIS — L81.4 LENTIGO: ICD-10-CM

## 2021-10-05 DIAGNOSIS — M62.838 MUSCLE SPASM: ICD-10-CM

## 2021-10-05 DIAGNOSIS — L82.1 SEBORRHEIC KERATOSIS: ICD-10-CM

## 2021-10-05 DIAGNOSIS — C44.1121 BASAL CELL CARCINOMA (BCC) OF RIGHT UPPER EYELID: ICD-10-CM

## 2021-10-05 DIAGNOSIS — D23.9 DERMAL NEVUS: ICD-10-CM

## 2021-10-05 DIAGNOSIS — Z85.828 HISTORY OF SKIN CANCER: Primary | ICD-10-CM

## 2021-10-05 DIAGNOSIS — M05.79 RHEUMATOID ARTHRITIS INVOLVING MULTIPLE SITES WITH POSITIVE RHEUMATOID FACTOR (H): Primary | Chronic | ICD-10-CM

## 2021-10-05 PROCEDURE — 99214 OFFICE O/P EST MOD 30 MIN: CPT | Mod: 95 | Performed by: INTERNAL MEDICINE

## 2021-10-05 PROCEDURE — 88331 PATH CONSLTJ SURG 1 BLK 1SPC: CPT | Performed by: DERMATOLOGY

## 2021-10-05 PROCEDURE — 99213 OFFICE O/P EST LOW 20 MIN: CPT | Mod: 25 | Performed by: DERMATOLOGY

## 2021-10-05 PROCEDURE — 11102 TANGNTL BX SKIN SINGLE LES: CPT | Performed by: DERMATOLOGY

## 2021-10-05 RX ORDER — CYCLOBENZAPRINE HCL 5 MG
5 TABLET ORAL
Qty: 30 TABLET | Refills: 2 | Status: SHIPPED | OUTPATIENT
Start: 2021-10-05 | End: 2022-01-18

## 2021-10-05 RX ORDER — SYRING-NEEDL,DISP,INSUL,0.3 ML 28GX1/2"
SYRINGE, EMPTY DISPOSABLE MISCELLANEOUS
Qty: 50 EACH | Refills: 1 | Status: SHIPPED | OUTPATIENT
Start: 2021-10-05 | End: 2021-12-20

## 2021-10-05 RX ORDER — HYDROXYCHLOROQUINE SULFATE 200 MG/1
400 TABLET, FILM COATED ORAL DAILY
Qty: 180 TABLET | Refills: 2 | Status: SHIPPED | OUTPATIENT
Start: 2021-10-05 | End: 2022-09-06

## 2021-10-05 RX ORDER — METHOTREXATE 25 MG/ML
25 INJECTION, SOLUTION INTRA-ARTERIAL; INTRAMUSCULAR; INTRAVENOUS
Qty: 8 ML | Refills: 3 | Status: SHIPPED | OUTPATIENT
Start: 2021-10-05 | End: 2022-01-18

## 2021-10-05 ASSESSMENT — MIFFLIN-ST. JEOR: SCORE: 2078.57

## 2021-10-05 NOTE — LETTER
10/5/2021         RE: Kaylene Diaz  [address] [place] [id number]        Dear Colleague,    Thank you for referring your patient, Kaylene Diaz, to the [facility]. Please see a copy of my visit note below.    Kaylene Diaz is an extremely pleasant 65 year old year old female patient here today for spot on right upper lid.   .   Patient states this has been present for a while.  Patient reports the following symptoms:  Not healing.  Patient reports the following previous treatments none.  These treatments did not work.  Patient reports the following modifying factors none.  Associated symptoms: none.  Patient has no other skin complaints today.  Remainder of the HPI, Meds, PMH, Allergies, FH, and SH was reviewed in chart.      Past Medical History:   Diagnosis Date     Basal cell carcinoma      RA (rheumatoid arthritis) (H)      Small bowel obstruction (H)      Squamous cell carcinoma        Past Surgical History:   Procedure Laterality Date     ARTHROSCOPY KNEE  12/10/2010    ARTHROSCOPY KNEE performed by NAVID FIERRO at [facility]     COLONOSCOPY N/A 8/9/2018    Procedure: COLONOSCOPY;  colonoscopy;  Surgeon: Luke Kaye MD;  Location: [facility]     SURGICAL HISTORY OF -       IUD removal     SURGICAL HISTORY OF -       Ovarian cyst     SURGICAL HISTORY OF -       thorn removal from foot     SURGICAL HISTORY OF -       knee surgery, scope, right knee        Family History   Problem Relation Age of Onset     Prostate Cancer Father      Eye Disorder Father         mac degen     Heart Disease Father      Macular Degeneration Father      Heart Disease Mother         a-fib     Eye Disorder Mother      C.A.D. Mother      Hypertension Mother      Alcohol/Drug Brother      Alcohol/Drug Sister      Alcohol/Drug Brother      Alcohol/Drug Brother      Alcohol/Drug Brother      Alcohol/Drug Sister      Obesity Sister      Alcohol/Drug Sister      Prostate Cancer Sister      Cancer Other       Melanoma No family hx of        Social History     Socioeconomic History     Marital status:      Spouse name: Not on file     Number of children: Not on file     Years of education: Not on file     Highest education level: Not on file   Occupational History     Employer: UNEMPLOYED   Tobacco Use     Smoking status: Former Smoker     Years: 30.00     Quit date: 2000     Years since quittin.4     Smokeless tobacco: Never Used   Substance and Sexual Activity     Alcohol use: Yes     Comment: VERY RARE     Drug use: No     Sexual activity: Yes     Partners: Male   Other Topics Concern     Parent/sibling w/ CABG, MI or angioplasty before 65F 55M? No   Social History Narrative     Not on file     Social Determinants of Health     Financial Resource Strain:      Difficulty of Paying Living Expenses:    Food Insecurity:      Worried About Running Out of Food in the Last Year:      Ran Out of Food in the Last Year:    Transportation Needs:      Lack of Transportation (Medical):      Lack of Transportation (Non-Medical):    Physical Activity:      Days of Exercise per Week:      Minutes of Exercise per Session:    Stress:      Feeling of Stress :    Social Connections:      Frequency of Communication with Friends and Family:      Frequency of Social Gatherings with Friends and Family:      Attends Sabianist Services:      Active Member of Clubs or Organizations:      Attends Club or Organization Meetings:      Marital Status:    Intimate Partner Violence:      Fear of Current or Ex-Partner:      Emotionally Abused:      Physically Abused:      Sexually Abused:        Outpatient Encounter Medications as of 10/5/2021   Medication Sig Dispense Refill     albuterol (PROAIR HFA/PROVENTIL HFA/VENTOLIN HFA) 108 (90 Base) MCG/ACT inhaler Inhale 2 puffs into the lungs every 6 hours       benzonatate (TESSALON) 200 MG capsule Take 1 capsule (200 mg) by mouth 3 times daily as needed for cough 30 capsule 0      "cyclobenzaprine (FLEXERIL) 5 MG tablet Take 1 tablet (5 mg) by mouth nightly as needed for muscle spasms 30 tablet 2     fluticasone (FLONASE) 50 MCG/ACT nasal spray Spray 1 spray into both nostrils daily       hydroxychloroquine (PLAQUENIL) 200 MG tablet Take 2 tablets (400 mg) by mouth daily 180 tablet 2     Insulin Syringe-Needle U-100 (BD INSULIN SYRINGE) 27G X 1/2\" 1 ML MISC For once weekly methotrexate administration. 50 each 1     methotrexate 50 MG/2ML injection Inject 1 mL (25 mg) Subcutaneous every 7 days 8 mL 3     Multiple Vitamins-Minerals (ADULT GUMMY PO) Take 1 chew tab by mouth daily VitaCrave       Multiple Vitamins-Minerals (HAIR/SKIN/NAILS/BIOTIN PO) Take 1 capsule by mouth daily.  100 tablet 3     nystatin (MYCOSTATIN) 314975 UNIT/GM external ointment Apply topically 2 times daily 30 g 1     rivaroxaban ANTICOAGULANT (XARELTO ANTICOAGULANT) 20 MG TABS tablet Take 1 tablet (20 mg) by mouth daily (with dinner) Continue with this 90 tablet 3     vitamin  B complex with vitamin C (VITAMIN  B COMPLEX) TABS Take 1 tablet by mouth daily  0     No facility-administered encounter medications on file as of 10/5/2021.             O:   NAD, WDWN, Alert & Oriented, Mood & Affect wnl, Vitals stable   Here today alone   Pulse 91   Ht 1.702 m (5' 7\")   Wt (!) 150.6 kg (332 lb)   BMI 52.00 kg/m     General appearance normal   Vitals stable   Alert, oriented and in no acute distress      Following lymph nodes palpated: Occipital, Cervical, Supraclavicular no lad   R upper eyelid 4mm pink pearly papule      Stuck on papules and brown macules on trunk and ext   Red papules on trunk  Flesh colored papules on trunk     The remainder of expanded problem focused exam was normal; the following areas were examined:  scalp/hair, conjunctiva/lids, face, neck, lips, back, ab, breasts , chest, digits/nails, RUE, LUE.      Eyes: Conjunctivae/lids:Normal     ENT: Lips, buccal mucosa, tongue: " normal    MSK:Normal    Cardiovascular: peripheral edema none    Pulm: Breathing Normal    Lymph Nodes: No Head and Neck Lymphadenopathy     Neuro/Psych: Orientation:Alert and Orientedx3 ; Mood/Affect:normal       MICRO:   R upper eyelid:Orthokeratosis of epidermis with a proliferation of nests of basaloid cells, with peripheral palisading and a haphazard arrangement in the center extending into the dermis, forming nodules.  The tumor cells have hyperchromatic nuclei. Poor cytoplasm and intercellular bridging.    A/P:  1. Seborrheic keratosis, lentigo, angioma, dermal nevus, hx of non-melanoma skin cancer   2. r  Upper eyelid r/o basal cell carcinoma   TANGENTIAL BIOPSY IN HOUSE:  After consent, anesthesia with LEC and prep, tangential excision performed and dx above confirmed with frozen section histology.  No complications and routine wound care.  Patient is not on  anticoagulants and risk of bleeding discussed with patient.       I have personally reviewed all specimens and/or slides and used them with my medical judgement to determine or confirm the final diagnosis.     Patient told result basal cell carcinoma schedule excision .      It was a pleasure speaking to Kaylene Diaz today.  Previous clinic notes and pertinent laboratory tests were reviewed prior to Kaylene Diaz's visit.  BENIGN LESIONS DISCUSSED WITH PATIENT:  I discussed the specifics of tumor, prognosis, and genetics of benign lesions.  I explained that treatment of these lesions would be purely cosmetic and not medically neccessary.  I discussed with patient different removal options including excision, cautery and /or laser.      Nature and genetics of benign skin lesions dicussed with patient.  Signs and Symptoms of skin cancer discussed with patient.  Patient encouraged to perform monthly skin exams.  UV precautions reviewed with patient.  Risks of non-melanoma skin cancer discussed with patient   Return to clinic next appt        Again,  thank you for allowing me to participate in the care of your patient.        Sincerely,        Cipriano Springer MD

## 2021-10-05 NOTE — PROGRESS NOTES
Kaylene Diaz is an extremely pleasant 65 year old year old female patient here today for spot on right upper lid.   .   Patient states this has been present for a while.  Patient reports the following symptoms:  Not healing.  Patient reports the following previous treatments none.  These treatments did not work.  Patient reports the following modifying factors none.  Associated symptoms: none.  Patient has no other skin complaints today.  Remainder of the HPI, Meds, PMH, Allergies, FH, and SH was reviewed in chart.      Past Medical History:   Diagnosis Date     Basal cell carcinoma      RA (rheumatoid arthritis) (H)      Small bowel obstruction (H)      Squamous cell carcinoma        Past Surgical History:   Procedure Laterality Date     ARTHROSCOPY KNEE  12/10/2010    ARTHROSCOPY KNEE performed by NAVID FIERRO at [facility]     COLONOSCOPY N/A 8/9/2018    Procedure: COLONOSCOPY;  colonoscopy;  Surgeon: Luke Kyae MD;  Location: [facility]     SURGICAL HISTORY OF -       IUD removal     SURGICAL HISTORY OF -       Ovarian cyst     SURGICAL HISTORY OF -       thorn removal from foot     SURGICAL HISTORY OF -       knee surgery, scope, right knee        Family History   Problem Relation Age of Onset     Prostate Cancer Father      Eye Disorder Father         mac degen     Heart Disease Father      Macular Degeneration Father      Heart Disease Mother         a-fib     Eye Disorder Mother      C.A.D. Mother      Hypertension Mother      Alcohol/Drug Brother      Alcohol/Drug Sister      Alcohol/Drug Brother      Alcohol/Drug Brother      Alcohol/Drug Brother      Alcohol/Drug Sister      Obesity Sister      Alcohol/Drug Sister      Prostate Cancer Sister      Cancer Other      Melanoma No family hx of        Social History     Socioeconomic History     Marital status:      Spouse name: Not on file     Number of children: Not on file     Years of education: Not on file     Highest education level: Not on file    Occupational History     Employer: UNEMPLOYED   Tobacco Use     Smoking status: Former Smoker     Years: 30.00     Quit date: 2000     Years since quittin.4     Smokeless tobacco: Never Used   Substance and Sexual Activity     Alcohol use: Yes     Comment: VERY RARE     Drug use: No     Sexual activity: Yes     Partners: Male   Other Topics Concern     Parent/sibling w/ CABG, MI or angioplasty before 65F 55M? No   Social History Narrative     Not on file     Social Determinants of Health     Financial Resource Strain:      Difficulty of Paying Living Expenses:    Food Insecurity:      Worried About Running Out of Food in the Last Year:      Ran Out of Food in the Last Year:    Transportation Needs:      Lack of Transportation (Medical):      Lack of Transportation (Non-Medical):    Physical Activity:      Days of Exercise per Week:      Minutes of Exercise per Session:    Stress:      Feeling of Stress :    Social Connections:      Frequency of Communication with Friends and Family:      Frequency of Social Gatherings with Friends and Family:      Attends Mosque Services:      Active Member of Clubs or Organizations:      Attends Club or Organization Meetings:      Marital Status:    Intimate Partner Violence:      Fear of Current or Ex-Partner:      Emotionally Abused:      Physically Abused:      Sexually Abused:        Outpatient Encounter Medications as of 10/5/2021   Medication Sig Dispense Refill     albuterol (PROAIR HFA/PROVENTIL HFA/VENTOLIN HFA) 108 (90 Base) MCG/ACT inhaler Inhale 2 puffs into the lungs every 6 hours       benzonatate (TESSALON) 200 MG capsule Take 1 capsule (200 mg) by mouth 3 times daily as needed for cough 30 capsule 0     cyclobenzaprine (FLEXERIL) 5 MG tablet Take 1 tablet (5 mg) by mouth nightly as needed for muscle spasms 30 tablet 2     fluticasone (FLONASE) 50 MCG/ACT nasal spray Spray 1 spray into both nostrils daily       hydroxychloroquine (PLAQUENIL) 200 MG  "tablet Take 2 tablets (400 mg) by mouth daily 180 tablet 2     Insulin Syringe-Needle U-100 (BD INSULIN SYRINGE) 27G X 1/2\" 1 ML MISC For once weekly methotrexate administration. 50 each 1     methotrexate 50 MG/2ML injection Inject 1 mL (25 mg) Subcutaneous every 7 days 8 mL 3     Multiple Vitamins-Minerals (ADULT GUMMY PO) Take 1 chew tab by mouth daily VitaCrave       Multiple Vitamins-Minerals (HAIR/SKIN/NAILS/BIOTIN PO) Take 1 capsule by mouth daily.  100 tablet 3     nystatin (MYCOSTATIN) 690204 UNIT/GM external ointment Apply topically 2 times daily 30 g 1     rivaroxaban ANTICOAGULANT (XARELTO ANTICOAGULANT) 20 MG TABS tablet Take 1 tablet (20 mg) by mouth daily (with dinner) Continue with this 90 tablet 3     vitamin  B complex with vitamin C (VITAMIN  B COMPLEX) TABS Take 1 tablet by mouth daily  0     No facility-administered encounter medications on file as of 10/5/2021.             O:   NAD, WDWN, Alert & Oriented, Mood & Affect wnl, Vitals stable   Here today alone   Pulse 91   Ht 1.702 m (5' 7\")   Wt (!) 150.6 kg (332 lb)   BMI 52.00 kg/m     General appearance normal   Vitals stable   Alert, oriented and in no acute distress      Following lymph nodes palpated: Occipital, Cervical, Supraclavicular no lad   R upper eyelid 4mm pink pearly papule      Stuck on papules and brown macules on trunk and ext   Red papules on trunk  Flesh colored papules on trunk     The remainder of expanded problem focused exam was normal; the following areas were examined:  scalp/hair, conjunctiva/lids, face, neck, lips, back, ab, breasts , chest, digits/nails, RUE, LUE.      Eyes: Conjunctivae/lids:Normal     ENT: Lips, buccal mucosa, tongue: normal    MSK:Normal    Cardiovascular: peripheral edema none    Pulm: Breathing Normal    Lymph Nodes: No Head and Neck Lymphadenopathy     Neuro/Psych: Orientation:Alert and Orientedx3 ; Mood/Affect:normal       MICRO:   R upper eyelid:Orthokeratosis of epidermis with a " proliferation of nests of basaloid cells, with peripheral palisading and a haphazard arrangement in the center extending into the dermis, forming nodules.  The tumor cells have hyperchromatic nuclei. Poor cytoplasm and intercellular bridging.    A/P:  1. Seborrheic keratosis, lentigo, angioma, dermal nevus, hx of non-melanoma skin cancer   2. r  Upper eyelid r/o basal cell carcinoma   TANGENTIAL BIOPSY IN HOUSE:  After consent, anesthesia with LEC and prep, tangential excision performed and dx above confirmed with frozen section histology.  No complications and routine wound care.  Patient is not on  anticoagulants and risk of bleeding discussed with patient.       I have personally reviewed all specimens and/or slides and used them with my medical judgement to determine or confirm the final diagnosis.     Patient told result basal cell carcinoma schedule excision .      It was a pleasure speaking to Kaylene Diaz today.  Previous clinic notes and pertinent laboratory tests were reviewed prior to Kaylene Diaz's visit.  BENIGN LESIONS DISCUSSED WITH PATIENT:  I discussed the specifics of tumor, prognosis, and genetics of benign lesions.  I explained that treatment of these lesions would be purely cosmetic and not medically neccessary.  I discussed with patient different removal options including excision, cautery and /or laser.      Nature and genetics of benign skin lesions dicussed with patient.  Signs and Symptoms of skin cancer discussed with patient.  Patient encouraged to perform monthly skin exams.  UV precautions reviewed with patient.  Risks of non-melanoma skin cancer discussed with patient   Return to clinic next appt

## 2021-10-06 ENCOUNTER — TELEPHONE (OUTPATIENT)
Dept: DERMATOLOGY | Facility: CLINIC | Age: 66
End: 2021-10-06

## 2021-10-06 NOTE — LETTER
October 6, 2021      Kaylene Diaz  [address] [place] [address]              Dear Kaylene,    You are scheduled for Mohs Surgery on October 18th at 7:45 am  We are located at the [facility] in [place]. Please check in at the Dermatology Clinic located on the 2nd floor at the end of the rivers.    You don't need to arrive more than 5-10 minutes prior to your appointment time.    Be sure to eat a good breakfast and bathe and wash your hair prior to Surgery.   If you are taking any anti-coagulants that are prescribed by your Doctor (such as Coumadin/warfarin, Plavix, Aspirin, Ibuprofen), please continue taking them.    However, If you are taking anti-coagulants over the counter without a Doctor's order for a Medical condition, please discontinue them 10 days prior to Surgery.      Please wear comfortable clothing as you could possibly be in the clinic for 4-6 hours for your surgery.    Cipriano Springer [phone]

## 2021-10-06 NOTE — TELEPHONE ENCOUNTER
Called pt and reviewed results. Letter and info mailed.  Madison FARR RN BSN PHN  Specialty Clinics

## 2021-10-06 NOTE — TELEPHONE ENCOUNTER
[facility]    Phone Message    May a detailed message be left on voicemail: yes     Reason for Call: Requesting Results   Name/type of test: Biopsy  Date of test: 10/5/2021  Was test done at a location other than [facility] (Please fill in the location if not [facility])?: No    Pts , Moody was calling in to see the results from the biopsy that was taken 10/5/2021. Please call Pts  at [phone] to further discuss. Thank you.      Action Taken: Message routed to:  Other: [facility]    Travel Screening: Not Applicable

## 2021-10-25 ENCOUNTER — OFFICE VISIT (OUTPATIENT)
Dept: DERMATOLOGY | Facility: CLINIC | Age: 66
End: 2021-10-25
Payer: MEDICARE

## 2021-10-25 VITALS — BODY MASS INDEX: 45.99 KG/M2 | WEIGHT: 293 LBS | HEIGHT: 67 IN

## 2021-10-25 DIAGNOSIS — C44.1121 BASAL CELL CARCINOMA (BCC) OF RIGHT UPPER EYELID: Primary | ICD-10-CM

## 2021-10-25 PROCEDURE — 14060 TIS TRNFR E/N/E/L 10 SQ CM/<: CPT | Performed by: DERMATOLOGY

## 2021-10-25 PROCEDURE — 99207 PR NO CHARGE LOS: CPT | Performed by: DERMATOLOGY

## 2021-10-25 PROCEDURE — 17311 MOHS 1 STAGE H/N/HF/G: CPT | Performed by: DERMATOLOGY

## 2021-10-25 ASSESSMENT — MIFFLIN-ST. JEOR: SCORE: 2078.57

## 2021-10-25 NOTE — NURSING NOTE
Surgical Office Location :   [facility]  [address] [place] [address]

## 2021-10-25 NOTE — PROGRESS NOTES
Kaylene Diaz is an extremely pleasant 65 year old year old female patient here today for evaluation and managment of basal cell carcinoma on right upper lid.  Patient has no other skin complaints today.  Remainder of the HPI, Meds, PMH, Allergies, FH, and SH was reviewed in chart.      Past Medical History:   Diagnosis Date     Basal cell carcinoma      RA (rheumatoid arthritis) (H)      Small bowel obstruction (H)      Squamous cell carcinoma        Past Surgical History:   Procedure Laterality Date     ARTHROSCOPY KNEE  12/10/2010    ARTHROSCOPY KNEE performed by NAVID FIERRO at [facility]     COLONOSCOPY N/A 2018    Procedure: COLONOSCOPY;  colonoscopy;  Surgeon: Luke Kaye MD;  Location: [facility]     SURGICAL HISTORY OF -       IUD removal     SURGICAL HISTORY OF -       Ovarian cyst     SURGICAL HISTORY OF -       thorn removal from foot     SURGICAL HISTORY OF -       knee surgery, scope, right knee        Family History   Problem Relation Age of Onset     Prostate Cancer Father      Eye Disorder Father         mac degen     Heart Disease Father      Macular Degeneration Father      Heart Disease Mother         a-fib     Eye Disorder Mother      C.A.D. Mother      Hypertension Mother      Alcohol/Drug Brother      Alcohol/Drug Sister      Alcohol/Drug Brother      Alcohol/Drug Brother      Alcohol/Drug Brother      Alcohol/Drug Sister      Obesity Sister      Alcohol/Drug Sister      Prostate Cancer Sister      Cancer Other      Melanoma No family hx of        Social History     Socioeconomic History     Marital status:      Spouse name: Not on file     Number of children: Not on file     Years of education: Not on file     Highest education level: Not on file   Occupational History     Employer: UNEMPLOYED   Tobacco Use     Smoking status: Former Smoker     Years: 30.00     Quit date: 2000     Years since quittin.5     Smokeless tobacco: Never Used   Substance and Sexual Activity      "Alcohol use: Yes     Comment: VERY RARE     Drug use: No     Sexual activity: Yes     Partners: Male   Other Topics Concern     Parent/sibling w/ CABG, MI or angioplasty before 65F 55M? No   Social History Narrative     Not on file     Social Determinants of Health     Financial Resource Strain:      Difficulty of Paying Living Expenses:    Food Insecurity:      Worried About Running Out of Food in the Last Year:      Ran Out of Food in the Last Year:    Transportation Needs:      Lack of Transportation (Medical):      Lack of Transportation (Non-Medical):    Physical Activity:      Days of Exercise per Week:      Minutes of Exercise per Session:    Stress:      Feeling of Stress :    Social Connections:      Frequency of Communication with Friends and Family:      Frequency of Social Gatherings with Friends and Family:      Attends Orthodox Services:      Active Member of Clubs or Organizations:      Attends Club or Organization Meetings:      Marital Status:    Intimate Partner Violence:      Fear of Current or Ex-Partner:      Emotionally Abused:      Physically Abused:      Sexually Abused:        Outpatient Encounter Medications as of 10/25/2021   Medication Sig Dispense Refill     albuterol (PROAIR HFA/PROVENTIL HFA/VENTOLIN HFA) 108 (90 Base) MCG/ACT inhaler Inhale 2 puffs into the lungs every 6 hours       cyclobenzaprine (FLEXERIL) 5 MG tablet Take 1 tablet (5 mg) by mouth nightly as needed for muscle spasms 30 tablet 2     fluticasone (FLONASE) 50 MCG/ACT nasal spray Spray 1 spray into both nostrils daily       hydroxychloroquine (PLAQUENIL) 200 MG tablet Take 2 tablets (400 mg) by mouth daily 180 tablet 2     Insulin Syringe-Needle U-100 (BD INSULIN SYRINGE) 27G X 1/2\" 1 ML MISC For once weekly methotrexate administration. 50 each 1     methotrexate 50 MG/2ML injection Inject 1 mL (25 mg) Subcutaneous every 7 days 8 mL 3     Multiple Vitamins-Minerals (ADULT GUMMY PO) Take 1 chew tab by mouth daily " "VitaCrave       Multiple Vitamins-Minerals (HAIR/SKIN/NAILS/BIOTIN PO) Take 1 capsule by mouth daily.  100 tablet 3     rivaroxaban ANTICOAGULANT (XARELTO ANTICOAGULANT) 20 MG TABS tablet Take 1 tablet (20 mg) by mouth daily (with dinner) Continue with this 90 tablet 3     vitamin  B complex with vitamin C (VITAMIN  B COMPLEX) TABS Take 1 tablet by mouth daily  0     [DISCONTINUED] benzonatate (TESSALON) 200 MG capsule Take 1 capsule (200 mg) by mouth 3 times daily as needed for cough 30 capsule 0     [DISCONTINUED] nystatin (MYCOSTATIN) 011120 UNIT/GM external ointment Apply topically 2 times daily 30 g 1     No facility-administered encounter medications on file as of 10/25/2021.             O:   NAD, WDWN, Alert & Oriented, Mood & Affect wnl, Vitals stable   Here today alone   Ht 1.702 m (5' 7\")   Wt (!) 150.6 kg (332 lb)   BMI 52.00 kg/m     General appearance normal   Vitals stable   Alert, oriented and in no acute distress     R medial upper eyelid 4mm scaly papule       Eyes: Conjunctivae/lids:Normal     ENT: Lips, buccal mucosa, tongue: normal    MSK:Normal    Cardiovascular: peripheral edema none    Pulm: Breathing Normal    Neuro/Psych: Orientation:Alert and Orientedx3 ; Mood/Affect:normal       A/P:  1. R medial upper eyelid basal cell carcinoma   MOHS:   Location    The rationale for Mohs surgery was discussed with the patient and consent was obtained.  The risks and benefits as well as alternatives to therapy were discussed, in detail.  Specifically, the risks of infection, scarring, bleeding, prolonged wound healing, incomplete removal, allergy to anesthesia, nerve injury and recurrence were addressed.  Indication for Mohs was Location. Prior to the procedure, the treatment site was clearly identified and, if available, confirmed with previous photos and confirmed by the patient   All components of the Universal Protocol/PAUSE rule were completed.  The Mohs surgeon operated in two distinct and " integrated capacities as the surgeon and pathologist.      The area was prepped with Betasept.  A rim of normal appearing skin was marked circumferentially around the lesion.  The area was infiltrated with local anesthesia.  The tumor was first debulked to remove all clinically apparent tumor.  An incision following the standard Mohs approach was done and the specimen was oriented,mapped and placed in 1 block(s).  Each specimen was then chromacoded and processed in the Mohs laboratory using standard Mohs technique and submitted for frozen section histology.  Frozen section analysis showed no residual tumor but CLEAR MARGINS.      The tumor was excised using standard Mohs technique in 1 stages(s).  CLEAR MARGINS OBTAINED and Final defect size was 1 cm.     We discussed the options for wound management in full with the patient including risks/benefits/ possible outcomes.        REPAIR RHOMBIC: Because of Because of the size and full thickness nature of the defect, Because of the tightness of the surrounding skin, To maintain form and function and Because of the proximity to the lid, a rhombic transposition flap was planned. After anesthesia and prep, the rhombic flap was carefully incised within relaxed skin tension lines, and the resulting Burow's triangle removed. The flap was raised and the wound edges were all undermined in the subcutaneous plane. After hemostasis, the flap was transposed into the defect and sutured in a layered fashion using Vicryl and Fast Absorbing Plain Gut sutures. Postoperative size was 2.5 x 2.6 cm.  EBL minimal; complications none; wound care routine.  The patient was discharged in good condition and will return in one week for wound evaluation.  It was a pleasure speaking to Kaylene Diaz today.  Previous clinic notes and pertinent laboratory tests were reviewed prior to Kaylene Diaz's visit.  Nature and genetics of benign skin lesions dicussed with patient.  Signs and Symptoms of skin  cancer discussed with patient.  Patient encouraged to perform monthly skin exams.  UV precautions reviewed with patient.  Risks of non-melanoma skin cancer discussed with patient   Return to clinic 3 months

## 2021-10-25 NOTE — PATIENT INSTRUCTIONS
Sutured Wound Care     [facility], [place]: [phone]  [facility]: [phone]      ? No strenuous activity for 48 hours. Resume moderate activity in 48 hours. No heavy exercising until you are seen for follow up in one week.     ? Take Tylenol as needed for discomfort.                         ? Do not drink alcoholic beverages for 48 hours.     ? Keep the pressure bandage in place for 24 hours. If the bandage becomes blood tinged or loose, reinforce it with gauze and tape.        (Refer to the reverse side of this page for management of bleeding).    ? Remove pressure bandage in 24 hours     ? Leave the flat bandage in place until your follow up appointment.    ? Keep the bandage dry. Wash around it carefully.    ? If the tape becomes soiled or starts to come off, reinforce it with additional paper tape.    ? Do not smoke for 3 weeks; smoking is detrimental to wound healing.    ? It is normal to have swelling and bruising around the surgical site. The bruising will fade in approximately 10-14 days. Elevate the area to reduce swelling.    ? Numbness, itchiness and sensitivity to temperature changes can occur after surgery and may take up to 18 months to normalize.    POSSIBLE COMPLICATIONS    BLEEDIN. Leave the bandage in place.  2. Use tightly rolled up gauze or a cloth to apply direct pressure over the bandage for 20   minutes.  3. Reapply pressure for an additional 20 minutes if necessary  4. Call the office or go to the nearest emergency room if pressure fails to stop the bleeding.  5. Use additional gauze and tape to maintain pressure once the bleeding has stopped.    PAIN:    1. Post operative pain should slowly get better, never worse.  2. A severe increase in pain may indicate a problem. Call the office if this occurs.    In case of emergency phone:Dr Springer [phone]

## 2021-10-25 NOTE — NURSING NOTE
"Chief Complaint   Patient presents with     Derm Problem     mohs       Vitals:    10/25/21 0724   Weight: (!) 150.6 kg (332 lb)   Height: 1.702 m (5' 7\")     Wt Readings from Last 1 Encounters:   10/25/21 (!) 150.6 kg (332 lb)       Michelle Washington LPN.................10/25/2021    "

## 2021-10-25 NOTE — LETTER
10/25/2021         RE: Kaylene Diaz  [id number] [place] [address]        Dear Colleague,    Thank you for referring your patient, Kaylene Diaz, to the [facility]. Please see a copy of my visit note below.    Kaylene Diaz is an extremely pleasant 65 year old year old female patient here today for evaluation and managment of basal cell carcinoma on right upper lid.  Patient has no other skin complaints today.  Remainder of the HPI, Meds, PMH, Allergies, FH, and SH was reviewed in chart.      Past Medical History:   Diagnosis Date     Basal cell carcinoma      RA (rheumatoid arthritis) (H)      Small bowel obstruction (H)      Squamous cell carcinoma        Past Surgical History:   Procedure Laterality Date     ARTHROSCOPY KNEE  12/10/2010    ARTHROSCOPY KNEE performed by NAVID FIERRO at [facility]     COLONOSCOPY N/A 8/9/2018    Procedure: COLONOSCOPY;  colonoscopy;  Surgeon: Luke Kaye MD;  Location: [facility]     SURGICAL HISTORY OF -       IUD removal     SURGICAL HISTORY OF -       Ovarian cyst     SURGICAL HISTORY OF -       thorn removal from foot     SURGICAL HISTORY OF -       knee surgery, scope, right knee        Family History   Problem Relation Age of Onset     Prostate Cancer Father      Eye Disorder Father         mac degen     Heart Disease Father      Macular Degeneration Father      Heart Disease Mother         a-fib     Eye Disorder Mother      C.A.D. Mother      Hypertension Mother      Alcohol/Drug Brother      Alcohol/Drug Sister      Alcohol/Drug Brother      Alcohol/Drug Brother      Alcohol/Drug Brother      Alcohol/Drug Sister      Obesity Sister      Alcohol/Drug Sister      Prostate Cancer Sister      Cancer Other      Melanoma No family hx of        Social History     Socioeconomic History     Marital status:      Spouse name: Not on file     Number of children: Not on file     Years of education: Not on file     Highest education level: Not on  file   Occupational History     Employer: UNEMPLOYED   Tobacco Use     Smoking status: Former Smoker     Years: 30.00     Quit date: 2000     Years since quittin.5     Smokeless tobacco: Never Used   Substance and Sexual Activity     Alcohol use: Yes     Comment: VERY RARE     Drug use: No     Sexual activity: Yes     Partners: Male   Other Topics Concern     Parent/sibling w/ CABG, MI or angioplasty before 65F 55M? No   Social History Narrative     Not on file     Social Determinants of Health     Financial Resource Strain:      Difficulty of Paying Living Expenses:    Food Insecurity:      Worried About Running Out of Food in the Last Year:      Ran Out of Food in the Last Year:    Transportation Needs:      Lack of Transportation (Medical):      Lack of Transportation (Non-Medical):    Physical Activity:      Days of Exercise per Week:      Minutes of Exercise per Session:    Stress:      Feeling of Stress :    Social Connections:      Frequency of Communication with Friends and Family:      Frequency of Social Gatherings with Friends and Family:      Attends Mu-ism Services:      Active Member of Clubs or Organizations:      Attends Club or Organization Meetings:      Marital Status:    Intimate Partner Violence:      Fear of Current or Ex-Partner:      Emotionally Abused:      Physically Abused:      Sexually Abused:        Outpatient Encounter Medications as of 10/25/2021   Medication Sig Dispense Refill     albuterol (PROAIR HFA/PROVENTIL HFA/VENTOLIN HFA) 108 (90 Base) MCG/ACT inhaler Inhale 2 puffs into the lungs every 6 hours       cyclobenzaprine (FLEXERIL) 5 MG tablet Take 1 tablet (5 mg) by mouth nightly as needed for muscle spasms 30 tablet 2     fluticasone (FLONASE) 50 MCG/ACT nasal spray Spray 1 spray into both nostrils daily       hydroxychloroquine (PLAQUENIL) 200 MG tablet Take 2 tablets (400 mg) by mouth daily 180 tablet 2     Insulin Syringe-Needle U-100 (BD INSULIN SYRINGE) 27G X  "1/2\" 1 ML MISC For once weekly methotrexate administration. 50 each 1     methotrexate 50 MG/2ML injection Inject 1 mL (25 mg) Subcutaneous every 7 days 8 mL 3     Multiple Vitamins-Minerals (ADULT GUMMY PO) Take 1 chew tab by mouth daily VitaCrave       Multiple Vitamins-Minerals (HAIR/SKIN/NAILS/BIOTIN PO) Take 1 capsule by mouth daily.  100 tablet 3     rivaroxaban ANTICOAGULANT (XARELTO ANTICOAGULANT) 20 MG TABS tablet Take 1 tablet (20 mg) by mouth daily (with dinner) Continue with this 90 tablet 3     vitamin  B complex with vitamin C (VITAMIN  B COMPLEX) TABS Take 1 tablet by mouth daily  0     [DISCONTINUED] benzonatate (TESSALON) 200 MG capsule Take 1 capsule (200 mg) by mouth 3 times daily as needed for cough 30 capsule 0     [DISCONTINUED] nystatin (MYCOSTATIN) 990251 UNIT/GM external ointment Apply topically 2 times daily 30 g 1     No facility-administered encounter medications on file as of 10/25/2021.             O:   NAD, WDWN, Alert & Oriented, Mood & Affect wnl, Vitals stable   Here today alone   Ht 1.702 m (5' 7\")   Wt (!) 150.6 kg (332 lb)   BMI 52.00 kg/m     General appearance normal   Vitals stable   Alert, oriented and in no acute distress     R medial upper eyelid 4mm scaly papule       Eyes: Conjunctivae/lids:Normal     ENT: Lips, buccal mucosa, tongue: normal    MSK:Normal    Cardiovascular: peripheral edema none    Pulm: Breathing Normal    Neuro/Psych: Orientation:Alert and Orientedx3 ; Mood/Affect:normal       A/P:  1. R medial upper eyelid basal cell carcinoma   MOHS:   Location    The rationale for Mohs surgery was discussed with the patient and consent was obtained.  The risks and benefits as well as alternatives to therapy were discussed, in detail.  Specifically, the risks of infection, scarring, bleeding, prolonged wound healing, incomplete removal, allergy to anesthesia, nerve injury and recurrence were addressed.  Indication for Mohs was Location. Prior to the procedure, the " treatment site was clearly identified and, if available, confirmed with previous photos and confirmed by the patient   All components of the Universal Protocol/PAUSE rule were completed.  The Mohs surgeon operated in two distinct and integrated capacities as the surgeon and pathologist.      The area was prepped with Betasept.  A rim of normal appearing skin was marked circumferentially around the lesion.  The area was infiltrated with local anesthesia.  The tumor was first debulked to remove all clinically apparent tumor.  An incision following the standard Mohs approach was done and the specimen was oriented,mapped and placed in 1 block(s).  Each specimen was then chromacoded and processed in the Mohs laboratory using standard Mohs technique and submitted for frozen section histology.  Frozen section analysis showed no residual tumor but CLEAR MARGINS.      The tumor was excised using standard Mohs technique in 1 stages(s).  CLEAR MARGINS OBTAINED and Final defect size was 1 cm.     We discussed the options for wound management in full with the patient including risks/benefits/ possible outcomes.        REPAIR RHOMBIC: Because of Because of the size and full thickness nature of the defect, Because of the tightness of the surrounding skin, To maintain form and function and Because of the proximity to the lid, a rhombic transposition flap was planned. After anesthesia and prep, the rhombic flap was carefully incised within relaxed skin tension lines, and the resulting Burow's triangle removed. The flap was raised and the wound edges were all undermined in the subcutaneous plane. After hemostasis, the flap was transposed into the defect and sutured in a layered fashion using Vicryl and Fast Absorbing Plain Gut sutures. Postoperative size was 2.5 x 2.6 cm.  EBL minimal; complications none; wound care routine.  The patient was discharged in good condition and will return in one week for wound evaluation.  It was a  pleasure speaking to Kaylene Diaz today.  Previous clinic notes and pertinent laboratory tests were reviewed prior to Kaylene Diaz's visit.  Nature and genetics of benign skin lesions dicussed with patient.  Signs and Symptoms of skin cancer discussed with patient.  Patient encouraged to perform monthly skin exams.  UV precautions reviewed with patient.  Risks of non-melanoma skin cancer discussed with patient   Return to clinic 3 months        Again, thank you for allowing me to participate in the care of your patient.        Sincerely,        Cipriano Springer MD

## 2021-12-13 ENCOUNTER — TRANSFERRED RECORDS (OUTPATIENT)
Dept: HEALTH INFORMATION MANAGEMENT | Facility: CLINIC | Age: 66
End: 2021-12-13
Payer: MEDICARE

## 2021-12-13 LAB
ALT SERPL-CCNC: 26 IU/L (ref 12–65)
AST SERPL-CCNC: 15 IU/L (ref 15–37)
CREATININE (EXTERNAL): 1.01 MG/DL (ref 0.6–1.3)
GFR ESTIMATED (EXTERNAL): 55 ML/MIN/1.73M2
GFR ESTIMATED (IF AFRICAN AMERICAN) (EXTERNAL): >60 ML/MIN/1.73M2

## 2021-12-16 DIAGNOSIS — M05.79 RHEUMATOID ARTHRITIS INVOLVING MULTIPLE SITES WITH POSITIVE RHEUMATOID FACTOR (H): Chronic | ICD-10-CM

## 2021-12-20 RX ORDER — SYRING-NEEDL,DISP,INSUL,0.3 ML 28GX1/2"
SYRINGE, EMPTY DISPOSABLE MISCELLANEOUS
Qty: 50 EACH | Refills: 1 | Status: SHIPPED | OUTPATIENT
Start: 2021-12-20 | End: 2022-07-19

## 2021-12-20 NOTE — TELEPHONE ENCOUNTER
Signed Prescriptions:                        Disp   Refills    Insulin Syringe-Needle U-100 (BD INSULIN S*50 each1        Sig: For once weekly methotrexate administration.  Authorizing Provider: YAHAIRA DREW  Ordering User: JOSE MILLER RN refilled medication per [facility] Refill Protocol.     Jose Miller RN

## 2022-01-18 ENCOUNTER — VIRTUAL VISIT (OUTPATIENT)
Dept: RHEUMATOLOGY | Facility: CLINIC | Age: 67
End: 2022-01-18
Payer: MEDICARE

## 2022-01-18 DIAGNOSIS — M05.79 RHEUMATOID ARTHRITIS INVOLVING MULTIPLE SITES WITH POSITIVE RHEUMATOID FACTOR (H): Primary | Chronic | ICD-10-CM

## 2022-01-18 DIAGNOSIS — Z79.899 HIGH RISK MEDICATION USE: ICD-10-CM

## 2022-01-18 DIAGNOSIS — M62.838 MUSCLE SPASM: ICD-10-CM

## 2022-01-18 PROCEDURE — 99214 OFFICE O/P EST MOD 30 MIN: CPT | Mod: 95 | Performed by: INTERNAL MEDICINE

## 2022-01-18 RX ORDER — METHOTREXATE 25 MG/ML
25 INJECTION, SOLUTION INTRA-ARTERIAL; INTRAMUSCULAR; INTRAVENOUS
Qty: 8 ML | Refills: 3 | Status: SHIPPED | OUTPATIENT
Start: 2022-01-18 | End: 2022-04-19

## 2022-01-18 RX ORDER — TIZANIDINE HYDROCHLORIDE 4 MG/1
4 CAPSULE, GELATIN COATED ORAL 3 TIMES DAILY PRN
Qty: 90 CAPSULE | Refills: 1 | Status: SHIPPED | OUTPATIENT
Start: 2022-01-18 | End: 2022-08-03

## 2022-01-18 NOTE — PROGRESS NOTES
"Kaylene Diaz  is a 66 year old year old female who is being evaluated via a billable video visit.      How would you like to obtain your AVS? MyChart  If the video visit is dropped, the invitation should be resent by: Text to cell phone: [phone]  Will anyone else be joining your video visit? No    Rheumatology Video Visit      Kaylene Diaz MRN# [id number]   YOB: 1955 Age: 66 year old      Date of visit: 1/18/22   PCP: Dr. Hitesh Washington    Chief Complaint   Patient presents with:  Rheumatoid Arthritis    Assessment and Plan     1.  Seropositive rheumatoid arthritis (RF 32, CCP >250): Currently on methotrexate 25 mg once weekly, hydroxychloroquine 400 mg daily.  Previously on sulfasalazine that was discontinued when she found no benefit from it; but she also did not require prednisone while on sulfasalazine; questioning if the \"benefit\" from sulfasalazine was actually the \"benefit\" of not gardening.  Does not tolerate folic acid because of associated increased sunburns.  More active disease when seen last time and this had significantly improved with changing methotrexate from oral to subcutaneous.  9/20/2021 toxicity monitoring labs performed at an outside labs were reviewed in CareEverywhere.  Chronic illness, stable.    - Continue methotrexate 25mg SQ once weekly   - Continue hydroxychloroquine 400 mg daily (last eye exam on 11/2/2020)  - Continue prednisone 5 mg daily as needed for rheumatoid arthritis symptoms; use sparingly (reportedly hasn't used for over 12 months)  - Labs in 3 months: CBC, Creatinine, Hepatic Panel, ESR, CRP (lab orders to be faxed to [facility] in [place])    High risk medication requiring intensive toxicity monitoring at least quarterly: labs ordered include CBC, Creatinine, Hepatic panel to monitor for cytopenia and hepatotoxicity; checking creatinine as it affects clearance of medication.       2.  Basal cell carcinoma and squamous cell carcinoma: Several lesions removed by " dermatology in the past.  Continue to follow with dermatology.     3.  Chronic lower back pain: Has significantly improved with lidocaine patches that have been used no more than 10 times in the past 1 year.  She has been using her mother's lidocaine patches.  Management per PCP    4.  Right Achilles tendinitis history: Was evaluated by podiatry who diagnosed her with Achilles tendinitis and her symptoms resolved with a boot that she wore at night.  Not an issue today.    5. Bone Health: advised DEXA; she'd like to wait until after COVID19 risk reduced and this is reasonable.  This was not discussed again today    6.  Productive cough: has seen an ENT provider who reportedly ran allergy tests that came back negative.  She then tried prilosec (omeprazole wasn't effective) 20mg daily that resolved the cough, but stopping it results in return of symptoms. Advised using prilosec 20mg BID and if after 30 days symptoms persist then she should talk with her PCP about possible further workup for GI issues.     7.  Muscle spasms: Primarily affecting the lower back.  Improved from infrequent cyclobenzaprine 5 mg nightly as needed, but finds that using tizanidine 4mg PRN is more effective.   - Start tiZANidine (ZANAFLEX) 4 MG capsule; Take 1 capsule (4 mg) by mouth 3 times daily as needed for muscle spasms  Dispense: 90 capsule; Refill: 1  - Discontinue cyclobenzaprine    8.  Vaccinations: Vaccinations reviewed with MsAlice Joe.  Risks and benefits of vaccinations were discussed.    - Influenza: refused by patient: She says that she never gets the flu vaccination  - [id number]: up to date  - [id number]: up to date  - Shingrix: Up to date   - COVID-19: has received the Pfizer COVID-19 vaccine on 3/5/2021 and 3/26/2021 and 11/10/2021; 4th dose of an mRNA vaccine due on or shortly after 4/10/2022    Total minutes spent in evaluation with patient, documentation, , and review of pertinent studies and chart notes: 20    Ms.  Joe verbalized agreement with and understanding of the rational for the diagnosis and treatment plan.  All questions were answered to best of my ability and the patient's satisfaction. Ms. Diaz was advised to contact the clinic with any questions that may arise after the clinic visit.      Thank you for involving me in the care of the patient    Return to clinic: 3-4 months      HPI   Kaylene Diaz is a 66 year old female with a past medical history significant for hyperlipidemia, osteoarthritis, osteopenia, PE (2020) on chronic anticoagulation, meniscal tear, squamous cell carcinoma, basal cell carcinoma, and rheumatoid arthritis who presents for follow-up of rheumatoid arthritis.    Today, 1/18/2022: states that she was having productive cough and allergies so saw an allergist; first appointment lasted for 1 minute per patient and then return appointment was told that she didn't really have anything.  Then started prilosec and symptoms resolved. Prilosec 20mg once daily resolved the phlegm and coughing; changed to the generic with return of symptoms.  Symptoms return if she stops taking Prilosec.  No black or bloody stools. No GERD symptoms. Arthritis is doing well; some pain with overuse but otherwise doing well. Tolerating medications well.     Denies fevers, chills, nausea, vomiting, constipation, diarrhea. No abdominal pain. No chest pain/pressure, palpitations, or shortness of breath.   No LE swelling. No neck pain. No oral or nasal sores.  No rash. No sicca symptoms.   Trying to lose weight.     Tobacco: Quit in 2000  EtOH: Rare  Drugs: None    ROS   12 point review of system was completed and negative except as noted in the HPI     Active Problem List     Patient Active Problem List   Diagnosis     Carpal tunnel syndrome     Tear meniscus knee     Osteopenia     Advanced directives, counseling/discussion     Hyperlipidemia LDL goal <160     Osteoarthritis     RA (rheumatoid arthritis) (H)     High risk  medications (not anticoagulants) long-term use     Kidney stone     ASCUS on Pap smear     Morbid obesity due to excess calories (H)     Small bowel obstruction (H)     Elevated blood pressure reading without diagnosis of hypertension     BLANCA (acute kidney injury) (H)     Elevated glucose     SBO (small bowel obstruction) (H)     Rheumatoid arthritis involving multiple sites with positive rheumatoid factor (H)     Past Medical History     Past Medical History:   Diagnosis Date     Basal cell carcinoma      RA (rheumatoid arthritis) (H)      Small bowel obstruction (H)      Squamous cell carcinoma      Past Surgical History     Past Surgical History:   Procedure Laterality Date     ARTHROSCOPY KNEE  12/10/2010    ARTHROSCOPY KNEE performed by NAVID FIERRO at [facility]     COLONOSCOPY N/A 8/9/2018    Procedure: COLONOSCOPY;  colonoscopy;  Surgeon: Luke Kaye MD;  Location: [facility]     SURGICAL HISTORY OF -       IUD removal     SURGICAL HISTORY OF -       Ovarian cyst     SURGICAL HISTORY OF -       thorn removal from foot     SURGICAL HISTORY OF -       knee surgery, scope, right knee     Allergy     Allergies   Allergen Reactions     Folic Acid      Gold Rash     Latex Rash     Not all latex products     Current Medication List     Current Outpatient Medications   Medication Sig     albuterol (PROAIR HFA/PROVENTIL HFA/VENTOLIN HFA) 108 (90 Base) MCG/ACT inhaler Inhale 2 puffs into the lungs every 6 hours     cyclobenzaprine (FLEXERIL) 5 MG tablet Take 1 tablet (5 mg) by mouth nightly as needed for muscle spasms     fluticasone (FLONASE) 50 MCG/ACT nasal spray Spray 1 spray into both nostrils daily     hydroxychloroquine (PLAQUENIL) 200 MG tablet Take 2 tablets (400 mg) by mouth daily     methotrexate 50 MG/2ML injection Inject 1 mL (25 mg) Subcutaneous every 7 days     Multiple Vitamins-Minerals (ADULT GUMMY PO) Take 1 chew tab by mouth daily VitaCrave     Multiple Vitamins-Minerals (HAIR/SKIN/NAILS/BIOTIN PO) Take  "1 capsule by mouth daily.      rivaroxaban ANTICOAGULANT (XARELTO ANTICOAGULANT) 20 MG TABS tablet Take 1 tablet (20 mg) by mouth daily (with dinner) Continue with this     vitamin  B complex with vitamin C (VITAMIN  B COMPLEX) TABS Take 1 tablet by mouth daily     Insulin Syringe-Needle U-100 (BD INSULIN SYRINGE) 27G X 1/2\" 1 ML MISC For once weekly methotrexate administration.     No current facility-administered medications for this visit.         Social History   See HPI    Family History     Family History   Problem Relation Age of Onset     Prostate Cancer Father      Eye Disorder Father         mac degen     Heart Disease Father      Macular Degeneration Father      Heart Disease Mother         a-fib     Eye Disorder Mother      C.A.D. Mother      Hypertension Mother      Alcohol/Drug Brother      Alcohol/Drug Sister      Alcohol/Drug Brother      Alcohol/Drug Brother      Alcohol/Drug Brother      Alcohol/Drug Sister      Obesity Sister      Alcohol/Drug Sister      Prostate Cancer Sister      Cancer Other      Melanoma No family hx of        Physical Exam     Temp Readings from Last 3 Encounters:   07/07/21 97.3  F (36.3  C) (Tympanic)   07/29/20 98  F (36.7  C) (Tympanic)   04/22/20 97.1  F (36.2  C) (Tympanic)     BP Readings from Last 5 Encounters:   07/07/21 (!) 138/90   10/28/20 (!) 161/84   07/29/20 110/70   04/22/20 127/62   03/16/20 (!) 142/78     Pulse Readings from Last 1 Encounters:   10/05/21 91     Resp Readings from Last 1 Encounters:   07/29/20 17     Estimated body mass index is 52 kg/m  as calculated from the following:    Height as of 10/25/21: 1.702 m (5' 7\").    Weight as of 10/25/21: 150.6 kg (332 lb).      GEN: NAD.   HEENT: MMM.  Anicteric, noninjected sclera. No obvious external lesions of the ear and nose. Hearing intact.  PULM: No increased work of breathing  MSK: Mild ulnar deviation at the MCPs; mild chronic swelling at the MCPs that appears unchanged; no overlying erythema.  " PIPs and DIPs without swelling.  Wrists without swelling.  SKIN: No rash or jaundice seen  PSYCH: Alert. Appropriate.      Labs / Imaging (select studies)     RF/CCP  Recent Labs   Lab Test 08/26/13  1611   CCPABY >250 Strongly Positive*     CBC  Recent Labs   Lab Test 04/22/20  1156 10/28/19  1052 08/29/19  0855   WBC 8.2 5.3 5.8   RBC 4.59 4.44 4.60   HGB 14.4 13.9 14.2   HCT 44.1 42.1 43.2   MCV 96 95 94   RDW 12.8 14.4 14.0    225 240   MCH 31.4 31.3 30.9   MCHC 32.7 33.0 32.9   NEUTROPHIL 70.6 71.0 73.2   LYMPH 16.3 19.9 15.9   MONOCYTE 11.2 7.2 8.7   EOSINOPHIL 1.5 1.7 1.9   BASOPHIL 0.4 0.2 0.3   ANEU 5.8 3.8 4.3   ALYM 1.3 1.1 0.9   PEEWEE 0.9 0.4 0.5   AEOS 0.1 0.1 0.1   ABAS 0.0 0.0 0.0     CMP  Recent Labs   Lab Test 03/09/21  0000 12/22/20  0000 04/22/20  1156 03/05/20  0000 10/28/19  1052 08/29/19  0855 08/29/19  0855 03/26/18  1034 10/31/17  1110 06/23/17  0817 06/23/17  0817   NA  --   --  138  --   --   --   --   --  137  --  139   POTASSIUM  --   --  4.2  --   --   --   --   --  4.5  --  4.4   CHLORIDE  --   --  107  --   --   --   --   --  108  --  106   CO2  --   --  27  --   --   --   --   --  24  --  24   ANIONGAP  --   --  4  --   --   --   --   --  5  --  9   GLC  --   --  81  --   --   --   --   --  84  --  115*   BUN  --   --  22  --   --   --   --   --  22  --  14   CR 1.01 0.98 0.74   < > 0.72   < > 0.75   < > 0.88   < > 0.75   GFRESTIMATED 55* 57* 85   < > 88   < > 84   < > 65   < > 78   GFRESTBLACK >60 >60 >90   < > >90   < > >90   < > 79   < > >90   GFR Calc     DEAN  --   --  9.6  --   --   --   --   --  9.2  --  9.0   BILITOTAL  --   --  0.4  --  0.5  --  0.5   < > 0.4   < > 0.4   ALBUMIN  --   --  3.6  --  3.7  --  3.6   < > 3.7   < > 3.8   PROTTOTAL  --   --  8.1  --  7.6  --  7.6   < > 8.0   < > 7.5   ALKPHOS  --   --  118  --  109  --  118   < > 131   < > 120   AST 15 15 13   < > 19   < > 21   < > 17   < > 22   ALT 26 28 22   < > 28   < > 23   < > 24   < > 26     < > = values in this interval not displayed.     Calcium/VitaminD  Recent Labs   Lab Test 04/22/20  1156 10/31/17  1110 06/23/17  0817   DEAN 9.6 9.2 9.0     ESR/CRP  Recent Labs   Lab Test 10/28/19  1052 08/29/19  0855 10/23/18  1000   SED 15 11 14   CRP 5.1 7.3 4.7     Hepatitis B  Recent Labs   Lab Test 09/11/13  1531   HBCAB Negative   HEPBANG Negative     Hepatitis C  Recent Labs   Lab Test 09/11/13  1531   HCVAB Negative     Tuberculosis Screening  Recent Labs   Lab Test 09/11/13  1532   TBRSLT Negative   TBAGN 0.00     Immunization History     Immunization History   Administered Date(s) Administered     COVID-19,PF,Pfizer (12+ Yrs) 03/05/2021, 03/26/2021, 11/10/2021     Pneumo Conj 13-V (2010&after) 12/03/2018     Pneumococcal 23 valent 10/24/2013, 03/09/2020     TD (ADULT, 7+) 10/28/2020     TDAP Vaccine (Adacel) 06/16/2010     Zoster vaccine recombinant adjuvanted (SHINGRIX) 12/03/2018, 06/19/2019          Chart documentation done in part with Dragon Voice recognition Software. Although reviewed after completion, some word and grammatical error may remain.      Video-Visit Details    Type of service:  Video Visit    Video Start Time: 9:13 AM    Video End Time:9:30 AM    Originating Location (pt. Location): [place], [place]    Distant Location (provider location):  Home    Platform used for Video Visit: Miguel Angel Rooney MD

## 2022-03-14 ENCOUNTER — MYC MEDICAL ADVICE (OUTPATIENT)
Dept: FAMILY MEDICINE | Facility: CLINIC | Age: 67
End: 2022-03-14
Payer: MEDICARE

## 2022-03-14 NOTE — TELEPHONE ENCOUNTER
Routed to provider.  Please see MyChart message from pt regarding Xarelto and upcoming endoscopy.  Osiris Teague RN

## 2022-04-04 ENCOUNTER — OFFICE VISIT (OUTPATIENT)
Dept: FAMILY MEDICINE | Facility: CLINIC | Age: 67
End: 2022-04-04
Payer: MEDICARE

## 2022-04-04 VITALS
SYSTOLIC BLOOD PRESSURE: 134 MMHG | HEART RATE: 81 BPM | OXYGEN SATURATION: 96 % | DIASTOLIC BLOOD PRESSURE: 68 MMHG | TEMPERATURE: 98 F | WEIGHT: 293 LBS | BODY MASS INDEX: 47.09 KG/M2 | HEIGHT: 66 IN

## 2022-04-04 DIAGNOSIS — Z00.00 ENCOUNTER FOR MEDICARE ANNUAL WELLNESS EXAM: Primary | ICD-10-CM

## 2022-04-04 DIAGNOSIS — G89.29 CHRONIC BILATERAL LOW BACK PAIN WITH BILATERAL SCIATICA: ICD-10-CM

## 2022-04-04 DIAGNOSIS — M54.41 CHRONIC BILATERAL LOW BACK PAIN WITH BILATERAL SCIATICA: ICD-10-CM

## 2022-04-04 DIAGNOSIS — M54.42 CHRONIC BILATERAL LOW BACK PAIN WITH BILATERAL SCIATICA: ICD-10-CM

## 2022-04-04 DIAGNOSIS — E28.39 OVARIAN FAILURE DUE TO MENOPAUSE: ICD-10-CM

## 2022-04-04 DIAGNOSIS — I26.99 OTHER ACUTE PULMONARY EMBOLISM, UNSPECIFIED WHETHER ACUTE COR PULMONALE PRESENT (H): ICD-10-CM

## 2022-04-04 DIAGNOSIS — E66.01 MORBID OBESITY WITH BMI OF 50.0-59.9, ADULT (H): ICD-10-CM

## 2022-04-04 DIAGNOSIS — K22.719 BARRETT'S ESOPHAGUS WITH DYSPLASIA: ICD-10-CM

## 2022-04-04 PROCEDURE — 99214 OFFICE O/P EST MOD 30 MIN: CPT | Mod: 25 | Performed by: FAMILY MEDICINE

## 2022-04-04 PROCEDURE — G0438 PPPS, INITIAL VISIT: HCPCS | Performed by: FAMILY MEDICINE

## 2022-04-04 RX ORDER — OMEPRAZOLE 20 MG/1
20 TABLET, DELAYED RELEASE ORAL
COMMUNITY
End: 2022-04-04

## 2022-04-04 ASSESSMENT — PAIN SCALES - GENERAL: PAINLEVEL: MILD PAIN (2)

## 2022-04-04 NOTE — PROGRESS NOTES
"  Assessment & Plan     Encounter for Medicare annual wellness exam  Discussed healthy lifestyle and preventative cares.      Morbid obesity with BMI of 50.0-59.9, adult (H)  Working on weight, diet    Ovarian failure due to menopause  Get scan  - DEXA HIP/PELVIS/SPINE - Future; Future    Napoles's esophagus with dysplasia  Noted has this from biopsy per her report, tried H2 blocker, failed, needs PPI, however high risk drug interaction with methotrexate, see if she can do interrupted therapy on the day she takes Methotrexate since half life it could work, however check with specialist  - omeprazole (PRILOSEC) 20 MG DR capsule; Take 1 capsule (20 mg) by mouth daily    Chronic bilateral low back pain with bilateral sciatica  Need to check due to chronic back pain since last summer  - MR Lumbar Spine w/o Contrast; Future    Other acute pulmonary embolism, unspecified whether acute cor pulmonale present (H)    - rivaroxaban ANTICOAGULANT (XARELTO ANTICOAGULANT) 20 MG TABS tablet; Take 1 tablet (20 mg) by mouth daily (with dinner)             BMI:   Estimated body mass index is 49.43 kg/m  as calculated from the following:    Height as of this encounter: 1.683 m (5' 6.25\").    Weight as of this encounter: 140 kg (308 lb 9.6 oz).   Weight management plan: diet    See Patient Instructions    Return in about 53 weeks (around 4/10/2023) for Annual Wellness Visit.    Hitesh Washington MD  [facility]    Fletcher FLORES is a 66 year old who presents for the following health issues   Chief Complaint   Patient presents with     Follow Up     EGD. From [facility]. done on 3/21/2022.     History of Present Illness       Back Pain:  She presents for follow up of back pain. Patient's back pain is a recurring problem.  Location of back pain:  Right middle of back  Description of back pain: sharp  Back pain spreads: left buttocks    Since patient first noticed back pain, pain is: always present, but gets " "better and worse  Does back pain interfere with her job:  Not applicable      Reason for visit:  Wellness check and acid reflux  Symptom onset:  More than a month  Symptoms include:  Heavy phlegm  Symptom intensity:  Moderate  Had these symptoms before:  Yes  Has tried/received treatment for these symptoms:  Yes  Previous treatment was successful:  No   She is taking medications regularly.       Annual Wellness Visit  Patient has been advised of split billing requirements and indicates understanding: Yes     Are you in the first 12 months of your Medicare Part B coverage?  No    Physical Health:    In general, how would you rate your overall physical health? good    Outside of work, how many days during the week do you exercise?none    Outside of work, approximately how many minutes a day do you exercise?not applicable    If you drink alcohol do you typically have >3 drinks per day or >7 drinks per week? No    Do you usually eat at least 4 servings of fruit and vegetables a day, include whole grains & fiber and avoid regularly eating high fat or \"junk\" foods? Yes    Do you have any problems taking medications regularly? No    Do you have any side effects from medications? none    Needs assistance for the following daily activities: no assistance needed    Which of the following safety concerns are present in your home?  none identified     Hearing impairment: No    In the past 6 months, have you been bothered by leaking of urine? no    Mental Health:    In general, how would you rate your overall mental or emotional health? excellent  PHQ-2 Score: (P) 0    Do you feel safe in your environment? Yes    Have you ever done Advance Care Planning? (For example, a Health Directive, POLST, or a discussion with a medical provider or your loved ones about your wishes)? Yes, patient states has an Advance Care Planning document and will bring a copy to the clinic.    Fall risk:  Fallen 2 or more times in the past year?: No  Any " "fall with injury in the past year?: No     Cognitive Screenin) Repeat 3 items (Leader, Season, Table)    2) Clock draw: NORMAL  3) 3 item recall: Recalls 3 objects  Results: 3 items recalled: COGNITIVE IMPAIRMENT LESS LIKELY    Mini-CogTM Copyright S Kiya. Licensed by the author for use in [facility]; reprinted with permission (morro@[facility]). All rights reserved.      Do you have sleep apnea, excessive snoring or daytime drowsiness?: yes- excessive snoring    Current providers sharing in care for this patient include:   Patient Care Team:  Hitesh Washington MD as PCP - General  Mitch Briceño [facility] as Pharmacist (Pharmacist Clinician- Clinical Pharmacy Specialist)  Cipriano Springer MD as Assigned Surgical Provider  Joseph Rooney MD as Assigned Rheumatology Provider  Jamshid Can MD as Assigned PCP    Patient has been advised of split billing requirements and indicates understanding: Yes      Review of Systems   Review Of Systems  Skin: negative  Eyes: negative  Ears/Nose/Throat: negative  Respiratory: No shortness of breath, dyspnea on exertion, cough, or hemoptysis  Cardiovascular: negative  Gastrointestinal: has gerd and had scopes  Genitourinary: negative  Musculoskeletal: as above  Neurologic: negative  Psychiatric: negative  Hematologic/Lymphatic/Immunologic: negative  Endocrine: negative        Objective    /68   Pulse 81   Temp 98  F (36.7  C) (Tympanic)   Ht 1.683 m (5' 6.25\")   Wt 140 kg (308 lb 9.6 oz)   SpO2 96%   BMI 49.43 kg/m    Body mass index is 49.43 kg/m .  Physical Exam   GENERAL: healthy, alert and no distress  EYES: Eyes grossly normal to inspection, PERRL and conjunctivae and sclerae normal  HENT: ear canals and TM's normal, nose and mouth without ulcers or lesions  NECK: no adenopathy, no asymmetry, masses, or scars and thyroid normal to palpation  RESP: lungs clear to auscultation - no rales, rhonchi or wheezes  CV: regular rate " and rhythm, normal S1 S2, no S3 or S4, no murmur, click or rub, no peripheral edema and peripheral pulses strong  ABDOMEN: soft, nontender, no hepatosplenomegaly, no masses and bowel sounds normal  MS: no gross musculoskeletal defects noted, no edema, SLR was negative, minimal pain in lumbar midline lower back  SKIN: no suspicious lesions or rashes  NEURO: Normal strength and tone, mentation intact and speech normal  PSYCH: mentation appears normal, affect normal/bright  LYMPH: no cervical adenopathy

## 2022-04-04 NOTE — PATIENT INSTRUCTIONS
Please get a dexa scan to check for osteoporosis.    Please check with Dr. Rooney to see if you can skip the dose of omeprazole when you take the methotrexate medication.  You do have changes to your esophagus that could lead to esophageal cancer called Napoles's Esophagus .  You need to be on a stronger acid blocker called a proton pump inhibitor.  We will see if we can use this by interrupting the use when you take the methotrexate.    Please get an MRI of your lumbar back due to your chronic back pain.      Please be aware that there will be an additional charge during your preventative visit due to either a new diagnosis and/or chronic disease management.    Preventative visits screen for diseases prior to they occur.  They do not cover for any new diagnosis or chronic disease management which would include medication refills, labs etc.    If you have questions regarding your coverage please check with your insurance provider.  At [facility] we need to code correctly to be in compliance with all insurance companies.          Thank you for choosing [facility].  You may be receiving an email and/or telephone survey request from [facility] Customer Experience regarding your visit today.  Please take a few minutes to respond to the survey to let us know how we are doing.      If you have questions or concerns, please contact us via [facility] or you can contact your care team at [phone] option [phone].    Our Clinic hours are:  Monday - Thursday 7am-6pm  Friday 7am-5pm    The [place] outpatient lab hours are:  Monday - Friday 7am-4:30pm    Appointments are required, call [phone]    If you have clinical questions after hours or would like to schedule an appointment,  call the clinic at [phone].            Patient Education   Personalized Prevention Plan  You are due for the preventive services outlined below.  Your care team is available to assist you in scheduling these services.  If you have already  completed any of these items, please share that information with your care team to update in your medical record.  Health Maintenance Due   Topic Date Due     Osteoporosis Screening  Never done     Flu Vaccine (1) Never done     COVID-19 Vaccine (4 - Booster for Pfizer series) 02/10/2022

## 2022-04-19 ENCOUNTER — VIRTUAL VISIT (OUTPATIENT)
Dept: RHEUMATOLOGY | Facility: CLINIC | Age: 67
End: 2022-04-19
Payer: MEDICARE

## 2022-04-19 DIAGNOSIS — M05.79 RHEUMATOID ARTHRITIS INVOLVING MULTIPLE SITES WITH POSITIVE RHEUMATOID FACTOR (H): Primary | Chronic | ICD-10-CM

## 2022-04-19 DIAGNOSIS — Z79.899 HIGH RISK MEDICATION USE: ICD-10-CM

## 2022-04-19 PROCEDURE — 99214 OFFICE O/P EST MOD 30 MIN: CPT | Mod: 95 | Performed by: INTERNAL MEDICINE

## 2022-04-19 RX ORDER — METHOTREXATE 25 MG/ML
25 INJECTION, SOLUTION INTRA-ARTERIAL; INTRAMUSCULAR; INTRAVENOUS
Qty: 8 ML | Refills: 3 | Status: SHIPPED | OUTPATIENT
Start: 2022-04-19 | End: 2022-07-19

## 2022-04-19 NOTE — PROGRESS NOTES
"Kaylene Diaz  is a 66 year old year old who is being evaluated via a billable video visit.      How would you like to obtain your AVS? MyChart  If the video visit is dropped, the invitation should be resent by: Text to cell phone: [phone]   Will anyone else be joining your video visit? No     Rheumatology Video Visit      Kaylene Diaz MRN# [id number]   YOB: 1955 Age: 66 year old      Date of visit: 4/19/22  PCP: Dr. Hitesh Washington    Chief Complaint   Patient presents with:  Rheumatoid Arthritis    Assessment and Plan     1.  Seropositive rheumatoid arthritis (RF 32, CCP >250): Currently on methotrexate 25 mg SQ once  weekly, hydroxychloroquine 400 mg daily.  Previously on sulfasalazine that was discontinued when she found no benefit from it; but she also did not require prednisone while on sulfasalazine; questioning if the \"benefit\" from sulfasalazine was actually the \"benefit\" of not gardening.  Does not tolerate folic acid because of associated increased sunburns.  March 2022 labs reviewed in care everywhere.  Check labs every 8 weeks for the next 2 times because of starting omeprazole daily that may increase methotrexate levels.  Chronic illness, stable.      - Continue methotrexate 25mg SQ once weekly   - Continue hydroxychloroquine 400 mg daily (last eye exam on 11/2/2020; yearly eye exam required)  - Continue prednisone 5 mg daily as needed for rheumatoid arthritis symptoms; use sparingly (reportedly hasn't used for over 12 months)  - Labs in May: CBC, Creatinine, Hepatic Panel (lab orders to be faxed to [facility] in [place])  - Labs in July: CBC, Creatinine, Hepatic Panel, ESR, CRP (lab orders to be faxed to [facility] in [place])    High risk medication requiring intensive toxicity monitoring at least quarterly: labs ordered include CBC, Creatinine, Hepatic panel to monitor for cytopenia and hepatotoxicity; checking creatinine as it affects clearance of medication.       2.  Basal cell carcinoma and " squamous cell carcinoma: Several lesions removed by dermatology in the past.  Continue to follow with dermatology.     3.  Chronic lower back pain: Has significantly improved with lidocaine patches that have been used no more than 10 times in the past 1 year.  She has been using her mother's lidocaine patches.  She now is planning to have an MRI of the lumbar spine and anticipates possibly needing an injection; this is being managed by her primary care provider.    4.  History of right Achilles tendinitis: Was evaluated by podiatry who diagnosed her with Achilles tendinitis and her symptoms resolved with a boot that she wore at night.  Not an issue today.     5. Bone Health: advised DEXA previously and she plans to have done soon; the DEXA has been ordered by her primary care provider    6.  Productive cough: has seen an ENT provider who reportedly ran allergy tests that came back negative.  She then tried prilosec (omeprazole wasn't effective) 20mg daily that resolved the cough, but stopping it results in return of symptoms.  Then had an EGD showing gastritis.  Planning to start omeprazole 20 mg daily.      7.  Muscle spasms: Primarily affecting the lower back.  Improved from infrequent cyclobenzaprine 5 mg nightly as needed, but finds that using tizanidine 4mg PRN is more effective.   - Continue tiZANidine (ZANAFLEX) 4 MG capsule; Take 1 capsule (4 mg) by mouth 3 times daily as needed for muscle spasms  Dispense: 90 capsule; Refill: 1    8.  Vaccinations: Vaccinations reviewed with Ms. Diaz.  Risks and benefits of vaccinations were discussed.    - Influenza: refused by patient previously, she previously stated that she never gets the influenza vaccination.  - [id number]: up to date  - [id number]: up to date  - Shingrix: Up to date   - COVID-19: has received the Pfizer COVID-19 vaccine on 3/5/2021 and 3/26/2021 and 11/10/2021; A 4th dose (1st booster) of the mRNA COVID-19 vaccination is recommended to be received  3 months after the third mRNA COVID-19 vaccination was received.  A 5th mRNA vaccine (2nd booster) may be received at least 4 months after the 4th dose.   Based on our current understanding (and this may change over time as we learn more), medications should be adjusted as noted below, if disease activity allows:  - NSAIDs and Acetaminophen: hold for 24 hours prior to vaccination if able to do so  - Methotrexate should be held for 1-2 weeks after each COVID-19 vaccine (as disease activity allows)    Total minutes spent in evaluation with patient, documentation, , and review of pertinent studies and chart notes: 18    Ms. Diaz verbalized agreement with and understanding of the rational for the diagnosis and treatment plan.  All questions were answered to best of my ability and the patient's satisfaction. Ms. Diaz was advised to contact the clinic with any questions that may arise after the clinic visit.      Thank you for involving me in the care of the patient    Return to clinic: 3-4 months      HPI   Kaylene Diaz is a 66 year old female with a past medical history significant for hyperlipidemia, osteoarthritis, osteopenia, PE (2020) on chronic anticoagulation, meniscal tear, squamous cell carcinoma, basal cell carcinoma, and rheumatoid arthritis who presents for follow-up of rheumatoid arthritis.    Today, 4/19/2022: RA controlled; tolerating methotrexate and hydroxychloroquine.  Low back pain is having further evaluation from her primary care provider with an MRI, and she anticipates that she may need a steroid injection in the future.  Tizanidine for muscle spasms has been helpful.  Plans to start omeprazole 20 mg daily for gastritis; had an EGD showing gastritis.  Productive cough previously resolved with using omeprazole, and stopping it caused return of symptoms.      Denies fevers, chills, nausea, vomiting, constipation, diarrhea. No abdominal pain. No chest pain/pressure, palpitations, or  shortness of breath.   No LE swelling. No neck pain. No oral or nasal sores.  No rash. No sicca symptoms.   Trying to lose weight.     Tobacco: Quit in 2000  EtOH: Rare  Drugs: None    ROS   12 point review of system was completed and negative except as noted in the HPI     Active Problem List     Patient Active Problem List   Diagnosis     Carpal tunnel syndrome     Tear meniscus knee     Osteopenia     Advanced directives, counseling/discussion     Hyperlipidemia LDL goal <160     Osteoarthritis     RA (rheumatoid arthritis) (H)     High risk medications (not anticoagulants) long-term use     Kidney stone     ASCUS on Pap smear     Morbid obesity due to excess calories (H)     Small bowel obstruction (H)     Elevated blood pressure reading without diagnosis of hypertension     BLANCA (acute kidney injury) (H)     Elevated glucose     SBO (small bowel obstruction) (H)     Rheumatoid arthritis involving multiple sites with positive rheumatoid factor (H)     Past Medical History     Past Medical History:   Diagnosis Date     Basal cell carcinoma      RA (rheumatoid arthritis) (H)      Small bowel obstruction (H)      Squamous cell carcinoma      Past Surgical History     Past Surgical History:   Procedure Laterality Date     ARTHROSCOPY KNEE  12/10/2010    ARTHROSCOPY KNEE performed by NAVID FEIRRO at [facility]     COLONOSCOPY N/A 8/9/2018    Procedure: COLONOSCOPY;  colonoscopy;  Surgeon: Luke Kaye MD;  Location: [facility]     SURGICAL HISTORY OF -       IUD removal     SURGICAL HISTORY OF -       Ovarian cyst     SURGICAL HISTORY OF -       thorn removal from foot     SURGICAL HISTORY OF -       knee surgery, scope, right knee     Allergy     Allergies   Allergen Reactions     Folic Acid      Gold Rash     Latex Rash     Not all latex products     Current Medication List     Current Outpatient Medications   Medication Sig     albuterol (PROAIR HFA/PROVENTIL HFA/VENTOLIN HFA) 108 (90 Base) MCG/ACT inhaler Inhale 2  "puffs into the lungs every 6 hours     fluticasone (FLONASE) 50 MCG/ACT nasal spray Spray 1 spray into both nostrils daily     hydroxychloroquine (PLAQUENIL) 200 MG tablet Take 2 tablets (400 mg) by mouth daily     methotrexate 50 MG/2ML injection Inject 1 mL (25 mg) Subcutaneous every 7 days     Multiple Vitamins-Minerals (ADULT GUMMY PO) Take 1 chew tab by mouth daily VitaCrave     Multiple Vitamins-Minerals (HAIR/SKIN/NAILS/BIOTIN PO) Take 1 capsule by mouth daily.      omeprazole (PRILOSEC) 20 MG DR capsule Take 1 capsule (20 mg) by mouth daily     rivaroxaban ANTICOAGULANT (XARELTO ANTICOAGULANT) 20 MG TABS tablet Take 1 tablet (20 mg) by mouth daily (with dinner)     tiZANidine (ZANAFLEX) 4 MG capsule Take 1 capsule (4 mg) by mouth 3 times daily as needed for muscle spasms     vitamin B complex with vitamin C (STRESS TAB) tablet Take 1 tablet by mouth daily     Insulin Syringe-Needle U-100 (BD INSULIN SYRINGE) 27G X 1/2\" 1 ML MISC For once weekly methotrexate administration.     No current facility-administered medications for this visit.         Social History   See HPI    Family History     Family History   Problem Relation Age of Onset     Prostate Cancer Father      Eye Disorder Father         mac degen     Heart Disease Father      Macular Degeneration Father      Heart Disease Mother         a-fib     Eye Disorder Mother      C.A.D. Mother      Hypertension Mother      Alcohol/Drug Brother      Alcohol/Drug Sister      Alcohol/Drug Brother      Alcohol/Drug Brother      Alcohol/Drug Brother      Alcohol/Drug Sister      Obesity Sister      Alcohol/Drug Sister      Prostate Cancer Sister      Cancer Other      Melanoma No family hx of        Physical Exam     Temp Readings from Last 3 Encounters:   04/04/22 98  F (36.7  C) (Tympanic)   07/07/21 97.3  F (36.3  C) (Tympanic)   07/29/20 98  F (36.7  C) (Tympanic)     BP Readings from Last 5 Encounters:   04/04/22 134/68   07/07/21 (!) 138/90   10/28/20 (!) " "161/84   07/29/20 110/70   04/22/20 127/62     Pulse Readings from Last 1 Encounters:   04/04/22 81     Resp Readings from Last 1 Encounters:   07/29/20 17     Estimated body mass index is 49.43 kg/m  as calculated from the following:    Height as of 4/4/22: 1.683 m (5' 6.25\").    Weight as of 4/4/22: 140 kg (308 lb 9.6 oz).      GEN: NAD.   HEENT: MMM.  Anicteric, noninjected sclera. No obvious external lesions of the ear and nose. Hearing intact.  PULM: No increased work of breathing  MSK: Mild ulnar deviation at the MCPs; mild chronic swelling at the MCPs that appears unchanged; no overlying erythema.  PIPs and DIPs without swelling.  Wrists without swelling.  SKIN: No rash or jaundice seen  PSYCH: Alert. Appropriate.      Labs / Imaging (select studies)     [facility] labs reviewed from 3/1/2022    CBC  Recent Labs   Lab Test 04/22/20  1156 10/28/19  1052 08/29/19  0855   WBC 8.2 5.3 5.8   RBC 4.59 4.44 4.60   HGB 14.4 13.9 14.2   HCT 44.1 42.1 43.2   MCV 96 95 94   RDW 12.8 14.4 14.0    225 240   MCH 31.4 31.3 30.9   MCHC 32.7 33.0 32.9   NEUTROPHIL 70.6 71.0 73.2   LYMPH 16.3 19.9 15.9   MONOCYTE 11.2 7.2 8.7   EOSINOPHIL 1.5 1.7 1.9   BASOPHIL 0.4 0.2 0.3   ANEU 5.8 3.8 4.3   ALYM 1.3 1.1 0.9   PEEWEE 0.9 0.4 0.5   AEOS 0.1 0.1 0.1   ABAS 0.0 0.0 0.0     CMP  Recent Labs   Lab Test 03/09/21  0000 12/22/20  0000 04/22/20  1156 03/05/20  0000 10/28/19  1052 08/29/19  0855 03/26/18  1034 10/31/17  1110 06/23/17  0817   NA  --   --  138  --   --   --   --  137 139   POTASSIUM  --   --  4.2  --   --   --   --  4.5 4.4   CHLORIDE  --   --  107  --   --   --   --  108 106   CO2  --   --  27  --   --   --   --  24 24   ANIONGAP  --   --  4  --   --   --   --  5 9   GLC  --   --  81  --   --   --   --  84 115*   BUN  --   --  22  --   --   --   --  22 14   CR 1.01 0.98 0.74   < > 0.72 0.75   < > 0.88 0.75   GFRESTIMATED 55* 57* 85   < > 88 84   < > 65 78   GFRESTBLACK >60 >60 >90   < > >90 >90   < > 79 >90   " American GFR Calc     DEAN  --   --  9.6  --   --   --   --  9.2 9.0   BILITOTAL  --   --  0.4  --  0.5 0.5   < > 0.4 0.4   ALBUMIN  --   --  3.6  --  3.7 3.6   < > 3.7 3.8   PROTTOTAL  --   --  8.1  --  7.6 7.6   < > 8.0 7.5   ALKPHOS  --   --  118  --  109 118   < > 131 120   AST 15 15 13   < > 19 21   < > 17 22   ALT 26 28 22   < > 28 23   < > 24 26    < > = values in this interval not displayed.     Calcium/VitaminD  Recent Labs   Lab Test 04/22/20  1156 10/31/17  1110 06/23/17  0817   DEAN 9.6 9.2 9.0     ESR/CRP  Recent Labs   Lab Test 10/28/19  1052 08/29/19  0855 10/23/18  1000   SED 15 11 14   CRP 5.1 7.3 4.7     Immunization History     Immunization History   Administered Date(s) Administered     COVID-19,PF,Pfizer (12+ Yrs) 03/05/2021, 03/26/2021, 11/10/2021     Pneumo Conj 13-V (2010&after) 12/03/2018     Pneumococcal 23 valent 10/24/2013, 03/09/2020     TD (ADULT, 7+) 10/28/2020     TDAP Vaccine (Adacel) 06/16/2010     Zoster vaccine recombinant adjuvanted (SHINGRIX) 12/03/2018, 06/19/2019          Chart documentation done in part with Dragon Voice recognition Software. Although reviewed after completion, some word and grammatical error may remain.      Video-Visit Details    Type of service:  Video Visit    Video Start Time: 10:00 AM    Video End Time:10:14 AM    Originating Location (pt. Location): [place], [place]    Distant Location (provider location):  [place]    Platform used for Video Visit: Gloria Rooney MD

## 2022-04-19 NOTE — PROGRESS NOTES
[facility]: please fax the lab orders entered today (printed) to [facility] in [place], [place].    Thanks!  Joseph Rooney MD  4/19/2022

## 2022-04-19 NOTE — Clinical Note
[facility]: please fax the lab orders entered today (printed) to [facility] in [place], [place].  Thanks! Joseph Rooney MD 4/19/2022

## 2022-04-19 NOTE — PATIENT INSTRUCTIONS
RHEUMATOLOGY    Dr. Joseph Rooney    [facility]  [address] [place] [address]  Phone number: [phone]  Fax number: [phone]      Thank you for choosing [facility]!    Keke Finnegan CMA Rheumatology    ----------------------    Labs are needed in May and July    Hydroxychloroquine toxicity monitoring eye exam that includes 10-2 VF and SD-OCT is needed now.  This is required each year, and the last documented eye exam that I have for review was on 11/2/2020.    For the COVID vaccination: A 4th dose (1st booster) of the mRNA COVID-19 vaccination is recommended to be received 3 months after the third mRNA COVID-19 vaccination was received.  A 5th mRNA vaccine (2nd booster) may be received at least 4 months after the 4th dose.   Based on our current understanding (and this may change over time as we learn more), medications should be adjusted as noted below, if disease activity allows:  - NSAIDs and Acetaminophen: hold for 24 hours prior to vaccination if able to do so  - Methotrexate should be held for 1-2 weeks after each COVID-19 vaccine (as disease activity allows)

## 2022-04-20 ENCOUNTER — TELEPHONE (OUTPATIENT)
Dept: RHEUMATOLOGY | Facility: CLINIC | Age: 67
End: 2022-04-20
Payer: MEDICARE

## 2022-04-20 NOTE — TELEPHONE ENCOUNTER
Prior Authorization Retail Medication Request    Medication/Dose: methotrexate 50 MG/2ML injection  ICD code (if different than what is on RX):  M05.79  Previously Tried and Failed:    Rationale:      Insurance Name:    Insurance ID:        Pharmacy Information (if different than what is on RX)  Name:  [facility]  Phone:  [phone]

## 2022-04-26 NOTE — TELEPHONE ENCOUNTER
Central Prior Authorization Team   Phone: [phone]    PA Initiation    Medication: methotrexate 50 MG/2ML injection  Insurance Company: Silver [employer] Part D - Phone [phone] Fax [phone]  Pharmacy Filling the Rx: [facility][id number] - [place], [place] - [address]  Filling Pharmacy Phone: [phone]  Filling Pharmacy Fax: [phone]  Start Date: 4/26/2022

## 2022-04-27 ENCOUNTER — HOSPITAL ENCOUNTER (OUTPATIENT)
Dept: MRI IMAGING | Facility: CLINIC | Age: 67
Discharge: HOME | End: 2022-04-27
Attending: FAMILY MEDICINE
Payer: MEDICARE

## 2022-04-27 ENCOUNTER — HOSPITAL ENCOUNTER (OUTPATIENT)
Dept: BONE DENSITY | Facility: CLINIC | Age: 67
Discharge: HOME | End: 2022-04-27
Attending: FAMILY MEDICINE
Payer: MEDICARE

## 2022-04-27 DIAGNOSIS — M54.42 CHRONIC BILATERAL LOW BACK PAIN WITH BILATERAL SCIATICA: ICD-10-CM

## 2022-04-27 DIAGNOSIS — G89.29 CHRONIC BILATERAL LOW BACK PAIN WITH BILATERAL SCIATICA: ICD-10-CM

## 2022-04-27 DIAGNOSIS — M54.41 CHRONIC BILATERAL LOW BACK PAIN WITH BILATERAL SCIATICA: ICD-10-CM

## 2022-04-27 DIAGNOSIS — E28.39 OVARIAN FAILURE DUE TO MENOPAUSE: ICD-10-CM

## 2022-04-27 PROCEDURE — 77080 DXA BONE DENSITY AXIAL: CPT

## 2022-04-27 PROCEDURE — 72148 MRI LUMBAR SPINE W/O DYE: CPT | Mod: ME

## 2022-04-27 NOTE — TELEPHONE ENCOUNTER
Prior Authorization Approval    Authorization Effective Date: 1/26/2022  Authorization Expiration Date: 4/26/2023  Medication: methotrexate 50 MG/2ML injection - APPROVED  Approved Dose/Quantity:    Reference #:     Insurance Company: Silver [employer] Part D - Phone [phone] Fax [phone]  Expected CoPay:       CoPay Card Available:      Foundation Assistance Needed:    Which Pharmacy is filling the prescription (Not needed for infusion/clinic administered): [facility] #[id number] - [place], [place] - [address]  Pharmacy Notified: Yes  Patient Notified: Yes  **Instructed pharmacy to notify patient when script is ready to /ship.**

## 2022-04-28 ENCOUNTER — MYC MEDICAL ADVICE (OUTPATIENT)
Dept: FAMILY MEDICINE | Facility: CLINIC | Age: 67
End: 2022-04-28
Payer: MEDICARE

## 2022-04-28 DIAGNOSIS — M54.41 CHRONIC BILATERAL LOW BACK PAIN WITH BILATERAL SCIATICA: Primary | ICD-10-CM

## 2022-04-28 DIAGNOSIS — M54.42 CHRONIC BILATERAL LOW BACK PAIN WITH BILATERAL SCIATICA: Primary | ICD-10-CM

## 2022-04-28 DIAGNOSIS — G89.29 CHRONIC BILATERAL LOW BACK PAIN WITH BILATERAL SCIATICA: Primary | ICD-10-CM

## 2022-04-29 NOTE — TELEPHONE ENCOUNTER
Forwarding patient's MyChart response to imaging notes (pasted below) and request for referral to PCP.      Lois Ortiz RN  [facility]        Porsche Maurice,  Your MRI shows degenerative changes.  There are some areas where the nerves exit the lumbar spine where the nerve has some compression.  I recommend to get a pain consult to get an epidural steroid injection.  Please let me know if you agree and I will put in the referral to have the procedure done.  Sincerely,  Hitesh Washington MD   Written by Hitesh Washington MD on 4/28/2022  8:37 AM CDT  Seen by patient MARK Diaz on 4/28/2022 10:38 AM

## 2022-06-02 ENCOUNTER — TELEPHONE (OUTPATIENT)
Dept: FAMILY MEDICINE | Facility: CLINIC | Age: 67
End: 2022-06-02
Payer: MEDICARE

## 2022-06-02 NOTE — TELEPHONE ENCOUNTER
Pt called reporting 3 days of pinched nerve in her L hip radiating to the back. The only position of comfort is leaning back in recliner. Pain becomes very overwhelming and pt becomes nauseated. Tylenol, ice, and muscle relaxant are not helping. Pt has an appt for next Thursday for injection in her back. Same day appt made for tomorrow Friday 6/3 with Dr Washington. Pt will try heating pad to see if that decreases pain.   Sissy Grady RN

## 2022-06-03 ENCOUNTER — OFFICE VISIT (OUTPATIENT)
Dept: FAMILY MEDICINE | Facility: CLINIC | Age: 67
End: 2022-06-03
Payer: MEDICARE

## 2022-06-03 VITALS
DIASTOLIC BLOOD PRESSURE: 80 MMHG | OXYGEN SATURATION: 98 % | WEIGHT: 293 LBS | TEMPERATURE: 97.9 F | BODY MASS INDEX: 49.4 KG/M2 | HEART RATE: 77 BPM | SYSTOLIC BLOOD PRESSURE: 160 MMHG

## 2022-06-03 DIAGNOSIS — N18.31 CHRONIC KIDNEY DISEASE, STAGE 3A (H): ICD-10-CM

## 2022-06-03 DIAGNOSIS — G89.29 CHRONIC BILATERAL LOW BACK PAIN WITH BILATERAL SCIATICA: ICD-10-CM

## 2022-06-03 DIAGNOSIS — M54.50 ACUTE LEFT-SIDED LOW BACK PAIN WITHOUT SCIATICA: Primary | ICD-10-CM

## 2022-06-03 DIAGNOSIS — M54.42 CHRONIC BILATERAL LOW BACK PAIN WITH BILATERAL SCIATICA: ICD-10-CM

## 2022-06-03 DIAGNOSIS — M54.41 CHRONIC BILATERAL LOW BACK PAIN WITH BILATERAL SCIATICA: ICD-10-CM

## 2022-06-03 PROCEDURE — 99214 OFFICE O/P EST MOD 30 MIN: CPT | Performed by: FAMILY MEDICINE

## 2022-06-03 RX ORDER — PREDNISONE 10 MG/1
40 TABLET ORAL DAILY
Qty: 20 TABLET | Refills: 0 | Status: SHIPPED | OUTPATIENT
Start: 2022-06-03 | End: 2022-06-08

## 2022-06-03 RX ORDER — HYDROCODONE BITARTRATE AND ACETAMINOPHEN 5; 325 MG/1; MG/1
1 TABLET ORAL EVERY 6 HOURS PRN
Qty: 18 TABLET | Refills: 0 | Status: SHIPPED | OUTPATIENT
Start: 2022-06-03 | End: 2022-06-06

## 2022-06-03 ASSESSMENT — PAIN SCALES - GENERAL: PAINLEVEL: NO PAIN (0)

## 2022-06-03 NOTE — PATIENT INSTRUCTIONS
Please take the prednisone 40 mg once a day with food for 5 days to see if this help with your back pain.    I also added some hydrocodone to help with your pain.          Thank you for choosing [facility].  You may be receiving an email and/or telephone survey request from [facility] [employer] regarding your visit today.  Please take a few minutes to respond to the survey to let us know how we are doing.      If you have questions or concerns, please contact us via [employer] or you can contact your care team at [phone] option 2.    Our Clinic hours are:  Monday - Thursday 7am-6pm  Friday 7am-5pm    The [place] outpatient lab hours are:  Monday - Friday 7am-4:30pm    Appointments are required, call [phone]    If you have clinical questions after hours or would like to schedule an appointment,  call the clinic at [phone].

## 2022-06-03 NOTE — PROGRESS NOTES
Assessment & Plan     Acute left-sided low back pain without sciatica  Patient is having an acute flare of her chronic back pain, no radicular symptoms, feeling pain in the left lower lumbar region.  Will add narcotic medication for short time and use burst of oral steroids.  Not able to take nsaids due to current medication regiment, will be seeing pain specialist next week for injection.   - predniSONE (DELTASONE) 10 MG tablet; Take 4 tablets (40 mg) by mouth daily for 5 days  - HYDROcodone-acetaminophen (NORCO) 5-325 MG tablet; Take 1 tablet by mouth every 6 hours as needed for pain    Chronic bilateral low back pain with bilateral sciatica    - predniSONE (DELTASONE) 10 MG tablet; Take 4 tablets (40 mg) by mouth daily for 5 days  - HYDROcodone-acetaminophen (NORCO) 5-325 MG tablet; Take 1 tablet by mouth every 6 hours as needed for pain    Chronic kidney disease, stage 3a (H)  Noted and will add to diagnosis               See Patient Instructions    Return in about 6 days (around 6/9/2022) for see the pain specialist for your back.    Hitesh Washington MD  [facility]    Fletcher FLORES is a 66 year old who presents for the following health issues  accompanied by her self.    Musculoskeletal Problem    History of Present Illness       Reason for visit:  Pinched nerve  Symptom onset:  3-7 days ago  Symptom intensity:  Severe  Symptom progression:  Staying the same  Had these symptoms before:  No  What makes it worse:  Walking standing sitting straight  What makes it better:  Leaning backwards in a chair. Or lying down    She eats 2-3 servings of fruits and vegetables daily.She consumes 0 sweetened beverage(s) daily.She exercises with enough effort to increase her heart rate 9 or less minutes per day.  She exercises with enough effort to increase her heart rate 3 or less days per week.   She is taking medications regularly.    Patient has known disc herniation.  Will be seeing pain  specialist for steroid injection.  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  She has been using all of the symptomatic treatments, muscle relaxer, no relief.  Would like some help.  No gi or gu symptoms        Review of Systems         Objective    BP (!) 172/80   Pulse 77   Temp 97.9  F (36.6  C) (Tympanic)   Wt 139.9 kg (308 lb 6.4 oz)   SpO2 98%   BMI 49.40 kg/m    Body mass index is 49.4 kg/m .  Physical Exam   GENERAL APPEARANCE: healthy, alert, no distress and Nourishment morbidly obese   Patient is pleasant, cooperative and in no acute distress.  Back:  Kaylene Diaz had mild difficulty moving from the chair to the exam table.  mild tenderness to palpation in the left lumbar  region.  No skin changes to the area.    no tenderness to the buttock area.  Straight leg raise was neg.  Flexion at hip was 75 degrees. Able to hyperextend and lean from side to side.  Muscle/Skeletal:  Strength was 5/5 of lower extremities.

## 2022-06-05 ASSESSMENT — ENCOUNTER SYMPTOMS
MUSCLE CRAMPS: 0
MUSCLE WEAKNESS: 0
BACK PAIN: 1
ARTHRALGIAS: 0
STIFFNESS: 1
NECK PAIN: 0
MYALGIAS: 1
JOINT SWELLING: 0

## 2022-06-09 ENCOUNTER — TELEPHONE (OUTPATIENT)
Dept: ANESTHESIOLOGY | Facility: CLINIC | Age: 67
End: 2022-06-09

## 2022-06-09 ENCOUNTER — OFFICE VISIT (OUTPATIENT)
Dept: ANESTHESIOLOGY | Facility: CLINIC | Age: 67
End: 2022-06-09
Payer: MEDICARE

## 2022-06-09 VITALS — SYSTOLIC BLOOD PRESSURE: 159 MMHG | DIASTOLIC BLOOD PRESSURE: 76 MMHG | OXYGEN SATURATION: 97 % | HEART RATE: 66 BPM

## 2022-06-09 DIAGNOSIS — G89.29 CHRONIC BILATERAL LOW BACK PAIN WITH BILATERAL SCIATICA: ICD-10-CM

## 2022-06-09 DIAGNOSIS — M54.41 CHRONIC BILATERAL LOW BACK PAIN WITH BILATERAL SCIATICA: ICD-10-CM

## 2022-06-09 DIAGNOSIS — M54.42 CHRONIC BILATERAL LOW BACK PAIN WITH BILATERAL SCIATICA: ICD-10-CM

## 2022-06-09 PROCEDURE — 99204 OFFICE O/P NEW MOD 45 MIN: CPT | Performed by: ANESTHESIOLOGY

## 2022-06-09 ASSESSMENT — PAIN SCALES - GENERAL: PAINLEVEL: MODERATE PAIN (4)

## 2022-06-09 NOTE — TELEPHONE ENCOUNTER
----- Message from Hitesh Washington MD sent at 6/9/2022  1:15 PM CDT -----  Regarding: RE: Blood Thinner Hold  Porsche Ashley,  That should be fine.  Sincerely,  Hitesh Washington MD    ----- Message -----  From: Gabi Cain RN  Sent: 6/9/2022  12:18 PM CDT  To: Hitesh Washington MD, Gabi Cain, RN, #  Subject: Blood Thinner Hold                               Hello,    My name is Gabi, I am one of the Nurses working with the [facility]. Our mutual patient, Kaylene, had an appointment with one of our providers, Dr. Davies, and  ordered the following procedure: Lumbar Epidural Steroid Injection    This would require the patient to hold their Xarelto for 3 days before procedure, per [facility] guidlines.     Is this something that would be agreeable to you?      Please reach out to our staff if you have any questions or concerns.   Thank you for your time.       Gabi Cain, RN  [facility]  [phone]

## 2022-06-09 NOTE — LETTER
6/9/2022       RE: Kaylene Diaz  [address] [place] [address]     Dear Colleague,    Thank you for referring your patient, Kaylene Diaz, to the [facility] [place] at [facility]. Please see a copy of my visit note below.                          [facility] Consultation    Date of visit: 6/9/2022    Reason for consultation:    Kaylene Diaz is a 66 year old female who is seen in consultation today at the request of her provider, Hitesh Washington.    Primary Care Provider is Hitesh Washington.  Pain medications are being prescribed by PCP.    Please see the [facility] health questionnaire which the patient completed and reviewed with me in detail.    Chief Complaint:    Chief Complaint   Patient presents with     Consult     New Consult RM 12 Middle left Lower Back Pain Level  4/10       Pain history:  Kaylene Diaz is a 66 year old female who first started having problems with pain in the lower back that has been present for many years but has progressed in the last year. She describes chronic pain in the lower back that radiates in bilateral lower extremities in the lateroposterior aspect of her legs sometimes radiating below her knees. She notes a sudden, recent, exacerbation of symptoms that caused pain radiating down the left lateral hip. This pain came on suddently approximately 10 days ago that was worse with standing. The pain comes on and stays, constant, and was so severe that it caused nausea. As soon as she sat down in a reclined, outstretched manner, the pain slowly subsided within 30 seconds. She just completed a course of prednisone that significantly improved the pain. However, she continues to experience the chronic pain radiating from the middle of her back to bilateral lower extremities.     Pain rating: Averages 4/10 on a 0-10 scale.  Aggravating  "factors include: standing, walking  Relieving factors include: sitting  Any bowel or bladder incontinence: denies    Current treatments include:  Plaquenil (Rheumatoid Arthritis)  Methotrexate (rheumatoid arthritis)  Tizanidine 4 mg TID - taking that for \"stiffness in the morning\"     Rivaroxaban (Xarelto)      Other treatments have included:  Kaylene Diaz has not been seen at a pain clinic in the past.    PT: no  Acupuncture: no  TENs Unit: no  Injections: no    Past Medical History:  Past Medical History:   Diagnosis Date     Basal cell carcinoma      RA (rheumatoid arthritis) (H)      Small bowel obstruction (H)      Squamous cell carcinoma      Patient Active Problem List    Diagnosis Date Noted     Chronic kidney disease, stage 3a (H) 06/03/2022     Priority: Medium     Rheumatoid arthritis involving multiple sites with positive rheumatoid factor (H) 03/26/2018     Priority: Medium     BLANCA (acute kidney injury) (H) 04/20/2017     Priority: Medium     Elevated glucose 04/20/2017     Priority: Medium     SBO (small bowel obstruction) (H) 04/20/2017     Priority: Medium     Elevated blood pressure reading without diagnosis of hypertension 03/14/2017     Priority: Medium     Small bowel obstruction (H) 01/19/2016     Priority: Medium     Morbid obesity due to excess calories (H) 12/07/2015     Priority: Medium     High risk medications (not anticoagulants) long-term use 11/07/2013     Priority: Medium     Kidney stone 10/15/2013     Priority: Medium     Seen at hospitals in [place], [place]       RA (rheumatoid arthritis) (H) 09/11/2013     Priority: Medium     Osteoarthritis 09/02/2013     Priority: Medium     Hyperlipidemia LDL goal <160 01/04/2012     Priority: Medium     ASCUS on Pap smear 12/27/2011     Priority: Medium     12/27/11: Pap - ASCUS, Neg HPV. Plan cotesting in 3 yrs.   3/21/14: Pap - NIL, Neg HPV. Routine screening         Tear meniscus knee 12/08/2010     Priority: Medium     Osteopenia 12/08/2010     " Priority: Medium     Testing done at chiropracter office - told she has decreased density       Carpal tunnel syndrome 05/16/2007     Priority: Medium     Advanced directives, counseling/discussion 12/27/2011     Priority: Low               Advance Directive received and scanned. Click on Code in the patient header to view. 12/27/2011

## 2022-06-09 NOTE — PATIENT INSTRUCTIONS
Referrals:    Physical Therapy Referral placed- External      Procedures:    Call to schedule your procedure: [phone], option #2    Lumbar Epidural Steroid Injection    Your pre-procedure instructions are below, please call our clinic if you have any questions.        Recommended Follow up:      Follow up 6-8 weeks after procedure.          Please call [phone], option #1 to schedule your clinic appointment if you don't already have an appointment scheduled.      Procedure Information related to COVID-19    Please call [phone] to schedule, reschedule, or cancel your procedure appointment.   Phones are answered Monday - Friday from 08:00 - 4:30pm.  Leave a voicemail with your name, birth date, and phone number if no one is available to take your call.     You will need to be tested for COVID-19 within 4 days (96 hours) of your procedure.  You will be called to schedule your COVID test by a central scheduling team. If you have not been contacted to schedule your test within 4 days of the scheduled procedure, call [phone]    Please be aware that the turn around time for the test is approximately 24-72 hours.   If your results are still pending the day of your procedure, you will be notified as soon as possible as the procedure may be cancelled.    Please note: You will only be contacted for positive and pending results.     The procedure center staff will call you several days before the procedure to review important information that you will need to know for the day of the procedure.   Please contact the clinic if you have further questions about this information.       Information related to Scheduling and Pre-Procedure Instructions:    Please Hold Xarelto for 3 days before your procedure.      After calling to schedule your procedure appointment, please contact the clinic to make your follow up clinic appointment 6-8 weeks after the procedure.  Clinic phone- [phone]      If you are having  a Diagnostic procedure, you will be given a Pain Diary to document your pain relief.   Please fax the completed form to our office.   fax number is [phone]    If you must reschedule your procedure more than two times, you must follow up in clinic before rescheduling again.        Preparing for your procedure    CAUTION - FAILURE TO FOLLOW THESE PRE-PROCEDURE INSTRUCTIONS WILL RESULT IN YOUR PROCEDURE BEING RESCHEDULED.      Your procedure: Lumbar Epidural Steroid Injection            You must have a  take you home after your procedure. Transportation by taxi or para-transit is okay as long as you have a responsible adult accompany you. You must provide your 's full name and contact number at time of check in.     Fasting Protocol Please have nothing to eat and drink for 2 hours before the procedure.      Medications If you take any medications, DO NOT STOP. Take your medications as usual the day of your procedure with a sip of water AT LEAST 2 HOURS PRIOR TO ARRIVAL.    Antibiotics If you are currently taking antibiotics, you must complete the entire dose 7 days prior to your scheduled procedure. You must be clear of any signs or symptoms of infection. If you begin antibiotics, please contact our clinic for instructions.     Fever, Chills, or Rash If you experience a fever of higher than 100 degrees, chills, rash, or open wounds during the one week before your procedure, please call the clinic to see if you may proceed with your procedure.      Medication Hold List  **Patients under Cardiology/Neurology care should consult their provider prior to the pain procedure to verify pre-procedure medication instructions. The information below contains general guidelines.**    Please Hold Xarelto for 3 days before your procedure.    Blood Thinners If you are taking daily ASPIRIN, PLAVIX, OR OTHER BLOOD THINNERS SUCH AS COUMADIN/WARFARIN, we will need your prescribing doctor to sign a release permitting  you to stop these medications. Once approved by your prescribing doctor - STOP ALL BLOOD THINNERS BASED ON THE TIME TABLE BELOW PRIOR TO YOUR PROCEDURE. If you have been instructed to stop WARFARIN(COUMADIN), you must have an INR lab drawn the day before your procedure. . Your INR must be within normal limits before we can perform your injection. MEDICATIONS CAN BE RESTARTED AFTER YOUR PROCEDURE.    14 DAY HOLD  Ticlid (ticlopidine)    10 DAY HOLD  Effient (Prasugel)    3 DAY HOLD  Xarelto (rivaroxaban) 7 DAY HOLD  Anacin, Bufferin, Ecotrin, Excedrin, Aggrenox (Aspirin)  Brilinta (ticagrelor)  Coumadin (Warfarin)  Pradexa (Dabigatran)  Elmiron (Pentosan)  Plavix (Clopidogrel Bisulfate)  Pletal (Cilostazol)    24 HOUR HOLD  Lovenox (enoxaparin)  Agrylin (Anagrelide)        Non-steroidal Anti-inflammatories (NSAIDs) DO NOT TAKE any non-steroidal anti-inflammatory medications (NSAIDs) listed on the table below. MEDICATIONS CAN BE RESTARTED AFTER YOUR PROCEDURE. Celebrex is OK to take and does not need to be discontinued.     Medications to stop:  3 DAY HOLD  Advil, Motrin (Ibuprofen)  Arthrotec (diciofenac sodium/misoprostol)  Clinoril (Sulindac)  Indocin (Indomethacin)  Lodine (Etodolac)  Toradol (Ketorolac)  Vicoprofen (Hydrocodone and Ibuprofen)  Voltaren (Diclotenac)    14 DAY HOLD  Daypro (Oxaprozin)  Feldene (Piroxicam)   7 DAY HOLD  Aleve (Naproxen sodium)  Darvon compound (contains aspirin)  Naprosyn (Naproxen)  Norgesic Forte (contains aspirin)  Mobic (Meloxicam)  Oruvall (Ketoprofen)  Percodan (contains aspirin)  Relafen (Nabumetone)  Salsalate  Trilisate  Vitamin E (more than 400 mg per day)  Any medication containing aspirin                To speak with a nurse, schedule/reschedule/cancel a clinic appointment, or request a medication refill call: [phone]     You can also reach us by [employer] [place]: [email or web address]

## 2022-06-09 NOTE — NURSING NOTE
Patient presents with:  Consult: New Consult RM 12 Middle left Lower Back Pain Level  4/10      Moderate Pain (4)         What medications are you using for pain? Hydrocodon    (New patients only) Have you been seen by another pain clinic/ provider? No    (Return Patients only) What refills are you needing today? No    Expectation Is to Be able to walk

## 2022-06-09 NOTE — PROGRESS NOTES
"                      [facility] Consultation    Date of visit: 6/9/2022    Reason for consultation:    Kaylene Diaz is a 66 year old female who is seen in consultation today at the request of her provider, Hitesh Washington.    Primary Care Provider is Hitesh Washington.  Pain medications are being prescribed by PCP.    Please see the [facility] health questionnaire which the patient completed and reviewed with me in detail.    Chief Complaint:    Chief Complaint   Patient presents with     Consult     New Consult RM 12 Middle left Lower Back Pain Level  4/10       Pain history:  Kaylene Diaz is a 66 year old female who first started having problems with pain in the lower back that has been present for many years but has progressed in the last year. She describes chronic pain in the lower back that radiates in bilateral lower extremities in the lateroposterior aspect of her legs sometimes radiating below her knees. She notes a sudden, recent, exacerbation of symptoms that caused pain radiating down the left lateral hip. This pain came on suddently approximately 10 days ago that was worse with standing. The pain comes on and stays, constant, and was so severe that it caused nausea. As soon as she sat down in a reclined, outstretched manner, the pain slowly subsided within 30 seconds. She just completed a course of prednisone that significantly improved the pain. However, she continues to experience the chronic pain radiating from the middle of her back to bilateral lower extremities.     Pain rating: Averages 4/10 on a 0-10 scale.  Aggravating factors include: standing, walking  Relieving factors include: sitting  Any bowel or bladder incontinence: denies    Current treatments include:  Plaquenil (Rheumatoid Arthritis)  Methotrexate (rheumatoid arthritis)  Tizanidine 4 mg TID - taking that for \"stiffness in the morning\"     Rivaroxaban (Xarelto)      Other treatments have " included:  Kaylene Diaz has not been seen at a pain clinic in the past.    PT: no  Acupuncture: no  TENs Unit: no  Injections: no    Past Medical History:  Past Medical History:   Diagnosis Date     Basal cell carcinoma      RA (rheumatoid arthritis) (H)      Small bowel obstruction (H)      Squamous cell carcinoma      Patient Active Problem List    Diagnosis Date Noted     Chronic kidney disease, stage 3a (H) 06/03/2022     Priority: Medium     Rheumatoid arthritis involving multiple sites with positive rheumatoid factor (H) 03/26/2018     Priority: Medium     BLANCA (acute kidney injury) (H) 04/20/2017     Priority: Medium     Elevated glucose 04/20/2017     Priority: Medium     SBO (small bowel obstruction) (H) 04/20/2017     Priority: Medium     Elevated blood pressure reading without diagnosis of hypertension 03/14/2017     Priority: Medium     Small bowel obstruction (H) 01/19/2016     Priority: Medium     Morbid obesity due to excess calories (H) 12/07/2015     Priority: Medium     High risk medications (not anticoagulants) long-term use 11/07/2013     Priority: Medium     Kidney stone 10/15/2013     Priority: Medium     Seen at hospitals in [place], [place]       RA (rheumatoid arthritis) (H) 09/11/2013     Priority: Medium     Osteoarthritis 09/02/2013     Priority: Medium     Hyperlipidemia LDL goal <160 01/04/2012     Priority: Medium     ASCUS on Pap smear 12/27/2011     Priority: Medium     12/27/11: Pap - ASCUS, Neg HPV. Plan cotesting in 3 yrs.   3/21/14: Pap - NIL, Neg HPV. Routine screening         Tear meniscus knee 12/08/2010     Priority: Medium     Osteopenia 12/08/2010     Priority: Medium     Testing done at chiropracter office - told she has decreased density       Carpal tunnel syndrome 05/16/2007     Priority: Medium     Advanced directives, counseling/discussion 12/27/2011     Priority: Low     Advance Directive Problem List Overview:   Name Relationship Phone    Primary Health Care Agent           "  Alternative Health Care Agent          Advance Directive received and scanned. Click on Code in the patient header to view. 12/27/2011          Past Surgical History:  Past Surgical History:   Procedure Laterality Date     ARTHROSCOPY KNEE  12/10/2010    ARTHROSCOPY KNEE performed by NAVID FIERRO at [facility]     COLONOSCOPY N/A 8/9/2018    Procedure: COLONOSCOPY;  colonoscopy;  Surgeon: Luke Kaye MD;  Location: [facility]     SURGICAL HISTORY OF -       IUD removal     SURGICAL HISTORY OF -       Ovarian cyst     SURGICAL HISTORY OF -       thorn removal from foot     SURGICAL HISTORY OF -       knee surgery, scope, right knee     Medications:  Current Outpatient Medications   Medication Sig Dispense Refill     albuterol (PROAIR HFA/PROVENTIL HFA/VENTOLIN HFA) 108 (90 Base) MCG/ACT inhaler Inhale 2 puffs into the lungs every 6 hours       fluticasone (FLONASE) 50 MCG/ACT nasal spray Spray 1 spray into both nostrils daily       hydroxychloroquine (PLAQUENIL) 200 MG tablet Take 2 tablets (400 mg) by mouth daily 180 tablet 2     Insulin Syringe-Needle U-100 (BD INSULIN SYRINGE) 27G X 1/2\" 1 ML MISC For once weekly methotrexate administration. 50 each 1     methotrexate 50 MG/2ML injection Inject 1 mL (25 mg) Subcutaneous every 7 days 8 mL 3     Multiple Vitamins-Minerals (ADULT GUMMY PO) Take 1 chew tab by mouth daily VitaCrave       Multiple Vitamins-Minerals (HAIR/SKIN/NAILS/BIOTIN PO) Take 1 capsule by mouth daily.  100 tablet 3     omeprazole (PRILOSEC) 20 MG DR capsule Take 1 capsule (20 mg) by mouth daily 90 capsule 3     rivaroxaban ANTICOAGULANT (XARELTO ANTICOAGULANT) 20 MG TABS tablet Take 1 tablet (20 mg) by mouth daily (with dinner) 90 tablet 3     tiZANidine (ZANAFLEX) 4 MG capsule Take 1 capsule (4 mg) by mouth 3 times daily as needed for muscle spasms 90 capsule 1     vitamin B complex with vitamin C (STRESS TAB) tablet Take 1 tablet by mouth daily  0     VITAMIN D PO Take by mouth daily   "     Allergies:     Allergies   Allergen Reactions     Folic Acid      Gold Rash     Latex Rash     Not all latex products     Social History:  Home situation: lives with spouse  Occupation/Schooling: retired  Tobacco use: denies  Alcohol use: denies  Drug use: denies  History of chemical dependency treatment: denies    Family history:  Family History   Problem Relation Age of Onset     Prostate Cancer Father      Eye Disorder Father         mac degen     Heart Disease Father      Macular Degeneration Father      Heart Disease Mother         a-fib     Eye Disorder Mother      C.A.D. Mother      Hypertension Mother      Alcohol/Drug Brother      Alcohol/Drug Sister      Alcohol/Drug Brother      Alcohol/Drug Brother      Alcohol/Drug Brother      Alcohol/Drug Sister      Obesity Sister      Alcohol/Drug Sister      Prostate Cancer Sister      Cancer Other      Melanoma No family hx of        Review of Systems:    POSITIVE IN BOLD (see below)  GENERAL: fever/chills, fatigue, general unwell feeling, weight gain/loss.  HEAD/EYES:  headache, dizziness, or vision changes.    EARS/NOSE/THROAT:  Nosebleeds, hearing loss, sinus infection, earache, tinnitus.  IMMUNE:  Allergies, cancer, immune deficiency, or infections.  SKIN:  Urticaria, rash, hives  HEME/Lymphatic:   anemia, easy bruising, easy bleeding.  RESPIRATORY:  cough, wheezing, or shortness of breath  CARDIOVASCULAR/Circulation:  Extremity edema, syncope, hypertension, tachycardia, or angina.  GASTROINTESTINAL:  abdominal pain, nausea/emesis, diarrhea, constipation,  hematochezia, or melena.  ENDOCRINE:  Diabetes, steroid use,  thyroid disease or osteoporosis.  MUSCULOSKELETAL: neck pain, back pain, arthralgia, arthritis, or gout.  GENITOURINARY:  frequency, urgency, dysuria, difficulty voiding, hematuria or incontinence.  NEUROLOGIC:  weakness, numbness, paresthesias, seizure, tremor, stroke or memory loss.  PSYCHIATRIC:  depression, anxiety, stress, suicidal  "thoughts or mood swings.     Physical Exam:  Vitals:    06/09/22 0941   BP: (!) 159/76   BP Location: Right arm   Patient Position: Chair   Cuff Size: Adult Large   Pulse: 66   SpO2: 97%     Exam:  Constitutional: healthy, alert and no distress  Head: normocephalic. Atraumatic.   Eyes: no redness or jaundice noted   ENT: oropharnx normal.  MMM.  Neck supple.    Cardiovascular: RRR no m/g/r   Respiratory: clear   Gastrointestinal: soft, non-tender, normoactive bowel sounds   : deferred  Skin: no suspicious lesions or rashes  Psychiatric: mentation appears normal and affect normal/bright    Musculoskeletal exam:  Gait/Station/Posture: normal    Lumbar spine: mild to moderate decrease in ROM with f/e/bl rotation    Myofascial tenderness:  negative  Straight leg exam: positive, L > R  Jean Marie's maneuver: negative    Neurologic exam:  CN:  Cranial nerves 2-12 are normal  Motor:  5/5 UE and LE strength    Sensory:  (upper and lower extremities):   Light touch: normal    Allodynia: absent   Hyperalgesia: absent     Diagnostic tests:  MRI of lumbar spine was completed on 4/27/2022 showing:  \"MRI LUMBAR SPINE WITHOUT CONTRAST   4/27/2022 2:17 PM      HISTORY: Low back pain, > 6 weeks. Chronic bilateral low back pain  with bilateral sciatica.       TECHNIQUE: Multiplanar multisequence MRI of the lumbar spine without  contrast.      COMPARISON: Lumbar spine radiograph dated 8/5/2015. Correlation is  also made with CT angiogram of the chest dated 7/20/2020 and CT  abdomen and pelvis 4/18/2020.     FINDINGS: Nomenclature is based on 6 lumbar vertebral bodies.  Correlating with prior CT examinations, there appear to be 12 thoracic  vertebral bodies with well-formed ribs and 6 nonrib-bearing  lumbar-type vertebral bodies. Lumbosacral junction considered to be an  L6-S1. Careful radiographic correlation is recommended if any  level-specific intervention is planned.     Normal vertebral body heights. Grade 1 degenerative " anterolisthesis of  L5 on S1 measuring approximately 3 mm. Sagittal alignment otherwise  appears within normal limits. There is moderate dextroconvex curvature  centered at L2-L3. There is mild degenerative left lateral listhesis  of L1 on L2 and mild degenerative right lateral listhesis of L3 on L4.  Multilevel marginal endplate osteophytes are present, including a  large left far lateral marginal disc osteophyte complex at L3-L4  (series 2 image 6).     Scattered Modic type II degenerative endplate signal changes are  present, including at the L1-L2, L2-L3, L3-L4, and L5-L6 levels. Bone  marrow signal otherwise is within normal limits. Normal appearance of  the distal spinal cord with conus terminating at the L1-L2 level.  There is a probable small Tarlov cyst at the level of S2. Varying  degrees of diffuse intervertebral disc desiccation with relative  sparing of the normal fluid signal in the L6-S1 disc. The visualized  paraspinous soft tissues and bony pelvis are unremarkable.     Segmental analysis:  T12-L1: Normal disc height. No herniation. Normal facets. No spinal  canal or neural foraminal stenosis.     L1-L2: Mild to moderate disc height loss. There is a disc bulge with  posterior endplate osteophytic ridging, slightly eccentric to the  right. Mild facet arthropathy. Minimal borderline central spinal canal  stenosis. Mild bilateral neural foraminal stenosis.     L2-L3: Moderate disc height loss, preferentially involving the left  aspect of the disc space. There is a disc bulge with posterior  endplate osteophytic ridging, eccentric to the left. There may be a  superimposed shallow central disc protrusion. Mild facet arthropathy.  Mild left lateral recess stenosis. Minimal borderline central spinal  canal narrowing. Minimal left neural foraminal stenosis. The right  neural foramen is patent.     L3-L4: Moderate disc height loss, preferentially involving the left  aspect of the disc space. There is a disc  bulge with posterior  endplate osteophytic ridging, eccentric to the left. Mild facet  arthropathy. Mild to moderate left lateral recess stenosis with  contact of the ventral and dorsal margins of the traversing left L4  nerve roots and slightly asymmetric posterior displacement of the  nerve root. There is moderate to severe left neural foraminal stenosis  with contour deformity of the exiting left L3 nerve root. Mild right  neural foraminal stenosis.     L4-L5: Mild disc height loss. Symmetric disc bulge with posterior  endplate osteophytic ridging. Mild facet arthropathy. No significant  spinal canal stenosis. Mild right neural foraminal stenosis. Minimal  left neural foraminal narrowing.     L5-L6: Mild disc height loss. Symmetric disc bulge. Posterior endplate  osteophytic ridging. Moderate facet arthropathy. There is a least  moderate bordering on moderate to severe right neural foraminal  stenosis. Left neural foramen is patent.     L6-S1: Normal disc height. No herniation. Mild facet arthropathy. No  spinal canal stenosis. Minimal right neural foraminal stenosis. The  left neural foramen is patent.                                                                      IMPRESSION:  1. Nomenclature is based on 6 nonrib-bearing lumbar vertebral bodies  labeled L1 through L6. The lumbosacral junction is considered to be at  L6-S1.  2. Multilevel degenerative changes, as described.  3. Grade 1 degenerative anterolisthesis of L5 on L6. Moderate  dextroconvex curvature and mild multilevel degenerative  spondylolisthesis in the coronal plane.  4. No high-grade central spinal canal stenosis. Varying degrees of  mild/moderate multilevel lateral recess stenosis, greatest on the left  at L3-L4.  5. Varying degrees of multilevel neural foraminal stenosis, including  moderate to severe left L3-L4 neural foraminal stenosis with some  degree of compression/contour deformity involving the exiting left L3  nerve root. Moderate  "bordering on moderate to severe right L5-S1  neural foraminal stenosis. Lesser degrees of neural foraminal  narrowing elsewhere.      HAMILTON BLAS MD \"    Personally reviewed imaging on day of visit     Other testing (labs, diagnostics) reviewed:  Labs  Last Comprehensive Metabolic Panel:  Sodium   Date Value Ref Range Status   04/22/2020 138 133 - 144 mmol/L Final     Potassium   Date Value Ref Range Status   04/22/2020 4.2 3.4 - 5.3 mmol/L Final     Chloride   Date Value Ref Range Status   04/22/2020 107 94 - 109 mmol/L Final     Carbon Dioxide   Date Value Ref Range Status   04/22/2020 27 20 - 32 mmol/L Final     Anion Gap   Date Value Ref Range Status   04/22/2020 4 3 - 14 mmol/L Final     Glucose   Date Value Ref Range Status   04/22/2020 81 70 - 99 mg/dL Final     Urea Nitrogen   Date Value Ref Range Status   04/22/2020 22 7 - 30 mg/dL Final     Creatinine   Date Value Ref Range Status   03/09/2021 1.01 0.60 - 1.30 mg/dL Final     GFR Estimate   Date Value Ref Range Status   03/09/2021 55 (A) >60 ml/min/1.73m2 Final     Calcium   Date Value Ref Range Status   04/22/2020 9.6 8.5 - 10.1 mg/dL Final       [place] Prescription Monitoring Program reviewed    Outside records reviewed      Assessment:  1. Lumbar Radiculpathy    Kaylene Diaz is a 66 year old female who presents with the complaints of acute on chronic lumbar radiculopathy.  Based on her history, imaging, and exam, her symptoms are likely radicular in nature with some overlying components of myofascial pain as well. We discussed treatment options, and I advocated that she engage in physical therapy. We will also trial a lumbar epidural steroid injection.     Plan:  Diagnosis reviewed, treatment option addressed, and risk/benefits discussed.  Self-care instructions given.  I am recommending a multidisciplinary treatment plan to help this patient better manage her pain.      1. Physical Therapy: Referral placed  2. Pain Psychologist to address issues of " relaxation, behavioral change, coping style, and other factors important to improvement: not at this time  3. Diagnostic Studies: none  4. Medication Management: not at this tiem  5. Further procedures recommended: Lumbar Epidural Steroid Injection  6. Follow up: 6 weeks after procedure    Total time spent was 50 minutes, and more than 50% of face to face time was spent in counseling and/or coordination of care regarding principles of multidisciplinary care, medication management, and therapeutic options. This time also includes time for chart review, preparation, and documentation.     Olivia Davies MD    Pain Medicine  Department of Anesthesiology  [facility]        Answers for HPI/ROS submitted by the patient on 6/5/2022  General Symptoms: No  Skin Symptoms: No  HENT Symptoms: No  EYE SYMPTOMS: No  HEART SYMPTOMS: No  LUNG SYMPTOMS: No  INTESTINAL SYMPTOMS: No  URINARY SYMPTOMS: No  GYNECOLOGIC SYMPTOMS: No  BREAST SYMPTOMS: No  SKELETAL SYMPTOMS: Yes  BLOOD SYMPTOMS: No  NERVOUS SYSTEM SYMPTOMS: No  MENTAL HEALTH SYMPTOMS: No  Back pain: Yes  Muscle aches: Yes  Neck pain: No  Swollen joints: No  Joint pain: No  Bone pain: No  Muscle cramps: No  Muscle weakness: No  Joint stiffness: Yes  Bone fracture: No

## 2022-06-09 NOTE — NURSING NOTE
RN read through the instructions with the patient for the recommended procedure: LESI  Patient verbalized understanding to holding appropriate medication per protocol and was agreeable to NPO policy and needing a .    Anticoagulant: Xarelto hold 3 days before procedure    Recommended Follow Up:  6-8 weeks after procedure    Gabi Cain RN

## 2022-06-14 PROBLEM — M54.41 CHRONIC BILATERAL LOW BACK PAIN WITH BILATERAL SCIATICA: Status: ACTIVE | Noted: 2022-06-14

## 2022-06-14 PROBLEM — M54.42 CHRONIC BILATERAL LOW BACK PAIN WITH BILATERAL SCIATICA: Status: ACTIVE | Noted: 2022-06-14

## 2022-06-14 PROBLEM — G89.29 CHRONIC BILATERAL LOW BACK PAIN WITH BILATERAL SCIATICA: Status: ACTIVE | Noted: 2022-06-14

## 2022-06-20 ENCOUNTER — TELEPHONE (OUTPATIENT)
Dept: RHEUMATOLOGY | Facility: CLINIC | Age: 67
End: 2022-06-20
Payer: MEDICARE

## 2022-06-20 DIAGNOSIS — M62.838 MUSCLE SPASM: Primary | ICD-10-CM

## 2022-06-20 RX ORDER — TIZANIDINE HYDROCHLORIDE 4 MG/1
4 CAPSULE, GELATIN COATED ORAL 3 TIMES DAILY PRN
Qty: 90 CAPSULE | Refills: 0 | Status: SHIPPED | OUTPATIENT
Start: 2022-06-20 | End: 2022-11-08

## 2022-06-20 NOTE — TELEPHONE ENCOUNTER
[facility] Call Center    Phone Message    May a detailed message be left on voicemail: yes     Reason for Call: Medication Refill Request    Has the patient contacted the pharmacy for the refill? Yes   Name of medication being requested: Tizanidine   Provider who prescribed the medication: Dr Rooney  Pharmacy: [id number] - [place], [place] - [address]  Date medication is needed: 6/21    Action Taken: Message routed to:  Other: [facility]    Travel Screening: Not Applicable

## 2022-07-07 ENCOUNTER — HOSPITAL ENCOUNTER (OUTPATIENT)
Facility: AMBULATORY SURGERY CENTER | Age: 67
Discharge: HOME | End: 2022-07-07
Attending: ANESTHESIOLOGY | Admitting: ANESTHESIOLOGY
Payer: MEDICARE

## 2022-07-07 VITALS
OXYGEN SATURATION: 97 % | BODY MASS INDEX: 47.09 KG/M2 | TEMPERATURE: 97.1 F | DIASTOLIC BLOOD PRESSURE: 90 MMHG | HEIGHT: 66 IN | SYSTOLIC BLOOD PRESSURE: 169 MMHG | RESPIRATION RATE: 16 BRPM | HEART RATE: 77 BPM | WEIGHT: 293 LBS

## 2022-07-07 DIAGNOSIS — M54.41 CHRONIC BILATERAL LOW BACK PAIN WITH BILATERAL SCIATICA: ICD-10-CM

## 2022-07-07 DIAGNOSIS — G89.29 CHRONIC BILATERAL LOW BACK PAIN WITH BILATERAL SCIATICA: ICD-10-CM

## 2022-07-07 DIAGNOSIS — M54.42 CHRONIC BILATERAL LOW BACK PAIN WITH BILATERAL SCIATICA: ICD-10-CM

## 2022-07-07 PROCEDURE — 62323 NJX INTERLAMINAR LMBR/SAC: CPT

## 2022-07-07 RX ORDER — METHYLPREDNISOLONE ACETATE 40 MG/ML
INJECTION, SUSPENSION INTRA-ARTICULAR; INTRALESIONAL; INTRAMUSCULAR; SOFT TISSUE PRN
Status: DISCONTINUED | OUTPATIENT
Start: 2022-07-07 | End: 2022-07-07 | Stop reason: HOSPADM

## 2022-07-07 RX ORDER — BUPIVACAINE HYDROCHLORIDE 2.5 MG/ML
INJECTION, SOLUTION EPIDURAL; INFILTRATION; INTRACAUDAL PRN
Status: DISCONTINUED | OUTPATIENT
Start: 2022-07-07 | End: 2022-07-07 | Stop reason: HOSPADM

## 2022-07-07 RX ORDER — LIDOCAINE HYDROCHLORIDE 10 MG/ML
INJECTION, SOLUTION EPIDURAL; INFILTRATION; INTRACAUDAL; PERINEURAL PRN
Status: DISCONTINUED | OUTPATIENT
Start: 2022-07-07 | End: 2022-07-07 | Stop reason: HOSPADM

## 2022-07-07 NOTE — DISCHARGE INSTRUCTIONS
Home Care Instructions after an Epidural Steroid Pain Injection    A lumbar epidural steroid injection delivers steroid medication directly into the area that may be causing your lower back pain and/or leg pain. A cervical or thoracic epidural steroid injection delivers steroids into the epidural space surrounding spinal nerve roots to help relieve pain in the upper spine/neck.    Activity  -Rest today  -Do not work today  -Resume normal activity tomorrow  -DO NOT shower for 24 hours  -DO NOT remove bandaid for 24 hours    Pain  -You may experience soreness at the injection site for one or two days  -You may use an ice pack for 20 minutes every 2 hours for the first 24 hours  -You may use a heating pad after the first 24 hours  -You may use Tylenol (acetaminophen) every 4 hours or other pain medicines as     directed by your physician    You may experience numbness radiating into your legs or arms (depending on the procedure location). This numbness may last several hours. Until sensation returns to normal; please use caution in walking, climbing stairs, and stepping out of your vehicle, etc.    DID YOU RECEIVE SEDATION TODAY?  No    Safety  Sedation medicine, if given, may remain active for many hours. It is important for the next 24 hours that you do not:  -Drive a car  -Operate machines or power tools  -Consume alcohol, including beer  -Sign any important papers or legal documents      Common side effects of steroids:  Not everyone will experience corticosteroid side effects. If side effects are experienced, they will gradually subside in the 7-10 day period following an injection. Most common side effects include:  -Flushed face and/or chest  -Feeling of warmth, particularly in the face but could be an overall feeling of warmth  -Increased blood sugar in diabetic patients  -Menstrual irregularities my occur. If taking hormone-based birth control an alternate method of birth control is recommended  -Sleep  disturbances and/or mood swings are possible  -Leg cramps    Please contact us if you have:  -Severe pain  -Fever more than 101.5 degrees Fahrenheit  -Signs of infection at the injection site (redness, swelling, or drainage)    If you have questions, please contact our office at [phone] between the hours of 7:00 am and 3:00 pm Monday through Friday. After office hours you can contact the on call provider by dialing [phone]. If you need immediate attention, we recommend that you go to a hospital emergency room or dial [phone].

## 2022-07-13 NOTE — PROCEDURES
Patient: Kaylene Diaz Age: 66 year old   MRN: [id number] Attending: Dr. Davies     Date of Visit: July 7, 2022      PAIN MEDICINE CLINIC PROCEDURE NOTE    ATTENDING CLINICIAN:    Olivia Davies MD    ASSISTANT CLINICIAN:      PREPROCEDURE DIAGNOSES:  1.  Lumbar radiculopathy  2.  Chronic low back pain       PROCEDURE(S) PERFORMED:  1.  Interlaminar lumbar epidural steroid injection   2.  Fluoroscopic guidance for the above-named procedure(s)      ANESTHESIA:  Local.    INDICATIONS:  Kaylene Diaz is a 66 year old female with a history of  chronic low back pain secondary to bilateral lumbosacral radiculopathy .  The patient stated that the patient was in their usual state of health and denied recent anticoagulant use or recent infections.  Therefore, the plan is to perform above mentioned procedures.     Procedure Details:  The patient was met in the procedure room, where the patient was identified by name, medical record number and date of birth.  All of the patient s last minute questions were answered. Written informed consent was obtained and saved in the electronic medical record, after the risks, benefits, and alternatives were discussed with the patient.      A formal time-out procedure was performed, as per protocol, including patient name, title of procedure, and site of procedure, and all in the room concurred.  Routine monitors were applied.      The patient was placed in the prone position on the procedure room table.  All pressure points were checked and comfortably padded.  Routine monitors were placed.  Vital signs were stable.    A chlorhexidine prep was completed followed by sterile draping per standard procedure.     The AP fluoroscopic view was optimized for approach at  L5-S1 interspace.  The skin over the interspace was infiltrated with 4-5 mL of 1% Lidocaine using a 25 gauge, 1.5 inch needle.  A 20-gauge 3-1/2 inch Tuohy needle was advanced under fluoroscopic guidance with right paramedial  approach until it touched lamina. Mutiple AP and lateral fluoroscopic images at this time  are taken as Tuohy needle was advanced to the epidural space. The epidural space was identified, without evidence of blood, cerebrospinal fluid, or parasthesia throughout. Needle tip placement within the epidural space was further confirmed with 1-2 mL of nonionic contrast agent, with the epidural space visualized in the AP and lateral fluoroscopic view(s) with appropriate spread of the agent with fat vacuolization and no intravascular or intrathecal spread noted. Next, 6 mL of a treatment solution containing 2 mL of preservative free 0.25% bupivacaine, 1 mL of Depo-Medrol 40 mg/mL and 3 mL preservative free normal saline was administered slowly. The needle was withdrawn.      Light pressure was held at the puncture site(s) to prevent ecchymosis and oozing.  The patient's skin was cleansed, and hemostasis was confirmed.  Band-aids were applied to the needle injection site(s).      Condition:    The patient remained awake and alert throughout the procedure.  The patient tolerated the procedure well and was monitored for approximately 15 minutes afterward in the post procedure area.  There were no immediate post procedure complications noted.  The patient was then discharged to home as per protocol.      Pre-procedure pain score: 1/10  Post-procedure pain score: 0/10

## 2022-07-13 NOTE — H&P
ABBREVIATED H&P [facility]      Patient Name: Kaylene Diaz   MRN: [id number]   YOB: 1955     1. Reason for Procedure:  Procedure Summary     Date: 07/07/22 Room / Location: [facility] – [place], [place] PROCEDURE ROOM 06 / [facility] [place] [facility]    Anesthesia Start:  Anesthesia Stop:     Procedure: Lumbar Epidural Steroid Injection (N/A Spine) Diagnosis:       Chronic bilateral low back pain with bilateral sciatica      (Chronic bilateral low back pain with bilateral sciatica [M54.42, M54.41, G89.29])    Providers: Olivia Davies MD Responsible Provider:     Anesthesia Type: Not recorded ASA Status: Not recorded          2. History:   Past Medical History:   Diagnosis Date     Basal cell carcinoma      RA (rheumatoid arthritis) (H)      Small bowel obstruction (H)      Squamous cell carcinoma        Comorbidities: Rheumatoid Arthritis, Obesity    Any history of sleep apnea? Yes    Any history of problems with sedation? No    3. Physical:    General: Normal  Skin:  Normal.  Respiratory: Clear to auscultation bilateral, no wheezing  Cardio:  Regular rate and rhythm  Abdomen: Soft, nontender, nondistended, no palpable masses.  Musculoskeletal: Normal  Neuro: Sensory exam normal, motor exam 5/5, bilateral upper and lower extremities         4. Current Medications (if not in Epic):   Current Outpatient Medications   Medication Sig Dispense Refill     albuterol (PROAIR HFA/PROVENTIL HFA/VENTOLIN HFA) 108 (90 Base) MCG/ACT inhaler Inhale 2 puffs into the lungs every 6 hours       fluticasone (FLONASE) 50 MCG/ACT nasal spray Spray 1 spray into both nostrils daily       hydroxychloroquine (PLAQUENIL) 200 MG tablet Take 2 tablets (400 mg) by mouth daily 180 tablet 2     methotrexate 50 MG/2ML injection Inject 1 mL (25 mg) Subcutaneous every 7 days 8 mL 3     Multiple Vitamins-Minerals (ADULT GUMMY PO) Take 1 chew tab by mouth daily VitaCrave       Multiple  "Vitamins-Minerals (HAIR/SKIN/NAILS/BIOTIN PO) Take 1 capsule by mouth daily.  100 tablet 3     omeprazole (PRILOSEC) 20 MG DR capsule Take 1 capsule (20 mg) by mouth daily 90 capsule 3     tiZANidine (ZANAFLEX) 4 MG capsule Take 1 capsule (4 mg) by mouth 3 times daily as needed for muscle spasms 90 capsule 0     tiZANidine (ZANAFLEX) 4 MG capsule Take 1 capsule (4 mg) by mouth 3 times daily as needed for muscle spasms 90 capsule 1     vitamin B complex with vitamin C (STRESS TAB) tablet Take 1 tablet by mouth daily  0     VITAMIN D PO Take by mouth daily       Insulin Syringe-Needle U-100 (BD INSULIN SYRINGE) 27G X 1/2\" 1 ML MISC For once weekly methotrexate administration. 50 each 1     rivaroxaban ANTICOAGULANT (XARELTO ANTICOAGULANT) 20 MG TABS tablet Take 1 tablet (20 mg) by mouth daily (with dinner) 90 tablet 3        5. Allergies and Reactions:  is allergic to folic acid, gold, and latex.                   "

## 2022-07-14 ENCOUNTER — TELEPHONE (OUTPATIENT)
Dept: PALLIATIVE MEDICINE | Facility: CLINIC | Age: 67
End: 2022-07-14

## 2022-07-14 NOTE — TELEPHONE ENCOUNTER
[facility] Call Center    Phone Message    May a detailed message be left on voicemail: yes     Reason for Call: Other: Patiet calling to get authorization to have a virtual post op appointment from her procedure done on 7/7/2022 due to her living in [facility]. Please call to discuss thank you.      Action Taken: Message routed to:  [facility] ([facility]): pain     Travel Screening: Not Applicable

## 2022-07-18 NOTE — PROGRESS NOTES
"Kaylene Diaz  is a 66 year old year old who is being evaluated via a billable video visit.      How would you like to obtain your AVS? MyChart  If the video visit is dropped, the invitation should be resent by: Text to cell phone: [phone]   Will anyone else be joining your video visit? No     Rheumatology Video Visit      Kaylene Diaz MRN# [id number]   YOB: 1955 Age: 66 year old      Date of visit: 7/19/22  PCP: Dr. Hitesh Washington    Chief Complaint   Patient presents with:  Rheumatoid Arthritis    Assessment and Plan     1.  Seropositive rheumatoid arthritis (RF 32, CCP >250): Currently on methotrexate 25 mg SQ once  weekly, hydroxychloroquine 400 mg daily.  Previously on sulfasalazine that was discontinued when she found no benefit from it; but she also did not require prednisone while on sulfasalazine; questioning if the \"benefit\" from sulfasalazine was actually the \"benefit\" of not gardening.  Does not tolerate folic acid because of associated increased sunburns.    Chronic illness, stable.      - Continue methotrexate 25mg SQ once weekly   - Continue hydroxychloroquine 400 mg daily (last eye exam on 11/2/2020; yearly eye exam required)  - Continue prednisone 5 mg daily as needed for rheumatoid arthritis symptoms; uses sparingly, with the last steroids being used only for her back that was a separate prescription from her PCP per patient  - Ophthalmology referral for hydroxychloroquine toxicity monitoring   - Labs in Sept: CBC, Creatinine, Hepatic Panel (lab orders to be faxed to [facility] in [place])  - Labs in Dec: CBC, Creatinine, Hepatic Panel, ESR, CRP (lab orders to be faxed to [facility] in [place])    High risk medication requiring intensive toxicity monitoring at least quarterly: labs ordered include CBC, Creatinine, Hepatic panel to monitor for cytopenia and hepatotoxicity; checking creatinine as it affects clearance of medication.       2.  Basal cell carcinoma and squamous cell carcinoma: " Several lesions removed by dermatology in the past.  Continue to follow with dermatology.     3.  Chronic lower back pain: Has significantly improved with lidocaine patches that have been used no more than 10 times in the past 1 year.  Now following a pain management clinic where a steroid injection was not helpful.  She is going to return to the pain management clinic because of continued low back pain that occurs when she is ambulating but not when she is sitting.    4.  History of right Achilles tendinitis: Was evaluated by podiatry who diagnosed her with Achilles tendinitis and her symptoms resolved with a boot that she wore at night.  Not an issue today.     5.  Osteopenia without elevated FRAX: Based on 4/27/2022 DEXA.    6.  History of productive cough: has seen an ENT provider who reportedly ran allergy tests that came back negative.  She then tried prilosec (omeprazole wasn't effective) 20mg daily that resolved the cough, but stopping it results in return of symptoms.  Then had an EGD showing gastritis.  Now on PPI.    7.  Muscle spasms: Primarily affecting the lower back.  Improved from infrequent cyclobenzaprine 5 mg nightly as needed, but finds that using tizanidine 4mg PRN is more effective.   - Continue tiZANidine (ZANAFLEX) 4 MG capsule; Take 1 capsule (4 mg) by mouth 3 times daily as needed for muscle spasms  Dispense: 90 capsule; Refill: 1    8.  Vaccinations: Vaccinations reviewed with Ms. Diaz.  Risks and benefits of vaccinations were discussed.    - Influenza: refused by patient previously, she previously stated that she never gets the influenza vaccination.  - [id number]: up to date  - [id number]: up to date  - Shingrix: Up to date   - COVID-19: has received the Pfizer COVID-19 vaccine on 3/5/2021 and 3/26/2021 and 11/10/2021. A 4th dose (1st booster) of the mRNA COVID-19 vaccination is recommended to be received 3 months after the third mRNA COVID-19 vaccination was received.  A 5th mRNA  vaccine (2nd booster) may be received at least 4 months after the 4th dose.   Based on our current understanding (and this may change over time as we learn more), medications should be adjusted as noted below, if disease activity allows:  - NSAIDs and Acetaminophen: hold for 24 hours prior to vaccination if able to do so     10. Elevated blood pressure:  Kaylene to follow up with Primary Care provider regarding elevated blood pressure.     Total minutes spent in evaluation with patient, documentation, , and review of pertinent studies and chart notes: 18    Ms. Diaz verbalized agreement with and understanding of the rational for the diagnosis and treatment plan.  All questions were answered to best of my ability and the patient's satisfaction. Ms. Diaz was advised to contact the clinic with any questions that may arise after the clinic visit.      Thank you for involving me in the care of the patient    Return to clinic: 3-4 months      HPI   Kaylene Diaz is a 66 year old female with a past medical history significant for hyperlipidemia, osteoarthritis, osteopenia, PE (2020) on chronic anticoagulation, meniscal tear, squamous cell carcinoma, basal cell carcinoma, and rheumatoid arthritis who presents for follow-up of rheumatoid arthritis.    Today, 7/19/2022: Use prednisone 40 mg daily x5 days for back pain at the direction of her PCP with improvement of the back pain.  Had a steroid injection in the pain clinic for her back pain that did not help.  She is going to follow-up with her pain management provider regarding this.  Varicose vein down the posterior leg that has been there for over 10 years but is now becoming painful and she is going to see her PCP regarding this issue.  RA controlled.  Tolerating medications well.  Needs to schedule an eye appointment for hydroxychloroquine toxicity monitoring.    Denies fevers, chills, nausea, vomiting, constipation, diarrhea. No abdominal pain. No chest  pain/pressure, palpitations, or shortness of breath.   No LE swelling. No neck pain. No oral or nasal sores.  No rash. No sicca symptoms.   Trying to lose weight.     Tobacco: Quit in 2000  EtOH: Rare  Drugs: None    ROS   12 point review of system was completed and negative except as noted in the HPI     Active Problem List     Patient Active Problem List   Diagnosis     Carpal tunnel syndrome     Tear meniscus knee     Osteopenia     Advanced directives, counseling/discussion     Hyperlipidemia LDL goal <160     Osteoarthritis     RA (rheumatoid arthritis) (H)     High risk medications (not anticoagulants) long-term use     Kidney stone     ASCUS on Pap smear     Morbid obesity due to excess calories (H)     Small bowel obstruction (H)     Elevated blood pressure reading without diagnosis of hypertension     BLANCA (acute kidney injury) (H)     Elevated glucose     SBO (small bowel obstruction) (H)     Rheumatoid arthritis involving multiple sites with positive rheumatoid factor (H)     Chronic kidney disease, stage 3a (H)     Chronic bilateral low back pain with bilateral sciatica     Past Medical History     Past Medical History:   Diagnosis Date     Basal cell carcinoma      RA (rheumatoid arthritis) (H)      Small bowel obstruction (H)      Squamous cell carcinoma      Past Surgical History     Past Surgical History:   Procedure Laterality Date     ARTHROSCOPY KNEE  12/10/2010    ARTHROSCOPY KNEE performed by NAVID FIERRO at [facility]     COLONOSCOPY N/A 8/9/2018    Procedure: COLONOSCOPY;  colonoscopy;  Surgeon: Luke Kaye MD;  Location: [facility]     INJECT EPIDURAL LUMBAR N/A 7/7/2022    Procedure: Lumbar Epidural Steroid Injection;  Surgeon: Olivia Davies MD;  Location: [facility] [place] [facility]     SURGICAL HISTORY OF -       IUD removal     SURGICAL HISTORY OF -       Ovarian cyst     SURGICAL HISTORY OF -       thorn removal from foot     SURGICAL HISTORY OF -       knee surgery, scope, right knee     Allergy  "    Allergies   Allergen Reactions     Folic Acid      Gold Rash     Latex Rash     Not all latex products     Current Medication List     Current Outpatient Medications   Medication Sig     albuterol (PROAIR HFA/PROVENTIL HFA/VENTOLIN HFA) 108 (90 Base) MCG/ACT inhaler Inhale 2 puffs into the lungs every 6 hours     fluticasone (FLONASE) 50 MCG/ACT nasal spray Spray 1 spray into both nostrils daily     hydroxychloroquine (PLAQUENIL) 200 MG tablet Take 2 tablets (400 mg) by mouth daily     Insulin Syringe-Needle U-100 (BD INSULIN SYRINGE) 27G X 1/2\" 1 ML MISC For once weekly methotrexate administration.     methotrexate 50 MG/2ML injection Inject 1 mL (25 mg) Subcutaneous every 7 days     Multiple Vitamins-Minerals (ADULT GUMMY PO) Take 1 chew tab by mouth daily VitaCrave     Multiple Vitamins-Minerals (HAIR/SKIN/NAILS/BIOTIN PO) Take 1 capsule by mouth daily.      omeprazole (PRILOSEC) 20 MG DR capsule Take 1 capsule (20 mg) by mouth daily     rivaroxaban ANTICOAGULANT (XARELTO ANTICOAGULANT) 20 MG TABS tablet Take 1 tablet (20 mg) by mouth daily (with dinner)     tiZANidine (ZANAFLEX) 4 MG capsule Take 1 capsule (4 mg) by mouth 3 times daily as needed for muscle spasms     tiZANidine (ZANAFLEX) 4 MG capsule Take 1 capsule (4 mg) by mouth 3 times daily as needed for muscle spasms     vitamin B complex with vitamin C (STRESS TAB) tablet Take 1 tablet by mouth daily     VITAMIN D PO Take by mouth daily     No current facility-administered medications for this visit.         Social History   See HPI    Family History     Family History   Problem Relation Age of Onset     Prostate Cancer Father      Eye Disorder Father         mac degen     Heart Disease Father      Macular Degeneration Father      Heart Disease Mother         a-fib     Eye Disorder Mother      C.A.D. Mother      Hypertension Mother      Alcohol/Drug Brother      Alcohol/Drug Sister      Alcohol/Drug Brother      Alcohol/Drug Brother      Alcohol/Drug " "Brother      Alcohol/Drug Sister      Obesity Sister      Alcohol/Drug Sister      Prostate Cancer Sister      Cancer Other      Melanoma No family hx of        Physical Exam     Temp Readings from Last 3 Encounters:   07/07/22 97.1  F (36.2  C) (Tympanic)   06/03/22 97.9  F (36.6  C) (Tympanic)   04/04/22 98  F (36.7  C) (Tympanic)     BP Readings from Last 5 Encounters:   07/07/22 (!) 169/90   06/09/22 (!) 159/76   06/03/22 (!) 160/80   04/04/22 134/68   07/07/21 (!) 138/90     Pulse Readings from Last 1 Encounters:   07/07/22 77     Resp Readings from Last 1 Encounters:   07/07/22 16     Estimated body mass index is 49.34 kg/m  as calculated from the following:    Height as of 7/7/22: 1.683 m (5' 6.25\").    Weight as of 7/7/22: 139.7 kg (308 lb).      GEN: NAD.   HEENT: MMM.  Anicteric, noninjected sclera. No obvious external lesions of the ear and nose. Hearing intact.  PULM: No increased work of breathing  MSK: Mild ulnar deviation at the MCPs; mild chronic swelling at the MCPs that appears unchanged; no overlying erythema.  PIPs and DIPs without swelling.  Wrists without swelling.  SKIN: No rash or jaundice seen  PSYCH: Alert. Appropriate.      Labs / Imaging (select studies)     [facility] labs reviewed from 6/14/2022    CBC  Recent Labs   Lab Test 04/22/20  1156 10/28/19  1052 08/29/19  0855   WBC 8.2 5.3 5.8   RBC 4.59 4.44 4.60   HGB 14.4 13.9 14.2   HCT 44.1 42.1 43.2   MCV 96 95 94   RDW 12.8 14.4 14.0    225 240   MCH 31.4 31.3 30.9   MCHC 32.7 33.0 32.9   NEUTROPHIL 70.6 71.0 73.2   LYMPH 16.3 19.9 15.9   MONOCYTE 11.2 7.2 8.7   EOSINOPHIL 1.5 1.7 1.9   BASOPHIL 0.4 0.2 0.3   ANEU 5.8 3.8 4.3   ALYM 1.3 1.1 0.9   PEEWEE 0.9 0.4 0.5   AEOS 0.1 0.1 0.1   ABAS 0.0 0.0 0.0     CMP  Recent Labs   Lab Test 03/09/21  0000 12/22/20  0000 04/22/20  1156 03/05/20  0000 10/28/19  1052 08/29/19  0855 03/26/18  1034 10/31/17  1110 06/23/17  0817   NA  --   --  138  --   --   --   --  137 139   POTASSIUM  --   --  " 4.2  --   --   --   --  4.5 4.4   CHLORIDE  --   --  107  --   --   --   --  108 106   CO2  --   --  27  --   --   --   --  24 24   ANIONGAP  --   --  4  --   --   --   --  5 9   GLC  --   --  81  --   --   --   --  84 115*   BUN  --   --  22  --   --   --   --  22 14   CR 1.01 0.98 0.74   < > 0.72 0.75   < > 0.88 0.75   GFRESTIMATED 55* 57* 85   < > 88 84   < > 65 78   GFRESTBLACK >60 >60 >90   < > >90 >90   < > 79 >90   GFR Calc     DEAN  --   --  9.6  --   --   --   --  9.2 9.0   BILITOTAL  --   --  0.4  --  0.5 0.5   < > 0.4 0.4   ALBUMIN  --   --  3.6  --  3.7 3.6   < > 3.7 3.8   PROTTOTAL  --   --  8.1  --  7.6 7.6   < > 8.0 7.5   ALKPHOS  --   --  118  --  109 118   < > 131 120   AST 15 15 13   < > 19 21   < > 17 22   ALT 26 28 22   < > 28 23   < > 24 26    < > = values in this interval not displayed.     Calcium/VitaminD  Recent Labs   Lab Test 04/22/20  1156 10/31/17  1110 06/23/17  0817   DEAN 9.6 9.2 9.0     ESR/CRP  Recent Labs   Lab Test 10/28/19  1052 08/29/19  0855 10/23/18  1000   SED 15 11 14   CRP 5.1 7.3 4.7     Immunization History     Immunization History   Administered Date(s) Administered     COVID-19,PF,Pfizer (12+ Yrs) 03/05/2021, 03/26/2021, 11/10/2021     Pneumo Conj 13-V (2010&after) 12/03/2018     Pneumococcal 23 valent 10/24/2013, 03/09/2020     TD (ADULT, 7+) 10/28/2020     TDAP Vaccine (Adacel) 06/16/2010     Zoster vaccine recombinant adjuvanted (SHINGRIX) 12/03/2018, 06/19/2019          Chart documentation done in part with Dragon Voice recognition Software. Although reviewed after completion, some word and grammatical error may remain.        Video-Visit Details    Type of service:  Video Visit    Video Start Time: 10:47 AM    Video End Time:11:05 AM    Originating Location (pt. Location): [place], [place]    Distant Location (provider location):  [place]    Platform used for Video Visit: Gloria Rooney MD

## 2022-07-19 ENCOUNTER — VIRTUAL VISIT (OUTPATIENT)
Dept: RHEUMATOLOGY | Facility: CLINIC | Age: 67
End: 2022-07-19
Payer: MEDICARE

## 2022-07-19 DIAGNOSIS — M05.79 RHEUMATOID ARTHRITIS INVOLVING MULTIPLE SITES WITH POSITIVE RHEUMATOID FACTOR (H): Primary | Chronic | ICD-10-CM

## 2022-07-19 DIAGNOSIS — Z79.899 HIGH RISK MEDICATION USE: ICD-10-CM

## 2022-07-19 PROCEDURE — 99214 OFFICE O/P EST MOD 30 MIN: CPT | Mod: 95 | Performed by: INTERNAL MEDICINE

## 2022-07-19 RX ORDER — SYRING-NEEDL,DISP,INSUL,0.3 ML 28GX1/2"
SYRINGE, EMPTY DISPOSABLE MISCELLANEOUS
Qty: 50 EACH | Refills: 1 | Status: SHIPPED | OUTPATIENT
Start: 2022-07-19 | End: 2023-06-01

## 2022-07-19 RX ORDER — METHOTREXATE 25 MG/ML
25 INJECTION, SOLUTION INTRA-ARTERIAL; INTRAMUSCULAR; INTRAVENOUS
Qty: 8 ML | Refills: 6 | Status: SHIPPED | OUTPATIENT
Start: 2022-07-19 | End: 2023-01-04

## 2022-07-19 NOTE — PROGRESS NOTES
Lab orders collected from printer and faxed to [facility].    Tata CARLOS RN Specialty Triage 7/19/2022 11:23 AM

## 2022-09-02 ENCOUNTER — OFFICE VISIT (OUTPATIENT)
Dept: OPHTHALMOLOGY | Facility: CLINIC | Age: 67
End: 2022-09-02
Payer: MEDICARE

## 2022-09-02 DIAGNOSIS — M05.79 RHEUMATOID ARTHRITIS INVOLVING MULTIPLE SITES WITH POSITIVE RHEUMATOID FACTOR (H): ICD-10-CM

## 2022-09-02 DIAGNOSIS — Z79.899 HIGH RISK MEDICATION USE: Primary | ICD-10-CM

## 2022-09-02 PROCEDURE — 92134 CPTRZ OPH DX IMG PST SGM RTA: CPT | Performed by: STUDENT IN AN ORGANIZED HEALTH CARE EDUCATION/TRAINING PROGRAM

## 2022-09-02 PROCEDURE — 92083 EXTENDED VISUAL FIELD XM: CPT | Performed by: STUDENT IN AN ORGANIZED HEALTH CARE EDUCATION/TRAINING PROGRAM

## 2022-09-02 PROCEDURE — 92012 INTRM OPH EXAM EST PATIENT: CPT | Performed by: STUDENT IN AN ORGANIZED HEALTH CARE EDUCATION/TRAINING PROGRAM

## 2022-09-02 ASSESSMENT — VISUAL ACUITY
METHOD: SNELLEN - LINEAR
OS_SC: 20/20
OD_SC+: -2
OD_SC: 20/25
OS_SC+: -2

## 2022-09-02 ASSESSMENT — TONOMETRY
OD_IOP_MMHG: 18
IOP_METHOD: APPLANATION
OS_IOP_MMHG: 17

## 2022-09-02 ASSESSMENT — EXTERNAL EXAM - RIGHT EYE: OD_EXAM: NORMAL

## 2022-09-02 ASSESSMENT — CUP TO DISC RATIO
OS_RATIO: 0.3
OD_RATIO: 0.3

## 2022-09-02 ASSESSMENT — SLIT LAMP EXAM - LIDS
COMMENTS: 1+ DCH
COMMENTS: 1+ DCH

## 2022-09-02 NOTE — LETTER
9/2/2022         RE: Kaylene Diaz  [id number] [place] [address]        Dear Colleague,    Thank you for referring your patient, Kaylene Diaz, to the [facility]. Please see a copy of my visit note below.     Current Eye Medications:  none     Subjective:  High-risk med testing - OCT and HVF - referred by Dr. Rooney. Overall vision has been stable at distance without correction, using OTC readers as needed for up close work. No pain or discomfort in either eye.     Hydroxychloroquine 200mg - 2 tablets orally every day for Rheumatoid Arthritis x 8 years.     Objective:  See Ophthalmology Exam.       Assessment:  Kaylene Diaz is a 66 year old female who presents with:   Encounter Diagnoses   Name Primary?     High risk medication use Yes    Plaquenil since Jan 2014.     Macular SD-OCT within normal limits both eyes (large foveal depressions both eyes - stable).    Aguilar visual field (HVF) 10-2 within normal limits both eyes.    No signs of Plaquenil retinal toxicity at present.     Discussed importance for annual testing moving forward.       Rheumatoid arthritis involving multiple sites with positive rheumatoid factor (H)        Plan:  Normal Plaquenil eye tests today.    Luiz Powers MD  [phone]          Again, thank you for allowing me to participate in the care of your patient.        Sincerely,        Luiz Powers MD

## 2022-09-02 NOTE — PROGRESS NOTES
Current Eye Medications:  none     Subjective:  High-risk med testing - OCT and HVF - referred by Dr. Rooney. Overall vision has been stable at distance without correction, using OTC readers as needed for up close work. No pain or discomfort in either eye.     Hydroxychloroquine 200mg - 2 tablets orally every day for Rheumatoid Arthritis x 8 years.     Objective:  See Ophthalmology Exam.       Assessment:  Kaylene Diaz is a 66 year old female who presents with:   Encounter Diagnoses   Name Primary?     High risk medication use Yes    Plaquenil since Jan 2014.     Macular SD-OCT within normal limits both eyes (large foveal depressions both eyes - stable).    Aguilar visual field (HVF) 10-2 within normal limits both eyes.    No signs of Plaquenil retinal toxicity at present.     Discussed importance for annual testing moving forward.       Rheumatoid arthritis involving multiple sites with positive rheumatoid factor (H)        Plan:  Normal Plaquenil eye tests today.    Luiz Powers MD  [phone]

## 2022-09-06 DIAGNOSIS — M05.79 RHEUMATOID ARTHRITIS INVOLVING MULTIPLE SITES WITH POSITIVE RHEUMATOID FACTOR (H): Chronic | ICD-10-CM

## 2022-09-06 RX ORDER — HYDROXYCHLOROQUINE SULFATE 200 MG/1
400 TABLET, FILM COATED ORAL DAILY
Qty: 180 TABLET | Refills: 2 | Status: SHIPPED | OUTPATIENT
Start: 2022-09-06 | End: 2023-03-24

## 2022-09-07 ASSESSMENT — ANXIETY QUESTIONNAIRES
IF YOU CHECKED OFF ANY PROBLEMS ON THIS QUESTIONNAIRE, HOW DIFFICULT HAVE THESE PROBLEMS MADE IT FOR YOU TO DO YOUR WORK, TAKE CARE OF THINGS AT HOME, OR GET ALONG WITH OTHER PEOPLE: NOT DIFFICULT AT ALL
GAD7 TOTAL SCORE: 0
7. FEELING AFRAID AS IF SOMETHING AWFUL MIGHT HAPPEN: NOT AT ALL
1. FEELING NERVOUS, ANXIOUS, OR ON EDGE: NOT AT ALL
6. BECOMING EASILY ANNOYED OR IRRITABLE: NOT AT ALL
5. BEING SO RESTLESS THAT IT IS HARD TO SIT STILL: NOT AT ALL
7. FEELING AFRAID AS IF SOMETHING AWFUL MIGHT HAPPEN: NOT AT ALL
2. NOT BEING ABLE TO STOP OR CONTROL WORRYING: NOT AT ALL
GAD7 TOTAL SCORE: 0
3. WORRYING TOO MUCH ABOUT DIFFERENT THINGS: NOT AT ALL
8. IF YOU CHECKED OFF ANY PROBLEMS, HOW DIFFICULT HAVE THESE MADE IT FOR YOU TO DO YOUR WORK, TAKE CARE OF THINGS AT HOME, OR GET ALONG WITH OTHER PEOPLE?: NOT DIFFICULT AT ALL
4. TROUBLE RELAXING: NOT AT ALL

## 2022-09-07 ASSESSMENT — PAIN SCALES - PAIN ENJOYMENT GENERAL ACTIVITY SCALE (PEG)
INTERFERED_ENJOYMENT_LIFE: 6
PEG_TOTALSCORE: 6
PEG_TOTALSCORE: 6
AVG_PAIN_PASTWEEK: 6
AVG_PAIN_PASTWEEK: 6
INTERFERED_ENJOYMENT_LIFE: 6
INTERFERED_GENERAL_ACTIVITY: 6
INTERFERED_GENERAL_ACTIVITY: 6

## 2022-09-09 ENCOUNTER — VIRTUAL VISIT (OUTPATIENT)
Dept: ANESTHESIOLOGY | Facility: CLINIC | Age: 67
End: 2022-09-09
Payer: MEDICARE

## 2022-09-09 ENCOUNTER — TELEPHONE (OUTPATIENT)
Dept: ANESTHESIOLOGY | Facility: CLINIC | Age: 67
End: 2022-09-09

## 2022-09-09 DIAGNOSIS — M54.16 LUMBAR RADICULOPATHY: Primary | ICD-10-CM

## 2022-09-09 PROCEDURE — 99213 OFFICE O/P EST LOW 20 MIN: CPT | Mod: 95 | Performed by: ANESTHESIOLOGY

## 2022-09-09 ASSESSMENT — ENCOUNTER SYMPTOMS
BACK PAIN: 1
BACK PAIN: 0
NECK PAIN: 0
ARTHRALGIAS: 1
MUSCLE CRAMPS: 1
MUSCLE WEAKNESS: 0
STIFFNESS: 1
JOINT SWELLING: 1
MYALGIAS: 1

## 2022-09-09 ASSESSMENT — PAIN SCALES - GENERAL: PAINLEVEL: SEVERE PAIN (6)

## 2022-09-09 ASSESSMENT — ANXIETY QUESTIONNAIRES: GAD7 TOTAL SCORE: 0

## 2022-09-09 NOTE — TELEPHONE ENCOUNTER
Patient is scheduled for procedure with Dr. Davies    Spoke with: patient    Date of Procedure: 11-18-22    Location: [facility]    Informed patient they will need an adult  yes    Pre-procedure COVID-19 Test: @ home    Additional comments: Arrival @ 11 am    Patient is aware pre-op RN will call 2-3 days prior to procedure with arrival time and instructions. yes      Blanca Hoewll on 9/9/2022 at 1:40 PM

## 2022-09-09 NOTE — PROGRESS NOTES
[place] is a 66 year old who is being evaluated via a billable video visit.      How would you like to obtain your AVS? MyChart  If the video visit is dropped, the invitation should be resent by: Text to cell phone: [phone]  Will anyone else be joining your video visit? No

## 2022-09-09 NOTE — PATIENT INSTRUCTIONS
Procedures:    Call to schedule your procedure: [phone]    Lumbar Epidural Steroid Injection    Your pre-procedure instructions are below, please call our clinic if you have any questions.        Recommended Follow up:      Follow up as needed.         To speak with a nurse, schedule/reschedule/cancel a clinic appointment, or request a medication refill call: [phone]    You can also reach us by [email or web address]: [email or web address]     Procedure Information related to COVID-19     Please call [phone] to schedule, reschedule, or cancel your procedure appointment.   Phones are answered Monday - Friday from 08:00 - 4:30pm.  Leave a voicemail with your name, birth date, and phone number if no one is available to take your call.         You will need to be tested for COVID-19 before your procedure. If you're going home on the same day as your procedure (surgery), you may take an at-home rapid antigen test 1 to 2 days before your procedure. If your test is negative, please take a photo of the test. Show the photo to the nurse when you come in for your procedure. If your test is positive, please update our office right away and your procedure may have to be postponed.     If you do not have access to an at-home rapid test, you will have to be tested at a lab within 4 days of your procedure.  You will be called to schedule your COVID test by a central scheduling team. If you have not been contacted to schedule your test within 4 days of the scheduled procedure, call [phone]     Please be aware that the turn around time for the test is approximately 24-72 hours.   If your results are still pending the day of your procedure, you will be notified as soon as possible as the procedure may be cancelled.     Please note: You will only be contacted for positive and pending results.      The procedure center staff will call you several days before the procedure to review important information that you  will need to know for the day of the procedure.   Please contact the clinic if you have further questions about this information.         Information related to Scheduling and Pre-Procedure Instructions:    If you must reschedule your procedure more than two times, you must follow up in clinic before rescheduling again.    Hold Xarelto for 3 days before your procedure.     Preparing for your procedure    CAUTION - FAILURE TO FOLLOW THESE PRE-PROCEDURE INSTRUCTIONS WILL RESULT IN YOUR PROCEDURE BEING RESCHEDULED.    Your Procedure: Lumbar Epidural Steroid Injection            You must have a  take you home after your procedure. Transportation by taxi or para-transit is okay as long as you have a responsible adult accompany you. You must provide your 's full name and contact number at time of check in.     Fasting Protocol Please have nothing to eat and drink for 2 hours before the procedure.      Medications If you take any medications, DO NOT STOP. Take your medications as usual the day of your procedure with a sip of water AT LEAST 2 HOURS PRIOR TO ARRIVAL.    Antibiotics If you are currently taking antibiotics, you must complete the entire dose 7 days prior to your scheduled procedure. You must be clear of any signs or symptoms of infection. If you begin antibiotics, please contact our clinic for instructions.     Fever, Chills, or Rash If you experience a fever of higher than 100 degrees, chills, rash, or open wounds during the one week before your procedure, please call the clinic to see if you may proceed with your procedure.      Medication Hold List  **Patients under Cardiology/Neurology care should consult their provider prior to the pain procedure to verify pre-procedure medication instructions. The information below contains general guidelines.**    Hold Xarelto for 3 days before your procedure.     Blood Thinners If you are taking daily ASPIRIN, PLAVIX, OR OTHER BLOOD THINNERS SUCH AS  COUMADIN/WARFARIN, we will need your prescribing doctor to sign a release permitting you to stop these medications. Once approved by your prescribing doctor - STOP ALL BLOOD THINNERS BASED ON THE TIME TABLE BELOW PRIOR TO YOUR PROCEDURE. If you have been instructed to stop WARFARIN(COUMADIN), you must have an INR lab drawn the day before your procedure. . Your INR must be within normal limits before we can perform your injection. MEDICATIONS CAN BE RESTARTED AFTER YOUR PROCEDURE.    14 DAY HOLD  Ticlid (ticlopidine)    10 DAY HOLD  Effient (Prasugel)    3 DAY HOLD  Xarelto (rivaroxaban) 7 DAY HOLD  Anacin, Bufferin, Ecotrin, Excedrin, Aggrenox (Aspirin)  Brilinta (ticagrelor)  Coumadin (Warfarin)  Pradexa (Dabigatran)  Elmiron (Pentosan)  Plavix (Clopidogrel Bisulfate)  Pletal (Cilostazol)    24 HOUR HOLD  Lovenox (enoxaparin)  Agrylin (Anagrelide)        Non-steroidal Anti-inflammatories (NSAIDs) DO NOT TAKE any non-steroidal anti-inflammatory medications (NSAIDs) listed on the table below. MEDICATIONS CAN BE RESTARTED AFTER YOUR PROCEDURE. Celebrex is OK to take and does not need to be discontinued.     Medications to stop:  3 DAY HOLD  Advil, Motrin (Ibuprofen)  Arthrotec (diciofenac sodium/misoprostol)  Clinoril (Sulindac)  Indocin (Indomethacin)  Lodine (Etodolac)  Toradol (Ketorolac)  Vicoprofen (Hydrocodone and Ibuprofen)  Voltaren (Diclotenac)    14 DAY HOLD  Daypro (Oxaprozin)  Feldene (Piroxicam)   7 DAY HOLD  Aleve (Naproxen sodium)  Darvon compound (contains aspirin)  Naprosyn (Naproxen)  Norgesic Forte (contains aspirin)  Mobic (Meloxicam)  Oruvall (Ketoprofen)  Percodan (contains aspirin)  Relafen (Nabumetone)  Salsalate  Trilisate  Vitamin E (more than 400 mg per day)  Any medication containing aspirin                To speak with a nurse, schedule/reschedule/cancel a clinic appointment, or request a medication refill call: [phone]    You can also reach us by [email or web address]:  [email or web address]

## 2022-09-09 NOTE — PROGRESS NOTES
Pain Clinic New Patient Consult Note:    Referring Provider: Samson   Primary care provider: Hitesh Washington    Kaylene Diaz is a 66 year old y.o. old female with a significant PMH of CKDIIIa, obesity, HLD, SBO and rheumatoid arthritis who presents for video visit for low back pain. Patient states that following injection did not have immediate pain relief in L5-S1 epidural steroid injection. Took 2 weeks, was not major. Mentions that she was able to walk 2x the distance before she had to sit down to relieve the pain. Most recently has been working in [employer] for 4-5 hours of constant sitting and standing. Mentions that she has leg pain sitting down. Top of left leg, front or back, can also travel down but not past the knee. Describes this as very painful. She mentions it is where the body meets her leg, which she states is in the hip region. Pt would like to have an epidural steroid scheduled sometime in the future.     HPI:  Patient Supplied Answers To the [facility] Pain Questionnaire  [facility] Pain -  Patient Entered Questionnaire/Answers 9/7/2022   What number best describes your pain right now:  0 = No pain  to  10 = Worst pain imaginable 6   How would you describe the pain? sharp   Which of the following worsen your pain? standing, sitting, walking   Which of the following improve or reduce your pain? sitting   What number best describes your average pain for the past week:  0 = No pain  to  10 = Worst pain imaginable 6   What number best describes your LOWEST pain in past 24 hours:  0 = No pain  to  10 = Worst pain imaginable 0   What number best describes your WORST pain in past 24 hours:  0 = No pain  to  10 = Worst pain imaginable 6   When is your pain worst? Constant   What non-medicine treatments have you already had for your pain? spine injections (shots)   Have you tried treating your pain with medication?  -   Are you currently taking medications for your pain? -           Pain treatments:    Herbal  medicines: N/A  Physical therapy: N/A  Chiropractor: N/A  Pain physician: Dr. Olivia Davies  Surgery: N/A  Biofeedback:N/A  Acupuncture: N/A    Tests/Imaging reviewed with the patient:  N/A    Significant Medical History:   Past Medical History:   Diagnosis Date     Basal cell carcinoma      RA (rheumatoid arthritis) (H)      Small bowel obstruction (H)      Squamous cell carcinoma           Past Surgical History:  Past Surgical History:   Procedure Laterality Date     ARTHROSCOPY KNEE  12/10/2010    ARTHROSCOPY KNEE performed by NAVID FIERRO at [facility]     COLONOSCOPY N/A 8/9/2018    Procedure: COLONOSCOPY;  colonoscopy;  Surgeon: Luke Kaye MD;  Location: [facility]     INJECT EPIDURAL LUMBAR N/A 7/7/2022    Procedure: Lumbar Epidural Steroid Injection;  Surgeon: Olivia Davies MD;  [place] [facility]     SURGICAL HISTORY OF -       IUD removal     SURGICAL HISTORY OF -       Ovarian cyst     SURGICAL HISTORY OF -       thorn removal from foot     SURGICAL HISTORY OF -       knee surgery, scope, right knee          Family History:  Family History   Problem Relation Age of Onset     Prostate Cancer Father      Eye Disorder Father         mac degen     Heart Disease Father      Macular Degeneration Father      Heart Disease Mother         a-fib     Eye Disorder Mother      C.A.D. Mother      Hypertension Mother      Alcohol/Drug Brother      Alcohol/Drug Sister      Alcohol/Drug Brother      Alcohol/Drug Brother      Alcohol/Drug Brother      Alcohol/Drug Sister      Obesity Sister      Alcohol/Drug Sister      Prostate Cancer Sister      Cancer Other      Melanoma No family hx of           Social History:  Social History     Socioeconomic History     Marital status:      Spouse name: Not on file     Number of children: Not on file     Years of education: Not on file     Highest education level: Not on file   Occupational History     Employer: UNEMPLOYED   Tobacco Use     Smoking status: Former Smoker      "Years: 30.00     Quit date: 2000     Years since quittin.4     Smokeless tobacco: Never Used   Vaping Use     Vaping Use: Never used   Substance and Sexual Activity     Alcohol use: Yes     Comment: VERY RARE     Drug use: No     Sexual activity: Not Currently     Partners: Male   Other Topics Concern     Parent/sibling w/ CABG, MI or angioplasty before 65F 55M? No   Social History Narrative     Not on file     Social Determinants of Health     Financial Resource Strain: Not on file   Food Insecurity: Not on file   Transportation Needs: Not on file   Physical Activity: Not on file   Stress: Not on file   Social Connections: Not on file   Intimate Partner Violence: Not on file   Housing Stability: Not on file     Social History     Social History Narrative     Not on file          Allergies:  Allergies   Allergen Reactions     Folic Acid      Gold Rash     Latex Rash     Not all latex products       Current Medications:   Current Outpatient Medications   Medication Sig Dispense Refill     albuterol (PROAIR HFA/PROVENTIL HFA/VENTOLIN HFA) 108 (90 Base) MCG/ACT inhaler Inhale 2 puffs into the lungs every 6 hours       fluticasone (FLONASE) 50 MCG/ACT nasal spray Spray 1 spray into both nostrils daily       hydroxychloroquine (PLAQUENIL) 200 MG tablet Take 2 tablets (400 mg) by mouth daily 180 tablet 2     Insulin Syringe-Needle U-100 (BD INSULIN SYRINGE) 27G X 1/2\" 1 ML MISC For once weekly methotrexate administration. 50 each 1     methotrexate 50 MG/2ML injection Inject 1 mL (25 mg) Subcutaneous every 7 days 8 mL 6     Multiple Vitamins-Minerals (ADULT GUMMY PO) Take 1 chew tab by mouth daily Vita[employer]ve       Multiple Vitamins-Minerals (HAIR/SKIN/NAILS/BIOTIN PO) Take 1 capsule by mouth daily.  100 tablet 3     omeprazole (PRILOSEC) 20 MG DR capsule Take 1 capsule (20 mg) by mouth daily 90 capsule 3     rivaroxaban ANTICOAGULANT (XARELTO ANTICOAGULANT) 20 MG TABS tablet Take 1 tablet (20 mg) by mouth daily " (with dinner) 90 tablet 3     tiZANidine (ZANAFLEX) 4 MG capsule Take 1 capsule (4 mg) by mouth 3 times daily as needed for muscle spasms 270 capsule 0     tiZANidine (ZANAFLEX) 4 MG capsule Take 1 capsule (4 mg) by mouth 3 times daily as needed for muscle spasms 90 capsule 0     vitamin B complex with vitamin C (STRESS TAB) tablet Take 1 tablet by mouth daily  0     VITAMIN D PO Take by mouth daily            Current Pain Medications:  Medications related to Pain Management (From now, onward)    None           Past Pain Medications:    N/A    Blood thinner:    Rivaroxaban 30mg    Work History: N/A    Current work status: N/A    Psychosocial History: N/A    History of treatment for behavioral disorder: N/A  History of suicidal ideation or suicidal attempt: N/A    Review of Systems:  Review of Systems   Musculoskeletal: Negative for back pain.   All other systems reviewed and are negative.      No other + ROS    Physical Exam:     There were no vitals filed for this visit.    General Appearance: No distress, seated comfortably  Mood: Euthymic  HE ENT: Video visit, unable to assess  Respiratory: Video visit, unable to assess  CVS: Video visit, unable to assess  GI: Video visit, unable to assess  Skin: Video visit, unable to assess  MS: Non agitated  Neuro: Video visit, unable to assess  Gait: Video visit, unable to assess    Pain specific exam:    Video visit, unable to assess    Laboratory results:  Recent Labs   Lab Test 03/09/21  0000 12/22/20  0000 04/22/20  1156 03/26/18  1034 10/31/17  1110   NA  --   --  138  --  137   POTASSIUM  --   --  4.2  --  4.5   CHLORIDE  --   --  107  --  108   CO2  --   --  27  --  24   ANIONGAP  --   --  4  --  5   GLC  --   --  81  --  84   BUN  --   --  22  --  22   CR 1.01 0.98 0.74   < > 0.88   DEAN  --   --  9.6  --  9.2    < > = values in this interval not displayed.       CBC RESULTS:   Recent Labs   Lab Test 04/22/20  1156   WBC 8.2   RBC 4.59   HGB 14.4   HCT 44.1   MCV 96    [facility] 31.4   [facility] 32.7   RDW 12.8            Imaging:       ASSESSMENT AND PLAN:     Encounter Diagnosis:  #Chronic lumbar radiculopathy  #Chronic bilateral low back pain with bilateral sciatica    Kaylene Diaz is a 66 year old y.o. old female with a significant PMH of CKDIIIa, obesity, HLD, SBO and rheumatoid arthritis who presented via video visit with low back pain concerning for chronic lumbar radiculopathy with bilateral sciatica. Patient currently states that her pain relief is much better compared to before her L5-S1 epidural steroid injection back in 7/7 and is most present with increased activity. Patients activity is much improved prior to baseline. Pain may stem from a combination of BMI and lumbar radiculopathy. Patient does not currently meet timeline for repeat steroid injection, can schedule patient for repeat epidural steroid injection in the future.      I have summarized the patient s past medical history, discussed their clinical findings and the potential differential diagnosis with the patient. Significant past medical history pertinent to the patient s current condition includes CKDIIIa, obesity, HLD, SBO and rheumatoid arthritis. The differential diagnosis discussed with the patient are listed above. I have discussed anatomy and possible sources of the pain using models and/or pictures (diagrams). I have discussed multi- disciplinary pain management options with the patient as pertaining to their case as detailed above. The pain management options we discussed included, but were not limited to the recommendations below.  I also discussed with patient the risks, benefits and alternatives to each pain management option.  All of the patient s questions and concerns were answered to the best of my ability.    RECOMMENDATIONS:     1. Medications: No changes    2. Procedure: We are scheduling the patient for a lumbar epidural steroid. Risks/benefits/alternatives were discussed.     I also  discussed with the patient that the possible risks involved with interventional treatment included, but are not limited to, no pain control, worsened pain, stroke,seizure, spinal headache, allergic reactions, introduction of infection or bleeding which may lead to emergent spine surgery, nerve damage, paralysis or even death.    Follow up: As needed      I saw and examined the patient with the STUDENT. I have reviewed and agree with the resident's note and plan of care and made changes and corrections directly to the body of the note.    TIME SPENT:  BY STUDENT ALONE 15 MIN  BY MYSELF AND STUDENT TOGETHER 15 MIN      These times included more than 50 % timespent counseling her about her diagnosis and treatment options and coordination of care with the primary team. This also includes time for chart review, preparation, and documentation.     Olivia Davies MD  Pain Medicine, Department of Anesthesiology  , [facility]

## 2022-09-09 NOTE — NURSING NOTE
Patient presents with:  Follow Up: Follow-up Mid back and left leg pain level 6/10      Severe Pain (6)         What medications are you using for pain? Tizandine    (New patients only) Have you been seen by another pain clinic/ provider? no    (Return Patients only) What refills are you needing today? no

## 2022-09-09 NOTE — LETTER
9/9/2022       RE: Kaylene Diaz  [address] [place] [address]     Dear Colleague,    Thank you for referring your patient, Kaylene Diaz, to the [facility] FOR COMPREHENSIVE PAIN MANAGEMENT [place] at [facility]. Please see a copy of my visit note below.      Pain Clinic New Patient Consult Note:      Referring Provider: Samson   Primary care provider: Hitesh Washington    Kaylene Diaz is a 66 year old y.o. old female with a significant PMH of CKDIIIa, obesity, HLD, SBO and rheumatoid arthritis who presents for video visit for low back pain. Patient states that following injection did not have immediate pain relief in L5-S1 epidural steroid injection. Took 2 weeks, was not major. Mentions that she was able to walk 2x the distance before she had to sit down to relieve the pain. Most recently has been working in garage for 4-5 hours of constant sitting and standing. Mentions that she has leg pain sitting down. Top of left leg, front or back, can also travel down but not past the knee. Describes this as very painful. She mentions it is where the body meets her leg, which she states is in the hip region. Pt would like to have an epidural steroid scheduled sometime in the future.     HPI:  Patient Supplied Answers To the [facility] Pain Questionnaire  [facility] Pain -  Patient Entered Questionnaire/Answers 9/7/2022   What number best describes your pain right now:  0 = No pain  to  10 = Worst pain imaginable 6   How would you describe the pain? sharp   Which of the following worsen your pain? standing, sitting, walking   Which of the following improve or reduce your pain? sitting   What number best describes your average pain for the past week:  0 = No pain  to  10 = Worst pain imaginable 6   What number best describes your LOWEST pain in past 24 hours:  0 = No pain  to  10 = Worst pain imaginable 0   What number best describes your WORST pain in past 24  hours:  0 = No pain  to  10 = Worst pain imaginable 6   When is your pain worst? Constant   What non-medicine treatments have you already had for your pain? spine injections (shots)   Have you tried treating your pain with medication?  -   Are you currently taking medications for your pain? -           Pain treatments:    Herbal medicines: N/A  Physical therapy: N/A  Chiropractor: N/A  Pain physician: Dr. Olivia Davies  Surgery: N/A  Biofeedback:N/A  Acupuncture: N/A    Tests/Imaging reviewed with the patient:  N/A    Significant Medical History:   Past Medical History:   Diagnosis Date     Basal cell carcinoma      RA (rheumatoid arthritis) (H)      Small bowel obstruction (H)      Squamous cell carcinoma           Past Surgical History:  Past Surgical History:   Procedure Laterality Date     ARTHROSCOPY KNEE  12/10/2010    ARTHROSCOPY KNEE performed by NAVID FIERRO at [facility]     COLONOSCOPY N/A 8/9/2018    Procedure: COLONOSCOPY;  colonoscopy;  Surgeon: Luke Kaye MD;  Location: [facility]     INJECT EPIDURAL LUMBAR N/A 7/7/2022    Procedure: Lumbar Epidural Steroid Injection;  Surgeon: Olivia Davies MD;  Location: [facility]     SURGICAL HISTORY OF -       IUD removal     SURGICAL HISTORY OF -       Ovarian cyst     SURGICAL HISTORY OF -       thorn removal from foot     SURGICAL HISTORY OF -       knee surgery, scope, right knee          Family History:  Family History   Problem Relation Age of Onset     Prostate Cancer Father      Eye Disorder Father         mac degen     Heart Disease Father      Macular Degeneration Father      Heart Disease Mother         a-fib     Eye Disorder Mother      C.A.D. Mother      Hypertension Mother      Alcohol/Drug Brother      Alcohol/Drug Sister      Alcohol/Drug Brother      Alcohol/Drug Brother      Alcohol/Drug Brother      Alcohol/Drug Sister      Obesity Sister      Alcohol/Drug Sister      Prostate Cancer Sister      Cancer Other      Melanoma No family hx of        "    Social History:  Social History     Socioeconomic History     Marital status:      Spouse name: Not on file     Number of children: Not on file     Years of education: Not on file     Highest education level: Not on file   Occupational History     Employer: UNEMPLOYED   Tobacco Use     Smoking status: Former Smoker     Years: 30.00     Quit date: 2000     Years since quittin.4     Smokeless tobacco: Never Used   Vaping Use     Vaping Use: Never used   Substance and Sexual Activity     Alcohol use: Yes     Comment: VERY RARE     Drug use: No     Sexual activity: Not Currently     Partners: Male   Other Topics Concern     Parent/sibling w/ CABG, MI or angioplasty before 65F 55M? No   Social History Narrative     Not on file     Social Determinants of Health     Financial Resource Strain: Not on file   Food Insecurity: Not on file   Transportation Needs: Not on file   Physical Activity: Not on file   Stress: Not on file   Social Connections: Not on file   Intimate Partner Violence: Not on file   Housing Stability: Not on file     Social History     Social History Narrative     Not on file          Allergies:  Allergies   Allergen Reactions     Folic Acid      Gold Rash     Latex Rash     Not all latex products       Current Medications:   Current Outpatient Medications   Medication Sig Dispense Refill     albuterol (PROAIR HFA/PROVENTIL HFA/VENTOLIN HFA) 108 (90 Base) MCG/ACT inhaler Inhale 2 puffs into the lungs every 6 hours       fluticasone (FLONASE) 50 MCG/ACT nasal spray Spray 1 spray into both nostrils daily       hydroxychloroquine (PLAQUENIL) 200 MG tablet Take 2 tablets (400 mg) by mouth daily 180 tablet 2     Insulin Syringe-Needle U-100 (BD INSULIN SYRINGE) 27G X 1/2\" 1 ML MISC For once weekly methotrexate administration. 50 each 1     methotrexate 50 MG/2ML injection Inject 1 mL (25 mg) Subcutaneous every 7 days 8 mL 6     Multiple Vitamins-Minerals (ADULT GUMMY PO) Take 1 chew tab by " mouth daily VitaCrave       Multiple Vitamins-Minerals (HAIR/SKIN/NAILS/BIOTIN PO) Take 1 capsule by mouth daily.  100 tablet 3     omeprazole (PRILOSEC) 20 MG DR capsule Take 1 capsule (20 mg) by mouth daily 90 capsule 3     rivaroxaban ANTICOAGULANT (XARELTO ANTICOAGULANT) 20 MG TABS tablet Take 1 tablet (20 mg) by mouth daily (with dinner) 90 tablet 3     tiZANidine (ZANAFLEX) 4 MG capsule Take 1 capsule (4 mg) by mouth 3 times daily as needed for muscle spasms 270 capsule 0     tiZANidine (ZANAFLEX) 4 MG capsule Take 1 capsule (4 mg) by mouth 3 times daily as needed for muscle spasms 90 capsule 0     vitamin B complex with vitamin C (STRESS TAB) tablet Take 1 tablet by mouth daily  0     VITAMIN D PO Take by mouth daily            Current Pain Medications:  Medications related to Pain Management (From now, onward)    None           Past Pain Medications:    N/A    Blood thinner:    Rivaroxaban 30mg    Work History: N/A    Current work status: N/A    Psychosocial History: N/A    History of treatment for behavioral disorder: N/A  History of suicidal ideation or suicidal attempt: N/A    Review of Systems:  Review of Systems   Musculoskeletal: Negative for back pain.   All other systems reviewed and are negative.      No other + ROS    Physical Exam:     There were no vitals filed for this visit.    General Appearance: No distress, seated comfortably  Mood: Euthymic  HE ENT: Video visit, unable to assess  Respiratory: Video visit, unable to assess  CVS: Video visit, unable to assess  GI: Video visit, unable to assess  Skin: Video visit, unable to assess  MS: Non agitated  Neuro: Video visit, unable to assess  Gait: Video visit, unable to assess    Pain specific exam:    Video visit, unable to assess    Laboratory results:  Recent Labs   Lab Test 03/09/21  0000 12/22/20  0000 04/22/20  1156 03/26/18  1034 10/31/17  1110   NA  --   --  138  --  137   POTASSIUM  --   --  4.2  --  4.5   CHLORIDE  --   --  107  --  108    CO2  --   --  27  --  24   ANIONGAP  --   --  4  --  5   GLC  --   --  81  --  84   BUN  --   --  22  --  22   CR 1.01 0.98 0.74   < > 0.88   DEAN  --   --  9.6  --  9.2    < > = values in this interval not displayed.       CBC RESULTS:   Recent Labs   Lab Test 04/22/20  1156   WBC 8.2   RBC 4.59   HGB 14.4   HCT 44.1   MCV 96   MCH 31.4   MCHC 32.7   RDW 12.8            Imaging:       ASSESSMENT AND PLAN:     Encounter Diagnosis:  #Chronic lumbar radiculopathy  #Chronic bilateral low back pain with bilateral sciatica    Kaylene Diaz is a 66 year old y.o. old female with a significant PMH of CKDIIIa, obesity, HLD, SBO and rheumatoid arthritis who presented via video visit with low back pain concerning for chronic lumbar radiculopathy with bilateral sciatica. Patient currently states that her pain relief is much better compared to before her L5-S1 epidural steroid injection back in 7/7 and is most present with increased activity. Patients activity is much improved prior to baseline. Pain may stem from a combination of BMI and lumbar radiculopathy. Patient does not currently meet timeline for repeat steroid injection, can schedule patient for repeat epidural steroid injection in the future.      I have summarized the patient s past medical history, discussed their clinical findings and the potential differential diagnosis with the patient. Significant past medical history pertinent to the patient s current condition includes CKDIIIa, obesity, HLD, SBO and rheumatoid arthritis. The differential diagnosis discussed with the patient are listed above. I have discussed anatomy and possible sources of the pain using models and/or pictures (diagrams). I have discussed multi- disciplinary pain management options with the patient as pertaining to their case as detailed above. The pain management options we discussed included, but were not limited to the recommendations below.  I also discussed with patient the risks,  benefits and alternatives to each pain management option.  All of the patient s questions and concerns were answered to the best of my ability.    RECOMMENDATIONS:     1. Medications: No changes    2. Procedure: We are scheduling the patient for a lumbar epidural steroid. Risks/benefits/alternatives were discussed.     I also discussed with the patient that the possible risks involved with interventional treatment included, but are not limited to, no pain control, worsened pain, stroke,seizure, spinal headache, allergic reactions, introduction of infection or bleeding which may lead to emergent spine surgery, nerve damage, paralysis or even death.    Follow up: As needed      I saw and examined the patient with the STUDENT. I have reviewed and agree with the resident's note and plan of care and made changes and corrections directly to the body of the note.    TIME SPENT:  BY STUDENT ALONE 15 MIN  BY MYSELF AND STUDENT TOGETHER 15 MIN      These times included more than 50 % timespent counseling her about her diagnosis and treatment options and coordination of care with the primary team. This also includes time for chart review, preparation, and documentation.       Olivia Davies MD  Pain Medicine, Department of Anesthesiology  , [facility]

## 2022-09-13 ENCOUNTER — TELEPHONE (OUTPATIENT)
Dept: ANESTHESIOLOGY | Facility: CLINIC | Age: 67
End: 2022-09-13

## 2022-09-13 ENCOUNTER — TELEPHONE (OUTPATIENT)
Dept: FAMILY MEDICINE | Facility: CLINIC | Age: 67
End: 2022-09-13

## 2022-09-13 NOTE — TELEPHONE ENCOUNTER
----- Message from Gabi Cain RN sent at 9/12/2022  9:28 AM CDT -----  Hello,    My name is Gabi, I am one of the Nurses working with the [facility]. Our mutual patient, Kaylene Diaz, had an appointment with one of our providers, Dr. Davies, and ordered the following procedure: Lumbar Epidural Steroid Injection    This would require the patient to hold their Xarelto for 3 days before procedure, per SANTOS[facility] guidelines.     Is this something that would be agreeable to you?      Please reach out to our staff if you have any questions or concerns.   Thank you for your time.       Gabi Cain RN  [facility]  [phone]

## 2022-09-13 NOTE — TELEPHONE ENCOUNTER
Covering for primary/ordering provider    Reviewed chart;  Appears she has held xarelto for 3 days in June;  Agree with plan to hold xarelto

## 2022-09-13 NOTE — TELEPHONE ENCOUNTER
RN sent message to patient's PCP Hitesh Washington MD, and received message from covering provider Dr. Lyndsey Washington DO, that patient is okay to hold Xarelto for 3 days before procedure with Dr. Davies.     Gabi Cain RN

## 2022-09-28 ENCOUNTER — TELEPHONE (OUTPATIENT)
Dept: ANESTHESIOLOGY | Facility: CLINIC | Age: 67
End: 2022-09-28

## 2022-09-28 NOTE — TELEPHONE ENCOUNTER
Called and lvm regarding updated date/time for procedure. Was scheduled for 11-18-22 but MD will be out. Informed procedure will now be 11-11-22 w/11 am check in

## 2022-10-17 NOTE — PATIENT INSTRUCTIONS
RHEUMATOLOGY    Dr. Joseph Rooney    [address] [place] [address]  Phone number: [phone]  Fax number: [phone]      Thank you for choosing [facility]!    Keke Finnegan CMA Rheumatology                   Hemigard Postcare Instructions: The HEMIGARD strips are to remain completely dry for at least 5-7 days.

## 2022-11-08 ENCOUNTER — OFFICE VISIT (OUTPATIENT)
Dept: DERMATOLOGY | Facility: CLINIC | Age: 67
End: 2022-11-08
Payer: MEDICARE

## 2022-11-08 DIAGNOSIS — L81.4 LENTIGO: ICD-10-CM

## 2022-11-08 DIAGNOSIS — L82.1 SEBORRHEIC KERATOSIS: ICD-10-CM

## 2022-11-08 DIAGNOSIS — L57.0 AK (ACTINIC KERATOSIS): ICD-10-CM

## 2022-11-08 DIAGNOSIS — D23.9 DERMAL NEVUS: ICD-10-CM

## 2022-11-08 DIAGNOSIS — Z85.828 HISTORY OF SKIN CANCER: Primary | ICD-10-CM

## 2022-11-08 DIAGNOSIS — D18.01 ANGIOMA OF SKIN: ICD-10-CM

## 2022-11-08 PROCEDURE — 17000 DESTRUCT PREMALG LESION: CPT | Performed by: DERMATOLOGY

## 2022-11-08 PROCEDURE — 17003 DESTRUCT PREMALG LES 2-14: CPT | Performed by: DERMATOLOGY

## 2022-11-08 PROCEDURE — 99213 OFFICE O/P EST LOW 20 MIN: CPT | Mod: 25 | Performed by: DERMATOLOGY

## 2022-11-08 RX ORDER — FLUOROURACIL 50 MG/G
CREAM TOPICAL
Qty: 40 G | Refills: 1 | Status: SHIPPED | OUTPATIENT
Start: 2022-11-08 | End: 2023-04-21

## 2022-11-08 RX ORDER — CALCIPOTRIENE 50 UG/G
CREAM TOPICAL
Qty: 60 G | Refills: 3 | Status: SHIPPED | OUTPATIENT
Start: 2022-11-08 | End: 2023-04-21

## 2022-11-08 ASSESSMENT — PAIN SCALES - GENERAL: PAINLEVEL: NO PAIN (0)

## 2022-11-08 NOTE — LETTER
11/8/2022         RE: Kaylene Diaz  [address] [place] [address]        Dear Colleague,    Thank you for referring your patient, Kaylene Diaz, to the [facility]. Please see a copy of my visit note below.    Kaylene Diaz is an extremely pleasant 66 year old year old female patient here today for hx of non-melanoma skin cancer.  She notes rough spotson face and hands.   .   Patient states this has been present for a while.  Patient reports the following symptoms:  scale.  Patient reports the following previous treatments none.  These treatments did not work.  Patient reports the following modifying factors none.  Associated symptoms: none.  Patient has no other skin complaints today.  Remainder of the HPI, Meds, PMH, Allergies, FH, and SH was reviewed in chart.      Past Medical History:   Diagnosis Date     Basal cell carcinoma      RA (rheumatoid arthritis) (H)      Small bowel obstruction (H)      Squamous cell carcinoma        Past Surgical History:   Procedure Laterality Date     ARTHROSCOPY KNEE  12/10/2010    ARTHROSCOPY KNEE performed by NAVID FIERRO at [facility]     COLONOSCOPY N/A 8/9/2018    Procedure: COLONOSCOPY;  colonoscopy;  Surgeon: Luke Kaye MD;  Location: [facility]     INJECT EPIDURAL LUMBAR N/A 7/7/2022    Procedure: Lumbar Epidural Steroid Injection;  Surgeon: Olivia Davies MD;  [place] [facility] [place]     SURGICAL HISTORY OF -       IUD removal     SURGICAL HISTORY OF -       Ovarian cyst     SURGICAL HISTORY OF -       thorn removal from foot     SURGICAL HISTORY OF -       knee surgery, scope, right knee        Family History   Problem Relation Age of Onset     Prostate Cancer Father      Eye Disorder Father         mac degen     Heart Disease Father      Macular Degeneration Father      Heart Disease Mother         a-fib     Eye Disorder Mother      C.A.D. Mother      Hypertension Mother      Alcohol/Drug Brother      Alcohol/Drug Sister      Alcohol/Drug  "Brother      Alcohol/Drug Brother      Alcohol/Drug Brother      Alcohol/Drug Sister      Obesity Sister      Alcohol/Drug Sister      Prostate Cancer Sister      Cancer Other      Melanoma No family hx of        Social History     Socioeconomic History     Marital status:      Spouse name: Not on file     Number of children: Not on file     Years of education: Not on file     Highest education level: Not on file   Occupational History     Employer: UNEMPLOYED   Tobacco Use     Smoking status: Former     Years: 30.00     Types: Cigarettes     Quit date: 2000     Years since quittin.5     Smokeless tobacco: Never   Vaping Use     Vaping Use: Never used   Substance and Sexual Activity     Alcohol use: Yes     Comment: VERY RARE     Drug use: No     Sexual activity: Not Currently     Partners: Male   Other Topics Concern     Parent/sibling w/ CABG, MI or angioplasty before 65F 55M? No   Social History Narrative     Not on file     Social Determinants of Health     Financial Resource Strain: Not on file   Food Insecurity: Not on file   Transportation Needs: Not on file   Physical Activity: Not on file   Stress: Not on file   Social Connections: Not on file   Intimate Partner Violence: Not on file   Housing Stability: Not on file       Outpatient Encounter Medications as of 2022   Medication Sig Dispense Refill     albuterol (PROAIR HFA/PROVENTIL HFA/VENTOLIN HFA) 108 (90 Base) MCG/ACT inhaler Inhale 2 puffs into the lungs every 6 hours       fluticasone (FLONASE) 50 MCG/ACT nasal spray Spray 1 spray into both nostrils daily       hydroxychloroquine (PLAQUENIL) 200 MG tablet Take 2 tablets (400 mg) by mouth daily 180 tablet 2     Insulin Syringe-Needle U-100 (BD INSULIN SYRINGE) 27G X 1/2\" 1 ML MISC For once weekly methotrexate administration. 50 each 1     methotrexate 50 MG/2ML injection Inject 1 mL (25 mg) Subcutaneous every 7 days 8 mL 6     Multiple Vitamins-Minerals (ADULT GUMMY PO) Take 1 " chew tab by mouth daily VitaCrave       Multiple Vitamins-Minerals (HAIR/SKIN/NAILS/BIOTIN PO) Take 1 capsule by mouth daily.  100 tablet 3     omeprazole (PRILOSEC) 20 MG DR capsule Take 1 capsule (20 mg) by mouth daily 90 capsule 3     rivaroxaban ANTICOAGULANT (XARELTO ANTICOAGULANT) 20 MG TABS tablet Take 1 tablet (20 mg) by mouth daily (with dinner) 90 tablet 3     tiZANidine (ZANAFLEX) 4 MG capsule Take 1 capsule (4 mg) by mouth 3 times daily as needed for muscle spasms 270 capsule 0     tiZANidine (ZANAFLEX) 4 MG capsule Take 1 capsule (4 mg) by mouth 3 times daily as needed for muscle spasms 90 capsule 0     vitamin B complex with vitamin C (STRESS TAB) tablet Take 1 tablet by mouth daily  0     VITAMIN D PO Take by mouth daily       No facility-administered encounter medications on file as of 11/8/2022.             O:   NAD, WDWN, Alert & Oriented, Mood & Affect wnl, Vitals stable   Here today alone   General appearance normal   Vitals stable   Alert, oriented and in no acute distress      Following lymph nodes palpated: Occipital, Cervical, Supraclavicular no lad  Gritty scaly papule on hands and face     Stuck on papules and brown macules on trunk and ext   Red papules on trunk  Flesh colored papules on trunk     The remainder of the full exam was normal; the following areas were examined:  conjunctiva/lids, oral mucosa, neck, peripheral vascular system, abdomen, lymph nodes, digits/nails, eccrine and apocrine glands, scalp/hair, face, neck, chest, abdomen, buttocks, back, RUE, LUE, RLE, LLE       Eyes: Conjunctivae/lids:Normal     ENT: Lips, buccal mucosa, tongue: normal    MSK:Normal    Cardiovascular: peripheral edema none    Pulm: Breathing Normal    Lymph Nodes: No Head and Neck Lymphadenopathy     Neuro/Psych: Orientation:Alert and Orientedx3 ; Mood/Affect:normal       A/P:  1. Seborrheic keratosis, lentigo, angioma, dermal nevus, hx of non-melanoma skin cancer   2. Actinic keratosis   R temple x1,  forehead x2, R brow x1, R hand x2,mid upper lip x1  LN2:  Treated with LN2 for 5s for 1-2 cycles. Warned risks of blistering, pain, pigment change, scarring, and incomplete resolution.  Advised patient to return if lesions do not completely resolve.  Wound care sheet given.    Hands efudex discussed with patient   Using 5-Flurouracil Cream    5-Fluorouracil (5FU) topical cream (brand names Efudex, Carac) is a prescription topical medicine to treat actinic keratoses (pre-squamous cell skin cancer lesions), sun-damaged skin as well as superficial skin cancers.    When applied the areas of sun-damaged skin, the 5FU will  find  damaged skin cells and destroy them.   During treatment, the skin will become red and look very irritated. This is the expected  normal response,  Some patients using 5FU show minimal redness and scaling while others have a very  vigorous  response where the skin scabs and peels. The important thing to realize is that 5FU is treating sun-damaged skin that carries skin cancer risk.        While the skin is irritated, open, sore or scabbed you can apply aquaphor, vaseline or 1% hydrocortisone cream in the morning.      You should apply a thin layer of the cream to the affected area twice a day for 2  weeks every night. A strip of cream the length of your finger tip should be enough to cover your entire face.  For tougher skin like arms, legs, or back, we may suggest longer treatment plans.  However if you react really strong and fast, you might stop earlier or use less frequently. - please call if it is very strong and you are concern you might need to stop early.      If you prescription coverage allows we may add calcipotriene (Dovonex ), a vitamin-D derivative, to the treatment plan.  In these cases we will have you mix the calcipotriene with the efudex to help shorten the treatment course and improve outcomes.      Typically very strong reactions are related to lots of underlying sun damage,  and this means you are getting a good response to the medication. However, there is no need to be miserable while using this. Please let us know if you are having trouble or concerns!       It was a pleasure speaking to Kaylene Diaz today.  Previous clinic notes and pertinent laboratory tests were reviewed prior to Kaylene Diaz's visit.  Nature of benign skin lesions dicussed with patient.  Patient encouraged to perform monthly skin exams.  UV precautions reviewed with patient.  Return to clinic 2 months        Again, thank you for allowing me to participate in the care of your patient.        Sincerely,        Cipriano Springer MD

## 2022-11-08 NOTE — PROGRESS NOTES
Kaylene Diaz is an extremely pleasant 66 year old year old female patient here today for hx of non-melanoma skin cancer.  She notes rough spotson face and hands.   .   Patient states this has been present for a while.  Patient reports the following symptoms:  scale.  Patient reports the following previous treatments none.  These treatments did not work.  Patient reports the following modifying factors none.  Associated symptoms: none.  Patient has no other skin complaints today.  Remainder of the HPI, Meds, PMH, Allergies, FH, and SH was reviewed in chart.      Past Medical History:   Diagnosis Date     Basal cell carcinoma      RA (rheumatoid arthritis) (H)      Small bowel obstruction (H)      Squamous cell carcinoma        Past Surgical History:   Procedure Laterality Date     ARTHROSCOPY KNEE  12/10/2010    ARTHROSCOPY KNEE performed by NAVID FIERRO at [facility]     COLONOSCOPY N/A 8/9/2018    Procedure: COLONOSCOPY;  colonoscopy;  Surgeon: Luke Kaye MD;  Location: [facility]     INJECT EPIDURAL LUMBAR N/A 7/7/2022    Procedure: Lumbar Epidural Steroid Injection;  Surgeon: Olivia Davies MD;  Location: [facility]     SURGICAL HISTORY OF -       IUD removal     SURGICAL HISTORY OF -       Ovarian cyst     SURGICAL HISTORY OF -       thorn removal from foot     SURGICAL HISTORY OF -       knee surgery, scope, right knee        Family History   Problem Relation Age of Onset     Prostate Cancer Father      Eye Disorder Father         mac degen     Heart Disease Father      Macular Degeneration Father      Heart Disease Mother         a-fib     Eye Disorder Mother      C.A.D. Mother      Hypertension Mother      Alcohol/Drug Brother      Alcohol/Drug Sister      Alcohol/Drug Brother      Alcohol/Drug Brother      Alcohol/Drug Brother      Alcohol/Drug Sister      Obesity Sister      Alcohol/Drug Sister      Prostate Cancer Sister      Cancer Other      Melanoma No family hx of        Social History     Socioeconomic  "History     Marital status:      Spouse name: Not on file     Number of children: Not on file     Years of education: Not on file     Highest education level: Not on file   Occupational History     Employer: UNEMPLOYED   Tobacco Use     Smoking status: Former     Years: 30.00     Types: Cigarettes     Quit date: 2000     Years since quittin.5     Smokeless tobacco: Never   Vaping Use     Vaping Use: Never used   Substance and Sexual Activity     Alcohol use: Yes     Comment: VERY RARE     Drug use: No     Sexual activity: Not Currently     Partners: Male   Other Topics Concern     Parent/sibling w/ CABG, MI or angioplasty before 65F 55M? No   Social History Narrative     Not on file     Social Determinants of Health     Financial Resource Strain: Not on file   Food Insecurity: Not on file   Transportation Needs: Not on file   Physical Activity: Not on file   Stress: Not on file   Social Connections: Not on file   Intimate Partner Violence: Not on file   Housing Stability: Not on file       Outpatient Encounter Medications as of 2022   Medication Sig Dispense Refill     albuterol (PROAIR HFA/PROVENTIL HFA/VENTOLIN HFA) 108 (90 Base) MCG/ACT inhaler Inhale 2 puffs into the lungs every 6 hours       fluticasone (FLONASE) 50 MCG/ACT nasal spray Spray 1 spray into both nostrils daily       hydroxychloroquine (PLAQUENIL) 200 MG tablet Take 2 tablets (400 mg) by mouth daily 180 tablet 2     Insulin Syringe-Needle U-100 (BD INSULIN SYRINGE) 27G X 1/2\" 1 ML MISC For once weekly methotrexate administration. 50 each 1     methotrexate 50 MG/2ML injection Inject 1 mL (25 mg) Subcutaneous every 7 days 8 mL 6     Multiple Vitamins-Minerals (ADULT GUMMY PO) Take 1 chew tab by mouth daily VitaCrave       Multiple Vitamins-Minerals (HAIR/SKIN/NAILS/BIOTIN PO) Take 1 capsule by mouth daily.  100 tablet 3     omeprazole (PRILOSEC) 20 MG DR capsule Take 1 capsule (20 mg) by mouth daily 90 capsule 3     rivaroxaban " ANTICOAGULANT (XARELTO ANTICOAGULANT) 20 MG TABS tablet Take 1 tablet (20 mg) by mouth daily (with dinner) 90 tablet 3     tiZANidine (ZANAFLEX) 4 MG capsule Take 1 capsule (4 mg) by mouth 3 times daily as needed for muscle spasms 270 capsule 0     tiZANidine (ZANAFLEX) 4 MG capsule Take 1 capsule (4 mg) by mouth 3 times daily as needed for muscle spasms 90 capsule 0     vitamin B complex with vitamin C (STRESS TAB) tablet Take 1 tablet by mouth daily  0     VITAMIN D PO Take by mouth daily       No facility-administered encounter medications on file as of 11/8/2022.             O:   NAD, WDWN, Alert & Oriented, Mood & Affect wnl, Vitals stable   Here today alone   General appearance normal   Vitals stable   Alert, oriented and in no acute distress      Following lymph nodes palpated: Occipital, Cervical, Supraclavicular no lad  Gritty scaly papule on hands and face     Stuck on papules and brown macules on trunk and ext   Red papules on trunk  Flesh colored papules on trunk     The remainder of the full exam was normal; the following areas were examined:  conjunctiva/lids, oral mucosa, neck, peripheral vascular system, abdomen, lymph nodes, digits/nails, eccrine and apocrine glands, scalp/hair, face, neck, chest, abdomen, buttocks, back, RUE, LUE, RLE, LLE       Eyes: Conjunctivae/lids:Normal     ENT: Lips, buccal mucosa, tongue: normal    MSK:Normal    Cardiovascular: peripheral edema none    Pulm: Breathing Normal    Lymph Nodes: No Head and Neck Lymphadenopathy     Neuro/Psych: Orientation:Alert and Orientedx3 ; Mood/Affect:normal       A/P:  1. Seborrheic keratosis, lentigo, angioma, dermal nevus, hx of non-melanoma skin cancer   2. Actinic keratosis   R temple x1, forehead x2, R brow x1, R hand x2,mid upper lip x1  LN2:  Treated with LN2 for 5s for 1-2 cycles. Warned risks of blistering, pain, pigment change, scarring, and incomplete resolution.  Advised patient to return if lesions do not completely resolve.   Wound care sheet given.    Hands efudex discussed with patient   Using 5-Flurouracil Cream    5-Fluorouracil (5FU) topical cream (brand names Efudex, Carac) is a prescription topical medicine to treat actinic keratoses (pre-squamous cell skin cancer lesions), sun-damaged skin as well as superficial skin cancers.    When applied the areas of sun-damaged skin, the 5FU will  find  damaged skin cells and destroy them.   During treatment, the skin will become red and look very irritated. This is the expected  normal response,  Some patients using 5FU show minimal redness and scaling while others have a very  vigorous  response where the skin scabs and peels. The important thing to realize is that 5FU is treating sun-damaged skin that carries skin cancer risk.        While the skin is irritated, open, sore or scabbed you can apply aquaphor, vaseline or 1% hydrocortisone cream in the morning.      You should apply a thin layer of the cream to the affected area twice a day for 2  weeks every night. A strip of cream the length of your finger tip should be enough to cover your entire face.  For tougher skin like arms, legs, or back, we may suggest longer treatment plans.  However if you react really strong and fast, you might stop earlier or use less frequently. - please call if it is very strong and you are concern you might need to stop early.      If you prescription coverage allows we may add calcipotriene (Dovonex ), a vitamin-D derivative, to the treatment plan.  In these cases we will have you mix the calcipotriene with the efudex to help shorten the treatment course and improve outcomes.      Typically very strong reactions are related to lots of underlying sun damage, and this means you are getting a good response to the medication. However, there is no need to be miserable while using this. Please let us know if you are having trouble or concerns!       It was a pleasure speaking to Kaylene Diaz today.  Previous  clinic notes and pertinent laboratory tests were reviewed prior to Kaylene Diaz's visit.  Nature of benign skin lesions dicussed with patient.  Patient encouraged to perform monthly skin exams.  UV precautions reviewed with patient.  Return to clinic 2 months

## 2022-11-08 NOTE — PATIENT INSTRUCTIONS
Using 5-Flurouracil Cream      Use a thin finger length amount of the two creams (Efudex and Dovonex) mixed together on your hands twice a day for 2 weeks. Follow up in clinic 2 months after completion to access the success of the treatment.     5-Fluorouracil (5FU) topical cream (brand names Efudex, Carac) is a prescription topical medicine to treat actinic keratoses (pre-squamous cell skin cancer lesions), sun-damaged skin as well as superficial skin cancers.    When applied the areas of sun-damaged skin, the 5FU will  find  damaged skin cells and destroy them.   During treatment, the skin will become red and look very irritated. This is the expected  normal response,  Some patients using 5FU show minimal redness and scaling while others have a very  vigorous  response where the skin scabs and peels. The important thing to realize is that 5FU is treating sun-damaged skin that carries skin cancer risk.      While the skin is irritated, open, sore or scabbed you can apply aquaphor, vaseline or 1% hydrocortisone cream in the morning.     You should apply a thin layer of the cream to the affected area twice a day for 2  weeks every night. A strip of cream the length of your finger tip should be enough to cover your entire face.  For tougher skin like arms, legs, or back, we may suggest longer treatment plans.  However if you react really strong and fast, you might stop earlier or use less frequently. - please call if it is very strong and you are concern you might need to stop early.     If you prescription coverage allows we may add calcipotriene (Dovonex ), a vitamin-D derivative, to the treatment plan.  In these cases we will have you mix the calcipotriene with the efudex to help shorten the treatment course and improve outcomes.      Typically very strong reactions are related to lots of underlying sun damage, and this means you are getting a good response to the medication. However, there is no need to be  miserable while using this. Please let us know if you are having trouble or concerns!          WOUND CARE INSTRUCTIONS   FOR CRYOSURGERY   This area treated with liquid nitrogen should form a blister (areas treated may or may not blister-skin may just turn dark and slough off). You do not need to bandage the area unless a blister forms and breaks (which may be a few days). When the blister breaks, begin daily dressing changes as follows:   1) Clean and dry the area with tap water using clean Q-tip or sterile gauze pad.   2) Apply Polysporin ointment or Bacitracin ointment over entire wound. Do NOT use Neosporin ointment.   3) Cover the wound with a band-aid or sterile non-stick gauze pad and micropore paper tape.   REPEAT THESE INSTRUCTIONS AT LEAST ONCE A DAY UNTIL THE WOUND HAS COMPLETELY HEALED.   It is an old wives tale that a wound heals better when it is exposed to air and allowed to dry out. The wound will heal faster with a better cosmetic result if it is kept moist with ointment and covered with a bandage.   *Do not let the wound dry out.   Supplies Needed:   *Cotton tipped applicators (Q-tips)   *Polysporin ointment or Bacitracin ointment (NOT NEOSPORIN)   *Band-aids, or non stick gauze pads and micropore paper tape   PATIENT INFORMATION   During the healing process you will notice a number of changes. All wounds develop a small halo of redness surrounding the wound. This means healing is occurring. Severe itching with extensive redness usually indicates sensitivity to the ointment or bandage tape used to dress the wound. You should call our office if this develops.   Swelling and/or discoloration around your surgical site is common, particularly when performed around the eye.   All wounds normally drain. The larger the wound the more drainage there will be. After 7-10 days, you will notice the wound beginning to shrink and new skin will begin to grow. The wound is healed when you can see skin has formed  over the entire area. A healed wound has a healthy, shiny look to the surface and is red to dark pink in color to normalize. Wounds may take approximately 4-6 weeks to heal. Larger wounds may take 6-8 weeks. After the wound is healed you may discontinue dressing changes.   You may experience a sensation of tightness as your wound heals. This is normal and will gradually subside.   Your healed wound may be sensitive to temperature changes. This sensitivity improves with time, but if you re having a lot of discomfort, try to avoid temperature extremes.   Patients frequently experience itching after their wound appears to have healed because of the continue healing under the skin. Plain Vaseline will help relieve the itching.        Proper skin care from Dr. Springer- [facility]     Eliminate harsh soaps, i.e. Dial, Zest, [place];   Use mild soaps such as Cetaphil or Dove Sensitive Skin   Avoid overly hot or cold showers   After showering, lightly pat dry off.   Apply moisturizer immediately to damp dry skin.   Aggressive use of a moisturizer (including Vanicream, Cetaphil, Aquaphor, Eucerin, or Cerave)   We recommend using tub-style moisturizer that needs to be scooped out by hand, not a pump. This has more of an oil base. It will hold moisture in your skin much better than a water base moisturizer. The ones recommended are non- pore clogging.       If you have any questions call [phone] and follow the prompts to Dr. Springer's office.

## 2022-11-09 ENCOUNTER — TELEPHONE (OUTPATIENT)
Dept: DERMATOLOGY | Facility: CLINIC | Age: 67
End: 2022-11-09

## 2022-11-09 NOTE — TELEPHONE ENCOUNTER
Prior Authorization Retail Medication Request    Medication/Dose: calcipotriene (DOVONEX) 0.005 % external cream  ICD code (if different than what is on RX):    Previously Tried and Failed:    Rationale:      Insurance Name:  [employer] [place]   Insurance ID:  [id number]       Pharmacy Information (if different than what is on RX)  Name:  [facility]   Phone:  [phone]

## 2022-11-10 NOTE — TELEPHONE ENCOUNTER
Central Prior Authorization Team - Phone: [phone]     PA Initiation    Medication: calcipotriene (DOVONEX) 0.005 % external cream- [place] INITIATED  Insurance Company:    Pharmacy Filling the Rx: [facility][id number] - [place], [place] - [address]  Filling Pharmacy Phone: [phone]  Filling Pharmacy Fax:    Start Date: 11/10/2022

## 2022-11-11 ENCOUNTER — ANCILLARY PROCEDURE (OUTPATIENT)
Dept: RADIOLOGY | Facility: AMBULATORY SURGERY CENTER | Age: 67
End: 2022-11-11
Attending: ANESTHESIOLOGY
Payer: MEDICARE

## 2022-11-11 ENCOUNTER — HOSPITAL ENCOUNTER (OUTPATIENT)
Facility: AMBULATORY SURGERY CENTER | Age: 67
Discharge: HOME | End: 2022-11-11
Attending: ANESTHESIOLOGY | Admitting: ANESTHESIOLOGY
Payer: MEDICARE

## 2022-11-11 VITALS
HEART RATE: 74 BPM | SYSTOLIC BLOOD PRESSURE: 157 MMHG | DIASTOLIC BLOOD PRESSURE: 92 MMHG | TEMPERATURE: 97 F | OXYGEN SATURATION: 99 % | RESPIRATION RATE: 16 BRPM

## 2022-11-11 DIAGNOSIS — R52 PAIN: ICD-10-CM

## 2022-11-11 DIAGNOSIS — M54.16 LUMBAR RADICULOPATHY: ICD-10-CM

## 2022-11-11 PROCEDURE — 62323 NJX INTERLAMINAR LMBR/SAC: CPT | Performed by: ANESTHESIOLOGY

## 2022-11-11 PROCEDURE — 62323 NJX INTERLAMINAR LMBR/SAC: CPT

## 2022-11-11 RX ORDER — IOPAMIDOL 408 MG/ML
INJECTION, SOLUTION INTRATHECAL PRN
Status: DISCONTINUED | OUTPATIENT
Start: 2022-11-11 | End: 2022-11-11 | Stop reason: HOSPADM

## 2022-11-11 RX ORDER — METHYLPREDNISOLONE ACETATE 40 MG/ML
INJECTION, SUSPENSION INTRA-ARTICULAR; INTRALESIONAL; INTRAMUSCULAR; SOFT TISSUE PRN
Status: DISCONTINUED | OUTPATIENT
Start: 2022-11-11 | End: 2022-11-11 | Stop reason: HOSPADM

## 2022-11-11 RX ORDER — LIDOCAINE HYDROCHLORIDE 10 MG/ML
INJECTION, SOLUTION EPIDURAL; INFILTRATION; INTRACAUDAL; PERINEURAL PRN
Status: DISCONTINUED | OUTPATIENT
Start: 2022-11-11 | End: 2022-11-11 | Stop reason: HOSPADM

## 2022-11-11 RX ORDER — BUPIVACAINE HYDROCHLORIDE 2.5 MG/ML
INJECTION, SOLUTION EPIDURAL; INFILTRATION; INTRACAUDAL PRN
Status: DISCONTINUED | OUTPATIENT
Start: 2022-11-11 | End: 2022-11-11 | Stop reason: HOSPADM

## 2022-11-11 NOTE — DISCHARGE INSTRUCTIONS
Home Care Instructions after an Epidural Steroid Pain Injection    A lumbar epidural steroid injection delivers steroid medication directly into the area that may be causing your lower back pain and/or leg pain. A cervical or thoracic epidural steroid injection delivers steroids into the epidural space surrounding spinal nerve roots to help relieve pain in the upper spine/neck.    Activity  -Rest today  -Do not work today  -Resume normal activity tomorrow  -DO NOT shower for 24 hours  -DO NOT remove bandaid for 24 hours    Pain  -You may experience soreness at the injection site for one or two days  -You may use an ice pack for 20 minutes every 2 hours for the first 24 hours  -You may use a heating pad after the first 24 hours  -You may use Tylenol (acetaminophen) every 4 hours or other pain medicines as     directed by your physician    You may experience numbness radiating into your legs or arms (depending on the procedure location). This numbness may last several hours. Until sensation returns to normal; please use caution in walking, climbing stairs, and stepping out of your vehicle, etc.        Common side effects of steroids:  Not everyone will experience corticosteroid side effects. If side effects are experienced, they will gradually subside in the 7-10 day period following an injection. Most common side effects include:  -Flushed face and/or chest  -Feeling of warmth, particularly in the face but could be an overall feeling of warmth  -Increased blood sugar in diabetic patients  -Menstrual irregularities my occur. If taking hormone-based birth control an alternate method of birth control is recommended  -Sleep disturbances and/or mood swings are possible  -Leg cramps    Please contact us if you have:  -Severe pain  -Fever more than 101.5 degrees Fahrenheit  -Signs of infection at the injection site (redness, swelling, or drainage)    FOR PAIN CENTER PATIENTS:  If you have questions, please contact the Pain  Clinic at [phone] Option #1 between the hours of 7:00 am and 3:00 pm Monday through Friday. After office hours you can contact the on call provider by dialing [phone]. If you need immediate attention, we recommend that you go to a hospital emergency room or dial [phone].      FOR PM&R PATIENTS:  For patients seen by the PM and R service, please call [phone]. If you need to fax a pain diary to PM&R the fax number is [phone]. If you are unable to fax, uploading to "[employer]" is encouraged, then send to provider. If you have procedure scheduling questions please call [phone] Option #2.

## 2022-11-15 NOTE — TELEPHONE ENCOUNTER
Central Prior Authorization Team - Phone: [phone]     Prior Authorization Approval    Authorization Effective Date: 8/12/2022  Authorization Expiration Date: 11/10/2023  Medication: calcipotriene (DOVONEX) 0.005 % external cream- [place] APPROVED  Approved Dose/Quantity: 60  Reference #:     Insurance Company:    Expected CoPay:       CoPay Card Available:      Foundation Assistance Needed:    Which Pharmacy is filling the prescription (Not needed for infusion/clinic administered): [id number] - [place], [place] - [address]  Pharmacy Notified: Yes  Patient Notified: YesComment:  pharmacy will notify when ready

## 2022-11-18 NOTE — H&P
ABBREVIATED H&P [facility]      Patient Name: Kaylene Diaz   MRN: [id number]   YOB: 1955     1. Reason for Procedure:  Procedure Summary     Date: 11/11/22 Room / Location: [facility]EDURE ROOM 06 / [facility]    Anesthesia Start:  Anesthesia Stop:     Procedure: Lumbar Epidural Steroid Injection, L4-5 (Spine) Diagnosis:       Lumbar radiculopathy      (Lumbar radiculopathy [M54.16])    Providers: Olivia Davies MD Responsible Provider:     Anesthesia Type: Not recorded ASA Status: Not recorded          2. History:   Past Medical History:   Diagnosis Date     Basal cell carcinoma      RA (rheumatoid arthritis) (H)      Small bowel obstruction (H)      Squamous cell carcinoma        Comorbidities: Rheumatoid Arthritis, Morbid Obesity    Any history of sleep apnea? No    Any history of problems with sedation? No    3. Physical:    General: Normal  Skin:  Normal.  Respiratory: Clear to auscultation bilateral, no wheezing  Cardio:  Regular rate and rhythm  Abdomen: Soft, nontender, nondistended, no palpable masses.  Musculoskeletal: Normal  Neuro: Sensory exam normal, motor exam 5/5, bilateral upper and lower extremities         4. Current Medications (if not in Epic):   Current Outpatient Medications   Medication Sig Dispense Refill     albuterol (PROAIR HFA/PROVENTIL HFA/VENTOLIN HFA) 108 (90 Base) MCG/ACT inhaler Inhale 2 puffs into the lungs every 6 hours       calcipotriene (DOVONEX) 0.005 % external cream Mix with efudex and apply twice daily to hands for 14 days 60 g 3     fluorouracil (EFUDEX) 5 % external cream Mix with dovonex and apply twice daily to hands for 14 days 40 g 1     fluticasone (FLONASE) 50 MCG/ACT nasal spray Spray 1 spray into both nostrils daily       hydroxychloroquine (PLAQUENIL) 200 MG tablet Take 2 tablets (400 mg) by mouth daily 180 tablet 2     Insulin Syringe-Needle U-100 (BD INSULIN SYRINGE)  "27G X 1/2\" 1 ML MISC For once weekly methotrexate administration. 50 each 1     methotrexate 50 MG/2ML injection Inject 1 mL (25 mg) Subcutaneous every 7 days 8 mL 6     Multiple Vitamins-Minerals (ADULT GUMMY PO) Take 1 chew tab by mouth daily VitaCrave       Multiple Vitamins-Minerals (HAIR/SKIN/NAILS/BIOTIN PO) Take 1 capsule by mouth daily.  100 tablet 3     omeprazole (PRILOSEC) 20 MG DR capsule Take 1 capsule (20 mg) by mouth daily 90 capsule 3     rivaroxaban ANTICOAGULANT (XARELTO ANTICOAGULANT) 20 MG TABS tablet Take 1 tablet (20 mg) by mouth daily (with dinner) 90 tablet 3     tiZANidine (ZANAFLEX) 4 MG capsule Take 1 capsule (4 mg) by mouth 3 times daily as needed for muscle spasms 270 capsule 0     vitamin B complex with vitamin C (STRESS TAB) tablet Take 1 tablet by mouth daily  0     VITAMIN D PO Take by mouth daily          5. Allergies and Reactions:  is allergic to folic acid, gold, and latex.                   "

## 2022-11-18 NOTE — OP NOTE
Patient: Kaylene Diaz Age: 66 year old   MRN: [id number] Attending: Dr. Davies      Date of Visit: November 11, 2022        PAIN MEDICINE CLINIC PROCEDURE NOTE     ATTENDING CLINICIAN:    Olivia Davies MD     ASSISTANT CLINICIAN:        PREPROCEDURE DIAGNOSES:  1.  Lumbar radiculopathy  2.  Chronic low back pain         PROCEDURE(S) PERFORMED:  1.  Interlaminar lumbar epidural steroid injection   2.  Fluoroscopic guidance for the above-named procedure(s)        ANESTHESIA:  Local.     INDICATIONS:  Kaylene Diaz is a 66 year old female with a history of  chronic low back pain secondary to bilateral lumbar radiculopathy .  The patient stated that the patient was in their usual state of health and denied recent anticoagulant use or recent infections.  Therefore, the plan is to perform above mentioned procedures.      Procedure Details:  The patient was met in the procedure room, where the patient was identified by name, medical record number and date of birth.  All of the patient s last minute questions were answered. Written informed consent was obtained and saved in the electronic medical record, after the risks, benefits, and alternatives were discussed with the patient.       A formal time-out procedure was performed, as per protocol, including patient name, title of procedure, and site of procedure, and all in the room concurred.  Routine monitors were applied.       The patient was placed in the prone position on the procedure room table.  All pressure points were checked and comfortably padded.  Routine monitors were placed.  Vital signs were stable.     A chlorhexidine prep was completed followed by sterile draping per standard procedure.      The AP fluoroscopic view was optimized for approach at  L5-S1 interspace.  The skin over the interspace was infiltrated with 4-5 mL of 1% Lidocaine using a 25 gauge, 1.5 inch needle.  A 20-gauge 3-1/2 inch Tuohy needle was advanced under fluoroscopic guidance with  right paramedial approach until it touched lamina. Mutiple AP and lateral fluoroscopic images at this time  are taken as Tuohy needle was advanced to the epidural space. The epidural space was identified, without evidence of blood, cerebrospinal fluid, or parasthesia throughout. Needle tip placement within the epidural space was further confirmed with 1-2 mL of nonionic contrast agent, with the epidural space visualized in the AP and lateral fluoroscopic view(s) with appropriate spread of the agent with fat vacuolization and no intravascular or intrathecal spread noted. Next, 6 mL of a treatment solution containing 2 mL of preservative free 0.25% bupivacaine, 1 mL of Depo-Medrol 40 mg/mL and 3 mL preservative free normal saline was administered slowly. The needle was withdrawn.        Light pressure was held at the puncture site(s) to prevent ecchymosis and oozing.  The patient's skin was cleansed, and hemostasis was confirmed.  Band-aids were applied to the needle injection site(s).       Condition:     The patient remained awake and alert throughout the procedure.  The patient tolerated the procedure well and was monitored for approximately 15 minutes afterward in the post procedure area.  There were no immediate post procedure complications noted.  The patient was then discharged to home as per protocol.       Pre-procedure pain score: 6/10  Post-procedure pain score: 0/10

## 2022-12-19 ENCOUNTER — VIRTUAL VISIT (OUTPATIENT)
Dept: RHEUMATOLOGY | Facility: CLINIC | Age: 67
End: 2022-12-19
Payer: MEDICARE

## 2022-12-19 DIAGNOSIS — M05.79 RHEUMATOID ARTHRITIS INVOLVING MULTIPLE SITES WITH POSITIVE RHEUMATOID FACTOR (H): Primary | ICD-10-CM

## 2022-12-19 DIAGNOSIS — M62.838 MUSCLE SPASM: ICD-10-CM

## 2022-12-19 DIAGNOSIS — Z79.899 HIGH RISK MEDICATION USE: ICD-10-CM

## 2022-12-19 PROCEDURE — 99214 OFFICE O/P EST MOD 30 MIN: CPT | Mod: 95 | Performed by: INTERNAL MEDICINE

## 2022-12-19 RX ORDER — PREDNISONE 5 MG/1
5 TABLET ORAL DAILY PRN
Qty: 90 TABLET | Refills: 0 | Status: SHIPPED | OUTPATIENT
Start: 2022-12-19 | End: 2023-01-16

## 2022-12-19 RX ORDER — TIZANIDINE HYDROCHLORIDE 4 MG/1
4 CAPSULE, GELATIN COATED ORAL 3 TIMES DAILY PRN
Qty: 270 CAPSULE | Refills: 1 | Status: SHIPPED | OUTPATIENT
Start: 2022-12-19 | End: 2023-04-21

## 2022-12-19 NOTE — PATIENT INSTRUCTIONS
RHEUMATOLOGY    Dr. Joseph Rooney    [facility]  [address] [place] [address]  [place] [address]  Phone number: [phone]  Fax number: [phone]    You may schedule your FLU shot by calling [phone] or if you would like to get your shot at a [facility] pharmacy you may schedule online at [email or web address].    Thank you for choosing [facility]!    Keke Finnegan CMA Rheumatology    -------------------    Labs are required now and in 3 months.    Hydroxychloroquine toxicity monitoring eye exam is also required now.  This exam is needed yearly and was last performed in 2020.

## 2022-12-19 NOTE — PROGRESS NOTES
LISA Payton: Please fax the lab orders that were entered today, 12/19/2022, to the lab at [facility] in [place], [place].     Joseph Rooney MD  12/19/2022

## 2022-12-19 NOTE — PROGRESS NOTES
"Kaylene Diaz  is a 67 year old year old who is being evaluated via a billable video visit.      How would you like to obtain your AVS? MyChart  If the video visit is dropped, the invitation should be resent by: Text to cell phone: [phone]   Will anyone else be joining your video visit? No     Rheumatology Video Visit      Kaylene Diaz MRN# [id number]   YOB: 1955 Age: 67 year old      Date of visit: 12/19/22  PCP: Dr. Hitesh Washington    Chief Complaint   Patient presents with:  Rheumatoid Arthritis    Assessment and Plan     1.  Seropositive rheumatoid arthritis (RF 32, CCP >250): Currently on methotrexate 25 mg SQ once  weekly, hydroxychloroquine 400 mg daily.  Previously on sulfasalazine that was discontinued when she found no benefit from it; but she also did not require prednisone while on sulfasalazine; questioning if the \"benefit\" from sulfasalazine was actually the \"benefit\" of not gardening.  Does not tolerate folic acid because of associated increased sunburns.  Toxicity monitoring labs and hydroxychloroquine toxicity monitoring eye exam are overdue and required prior to refills.  Mild flare of arthritis symptoms at the MCPs since shoveling snow; she has prednisone 5 mg daily as needed and I advise that she use prednisone 10 mg daily x14 days, then 5 mg daily x14 days, then stop; she wrote down these instructions and verbalized understanding.    Chronic illness, stable.      - Continue methotrexate 25mg SQ once weekly   - Continue hydroxychloroquine 400 mg daily (last eye exam on 9/2/2022 with Dr. Powers)  - Continue prednisone 5 mg daily as needed for rheumatoid arthritis symptoms; uses sparingly, with the last steroids being used only for her back that was a separate prescription from her PCP per patient  - Labs now: CBC, Creatinine, Hepatic Panel (lab orders previously faxed to [facility] in [place])  - Labs in March 2023: CBC, Creatinine, Hepatic Panel, ESR, CRP (lab orders to be faxed to " [facility] in [place])    High risk medication requiring intensive toxicity monitoring at least quarterly: labs ordered include CBC, Creatinine, Hepatic panel to monitor for cytopenia and hepatotoxicity; checking creatinine as it affects clearance of medication.       2.  Basal cell carcinoma and squamous cell carcinoma: Several lesions removed by dermatology in the past.  Continue to follow with dermatology.     3.  Chronic lower back pain: Has significantly improved with lidocaine patches that have been used no more than 10 times in the past 1 year.  Now following a pain management clinic where a steroid injection was not helpful.  She is going to return to the pain management clinic because of continued low back pain that occurs when she is ambulating but not when she is sitting.    4.  History of right Achilles tendinitis: Was evaluated by podiatry who diagnosed her with Achilles tendinitis and her symptoms resolved with a boot that she wore at night.  Not an issue today.     5.  Osteopenia without elevated FRAX: Based on 4/27/2022 DEXA.    6.  History of productive cough: has seen an ENT provider who reportedly ran allergy tests that came back negative.  She then tried prilosec (omeprazole wasn't effective) 20mg daily that resolved the cough, but stopping it results in return of symptoms.  Then had an EGD showing gastritis.  Now on PPI.    7.  Muscle spasms: Primarily affecting the lower back.  Improved from infrequent cyclobenzaprine 5 mg nightly as needed, but finds that using tizanidine 4mg PRN is more effective.   - Continue tiZANidine (ZANAFLEX) 4 MG capsule; Take 1 capsule (4 mg) by mouth 3 times daily as needed for muscle spasms  Dispense: 90 capsule; Refill: 1    8.  Vaccinations: Vaccinations reviewed with Ms. Diaz.  Risks and benefits of vaccinations were discussed.    - Influenza: refused by patient previously, she previously stated that she never gets the influenza vaccination.  - [id number]: up to date  -  [id number]: up to date  - Shingrix: Up to date   - COVID-19: Advised updating, and to hold methotrexate for 2 weeks afterward.    10. Elevated blood pressure:  Kaylene to follow up with Primary Care provider regarding elevated blood pressure.     Total minutes spent in evaluation with patient, documentation, , and review of pertinent studies and chart notes: 18    Ms. Diaz verbalized agreement with and understanding of the rational for the diagnosis and treatment plan.  All questions were answered to best of my ability and the patient's satisfaction. Ms. Diaz was advised to contact the clinic with any questions that may arise after the clinic visit.      Thank you for involving me in the care of the patient    Return to clinic: 3-4 months, virtual by patient request      HPI   Kaylene Diaz is a 67 year old female with a past medical history significant for hyperlipidemia, osteoarthritis, osteopenia, PE (2020) on chronic anticoagulation, meniscal tear, squamous cell carcinoma, basal cell carcinoma, and rheumatoid arthritis who presents for follow-up of rheumatoid arthritis.    Today, 12/19/2022: Shoveled snow and since then had mild swelling and a bit more ache at the MCPs bilaterally, more swollen than before shoveling but symptoms have persisted for several days now.  Has not been using prednisone.  Is overdue for labs and states that she will get these done ASAP.  Arthritis is not limiting daily activities.    Denies fevers, chills, nausea, vomiting, constipation, diarrhea. No abdominal pain. No chest pain/pressure, palpitations, or shortness of breath.   No LE swelling. No neck pain. No oral or nasal sores.  No rash. No sicca symptoms.   Trying to lose weight.     Tobacco: Quit in 2000  EtOH: Rare  Drugs: None    ROS   12 point review of system was completed and negative except as noted in the HPI     Active Problem List     Patient Active Problem List   Diagnosis     Carpal tunnel syndrome     Tear  meniscus knee     Osteopenia     Advanced directives, counseling/discussion     Hyperlipidemia LDL goal <160     Osteoarthritis     RA (rheumatoid arthritis) (H)     High risk medications (not anticoagulants) long-term use     Kidney stone     ASCUS on Pap smear     Morbid obesity due to excess calories (H)     Small bowel obstruction (H)     Elevated blood pressure reading without diagnosis of hypertension     BLANCA (acute kidney injury) (H)     Elevated glucose     SBO (small bowel obstruction) (H)     Rheumatoid arthritis involving multiple sites with positive rheumatoid factor (H)     Chronic kidney disease, stage 3a (H)     Chronic bilateral low back pain with bilateral sciatica     Lumbar radiculopathy     Past Medical History     Past Medical History:   Diagnosis Date     Basal cell carcinoma      RA (rheumatoid arthritis) (H)      Small bowel obstruction (H)      Squamous cell carcinoma      Past Surgical History     Past Surgical History:   Procedure Laterality Date     ARTHROSCOPY KNEE  12/10/2010    ARTHROSCOPY KNEE performed by NAVID FIERRO at [facility]     COLONOSCOPY N/A 8/9/2018    Procedure: COLONOSCOPY;  colonoscopy;  Surgeon: Luke Kaye MD;  Location: [facility]     INJECT EPIDURAL LUMBAR N/A 7/7/2022    Procedure: Lumbar Epidural Steroid Injection;  Surgeon: Olivia Davies MD;  Location: [facility] OR     INJECT EPIDURAL LUMBAR N/A 11/11/2022    Procedure: Lumbar Epidural Steroid Injection, L4-5;  Surgeon: Olivia Davies MD;  Location: [facility] OR     SURGICAL HISTORY OF -       IUD removal     SURGICAL HISTORY OF -       Ovarian cyst     SURGICAL HISTORY OF -       thorn removal from foot     SURGICAL HISTORY OF -       knee surgery, scope, right knee     Allergy     Allergies   Allergen Reactions     Folic Acid      Gold Rash     Latex Rash     Not all latex products     Current Medication List     Current Outpatient Medications   Medication Sig     albuterol (PROAIR HFA/PROVENTIL HFA/VENTOLIN HFA) 108  "(90 Base) MCG/ACT inhaler Inhale 2 puffs into the lungs every 6 hours     calcipotriene (DOVONEX) 0.005 % external cream Mix with efudex and apply twice daily to hands for 14 days     fluorouracil (EFUDEX) 5 % external cream Mix with dovonex and apply twice daily to hands for 14 days     fluticasone (FLONASE) 50 MCG/ACT nasal spray Spray 1 spray into both nostrils daily     hydroxychloroquine (PLAQUENIL) 200 MG tablet Take 2 tablets (400 mg) by mouth daily     Insulin Syringe-Needle U-100 (BD INSULIN SYRINGE) 27G X 1/2\" 1 ML MISC For once weekly methotrexate administration.     methotrexate 50 MG/2ML injection Inject 1 mL (25 mg) Subcutaneous every 7 days     Multiple Vitamins-Minerals (ADULT GUMMY PO) Take 1 chew tab by mouth daily VitaCrave     Multiple Vitamins-Minerals (HAIR/SKIN/NAILS/BIOTIN PO) Take 1 capsule by mouth daily.      omeprazole (PRILOSEC) 20 MG DR capsule Take 1 capsule (20 mg) by mouth daily     rivaroxaban ANTICOAGULANT (XARELTO ANTICOAGULANT) 20 MG TABS tablet Take 1 tablet (20 mg) by mouth daily (with dinner)     tiZANidine (ZANAFLEX) 4 MG capsule Take 1 capsule (4 mg) by mouth 3 times daily as needed for muscle spasms     vitamin B complex with vitamin C (STRESS TAB) tablet Take 1 tablet by mouth daily     VITAMIN D PO Take by mouth daily     No current facility-administered medications for this visit.         Social History   See HPI    Family History     Family History   Problem Relation Age of Onset     Prostate Cancer Father      Eye Disorder Father         mac degen     Heart Disease Father      Macular Degeneration Father      Heart Disease Mother         a-fib     Eye Disorder Mother      C.A.D. Mother      Hypertension Mother      Alcohol/Drug Brother      Alcohol/Drug Sister      Alcohol/Drug Brother      Alcohol/Drug Brother      Alcohol/Drug Brother      Alcohol/Drug Sister      Obesity Sister      Alcohol/Drug Sister      Prostate Cancer Sister      Cancer Other      Melanoma No " "family hx of        Physical Exam     Temp Readings from Last 3 Encounters:   11/11/22 97  F (36.1  C) (Temporal)   07/07/22 97.1  F (36.2  C) (Tympanic)   06/03/22 97.9  F (36.6  C) (Tympanic)     BP Readings from Last 5 Encounters:   11/11/22 (!) 157/92   07/07/22 (!) 169/90   06/09/22 (!) 159/76   06/03/22 (!) 160/80   04/04/22 134/68     Pulse Readings from Last 1 Encounters:   11/11/22 74     Resp Readings from Last 1 Encounters:   11/11/22 16     Estimated body mass index is 49.34 kg/m  as calculated from the following:    Height as of 7/7/22: 1.683 m (5' 6.25\").    Weight as of 7/7/22: 139.7 kg (308 lb).      GEN: NAD.   HEENT: MMM.  Anicteric, noninjected sclera. No obvious external lesions of the ear and nose. Hearing intact.  PULM: No increased work of breathing  MSK: Mild ulnar deviation at the MCPs; mild chronic swelling at the MCPs; no overlying erythema.  PIPs and DIPs without swelling.  Wrists without swelling.  SKIN: No rash or jaundice seen  PSYCH: Alert. Appropriate.      Labs / Imaging (select studies)     [facility] labs reviewed from 6/14/2022      CBC  Recent Labs   Lab Test 04/22/20  1156 10/28/19  1052 08/29/19  0855   WBC 8.2 5.3 5.8   RBC 4.59 4.44 4.60   HGB 14.4 13.9 14.2   HCT 44.1 42.1 43.2   MCV 96 95 94   RDW 12.8 14.4 14.0    225 240   MCH 31.4 31.3 30.9   MCHC 32.7 33.0 32.9   NEUTROPHIL 70.6 71.0 73.2   LYMPH 16.3 19.9 15.9   MONOCYTE 11.2 7.2 8.7   EOSINOPHIL 1.5 1.7 1.9   BASOPHIL 0.4 0.2 0.3   ANEU 5.8 3.8 4.3   ALYM 1.3 1.1 0.9   PEEWEE 0.9 0.4 0.5   AEOS 0.1 0.1 0.1   ABAS 0.0 0.0 0.0     CMP  Recent Labs   Lab Test 03/09/21  0000 12/22/20  0000 04/22/20  1156 03/05/20  0000 10/28/19  1052 08/29/19  0855 03/26/18  1034 10/31/17  1110 06/23/17  0817   NA  --   --  138  --   --   --   --  137 139   POTASSIUM  --   --  4.2  --   --   --   --  4.5 4.4   CHLORIDE  --   --  107  --   --   --   --  108 106   CO2  --   --  27  --   --   --   --  24 24   ANIONGAP  --   --  4  --   --  "  --   --  5 9   GLC  --   --  81  --   --   --   --  84 115*   BUN  --   --  22  --   --   --   --  22 14   CR 1.01 0.98 0.74   < > 0.72 0.75   < > 0.88 0.75   GFRESTIMATED 55* 57* 85   < > 88 84   < > 65 78   GFRESTBLACK >60 >60 >90   < > >90 >90   < > 79 >90   GFR Calc     DEAN  --   --  9.6  --   --   --   --  9.2 9.0   BILITOTAL  --   --  0.4  --  0.5 0.5   < > 0.4 0.4   ALBUMIN  --   --  3.6  --  3.7 3.6   < > 3.7 3.8   PROTTOTAL  --   --  8.1  --  7.6 7.6   < > 8.0 7.5   ALKPHOS  --   --  118  --  109 118   < > 131 120   AST 15 15 13   < > 19 21   < > 17 22   ALT 26 28 22   < > 28 23   < > 24 26    < > = values in this interval not displayed.     Calcium/VitaminD  Recent Labs   Lab Test 04/22/20  1156 10/31/17  1110 06/23/17  0817   DEAN 9.6 9.2 9.0     ESR/CRP  Recent Labs   Lab Test 10/28/19  1052 08/29/19  0855 10/23/18  1000   SED 15 11 14   CRP 5.1 7.3 4.7     Immunization History     Immunization History   Administered Date(s) Administered     COVID-19 Vaccine 12+ (Pfizer) 03/05/2021, 03/26/2021, 11/10/2021     Pneumo Conj 13-V (2010&after) 12/03/2018     Pneumococcal 23 valent 10/24/2013, 03/09/2020     TD (ADULT, 7+) 10/28/2020     TDAP Vaccine (Adacel) 06/16/2010     Zoster vaccine recombinant adjuvanted (SHINGRIX) 12/03/2018, 06/19/2019          Chart documentation done in part with Dragon Voice recognition Software. Although reviewed after completion, some word and grammatical error may remain.          Video-Visit Details    Type of service:  Video Visit    Video Start Time: 10:33 AM    Video End Time: 10:43 AM    Originating Location (pt. Location): [place], [place]    Distant Location (provider location):  Off site, [place]    Platform used for Video Visit: Miguel Angel Rooney MD

## 2022-12-19 NOTE — Clinical Note
LISA Payton: Please fax the lab orders that were entered today, 12/19/2022, to the lab at [facility] in [place], [place].   Joseph Rooney MD 12/19/2022

## 2022-12-21 NOTE — PROGRESS NOTES
Labs were faxed to [facility] in [place] on 12/20/22. In writers basket at desk.     Tata CARLOS RN Specialty Triage 12/21/2022 9:50 AM

## 2022-12-26 ENCOUNTER — HEALTH MAINTENANCE LETTER (OUTPATIENT)
Age: 67
End: 2022-12-26

## 2023-01-04 ENCOUNTER — TELEPHONE (OUTPATIENT)
Dept: FAMILY MEDICINE | Facility: CLINIC | Age: 68
End: 2023-01-04

## 2023-01-04 NOTE — TELEPHONE ENCOUNTER
Patient Quality Outreach    Patient is due for the following:   Breast Cancer Screening - Mammogram    Next Steps:   No follow up needed at this time.    Type of outreach:    Sent letter.      Questions for provider review:    None    Marilyn Alva, CMA

## 2023-01-04 NOTE — LETTER
January 4, 2023    To  Kaylene Diaz  [address] [place] [address]    Your team at [facility] cares about your health. We have reviewed your chart and based on our findings; we are making the following recommendations to better manage your health.     You are in particular need of attention regarding the following:     Schedule Annual MAMMOGRAPHY. [facility] scheduling number is [phone] or schedule in [facility] (self referral).  1 in 8 women will develop invasive breast cancer during her lifetime and it is the most common non-skin cancer in [place] Women. EARLY detection, new treatments, and a better understanding of the disease have increased survival rates- the 5 year survival rate in the 1960's was 63% and today it is close to 90%.  If you are under/uninsured, we recommend you contact the [facility]. They offer mammograms at no charge or on a sliding fee charge. You can schedule with them at [phone]. Please have them send us the results.     If you have already completed these items, please contact the clinic via phone or   [facility] so your care team can review and update your records. Thank you for   choosing [facility] for your healthcare needs. For any questions,   concerns, or to schedule an appointment please contact our clinic.    Healthy Regards,      Your [facility] Care Team

## 2023-01-16 ENCOUNTER — OFFICE VISIT (OUTPATIENT)
Dept: FAMILY MEDICINE | Facility: CLINIC | Age: 68
End: 2023-01-16
Payer: MEDICARE

## 2023-01-16 ENCOUNTER — MYC MEDICAL ADVICE (OUTPATIENT)
Dept: RHEUMATOLOGY | Facility: CLINIC | Age: 68
End: 2023-01-16

## 2023-01-16 ENCOUNTER — ANCILLARY PROCEDURE (OUTPATIENT)
Dept: GENERAL RADIOLOGY | Facility: CLINIC | Age: 68
End: 2023-01-16
Attending: NURSE PRACTITIONER
Payer: MEDICARE

## 2023-01-16 VITALS
HEIGHT: 66 IN | SYSTOLIC BLOOD PRESSURE: 158 MMHG | RESPIRATION RATE: 20 BRPM | HEART RATE: 79 BPM | DIASTOLIC BLOOD PRESSURE: 64 MMHG | TEMPERATURE: 97.5 F | BODY MASS INDEX: 46.4 KG/M2 | WEIGHT: 288.7 LBS | OXYGEN SATURATION: 98 %

## 2023-01-16 DIAGNOSIS — M17.11 PRIMARY OSTEOARTHRITIS OF RIGHT KNEE: Primary | ICD-10-CM

## 2023-01-16 DIAGNOSIS — G89.29 CHRONIC PAIN OF RIGHT KNEE: ICD-10-CM

## 2023-01-16 DIAGNOSIS — M25.561 CHRONIC PAIN OF RIGHT KNEE: ICD-10-CM

## 2023-01-16 DIAGNOSIS — Z12.31 ENCOUNTER FOR SCREENING MAMMOGRAM FOR BREAST CANCER: ICD-10-CM

## 2023-01-16 PROCEDURE — 99213 OFFICE O/P EST LOW 20 MIN: CPT | Performed by: NURSE PRACTITIONER

## 2023-01-16 PROCEDURE — 73560 X-RAY EXAM OF KNEE 1 OR 2: CPT | Mod: TC | Performed by: RADIOLOGY

## 2023-01-16 ASSESSMENT — PAIN SCALES - GENERAL: PAINLEVEL: SEVERE PAIN (6)

## 2023-01-16 NOTE — PROGRESS NOTES
Assessment & Plan     Primary osteoarthritis of right knee  X-rays reviewed with patient and diagnosis discussed.  Recommend referral to orthopedics for consideration of steroid injections.  - Orthopedic  Referral; Future    Chronic pain of right knee  - XR Knee Standing AP Bilat & Lateral Right; Future    Encounter for screening mammogram for breast cancer  - MA SCREENING DIGITAL BILAT - Future  (s+30); Future    The risks, benefits and treatment options of prescribed medications or other treatments have been discussed with the patient. The patient verbalized their understanding and should call or follow up if no improvement or if they develop further problems.    TYRA Zapata CNP  [facility]            Fletcher FLORES is a 67 year old, presenting for the following health issues:  Knee Pain (Right Knee- progressively getting worse , feeling it more lately . X 2 months ) and Health Maintenance (Reminded due for mammogram, declined flu and covid booster)      Knee Pain    History of Present Illness       Reason for visit:  Pain in my right knee  Symptoms include:  Pain  Symptom intensity:  Moderate  Symptom progression:  Worsening  Had these symptoms before:  No  What makes it worse:  Straight leg sitting or laying on my back in my bed  What makes it better:  Bending the knee    She eats 2-3 servings of fruits and vegetables daily.She consumes 0 sweetened beverage(s) daily.She exercises with enough effort to increase her heart rate 20 to 29 minutes per day.  She exercises with enough effort to increase her heart rate 7 days per week.   She is taking medications regularly.     Minor pain since last summer  Worsening for 2 months.  No redness or swelling.  Certain positions make it worse  Hurts to walk  Feels unstable at times.  Pain starts in the back of the knee and radiates around to the front.  H/o meniscus tear in 2010 - s/p repair  Took a pain pill (not sure what) a  "couple nights ago            Review of Systems   Constitutional, HEENT, cardiovascular, pulmonary, gi and gu systems are negative, except as otherwise noted.      Objective    BP (!) 158/64 (BP Location: Right arm, Patient Position: Chair, Cuff Size: Adult Large)   Pulse 79   Temp 97.5  F (36.4  C) (Tympanic)   Resp 20   Ht 1.664 m (5' 5.5\")   Wt 131 kg (288 lb 11.2 oz)   SpO2 98%   BMI 47.31 kg/m    Body mass index is 47.31 kg/m .  Physical Exam   GENERAL: healthy, alert and no distress  MS: Bilateral knees: Normal appearance, no erythema or edema.  Tenderness to palpation over the medial joint line of the right knee.  Range of motion normal.  Gait normal.    Xray independently reviewed, decreased joint space bilaterally in the medial knee; worse on the right which appears to be bone-on-bone. Radiologist read pending.                      "

## 2023-01-18 ENCOUNTER — OFFICE VISIT (OUTPATIENT)
Dept: RHEUMATOLOGY | Facility: CLINIC | Age: 68
End: 2023-01-18
Payer: MEDICARE

## 2023-01-18 VITALS
SYSTOLIC BLOOD PRESSURE: 173 MMHG | WEIGHT: 288.8 LBS | HEART RATE: 81 BPM | OXYGEN SATURATION: 97 % | DIASTOLIC BLOOD PRESSURE: 89 MMHG | BODY MASS INDEX: 47.33 KG/M2

## 2023-01-18 DIAGNOSIS — M17.0 PRIMARY OSTEOARTHRITIS OF BOTH KNEES: ICD-10-CM

## 2023-01-18 DIAGNOSIS — M17.11 PRIMARY OSTEOARTHRITIS OF RIGHT KNEE: Primary | ICD-10-CM

## 2023-01-18 PROCEDURE — 20610 DRAIN/INJ JOINT/BURSA W/O US: CPT | Mod: RT | Performed by: INTERNAL MEDICINE

## 2023-01-18 PROCEDURE — 99213 OFFICE O/P EST LOW 20 MIN: CPT | Mod: 25 | Performed by: INTERNAL MEDICINE

## 2023-01-18 RX ORDER — TRIAMCINOLONE ACETONIDE 40 MG/ML
40 INJECTION, SUSPENSION INTRA-ARTICULAR; INTRAMUSCULAR ONCE
Status: CANCELLED | OUTPATIENT
Start: 2023-01-18 | End: 2023-01-18

## 2023-01-18 RX ORDER — TRIAMCINOLONE ACETONIDE 40 MG/ML
40 INJECTION, SUSPENSION INTRA-ARTICULAR; INTRAMUSCULAR ONCE
Status: COMPLETED | OUTPATIENT
Start: 2023-01-18 | End: 2023-01-18

## 2023-01-18 RX ADMIN — TRIAMCINOLONE ACETONIDE 40 MG: 40 INJECTION, SUSPENSION INTRA-ARTICULAR; INTRAMUSCULAR at 15:37

## 2023-01-18 NOTE — PROGRESS NOTES
"  Rheumatology Clinic Visit      Mark Diaz MRN# [id number]   YOB: 1955 Age: 67 year old      Date of visit: 1/18/23  PCP: Dr. Hitesh Washintgon    Chief Complaint   Patient presents with:  Rheumatoid Arthritis: Knee injection    Assessment and Plan     1.  R>L knee osteoarthritis: 1/16/2023 x-ray of the knees: \"IMPRESSION: Degenerative narrowing of the medial compartments of both  knees with complete effacement of the medial compartment on the right and bone-on-bone contact. No evidence for fracture or significant effusion.\"  Degenerative arthritis symptoms of both knees, worse on the right.  She is requesting a steroid injection of the right knee.  We reviewed the diagnosis and treatment options.  Discussed the importance of physical therapy and weight loss.  Steroid injection today of the right knee.   Positive reinforcement for continued weight loss efforts.  Advised using topical Voltaren gel as needed for the knee osteoarthritis symptoms.  - PT referral  - Topical Voltaren gel as needed  - Steroid injection of the right knee as documented in the procedure section    2. Elevated blood pressure:  MARK to follow up with Primary Care provider regarding elevated blood pressure.     Total minutes spent in evaluation with patient, documentation, , and review of pertinent studies and chart notes: 18    Ms. Diaz verbalized agreement with and understanding of the rational for the diagnosis and treatment plan.  All questions were answered to best of my ability and the patient's satisfaction. Ms. Diaz was advised to contact the clinic with any questions that may arise after the clinic visit.      Thank you for involving me in the care of the patient    Return to clinic: 3-4 months, virtual by patient request      HPI   Mark Diaz is a 67 year old female with a past medical history significant for hyperlipidemia, osteoarthritis, osteopenia, PE (2020) on chronic anticoagulation, meniscal " tear, squamous cell carcinoma, basal cell carcinoma, and rheumatoid arthritis who presents for follow-up of rheumatoid arthritis.    Today, 1/18/2023: R>L knee pain that is worse with activity and improves with rest.  Was seen in the primary care clinic on 1/16/2023 she was found to have osteoarthritis of the right knee.  She was advised to see orthopedics for consideration of steroid injection.  Patient contacted this clinic and requested steroid injection and therefore presents today.      Denies fevers, chills, nausea, vomiting, constipation, diarrhea.  Intentionally losing weight.    Tobacco: Quit in 2000  EtOH: Rare  Drugs: None    ROS     GEN: No fevers or chills  SKIN: No rash  CV: No chest pain, pressure, palpitations, or dyspnea on exertion.  PULM: No wheeze, cough, or shortness of breath.         Active Problem List     Patient Active Problem List   Diagnosis     Carpal tunnel syndrome     Tear meniscus knee     Osteopenia     Advanced directives, counseling/discussion     Hyperlipidemia LDL goal <160     Osteoarthritis     RA (rheumatoid arthritis) (H)     High risk medications (not anticoagulants) long-term use     Kidney stone     ASCUS on Pap smear     Morbid obesity due to excess calories (H)     Small bowel obstruction (H)     Elevated blood pressure reading without diagnosis of hypertension     BLANCA (acute kidney injury) (H)     Elevated glucose     SBO (small bowel obstruction) (H)     Rheumatoid arthritis involving multiple sites with positive rheumatoid factor (H)     Chronic kidney disease, stage 3a (H)     Chronic bilateral low back pain with bilateral sciatica     Lumbar radiculopathy     Past Medical History     Past Medical History:   Diagnosis Date     Basal cell carcinoma      RA (rheumatoid arthritis) (H)      Small bowel obstruction (H)      Squamous cell carcinoma      Past Surgical History     Past Surgical History:   Procedure Laterality Date     ARTHROSCOPY KNEE  12/10/2010     "ARTHROSCOPY KNEE performed by NAVID FIERRO at [facility]     COLONOSCOPY N/A 8/9/2018    Procedure: COLONOSCOPY;  colonoscopy;  Surgeon: Luke Kaye MD;  Location: [facility]     INJECT EPIDURAL LUMBAR N/A 7/7/2022    Procedure: Lumbar Epidural Steroid Injection;  Surgeon: Olivia Davies MD;  Location: [facility] OR     INJECT EPIDURAL LUMBAR N/A 11/11/2022    Procedure: Lumbar Epidural Steroid Injection, L4-5;  Surgeon: Olivia Davies MD;  Location: [facility] OR     SURGICAL HISTORY OF -       IUD removal     SURGICAL HISTORY OF -       Ovarian cyst     SURGICAL HISTORY OF -       thorn removal from foot     SURGICAL HISTORY OF -       knee surgery, scope, right knee     Allergy     Allergies   Allergen Reactions     Folic Acid      Gold Rash     Latex Rash     Not all latex products     Current Medication List     Current Outpatient Medications   Medication Sig     albuterol (PROAIR HFA/PROVENTIL HFA/VENTOLIN HFA) 108 (90 Base) MCG/ACT inhaler Inhale 2 puffs into the lungs every 6 hours     calcipotriene (DOVONEX) 0.005 % external cream Mix with efudex and apply twice daily to hands for 14 days     fluorouracil (EFUDEX) 5 % external cream Mix with dovonex and apply twice daily to hands for 14 days     hydroxychloroquine (PLAQUENIL) 200 MG tablet Take 2 tablets (400 mg) by mouth daily     Insulin Syringe-Needle U-100 (BD INSULIN SYRINGE) 27G X 1/2\" 1 ML MISC For once weekly methotrexate administration.     methotrexate 50 MG/2ML injection Inject 1 mL (25 mg) Subcutaneous every 7 days     Multiple Vitamins-Minerals (ADULT GUMMY PO) Take 1 chew tab by mouth daily VitaCrave     Multiple Vitamins-Minerals (HAIR/SKIN/NAILS/BIOTIN PO) Take 1 capsule by mouth daily.      omeprazole (PRILOSEC) 20 MG DR capsule Take 1 capsule (20 mg) by mouth daily     rivaroxaban ANTICOAGULANT (XARELTO ANTICOAGULANT) 20 MG TABS tablet Take 1 tablet (20 mg) by mouth daily (with dinner)     tiZANidine (ZANAFLEX) 4 MG capsule Take 1 capsule (4 mg) by " "mouth 3 times daily as needed for muscle spasms     vitamin B complex with vitamin C (STRESS TAB) tablet Take 1 tablet by mouth daily     VITAMIN D PO Take by mouth daily     No current facility-administered medications for this visit.         Social History   See HPI    Family History     Family History   Problem Relation Age of Onset     Prostate Cancer Father      Eye Disorder Father         mac degen     Heart Disease Father      Macular Degeneration Father      Heart Disease Mother         a-fib     Eye Disorder Mother      C.A.D. Mother      Hypertension Mother      Alcohol/Drug Brother      Alcohol/Drug Sister      Alcohol/Drug Brother      Alcohol/Drug Brother      Alcohol/Drug Brother      Alcohol/Drug Sister      Obesity Sister      Alcohol/Drug Sister      Prostate Cancer Sister      Cancer Other      Melanoma No family hx of        Physical Exam     Temp Readings from Last 3 Encounters:   01/16/23 97.5  F (36.4  C) (Tympanic)   11/11/22 97  F (36.1  C) (Temporal)   07/07/22 97.1  F (36.2  C) (Tympanic)     BP Readings from Last 5 Encounters:   01/16/23 (!) 158/64   11/11/22 (!) 157/92   07/07/22 (!) 169/90   06/09/22 (!) 159/76   06/03/22 (!) 160/80     Pulse Readings from Last 1 Encounters:   01/16/23 79     Resp Readings from Last 1 Encounters:   01/16/23 20     Estimated body mass index is 47.31 kg/m  as calculated from the following:    Height as of 1/16/23: 1.664 m (5' 5.5\").    Weight as of 1/16/23: 131 kg (288 lb 11.2 oz).      GEN: NAD.   HEENT:  Anicteric, noninjected sclera. No obvious external lesions of the ear and nose. Hearing intact.  PULM: No increased work of breathing  MSK: Medial joint line tenderness of both knees, worse on the right; no effusion, increased warmth, or overlying erythema.  SKIN: No rash or jaundice seen  PSYCH: Alert. Appropriate.      Labs / Imaging (select studies)     [facility] labs reviewed from 6/14/2022    CBC  Recent Labs   Lab Test 04/22/20  1156 10/28/19  1052 " 08/29/19  0855   WBC 8.2 5.3 5.8   RBC 4.59 4.44 4.60   HGB 14.4 13.9 14.2   HCT 44.1 42.1 43.2   MCV 96 95 94   RDW 12.8 14.4 14.0    225 240   MCH 31.4 31.3 30.9   MCHC 32.7 33.0 32.9   NEUTROPHIL 70.6 71.0 73.2   LYMPH 16.3 19.9 15.9   MONOCYTE 11.2 7.2 8.7   EOSINOPHIL 1.5 1.7 1.9   BASOPHIL 0.4 0.2 0.3   ANEU 5.8 3.8 4.3   ALYM 1.3 1.1 0.9   PEEWEE 0.9 0.4 0.5   AEOS 0.1 0.1 0.1   ABAS 0.0 0.0 0.0     CMP  Recent Labs   Lab Test 03/09/21  0000 12/22/20  0000 04/22/20  1156 03/05/20  0000 10/28/19  1052 08/29/19  0855 03/26/18  1034 10/31/17  1110 06/23/17  0817   NA  --   --  138  --   --   --   --  137 139   POTASSIUM  --   --  4.2  --   --   --   --  4.5 4.4   CHLORIDE  --   --  107  --   --   --   --  108 106   CO2  --   --  27  --   --   --   --  24 24   ANIONGAP  --   --  4  --   --   --   --  5 9   GLC  --   --  81  --   --   --   --  84 115*   BUN  --   --  22  --   --   --   --  22 14   CR 1.01 0.98 0.74   < > 0.72 0.75   < > 0.88 0.75   GFRESTIMATED 55* 57* 85   < > 88 84   < > 65 78   GFRESTBLACK >60 >60 >90   < > >90 >90   < > 79 >90   GFR Calc     DEAN  --   --  9.6  --   --   --   --  9.2 9.0   BILITOTAL  --   --  0.4  --  0.5 0.5   < > 0.4 0.4   ALBUMIN  --   --  3.6  --  3.7 3.6   < > 3.7 3.8   PROTTOTAL  --   --  8.1  --  7.6 7.6   < > 8.0 7.5   ALKPHOS  --   --  118  --  109 118   < > 131 120   AST 15 15 13   < > 19 21   < > 17 22   ALT 26 28 22   < > 28 23   < > 24 26    < > = values in this interval not displayed.     Calcium/VitaminD  Recent Labs   Lab Test 04/22/20  1156 10/31/17  1110 06/23/17  0817   DEAN 9.6 9.2 9.0     ESR/CRP  Recent Labs   Lab Test 10/28/19  1052 08/29/19  0855 10/23/18  1000   SED 15 11 14   CRP 5.1 7.3 4.7     Lipid Panel  Recent Labs   Lab Test 07/29/20  1115 11/08/17  0726   CHOL 191 194   TRIG 95 135   HDL 53 47*   * 120*   NHDL 138* 147*       Immunization History     Immunization History   Administered Date(s) Administered     COVID-19  Vaccine 12+ (Pfizer) 03/05/2021, 03/26/2021, 11/10/2021     Pneumo Conj 13-V (2010&after) 12/03/2018     Pneumococcal 23 valent 10/24/2013, 03/09/2020     TD (ADULT, 7+) 10/28/2020     TDAP Vaccine (Adacel) 06/16/2010     Zoster vaccine recombinant adjuvanted (SHINGRIX) 12/03/2018, 06/19/2019       Procedure     Procedure: Steroid injection of the right knee  Indication: Pain, osteoarthritis of the right knee    The procedure was explained in detail. Risks including infection, pain, structural damage such as cartilage damage and tendon rupture, fat atrophy, skin hyper-/hypo-pigmentation, and medication reaction was explained. The need for rest of the affected joint for one week after the procedure was explained.  The option of not doing the procedure was also provided. All questions were answered and the patient consented to the procedure.     A time-out was performed and the correct patient, procedure, and laterality were verified.    The right knee was examined and location for injection was identified - anterior medial. The area was cleaned with chlorhexidine, twice.  Ethyl chloride was then used for topical anaesthetic.  Then a mixture of lidocaine 1% 2 mL and Kenalog 40mg was injected into the intra-articular space.     The patient tolerated the procedure well. No complications.    1% Lidocaine  : Hospira  Lot #: [id number]  EXPIRATION DATE: 1 SEPT 2023  NDC: 2877-3787-60    MEDICATION: Triamcinolone 40 mg  LOT #: [id number]  EXPIRATION DATE:  06/2024  NDC#: [address]-4           Chart documentation done in part with Dragon Voice recognition Software. Although reviewed after completion, some word and grammatical error may remain.    Joseph Rooney MD

## 2023-01-18 NOTE — PATIENT INSTRUCTIONS
RHEUMATOLOGY    Dr. Joseph Rooney    [facility]  [address] [place] [address]  [place] [address]  Phone number: [phone]  Fax number: [phone]    You may schedule your FLU shot by calling [phone] or if you would like to get your shot at a [facility] pharmacy you may schedule online at [email or web address].    Thank you for choosing [facility]!    Keke Finnegan CMA Rheumatology

## 2023-01-18 NOTE — NURSING NOTE
RAPID3 (0-30) Cumulative Score  12.3          RAPID3 Weighted Score (divide #4 by 3 and that is the weighted score)  4.1

## 2023-01-30 ENCOUNTER — OFFICE VISIT (OUTPATIENT)
Dept: DERMATOLOGY | Facility: CLINIC | Age: 68
End: 2023-01-30
Payer: MEDICARE

## 2023-01-30 ENCOUNTER — TELEPHONE (OUTPATIENT)
Dept: DERMATOLOGY | Facility: CLINIC | Age: 68
End: 2023-01-30

## 2023-01-30 DIAGNOSIS — Z87.2 HISTORY OF ACTINIC KERATOSES: ICD-10-CM

## 2023-01-30 DIAGNOSIS — C44.712 BASAL CELL CARCINOMA (BCC) OF RIGHT LOWER LEG: ICD-10-CM

## 2023-01-30 DIAGNOSIS — L81.4 LENTIGO: Primary | ICD-10-CM

## 2023-01-30 DIAGNOSIS — L82.1 SEBORRHEIC KERATOSIS: ICD-10-CM

## 2023-01-30 PROCEDURE — 88331 PATH CONSLTJ SURG 1 BLK 1SPC: CPT | Performed by: DERMATOLOGY

## 2023-01-30 PROCEDURE — 99213 OFFICE O/P EST LOW 20 MIN: CPT | Mod: 25 | Performed by: DERMATOLOGY

## 2023-01-30 PROCEDURE — 11102 TANGNTL BX SKIN SINGLE LES: CPT | Performed by: DERMATOLOGY

## 2023-01-30 ASSESSMENT — PAIN SCALES - GENERAL: PAINLEVEL: NO PAIN (0)

## 2023-01-30 NOTE — TELEPHONE ENCOUNTER
Patient notified. Patient verbalized understanding. Scheduled for MOHS Surgery. Mohs Pre-op letter sent via My chart. Melissa Krueger RN

## 2023-01-30 NOTE — LETTER
1/30/2023         RE: Kaylene Diaz  [address] [place] [address]        Dear Colleague,    Thank you for referring your patient, Kaylene Diaz, to the [facility]. Please see a copy of my visit note below.    Kaylene Diaz is an extremely pleasant 67 year old year old female patient here today for f/u actinic keratosis on hands.  Used efudex hands.   Did well on hands.  She ntoes spot on right calf.  .   Patient states this has been present for a while.  Patient reports the following symptoms:  Not healing.  .  Patient reports the following previous treatments none.  These treatments did not work.  Patient reports the following modifying factors none.  Associated symptoms: none.  Patient has no other skin complaints today.  Remainder of the HPI, Meds, PMH, Allergies, FH, and SH was reviewed in chart.      Past Medical History:   Diagnosis Date     Basal cell carcinoma      RA (rheumatoid arthritis) (H)      Small bowel obstruction (H)      Squamous cell carcinoma        Past Surgical History:   Procedure Laterality Date     ARTHROSCOPY KNEE  12/10/2010    ARTHROSCOPY KNEE performed by NAVID FIERRO at [facility]     COLONOSCOPY N/A 8/9/2018    Procedure: COLONOSCOPY;  colonoscopy;  Surgeon: Luke Kaye MD;  Location: [facility]     INJECT EPIDURAL LUMBAR N/A 7/7/2022    Procedure: Lumbar Epidural Steroid Injection;  Surgeon: Olivia Davies MD;  Location: [facility]     INJECT EPIDURAL LUMBAR N/A 11/11/2022    Procedure: Lumbar Epidural Steroid Injection, L4-5;  Surgeon: Olivia Davies MD;  Location: [facility] OR     SURGICAL HISTORY OF -       IUD removal     SURGICAL HISTORY OF -       Ovarian cyst     SURGICAL HISTORY OF -       thorn removal from foot     SURGICAL HISTORY OF -       knee surgery, scope, right knee        Family History   Problem Relation Age of Onset     Prostate Cancer Father      Eye Disorder Father         mac degen     Heart Disease Father      Macular Degeneration  Father      Heart Disease Mother         a-fib     Eye Disorder Mother      C.A.D. Mother      Hypertension Mother      Alcohol/Drug Brother      Alcohol/Drug Sister      Alcohol/Drug Brother      Alcohol/Drug Brother      Alcohol/Drug Brother      Alcohol/Drug Sister      Obesity Sister      Alcohol/Drug Sister      Prostate Cancer Sister      Cancer Other      Melanoma No family hx of        Social History     Socioeconomic History     Marital status:      Spouse name: Not on file     Number of children: Not on file     Years of education: Not on file     Highest education level: Not on file   Occupational History     Employer: UNEMPLOYED   Tobacco Use     Smoking status: Former     Years: 30.00     Types: Cigarettes     Quit date: 2000     Years since quittin.8     Smokeless tobacco: Never   Vaping Use     Vaping Use: Never used   Substance and Sexual Activity     Alcohol use: Yes     Comment: VERY RARE     Drug use: No     Sexual activity: Not Currently     Partners: Male   Other Topics Concern     Parent/sibling w/ CABG, MI or angioplasty before 65F 55M? No   Social History Narrative     Not on file     Social Determinants of Health     Financial Resource Strain: Not on file   Food Insecurity: Not on file   Transportation Needs: Not on file   Physical Activity: Not on file   Stress: Not on file   Social Connections: Not on file   Intimate Partner Violence: Not on file   Housing Stability: Not on file       Outpatient Encounter Medications as of 2023   Medication Sig Dispense Refill     albuterol (PROAIR HFA/PROVENTIL HFA/VENTOLIN HFA) 108 (90 Base) MCG/ACT inhaler Inhale 2 puffs into the lungs every 6 hours       calcipotriene (DOVONEX) 0.005 % external cream Mix with efudex and apply twice daily to hands for 14 days 60 g 3     fluorouracil (EFUDEX) 5 % external cream Mix with dovonex and apply twice daily to hands for 14 days 40 g 1     hydroxychloroquine (PLAQUENIL) 200 MG tablet Take 2  "tablets (400 mg) by mouth daily 180 tablet 2     Insulin Syringe-Needle U-100 (BD INSULIN SYRINGE) 27G X 1/2\" 1 ML MISC For once weekly methotrexate administration. 50 each 1     methotrexate 50 MG/2ML injection Inject 1 mL (25 mg) Subcutaneous every 7 days 8 mL 6     Multiple Vitamins-Minerals (ADULT GUMMY PO) Take 1 chew tab by mouth daily VitaCrave       Multiple Vitamins-Minerals (HAIR/SKIN/NAILS/BIOTIN PO) Take 1 capsule by mouth daily.  100 tablet 3     omeprazole (PRILOSEC) 20 MG DR capsule Take 1 capsule (20 mg) by mouth daily 90 capsule 3     rivaroxaban ANTICOAGULANT (XARELTO ANTICOAGULANT) 20 MG TABS tablet Take 1 tablet (20 mg) by mouth daily (with dinner) 90 tablet 3     tiZANidine (ZANAFLEX) 4 MG capsule Take 1 capsule (4 mg) by mouth 3 times daily as needed for muscle spasms 270 capsule 1     vitamin B complex with vitamin C (STRESS TAB) tablet Take 1 tablet by mouth daily  0     VITAMIN D PO Take by mouth daily       No facility-administered encounter medications on file as of 1/30/2023.             O:   NAD, WDWN, Alert & Oriented, Mood & Affect wnl, Vitals stable   Here today alone   General appearance normal   Vitals stable   Alert, oriented and in no acute distress     Post inflammatory changes on hands   Stuck on papules and brown macules on trunk and ext   R calf ulcerated 7mm scaly papule       Eyes: Conjunctivae/lids:Normal     ENT: Lips, buccal mucosa, tongue: normal    MSK:Normal    Cardiovascular: peripheral edema none    Pulm: Breathing Normal    Neuro/Psych: Orientation:Alert and Orientedx3 ; Mood/Affect:normal       MICRO:   R calf:Orthokeratosis of epidermis with a proliferation of nests of basaloid cells, with peripheral palisading and a haphazard arrangement in the center extending into the dermis, forming nodules.  The tumor cells have hyperchromatic nuclei. Poor cytoplasm and intercellular bridging.    A/P:  1. Seborrheic keratosis, lentigo, hx of non-melanoma skin cancer   2. " Actinic keratosis s/p efudex on hands did well  3. R calf r/o basal cell carcinoma   TANGENTIAL BIOPSY IN HOUSE:  After consent, anesthesia with LEC and prep, tangential excision performed and dx above confirmed with frozen section histology.  No complications and routine wound care.  Patient is not on  anticoagulants and risk of bleeding discussed with patient.       I have personally reviewed all specimens and/or slides and used them with my medical judgement to determine or confirm the final diagnosis.     Patient told result basal cell carcinoma schedule excision .    It was a pleasure speaking to Kaylene Diaz today.  Previous clinic notes and pertinent laboratory tests were reviewed prior to Kaylene Diaz's visit.  Nature and genetics of benign skin lesions dicussed with patient.  Signs and Symptoms of skin cancer discussed with patient.  Patient encouraged to perform monthly skin exams.  UV precautions reviewed with patient.  Risks of non-melanoma skin cancer discussed with patient   Return to clinic next appt        Again, thank you for allowing me to participate in the care of your patient.        Sincerely,        Cipriano Springer MD

## 2023-01-30 NOTE — PATIENT INSTRUCTIONS
Wound Care Instructions     FOR SUPERFICIAL WOUNDS     [facility] [phone]    [facility] [phone]      Right calf                 AFTER 24 HOURS YOU SHOULD REMOVE THE BANDAGE AND BEGIN DAILY DRESSING CHANGES AS FOLLOWS:     1) Remove Dressing.     2) Clean and dry the area with tap water using a Q-tip or sterile gauze pad.     3) Apply Vaseline, Aquaphor, Polysporin ointment or Bacitracin ointment over entire wound.  Do NOT use Neosporin ointment.     4) Cover the wound with a band-aid, or a sterile non-stick gauze pad and micropore paper tape      REPEAT THESE INSTRUCTIONS AT LEAST ONCE A DAY UNTIL THE WOUND HAS COMPLETELY HEALED.    It is an old wives tale that a wound heals better when it is exposed to air and allowed to dry out. The wound will heal faster with a better cosmetic result if it is kept moist with ointment and covered with a bandage.    **Do not let the wound dry out.**      Supplies Needed:      *Cotton tipped applicators (Q-tips)    *Polysporin Ointment or Bacitracin Ointment (NOT NEOSPORIN)    *Band-aids or non-stick gauze pads and micropore paper tape.      PATIENT INFORMATION:    During the healing process you will notice a number of changes. All wounds develop a small halo of redness surrounding the wound.  This means healing is occurring. Severe itching with extensive redness usually indicates sensitivity to the ointment or bandage tape used to dress the wound.  You should call our office if this develops.      Swelling  and/or discoloration around your surgical site is common, particularly when performed around the eye.    All wounds normally drain.  The larger the wound the more drainage there will be.  After 7-10 days, you will notice the wound beginning to shrink and new skin will begin to grow.  The wound is healed when you can see skin has formed over the entire area.  A healed wound has a healthy, shiny look to the surface and is red to dark pink in  color to normalize.  Wounds may take approximately 4-6 weeks to heal.  Larger wounds may take 6-8 weeks.  After the wound is healed you may discontinue dressing changes.    You may experience a sensation of tightness as your wound heals. This is normal and will gradually subside.    Your healed wound may be sensitive to temperature changes. This sensitivity improves with time, but if you re having a lot of discomfort, try to avoid temperature extremes.    Patients frequently experience itching after their wound appears to have healed because of the continue healing under the skin.  Plain Vaseline will help relieve the itching.        POSSIBLE COMPLICATIONS    BLEEDING:    Leave the bandage in place.  Use tightly rolled up gauze or a cloth to apply direct pressure over the bandage for 30  minutes.  Reapply pressure for an additional 30 minutes if necessary  Use additional gauze and tape to maintain pressure once the bleeding has stopped.

## 2023-01-30 NOTE — LETTER
[facility] [place]  [address] [facility] [address]  Phone: [phone]    January 30, 2023    Kaylene Diaz                                                                                                                  [address] [place] [address]            Dear Ms. Diaz,    You are scheduled for Mohs Surgery on:     Wednesday February 15 th at 7:45 am.     Please check in at Dermatology Clinic.   (2nd Floor, last  Clinic on right up staircase or elevator -past OB/GYN clinic)    You don't need to arrive more than 5-10 minutes prior to your appointment time.     Be sure to eat a good breakfast and bathe and wash your hair prior to Surgery.    If you are taking any anti-coagulants that are prescribed by your Doctor (such as Coumadin/warfarin, Plavix, Aspirin, Ibuprofen), please continue taking them.     However, If you are taking anti-coagulants over the counter without  a Doctor's order for a Medical condition, please discontinue them 10 days prior to Surgery.      Please wear loose comfortable clothing as it could possibly be 4-6 hours until your surgery is completed depending upon how many layers of tissue need to be removed.     Wi-fi access is available.     Thank you,      Cipriano Springer MD/ Melissa Krueger RN

## 2023-01-30 NOTE — TELEPHONE ENCOUNTER
----- Message from Cipriano Springer MD sent at 1/30/2023 11:16 AM CST -----  R lower leg basal cell carcinoma schedule excision

## 2023-01-30 NOTE — PROGRESS NOTES
Kaylene Diaz is an extremely pleasant 67 year old year old female patient here today for f/u actinic keratosis on hands.  Used efudex hands.   Did well on hands.  She ntoes spot on right calf.  .   Patient states this has been present for a while.  Patient reports the following symptoms:  Not healing.  .  Patient reports the following previous treatments none.  These treatments did not work.  Patient reports the following modifying factors none.  Associated symptoms: none.  Patient has no other skin complaints today.  Remainder of the HPI, Meds, PMH, Allergies, FH, and SH was reviewed in chart.      Past Medical History:   Diagnosis Date     Basal cell carcinoma      RA (rheumatoid arthritis) (H)      Small bowel obstruction (H)      Squamous cell carcinoma        Past Surgical History:   Procedure Laterality Date     ARTHROSCOPY KNEE  12/10/2010    ARTHROSCOPY KNEE performed by NAVID FIERRO at [facility]     COLONOSCOPY N/A 8/9/2018    Procedure: COLONOSCOPY;  colonoscopy;  Surgeon: Luke Kaye MD;  Location: [facility]     INJECT EPIDURAL LUMBAR N/A 7/7/2022    Procedure: Lumbar Epidural Steroid Injection;  Surgeon: Olivia Davies MD;  [place] [facility] [place] [facility]     INJECT EPIDURAL LUMBAR N/A 11/11/2022    Procedure: Lumbar Epidural Steroid Injection, L4-5;  Surgeon: Olivia Davies MD;  Location: [facility] [place] [facility]     SURGICAL HISTORY OF -       IUD removal     SURGICAL HISTORY OF -       Ovarian cyst     SURGICAL HISTORY OF -       thorn removal from foot     SURGICAL HISTORY OF -       knee surgery, scope, right knee        Family History   Problem Relation Age of Onset     Prostate Cancer Father      Eye Disorder Father         mac degen     Heart Disease Father      Macular Degeneration Father      Heart Disease Mother         a-fib     Eye Disorder Mother      C.A.D. Mother      Hypertension Mother      Alcohol/Drug Brother      Alcohol/Drug Sister      Alcohol/Drug Brother      Alcohol/Drug Brother      Alcohol/Drug Brother   "    Alcohol/Drug Sister      Obesity Sister      Alcohol/Drug Sister      Prostate Cancer Sister      Cancer Other      Melanoma No family hx of        Social History     Socioeconomic History     Marital status:      Spouse name: Not on file     Number of children: Not on file     Years of education: Not on file     Highest education level: Not on file   Occupational History     Employer: UNEMPLOYED   Tobacco Use     Smoking status: Former     Years:      Types: Cigarettes     Quit date: 2000     Years since quittin.8     Smokeless tobacco: Never   Vaping Use     Vaping Use: Never used   Substance and Sexual Activity     Alcohol use: Yes     Comment: VERY RARE     Drug use: No     Sexual activity: Not Currently     Partners: Male   Other Topics Concern     Parent/sibling w/ CABG, MI or angioplasty before 65F 55M? No   Social History Narrative     Not on file     Social Determinants of Health     Financial Resource Strain: Not on file   Food Insecurity: Not on file   Transportation Needs: Not on file   Physical Activity: Not on file   Stress: Not on file   Social Connections: Not on file   Intimate Partner Violence: Not on file   Housing Stability: Not on file       Outpatient Encounter Medications as of 2023   Medication Sig Dispense Refill     albuterol (PROAIR HFA/PROVENTIL HFA/VENTOLIN HFA) 108 (90 Base) MCG/ACT inhaler Inhale 2 puffs into the lungs every 6 hours       calcipotriene (DOVONEX) 0.005 % external cream Mix with efudex and apply twice daily to hands for 14 days 60 g 3     fluorouracil (EFUDEX) 5 % external cream Mix with dovonex and apply twice daily to hands for 14 days 40 g 1     hydroxychloroquine (PLAQUENIL) 200 MG tablet Take 2 tablets (400 mg) by mouth daily 180 tablet 2     Insulin Syringe-Needle U-100 (BD INSULIN SYRINGE) 27G X 1/2\" 1 ML MISC For once weekly methotrexate administration. 50 each 1     methotrexate 50 MG/2ML injection Inject 1 mL (25 mg) Subcutaneous " every 7 days 8 mL 6     Multiple Vitamins-Minerals (ADULT GUMMY PO) Take 1 chew tab by mouth daily VitaCrave       Multiple Vitamins-Minerals (HAIR/SKIN/NAILS/BIOTIN PO) Take 1 capsule by mouth daily.  100 tablet 3     omeprazole (PRILOSEC) 20 MG DR capsule Take 1 capsule (20 mg) by mouth daily 90 capsule 3     rivaroxaban ANTICOAGULANT (XARELTO ANTICOAGULANT) 20 MG TABS tablet Take 1 tablet (20 mg) by mouth daily (with dinner) 90 tablet 3     tiZANidine (ZANAFLEX) 4 MG capsule Take 1 capsule (4 mg) by mouth 3 times daily as needed for muscle spasms 270 capsule 1     vitamin B complex with vitamin C (STRESS TAB) tablet Take 1 tablet by mouth daily  0     VITAMIN D PO Take by mouth daily       No facility-administered encounter medications on file as of 1/30/2023.             O:   NAD, WDWN, Alert & Oriented, Mood & Affect wnl, Vitals stable   Here today alone   General appearance normal   Vitals stable   Alert, oriented and in no acute distress     Post inflammatory changes on hands   Stuck on papules and brown macules on trunk and ext   R calf ulcerated 7mm scaly papule       Eyes: Conjunctivae/lids:Normal     ENT: Lips, buccal mucosa, tongue: normal    MSK:Normal    Cardiovascular: peripheral edema none    Pulm: Breathing Normal    Neuro/Psych: Orientation:Alert and Orientedx3 ; Mood/Affect:normal       MICRO:   R calf:Orthokeratosis of epidermis with a proliferation of nests of basaloid cells, with peripheral palisading and a haphazard arrangement in the center extending into the dermis, forming nodules.  The tumor cells have hyperchromatic nuclei. Poor cytoplasm and intercellular bridging.    A/P:  1. Seborrheic keratosis, lentigo, hx of non-melanoma skin cancer   2. Actinic keratosis s/p efudex on hands did well  3. R calf r/o basal cell carcinoma   TANGENTIAL BIOPSY IN HOUSE:  After consent, anesthesia with LEC and prep, tangential excision performed and dx above confirmed with frozen section histology.  No  complications and routine wound care.  Patient is not on  anticoagulants and risk of bleeding discussed with patient.       I have personally reviewed all specimens and/or slides and used them with my medical judgement to determine or confirm the final diagnosis.     Patient told result basal cell carcinoma schedule excision .    It was a pleasure speaking to Kaylene Diaz today.  Previous clinic notes and pertinent laboratory tests were reviewed prior to Kaylene Diaz's visit.  Nature and genetics of benign skin lesions dicussed with patient.  Signs and Symptoms of skin cancer discussed with patient.  Patient encouraged to perform monthly skin exams.  UV precautions reviewed with patient.  Risks of non-melanoma skin cancer discussed with patient   Return to clinic next appt

## 2023-02-15 ENCOUNTER — OFFICE VISIT (OUTPATIENT)
Dept: DERMATOLOGY | Facility: CLINIC | Age: 68
End: 2023-02-15
Payer: MEDICARE

## 2023-02-15 VITALS — SYSTOLIC BLOOD PRESSURE: 160 MMHG | DIASTOLIC BLOOD PRESSURE: 84 MMHG | HEART RATE: 78 BPM | OXYGEN SATURATION: 94 %

## 2023-02-15 DIAGNOSIS — C44.712 BASAL CELL CARCINOMA OF RIGHT LOWER LEG: Primary | ICD-10-CM

## 2023-02-15 PROCEDURE — 17313 MOHS 1 STAGE T/A/L: CPT | Performed by: DERMATOLOGY

## 2023-02-15 ASSESSMENT — PAIN SCALES - GENERAL: PAINLEVEL: NO PAIN (0)

## 2023-02-15 NOTE — LETTER
2/15/2023         RE: Kaylene Diaz  [address] [place] [address]        Dear Colleague,    Thank you for referring your patient, Kaylene Diaz, to the [facility]. Please see a copy of my visit note below.    Surgical Office Location :   [facility]  [address] [place] [address]      Kaylene Diaz is an extremely pleasant 67 year old year old female patient here today for evaluation and managment of basal cell carcinoma on right calf.  Patient has no other skin complaints today.  Remainder of the HPI, Meds, PMH, Allergies, FH, and SH was reviewed in chart.      Past Medical History:   Diagnosis Date     Basal cell carcinoma      RA (rheumatoid arthritis) (H)      Small bowel obstruction (H)      Squamous cell carcinoma        Past Surgical History:   Procedure Laterality Date     ARTHROSCOPY KNEE  12/10/2010    ARTHROSCOPY KNEE performed by NAVID FIERRO at [facility]     COLONOSCOPY N/A 8/9/2018    Procedure: COLONOSCOPY;  colonoscopy;  Surgeon: Luke Kaye MD;  Location: [facility]     INJECT EPIDURAL LUMBAR N/A 7/7/2022    Procedure: Lumbar Epidural Steroid Injection;  Surgeon: Olivia Davies MD;  Location: [facility] [place] [facility]     INJECT EPIDURAL LUMBAR N/A 11/11/2022    Procedure: Lumbar Epidural Steroid Injection, L4-5;  Surgeon: Olivia Davies MD;  Location: [facility] [place] [facility]     SURGICAL HISTORY OF -       IUD removal     SURGICAL HISTORY OF -       Ovarian cyst     SURGICAL HISTORY OF -       thorn removal from foot     SURGICAL HISTORY OF -       knee surgery, scope, right knee        Family History   Problem Relation Age of Onset     Prostate Cancer Father      Eye Disorder Father         mac degen     Heart Disease Father      Macular Degeneration Father      Heart Disease Mother         a-fib     Eye Disorder Mother      C.A.D. Mother      Hypertension Mother      Alcohol/Drug Brother      Alcohol/Drug Sister      Alcohol/Drug Brother      Alcohol/Drug Brother       "Alcohol/Drug Brother      Alcohol/Drug Sister      Obesity Sister      Alcohol/Drug Sister      Prostate Cancer Sister      Cancer Other      Melanoma No family hx of        Social History     Socioeconomic History     Marital status:      Spouse name: Not on file     Number of children: Not on file     Years of education: Not on file     Highest education level: Not on file   Occupational History     Employer: UNEMPLOYED   Tobacco Use     Smoking status: Former     Years: 30.00     Types: Cigarettes     Quit date: 2000     Years since quittin.8     Smokeless tobacco: Never   Vaping Use     Vaping Use: Never used   Substance and Sexual Activity     Alcohol use: Yes     Comment: VERY RARE     Drug use: No     Sexual activity: Not Currently     Partners: Male   Other Topics Concern     Parent/sibling w/ CABG, MI or angioplasty before 65F 55M? No   Social History Narrative     Not on file     Social Determinants of Health     Financial Resource Strain: Not on file   Food Insecurity: Not on file   Transportation Needs: Not on file   Physical Activity: Not on file   Stress: Not on file   Social Connections: Not on file   Intimate Partner Violence: Not on file   Housing Stability: Not on file       Outpatient Encounter Medications as of 2/15/2023   Medication Sig Dispense Refill     albuterol (PROAIR HFA/PROVENTIL HFA/VENTOLIN HFA) 108 (90 Base) MCG/ACT inhaler Inhale 2 puffs into the lungs every 6 hours       calcipotriene (DOVONEX) 0.005 % external cream Mix with efudex and apply twice daily to hands for 14 days 60 g 3     fluorouracil (EFUDEX) 5 % external cream Mix with dovonex and apply twice daily to hands for 14 days 40 g 1     hydroxychloroquine (PLAQUENIL) 200 MG tablet Take 2 tablets (400 mg) by mouth daily 180 tablet 2     Insulin Syringe-Needle U-100 (BD INSULIN SYRINGE) 27G X 1/2\" 1 ML MISC For once weekly methotrexate administration. 50 each 1     methotrexate 50 MG/2ML injection Inject 1 mL " (25 mg) Subcutaneous every 7 days 8 mL 6     Multiple Vitamins-Minerals (ADULT GUMMY PO) Take 1 chew tab by mouth daily VitaCrave       Multiple Vitamins-Minerals (HAIR/SKIN/NAILS/BIOTIN PO) Take 1 capsule by mouth daily.  100 tablet 3     omeprazole (PRILOSEC) 20 MG DR capsule Take 1 capsule (20 mg) by mouth daily 90 capsule 3     rivaroxaban ANTICOAGULANT (XARELTO ANTICOAGULANT) 20 MG TABS tablet Take 1 tablet (20 mg) by mouth daily (with dinner) 90 tablet 3     tiZANidine (ZANAFLEX) 4 MG capsule Take 1 capsule (4 mg) by mouth 3 times daily as needed for muscle spasms 270 capsule 1     vitamin B complex with vitamin C (STRESS TAB) tablet Take 1 tablet by mouth daily  0     VITAMIN D PO Take by mouth daily       No facility-administered encounter medications on file as of 2/15/2023.             O:   NAD, WDWN, Alert & Oriented, Mood & Affect wnl, Vitals stable   Here today alone   BP (!) 160/84   Pulse 78   SpO2 94%    General appearance normal   Vitals stable   Alert, oriented and in no acute distress     R calf 7mm scaly papule       Eyes: Conjunctivae/lids:Normal     ENT: Lips, buccal mucosa, tongue: normal    MSK:Normal    Cardiovascular: peripheral edema none    Pulm: Breathing Normal    Neuro/Psych: Orientation:Alert and Orientedx3 ; Mood/Affect:normal       A/P:  1. R calf basal cell carcinoma   MOHS:   Location    The rationale for Mohs surgery was discussed with the patient and consent was obtained.  The risks and benefits as well as alternatives to therapy were discussed, in detail.  Specifically, the risks of infection, scarring, bleeding, prolonged wound healing, incomplete removal, allergy to anesthesia, nerve injury and recurrence were addressed.  Indication for Mohs was Location. Prior to the procedure, the treatment site was clearly identified and, if available, confirmed with previous photos and confirmed by the patient   All components of the Universal Protocol/PAUSE rule were completed.  The  Mohs surgeon operated in two distinct and integrated capacities as the surgeon and pathologist.      The area was prepped with Betasept.  A rim of normal appearing skin was marked circumferentially around the lesion.  The area was infiltrated with local anesthesia.  The tumor was first debulked to remove all clinically apparent tumor.  An incision following the standard Mohs approach was done and the specimen was oriented,mapped and placed in 1 block(s).  Each specimen was then chromacoded and processed in the Mohs laboratory using standard Mohs technique and submitted for frozen section histology.  Frozen section analysis showed no residual tumor but CLEAR MARGINS.      The tumor was excised using standard Mohs technique in 1 stages(s).  CLEAR MARGINS OBTAINED and Final defect size was 1.3 x 1 cm.     We discussed the options for wound management in full with the patient including risks/benefits/ possible outcomes.        REPAIR SECOND INTENT: We discussed the options for wound management in full with the patient including risks/benefits/possible outcomes. Decision made to allow the wound to heal by second intention. Cautery was used for for hemostasis. EBL minimal; complications none; wound care routine.  The patient was discharged in good condition and will return in one month or prn for wound evaluation.  It was a pleasure speaking to Kaylene Diaz today.  Previous clinic notes and pertinent laboratory tests were reviewed prior to Kaylene Diaz's visit.  Signs and Symptoms of skin cancer discussed with patient.  Patient encouraged to perform monthly skin exams.  UV precautions reviewed with patient.  Risks of non-melanoma skin cancer discussed with patient   Return to clinic 6 months        Again, thank you for allowing me to participate in the care of your patient.        Sincerely,        Cipriano Springer MD

## 2023-02-15 NOTE — PROGRESS NOTES
Kaylene Diaz is an extremely pleasant 67 year old year old female patient here today for evaluation and managment of basal cell carcinoma on right calf.  Patient has no other skin complaints today.  Remainder of the HPI, Meds, PMH, Allergies, FH, and SH was reviewed in chart.      Past Medical History:   Diagnosis Date     Basal cell carcinoma      RA (rheumatoid arthritis) (H)      Small bowel obstruction (H)      Squamous cell carcinoma        Past Surgical History:   Procedure Laterality Date     ARTHROSCOPY KNEE  12/10/2010    ARTHROSCOPY KNEE performed by NAVID FIERRO at [facility]     COLONOSCOPY N/A 8/9/2018    Procedure: COLONOSCOPY;  colonoscopy;  Surgeon: Luke Kaye MD;  Location: [facility]     INJECT EPIDURAL LUMBAR N/A 7/7/2022    Procedure: Lumbar Epidural Steroid Injection;  Surgeon: Olivia Davies MD;  [facility]     INJECT EPIDURAL LUMBAR N/A 11/11/2022    Procedure: Lumbar Epidural Steroid Injection, L4-5;  Surgeon: Olivia Davies MD;  [facility]: [facility]     SURGICAL HISTORY OF -       IUD removal     SURGICAL HISTORY OF -       Ovarian cyst     SURGICAL HISTORY OF -       thorn removal from foot     SURGICAL HISTORY OF -       knee surgery, scope, right knee        Family History   Problem Relation Age of Onset     Prostate Cancer Father      Eye Disorder Father         mac degen     Heart Disease Father      Macular Degeneration Father      Heart Disease Mother         a-fib     Eye Disorder Mother      C.A.D. Mother      Hypertension Mother      Alcohol/Drug Brother      Alcohol/Drug Sister      Alcohol/Drug Brother      Alcohol/Drug Brother      Alcohol/Drug Brother      Alcohol/Drug Sister      Obesity Sister      Alcohol/Drug Sister      Prostate Cancer Sister      Cancer Other      Melanoma No family hx of        Social History     Socioeconomic History     Marital status:      Spouse name: Not on file     Number of children: Not on file     Years of education: Not on file  "    Highest education level: Not on file   Occupational History     Employer: UNEMPLOYED   Tobacco Use     Smoking status: Former     Years: 30.00     Types: Cigarettes     Quit date: 2000     Years since quittin.8     Smokeless tobacco: Never   Vaping Use     Vaping Use: Never used   Substance and Sexual Activity     Alcohol use: Yes     Comment: VERY RARE     Drug use: No     Sexual activity: Not Currently     Partners: Male   Other Topics Concern     Parent/sibling w/ CABG, MI or angioplasty before 65F 55M? No   Social History Narrative     Not on file     Social Determinants of Health     Financial Resource Strain: Not on file   Food Insecurity: Not on file   Transportation Needs: Not on file   Physical Activity: Not on file   Stress: Not on file   Social Connections: Not on file   Intimate Partner Violence: Not on file   Housing Stability: Not on file       Outpatient Encounter Medications as of 2/15/2023   Medication Sig Dispense Refill     albuterol (PROAIR HFA/PROVENTIL HFA/VENTOLIN HFA) 108 (90 Base) MCG/ACT inhaler Inhale 2 puffs into the lungs every 6 hours       calcipotriene (DOVONEX) 0.005 % external cream Mix with efudex and apply twice daily to hands for 14 days 60 g 3     fluorouracil (EFUDEX) 5 % external cream Mix with dovonex and apply twice daily to hands for 14 days 40 g 1     hydroxychloroquine (PLAQUENIL) 200 MG tablet Take 2 tablets (400 mg) by mouth daily 180 tablet 2     Insulin Syringe-Needle U-100 (BD INSULIN SYRINGE) 27G X 1/2\" 1 ML MISC For once weekly methotrexate administration. 50 each 1     methotrexate 50 MG/2ML injection Inject 1 mL (25 mg) Subcutaneous every 7 days 8 mL 6     Multiple Vitamins-Minerals (ADULT GUMMY PO) Take 1 chew tab by mouth daily VitaCrave       Multiple Vitamins-Minerals (HAIR/SKIN/NAILS/BIOTIN PO) Take 1 capsule by mouth daily.  100 tablet 3     omeprazole (PRILOSEC) 20 MG DR capsule Take 1 capsule (20 mg) by mouth daily 90 capsule 3     " rivaroxaban ANTICOAGULANT (XARELTO ANTICOAGULANT) 20 MG TABS tablet Take 1 tablet (20 mg) by mouth daily (with dinner) 90 tablet 3     tiZANidine (ZANAFLEX) 4 MG capsule Take 1 capsule (4 mg) by mouth 3 times daily as needed for muscle spasms 270 capsule 1     vitamin B complex with vitamin C (STRESS TAB) tablet Take 1 tablet by mouth daily  0     VITAMIN D PO Take by mouth daily       No facility-administered encounter medications on file as of 2/15/2023.             O:   NAD, WDWN, Alert & Oriented, Mood & Affect wnl, Vitals stable   Here today alone   BP (!) 160/84   Pulse 78   SpO2 94%    General appearance normal   Vitals stable   Alert, oriented and in no acute distress     R calf 7mm scaly papule       Eyes: Conjunctivae/lids:Normal     ENT: Lips, buccal mucosa, tongue: normal    MSK:Normal    Cardiovascular: peripheral edema none    Pulm: Breathing Normal    Neuro/Psych: Orientation:Alert and Orientedx3 ; Mood/Affect:normal       A/P:  1. R calf basal cell carcinoma   MOHS:   Location    The rationale for Mohs surgery was discussed with the patient and consent was obtained.  The risks and benefits as well as alternatives to therapy were discussed, in detail.  Specifically, the risks of infection, scarring, bleeding, prolonged wound healing, incomplete removal, allergy to anesthesia, nerve injury and recurrence were addressed.  Indication for Mohs was Location. Prior to the procedure, the treatment site was clearly identified and, if available, confirmed with previous photos and confirmed by the patient   All components of the Universal Protocol/PAUSE rule were completed.  The Mohs surgeon operated in two distinct and integrated capacities as the surgeon and pathologist.      The area was prepped with Betasept.  A rim of normal appearing skin was marked circumferentially around the lesion.  The area was infiltrated with local anesthesia.  The tumor was first debulked to remove all clinically apparent tumor.   An incision following the standard Mohs approach was done and the specimen was oriented,mapped and placed in 1 block(s).  Each specimen was then chromacoded and processed in the Mohs laboratory using standard Mohs technique and submitted for frozen section histology.  Frozen section analysis showed no residual tumor but CLEAR MARGINS.      The tumor was excised using standard Mohs technique in 1 stages(s).  CLEAR MARGINS OBTAINED and Final defect size was 1.3 x 1 cm.     We discussed the options for wound management in full with the patient including risks/benefits/ possible outcomes.        REPAIR SECOND INTENT: We discussed the options for wound management in full with the patient including risks/benefits/possible outcomes. Decision made to allow the wound to heal by second intention. Cautery was used for for hemostasis. EBL minimal; complications none; wound care routine.  The patient was discharged in good condition and will return in one month or prn for wound evaluation.  It was a pleasure speaking to Kaylene Diaz today.  Previous clinic notes and pertinent laboratory tests were reviewed prior to Kaylene Diaz's visit.  Signs and Symptoms of skin cancer discussed with patient.  Patient encouraged to perform monthly skin exams.  UV precautions reviewed with patient.  Risks of non-melanoma skin cancer discussed with patient   Return to clinic 6 months

## 2023-03-24 ENCOUNTER — VIRTUAL VISIT (OUTPATIENT)
Dept: RHEUMATOLOGY | Facility: CLINIC | Age: 68
End: 2023-03-24
Payer: MEDICARE

## 2023-03-24 DIAGNOSIS — M05.79 RHEUMATOID ARTHRITIS INVOLVING MULTIPLE SITES WITH POSITIVE RHEUMATOID FACTOR (H): Primary | Chronic | ICD-10-CM

## 2023-03-24 DIAGNOSIS — Z79.899 HIGH RISK MEDICATION USE: ICD-10-CM

## 2023-03-24 PROCEDURE — 99214 OFFICE O/P EST MOD 30 MIN: CPT | Mod: VID | Performed by: INTERNAL MEDICINE

## 2023-03-24 RX ORDER — HYDROXYCHLOROQUINE SULFATE 200 MG/1
400 TABLET, FILM COATED ORAL DAILY
Qty: 180 TABLET | Refills: 2 | Status: SHIPPED | OUTPATIENT
Start: 2023-03-24 | End: 2024-01-02

## 2023-03-24 RX ORDER — METHOTREXATE 25 MG/ML
25 INJECTION, SOLUTION INTRA-ARTERIAL; INTRAMUSCULAR; INTRAVENOUS
Qty: 8 ML | Refills: 6 | Status: SHIPPED | OUTPATIENT
Start: 2023-03-24 | End: 2023-06-01

## 2023-03-24 NOTE — Clinical Note
LISA Payton: Please fax labs orders to [facility] in [place], [place]  Thank you, Joseph Rooney MD 3/24/2023

## 2023-03-24 NOTE — PROGRESS NOTES
LISA Payton: Please fax labs orders to [facility] in [place], [place]    Thank you,  Joseph Rooney MD  3/24/2023

## 2023-03-24 NOTE — PROGRESS NOTES
"Kaylene Diaz  is a 67 year old year old who is being evaluated via a billable video visit.      How would you like to obtain your AVS? MyChart  If the video visit is dropped, the invitation should be resent by: Text to cell phone: [phone]   Will anyone else be joining your video visit? No     Rheumatology Video Visit      Kaylene Diaz MRN# [id number]   YOB: 1955 Age: 67 year old      Date of visit: 3/24/23  PCP: Dr. Hitesh Washington    Chief Complaint   Patient presents with:  Rheumatoid Arthritis    Assessment and Plan     1.  Seropositive rheumatoid arthritis (RF 32, CCP >250): Currently on methotrexate 25 mg SQ once  weekly, hydroxychloroquine 400 mg daily.  Previously on sulfasalazine that was discontinued when she found no benefit from it; but she also did not require prednisone while on sulfasalazine; questioning if the \"benefit\" from sulfasalazine was actually the \"benefit\" of not gardening.  Does not tolerate folic acid because of associated increased sunburns.  Doing well with regard to RA; not using prednisone.   Chronic illness, stable.      - Continue methotrexate 25mg SQ once weekly   - Continue hydroxychloroquine 400 mg daily (last eye exam on 9/2/2022 with Dr. Powers)  - Continue prednisone 5 mg daily as needed for rheumatoid arthritis symptoms; uses sparingly  - Labs in 3 months: CBC, Creatinine, Hepatic Panel, ESR, CRP (lab orders to be faxed to [facility] in [place])    High risk medication requiring intensive toxicity monitoring at least quarterly: labs ordered include CBC, Creatinine, Hepatic panel to monitor for cytopenia and hepatotoxicity; checking creatinine as it affects clearance of medication.       2.  Basal cell carcinoma and squamous cell carcinoma: Several lesions removed by dermatology in the past.  Continue to follow with dermatology.     3.  Chronic lower back pain: Has significantly improved with lidocaine patches that have been used no more than 10 times in the past 1 " "year.  Now following a pain management clinic where a steroid injection was not helpful.  She is going to return to the pain management clinic because of continued low back pain that occurs when she is ambulating but not when she is sitting.    4.  History of right Achilles tendinitis: Was evaluated by podiatry who diagnosed her with Achilles tendinitis and her symptoms resolved with a boot that she wore at night.  Not an issue today.     5.  Osteopenia without elevated FRAX: Based on 4/27/2022 DEXA.    6.  History of productive cough: has seen an ENT provider who reportedly ran allergy tests that came back negative.  She then tried prilosec (omeprazole wasn't effective) 20mg daily that resolved the cough, but stopping it results in return of symptoms.  Then had an EGD showing gastritis.  Now on PPI.    7.  Muscle spasms: Primarily affecting the lower back.  Improved from infrequent cyclobenzaprine 5 mg nightly as needed, but finds that using tizanidine 4mg PRN is more effective.   - Continue tiZANidine (ZANAFLEX) 4 MG capsule; Take 1 capsule (4 mg) by mouth 3 times daily as needed for muscle spasms  Dispense: 90 capsule; Refill: 1    8.  R>L knee osteoarthritis: 1/16/2023 x-ray of the knees: \"IMPRESSION: Degenerative narrowing of the medial compartments of both  knees with complete effacement of the medial compartment on the right and bone-on-bone contact. No evidence for fracture or significant effusion.\"  Degenerative arthritis symptoms of both knees, worse on the right.  1/18/2023 steroid injection of the right knee was very effective; no right knee pain at this time.     9.  Vaccinations: Vaccinations reviewed with Ms. Diaz.  Risks and benefits of vaccinations were discussed.    - Influenza: refused by patient previously, she previously stated that she never gets the influenza vaccination.  - [id number]: up to date  - [id number]: up to date  - Shingrix: Up to date   - COVID-19: Advised updating, and to hold " methotrexate for 2 weeks afterward.    10. Elevated blood pressure:  [place] to follow up with Primary Care provider regarding elevated blood pressure.     Total minutes spent in evaluation with patient, documentation, , and review of pertinent studies and chart notes: 20    Ms. Diaz verbalized agreement with and understanding of the rational for the diagnosis and treatment plan.  All questions were answered to best of my ability and the patient's satisfaction. Ms. Diaz was advised to contact the clinic with any questions that may arise after the clinic visit.      Thank you for involving me in the care of the patient    Return to clinic: 3-4 months, virtual by patient request      HPI   [place] Diaz is a 67 year old female with a past medical history significant for hyperlipidemia, osteoarthritis, osteopenia, PE (2020) on chronic anticoagulation, meniscal tear, squamous cell carcinoma, basal cell carcinoma, and rheumatoid arthritis who presents for follow-up of rheumatoid arthritis.    Today, 3/24/2023: Steroid injection of the right knee resolved right knee pain.  No knee pain at this time.  Rheumatoid arthritis is well controlled.  Stable ulnar deviation and mild swelling of the MCPs as it has been for years but no pain or stiffness.    Denies fevers, chills, nausea, vomiting, constipation, diarrhea. No abdominal pain. No chest pain/pressure, palpitations, or shortness of breath.   No LE swelling. No neck pain. No oral or nasal sores.  No rash. No sicca symptoms.   Intentionally losing weight.    Tobacco: Quit in 2000  EtOH: Rare  Drugs: None    ROS   12 point review of system was completed and negative except as noted in the HPI     Active Problem List     Patient Active Problem List   Diagnosis     Carpal tunnel syndrome     Tear meniscus knee     Osteopenia     Advanced directives, counseling/discussion     Hyperlipidemia LDL goal <160     Osteoarthritis     RA (rheumatoid arthritis) (H)     High  risk medications (not anticoagulants) long-term use     Kidney stone     ASCUS on Pap smear     Morbid obesity due to excess calories (H)     Small bowel obstruction (H)     Elevated blood pressure reading without diagnosis of hypertension     BLANCA (acute kidney injury) (H)     Elevated glucose     SBO (small bowel obstruction) (H)     Rheumatoid arthritis involving multiple sites with positive rheumatoid factor (H)     Chronic kidney disease, stage 3a (H)     Chronic bilateral low back pain with bilateral sciatica     Lumbar radiculopathy     Past Medical History     Past Medical History:   Diagnosis Date     Basal cell carcinoma      RA (rheumatoid arthritis) (H)      Small bowel obstruction (H)      Squamous cell carcinoma      Past Surgical History     Past Surgical History:   Procedure Laterality Date     ARTHROSCOPY KNEE  12/10/2010    ARTHROSCOPY KNEE performed by NAVID FIERRO at [facility]     COLONOSCOPY N/A 8/9/2018    Procedure: COLONOSCOPY;  colonoscopy;  Surgeon: Luke Kaye MD;  Location: [facility]     INJECT EPIDURAL LUMBAR N/A 7/7/2022    Procedure: Lumbar Epidural Steroid Injection;  Surgeon: Olivia Davies MD;  [facility]     INJECT EPIDURAL LUMBAR N/A 11/11/2022    Procedure: Lumbar Epidural Steroid Injection, L4-5;  Surgeon: Olivia Davies MD;  [facility]     SURGICAL HISTORY OF -       IUD removal     SURGICAL HISTORY OF -       Ovarian cyst     SURGICAL HISTORY OF -       thorn removal from foot     SURGICAL HISTORY OF -       knee surgery, scope, right knee     Allergy     Allergies   Allergen Reactions     Folic Acid      Gold Rash     Latex Rash     Not all latex products     Current Medication List     Current Outpatient Medications   Medication Sig     albuterol (PROAIR HFA/PROVENTIL HFA/VENTOLIN HFA) 108 (90 Base) MCG/ACT inhaler Inhale 2 puffs into the lungs every 6 hours     calcipotriene (DOVONEX) 0.005 % external cream Mix with efudex and apply twice daily to hands for  "14 days     fluorouracil (EFUDEX) 5 % external cream Mix with dovonex and apply twice daily to hands for 14 days     hydroxychloroquine (PLAQUENIL) 200 MG tablet Take 2 tablets (400 mg) by mouth daily     Insulin Syringe-Needle U-100 (BD INSULIN SYRINGE) 27G X 1/2\" 1 ML MISC For once weekly methotrexate administration.     methotrexate 50 MG/2ML injection Inject 1 mL (25 mg) Subcutaneous every 7 days     Multiple Vitamins-Minerals (ADULT GUMMY PO) Take 1 chew tab by mouth daily VitaCrave     Multiple Vitamins-Minerals (HAIR/SKIN/NAILS/BIOTIN PO) Take 1 capsule by mouth daily.      omeprazole (PRILOSEC) 20 MG DR capsule Take 1 capsule (20 mg) by mouth daily     rivaroxaban ANTICOAGULANT (XARELTO ANTICOAGULANT) 20 MG TABS tablet Take 1 tablet (20 mg) by mouth daily (with dinner)     tiZANidine (ZANAFLEX) 4 MG capsule Take 1 capsule (4 mg) by mouth 3 times daily as needed for muscle spasms     vitamin B complex with vitamin C (STRESS TAB) tablet Take 1 tablet by mouth daily     VITAMIN D PO Take by mouth daily     No current facility-administered medications for this visit.         Social History   See HPI    Family History     Family History   Problem Relation Age of Onset     Prostate Cancer Father      Eye Disorder Father         mac degen     Heart Disease Father      Macular Degeneration Father      Heart Disease Mother         a-fib     Eye Disorder Mother      C.A.D. Mother      Hypertension Mother      Alcohol/Drug Brother      Alcohol/Drug Sister      Alcohol/Drug Brother      Alcohol/Drug Brother      Alcohol/Drug Brother      Alcohol/Drug Sister      Obesity Sister      Alcohol/Drug Sister      Prostate Cancer Sister      Cancer Other      Melanoma No family hx of        Physical Exam     Temp Readings from Last 3 Encounters:   01/16/23 97.5  F (36.4  C) (Tympanic)   11/11/22 97  F (36.1  C) (Temporal)   07/07/22 97.1  F (36.2  C) (Tympanic)     BP Readings from Last 5 Encounters:   02/15/23 (!) 160/84 " "  01/18/23 (!) 173/89   01/16/23 (!) 158/64   11/11/22 (!) 157/92   07/07/22 (!) 169/90     Pulse Readings from Last 1 Encounters:   02/15/23 78     Resp Readings from Last 1 Encounters:   01/16/23 20     Estimated body mass index is 47.33 kg/m  as calculated from the following:    Height as of 1/16/23: 1.664 m (5' 5.5\").    Weight as of 1/18/23: 131 kg (288 lb 12.8 oz).      GEN: NAD.   HEENT:  Anicteric, noninjected sclera. No obvious external lesions of the ear and nose. Hearing intact.  PULM: No increased work of breathing  MSK: Ulnar deviation at the MCPs; mild chronic swelling at the MCPs; no overlying erythema.  PIPs and DIPs without swelling.  Wrists without swelling.  SKIN: No rash or jaundice seen  PSYCH: Alert. Appropriate.      Labs / Imaging (select studies)     [facility] Labs dated 3/7/2023 were okay      CBC  Recent Labs   Lab Test 04/22/20  1156 10/28/19  1052 08/29/19  0855   WBC 8.2 5.3 5.8   RBC 4.59 4.44 4.60   HGB 14.4 13.9 14.2   HCT 44.1 42.1 43.2   MCV 96 95 94   RDW 12.8 14.4 14.0    225 240   MCH 31.4 31.3 30.9   MCHC 32.7 33.0 32.9   NEUTROPHIL 70.6 71.0 73.2   LYMPH 16.3 19.9 15.9   MONOCYTE 11.2 7.2 8.7   EOSINOPHIL 1.5 1.7 1.9   BASOPHIL 0.4 0.2 0.3   ANEU 5.8 3.8 4.3   ALYM 1.3 1.1 0.9   PEEWEE 0.9 0.4 0.5   AEOS 0.1 0.1 0.1   ABAS 0.0 0.0 0.0     CMP  Recent Labs   Lab Test 03/09/21  0000 12/22/20  0000 04/22/20  1156 03/05/20  0000 10/28/19  1052 08/29/19  0855 03/26/18  1034 10/31/17  1110 06/23/17  0817   NA  --   --  138  --   --   --   --  137 139   POTASSIUM  --   --  4.2  --   --   --   --  4.5 4.4   CHLORIDE  --   --  107  --   --   --   --  108 106   CO2  --   --  27  --   --   --   --  24 24   ANIONGAP  --   --  4  --   --   --   --  5 9   GLC  --   --  81  --   --   --   --  84 115*   BUN  --   --  22  --   --   --   --  22 14   CR 1.01 0.98 0.74   < > 0.72 0.75   < > 0.88 0.75   GFRESTIMATED 55* 57* 85   < > 88 84   < > 65 78   GFRESTBLACK >60 >60 >90   < > >90 >90   < > " 79 >90   GFR Calc     DEAN  --   --  9.6  --   --   --   --  9.2 9.0   BILITOTAL  --   --  0.4  --  0.5 0.5   < > 0.4 0.4   ALBUMIN  --   --  3.6  --  3.7 3.6   < > 3.7 3.8   PROTTOTAL  --   --  8.1  --  7.6 7.6   < > 8.0 7.5   ALKPHOS  --   --  118  --  109 118   < > 131 120   AST 15 15 13   < > 19 21   < > 17 22   ALT 26 28 22   < > 28 23   < > 24 26    < > = values in this interval not displayed.     Calcium/VitaminD  Recent Labs   Lab Test 04/22/20  1156 10/31/17  1110 06/23/17  0817   DEAN 9.6 9.2 9.0     ESR/CRP  Recent Labs   Lab Test 10/28/19  1052 08/29/19  0855 10/23/18  1000   SED 15 11 14   CRP 5.1 7.3 4.7     Immunization History     Immunization History   Administered Date(s) Administered     COVID-19 Vaccine 12+ (Pfizer) 03/05/2021, 03/26/2021, 11/10/2021     Pneumo Conj 13-V (2010&after) 12/03/2018     Pneumococcal 23 valent 10/24/2013, 03/09/2020     TD,PF 7+ (Tenivac) 10/28/2020     TDAP Vaccine (Adacel) 06/16/2010     Zoster recombinant adjuvanted (SHINGRIX) 12/03/2018, 06/19/2019          Chart documentation done in part with Dragon Voice recognition Software. Although reviewed after completion, some word and grammatical error may remain.          Video-Visit Details    Type of service:  Video Visit    Video Start Time: 9:35 AM    Video End Time:9:49 AM    Originating Location (pt. Location): Home, [place]    Distant Location (provider location):  Off site, [place]    Platform used for Video Visit: Gloria Rooney MD

## 2023-04-15 ASSESSMENT — ENCOUNTER SYMPTOMS
HEMATOCHEZIA: 0
DIZZINESS: 0
EYE PAIN: 0
PARESTHESIAS: 0
HEADACHES: 0
JOINT SWELLING: 0
BREAST MASS: 0
PALPITATIONS: 0
CHILLS: 0
ARTHRALGIAS: 0
SORE THROAT: 0
MYALGIAS: 1
COUGH: 0
CONSTIPATION: 0
DYSURIA: 0
HEMATURIA: 0
NAUSEA: 0
SHORTNESS OF BREATH: 0
HEARTBURN: 0
NERVOUS/ANXIOUS: 0
ABDOMINAL PAIN: 0
DIARRHEA: 0
FEVER: 0
WEAKNESS: 0
FREQUENCY: 0

## 2023-04-15 ASSESSMENT — ACTIVITIES OF DAILY LIVING (ADL): CURRENT_FUNCTION: NO ASSISTANCE NEEDED

## 2023-04-21 ENCOUNTER — OFFICE VISIT (OUTPATIENT)
Dept: FAMILY MEDICINE | Facility: CLINIC | Age: 68
End: 2023-04-21
Payer: MEDICARE

## 2023-04-21 ENCOUNTER — HOSPITAL ENCOUNTER (OUTPATIENT)
Dept: MAMMOGRAPHY | Facility: CLINIC | Age: 68
Discharge: HOME | End: 2023-04-21
Attending: NURSE PRACTITIONER | Admitting: NURSE PRACTITIONER
Payer: MEDICARE

## 2023-04-21 VITALS
SYSTOLIC BLOOD PRESSURE: 150 MMHG | TEMPERATURE: 98.3 F | RESPIRATION RATE: 20 BRPM | HEART RATE: 83 BPM | BODY MASS INDEX: 42.92 KG/M2 | DIASTOLIC BLOOD PRESSURE: 72 MMHG | OXYGEN SATURATION: 99 % | WEIGHT: 257.6 LBS | HEIGHT: 65 IN

## 2023-04-21 DIAGNOSIS — Z12.31 ENCOUNTER FOR SCREENING MAMMOGRAM FOR BREAST CANCER: ICD-10-CM

## 2023-04-21 DIAGNOSIS — D84.9 IMMUNODEFICIENCY, UNSPECIFIED (H): ICD-10-CM

## 2023-04-21 DIAGNOSIS — N18.31 CHRONIC KIDNEY DISEASE, STAGE 3A (H): ICD-10-CM

## 2023-04-21 DIAGNOSIS — E66.813 CLASS 3 OBESITY: ICD-10-CM

## 2023-04-21 DIAGNOSIS — K22.719 BARRETT'S ESOPHAGUS WITH DYSPLASIA: ICD-10-CM

## 2023-04-21 DIAGNOSIS — E78.5 HYPERLIPIDEMIA LDL GOAL <100: ICD-10-CM

## 2023-04-21 DIAGNOSIS — I26.99 OTHER ACUTE PULMONARY EMBOLISM, UNSPECIFIED WHETHER ACUTE COR PULMONALE PRESENT (H): ICD-10-CM

## 2023-04-21 DIAGNOSIS — R73.9 HYPERGLYCEMIA: ICD-10-CM

## 2023-04-21 DIAGNOSIS — Z00.00 ENCOUNTER FOR MEDICARE ANNUAL WELLNESS EXAM: Primary | ICD-10-CM

## 2023-04-21 DIAGNOSIS — M62.838 MUSCLE SPASM: ICD-10-CM

## 2023-04-21 DIAGNOSIS — I10 PRIMARY HYPERTENSION: ICD-10-CM

## 2023-04-21 PROBLEM — M05.79 RHEUMATOID ARTHRITIS INVOLVING MULTIPLE SITES WITH POSITIVE RHEUMATOID FACTOR (H): Chronic | Status: RESOLVED | Noted: 2018-03-26 | Resolved: 2023-04-21

## 2023-04-21 PROBLEM — N17.9 AKI (ACUTE KIDNEY INJURY) (H): Status: RESOLVED | Noted: 2017-04-20 | Resolved: 2023-04-21

## 2023-04-21 PROBLEM — K56.609 SBO (SMALL BOWEL OBSTRUCTION) (H): Status: RESOLVED | Noted: 2017-04-20 | Resolved: 2023-04-21

## 2023-04-21 LAB
ANION GAP SERPL CALCULATED.3IONS-SCNC: 12 MMOL/L (ref 7–15)
BUN SERPL-MCNC: 17.7 MG/DL (ref 8–23)
CALCIUM SERPL-MCNC: 10 MG/DL (ref 8.8–10.2)
CHLORIDE SERPL-SCNC: 106 MMOL/L (ref 98–107)
CHOLEST SERPL-MCNC: 186 MG/DL
CREAT SERPL-MCNC: 0.75 MG/DL (ref 0.51–0.95)
CREAT UR-MCNC: 96.9 MG/DL
DEPRECATED HCO3 PLAS-SCNC: 22 MMOL/L (ref 22–29)
GFR SERPL CREATININE-BSD FRML MDRD: 86 ML/MIN/1.73M2
GLUCOSE SERPL-MCNC: 83 MG/DL (ref 70–99)
HBA1C MFR BLD: 4.8 % (ref 0–5.6)
HDLC SERPL-MCNC: 44 MG/DL
HGB BLD-MCNC: 13.4 G/DL (ref 11.7–15.7)
LDLC SERPL CALC-MCNC: 127 MG/DL
MICROALBUMIN UR-MCNC: <12 MG/L
MICROALBUMIN/CREAT UR: NORMAL MG/G{CREAT}
NONHDLC SERPL-MCNC: 142 MG/DL
POTASSIUM SERPL-SCNC: 5 MMOL/L (ref 3.4–5.3)
SODIUM SERPL-SCNC: 140 MMOL/L (ref 136–145)
TRIGL SERPL-MCNC: 75 MG/DL

## 2023-04-21 PROCEDURE — 80061 LIPID PANEL: CPT | Performed by: FAMILY MEDICINE

## 2023-04-21 PROCEDURE — 80048 BASIC METABOLIC PNL TOTAL CA: CPT | Performed by: FAMILY MEDICINE

## 2023-04-21 PROCEDURE — 77067 SCR MAMMO BI INCL CAD: CPT

## 2023-04-21 PROCEDURE — G0439 PPPS, SUBSEQ VISIT: HCPCS | Performed by: FAMILY MEDICINE

## 2023-04-21 PROCEDURE — 82043 UR ALBUMIN QUANTITATIVE: CPT | Performed by: FAMILY MEDICINE

## 2023-04-21 PROCEDURE — 82570 ASSAY OF URINE CREATININE: CPT | Performed by: FAMILY MEDICINE

## 2023-04-21 PROCEDURE — 83036 HEMOGLOBIN GLYCOSYLATED A1C: CPT | Performed by: FAMILY MEDICINE

## 2023-04-21 PROCEDURE — 99214 OFFICE O/P EST MOD 30 MIN: CPT | Mod: 25 | Performed by: FAMILY MEDICINE

## 2023-04-21 PROCEDURE — 85018 HEMOGLOBIN: CPT | Performed by: FAMILY MEDICINE

## 2023-04-21 PROCEDURE — 36415 COLL VENOUS BLD VENIPUNCTURE: CPT | Performed by: FAMILY MEDICINE

## 2023-04-21 RX ORDER — TIZANIDINE HYDROCHLORIDE 4 MG/1
4 CAPSULE, GELATIN COATED ORAL 3 TIMES DAILY PRN
Qty: 270 CAPSULE | Refills: 3 | Status: SHIPPED | OUTPATIENT
Start: 2023-04-21 | End: 2024-04-22

## 2023-04-21 RX ORDER — LISINOPRIL 20 MG/1
20 TABLET ORAL DAILY
Qty: 90 TABLET | Refills: 3 | Status: SHIPPED | OUTPATIENT
Start: 2023-04-21 | End: 2024-04-02

## 2023-04-21 ASSESSMENT — ENCOUNTER SYMPTOMS
SHORTNESS OF BREATH: 0
ABDOMINAL PAIN: 0
FEVER: 0
EYE PAIN: 0
HEMATOCHEZIA: 0
ARTHRALGIAS: 0
NAUSEA: 0
HEMATURIA: 0
PARESTHESIAS: 0
DYSURIA: 0
CONSTIPATION: 0
COUGH: 0
JOINT SWELLING: 0
DIARRHEA: 0
NERVOUS/ANXIOUS: 0
FREQUENCY: 0
CHILLS: 0
SORE THROAT: 0
HEADACHES: 0
WEAKNESS: 0
HEARTBURN: 0
PALPITATIONS: 0
DIZZINESS: 0
MYALGIAS: 1
BREAST MASS: 0

## 2023-04-21 ASSESSMENT — PAIN SCALES - GENERAL: PAINLEVEL: NO PAIN (0)

## 2023-04-21 ASSESSMENT — ACTIVITIES OF DAILY LIVING (ADL): CURRENT_FUNCTION: NO ASSISTANCE NEEDED

## 2023-04-21 NOTE — PROGRESS NOTES
"SUBJECTIVE:   [place] is a 67 year old who presents for Preventive Visit.      4/21/2023    10:38 AM   Additional Questions   Roomed by gila pugh cma   Patient has been advised of split billing requirements and indicates understanding: Yes  Are you in the first 12 months of your Medicare coverage?  No    Healthy Habits:     In general, how would you rate your overall health?  Good    Frequency of exercise:  6-7 days/week    Duration of exercise:  Greater than 60 minutes    Do you usually eat at least 4 servings of fruit and vegetables a day, include whole grains    & fiber and avoid regularly eating high fat or \"junk\" foods?  Yes    Taking medications regularly:  Yes    Medication side effects:  Not applicable    Ability to successfully perform activities of daily living:  No assistance needed    Home Safety:  No safety concerns identified    Hearing Impairment:  No hearing concerns    In the past 6 months, have you been bothered by leaking of urine?  No    In general, how would you rate your overall mental or emotional health?  Excellent      PHQ-2 Total Score: 0    Additional concerns today:  No      Have you ever done Advance Care Planning? (For example, a Health Directive, POLST, or a discussion with a medical provider or your loved ones about your wishes): Yes, patient states has an Advance Care Planning document and will bring a copy to the clinic.        Fall risk  Fallen 2 or more times in the past year?: No  Any fall with injury in the past year?: No  click delete button to remove this line now  Cognitive Screening   1) Repeat 3 items (Leader, Season, Table)    2) Clock draw: NORMAL  3) 3 item recall: Recalls 3 objects  Results: 3 items recalled: COGNITIVE IMPAIRMENT LESS LIKELY    Mini-CogTM Copyright RONALDO Huertas. Licensed by the author for use in [facility]; reprinted with permission (morro@.[facility]). All rights reserved.      Do you have sleep apnea, excessive snoring or daytime drowsiness?: " no    Reviewed and updated as needed this visit by clinical staff   Tobacco  Allergies  Meds              Reviewed and updated as needed this visit by Provider                 Social History     Tobacco Use     Smoking status: Former     Years: 30.00     Types: Cigarettes     Quit date: 2000     Years since quittin.0     Smokeless tobacco: Never   Vaping Use     Vaping status: Never Used   Substance Use Topics     Alcohol use: Yes     Comment: VERY RARE             4/15/2023     7:00 AM   Alcohol Use   Prescreen: >3 drinks/day or >7 drinks/week? No          View : No data to display.              Do you have a current opioid prescription? No  Do you use any other controlled substances or medications that are not prescribed by a provider? None              Current providers sharing in care for this patient include:   Patient Care Team:  Hitesh Wahsington MD as PCP - General  Mitch Briceño [facility] as Pharmacist (Pharmacist Clinician- Clinical Pharmacy Specialist)  Cipriano Springer MD as Assigned Surgical Provider  Joseph Rooney MD as Assigned Rheumatology Provider  Hitesh Washington MD as Assigned PCP  Olivia Davies MD as MD (Anesthesiology)    The following health maintenance items are reviewed in Epic and correct as of today:  Health Maintenance   Topic Date Due     MICROALBUMIN  Never done     URINE DRUG SCREEN  Never done     BMP  2021     HEMOGLOBIN  2021     LIPID  2021     COVID-19 Vaccine (4 - Booster for Pfizer series) 2022     INFLUENZA VACCINE (1) Never done     MEDICARE ANNUAL WELLNESS VISIT  2024     ANNUAL REVIEW OF HM ORDERS  2024     FALL RISK ASSESSMENT  2024     MAMMO SCREENING  2025     ADVANCE CARE PLANNING  2028     COLORECTAL CANCER SCREENING  2028     DTAP/TDAP/TD IMMUNIZATION (3 - Td or Tdap) 10/28/2030     DEXA  2037     HEPATITIS C SCREENING  Completed     PHQ-2 (once per calendar year)   Completed     Pneumococcal Vaccine: 65+ Years  Completed     URINALYSIS  Completed     ZOSTER IMMUNIZATION  Completed     IPV IMMUNIZATION  Aged Out     MENINGITIS IMMUNIZATION  Aged Out           FHS-7:       4/21/2023     9:28 AM   Breast CA Risk Assessment (FHS-7)   Did any of your first-degree relatives have breast or ovarian cancer? No   Did any of your relatives have bilateral breast cancer? No   Did any man in your family have breast cancer? No   Did any woman in your family have breast and ovarian cancer? No   Did any woman in your family have breast cancer before age 50 y? No   Do you have 2 or more relatives with breast and/or ovarian cancer? No   Do you have 2 or more relatives with breast and/or bowel cancer? No       Mammogram Screening: Recommended mammography every 1-2 years with patient discussion and risk factor consideration  Pertinent mammograms are reviewed under the imaging tab.    Review of Systems   Constitutional: Negative for chills and fever.   HENT: Negative for congestion, ear pain, hearing loss and sore throat.    Eyes: Negative for pain and visual disturbance.   Respiratory: Negative for cough and shortness of breath.    Cardiovascular: Negative for chest pain, palpitations and peripheral edema.   Gastrointestinal: Negative for abdominal pain, constipation, diarrhea, heartburn, hematochezia and nausea.   Breasts:  Negative for tenderness, breast mass and discharge.   Genitourinary: Negative for dysuria, frequency, genital sores, hematuria, pelvic pain, urgency, vaginal bleeding and vaginal discharge.   Musculoskeletal: Positive for myalgias. Negative for arthralgias and joint swelling.   Skin: Negative for rash.   Neurological: Negative for dizziness, weakness, headaches and paresthesias.   Psychiatric/Behavioral: Negative for mood changes. The patient is not nervous/anxious.          OBJECTIVE:   BP (!) 150/72   Pulse 83   Temp 98.3  F (36.8  C) (Tympanic)   Resp 20   Ht 1.663 m  "(5' 5.47\")   Wt 116.8 kg (257 lb 9.6 oz)   SpO2 99%   BMI 42.25 kg/m   Estimated body mass index is 42.25 kg/m  as calculated from the following:    Height as of this encounter: 1.663 m (5' 5.47\").    Weight as of this encounter: 116.8 kg (257 lb 9.6 oz).  Physical Exam  GENERAL: healthy, alert and no distress  EYES: Eyes grossly normal to inspection, PERRL and conjunctivae and sclerae normal  HENT: ear canals and TM's normal, nose and mouth without ulcers or lesions  NECK: no adenopathy, no asymmetry, masses, or scars and thyroid normal to palpation  RESP: lungs clear to auscultation - no rales, rhonchi or wheezes  CV: regular rate and rhythm, normal S1 S2, no S3 or S4, no murmur, click or rub, no peripheral edema and peripheral pulses strong  ABDOMEN: soft, nontender, no hepatosplenomegaly, no masses and bowel sounds normal  MS: no gross musculoskeletal defects noted, no edema  SKIN: no suspicious lesions or rashes  NEURO: Normal strength and tone, mentation intact and speech normal  PSYCH: mentation appears normal, affect normal/bright  LYMPH: no cervical adenopathy        ASSESSMENT / PLAN:   (Z00.00) Encounter for Medicare annual wellness exam  (primary encounter diagnosis)  Comment: Discussed healthy lifestyle and preventative cares.    Plan: PRIMARY CARE FOLLOW-UP SCHEDULING            (N18.31) Chronic kidney disease, stage 3a (H)  Comment: need to check lab and start ace  Plan: Albumin Random Urine Quantitative with Creat         Ratio, BASIC METABOLIC PANEL, Hemoglobin,         OFFICE/OUTPT VISIT,EST,LEVL IV            (I10) Primary hypertension  Comment: new diagnosis, start ace and follow up, handout is given  Plan: lisinopril (ZESTRIL) 20 MG tablet, PRIMARY CARE        FOLLOW-UP SCHEDULING, OFFICE/OUTPT         VISIT,EST,LEVL IV            (E78.5) Hyperlipidemia LDL goal <100  Comment: check lab  Plan: Lipid panel reflex to direct LDL Fasting,         OFFICE/OUTPT VISIT,EST,LEVL IV, CANCELED: Lipid    " "    panel reflex to direct LDL Non-fasting            (R73.9) Hyperglycemia  Comment: check lab  Plan: Hemoglobin A1c, OFFICE/OUTPT VISIT,EST,LEVL IV            (D84.9) Immunodeficiency, unspecified (H)  Comment: seeing rheumatologist and on medications  Plan:     (E66.01) Class 3 obesity (H)  Comment: diet for weight loss  Plan:     (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  Comment: on medication for prevention  Plan: omeprazole (PRILOSEC) 20 MG DR capsule            (I26.99) Other acute pulmonary embolism, unspecified whether acute cor pulmonale present (H)  Comment: continue with med  Plan: rivaroxaban ANTICOAGULANT (XARELTO         ANTICOAGULANT) 20 MG TABS tablet            (M62.838) Muscle spasm  Comment:   Plan: tiZANidine (ZANAFLEX) 4 MG capsule        Refilled med      Patient has been advised of split billing requirements and indicates understanding: Yes      COUNSELING:  Reviewed preventive health counseling, as reflected in patient instructions       Healthy diet/nutrition      BMI:   Estimated body mass index is 42.25 kg/m  as calculated from the following:    Height as of this encounter: 1.663 m (5' 5.47\").    Weight as of this encounter: 116.8 kg (257 lb 9.6 oz).   Weight management plan: diet      She reports that she quit smoking about 23 years ago. Her smoking use included cigarettes. She has never used smokeless tobacco.      Appropriate preventive services were discussed with this patient, including applicable screening as appropriate for cardiovascular disease, diabetes, osteopenia/osteoporosis, and glaucoma.  As appropriate for age/gender, discussed screening for colorectal cancer, prostate cancer, breast cancer, and cervical cancer. Checklist reviewing preventive services available has been given to the patient.    Reviewed patients plan of care and provided an AVS. The Basic Care Plan (routine screening as documented in Health Maintenance) for Kaylene meets the Care Plan requirement. This " Care Plan has been established and reviewed with the Patient.      Hitesh [place] MD  [facility]    Identified Health Risks:

## 2023-04-21 NOTE — PATIENT INSTRUCTIONS
Please go to lab.    I am starting lisinopril 20 mg a day for your blood pressure.    Get a blood pressure machine.    Do a virtual visit in 3 weeks to check your blood pressure.    I refilled your medications.    Please be aware that there will be an additional charge during your preventative visit due to either a new diagnosis and/or chronic disease management.    Preventative visits screen for diseases prior to they occur.  They do not cover for any new diagnosis or chronic disease management which would include medication refills, labs etc.    If you have questions regarding your coverage please check with your insurance provider.  At [facility] we need to code correctly to be in compliance with all insurance companies.        Thank you for choosing [facility].  You may be receiving an email and/or telephone survey request from [facility] Customer Experience regarding your visit today.  Please take a few minutes to respond to the survey to let us know how we are doing.      If you have questions or concerns, please contact us via Glints or you can contact your care team at [phone] option 2.    Our Clinic hours are:  Monday - Thursday 7am-6pm  Friday 7am-5pm    The [place] outpatient lab hours are:  Monday - Friday 7am-4:30pm    Appointments are required, call [phone]    If you have clinical questions after hours or would like to schedule an appointment,  call the clinic at [phone].    Patient Education   Personalized Prevention Plan  You are due for the preventive services outlined below.  Your care team is available to assist you in scheduling these services.  If you have already completed any of these items, please share that information with your care team to update in your medical record.  Health Maintenance Due   Topic Date Due    Kidney Microalbumin Urine Test  Never done    URINE DRUG SCREEN  Never done    Basic Metabolic Panel  04/22/2021    Hemoglobin  04/22/2021    Cholesterol Lab   07/29/2021    COVID-19 Vaccine (4 - Booster for Pfizer series) 01/05/2022    Flu Vaccine (1) Never done    ANNUAL REVIEW OF HM ORDERS  04/04/2023

## 2023-04-21 NOTE — NURSING NOTE
"Chief Complaint   Patient presents with     Physical       Initial There were no vitals taken for this visit. Estimated body mass index is 47.33 kg/m  as calculated from the following:    Height as of 1/16/23: 1.664 m (5' 5.5\").    Weight as of 1/18/23: 131 kg (288 lb 12.8 oz).    Patient presents to the clinic using No DME    Is there anyone who you would like to be able to receive your results? No  If yes have patient fill out REID    "

## 2023-04-24 ENCOUNTER — TELEPHONE (OUTPATIENT)
Dept: FAMILY MEDICINE | Facility: CLINIC | Age: 68
End: 2023-04-24
Payer: MEDICARE

## 2023-04-24 NOTE — TELEPHONE ENCOUNTER
Dr. Washington,    Pharmacy calls us.  Patient's insurance will not pay for tizanidine capsules but will for tablets.  Is this ok? Sissy BLAND RN

## 2023-05-01 ENCOUNTER — TELEPHONE (OUTPATIENT)
Dept: FAMILY MEDICINE | Facility: CLINIC | Age: 68
End: 2023-05-01
Payer: MEDICARE

## 2023-05-01 ENCOUNTER — VIRTUAL VISIT (OUTPATIENT)
Dept: FAMILY MEDICINE | Facility: CLINIC | Age: 68
End: 2023-05-01
Payer: MEDICARE

## 2023-05-01 DIAGNOSIS — I26.99 OTHER ACUTE PULMONARY EMBOLISM, UNSPECIFIED WHETHER ACUTE COR PULMONALE PRESENT (H): ICD-10-CM

## 2023-05-01 DIAGNOSIS — U07.1 INFECTION DUE TO 2019 NOVEL CORONAVIRUS: Primary | ICD-10-CM

## 2023-05-01 DIAGNOSIS — E66.813 CLASS 3 OBESITY: ICD-10-CM

## 2023-05-01 DIAGNOSIS — D84.9 IMMUNODEFICIENCY, UNSPECIFIED (H): ICD-10-CM

## 2023-05-01 PROCEDURE — 99214 OFFICE O/P EST MOD 30 MIN: CPT | Mod: CS | Performed by: FAMILY MEDICINE

## 2023-05-01 NOTE — TELEPHONE ENCOUNTER
COVID Positive/Requesting COVID treatment    Patient is positive for COVID and requesting treatment options.    Date of positive COVID test (PCR or at home)? 04/30/2023  Current COVID symptoms: fever or chills, fatigue, muscle or body aches, sore throat and congestion or runny nose  Date COVID symptoms began: 4/28/2023    Message should be routed to clinic RN pool. Best phone number to use for call back: [phone]    Pharmacy rohit gomez

## 2023-05-01 NOTE — TELEPHONE ENCOUNTER
RN COVID TREATMENT VISIT  05/01/23      The patient has been triaged and does not require a higher level of care.    Kaylene Diaz  67 year old  Current weight? 252 lbs    Has the patient been seen by a primary care provider at an [facility] or [facility] within the past two years? Yes.   Have you been in close proximity to/do you have a known exposure to a person with a confirmed case of influenza? No.     General treatment eligibility:  Date of positive COVID test (PCR or at home)?  4/30/23    Are you or have you been hospitalized for this COVID-19 infection? No.   Have you received monoclonal antibodies or antiviral treatment for COVID-19 since this positive test? No.   Do you have any of the following conditions that place you at risk of being very sick from COVID-19?   - Age 50 years or older  - Chronic kidney disease (mild to moderate; eGFR or GFR 30-59 mL/min)  - Overweight or Obesity (BMI >85th percentile or BMI 25 or higher)  - Patient reports taking medicines that suppress the immune system such as: Greater than or equal to 20mg prednisone or equivalent per day, cyclophosphamide or cisplatin, methotrexate, cancer chemotherapeutic agents classified as severely immunosuppressive, tumor-necrosis (TNF) blockers, and other biologics  Yes, patient has at least one high risk condition as noted above.     Current COVID symptoms:   - fever or chills  - muscle or body aches  - sore throat  - congestion or runny nose  Yes. Patient has at least one symptom as selected.     How many days since symptoms started? 5 days or less. Established patient, 12 years or older weighing at least 88.2 lbs, who has symptoms that started in the past 5 days, has not been hospitalized nor received treatment already, and is at risk for being very sick from COVID-19.     Treatment eligibility by RN:    Are you currently pregnant or nursing? No    Do you have a clinically significant hypersensitivity to  nirmatrelvir or ritonavir, or toxic epidermal necrolysis (TEN) or Irby-Kain Syndrome? No    Do you have a history of hepatitis, any hepatic impairment on the Problem List (such as Child-Mustafa Class C, cirrhosis, fatty liver disease, alcoholic liver disease), or was the last liver lab (hepatic panel, ALT, AST, ALK Phos, bilirubin) elevated in the past 6 months? YES    Do you have any history of severe renal impairment (eGFR < 30mL/min)? No    Is patient eligible to continue? No, patient does not meet all eligibility requirements for the RN COVID treatment (as denoted by yes response(s) above). Patient informed they will need a virtual provider visit to assess treatment options.  Patient will be transferred to a  at the end of this call.   Sissy Small RN

## 2023-05-01 NOTE — PROGRESS NOTES
MARK is a 67 year old who is being evaluated via a billable video visit.      How would you like to obtain your AVS? MyChart  If the video visit is dropped, the invitation should be resent by: Text to cell phone: [phone]  Will anyone else be joining your video visit? No        Assessment & Plan     Infection due to 2019 novel coronavirus  Discussed infection, she qualifies, discussed drug drug interaction and hold xaralto for 8 days.  Discussed molnupiravir medication, however she wants the paxolvid.   - nirmatrelvir and ritonavir (PAXLOVID) 300 mg/100 mg therapy pack; Take 3 tablets by mouth 2 times daily for 5 days (Take 2 Nirmatrelvir tablets and 1 Ritonavir tablet twice daily for 5 days) Hold xarelto for 8 days.    Other acute pulmonary embolism, unspecified whether acute cor pulmonale present (H)  She will go off of the medication for 8 days since she had only an isolated blood clot.     Immunodeficiency, unspecified (H)  Is on methotrexate, she is seeing her specialist    Class 3 obesity (H)   she has obesity and is at risk               See Patient Instructions    Hitesh Washington MD  [facility]    Subjective   MARK is a 67 year old, presenting for the following health issues:  Suspected Covid (Positive at home test yesterday.)        5/1/2023     3:10 PM   Additional Questions   Roomed by Laya Garza MA   Accompanied by Self         5/1/2023     3:10 PM   Patient Reported Additional Medications   Patient reports taking the following new medications None     History of Present Illness       Reason for visit:  Positive covid test  Symptom onset:  1-3 days ago  Symptoms include:  Sore throat.muscle aches.lo grade fever..fatigue..some phlem  Symptom intensity:  Mild  Symptom progression:  Staying the same  Had these symptoms before:  Yes  Has tried/received treatment for these symptoms:  No    She eats 4 or more servings of fruits and vegetables daily.She consumes 0  sweetened beverage(s) daily.She exercises with enough effort to increase her heart rate 60 or more minutes per day.  She exercises with enough effort to increase her heart rate 3 or less days per week.   She is taking medications regularly.         COVID-19 Symptom Review  How many days ago did these symptoms start? X 4 days, worse on Sunday.    Are any of the following symptoms significant for you?    New or worsening difficulty breathing? No    Worsening cough? Yes, I am coughing up mucus.Green mucus.    Fever or chills? Yes, the highest temperature was 101.3 and chills    Headache: No    Sore throat: YES- 8/10    Chest pain: No    Diarrhea: No    Body aches? YES- off and on    Fatigue: Yes    What treatments has patient tried? Tea, honey, increased water, Chloraseptic.   Does patient live in a nursing home, group home, or shelter? No  Does patient have a way to get food/medications during quarantined? Yes, I have a friend or family member who can help me.              Review of Systems   Cough, sputum.  Had covid prior before antivirals and 1 week not bad, however next 3 weeks had SOB and BECERRIL etc      Objective    Vitals - Patient Reported  SpO2 (Patient Reported): 93  Temperature (Patient Reported): 101  F (38.3  C)  Pain Score: Extreme Pain (8)  Pain Loc: Throat        Physical Exam   GENERAL: Healthy, alert and no distress  EYES: Eyes grossly normal to inspection.  No discharge or erythema, or obvious scleral/conjunctival abnormalities.  RESP: No audible wheeze, cough, or visible cyanosis.  No visible retractions or increased work of breathing.    SKIN: Visible skin clear. No significant rash, abnormal pigmentation or lesions.  NEURO: Cranial nerves grossly intact.  Mentation and speech appropriate for age.  PSYCH: Mentation appears normal, affect normal/bright, judgement and insight intact, normal speech and appearance well-groomed.                Video-Visit Details    Type of service:  Video Visit   Video  Start Time: 1644  Video End Time:1655    Originating Location (pt. Location): Home  Distant Location (provider location):  On-site  Platform used for Video Visit: Gloria

## 2023-05-01 NOTE — PATIENT INSTRUCTIONS
Hold your Xaralto for 8 days.    Handouts are sent.          Thank you for choosing [facility].  You may be receiving an email and/or telephone survey request from [facility] Customer Experience regarding your visit today.  Please take a few minutes to respond to the survey to let us know how we are doing.      If you have questions or concerns, please contact us via WildTangent or you can contact your care team at [phone] option 2.    Our Clinic hours are:  Monday - Thursday 7am-6pm  Friday 7am-5pm    The [place] outpatient lab hours are:  Monday - Friday 7am-4:30pm    Appointments are required, call [phone]    If you have clinical questions after hours or would like to schedule an appointment,  call the clinic at [phone].

## 2023-05-18 ENCOUNTER — MYC MEDICAL ADVICE (OUTPATIENT)
Dept: FAMILY MEDICINE | Facility: CLINIC | Age: 68
End: 2023-05-18
Payer: MEDICARE

## 2023-05-18 ENCOUNTER — E-VISIT (OUTPATIENT)
Dept: FAMILY MEDICINE | Facility: CLINIC | Age: 68
End: 2023-05-18
Payer: MEDICARE
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
## 2023-05-18 PROCEDURE — 99421 OL DIG E/M SVC 5-10 MIN: CPT | Performed by: FAMILY MEDICINE

## 2023-05-18 NOTE — TELEPHONE ENCOUNTER
"Routed to provider.  Please see MyChart message from pt.    Pt estimates that deer tick was on pt \"no more than 2 days.\"  Pt says she has been gardening and believes that she brought it in from garden.    Tick removed today.  Red ring at bite neelam is pencil eraser sized.    No other complaints.    Pt takes immunosuppressive medication for RA and completed Paxlovid 5 weeks ago for Covid.    Advised virtual visit to discuss further but pt wants Dr Washington to weigh in on whether a visit is warranted.    Osiris Teague RN      "

## 2023-05-18 NOTE — TELEPHONE ENCOUNTER
She likely would need treatment due to deer tick.  Needs virtual appointment or Evisit I think there is once for deer ticks.  Hitesh Washington MD  Family Medicine

## 2023-05-19 RX ORDER — DOXYCYCLINE 100 MG/1
100 CAPSULE ORAL 2 TIMES DAILY
Qty: 20 CAPSULE | Refills: 0 | Status: SHIPPED | OUTPATIENT
Start: 2023-05-19 | End: 2023-05-29

## 2023-05-19 NOTE — PATIENT INSTRUCTIONS
Thank you for choosing us for your care. I have placed an order for a prescription so that you can start treatment. View your full visit summary for details by clicking on the link below. Your pharmacist will able to address any questions you may have about the medication.     If you're not feeling better within 5-7 days, please schedule an appointment.  You can schedule an appointment right here in [facility], or call [phone]  If the visit is for the same symptoms as your eVisit, we'll refund the cost of your eVisit if seen within seven days.      Porsche Maurice,    I have prescribed doxycycline 100 mg twice a day for 10 days to cover lyme disease based on your history.  While taking this medication do not take your gummy that you use for nails, hair with biotin that is listed on your medication list since there is an interaction which makes the antibiotic less effective.    Sincerely,  Hitesh Washington MD

## 2023-05-30 ENCOUNTER — VIRTUAL VISIT (OUTPATIENT)
Dept: FAMILY MEDICINE | Facility: CLINIC | Age: 68
End: 2023-05-30
Payer: MEDICARE

## 2023-05-30 DIAGNOSIS — I10 PRIMARY HYPERTENSION: Primary | ICD-10-CM

## 2023-05-30 PROCEDURE — 99213 OFFICE O/P EST LOW 20 MIN: CPT | Mod: VID | Performed by: FAMILY MEDICINE

## 2023-05-30 NOTE — PATIENT INSTRUCTIONS
Your blood pressure is controlled.    Please continue with your lisinopril 20 mg a day.          Thank you for choosing [facility].  You may be receiving an email and/or telephone survey request from [facility] Customer Experience regarding your visit today.  Please take a few minutes to respond to the survey to let us know how we are doing.      If you have questions or concerns, please contact us via Arzeda or you can contact your care team at [phone] option 2.    Our Clinic hours are:  Monday - Thursday 7am-6pm  Friday 7am-5pm    The [place] outpatient lab hours are:  Monday - Friday 7am-4:30pm    Appointments are required, call [phone]    If you have clinical questions after hours or would like to schedule an appointment,  call the clinic at [phone].

## 2023-05-30 NOTE — PROGRESS NOTES
MARK is a 67 year old who is being evaluated via a billable video visit.      How would you like to obtain your AVS? MyChart  If the video visit is dropped, the invitation should be resent by: Text to cell phone: [phone]  Will anyone else be joining your video visit? No        Assessment & Plan     Primary hypertension  Patient was diagnosed with htn, just started her bp medication of lisinopril.  No side effects.  bp is controlled.  Will continue with medication.               See Patient Instructions    Hitesh Washington MD  [facility]   MARK is a 67 year old, presenting for the following health issues:  Hypertension        5/30/2023     9:04 AM   Additional Questions   Roomed by Jaqueline ZAMARRIPA   Accompanied by self         5/30/2023     9:04 AM   Patient Reported Additional Medications   Patient reports taking the following new medications none     History of Present Illness       Hypertension: She presents for follow up of hypertension.  She does check blood pressure  regularly outside of the clinic. Outside blood pressures have been over 140/90. She follows a low salt diet.     She eats 4 or more servings of fruits and vegetables daily.She consumes 0 sweetened beverage(s) daily.She exercises with enough effort to increase her heart rate 30 to 60 minutes per day.  She exercises with enough effort to increase her heart rate 3 or less days per week.   She is taking medications regularly.       Hypertension Follow-up      Do you check your blood pressure regularly outside of the clinic? Yes     Are you following a low salt diet? Yes    Are your blood pressures ever more than 140 on the top number (systolic) OR more   than 90 on the bottom number (diastolic), for example 140/90? Yes, 142 and 155          Review of Systems   No resp or cardiac symtpoms      Objective    Vitals - Patient Reported  Systolic (Patient Reported): 135  Diastolic (Patient Reported): 73  Pulse (Patient  Reported): 75  Pain Score: Mild Pain (2)  Pain Loc: Knee      Vitals:  No vitals were obtained today due to virtual visit.    Physical Exam   GENERAL: Healthy, alert and no distress  EYES: Eyes grossly normal to inspection.  No discharge or erythema, or obvious scleral/conjunctival abnormalities.  RESP: No audible wheeze, cough, or visible cyanosis.  No visible retractions or increased work of breathing.    SKIN: Visible skin clear. No significant rash, abnormal pigmentation or lesions.  NEURO: Cranial nerves grossly intact.  Mentation and speech appropriate for age.  PSYCH: Mentation appears normal, affect normal/bright, judgement and insight intact, normal speech and appearance well-groomed.                Video-Visit Details    Type of service:  Video Visit   Video Start Time: 0925  Video End Time:0930    Originating Location (pt. Location): Home  Distant Location (provider location):  Off-site  Platform used for Video Visit: 5 Scre[employer] Media

## 2023-06-01 ENCOUNTER — OFFICE VISIT (OUTPATIENT)
Dept: RHEUMATOLOGY | Facility: CLINIC | Age: 68
End: 2023-06-01
Payer: MEDICARE

## 2023-06-01 VITALS
WEIGHT: 249 LBS | BODY MASS INDEX: 40.84 KG/M2 | OXYGEN SATURATION: 99 % | HEART RATE: 89 BPM | DIASTOLIC BLOOD PRESSURE: 78 MMHG | SYSTOLIC BLOOD PRESSURE: 150 MMHG

## 2023-06-01 DIAGNOSIS — M25.512 CHRONIC LEFT SHOULDER PAIN: ICD-10-CM

## 2023-06-01 DIAGNOSIS — Z79.899 HIGH RISK MEDICATION USE: ICD-10-CM

## 2023-06-01 DIAGNOSIS — M17.11 PRIMARY OSTEOARTHRITIS OF RIGHT KNEE: ICD-10-CM

## 2023-06-01 DIAGNOSIS — M05.79 RHEUMATOID ARTHRITIS INVOLVING MULTIPLE SITES WITH POSITIVE RHEUMATOID FACTOR (H): Primary | ICD-10-CM

## 2023-06-01 DIAGNOSIS — G89.29 CHRONIC LEFT SHOULDER PAIN: ICD-10-CM

## 2023-06-01 LAB
ALBUMIN SERPL BCG-MCNC: 4.2 G/DL (ref 3.5–5.2)
ALP SERPL-CCNC: 128 U/L (ref 35–104)
ALT SERPL W P-5'-P-CCNC: 21 U/L (ref 10–35)
AST SERPL W P-5'-P-CCNC: 30 U/L (ref 10–35)
BASOPHILS # BLD AUTO: 0 10E3/UL (ref 0–0.2)
BASOPHILS NFR BLD AUTO: 0 %
BILIRUB DIRECT SERPL-MCNC: <0.2 MG/DL (ref 0–0.3)
BILIRUB SERPL-MCNC: 0.6 MG/DL
CREAT SERPL-MCNC: 0.83 MG/DL (ref 0.51–0.95)
CRP SERPL-MCNC: 3.66 MG/L
EOSINOPHIL # BLD AUTO: 0.1 10E3/UL (ref 0–0.7)
EOSINOPHIL NFR BLD AUTO: 1 %
ERYTHROCYTE [DISTWIDTH] IN BLOOD BY AUTOMATED COUNT: 13.9 % (ref 10–15)
ERYTHROCYTE [SEDIMENTATION RATE] IN BLOOD BY WESTERGREN METHOD: 11 MM/HR (ref 0–30)
GFR SERPL CREATININE-BSD FRML MDRD: 76 ML/MIN/1.73M2
HCT VFR BLD AUTO: 40.3 % (ref 35–47)
HGB BLD-MCNC: 13.3 G/DL (ref 11.7–15.7)
IMM GRANULOCYTES # BLD: 0 10E3/UL
IMM GRANULOCYTES NFR BLD: 0 %
LYMPHOCYTES # BLD AUTO: 1.2 10E3/UL (ref 0.8–5.3)
LYMPHOCYTES NFR BLD AUTO: 19 %
MCH RBC QN AUTO: 31.6 PG (ref 26.5–33)
MCHC RBC AUTO-ENTMCNC: 33 G/DL (ref 31.5–36.5)
MCV RBC AUTO: 96 FL (ref 78–100)
MONOCYTES # BLD AUTO: 0.5 10E3/UL (ref 0–1.3)
MONOCYTES NFR BLD AUTO: 7 %
NEUTROPHILS # BLD AUTO: 4.8 10E3/UL (ref 1.6–8.3)
NEUTROPHILS NFR BLD AUTO: 73 %
PLATELET # BLD AUTO: 243 10E3/UL (ref 150–450)
PROT SERPL-MCNC: 7.3 G/DL (ref 6.4–8.3)
RBC # BLD AUTO: 4.21 10E6/UL (ref 3.8–5.2)
WBC # BLD AUTO: 6.5 10E3/UL (ref 4–11)

## 2023-06-01 PROCEDURE — 20610 DRAIN/INJ JOINT/BURSA W/O US: CPT | Mod: RT | Performed by: INTERNAL MEDICINE

## 2023-06-01 PROCEDURE — 85652 RBC SED RATE AUTOMATED: CPT | Performed by: INTERNAL MEDICINE

## 2023-06-01 PROCEDURE — 99214 OFFICE O/P EST MOD 30 MIN: CPT | Mod: 25 | Performed by: INTERNAL MEDICINE

## 2023-06-01 PROCEDURE — 82565 ASSAY OF CREATININE: CPT | Performed by: INTERNAL MEDICINE

## 2023-06-01 PROCEDURE — 85025 COMPLETE CBC W/AUTO DIFF WBC: CPT | Performed by: INTERNAL MEDICINE

## 2023-06-01 PROCEDURE — 80076 HEPATIC FUNCTION PANEL: CPT | Performed by: INTERNAL MEDICINE

## 2023-06-01 PROCEDURE — 36415 COLL VENOUS BLD VENIPUNCTURE: CPT | Performed by: INTERNAL MEDICINE

## 2023-06-01 PROCEDURE — 86140 C-REACTIVE PROTEIN: CPT | Performed by: INTERNAL MEDICINE

## 2023-06-01 RX ORDER — METHYLPREDNISOLONE ACETATE 40 MG/ML
40 INJECTION, SUSPENSION INTRA-ARTICULAR; INTRALESIONAL; INTRAMUSCULAR; SOFT TISSUE ONCE
Status: COMPLETED | OUTPATIENT
Start: 2023-06-01 | End: 2023-06-01

## 2023-06-01 RX ORDER — SYRINGE-NEEDLE,INSULIN,0.5 ML 27GX1/2"
SYRINGE, EMPTY DISPOSABLE MISCELLANEOUS
Qty: 50 EACH | Refills: 1 | Status: SHIPPED | OUTPATIENT
Start: 2023-06-01 | End: 2024-05-03

## 2023-06-01 RX ORDER — METHOTREXATE 25 MG/ML
25 INJECTION, SOLUTION INTRA-ARTERIAL; INTRAMUSCULAR; INTRAVENOUS
Qty: 8 ML | Refills: 6 | Status: SHIPPED | OUTPATIENT
Start: 2023-06-01 | End: 2024-01-02

## 2023-06-01 RX ADMIN — METHYLPREDNISOLONE ACETATE 40 MG: 40 INJECTION, SUSPENSION INTRA-ARTICULAR; INTRALESIONAL; INTRAMUSCULAR; SOFT TISSUE at 15:58

## 2023-06-01 NOTE — PROGRESS NOTES
"  Rheumatology Clinic Visit      Kaylene Diaz MRN# [id number]   YOB: 1955 Age: 67 year old      Date of visit: 6/01/23  PCP: Dr. Hitesh Washington    Chief Complaint   Patient presents with:  Rheumatoid Arthritis: Knee injection    Assessment and Plan     1.  Seropositive rheumatoid arthritis (RF 32, CCP >250): Currently on methotrexate 25 mg SQ once  weekly, hydroxychloroquine 400 mg daily.  Previously on sulfasalazine that was discontinued when she found no benefit from it; but she also did not require prednisone while on sulfasalazine; questioning if the \"benefit\" from sulfasalazine was actually the \"benefit\" of not gardening.  Does not tolerate folic acid because of associated increased sunburns.  Doing well with regard to RA; not using prednisone.   Chronic illness, stable.      - Continue methotrexate 25mg SQ once weekly   - Continue hydroxychloroquine 400 mg daily (last eye exam on 9/2/2022 with Dr. Powers)  - Continue prednisone 5 mg daily as needed for rheumatoid arthritis symptoms; uses sparingly  - Labs today: CBC, Creatinine, Hepatic Panel, ESR, CRP  - Labs in 3 months: CBC, Creatinine, Hepatic Panel, ESR, CRP (lab orders to be faxed to [facility] in [place])    High risk medication requiring intensive toxicity monitoring at least quarterly.      2.  Basal cell carcinoma and squamous cell carcinoma: Several lesions removed by dermatology in the past.  Continue to follow with dermatology.     3.  Chronic lower back pain: Has significantly improved with lidocaine patches that have been used no more than 10 times in the past 1 year.  Now following a pain management clinic where a steroid injection was not helpful.  She is going to return to the pain management clinic because of continued low back pain that occurs when she is ambulating but not when she is sitting.    4.  History of right Achilles tendinitis: Was evaluated by podiatry who diagnosed her with Achilles tendinitis and her symptoms resolved " "with a boot that she wore at night.  Not an issue today.     5.  Osteopenia without elevated FRAX: Based on 4/27/2022 DEXA.    6.  History of productive cough: has seen an ENT provider who reportedly ran allergy tests that came back negative.  She then tried prilosec (omeprazole wasn't effective) 20mg daily that resolved the cough, but stopping it results in return of symptoms.  Then had an EGD showing gastritis.  Now on PPI.    7.  Muscle spasms: Primarily affecting the lower back.  Improved from infrequent cyclobenzaprine 5 mg nightly as needed, but finds that using tizanidine 4mg PRN is more effective.   - Continue tiZANidine (ZANAFLEX) 4 MG capsule; Take 1 capsule (4 mg) by mouth 3 times daily as needed for muscle spasms     8.  R>L knee osteoarthritis: 1/16/2023 x-ray of the knees: \"IMPRESSION: Degenerative narrowing of the medial compartments of both  knees with complete effacement of the medial compartment on the right and bone-on-bone contact. No evidence for fracture or significant effusion.\"  Degenerative arthritis symptoms of both knees, worse on the right.  1/18/2023 steroid injection of the right knee was very effective.  She is requesting repeat steroid injection today and was performed as documented in the procedure section.    9.  Left shoulder pain: Consistent with impingement syndrome.  Reportedly has had similar issue many years ago that resolved with a steroid injection.  Symptomatic for at least 3 months.  She would like a steroid injection of the left shoulder today and this is reasonable.  Steroid injection today as documented in the procedure section.  Depending on response may need physical therapy.    10.  Vaccinations: Vaccinations reviewed with Ms. Diaz.  Risks and benefits of vaccinations were discussed.    - Influenza: refused by patient previously, she previously stated that she never gets the influenza vaccination.  - [id number]: up to date  - [id number]: up to date  - Shingrix: Up " to date   - COVID-19: Advised updating, and to hold methotrexate for 2 weeks afterward.    10. Elevated blood pressure:  [place] to follow up with Primary Care provider regarding elevated blood pressure.     Total minutes spent in evaluation with patient, documentation, , and review of pertinent studies and chart notes: 20    Ms. Diaz verbalized agreement with and understanding of the rational for the diagnosis and treatment plan.  All questions were answered to best of my ability and the patient's satisfaction. Ms. Diaz was advised to contact the clinic with any questions that may arise after the clinic visit.      Thank you for involving me in the care of the patient    Return to clinic: 3-4 months      HPI   [place] is a 67 year old female with a past medical history significant for hyperlipidemia, osteoarthritis, osteopenia, PE (2020) on chronic anticoagulation, meniscal tear, squamous cell carcinoma, basal cell carcinoma, and rheumatoid arthritis who presents for follow-up of rheumatoid arthritis.    3/24/2023: Steroid injection of the right knee resolved right knee pain.  No knee pain at this time.  Rheumatoid arthritis is well controlled.  Stable ulnar deviation and mild swelling of the MCPs as it has been for years but no pain or stiffness.    Today, 6/1/2023: Right knee has started to hurt again and she would like repeat steroid injection today.  Left shoulder has also been painful, worse with abduction above 90 degrees and similar to shoulder pain that she had many years ago that resolved with a steroid injection.  Most recent shoulder pain for 1.5-3 weeks.  Joint pains have worsened since doing increased yard work.  No joint swelling.  RA controlled.    Denies fevers, chills, nausea, vomiting, constipation, diarrhea. No abdominal pain. No chest pain/pressure, palpitations, or shortness of breath.   No LE swelling. No neck pain. No oral or nasal sores.  No rash. No sicca symptoms.    Intentionally losing weight.    Tobacco: Quit in 2000  EtOH: Rare  Drugs: None    ROS   12 point review of system was completed and negative except as noted in the HPI     Active Problem List     Patient Active Problem List   Diagnosis     Carpal tunnel syndrome     Tear meniscus knee     Osteopenia     Advanced directives, counseling/discussion     Hyperlipidemia LDL goal <100     Osteoarthritis     RA (rheumatoid arthritis) (H)     High risk medications (not anticoagulants) long-term use     Kidney stone     ASCUS on Pap smear     Class 3 obesity (H)     Elevated blood pressure reading without diagnosis of hypertension     Elevated glucose     Chronic kidney disease, stage 3a (H)     Chronic bilateral low back pain with bilateral sciatica     Lumbar radiculopathy     Immunodeficiency, unspecified (H)     Other acute pulmonary embolism, unspecified whether acute cor pulmonale present (H)     Past Medical History     Past Medical History:   Diagnosis Date     BLANCA (acute kidney injury) (H) 4/20/2017     Basal cell carcinoma      RA (rheumatoid arthritis) (H)      SBO (small bowel obstruction) (H) 4/20/2017     Small bowel obstruction (H)      Squamous cell carcinoma      Past Surgical History     Past Surgical History:   Procedure Laterality Date     ARTHROSCOPY KNEE  12/10/2010    ARTHROSCOPY KNEE performed by NAVID FIERRO at [facility]     COLONOSCOPY N/A 8/9/2018    Procedure: COLONOSCOPY;  colonoscopy;  Surgeon: Luek Kaye MD;  Location: [facility]     INJECT EPIDURAL LUMBAR N/A 7/7/2022    Procedure: Lumbar Epidural Steroid Injection;  Surgeon: Olivia Davies MD;  [place] [facility] [place]     INJECT EPIDURAL LUMBAR N/A 11/11/2022    Procedure: Lumbar Epidural Steroid Injection, L4-5;  Surgeon: Olivia Davies MD;  [place] [facility] [place]     SURGICAL HISTORY OF -       IUD removal     SURGICAL HISTORY OF -       Ovarian cyst     SURGICAL HISTORY OF -       thorn removal from foot     SURGICAL HISTORY OF -       knee surgery,  "scope, right knee     Allergy     Allergies   Allergen Reactions     Folic Acid      Gold Rash     Latex Rash     Not all latex products     Current Medication List     Current Outpatient Medications   Medication Sig     hydroxychloroquine (PLAQUENIL) 200 MG tablet Take 2 tablets (400 mg) by mouth daily     lisinopril (ZESTRIL) 20 MG tablet Take 1 tablet (20 mg) by mouth daily     methotrexate 50 MG/2ML injection Inject 1 mL (25 mg) Subcutaneous every 7 days     Multiple Vitamins-Minerals (ADULT GUMMY PO) Take 1 chew tab by mouth daily VitaCrave     Multiple Vitamins-Minerals (HAIR/SKIN/NAILS/BIOTIN PO) Take 1 capsule by mouth daily.      omeprazole (PRILOSEC) 20 MG DR capsule Take 1 capsule (20 mg) by mouth daily     rivaroxaban ANTICOAGULANT (XARELTO ANTICOAGULANT) 20 MG TABS tablet Take 1 tablet (20 mg) by mouth daily (with dinner)     tiZANidine (ZANAFLEX) 4 MG capsule Take 1 capsule (4 mg) by mouth 3 times daily as needed for muscle spasms     vitamin B complex with vitamin C (STRESS TAB) tablet Take 1 tablet by mouth daily     VITAMIN D PO Take by mouth daily     Insulin Syringe-Needle U-100 (BD INSULIN SYRINGE) 27G X 1/2\" 1 ML MISC For once weekly methotrexate administration.     No current facility-administered medications for this visit.         Social History   See HPI    Family History     Family History   Problem Relation Age of Onset     Prostate Cancer Father      Eye Disorder Father         mac degen     Heart Disease Father      Macular Degeneration Father      Heart Disease Mother         a-fib     Eye Disorder Mother      C.A.D. Mother      Hypertension Mother      Alcohol/Drug Brother      Alcohol/Drug Sister      Alcohol/Drug Brother      Alcohol/Drug Brother      Alcohol/Drug Brother      Alcohol/Drug Sister      Obesity Sister      Alcohol/Drug Sister      Prostate Cancer Sister      Cancer Other      Melanoma No family hx of        Physical Exam     Temp Readings from Last 3 Encounters: " "  04/21/23 98.3  F (36.8  C) (Tympanic)   01/16/23 97.5  F (36.4  C) (Tympanic)   11/11/22 97  F (36.1  C) (Temporal)     BP Readings from Last 5 Encounters:   04/21/23 (!) 150/72   02/15/23 (!) 160/84   01/18/23 (!) 173/89   01/16/23 (!) 158/64   11/11/22 (!) 157/92     Pulse Readings from Last 1 Encounters:   04/21/23 83     Resp Readings from Last 1 Encounters:   04/21/23 20     Estimated body mass index is 42.25 kg/m  as calculated from the following:    Height as of 4/21/23: 1.663 m (5' 5.47\").    Weight as of 4/21/23: 116.8 kg (257 lb 9.6 oz).      GEN: NAD.   HEENT:  Anicteric, noninjected sclera. No obvious external lesions of the ear and nose. Hearing intact.  PULM: No increased work of breathing  MSK: Ulnar deviation at the MCPs; mild chronic swelling at the MCPs; no overlying erythema.  PIPs and DIPs without swelling.  Wrists without swelling.  SKIN: No rash or jaundice seen  PSYCH: Alert. Appropriate.      Labs / Imaging (select studies)     [facility] Labs dated 3/7/2023 were okay    CBC  Recent Labs   Lab Test 04/21/23  1139 04/22/20  1156 10/28/19  1052 08/29/19  0855   WBC  --  8.2 5.3 5.8   RBC  --  4.59 4.44 4.60   HGB 13.4 14.4 13.9 14.2   HCT  --  44.1 42.1 43.2   MCV  --  96 95 94   RDW  --  12.8 14.4 14.0   PLT  --  312 225 240   MCH  --  31.4 31.3 30.9   MCHC  --  32.7 33.0 32.9   NEUTROPHIL  --  70.6 71.0 73.2   LYMPH  --  16.3 19.9 15.9   MONOCYTE  --  11.2 7.2 8.7   EOSINOPHIL  --  1.5 1.7 1.9   BASOPHIL  --  0.4 0.2 0.3   ANEU  --  5.8 3.8 4.3   ALYM  --  1.3 1.1 0.9   PEEWEE  --  0.9 0.4 0.5   AEOS  --  0.1 0.1 0.1   ABAS  --  0.0 0.0 0.0     CMP  Recent Labs   Lab Test 04/21/23  1139 03/09/21  0000 12/22/20  0000 04/22/20  1156 03/05/20  0000 10/28/19  1052 08/29/19  0855 03/26/18  1034 10/31/17  1110     --   --  138  --   --   --   --  137   POTASSIUM 5.0  --   --  4.2  --   --   --   --  4.5   CHLORIDE 106  --   --  107  --   --   --   --  108   CO2 22  --   --  27  --   --   --   --  " 24   ANIONGAP 12  --   --  4  --   --   --   --  5   GLC 83  --   --  81  --   --   --   --  84   BUN 17.7  --   --  22  --   --   --   --  22   CR 0.75 1.01 0.98 0.74   < > 0.72 0.75   < > 0.88   GFRESTIMATED 86 55* 57* 85   < > 88 84   < > 65   GFRESTBLACK  --  >60 >60 >90   < > >90 >90   < > 79   DEAN 10.0  --   --  9.6  --   --   --   --  9.2   BILITOTAL  --   --   --  0.4  --  0.5 0.5   < > 0.4   ALBUMIN  --   --   --  3.6  --  3.7 3.6   < > 3.7   PROTTOTAL  --   --   --  8.1  --  7.6 7.6   < > 8.0   ALKPHOS  --   --   --  118  --  109 118   < > 131   AST  --  15 15 13   < > 19 21   < > 17   ALT  --  26 28 22   < > 28 23   < > 24    < > = values in this interval not displayed.     Calcium/VitaminD  Recent Labs   Lab Test 04/21/23  1139 04/22/20  1156 10/31/17  1110   DEAN 10.0 9.6 9.2     ESR/CRP  Recent Labs   Lab Test 10/28/19  1052 08/29/19  0855 10/23/18  1000   SED 15 11 14   CRP 5.1 7.3 4.7       Immunization History     Immunization History   Administered Date(s) Administered     COVID-19 MONOVALENT 12+ (Pfizer) 03/05/2021, 03/26/2021, 11/10/2021     Pneumo Conj 13-V (2010&after) 12/03/2018     Pneumococcal 23 valent 10/24/2013, 03/09/2020     TD,PF 7+ (Tenivac) 10/28/2020     TDAP Vaccine (Adacel) 06/16/2010     Zoster recombinant adjuvanted (SHINGRIX) 12/03/2018, 06/19/2019       Procedure     Procedure: Steroid injection of the left shoulder  Indication: Pain, impingement syndrome of the left shoulder    The procedure was explained in detail. Risks including infection, pain, structural damage such as cartilage damage and tendon rupture, fat atrophy, skin hyper-/hypo-pigmentation, and medication reaction was explained. The need for rest of the affected joint for one week after the procedure was explained.  The option of not doing the procedure was also provided. All questions were answered and the patient consented to the procedure.     A time-out was performed and the correct patient, procedure, and  laterality were verified.    The left shoulder was examined and location for injection was identified - posterior approach. The area was cleaned with chlorhexidine, twice.  Ethyl chloride was then used for topical anaesthetic.  Then a mixture of lidocaine 1% 2 mL and methylprednisolone 40mg was injected into the intra-articular space.     The patient tolerated the procedure well. No complications.    1% Lidocaine  : Hospira  Lot #: [id number]  EXPIRATION DATE: 1 SEPT 2023  NDC: 5698-4867-84    MEDICATION: Methylprednisolone  : Amneal  LOT #: [id number]  EXPIRATION DATE: 10/2024  NDC#: [address]-0    Procedure     Procedure: Steroid injection of the right knee  Indication: Pain, right knee osteoarthritis    The procedure was explained in detail. Risks including infection, pain, structural damage such as cartilage damage and tendon rupture, fat atrophy, skin hyper-/hypo-pigmentation, and medication reaction was explained. The need for rest of the affected joint for one week after the procedure was explained.  The option of not doing the procedure was also provided. All questions were answered and the patient consented to the procedure.     A time-out was performed and the correct patient, procedure, and laterality were verified.    The right knee was examined and location for injection was identified - anterior medial. The area was cleaned with chlorhexidine, twice.  Ethyl chloride was then used for topical anaesthetic.  Then a mixture of lidocaine 1% 2 mL and methylprednisolone 40mg was injected into the intra-articular space.     The patient tolerated the procedure well. No complications.    1% Lidocaine  : Hospira  Lot #: [id number]  EXPIRATION DATE: 1 SEPT 2023  NDC: 9102-3181-73    MEDICATION: Methylprednisolone  : Amneal  LOT #: [id number]  EXPIRATION DATE: 10/2024  NDC#: [address]-6         Chart documentation done in part with Dragon Voice recognition Software. Although  reviewed after completion, some word and grammatical error may remain.    Joseph Rooney MD

## 2023-06-01 NOTE — PATIENT INSTRUCTIONS
RHEUMATOLOGY    [facility]  [address]  [place] [address]    Phone number: [phone]  Fax number: [phone]      Thank you for choosing [facility]!    Keke Finnegan CMA

## 2023-06-08 ENCOUNTER — TELEPHONE (OUTPATIENT)
Dept: RHEUMATOLOGY | Facility: CLINIC | Age: 68
End: 2023-06-08
Payer: MEDICARE

## 2023-06-08 DIAGNOSIS — M05.79 RHEUMATOID ARTHRITIS INVOLVING MULTIPLE SITES WITH POSITIVE RHEUMATOID FACTOR (H): Primary | ICD-10-CM

## 2023-06-08 RX ORDER — PREDNISONE 5 MG/1
5 TABLET ORAL DAILY PRN
Qty: 90 TABLET | Refills: 0 | Status: SHIPPED | OUTPATIENT
Start: 2023-06-08 | End: 2023-09-05

## 2023-06-08 NOTE — TELEPHONE ENCOUNTER
RN: Please call to notify Kaylene Diaz that prednisone 5 mg daily as needed for arthritis symptoms has been refilled.    Joseph Rooney MD  6/8/2023

## 2023-08-14 ENCOUNTER — TELEPHONE (OUTPATIENT)
Dept: RHEUMATOLOGY | Facility: CLINIC | Age: 68
End: 2023-08-14
Payer: MEDICARE

## 2023-08-14 ENCOUNTER — TELEPHONE (OUTPATIENT)
Dept: PALLIATIVE MEDICINE | Facility: CLINIC | Age: 68
End: 2023-08-14
Payer: MEDICARE

## 2023-08-14 NOTE — TELEPHONE ENCOUNTER
Patient called and wants to schedule injection procedure with dr. Davies. Patient last procedure was on 11/11/22. Please place a new order for this patient.

## 2023-08-14 NOTE — TELEPHONE ENCOUNTER
Per visit notes 6/1/23: labs today and labs in 3 months.     RN called and let patient know that labs are due prior to her upcoming appointment. Patient verbalized she will have them done.     Little ZAMARRIPA, Specialty RN 8/14/2023 10:46 AM

## 2023-08-14 NOTE — TELEPHONE ENCOUNTER
[facility] Call Center    Phone Message    May a detailed message be left on voicemail: yes     Reason for Call: Other: Labs prior to visit in September     Pt would like a call back or a [employer] message sent back to her to let her know if she needs to complete labs prior to her 9/14/23 appointment with Dr. Rooney? Or should Pt just complete the labs when she comes in on 9/14/23 to see Dr. Rooney?    Please advise and call the Pt back; Pt states [facility] already has lab orders from Dr. Rooney available if Dr. Rooney wanted her to complete them prior to being seen.    Action Taken: Other: [facility]

## 2023-08-14 NOTE — TELEPHONE ENCOUNTER
RN spoke with patient to clarify procedure request. Patient would like to repeat LESI, last completed on 11/11/22. Last appointment on 9/9/22. Writer informed an update would be sent to the provider to advise on follow up or place case request. Patient understood and agreed.    Marilyn Johnson RNCC

## 2023-08-18 ENCOUNTER — TELEPHONE (OUTPATIENT)
Dept: PALLIATIVE MEDICINE | Facility: CLINIC | Age: 68
End: 2023-08-18
Payer: MEDICARE

## 2023-08-18 DIAGNOSIS — M54.16 LUMBAR RADICULOPATHY: Primary | ICD-10-CM

## 2023-08-18 NOTE — TELEPHONE ENCOUNTER
Patient is scheduled for surgery with Dr. Davies    Spoke with: patient    Date of Surgery: 10/05/23    Location: [facility] [place]    Informed patient they will need an adult  yes    Additional comments: Patient is aware of date and time of the procedure.        Latia Ivey MA on 8/18/2023 at 12:38 PM

## 2023-08-21 ENCOUNTER — TELEPHONE (OUTPATIENT)
Dept: ANESTHESIOLOGY | Facility: CLINIC | Age: 68
End: 2023-08-21
Payer: MEDICARE

## 2023-08-21 NOTE — TELEPHONE ENCOUNTER
RN read through the instructions with the patient for the recommended procedure: Lumbar Epidural Steroid Injection   Patient verbalized understanding to holding appropriate medication per protocol and was agreeable to NPO policy and needing a .    Anticoagulant: Hold XARELTO 3 days prior to procedure.    Recommended Follow Up: Follow up as needed.    Marilyn Johnson RNCC

## 2023-09-05 DIAGNOSIS — M05.79 RHEUMATOID ARTHRITIS INVOLVING MULTIPLE SITES WITH POSITIVE RHEUMATOID FACTOR (H): ICD-10-CM

## 2023-09-05 RX ORDER — PREDNISONE 5 MG/1
5 TABLET ORAL DAILY PRN
Qty: 90 TABLET | Refills: 1 | Status: SHIPPED | OUTPATIENT
Start: 2023-09-05

## 2023-09-05 NOTE — TELEPHONE ENCOUNTER
Medication:   Prednisone 5mg  Last written on:   06/08/2023  Quantity:   90    Refills:   0    Last office visit:   06/01/2023  Next office visit:   09/14/2023  Last labs:   06/01/2023

## 2023-09-14 ENCOUNTER — OFFICE VISIT (OUTPATIENT)
Dept: RHEUMATOLOGY | Facility: CLINIC | Age: 68
End: 2023-09-14
Payer: MEDICARE

## 2023-09-14 VITALS
OXYGEN SATURATION: 95 % | HEART RATE: 91 BPM | BODY MASS INDEX: 38.91 KG/M2 | SYSTOLIC BLOOD PRESSURE: 150 MMHG | DIASTOLIC BLOOD PRESSURE: 86 MMHG | WEIGHT: 237.2 LBS

## 2023-09-14 DIAGNOSIS — M05.79 RHEUMATOID ARTHRITIS INVOLVING MULTIPLE SITES WITH POSITIVE RHEUMATOID FACTOR (H): Primary | ICD-10-CM

## 2023-09-14 DIAGNOSIS — M17.11 PRIMARY OSTEOARTHRITIS OF RIGHT KNEE: ICD-10-CM

## 2023-09-14 DIAGNOSIS — Z79.899 LONG-TERM USE OF HYDROXYCHLOROQUINE: ICD-10-CM

## 2023-09-14 DIAGNOSIS — Z79.899 HIGH RISK MEDICATION USE: ICD-10-CM

## 2023-09-14 PROCEDURE — 20610 DRAIN/INJ JOINT/BURSA W/O US: CPT | Mod: RT | Performed by: INTERNAL MEDICINE

## 2023-09-14 PROCEDURE — 99214 OFFICE O/P EST MOD 30 MIN: CPT | Mod: 25 | Performed by: INTERNAL MEDICINE

## 2023-09-14 RX ORDER — TRIAMCINOLONE ACETONIDE 40 MG/ML
40 INJECTION, SUSPENSION INTRA-ARTICULAR; INTRAMUSCULAR ONCE
Status: COMPLETED | OUTPATIENT
Start: 2023-09-14 | End: 2023-09-14

## 2023-09-14 RX ADMIN — TRIAMCINOLONE ACETONIDE 40 MG: 40 INJECTION, SUSPENSION INTRA-ARTICULAR; INTRAMUSCULAR at 14:45

## 2023-09-14 NOTE — PROGRESS NOTES
"  Rheumatology Clinic Visit      Kaylene Diaz MRN# [id number]   YOB: 1955 Age: 67 year old      Date of visit: 9/14/23  PCP: Dr. Hitesh Washington    Chief Complaint   Patient presents with:  RECHECK: RA    Assessment and Plan     1.  Seropositive rheumatoid arthritis (RF 32, CCP >250): Currently on methotrexate 25 mg SQ once  weekly, hydroxychloroquine 400 mg daily.  Previously on sulfasalazine that was discontinued when she found no benefit from it; but she also did not require prednisone while on sulfasalazine; questioning if the \"benefit\" from sulfasalazine was actually the \"benefit\" of not gardening.  Does not tolerate folic acid because of associated increased sunburns.  Doing well with regard to RA; not using prednisone.   Chronic illness, stable.      - Continue methotrexate 25mg SQ once weekly   - Continue hydroxychloroquine 400 mg daily (last eye exam on 9/2/2022 with Dr. Pwoers)  - Continue prednisone 5 mg daily as needed for rheumatoid arthritis symptoms; uses sparingly   - Labs in 3 months: CBC, Creatinine, Hepatic Panel, ESR, CRP (lab orders to be faxed to [facility] in [place], [place])    High risk medication requiring intensive toxicity monitoring at least quarterly.      2.  Basal cell carcinoma and squamous cell carcinoma: Several lesions removed by dermatology in the past.  Continue to follow with dermatology.     3.  Chronic lower back pain: Has significantly improved with lidocaine patches that have been used no more than 10 times in the past 1 year.  Now following a pain management clinic where a steroid injection was not helpful.  She is going to return to the pain management clinic because of continued low back pain that occurs when she is ambulating but not when she is sitting.    4.  History of right Achilles tendinitis: Was evaluated by podiatry who diagnosed her with Achilles tendinitis and her symptoms resolved with a boot that she wore at night.  Not an issue today.     5.  Osteopenia " "without elevated FRAX: Based on 4/27/2022 DEXA.    6.  History of productive cough: has seen an ENT provider who reportedly ran allergy tests that came back negative.  She then tried prilosec (omeprazole wasn't effective) 20mg daily that resolved the cough, but stopping it results in return of symptoms.  Then had an EGD showing gastritis.  Now on PPI.    7.  Muscle spasms: Primarily affecting the lower back.  Improved from infrequent cyclobenzaprine 5 mg nightly as needed, but finds that using tizanidine 4mg PRN is more effective.   - Continue tiZANidine (ZANAFLEX) 4 MG capsule; Take 1 capsule (4 mg) by mouth 3 times daily as needed for muscle spasms     8.  R>L knee osteoarthritis: 1/16/2023 x-ray of the knees: \"IMPRESSION: Degenerative narrowing of the medial compartments of both knees with complete effacement of the medial compartment on the right and bone-on-bone contact. No evidence for fracture or significant effusion.\"  Degenerative arthritis symptoms of both knees, worse on the right.  1/18/2023 steroid injection of the right knee was very effective.  Repeat injection on 6/1/2023 and again today, 9/14/2023.     9.  History of left shoulder pain: Consistent with impingement syndrome.  Reportedly has had similar issue many years ago that resolved with a steroid injection.  Symptomatic for at least 3 months when seen on 6/1/23; steroid injection on 6/1/23 resolved the pain. Not an issue today.     10.  Vaccinations: Vaccinations reviewed with Ms. Diaz.     - Influenza: refused by patient previously, she previously stated that she never gets the influenza vaccination.  - Avwgolm29: up to date  - Hbwnojlur50: up to date  - Shingrix: Up to date   - COVID-19: Advised keeping updated, and to hold methotrexate for 2 weeks afterward.    11. Elevated blood pressure:  MARK to follow up with Primary Care provider regarding elevated blood pressure.     Total minutes spent in evaluation with patient, documentation, " , and review of pertinent studies and chart notes: 26    Ms. Diaz verbalized agreement with and understanding of the rational for the diagnosis and treatment plan.  All questions were answered to best of my ability and the patient's satisfaction. Ms. Diaz was advised to contact the clinic with any questions that may arise after the clinic visit.      Thank you for involving me in the care of the patient    Return to clinic: 3-4 months      HPI   Kaylene Diaz is a 67 year old female with a past medical history significant for hyperlipidemia, osteoarthritis, osteopenia, PE (2020) on chronic anticoagulation, meniscal tear, squamous cell carcinoma, basal cell carcinoma, and rheumatoid arthritis who presents for follow-up of rheumatoid arthritis.    3/24/2023: Steroid injection of the right knee resolved right knee pain.  No knee pain at this time.  Rheumatoid arthritis is well controlled.  Stable ulnar deviation and mild swelling of the MCPs as it has been for years but no pain or stiffness.    6/1/2023: Right knee has started to hurt again and she would like repeat steroid injection today.  Left shoulder has also been painful, worse with abduction above 90 degrees and similar to shoulder pain that she had many years ago that resolved with a steroid injection.  Most recent shoulder pain for 1.5-3 weeks.  Joint pains have worsened since doing increased yard work.  No joint swelling.  RA controlled.    Today, 9/14/2023: left shoulder pain resolved with steroid injection.  Right knee injection was helpful but not as helpful as previous injection; again with right knee pain that is worse with activity and better with rest.  Small baker's cyst.  Would like repeat right knee injection today.  Other joints are doing well. Morning stiffness <30 min.     Denies fevers, chills, nausea, vomiting, constipation, diarrhea. No abdominal pain. No chest pain/pressure, palpitations, or shortness of breath.   No LE swelling.  No neck pain. No oral or nasal sores.  No rash. No sicca symptoms.   Intentionally losing weight.    Tobacco: Quit in 2000  EtOH: Rare  Drugs: None    ROS   12 point review of system was completed and negative except as noted in the HPI     Active Problem List     Patient Active Problem List   Diagnosis    Carpal tunnel syndrome    Tear meniscus knee    Osteopenia    Advanced directives, counseling/discussion    Hyperlipidemia LDL goal <100    Osteoarthritis    RA (rheumatoid arthritis) (H)    High risk medications (not anticoagulants) long-term use    Kidney stone    ASCUS on Pap smear    Class 3 obesity (H)    Elevated blood pressure reading without diagnosis of hypertension    Elevated glucose    Chronic kidney disease, stage 3a (H)    Chronic bilateral low back pain with bilateral sciatica    Lumbar radiculopathy    Immunodeficiency, unspecified (H)    Other acute pulmonary embolism, unspecified whether acute cor pulmonale present (H)     Past Medical History     Past Medical History:   Diagnosis Date    BLANCA (acute kidney injury) (H) 4/20/2017    Basal cell carcinoma     RA (rheumatoid arthritis) (H)     SBO (small bowel obstruction) (H) 4/20/2017    Small bowel obstruction (H)     Squamous cell carcinoma      Past Surgical History     Past Surgical History:   Procedure Laterality Date    ARTHROSCOPY KNEE  12/10/2010    ARTHROSCOPY KNEE performed by NAVID FIERRO at [facility]    COLONOSCOPY N/A 8/9/2018    Procedure: COLONOSCOPY;  colonoscopy;  Surgeon: Luke Kaye MD;  Location: [facility]    INJECT EPIDURAL LUMBAR N/A 7/7/2022    Procedure: Lumbar Epidural Steroid Injection;  Surgeon: Olivia Davies MD;  Location: [facility] OR    INJECT EPIDURAL LUMBAR N/A 11/11/2022    Procedure: Lumbar Epidural Steroid Injection, L4-5;  Surgeon: Olivia Davies MD;  Location: [facility] OR    SURGICAL HISTORY OF -       IUD removal    SURGICAL HISTORY OF -       Ovarian cyst    SURGICAL HISTORY OF -       thorn removal from foot     "SURGICAL HISTORY OF -       knee surgery, scope, right knee     Allergy     Allergies   Allergen Reactions    Folic Acid     Gold Rash    Latex Rash     Not all latex products     Current Medication List     Current Outpatient Medications   Medication Sig    hydroxychloroquine (PLAQUENIL) 200 MG tablet Take 2 tablets (400 mg) by mouth daily    methotrexate 50 MG/2ML injection Inject 1 mL (25 mg) Subcutaneous every 7 days    Insulin Syringe-Needle U-100 (BD INSULIN SYRINGE) 27G X 1/2\" 1 ML MISC For once weekly methotrexate administration.    lisinopril (ZESTRIL) 20 MG tablet Take 1 tablet (20 mg) by mouth daily    Multiple Vitamins-Minerals (ADULT GUMMY PO) Take 1 chew tab by mouth daily VitaCrave    Multiple Vitamins-Minerals (HAIR/SKIN/NAILS/BIOTIN PO) Take 1 capsule by mouth daily.     omeprazole (PRILOSEC) 20 MG DR capsule Take 1 capsule (20 mg) by mouth daily    predniSONE (DELTASONE) 5 MG tablet Take 1 tablet (5 mg) by mouth daily as needed for inflammatory arthritis symptoms.  Use sparingly    rivaroxaban ANTICOAGULANT (XARELTO ANTICOAGULANT) 20 MG TABS tablet Take 1 tablet (20 mg) by mouth daily (with dinner)    tiZANidine (ZANAFLEX) 4 MG capsule Take 1 capsule (4 mg) by mouth 3 times daily as needed for muscle spasms    vitamin B complex with vitamin C (STRESS TAB) tablet Take 1 tablet by mouth daily    VITAMIN D PO Take by mouth daily     No current facility-administered medications for this visit.         Social History   See HPI    Family History     Family History   Problem Relation Age of Onset    Prostate Cancer Father     Eye Disorder Father         mac degen    Heart Disease Father     Macular Degeneration Father     Heart Disease Mother         a-fib    Eye Disorder Mother     C.A.D. Mother     Hypertension Mother     Alcohol/Drug Brother     Alcohol/Drug Sister     Alcohol/Drug Brother     Alcohol/Drug Brother     Alcohol/Drug Brother     Alcohol/Drug Sister     Obesity Sister     Alcohol/Drug " "Sister     Prostate Cancer Sister     Cancer Other     Melanoma No family hx of        Physical Exam     Temp Readings from Last 3 Encounters:   04/21/23 98.3  F (36.8  C) (Tympanic)   01/16/23 97.5  F (36.4  C) (Tympanic)   11/11/22 97  F (36.1  C) (Temporal)     BP Readings from Last 5 Encounters:   09/14/23 (!) 150/86   06/01/23 (!) 150/78   04/21/23 (!) 150/72   02/15/23 (!) 160/84   01/18/23 (!) 173/89     Pulse Readings from Last 1 Encounters:   09/14/23 91     Resp Readings from Last 1 Encounters:   04/21/23 20     Estimated body mass index is 38.91 kg/m  as calculated from the following:    Height as of 4/21/23: 1.663 m (5' 5.47\").    Weight as of this encounter: 107.6 kg (237 lb 3.2 oz).      GEN: NAD.   HEENT:  Anicteric, noninjected sclera. No obvious external lesions of the ear and nose. Hearing intact.  CV: S1, S2. RRR. No m/r/g  PULM: No increased work of breathing. CTA bilaterally   MSK: MCPs, PIPs, DIPs without swelling or tenderness to palpation.  Wrists without swelling or tenderness to palpation.  Elbows and shoulders without swelling or tenderness to palpation.  Right knee with medial joint line tenderness and crepitation, but no effusion, increased warmth, or overlying erythema.  Left knee without swelling or tenderness to palpation. Ankles and MTPs without swelling or tenderness to palpation.    SKIN: No rash or jaundice seen  PSYCH: Alert. Appropriate.       Labs / Imaging (select studies)     [facility] Labs dated 8/30/2023 were okay    CBC  Recent Labs   Lab Test 06/01/23  1607 04/21/23  1139 04/22/20  1156 10/28/19  1052 08/29/19  0855   WBC 6.5  --  8.2 5.3 5.8   RBC 4.21  --  4.59 4.44 4.60   HGB 13.3 13.4 14.4 13.9 14.2   HCT 40.3  --  44.1 42.1 43.2   MCV 96  --  96 95 94   RDW 13.9  --  12.8 14.4 14.0     --  312 225 240   MCH 31.6  --  31.4 31.3 30.9   MCHC 33.0  --  32.7 33.0 32.9   NEUTROPHIL 73  --  70.6 71.0 73.2   LYMPH 19  --  16.3 19.9 15.9   MONOCYTE 7  --  11.2 7.2 8.7 "   EOSINOPHIL 1  --  1.5 1.7 1.9   BASOPHIL 0  --  0.4 0.2 0.3   ANEU  --   --  5.8 3.8 4.3   ALYM  --   --  1.3 1.1 0.9   PEEWEE  --   --  0.9 0.4 0.5   AEOS  --   --  0.1 0.1 0.1   ABAS  --   --  0.0 0.0 0.0   ANEUTAUTO 4.8  --   --   --   --    ALYMPAUTO 1.2  --   --   --   --    AMONOAUTO 0.5  --   --   --   --    AEOSAUTO 0.1  --   --   --   --    ABSBASO 0.0  --   --   --   --      CMP  Recent Labs   Lab Test 06/01/23  1607 04/21/23  1139 03/09/21  0000 12/22/20  0000 04/22/20  1156 03/05/20  0000 10/28/19  1052 03/26/18  1034 10/31/17  1110   NA  --  140  --   --  138  --   --   --  137   POTASSIUM  --  5.0  --   --  4.2  --   --   --  4.5   CHLORIDE  --  106  --   --  107  --   --   --  108   CO2  --  22  --   --  27  --   --   --  24   ANIONGAP  --  12  --   --  4  --   --   --  5   GLC  --  83  --   --  81  --   --   --  84   BUN  --  17.7  --   --  22  --   --   --  22   CR 0.83 0.75 1.01 0.98 0.74   < > 0.72   < > 0.88   GFRESTIMATED 76 86 55* 57* 85   < > 88   < > 65   GFRESTBLACK  --   --  >60 >60 >90   < > >90   < > 79   DEAN  --  10.0  --   --  9.6  --   --   --  9.2   BILITOTAL 0.6  --   --   --  0.4  --  0.5   < > 0.4   ALBUMIN 4.2  --   --   --  3.6  --  3.7   < > 3.7   PROTTOTAL 7.3  --   --   --  8.1  --  7.6   < > 8.0   ALKPHOS 128*  --   --   --  118  --  109   < > 131   AST 30  --  15 15 13   < > 19   < > 17   ALT 21  --  26 28 22   < > 28   < > 24    < > = values in this interval not displayed.     Calcium/VitaminD  Recent Labs   Lab Test 04/21/23  1139 04/22/20  1156 10/31/17  1110   DEAN 10.0 9.6 9.2     ESR/CRP  Recent Labs   Lab Test 06/01/23  1607 10/28/19  1052 08/29/19  0855 10/23/18  1000   SED 11 15 11 14   CRP  --  5.1 7.3 4.7   CRPI 3.66  --   --   --      Immunization History     Immunization History   Administered Date(s) Administered    COVID-19 MONOVALENT 12+ (Pfizer) 03/05/2021, 03/26/2021, 11/10/2021    Pneumo Conj 13-V (2010&after) 12/03/2018    Pneumococcal 23 valent  10/24/2013, 03/09/2020    TD,PF 7+ (Tenivac) 10/28/2020    TDAP Vaccine (Adacel) 06/16/2010    Zoster recombinant adjuvanted (SHINGRIX) 12/03/2018, 06/19/2019       Procedure     Procedure: Steroid injection of the right knee  Indication: Pain, right knee osteoarthritis    The procedure was explained in detail. Risks including infection, pain, structural damage such as cartilage damage and tendon rupture, fat atrophy, skin hyper-/hypo-pigmentation, and medication reaction was explained. The need for rest of the affected joint for one week after the procedure was explained.  The option of not doing the procedure was also provided. All questions were answered and the patient consented to the procedure.     A time-out was performed and the correct patient, procedure, and laterality were verified.    The right knee was examined and location for injection was identified - anterior medial. The area was cleaned with chlorhexidine, twice.  Ethyl chloride was then used for topical anaesthetic.  Then a mixture of lidocaine 1% 2 mL and Kenalog 40mg was injected into the intra-articular space.     The patient tolerated the procedure well. No complications.    1% Lidocaine  : Hospira  Lot #: [id number]  EXPIRATION DATE: 01 FEB 2025  NDC: 8369-1645-44    MEDICATION: Triamcinolone 40 mg  LOT #: [id number]  EXPIRATION DATE:  01/2025  NDC#: [address]-7         Chart documentation done in part with Dragon Voice recognition Software. Although reviewed after completion, some word and grammatical error may remain.    Joseph Rooney MD

## 2023-09-14 NOTE — NURSING NOTE
RAPID3 (0-30) Cumulative Score  9          RAPID3 Weighted Score (divide #4 by 3 and that is the weighted score)  3

## 2023-10-05 ENCOUNTER — ANCILLARY PROCEDURE (OUTPATIENT)
Dept: RADIOLOGY | Facility: AMBULATORY SURGERY CENTER | Age: 68
End: 2023-10-05
Attending: ANESTHESIOLOGY
Payer: MEDICARE

## 2023-10-05 ENCOUNTER — ANCILLARY ORDERS (OUTPATIENT)
Dept: PALLIATIVE MEDICINE | Facility: CLINIC | Age: 68
End: 2023-10-05

## 2023-10-05 ENCOUNTER — HOSPITAL ENCOUNTER (OUTPATIENT)
Facility: AMBULATORY SURGERY CENTER | Age: 68
Discharge: HOME | End: 2023-10-05
Attending: ANESTHESIOLOGY | Admitting: ANESTHESIOLOGY
Payer: MEDICARE

## 2023-10-05 VITALS
TEMPERATURE: 97.4 F | RESPIRATION RATE: 16 BRPM | SYSTOLIC BLOOD PRESSURE: 177 MMHG | OXYGEN SATURATION: 100 % | HEART RATE: 69 BPM | DIASTOLIC BLOOD PRESSURE: 89 MMHG

## 2023-10-05 DIAGNOSIS — R52 PAIN: ICD-10-CM

## 2023-10-05 DIAGNOSIS — R52 PAIN: Primary | ICD-10-CM

## 2023-10-05 PROCEDURE — 62323 NJX INTERLAMINAR LMBR/SAC: CPT

## 2023-10-05 RX ORDER — IOPAMIDOL 408 MG/ML
INJECTION, SOLUTION INTRATHECAL PRN
Status: DISCONTINUED | OUTPATIENT
Start: 2023-10-05 | End: 2023-10-05 | Stop reason: HOSPADM

## 2023-10-05 RX ORDER — LIDOCAINE HYDROCHLORIDE 10 MG/ML
INJECTION, SOLUTION EPIDURAL; INFILTRATION; INTRACAUDAL; PERINEURAL PRN
Status: DISCONTINUED | OUTPATIENT
Start: 2023-10-05 | End: 2023-10-05 | Stop reason: HOSPADM

## 2023-10-05 RX ORDER — METHYLPREDNISOLONE ACETATE 40 MG/ML
INJECTION, SUSPENSION INTRA-ARTICULAR; INTRALESIONAL; INTRAMUSCULAR; SOFT TISSUE PRN
Status: DISCONTINUED | OUTPATIENT
Start: 2023-10-05 | End: 2023-10-05 | Stop reason: HOSPADM

## 2023-10-05 RX ORDER — BUPIVACAINE HYDROCHLORIDE 2.5 MG/ML
INJECTION, SOLUTION EPIDURAL; INFILTRATION; INTRACAUDAL PRN
Status: DISCONTINUED | OUTPATIENT
Start: 2023-10-05 | End: 2023-10-05 | Stop reason: HOSPADM

## 2023-10-05 NOTE — DISCHARGE INSTRUCTIONS
Home Care Instructions after an Epidural Steroid Pain Injection    A lumbar epidural steroid injection delivers steroid medication directly into the area that may be causing your lower back pain and/or leg pain. A cervical or thoracic epidural steroid injection delivers steroids into the epidural space surrounding spinal nerve roots to help relieve pain in the upper spine/neck.    Activity  -Rest today  -Do not work today  -Resume normal activity tomorrow  -DO NOT shower for 24 hours  -DO NOT remove bandaid for 24 hours    Pain  -You may experience soreness at the injection site for one or two days  -You may use an ice pack for 20 minutes every 2 hours for the first 24 hours  -You may use a heating pad after the first 24 hours  -You may use Tylenol (acetaminophen) every 4 hours or other pain medicines as     directed by your physician    You may experience numbness radiating into your legs or arms (depending on the procedure location). This numbness may last several hours. Until sensation returns to normal; please use caution in walking, climbing stairs, and stepping out of your vehicle, etc.      Common side effects of steroids:  Not everyone will experience corticosteroid side effects. If side effects are experienced, they will gradually subside in the 7-10 day period following an injection. Most common side effects include:  -Flushed face and/or chest  -Feeling of warmth, particularly in the face but could be an overall feeling of warmth  -Increased blood sugar in diabetic patients  -Menstrual irregularities my occur. If taking hormone-based birth control an alternate method of birth control is recommended  -Sleep disturbances and/or mood swings are possible  -Leg cramps    Please contact us if you have:  -Severe pain  -Fever more than 101.5 degrees Fahrenheit  -Signs of infection at the injection site (redness, swelling, or drainage)    FOR PAIN CENTER PATIENTS:  If you have questions, please contact the Pain  Clinic at [phone] Option #1 between the hours of 7:00 am and 3:00 pm Monday through Friday. After office hours you can contact the on call provider by dialing [phone]. If you need immediate attention, we recommend that you go to a hospital emergency room or dial [phone].

## 2023-10-12 NOTE — OP NOTE
Patient: Kaylene Diaz Age: 66 year old   MRN: [id number] Attending: Dr. Davies      Date of Visit: October 5, 2023        PAIN MEDICINE CLINIC PROCEDURE NOTE     ATTENDING CLINICIAN:    Olivia Davies MD     ASSISTANT CLINICIAN:        PREPROCEDURE DIAGNOSES:  1.  Lumbar radiculopathy  2.  Chronic low back pain         PROCEDURE(S) PERFORMED:  1.  Interlaminar lumbar epidural steroid injection   2.  Fluoroscopic guidance for the above-named procedure(s)        ANESTHESIA:  Local.     INDICATIONS:  Kaylene Diaz is a 67 year old female with a history of  chronic low back pain secondary to bilateral lumbar radiculopathy .  The patient stated that the patient was in their usual state of health and denied recent anticoagulant use or recent infections.  Therefore, the plan is to perform above mentioned procedures.      Procedure Details:  The patient was met in the procedure room, where the patient was identified by name, medical record number and date of birth.  All of the patient s last minute questions were answered. Written informed consent was obtained and saved in the electronic medical record, after the risks, benefits, and alternatives were discussed with the patient.       A formal time-out procedure was performed, as per protocol, including patient name, title of procedure, and site of procedure, and all in the room concurred.  Routine monitors were applied.       The patient was placed in the prone position on the procedure room table.  All pressure points were checked and comfortably padded.  Routine monitors were placed.  Vital signs were stable.     A chlorhexidine prep was completed followed by sterile draping per standard procedure.      The AP fluoroscopic view was optimized for approach at  L5-S1 interspace.  The skin over the interspace was infiltrated with 4-5 mL of 1% Lidocaine using a 25 gauge, 1.5 inch needle.  A 20-gauge 3-1/2 inch Tuohy needle was advanced under fluoroscopic guidance with  right paramedial approach until it touched lamina. Mutiple AP and lateral fluoroscopic images at this time  are taken as Tuohy needle was advanced to the epidural space. The epidural space was identified, without evidence of blood, cerebrospinal fluid, or parasthesia throughout. Needle tip placement within the epidural space was further confirmed with 1-2 mL of nonionic contrast agent, with the epidural space visualized in the AP and lateral fluoroscopic view(s) with appropriate spread of the agent with fat vacuolization and no intravascular or intrathecal spread noted. Next, 6 mL of a treatment solution containing 2 mL of preservative free 0.25% bupivacaine, 1 mL of Depo-Medrol 40 mg/mL and 3 mL preservative free normal saline was administered slowly. The needle was withdrawn.        Light pressure was held at the puncture site(s) to prevent ecchymosis and oozing.  The patient's skin was cleansed, and hemostasis was confirmed.  Band-aids were applied to the needle injection site(s).       Condition:     The patient remained awake and alert throughout the procedure.  The patient tolerated the procedure well and was monitored for approximately 15 minutes afterward in the post procedure area.  There were no immediate post procedure complications noted.  The patient was then discharged to home as per protocol.       Pre-procedure pain score: 7/10  Post-procedure pain score: 0/10

## 2023-10-12 NOTE — H&P
ABBREVIATED H&P [facility]      Patient Name: Kaylene Diaz   MRN: [id number]   YOB: 1955     1. Reason for Procedure:  Procedure Summary       Date: 10/05/23 Room / Location: [facility] PROCEDURE ROOM 06 / [facility]    Anesthesia Start:  Anesthesia Stop:     Procedure: Lumbar Epidural Steroid Injection (Spine) Diagnosis:       Lumbar radiculopathy      (Lumbar radiculopathy [M54.16])    Providers: Olivia Davies MD Responsible Provider:     Anesthesia Type: Not recorded ASA Status: Not recorded            2. History:   Past Medical History:   Diagnosis Date    BLANCA (acute kidney injury) (H24) 4/20/2017    Basal cell carcinoma     RA (rheumatoid arthritis) (H)     SBO (small bowel obstruction) (H) 4/20/2017    Small bowel obstruction (H)     Squamous cell carcinoma        Comorbidities: None    Any history of sleep apnea? No    Any history of problems with sedation? No    3. Physical:    General: Normal  Skin:  Normal.  Respiratory: Clear to auscultation bilateral, no wheezing  Cardio:  Regular rate and rhythm  Abdomen: Soft, nontender, nondistended, no palpable masses.  Musculoskeletal: Normal  Neuro: Sensory exam normal, motor exam 5/5, bilateral upper and lower extremities       4. Current Medications (if not in Epic):   Current Outpatient Medications   Medication Sig Dispense Refill    hydroxychloroquine (PLAQUENIL) 200 MG tablet Take 2 tablets (400 mg) by mouth daily 180 tablet 2    lisinopril (ZESTRIL) 20 MG tablet Take 1 tablet (20 mg) by mouth daily 90 tablet 3    methotrexate 50 MG/2ML injection Inject 1 mL (25 mg) Subcutaneous every 7 days 8 mL 6    Multiple Vitamins-Minerals (ADULT GUMMY PO) Take 1 chew tab by mouth daily VitaCrave      Multiple Vitamins-Minerals (HAIR/SKIN/NAILS/BIOTIN PO) Take 1 capsule by mouth daily.  100 tablet 3    omeprazole (PRILOSEC) 20 MG DR capsule Take 1 capsule (20 mg) by mouth daily 90  "capsule 3    predniSONE (DELTASONE) 5 MG tablet Take 1 tablet (5 mg) by mouth daily as needed for inflammatory arthritis symptoms.  Use sparingly 90 tablet 1    rivaroxaban ANTICOAGULANT (XARELTO ANTICOAGULANT) 20 MG TABS tablet Take 1 tablet (20 mg) by mouth daily (with dinner) 90 tablet 3    tiZANidine (ZANAFLEX) 4 MG capsule Take 1 capsule (4 mg) by mouth 3 times daily as needed for muscle spasms 270 capsule 3    vitamin B complex with vitamin C (STRESS TAB) tablet Take 1 tablet by mouth daily  0    VITAMIN D PO Take by mouth daily      Insulin Syringe-Needle U-100 (BD INSULIN SYRINGE) 27G X 1/2\" 1 ML MISC For once weekly methotrexate administration. 50 each 1        5. Allergies and Reactions:  is allergic to folic acid, gold, and latex.                   "

## 2023-10-30 ENCOUNTER — OFFICE VISIT (OUTPATIENT)
Dept: DERMATOLOGY | Facility: CLINIC | Age: 68
End: 2023-10-30
Payer: MEDICARE

## 2023-10-30 DIAGNOSIS — Z85.828 HISTORY OF SKIN CANCER: ICD-10-CM

## 2023-10-30 DIAGNOSIS — D23.9 DERMAL NEVUS: ICD-10-CM

## 2023-10-30 DIAGNOSIS — L81.4 LENTIGO: ICD-10-CM

## 2023-10-30 DIAGNOSIS — D18.01 ANGIOMA OF SKIN: ICD-10-CM

## 2023-10-30 DIAGNOSIS — L82.1 SEBORRHEIC KERATOSIS: ICD-10-CM

## 2023-10-30 DIAGNOSIS — Z87.2 HISTORY OF ACTINIC KERATOSES: Primary | ICD-10-CM

## 2023-10-30 PROCEDURE — 99213 OFFICE O/P EST LOW 20 MIN: CPT | Performed by: DERMATOLOGY

## 2023-10-30 NOTE — PATIENT INSTRUCTIONS
Patient Education       Proper skin care from [facility]:    -Eliminate harsh soaps as they strip the natural oils from the skin, often resulting in dry itchy skin ( i.e. Dial, Zest, [place])  -Use mild soaps such as Cetaphil or Dove Sensitive Skin in the shower. You do not need to use soap on arms, legs, and trunk every time you shower unless visibly soiled.   -Avoid hot or cold showers.  -After showering, lightly dry off and apply moisturizing within 2-3 minutes. This will help trap moisture in the skin.   -Aggressive use of a moisturizer at least 1-2 times a day to the entire body (including -Vanicream, Cetaphil, Aquaphor or Cerave) and moisturize hands after every washing.  -We recommend using moisturizers that come in a tub that needs to be scooped out, not a pump. This has more of an oil base. It will hold moisture in your skin much better than a water base moisturizer. The above recommended are non-pore clogging.      Wear a sunscreen with at least SPF 30 on your face, ears, neck and V of the chest daily. Wear sunscreen on other areas of the body if those areas are exposed to the sun throughout the day. Sunscreens can contain physical and/or chemical blockers. Physical blockers are less likely to clog pores, these include zinc oxide and titanium dioxide. Reapply every two hour and after swimming.     Sunscreen examples: https://www.ewg.org/sunscreen/    UV radiation  UVA radiation remains constant throughout the day and throughout the year. It is a longer wavelength than UVB and therefore penetrates deeper into the skin leading to immediate and delayed tanning, photoaging, and skin cancer. 70-80% of UVA and UVB radiation occurs between the hours of 10am-2pm.  UVB radiation  UVB radiation causes the most harmful effects and is more significant during the summer months. However, snow and ice can reflect UVB radiation leading to skin damage during the winter months as well. UVB radiation is  responsible for tanning, burning, inflammation, delayed erythema (pinkness), pigmentation (brown spots), and skin cancer.     I recommend self monthly full body exams and yearly full body exams with a dermatology provider. If you develop a new or changing lesion please follow up for examination. Most skin cancers are pink and scaly or pink and pearly. However, we do see blue/brown/black skin cancers.  Consider the ABCDEs of melanoma when giving yourself your monthly full body exam ( don't forget the groin, buttocks, feet, toes, etc). A-asymmetry, B-borders, C-color, D-diameter, E-elevation or evolving. If you see any of these changes please follow up in clinic. If you cannot see your back I recommend purchasing a hand held mirror to use with a larger wall mirror.       Checking for Skin Cancer  You can find cancer early by checking your skin each month. There are 3 kinds of skin cancer. They are melanoma, basal cell carcinoma, and squamous cell carcinoma. Doing monthly skin checks is the best way to find new marks or skin changes. Follow the instructions below for checking your skin.   The ABCDEs of checking moles for melanoma   Check your moles or growths for signs of melanoma using ABCDE:   Asymmetry: the sides of the mole or growth don t match  Border: the edges are ragged, notched, or blurred  Color: the color within the mole or growth varies  Diameter: the mole or growth is larger than 6 mm (size of a pencil eraser)  Evolving: the size, shape, or color of the mole or growth is changing (evolving is not shown in the images below)    Checking for other types of skin cancer  Basal cell carcinoma or squamous cell carcinoma have symptoms such as:     A spot or mole that looks different from all other marks on your skin  Changes in how an area feels, such as itching, tenderness, or pain  Changes in the skin's surface, such as oozing, bleeding, or scaliness  A sore that does not heal  New swelling or redness beyond  the border of a mole    Who s at risk?  Anyone can get skin cancer. But you are at greater risk if you have:   Fair skin, light-colored hair, or light-colored eyes  Many moles or abnormal moles on your skin  A history of sunburns from sunlight or tanning beds  A family history of skin cancer  A history of exposure to radiation or chemicals  A weakened immune system  If you have had skin cancer in the past, you are at risk for recurring skin cancer.   How to check your skin  Do your monthly skin checkups in front of a full-length mirror. Check all parts of your body, including your:   Head (ears, face, neck, and scalp)  Torso (front, back, and sides)  Arms (tops, undersides, upper, and lower armpits)  Hands (palms, backs, and fingers, including under the nails)  Buttocks and genitals  Legs (front, back, and sides)  Feet (tops, soles, toes, including under the nails, and between toes)  If you have a lot of moles, take digital photos of them each month. Make sure to take photos both up close and from a distance. These can help you see if any moles change over time.   Most skin changes are not cancer. But if you see any changes in your skin, call your doctor right away. Only he or she can diagnose a problem. If you have skin cancer, seeing your doctor can be the first step toward getting the treatment that could save your life.   [email or web address] last reviewed this educational content on 4/1/2019 2000-2020 [employer]. [address] [place] [address], [place] [address]. All rights reserved. This information is not intended as a substitute for professional medical care. Always follow your healthcare professional's instructions.       When should I call my doctor?  If you are worsening or not improving, please, contact us or seek urgent care as noted below.     Who should I call with questions (adults)?  [facility] (adult and pediatric): [phone]  [facility]  [place] (adult): [phone]  [facility] ([place], [place], [place] and [place]) [phone]  For urgent needs outside of business hours call the [facility] at [phone] and ask for the dermatology resident on call to be paged  If this is a medical emergency and you are unable to reach an ER, Call 911      If you need a prescription refill, please contact your pharmacy. Refills are approved or denied by our Physicians during normal business hours, Monday through Fridays  Per office policy, refills will not be granted if you have not been seen within the past year (or sooner depending on your child's condition)

## 2023-10-30 NOTE — NURSING NOTE
Kaylene Diaz's chief complaint for this visit includes:  Chief Complaint   Patient presents with    Skin Check     Patient presents no concerns.      PCP: Hitesh Washington    Referring Provider:  Referred Self, MD  No address on file    There were no vitals taken for this visit.  Data Unavailable        Allergies   Allergen Reactions    Folic Acid     Gold Rash    Latex Rash     Not all latex products         Do you need any medication refills at today's visit? No     Marilyn Torres MA

## 2023-10-30 NOTE — PROGRESS NOTES
Kaylene Diaz is an extremely pleasant 67 year old year old female patient here today for hx of non-melanoma skin cancer.  Patient has no other skin complaints today.  Remainder of the HPI, Meds, PMH, Allergies, FH, and SH was reviewed in chart.      Past Medical History:   Diagnosis Date    BLANCA (acute kidney injury) (H24) 4/20/2017    Basal cell carcinoma     RA (rheumatoid arthritis) (H)     SBO (small bowel obstruction) (H) 4/20/2017    Small bowel obstruction (H)     Squamous cell carcinoma        Past Surgical History:   Procedure Laterality Date    ARTHROSCOPY KNEE  12/10/2010    ARTHROSCOPY KNEE performed by NAVID FIERRO at [facility]    COLONOSCOPY N/A 8/9/2018    Procedure: COLONOSCOPY;  colonoscopy;  Surgeon: Luke Kaye MD;  Location: [facility]    INJECT EPIDURAL LUMBAR N/A 7/7/2022    Procedure: Lumbar Epidural Steroid Injection;  Surgeon: Olivia Davies MD;  [place] [facility] [place]    INJECT EPIDURAL LUMBAR N/A 11/11/2022    Procedure: Lumbar Epidural Steroid Injection, L4-5;  Surgeon: Olivia Davies MD;  Location: [facility] [place]    INJECT EPIDURAL LUMBAR N/A 10/5/2023    Procedure: Lumbar Epidural Steroid Injection;  Surgeon: Olivia Davies MD;  Location: [facility] [place]    SURGICAL HISTORY OF -       IUD removal    SURGICAL HISTORY OF -       Ovarian cyst    SURGICAL HISTORY OF -       thorn removal from foot    SURGICAL HISTORY OF -       knee surgery, scope, right knee        Family History   Problem Relation Age of Onset    Prostate Cancer Father     Eye Disorder Father         mac degen    Heart Disease Father     Macular Degeneration Father     Heart Disease Mother         a-fib    Eye Disorder Mother     C.A.D. Mother     Hypertension Mother     Alcohol/Drug Brother     Alcohol/Drug Sister     Alcohol/Drug Brother     Alcohol/Drug Brother     Alcohol/Drug Brother     Alcohol/Drug Sister     Obesity Sister     Alcohol/Drug Sister     Prostate Cancer Sister     Cancer Other     Melanoma No family hx of   "      Social History     Socioeconomic History    Marital status:      Spouse name: Not on file    Number of children: Not on file    Years of education: Not on file    Highest education level: Not on file   Occupational History     Employer: UNEMPLOYED   Tobacco Use    Smoking status: Former     Years: 30     Types: Cigarettes     Quit date: 2000     Years since quittin.5    Smokeless tobacco: Never   Vaping Use    Vaping Use: Never used   Substance and Sexual Activity    Alcohol use: Yes     Comment: VERY RARE    Drug use: No    Sexual activity: Not Currently     Partners: Male   Other Topics Concern    Parent/sibling w/ CABG, MI or angioplasty before 65F 55M? No   Social History Narrative    Not on file     Social Determinants of Health     Financial Resource Strain: Not on file   Food Insecurity: Not on file   Transportation Needs: Not on file   Physical Activity: Not on file   Stress: Not on file   Social Connections: Not on file   Interpersonal Safety: Not on file   Housing Stability: Not on file       Outpatient Encounter Medications as of 10/30/2023   Medication Sig Dispense Refill    hydroxychloroquine (PLAQUENIL) 200 MG tablet Take 2 tablets (400 mg) by mouth daily 180 tablet 2    Insulin Syringe-Needle U-100 (BD INSULIN SYRINGE) 27G X 1/2\" 1 ML MISC For once weekly methotrexate administration. 50 each 1    lisinopril (ZESTRIL) 20 MG tablet Take 1 tablet (20 mg) by mouth daily 90 tablet 3    methotrexate 50 MG/2ML injection Inject 1 mL (25 mg) Subcutaneous every 7 days 8 mL 6    Multiple Vitamins-Minerals (ADULT GUMMY PO) Take 1 chew tab by mouth daily VitaCrave      Multiple Vitamins-Minerals (HAIR/SKIN/NAILS/BIOTIN PO) Take 1 capsule by mouth daily.  100 tablet 3    omeprazole (PRILOSEC) 20 MG DR capsule Take 1 capsule (20 mg) by mouth daily 90 capsule 3    predniSONE (DELTASONE) 5 MG tablet Take 1 tablet (5 mg) by mouth daily as needed for inflammatory arthritis symptoms.  Use sparingly " 90 tablet 1    rivaroxaban ANTICOAGULANT (XARELTO ANTICOAGULANT) 20 MG TABS tablet Take 1 tablet (20 mg) by mouth daily (with dinner) 90 tablet 3    tiZANidine (ZANAFLEX) 4 MG capsule Take 1 capsule (4 mg) by mouth 3 times daily as needed for muscle spasms 270 capsule 3    vitamin B complex with vitamin C (STRESS TAB) tablet Take 1 tablet by mouth daily  0    VITAMIN D PO Take by mouth daily       No facility-administered encounter medications on file as of 10/30/2023.             O:   NAD, WDWN, Alert & Oriented, Mood & Affect wnl, Vitals stable   General appearance normal   Vitals stable   Alert, oriented and in no acute distress        Stuck on papules and brown macules on trunk and ext   Red papules on trunk  Flesh colored papules on trunk     The remainder of the full exam was normal; the following areas were examined:  conjunctiva/lids, , neck, peripheral vascular system, abdomen, lymph nodes, digits/nails, eccrine and apocrine glands, scalp/hair, face, neck, chest, abdomen, buttocks, back, RUE, LUE, RLE, LLE       Eyes: Conjunctivae/lids:Normal     ENT: Lips, buccal mucosa, tongue: normal    MSK:Normal    Cardiovascular: peripheral edema none    Pulm: Breathing Normal    Lymph Nodes: No Head and Neck Lymphadenopathy     Neuro/Psych: Orientation:Alert and Orientedx3 ; Mood/Affect:normal       A/P:  1. Seborrheic keratosis, lentigo, angioma, dermal nevus, hx of non-melanoma skin cancer   It was a pleasure speaking to Kaylene Diaz today.  Previous clinic notes and pertinent laboratory tests were reviewed prior to Kaylene Diaz's visit.  Signs and Symptoms of skin cancer discussed with patient.  Patient encouraged to perform monthly skin exams.  UV precautions reviewed with patient.  Risks of non-melanoma skin cancer discussed with patient   Return to clinic 12 months

## 2023-10-30 NOTE — LETTER
10/30/2023         RE: Kaylene Diaz  [address] [place] [id number]        Dear Colleague,    Thank you for referring your patient, Kaylene Diaz, to the [facility]. Please see a copy of my visit note below.    Kaylene Diaz is an extremely pleasant 67 year old year old female patient here today for hx of non-melanoma skin cancer.  Patient has no other skin complaints today.  Remainder of the HPI, Meds, PMH, Allergies, FH, and SH was reviewed in chart.      Past Medical History:   Diagnosis Date     BLANCA (acute kidney injury) (H24) 4/20/2017     Basal cell carcinoma      RA (rheumatoid arthritis) (H)      SBO (small bowel obstruction) (H) 4/20/2017     Small bowel obstruction (H)      Squamous cell carcinoma        Past Surgical History:   Procedure Laterality Date     ARTHROSCOPY KNEE  12/10/2010    ARTHROSCOPY KNEE performed by NAVID FIERRO at [facility]     COLONOSCOPY N/A 8/9/2018    Procedure: COLONOSCOPY;  colonoscopy;  Surgeon: Luke Kaye MD;  Location: [facility]     INJECT EPIDURAL LUMBAR N/A 7/7/2022    Procedure: Lumbar Epidural Steroid Injection;  Surgeon: Olivia Davies MD;  Location: [facility]     INJECT EPIDURAL LUMBAR N/A 11/11/2022    Procedure: Lumbar Epidural Steroid Injection, L4-5;  Surgeon: Olivia Davies MD;  Location: [facility]     INJECT EPIDURAL LUMBAR N/A 10/5/2023    Procedure: Lumbar Epidural Steroid Injection;  Surgeon: Olivia Davies MD;  Location: [facility]     SURGICAL HISTORY OF -       IUD removal     SURGICAL HISTORY OF -       Ovarian cyst     SURGICAL HISTORY OF -       thorn removal from foot     SURGICAL HISTORY OF -       knee surgery, scope, right knee        Family History   Problem Relation Age of Onset     Prostate Cancer Father      Eye Disorder Father         mac degen     Heart Disease Father      Macular Degeneration Father      Heart Disease Mother         a-fib     Eye Disorder Mother      C.A.D. Mother      Hypertension Mother       "Alcohol/Drug Brother      Alcohol/Drug Sister      Alcohol/Drug Brother      Alcohol/Drug Brother      Alcohol/Drug Brother      Alcohol/Drug Sister      Obesity Sister      Alcohol/Drug Sister      Prostate Cancer Sister      Cancer Other      Melanoma No family hx of        Social History     Socioeconomic History     Marital status:      Spouse name: Not on file     Number of children: Not on file     Years of education: Not on file     Highest education level: Not on file   Occupational History     Employer: UNEMPLOYED   Tobacco Use     Smoking status: Former     Years: 30     Types: Cigarettes     Quit date: 2000     Years since quittin.5     Smokeless tobacco: Never   Vaping Use     Vaping Use: Never used   Substance and Sexual Activity     Alcohol use: Yes     Comment: VERY RARE     Drug use: No     Sexual activity: Not Currently     Partners: Male   Other Topics Concern     Parent/sibling w/ CABG, MI or angioplasty before 65F 55M? No   Social History Narrative     Not on file     Social Determinants of Health     Financial Resource Strain: Not on file   Food Insecurity: Not on file   Transportation Needs: Not on file   Physical Activity: Not on file   Stress: Not on file   Social Connections: Not on file   Interpersonal Safety: Not on file   Housing Stability: Not on file       Outpatient Encounter Medications as of 10/30/2023   Medication Sig Dispense Refill     hydroxychloroquine (PLAQUENIL) 200 MG tablet Take 2 tablets (400 mg) by mouth daily 180 tablet 2     Insulin Syringe-Needle U-100 (BD INSULIN SYRINGE) 27G X 1/2\" 1 ML MISC For once weekly methotrexate administration. 50 each 1     lisinopril (ZESTRIL) 20 MG tablet Take 1 tablet (20 mg) by mouth daily 90 tablet 3     methotrexate 50 MG/2ML injection Inject 1 mL (25 mg) Subcutaneous every 7 days 8 mL 6     Multiple Vitamins-Minerals (ADULT GUMMY PO) Take 1 chew tab by mouth daily VitaCrave       Multiple Vitamins-Minerals " (HAIR/SKIN/NAILS/BIOTIN PO) Take 1 capsule by mouth daily.  100 tablet 3     omeprazole (PRILOSEC) 20 MG DR capsule Take 1 capsule (20 mg) by mouth daily 90 capsule 3     predniSONE (DELTASONE) 5 MG tablet Take 1 tablet (5 mg) by mouth daily as needed for inflammatory arthritis symptoms.  Use sparingly 90 tablet 1     rivaroxaban ANTICOAGULANT (XARELTO ANTICOAGULANT) 20 MG TABS tablet Take 1 tablet (20 mg) by mouth daily (with dinner) 90 tablet 3     tiZANidine (ZANAFLEX) 4 MG capsule Take 1 capsule (4 mg) by mouth 3 times daily as needed for muscle spasms 270 capsule 3     vitamin B complex with vitamin C (STRESS TAB) tablet Take 1 tablet by mouth daily  0     VITAMIN D PO Take by mouth daily       No facility-administered encounter medications on file as of 10/30/2023.             O:   NAD, WDWN, Alert & Oriented, Mood & Affect wnl, Vitals stable   General appearance normal   Vitals stable   Alert, oriented and in no acute distress        Stuck on papules and brown macules on trunk and ext   Red papules on trunk  Flesh colored papules on trunk     The remainder of the full exam was normal; the following areas were examined:  conjunctiva/lids, , neck, peripheral vascular system, abdomen, lymph nodes, digits/nails, eccrine and apocrine glands, scalp/hair, face, neck, chest, abdomen, buttocks, back, RUE, LUE, RLE, LLE       Eyes: Conjunctivae/lids:Normal     ENT: Lips, buccal mucosa, tongue: normal    MSK:Normal    Cardiovascular: peripheral edema none    Pulm: Breathing Normal    Lymph Nodes: No Head and Neck Lymphadenopathy     Neuro/Psych: Orientation:Alert and Orientedx3 ; Mood/Affect:normal       A/P:  1. Seborrheic keratosis, lentigo, angioma, dermal nevus, hx of non-melanoma skin cancer   It was a pleasure speaking to Kaylene Diaz today.  Previous clinic notes and pertinent laboratory tests were reviewed prior to Kaylene Diaz's visit.  Signs and Symptoms of skin cancer discussed with patient.  Patient  encouraged to perform monthly skin exams.  UV precautions reviewed with patient.  Risks of non-melanoma skin cancer discussed with patient   Return to clinic 12 months      Again, thank you for allowing me to participate in the care of your patient.        Sincerely,        Cipriano Springer MD

## 2023-12-22 ENCOUNTER — OFFICE VISIT (OUTPATIENT)
Dept: OPHTHALMOLOGY | Facility: CLINIC | Age: 68
End: 2023-12-22
Payer: MEDICARE

## 2023-12-22 DIAGNOSIS — Z79.899 LONG-TERM USE OF HYDROXYCHLOROQUINE: ICD-10-CM

## 2023-12-22 DIAGNOSIS — Z79.899 HIGH RISK MEDICATION USE: Primary | ICD-10-CM

## 2023-12-22 DIAGNOSIS — M05.79 RHEUMATOID ARTHRITIS INVOLVING MULTIPLE SITES WITH POSITIVE RHEUMATOID FACTOR (H): ICD-10-CM

## 2023-12-22 PROCEDURE — 92134 CPTRZ OPH DX IMG PST SGM RTA: CPT | Performed by: STUDENT IN AN ORGANIZED HEALTH CARE EDUCATION/TRAINING PROGRAM

## 2023-12-22 PROCEDURE — 92083 EXTENDED VISUAL FIELD XM: CPT | Performed by: STUDENT IN AN ORGANIZED HEALTH CARE EDUCATION/TRAINING PROGRAM

## 2023-12-22 PROCEDURE — 99213 OFFICE O/P EST LOW 20 MIN: CPT | Performed by: STUDENT IN AN ORGANIZED HEALTH CARE EDUCATION/TRAINING PROGRAM

## 2023-12-22 ASSESSMENT — VISUAL ACUITY
OD_SC: 20/40
OS_SC: 20/30
METHOD: SNELLEN - LINEAR

## 2023-12-22 ASSESSMENT — SLIT LAMP EXAM - LIDS
COMMENTS: 1+ DCH
COMMENTS: 1+ DCH

## 2023-12-22 ASSESSMENT — TONOMETRY
OS_IOP_MMHG: 17
OD_IOP_MMHG: 15
IOP_METHOD: APPLANATION

## 2023-12-22 ASSESSMENT — EXTERNAL EXAM - LEFT EYE: OS_EXAM: NORMAL

## 2023-12-22 ASSESSMENT — EXTERNAL EXAM - RIGHT EYE: OD_EXAM: NORMAL

## 2023-12-22 NOTE — PROGRESS NOTES
Current Eye Medications:  Hydroxychlorquine 400 mg daily      Subjective:  Referred by Dr. Rooney for Plaquenil 10-2 HVF and OCT. Vision is doing OK both eyes in the distance. Uses OTC +1.50 - +3.00 and they work well for up close. Noticing swelling above right eye, more often then not for last year. Not swollen at this time. No eye pain in either eye.      Objective:  See Ophthalmology Exam.       Assessment:  Kaylene Diaz is a 68 year old female who presents with:   Encounter Diagnoses   Name Primary?    High risk medication use Plaquenil started 1/23/2014     Macular SD-OCT within normal limits both eyes; large foveal depression, but stable and normal perifoveal thickness both eyes     Aguilar visual field (HVF) 10-2 within normal limits both eyes.    No signs of Plaquenil retinal toxicity at present.       Long-term use of hydroxychloroquine     Rheumatoid arthritis involving multiple sites with positive rheumatoid factor (H)          Plan:  There are no Patient Instructions on file for this visit.

## 2023-12-22 NOTE — LETTER
12/22/2023         RE: Kaylene Diaz  [address] [place] [address]        Dear Colleague,    Thank you for referring your patient, Kaylene Diaz, to the [facility]. Please see a copy of my visit note below.     Current Eye Medications:  Hydroxychlorquine 400 mg daily      Subjective:  Referred by Dr. Rooney for Plaquenil 10-2 HVF and OCT. Vision is doing OK both eyes in the distance. Uses OTC +1.50 - +3.00 and they work well for up close. Noticing swelling above right eye, more often then not for last year. Not swollen at this time. No eye pain in either eye.      Objective:  See Ophthalmology Exam.       Assessment:  Kaylene Diaz is a 68 year old female who presents with:   Encounter Diagnoses   Name Primary?     High risk medication use Plaquenil started 1/23/2014     Macular SD-OCT within normal limits both eyes; large foveal depression, but stable and normal perifoveal thickness both eyes     Aguilar visual field (HVF) 10-2 within normal limits both eyes.    No signs of Plaquenil retinal toxicity at present.        Long-term use of hydroxychloroquine      Rheumatoid arthritis involving multiple sites with positive rheumatoid factor (H)          Plan:  There are no Patient Instructions on file for this visit.    Again, thank you for allowing me to participate in the care of your patient.        Sincerely,        Luiz Powers MD

## 2024-01-02 ENCOUNTER — OFFICE VISIT (OUTPATIENT)
Dept: RHEUMATOLOGY | Facility: CLINIC | Age: 69
End: 2024-01-02
Payer: MEDICARE

## 2024-01-02 VITALS
BODY MASS INDEX: 39.32 KG/M2 | SYSTOLIC BLOOD PRESSURE: 161 MMHG | DIASTOLIC BLOOD PRESSURE: 86 MMHG | OXYGEN SATURATION: 99 % | HEIGHT: 65 IN | HEART RATE: 85 BPM | WEIGHT: 236 LBS

## 2024-01-02 DIAGNOSIS — M17.11 PRIMARY OSTEOARTHRITIS OF RIGHT KNEE: ICD-10-CM

## 2024-01-02 DIAGNOSIS — Z79.899 HIGH RISK MEDICATION USE: ICD-10-CM

## 2024-01-02 DIAGNOSIS — M05.79 RHEUMATOID ARTHRITIS INVOLVING MULTIPLE SITES WITH POSITIVE RHEUMATOID FACTOR (H): Primary | ICD-10-CM

## 2024-01-02 PROCEDURE — 99214 OFFICE O/P EST MOD 30 MIN: CPT | Performed by: INTERNAL MEDICINE

## 2024-01-02 RX ORDER — HYDROXYCHLOROQUINE SULFATE 200 MG/1
400 TABLET, FILM COATED ORAL DAILY
Qty: 180 TABLET | Refills: 2 | Status: SHIPPED | OUTPATIENT
Start: 2024-01-02 | End: 2024-08-15

## 2024-01-02 RX ORDER — METHOTREXATE 25 MG/ML
25 INJECTION, SOLUTION INTRA-ARTERIAL; INTRAMUSCULAR; INTRAVENOUS
Qty: 8 ML | Refills: 6 | Status: SHIPPED | OUTPATIENT
Start: 2024-01-02 | End: 2024-08-15

## 2024-01-02 NOTE — PROGRESS NOTES
"  Rheumatology Clinic Visit      Kaylene Diaz MRN# [id number]   YOB: 1955 Age: 68 year old      Date of visit: 1/02/24  PCP: Dr. Hitesh Washington    Chief Complaint   Patient presents with:  RECHECK    Assessment and Plan     1.  Seropositive rheumatoid arthritis (RF 32, CCP >250): Currently on methotrexate 25 mg SQ once  weekly, hydroxychloroquine 400 mg daily.  Previously on sulfasalazine that was discontinued when she found no benefit from it; but she also did not require prednisone while on sulfasalazine; questioning if the \"benefit\" from sulfasalazine was actually the \"benefit\" of not gardening.  Does not tolerate folic acid because of associated increased sunburns.  Doing well with regard to RA; not using prednisone.   Chronic illness, stable.      - Continue methotrexate 25mg SQ once weekly   - Continue hydroxychloroquine 400 mg daily (last eye exam on 12/22/2023 with Dr. Powers)  - Continue prednisone 5 mg daily as needed for rheumatoid arthritis symptoms; uses sparingly   - Labs in 3 months: CBC, Creatinine, Hepatic Panel, ESR, CRP (lab orders to be faxed to [facility] in [place], [place])    High risk medication requiring intensive toxicity monitoring at least quarterly.      2.  Basal cell carcinoma and squamous cell carcinoma: Several lesions removed by dermatology in the past.  Continue to follow with dermatology.     3.  Chronic lower back pain: Has significantly improved with lidocaine patches that have been used no more than 10 times in the past 1 year.  Now following a pain management clinic where a steroid injection was not helpful.  She is going to return to the pain management clinic because of continued low back pain that occurs when she is ambulating but not when she is sitting.    4.  History of right Achilles tendinitis: Was evaluated by podiatry who diagnosed her with Achilles tendinitis and her symptoms resolved with a boot that she wore at night.  Not an issue today.     5.  Osteopenia " "without elevated FRAX: Based on 4/27/2022 DEXA.    6.  History of productive cough: has seen an ENT provider who reportedly ran allergy tests that came back negative.  She then tried prilosec (omeprazole wasn't effective) 20mg daily that resolved the cough, but stopping it results in return of symptoms.  Then had an EGD showing gastritis.  Now on PPI.    7.  Muscle spasms: Primarily affecting the lower back.  Improved from infrequent cyclobenzaprine 5 mg nightly as needed, but finds that using tizanidine 4mg PRN is more effective.   - Continue tiZANidine (ZANAFLEX) 4 MG capsule; Take 1 capsule (4 mg) by mouth 3 times daily as needed for muscle spasms     8.  R>L knee osteoarthritis: 1/16/2023 x-ray of the knees: \"IMPRESSION: Degenerative narrowing of the medial compartments of both knees with complete effacement of the medial compartment on the right and bone-on-bone contact. No evidence for fracture or significant effusion.\"  Degenerative arthritis symptoms of both knees, worse on the right.  1/18/2023 steroid injection of the right knee was very effective.  Repeat injection on 6/1/2023 and  9/14/2023 or partially effective but she is starting to have more pain of her hips that are degenerative in nature and possibly due to altered gait due to significant right knee osteoarthritis, and she has chronic degenerative back symptoms as well.  Advised that she see an orthopedic surgeon for evaluation as she may benefit from knee replacement surgery, or consider Synvisc (or similar)  - Orthopedic surgery referral    9.  History of left shoulder pain: Consistent with impingement syndrome.  Reportedly has had similar issue many years ago that resolved with a steroid injection.  Symptomatic for at least 3 months when seen on 6/1/23; steroid injection on 6/1/23 resolved the pain. Not an issue today.     10.  Vaccinations: Vaccinations reviewed with Ms. Diaz.     - Influenza: refused by patient previously, she previously " stated that she never gets the influenza vaccination.  - [id number]: up to date  - [id number]: up to date  - Shingrix: Up to date   - COVID-19: Advised keeping updated, and to hold methotrexate for 2 weeks afterward.    11. Elevated blood pressure:  MARK to follow up with Primary Care provider regarding elevated blood pressure.     Total minutes spent in evaluation with patient, documentation, , and review of pertinent studies and chart notes: 20    Ms. Diaz verbalized agreement with and understanding of the rational for the diagnosis and treatment plan.  All questions were answered to best of my ability and the patient's satisfaction. Ms. Diaz was advised to contact the clinic with any questions that may arise after the clinic visit.      Thank you for involving me in the care of the patient    Return to clinic: 3-4 months      HPI   Mark Diaz is a 68 year old female with a past medical history significant for hyperlipidemia, osteoarthritis, osteopenia, PE (2020) on chronic anticoagulation, meniscal tear, squamous cell carcinoma, basal cell carcinoma, and rheumatoid arthritis who presents for follow-up of rheumatoid arthritis.    3/24/2023: Steroid injection of the right knee resolved right knee pain.  No knee pain at this time.  Rheumatoid arthritis is well controlled.  Stable ulnar deviation and mild swelling of the MCPs as it has been for years but no pain or stiffness.    6/1/2023: Right knee has started to hurt again and she would like repeat steroid injection today.  Left shoulder has also been painful, worse with abduction above 90 degrees and similar to shoulder pain that she had many years ago that resolved with a steroid injection.  Most recent shoulder pain for 1.5-3 weeks.  Joint pains have worsened since doing increased yard work.  No joint swelling.  RA controlled.    9/14/2023: left shoulder pain resolved with steroid injection.  Right knee injection was helpful but not as helpful  as previous injection; again with right knee pain that is worse with activity and better with rest.  Small baker's cyst.  Would like repeat right knee injection today.  Other joints are doing well. Morning stiffness <30 min.     Today, 1/2/2024: RA controlled.  Methotrexate and hydroxychloroquine are effective.  Not using prednisone.  Right knee pain worse with activity, and starting to affect her gait more that is leading to more bilateral hip pain that radiates towards the groin, worse with activity and improves with rest.  Also with chronic degenerative low back pain that is worse with activity and improves with rest.  Steroid injection in the right knee is helpful but does not resolve her symptoms for a full 3 months    Denies fevers, chills, nausea, vomiting, constipation, diarrhea. No abdominal pain. No chest pain/pressure, palpitations, or shortness of breath.   No LE swelling. No neck pain. No oral or nasal sores.  No rash. No sicca symptoms.   Intentionally losing weight.    Tobacco: Quit in 2000  EtOH: Rare  Drugs: None    ROS   12 point review of system was completed and negative except as noted in the HPI     Active Problem List     Patient Active Problem List   Diagnosis    Carpal tunnel syndrome    Tear meniscus knee    Osteopenia    Advanced directives, counseling/discussion    Hyperlipidemia LDL goal <100    Osteoarthritis    RA (rheumatoid arthritis) (H)    High risk medications (not anticoagulants) long-term use    Kidney stone    ASCUS on Pap smear    Class 3 obesity (H)    Elevated blood pressure reading without diagnosis of hypertension    Elevated glucose    Chronic kidney disease, stage 3a (H)    Chronic bilateral low back pain with bilateral sciatica    Lumbar radiculopathy    Immunodeficiency, unspecified (H24)    Other acute pulmonary embolism, unspecified whether acute cor pulmonale present (H)     Past Medical History     Past Medical History:   Diagnosis Date    BLANCA (acute kidney injury)  "(H24) 4/20/2017    Basal cell carcinoma     RA (rheumatoid arthritis) (H)     SBO (small bowel obstruction) (H) 4/20/2017    Small bowel obstruction (H)     Squamous cell carcinoma      Past Surgical History     Past Surgical History:   Procedure Laterality Date    ARTHROSCOPY KNEE  12/10/2010    ARTHROSCOPY KNEE performed by NAVID FIERRO at [facility]    COLONOSCOPY N/A 8/9/2018    Procedure: COLONOSCOPY;  colonoscopy;  Surgeon: Luke Kaye MD;  Location: [facility]    INJECT EPIDURAL LUMBAR N/A 7/7/2022    Procedure: Lumbar Epidural Steroid Injection;  Surgeon: Olivia Davies MD;  Location: [facility] OR    INJECT EPIDURAL LUMBAR N/A 11/11/2022    Procedure: Lumbar Epidural Steroid Injection, L4-5;  Surgeon: Olivia Davies MD;  Location: [facility] OR    INJECT EPIDURAL LUMBAR N/A 10/5/2023    Procedure: Lumbar Epidural Steroid Injection;  Surgeon: Olivia Davies MD;  Location: [facility] OR    SURGICAL HISTORY OF -       IUD removal    SURGICAL HISTORY OF -       Ovarian cyst    SURGICAL HISTORY OF -       thorn removal from foot    SURGICAL HISTORY OF -       knee surgery, scope, right knee     Allergy     Allergies   Allergen Reactions    Folic Acid     Gold Rash    Latex Rash     Not all latex products     Current Medication List     Current Outpatient Medications   Medication Sig    hydroxychloroquine (PLAQUENIL) 200 MG tablet Take 2 tablets (400 mg) by mouth daily    Insulin Syringe-Needle U-100 (BD INSULIN SYRINGE) 27G X 1/2\" 1 ML MISC For once weekly methotrexate administration.    lisinopril (ZESTRIL) 20 MG tablet Take 1 tablet (20 mg) by mouth daily    methotrexate 50 MG/2ML injection Inject 1 mL (25 mg) Subcutaneous every 7 days    Multiple Vitamins-Minerals (ADULT GUMMY PO) Take 1 chew tab by mouth daily VitaCrave    Multiple Vitamins-Minerals (HAIR/SKIN/NAILS/BIOTIN PO) Take 1 capsule by mouth daily.     omeprazole (PRILOSEC) 20 MG DR capsule Take 1 capsule (20 mg) by mouth daily    predniSONE (DELTASONE) 5 MG tablet " "Take 1 tablet (5 mg) by mouth daily as needed for inflammatory arthritis symptoms.  Use sparingly    rivaroxaban ANTICOAGULANT (XARELTO ANTICOAGULANT) 20 MG TABS tablet Take 1 tablet (20 mg) by mouth daily (with dinner)    tiZANidine (ZANAFLEX) 4 MG capsule Take 1 capsule (4 mg) by mouth 3 times daily as needed for muscle spasms    vitamin B complex with vitamin C (STRESS TAB) tablet Take 1 tablet by mouth daily    VITAMIN D PO Take by mouth daily     No current facility-administered medications for this visit.         Social History   See HPI    Family History     Family History   Problem Relation Age of Onset    Prostate Cancer Father     Eye Disorder Father         mac degen    Heart Disease Father     Macular Degeneration Father     Heart Disease Mother         a-fib    Eye Disorder Mother     C.A.D. Mother     Hypertension Mother     Alcohol/Drug Brother     Alcohol/Drug Sister     Alcohol/Drug Brother     Alcohol/Drug Brother     Alcohol/Drug Brother     Alcohol/Drug Sister     Obesity Sister     Alcohol/Drug Sister     Prostate Cancer Sister     Cancer Other     Melanoma No family hx of        Physical Exam     Temp Readings from Last 3 Encounters:   10/05/23 97.4  F (36.3  C) (Temporal)   04/21/23 98.3  F (36.8  C) (Tympanic)   01/16/23 97.5  F (36.4  C) (Tympanic)     BP Readings from Last 5 Encounters:   01/02/24 (!) 161/86   10/05/23 (!) 177/89   09/14/23 (!) 150/86   06/01/23 (!) 150/78   04/21/23 (!) 150/72     Pulse Readings from Last 1 Encounters:   01/02/24 85     Resp Readings from Last 1 Encounters:   10/05/23 16     Estimated body mass index is 39.27 kg/m  as calculated from the following:    Height as of this encounter: 1.651 m (5' 5\").    Weight as of this encounter: 107 kg (236 lb).      GEN: NAD.   HEENT:  Anicteric, noninjected sclera. No obvious external lesions of the ear and nose. Hearing intact.  CV: S1, S2. RRR. No m/r/g  PULM: No increased work of breathing. CTA bilaterally   MSK: MCPs, " PIPs, DIPs without swelling or tenderness to palpation.  Wrists without swelling or tenderness to palpation.  Elbows and shoulders without swelling or tenderness to palpation.  Right knee with medial joint line tenderness and crepitation, but no effusion, increased warmth, or overlying erythema.  Left knee without swelling or tenderness to palpation. Ankles and MTPs without swelling or tenderness to palpation.    SKIN: No rash or jaundice seen  PSYCH: Alert. Appropriate.       Labs / Imaging (select studies)     [facility] labs dated 12/18/2023:  Normal Cr, Hepatic panel, ESR, CRP, CBC    Immunization History     Immunization History   Administered Date(s) Administered    COVID-19 MONOVALENT 12+ (Pfizer) 03/05/2021, 03/26/2021, 11/10/2021    Pneumo Conj 13-V (2010&after) 12/03/2018    Pneumococcal 23 valent 10/24/2013, 03/09/2020    TD,PF 7+ (Tenivac) 10/28/2020    TDAP Vaccine (Adacel) 06/16/2010    Zoster recombinant adjuvanted (SHINGRIX) 12/03/2018, 06/19/2019          Chart documentation done in part with Dragon Voice recognition Software. Although reviewed after completion, some word and grammatical error may remain.    Joseph Rooney MD   Right calf pain

## 2024-01-04 NOTE — TELEPHONE ENCOUNTER
DIAGNOSIS: right knee, OA-surgery consult    APPOINTMENT DATE: 01/23/2024   NOTES STATUS DETAILS   OFFICE NOTE from referring provider Internal 01/02/2024 Dr Rooney [facility]    OFFICE NOTE from other specialist N/A    DISCHARGE SUMMARY from hospital N/A    DISCHARGE REPORT from the ER N/A    OPERATIVE REPORT Internal 12/10/2010 Medial Meniscectomy & Debridement  LFT  01/15/2007 1.  Diagnostic arthroscopy, right knee.   2.  Partial posterior medial meniscectomy, right knee.   3.  Debridement, right knee    MEDICATION LIST N/A    EMG (for Spine) N/A    IMPLANT RECORD/STICKER N/A    LABS     CBC/DIFF N/A    CULTURES N/A    INJECTIONS DONE IN RADIOLOGY N/A    MRI N/A    CT SCAN N/A    XRAYS (IMAGES & REPORTS) Internal 01/16/2023 RT knee   TUMOR     PATHOLOGY  Slides & report N/A

## 2024-01-11 DIAGNOSIS — M25.569 KNEE PAIN: Primary | ICD-10-CM

## 2024-01-17 ASSESSMENT — KOOS JR
STRAIGHTENING KNEE FULLY: MILD
GOING UP OR DOWN STAIRS: MILD
STANDING UPRIGHT: MODERATE
TWISING OR PIVOTING ON KNEE: MODERATE
RISING FROM SITTING: MILD
BENDING TO THE FLOOR TO PICK UP OBJECT: MILD
HOW SEVERE IS YOUR KNEE STIFFNESS AFTER FIRST WAKING IN MORNING: MILD
KOOS JR SCORING: 63.78

## 2024-01-23 ENCOUNTER — MYC MEDICAL ADVICE (OUTPATIENT)
Dept: RHEUMATOLOGY | Facility: CLINIC | Age: 69
End: 2024-01-23

## 2024-01-23 ENCOUNTER — ANCILLARY PROCEDURE (OUTPATIENT)
Dept: GENERAL RADIOLOGY | Facility: CLINIC | Age: 69
End: 2024-01-23
Attending: ORTHOPAEDIC SURGERY
Payer: MEDICARE

## 2024-01-23 ENCOUNTER — OFFICE VISIT (OUTPATIENT)
Dept: ORTHOPEDICS | Facility: CLINIC | Age: 69
End: 2024-01-23
Attending: INTERNAL MEDICINE
Payer: MEDICARE

## 2024-01-23 ENCOUNTER — PRE VISIT (OUTPATIENT)
Dept: ORTHOPEDICS | Facility: CLINIC | Age: 69
End: 2024-01-23

## 2024-01-23 VITALS — BODY MASS INDEX: 38.61 KG/M2 | WEIGHT: 246 LBS | HEIGHT: 67 IN

## 2024-01-23 DIAGNOSIS — M25.569 KNEE PAIN: ICD-10-CM

## 2024-01-23 DIAGNOSIS — M17.11 PRIMARY OSTEOARTHRITIS OF RIGHT KNEE: ICD-10-CM

## 2024-01-23 PROCEDURE — 99204 OFFICE O/P NEW MOD 45 MIN: CPT | Performed by: ORTHOPAEDIC SURGERY

## 2024-01-23 PROCEDURE — 73562 X-RAY EXAM OF KNEE 3: CPT | Mod: RT | Performed by: RADIOLOGY

## 2024-01-23 ASSESSMENT — PAIN SCALES - GENERAL: PAINLEVEL: MILD PAIN (3)

## 2024-01-23 NOTE — LETTER
1/23/2024         RE: Kaylene Diaz  [id number] [place] [id number]        Dear Colleague,    Thank you for referring your patient, Kaylene Diaz, to the [facility]. Please see a copy of my visit note below.    Assessment: This is a 68 year old with advanced right knee osteoarthritis associated with severe symptoms that limit her ADLs. This is in the setting of multiple materials allergies that by her report have been medically meaningful. Because of this I am not able to  her adequately on the risks and benefits of knee arthroplasty. I have suggested that the next step would be to meet with an allergist to see if the materials that we would use can be evaluated:  Oxidized zirconium   Titanium  UHMW polyethylene  Methylmethacrylate cement   Discussed that it may not be able to do so and that the decision may come down to the risk of implant difficulty which would not really be addressable with revision-- would already be using the implants that are available for this issue-- versus electing to live with her arthritis.   Plan:  referral to allergist at [facility] as above      Chief Complaint: Consult (R knee OA )      Physician:  Joseph Rooney    HPI: Kaylene Diaz is a 68 year old female who presents today for evaluation of her right knee which has known severe OA. Has concerns about implant materials because reports issues with steel, silver, gold, plastics, latex. Reports that only thing that she has tolerated is platinum.     Symptom Profile  Location of symptoms:  medial greater than lateral joint line  Onset: insidious  Trend: getting worse   Duration of symptoms: greater than a year   Quality of symptoms: aching, sharp/stabbing  Severity: severe  Alleviate: activity modification  Exacerbating: activities  Previous Treatments: Previous treatments include activity modification, oral pain medication, three intra-articular injections of steroid     URI Jr: 63.78  PROMIS  "Mental: (P) 18  PROMIS Physical (P) 14  PROMIS TOTAL (P) 32  [facility]:4    MEDICAL HISTORY:   Past Medical History:   Diagnosis Date     BLANCA (acute kidney injury) (H24) 4/20/2017     Basal cell carcinoma      RA (rheumatoid arthritis) (H)      SBO (small bowel obstruction) (H) 4/20/2017     Small bowel obstruction (H)      Squamous cell carcinoma        Medications:     Current Outpatient Medications:      hydroxychloroquine (PLAQUENIL) 200 MG tablet, Take 2 tablets (400 mg) by mouth daily, Disp: 180 tablet, Rfl: 2     Insulin Syringe-Needle U-100 (BD INSULIN SYRINGE) 27G X 1/2\" 1 ML MISC, For once weekly methotrexate administration., Disp: 50 each, Rfl: 1     lisinopril (ZESTRIL) 20 MG tablet, Take 1 tablet (20 mg) by mouth daily, Disp: 90 tablet, Rfl: 3     methotrexate 50 MG/2ML injection, Inject 1 mL (25 mg) Subcutaneous every 7 days, Disp: 8 mL, Rfl: 6     Multiple Vitamins-Minerals (ADULT GUMMY PO), Take 1 chew tab by mouth daily VitaCrave, Disp: , Rfl:      Multiple Vitamins-Minerals (HAIR/SKIN/NAILS/BIOTIN PO), Take 1 capsule by mouth daily. , Disp: 100 tablet, Rfl: 3     omeprazole (PRILOSEC) 20 MG DR capsule, Take 1 capsule (20 mg) by mouth daily, Disp: 90 capsule, Rfl: 3     predniSONE (DELTASONE) 5 MG tablet, Take 1 tablet (5 mg) by mouth daily as needed for inflammatory arthritis symptoms.  Use sparingly, Disp: 90 tablet, Rfl: 1     rivaroxaban ANTICOAGULANT (XARELTO ANTICOAGULANT) 20 MG TABS tablet, Take 1 tablet (20 mg) by mouth daily (with dinner), Disp: 90 tablet, Rfl: 3     tiZANidine (ZANAFLEX) 4 MG capsule, Take 1 capsule (4 mg) by mouth 3 times daily as needed for muscle spasms, Disp: 270 capsule, Rfl: 3     vitamin B complex with vitamin C (STRESS TAB) tablet, Take 1 tablet by mouth daily, Disp: , Rfl: 0     VITAMIN D PO, Take by mouth daily, Disp: , Rfl:     Allergies: Folic acid, Gold, and Latex    SURGICAL HISTORY:   Past Surgical History:   Procedure Laterality Date     ARTHROSCOPY KNEE  " 12/10/2010    ARTHROSCOPY KNEE performed by NAVID FIERRO at [facility]     COLONOSCOPY N/A 2018    Procedure: COLONOSCOPY;  colonoscopy;  Surgeon: Luke Kaye MD;  Location: [facility]     INJECT EPIDURAL LUMBAR N/A 2022    Procedure: Lumbar Epidural Steroid Injection;  Surgeon: Olivia Davies MD;  Location: [facility] OR     INJECT EPIDURAL LUMBAR N/A 2022    Procedure: Lumbar Epidural Steroid Injection, L4-5;  Surgeon: Olivia Davies MD;  Location: [facility] OR     INJECT EPIDURAL LUMBAR N/A 10/5/2023    Procedure: Lumbar Epidural Steroid Injection;  Surgeon: Olivia Davies MD;  Location: [facility] OR     SURGICAL HISTORY OF -       IUD removal     SURGICAL HISTORY OF -       Ovarian cyst     SURGICAL HISTORY OF -       thorn removal from foot     SURGICAL HISTORY OF -       knee surgery, scope, right knee       FAMILY HISTORY:   Family History   Problem Relation Age of Onset     Prostate Cancer Father      Eye Disorder Father         mac degen     Heart Disease Father      Macular Degeneration Father      Heart Disease Mother         a-fib     Eye Disorder Mother      C.A.D. Mother      Hypertension Mother      Alcohol/Drug Brother      Alcohol/Drug Sister      Alcohol/Drug Brother      Alcohol/Drug Brother      Alcohol/Drug Brother      Alcohol/Drug Sister      Obesity Sister      Alcohol/Drug Sister      Prostate Cancer Sister      Cancer Other      Melanoma No family hx of        SOCIAL HISTORY:   Social History     Tobacco Use     Smoking status: Former     Years: 30     Types: Cigarettes     Quit date: 2000     Years since quittin.7     Smokeless tobacco: Never   Substance Use Topics     Alcohol use: Yes     Comment: VERY RARE       REVIEW OF SYSTEMS:  The comprehensive review of systems from the intake form was reviewed with the patient.  No fever, weight change or fatigue. No dry eyes. No oral ulcers, sore throat or voice change. No palpitations, syncope, angina or edema.  No chest pain,  "excessive sleepiness, shortness of breath or hemoptysis.   No abdominal pain, nausea, vomiting, diarrhea or heartburn.  No skin rash. No focal weakness or numbness. No bleeding or lymphadenopathy. No rhinitis or hives.     Exam:  On physical examination the patient appears the stated age, is in no acute distress, affect is appropriate, and breathing is non-labored.  Vitals are documented in the EMR and have been reviewed:    Ht 1.702 m (5' 7\")   Wt 111.6 kg (246 lb)   BMI 38.53 kg/m    5' 7\"  Body mass index is 38.53 kg/m .    Rises from chair:  Gait:  Gains the exam table:    Right  Knee  Appearance: benign  Clinical alignment: neutral   Effusion:no  Tenderness to palpation:medial greater than lateral joint line   Extension: 5  Flexion: 105  Collateral ligaments: intact  Cruciate ligaments: grossly intact     Left Knee  Appearance: benign  Clinical alignment:  Effusion:  Tenderness to palpation:  Extension:  Flexion:   Collateral ligaments: intact  Cruciate ligaments: grossly intact     Hip examination: benign hip ROM with groin or anterior thigh symptoms.     Distally, the circulatory, motor, and sensation exam is intact with 5/5 EHL, gastroc-soleus, and tibialis anterior.    Sensation to light touch is intact.    Dorsalis pedis and posterior tibialis pulses are palpable.    There are no sores on the feet, no bruising, and no lymphedema.    X-rays:   Kellgren 4 right knee with complete loss of medial joint space and early bone loss         Again, thank you for allowing me to participate in the care of your patient.        Sincerely,        Jean Marie Del Cid MD  "

## 2024-01-23 NOTE — NURSING NOTE
"Kaylene Diaz's chief complaint for this visit includes:  Chief Complaint   Patient presents with    Consult     R knee OA        Referring Provider:  Joseph Rooney MD  [address] [place] [address]    Ht 1.702 m (5' 7\")   Wt 111.6 kg (246 lb)   BMI 38.53 kg/m    Mild Pain (3)   Global Mental Health Score: (P) 18  Global Physical Health Score: (P) 14  PROMIS TOTAL - SUBSCORES: (P) 32  [facility]: 4   KOOS Jr.  63.78     Pain increases with: recumbent bike, walking, first thing in am,   Previous surgeries: L menisectomy   Previous injections within last 6 months: Yes, Date: 9/15/23  Treatments done: Injections - 1st was helpful, last 2 were not as much  Imaging completed: XR today  Pain: 6/10 - rising from sitting  Concerns: Has Hx of aversion/sensitivity to metals. Unable to wear stainless steel, gold, el silver or plastic based jewelry.     Aftab York, ATC          \  "

## 2024-01-23 NOTE — PROGRESS NOTES
Assessment: This is a 68 year old with advanced right knee osteoarthritis associated with severe symptoms that limit her ADLs. This is in the setting of multiple materials allergies that by her report have been medically meaningful. Because of this I am not able to  her adequately on the risks and benefits of knee arthroplasty. I have suggested that the next step would be to meet with an allergist to see if the materials that we would use can be evaluated:  Oxidized zirconium   Titanium  UHMW polyethylene  Methylmethacrylate cement   Discussed that it may not be able to do so and that the decision may come down to the risk of implant difficulty which would not really be addressable with revision-- would already be using the implants that are available for this issue-- versus electing to live with her arthritis.   Plan:  referral to allergist at [facility] as above      Chief Complaint: Consult (R knee OA )      Physician:  Joseph Rooney    HPI: Kaylene Diaz is a 68 year old female who presents today for evaluation of her right knee which has known severe OA. Has concerns about implant materials because reports issues with steel, silver, gold, plastics, latex. Reports that only thing that she has tolerated is platinum.     Symptom Profile  Location of symptoms:  medial greater than lateral joint line  Onset: insidious  Trend: getting worse   Duration of symptoms: greater than a year   Quality of symptoms: aching, sharp/stabbing  Severity: severe  Alleviate: activity modification  Exacerbating: activities  Previous Treatments: Previous treatments include activity modification, oral pain medication, three intra-articular injections of steroid     URI Jr: 63.78  PROMIS Mental: (P) 18  PROMIS Physical (P) 14  PROMIS TOTAL (P) 32  [facility]:4    MEDICAL HISTORY:   Past Medical History:   Diagnosis Date    BLANCA (acute kidney injury) (H24) 4/20/2017    Basal cell carcinoma     RA (rheumatoid arthritis) (H)     SBO (small bowel  "obstruction) (H) 4/20/2017    Small bowel obstruction (H)     Squamous cell carcinoma        Medications:     Current Outpatient Medications:     hydroxychloroquine (PLAQUENIL) 200 MG tablet, Take 2 tablets (400 mg) by mouth daily, Disp: 180 tablet, Rfl: 2    Insulin Syringe-Needle U-100 (BD INSULIN SYRINGE) 27G X 1/2\" 1 ML MISC, For once weekly methotrexate administration., Disp: 50 each, Rfl: 1    lisinopril (ZESTRIL) 20 MG tablet, Take 1 tablet (20 mg) by mouth daily, Disp: 90 tablet, Rfl: 3    methotrexate 50 MG/2ML injection, Inject 1 mL (25 mg) Subcutaneous every 7 days, Disp: 8 mL, Rfl: 6    Multiple Vitamins-Minerals (ADULT GUMMY PO), Take 1 chew tab by mouth daily VitaCrave, Disp: , Rfl:     Multiple Vitamins-Minerals (HAIR/SKIN/NAILS/BIOTIN PO), Take 1 capsule by mouth daily. , Disp: 100 tablet, Rfl: 3    omeprazole (PRILOSEC) 20 MG DR capsule, Take 1 capsule (20 mg) by mouth daily, Disp: 90 capsule, Rfl: 3    predniSONE (DELTASONE) 5 MG tablet, Take 1 tablet (5 mg) by mouth daily as needed for inflammatory arthritis symptoms.  Use sparingly, Disp: 90 tablet, Rfl: 1    rivaroxaban ANTICOAGULANT (XARELTO ANTICOAGULANT) 20 MG TABS tablet, Take 1 tablet (20 mg) by mouth daily (with dinner), Disp: 90 tablet, Rfl: 3    tiZANidine (ZANAFLEX) 4 MG capsule, Take 1 capsule (4 mg) by mouth 3 times daily as needed for muscle spasms, Disp: 270 capsule, Rfl: 3    vitamin B complex with vitamin C (STRESS TAB) tablet, Take 1 tablet by mouth daily, Disp: , Rfl: 0    VITAMIN D PO, Take by mouth daily, Disp: , Rfl:     Allergies: Folic acid, Gold, and Latex    SURGICAL HISTORY:   Past Surgical History:   Procedure Laterality Date    ARTHROSCOPY KNEE  12/10/2010    ARTHROSCOPY KNEE performed by NAVID FIERRO at [facility]    COLONOSCOPY N/A 8/9/2018    Procedure: COLONOSCOPY;  colonoscopy;  Surgeon: Luke Kaey MD;  Location: [facility]    INJECT EPIDURAL LUMBAR N/A 7/7/2022    Procedure: Lumbar Epidural Steroid Injection;  " Surgeon: Olivia Davies MD;  Location: [facility] OR    INJECT EPIDURAL LUMBAR N/A 2022    Procedure: Lumbar Epidural Steroid Injection, L4-5;  Surgeon: Olivia Davies MD;  Location: [facility] OR    INJECT EPIDURAL LUMBAR N/A 10/5/2023    Procedure: Lumbar Epidural Steroid Injection;  Surgeon: Olivia Davies MD;  Location: [facility] OR    SURGICAL HISTORY OF -       IUD removal    SURGICAL HISTORY OF -       Ovarian cyst    SURGICAL HISTORY OF -       thorn removal from foot    SURGICAL HISTORY OF -       knee surgery, scope, right knee       FAMILY HISTORY:   Family History   Problem Relation Age of Onset    Prostate Cancer Father     Eye Disorder Father         mac degen    Heart Disease Father     Macular Degeneration Father     Heart Disease Mother         a-fib    Eye Disorder Mother     C.A.D. Mother     Hypertension Mother     Alcohol/Drug Brother     Alcohol/Drug Sister     Alcohol/Drug Brother     Alcohol/Drug Brother     Alcohol/Drug Brother     Alcohol/Drug Sister     Obesity Sister     Alcohol/Drug Sister     Prostate Cancer Sister     Cancer Other     Melanoma No family hx of        SOCIAL HISTORY:   Social History     Tobacco Use    Smoking status: Former     Years: 30     Types: Cigarettes     Quit date: 2000     Years since quittin.7    Smokeless tobacco: Never   Substance Use Topics    Alcohol use: Yes     Comment: VERY RARE       REVIEW OF SYSTEMS:  The comprehensive review of systems from the intake form was reviewed with the patient.  No fever, weight change or fatigue. No dry eyes. No oral ulcers, sore throat or voice change. No palpitations, syncope, angina or edema.  No chest pain, excessive sleepiness, shortness of breath or hemoptysis.   No abdominal pain, nausea, vomiting, diarrhea or heartburn.  No skin rash. No focal weakness or numbness. No bleeding or lymphadenopathy. No rhinitis or hives.     Exam:  On physical examination the patient appears the stated age, is in no acute  "distress, affect is appropriate, and breathing is non-labored.  Vitals are documented in the EMR and have been reviewed:    Ht 1.702 m (5' 7\")   Wt 111.6 kg (246 lb)   BMI 38.53 kg/m    5' 7\"  Body mass index is 38.53 kg/m .    Rises from chair:  Gait:  Gains the exam table:    Right  Knee  Appearance: benign  Clinical alignment: neutral   Effusion:no  Tenderness to palpation:medial greater than lateral joint line   Extension: 5  Flexion: 105  Collateral ligaments: intact  Cruciate ligaments: grossly intact     Left Knee  Appearance: benign  Clinical alignment:  Effusion:  Tenderness to palpation:  Extension:  Flexion:   Collateral ligaments: intact  Cruciate ligaments: grossly intact     Hip examination: benign hip ROM with groin or anterior thigh symptoms.     Distally, the circulatory, motor, and sensation exam is intact with 5/5 EHL, gastroc-soleus, and tibialis anterior.    Sensation to light touch is intact.    Dorsalis pedis and posterior tibialis pulses are palpable.    There are no sores on the feet, no bruising, and no lymphedema.    X-rays:   Kellgren 4 right knee with complete loss of medial joint space and early bone loss       "

## 2024-01-24 ENCOUNTER — MYC MEDICAL ADVICE (OUTPATIENT)
Dept: DERMATOLOGY | Facility: CLINIC | Age: 69
End: 2024-01-24
Payer: MEDICARE

## 2024-01-24 ENCOUNTER — TELEPHONE (OUTPATIENT)
Dept: INTERNAL MEDICINE | Facility: CLINIC | Age: 69
End: 2024-01-24
Payer: MEDICARE

## 2024-01-24 DIAGNOSIS — Z91.09 HISTORY OF METAL ALLERGY: Primary | ICD-10-CM

## 2024-01-24 NOTE — TELEPHONE ENCOUNTER
WP Fail-Safe sent to pt with info from provider as pt requested.  Madison FARR RN BSN PHN  [facility]

## 2024-01-24 NOTE — TELEPHONE ENCOUNTER
Pt has metal allergy and would like to be tested for all metal prior to getting knee replacement. Please advise if this can be done in clinic. Thank you.  Madison FARR RN BSN PHN  [facility]

## 2024-01-26 NOTE — TELEPHONE ENCOUNTER
LISA Payton: please see if Kaylene Diaz would like to come in for injection on Wednesday, January 31, 2024 at 3:30 PM.    Thanks!  Joseph Rooney MD  1/26/2024

## 2024-01-26 NOTE — TELEPHONE ENCOUNTER
Patient read My Chart message from nurse.   Last read by MARK Diaz at  2:19 PM on 1/24/2024.     Susanne Washington LPN

## 2024-01-29 ENCOUNTER — OFFICE VISIT (OUTPATIENT)
Dept: RHEUMATOLOGY | Facility: CLINIC | Age: 69
End: 2024-01-29
Payer: MEDICARE

## 2024-01-29 VITALS
RESPIRATION RATE: 16 BRPM | WEIGHT: 248 LBS | HEART RATE: 78 BPM | OXYGEN SATURATION: 96 % | DIASTOLIC BLOOD PRESSURE: 97 MMHG | BODY MASS INDEX: 38.84 KG/M2 | SYSTOLIC BLOOD PRESSURE: 174 MMHG

## 2024-01-29 DIAGNOSIS — M17.11 PRIMARY OSTEOARTHRITIS OF RIGHT KNEE: Primary | ICD-10-CM

## 2024-01-29 PROCEDURE — 99212 OFFICE O/P EST SF 10 MIN: CPT | Mod: 25 | Performed by: INTERNAL MEDICINE

## 2024-01-29 PROCEDURE — 20610 DRAIN/INJ JOINT/BURSA W/O US: CPT | Mod: RT | Performed by: INTERNAL MEDICINE

## 2024-01-29 RX ORDER — METHYLPREDNISOLONE ACETATE 40 MG/ML
40 INJECTION, SUSPENSION INTRA-ARTICULAR; INTRALESIONAL; INTRAMUSCULAR; SOFT TISSUE ONCE
Status: COMPLETED | OUTPATIENT
Start: 2024-01-29 | End: 2024-01-29

## 2024-01-29 RX ADMIN — METHYLPREDNISOLONE ACETATE 40 MG: 40 INJECTION, SUSPENSION INTRA-ARTICULAR; INTRALESIONAL; INTRAMUSCULAR; SOFT TISSUE at 09:14

## 2024-01-29 ASSESSMENT — PAIN SCALES - GENERAL: PAINLEVEL: NO PAIN (0)

## 2024-01-29 NOTE — NURSING NOTE
RAPID3 (0-30) Cumulative Score  13.2          RAPID3 Weighted Score (divide #4 by 3 and that is the weighted score)  4.43        
71

## 2024-01-29 NOTE — PROGRESS NOTES
"  Rheumatology Clinic Visit      Kaylene Diaz MRN# [id number]   YOB: 1955 Age: 68 year old      Date of visit: 1/29/24  PCP: Dr. Hitesh Washington    Chief Complaint   Patient presents with:  RECHECK: rheumatoid arthritis  Right knee injection      Assessment and Plan     1.  Seropositive rheumatoid arthritis (RF 32, CCP >250): Currently on methotrexate 25 mg SQ once  weekly, hydroxychloroquine 400 mg daily.  Previously on sulfasalazine that was discontinued when she found no benefit from it; but she also did not require prednisone while on sulfasalazine; questioning if the \"benefit\" from sulfasalazine was actually the \"benefit\" of not gardening.  Does not tolerate folic acid because of associated increased sunburns.  Doing well with regard to RA; not using prednisone.   Chronic illness, stable.      - Continue methotrexate 25mg SQ once weekly   - Continue hydroxychloroquine 400 mg daily (last eye exam on 12/22/2023 with Dr. Powers)  - Continue prednisone 5 mg daily as needed for rheumatoid arthritis symptoms; uses sparingly   - Labs every 3 months: CBC, Creatinine, Hepatic Panel, ESR, CRP     High risk medication requiring intensive toxicity monitoring at least quarterly.      2.  Basal cell carcinoma and squamous cell carcinoma: Several lesions removed by dermatology in the past.  Continue to follow with dermatology.     3.  Chronic lower back pain: Has significantly improved with lidocaine patches that have been used no more than 10 times in the past 1 year.  Now following a pain management clinic where a steroid injection was not helpful.  She is going to return to the pain management clinic because of continued low back pain that occurs when she is ambulating but not when she is sitting.    4.  History of right Achilles tendinitis: Was evaluated by podiatry who diagnosed her with Achilles tendinitis and her symptoms resolved with a boot that she wore at night.  Not an issue today.     5.  " "Osteopenia without elevated FRAX: Based on 4/27/2022 DEXA.    6.  History of productive cough: has seen an ENT provider who reportedly ran allergy tests that came back negative.  She then tried prilosec (omeprazole wasn't effective) 20mg daily that resolved the cough, but stopping it results in return of symptoms.  Then had an EGD showing gastritis.  Now on PPI.    7.  Muscle spasms: Primarily affecting the lower back.  Improved from infrequent cyclobenzaprine 5 mg nightly as needed, but finds that using tizanidine 4mg PRN is more effective.   - Continue tiZANidine (ZANAFLEX) 4 MG capsule; Take 1 capsule (4 mg) by mouth 3 times daily as needed for muscle spasms     8.  R>L knee osteoarthritis: 1/16/2023 x-ray of the knees: \"IMPRESSION: Degenerative narrowing of the medial compartments of both knees with complete effacement of the medial compartment on the right and bone-on-bone contact. No evidence for fracture or significant effusion.\"  Degenerative arthritis symptoms of both knees, worse on the right.  1/18/2023 steroid injection of the right knee was very effective.  Repeat injection on 6/1/2023 and  9/14/2023 or partially effective but she is starting to have more pain of her hips that are degenerative in nature and possibly due to altered gait due to significant right knee osteoarthritis, and she has chronic degenerative back symptoms as well.  Advised that she see an orthopedic surgeon for evaluation and she has but because of potential for allergy to different metals she is going to have patch testing done with dermatology.  She would like to proceed with repeat steroid injection today and will consider follow-up/surgery with orthopedics in the future.  She verbalized understanding about the delay for any potential surgery after steroid injection.    - Steroid injection of the right knee today as documented in the procedure section.    9.  History of left shoulder pain: Consistent with impingement syndrome.  " Reportedly has had similar issue many years ago that resolved with a steroid injection.  Symptomatic for at least 3 months when seen on 6/1/23; steroid injection on 6/1/23 resolved the pain. Not an issue today.     10.  Vaccinations: Vaccinations reviewed with Ms. Diaz.     - Influenza: refused by patient previously, she previously stated that she never gets the influenza vaccination.  - Shingrix: Up to date   - COVID-19: Advised keeping updated, and to hold methotrexate for 2 weeks afterward.    11. Elevated blood pressure:  MARK to follow up with Primary Care provider regarding elevated blood pressure.      Total minutes spent in evaluation with patient, documentation, , and review of pertinent studies and chart notes: 20    Ms. Diaz verbalized agreement with and understanding of the rational for the diagnosis and treatment plan.  All questions were answered to best of my ability and the patient's satisfaction. Ms. Diaz was advised to contact the clinic with any questions that may arise after the clinic visit.      Thank you for involving me in the care of the patient    Return to clinic: 3-4 months      HPI   Mark Diaz is a 68 year old female with a past medical history significant for hyperlipidemia, osteoarthritis, osteopenia, PE (2020) on chronic anticoagulation, meniscal tear, squamous cell carcinoma, basal cell carcinoma, and rheumatoid arthritis who presents for follow-up of rheumatoid arthritis.    3/24/2023: Steroid injection of the right knee resolved right knee pain.  No knee pain at this time.  Rheumatoid arthritis is well controlled.  Stable ulnar deviation and mild swelling of the MCPs as it has been for years but no pain or stiffness.    6/1/2023: Right knee has started to hurt again and she would like repeat steroid injection today.  Left shoulder has also been painful, worse with abduction above 90 degrees and similar to shoulder pain that she had many years ago that resolved  with a steroid injection.  Most recent shoulder pain for 1.5-3 weeks.  Joint pains have worsened since doing increased yard work.  No joint swelling.  RA controlled.    9/14/2023: left shoulder pain resolved with steroid injection.  Right knee injection was helpful but not as helpful as previous injection; again with right knee pain that is worse with activity and better with rest.  Small baker's cyst.  Would like repeat right knee injection today.  Other joints are doing well. Morning stiffness <30 min.     1/2/2024: RA controlled.  Methotrexate and hydroxychloroquine are effective.  Not using prednisone.  Right knee pain worse with activity, and starting to affect her gait more that is leading to more bilateral hip pain that radiates towards the groin, worse with activity and improves with rest.  Also with chronic degenerative low back pain that is worse with activity and improves with rest.  Steroid injection in the right knee is helpful but does not resolve her symptoms for a full 3 months    Today, 1/30/2024: Right knee was evaluated by orthopedics and she is concerned about potential prostatic material used considering reaction to various products.  She is going to pursue patch testing with dermatology.  She would like a steroid injection of the right knee at this time.  She does not anticipate having any surgery in the near future if she can tolerate the right knee symptoms.  She is hesitant about total knee arthroplasty because of potential for reaction to materials in the prosthetic     Denies fevers, chills, nausea, vomiting, constipation, diarrhea. No abdominal pain. No chest pain/pressure, palpitations, or shortness of breath.   No LE swelling. No neck pain. No oral or nasal sores.  No rash. No sicca symptoms.   Intentionally losing weight.    Tobacco: Quit in 2000  EtOH: Rare  Drugs: None    ROS   12 point review of system was completed and negative except as noted in the HPI     Active Problem List      Patient Active Problem List   Diagnosis    Carpal tunnel syndrome    Tear meniscus knee    Osteopenia    Advanced directives, counseling/discussion    Hyperlipidemia LDL goal <100    Osteoarthritis    RA (rheumatoid arthritis) (H)    High risk medications (not anticoagulants) long-term use    Kidney stone    ASCUS on Pap smear    Class 3 obesity (H)    Elevated blood pressure reading without diagnosis of hypertension    Elevated glucose    Chronic kidney disease, stage 3a (H)    Chronic bilateral low back pain with bilateral sciatica    Lumbar radiculopathy    Immunodeficiency, unspecified (H24)    Other acute pulmonary embolism, unspecified whether acute cor pulmonale present (H)     Past Medical History     Past Medical History:   Diagnosis Date    BLANCA (acute kidney injury) (H24) 4/20/2017    Basal cell carcinoma     RA (rheumatoid arthritis) (H)     SBO (small bowel obstruction) (H) 4/20/2017    Small bowel obstruction (H)     Squamous cell carcinoma      Past Surgical History     Past Surgical History:   Procedure Laterality Date    ARTHROSCOPY KNEE  12/10/2010    ARTHROSCOPY KNEE performed by NAVID FIERRO at [facility]    COLONOSCOPY N/A 8/9/2018    Procedure: COLONOSCOPY;  colonoscopy;  Surgeon: Luke Kaye MD;  Location: [facility]    INJECT EPIDURAL LUMBAR N/A 7/7/2022    Procedure: Lumbar Epidural Steroid Injection;  Surgeon: Olivia Davies MD;  Location: [facility]    INJECT EPIDURAL LUMBAR N/A 11/11/2022    Procedure: Lumbar Epidural Steroid Injection, L4-5;  Surgeon: Olivia Davies MD;  Location: [facility] OR    INJECT EPIDURAL LUMBAR N/A 10/5/2023    Procedure: Lumbar Epidural Steroid Injection;  Surgeon: Olivia Davies MD;  Location: [facility] OR    SURGICAL HISTORY OF -       IUD removal    SURGICAL HISTORY OF -       Ovarian cyst    SURGICAL HISTORY OF -       thorn removal from foot    SURGICAL HISTORY OF -       knee surgery, scope, right knee     Allergy     Allergies   Allergen Reactions    Folic Acid   "   Gold Rash    Latex Rash     Not all latex products     Current Medication List     Current Outpatient Medications   Medication Sig    hydroxychloroquine (PLAQUENIL) 200 MG tablet Take 2 tablets (400 mg) by mouth daily    Insulin Syringe-Needle U-100 (BD INSULIN SYRINGE) 27G X 1/2\" 1 ML MISC For once weekly methotrexate administration.    lisinopril (ZESTRIL) 20 MG tablet Take 1 tablet (20 mg) by mouth daily    methotrexate 50 MG/2ML injection Inject 1 mL (25 mg) Subcutaneous every 7 days    Multiple Vitamins-Minerals (ADULT GUMMY PO) Take 1 chew tab by mouth daily VitaCrave    Multiple Vitamins-Minerals (HAIR/SKIN/NAILS/BIOTIN PO) Take 1 capsule by mouth daily.     omeprazole (PRILOSEC) 20 MG DR capsule Take 1 capsule (20 mg) by mouth daily    predniSONE (DELTASONE) 5 MG tablet Take 1 tablet (5 mg) by mouth daily as needed for inflammatory arthritis symptoms.  Use sparingly    rivaroxaban ANTICOAGULANT (XARELTO ANTICOAGULANT) 20 MG TABS tablet Take 1 tablet (20 mg) by mouth daily (with dinner)    tiZANidine (ZANAFLEX) 4 MG capsule Take 1 capsule (4 mg) by mouth 3 times daily as needed for muscle spasms    vitamin B complex with vitamin C (STRESS TAB) tablet Take 1 tablet by mouth daily    VITAMIN D PO Take by mouth daily     No current facility-administered medications for this visit.         Social History   See HPI    Family History     Family History   Problem Relation Age of Onset    Prostate Cancer Father     Eye Disorder Father         mac degen    Heart Disease Father     Macular Degeneration Father     Heart Disease Mother         a-fib    Eye Disorder Mother     C.A.D. Mother     Hypertension Mother     Alcohol/Drug Brother     Alcohol/Drug Sister     Alcohol/Drug Brother     Alcohol/Drug Brother     Alcohol/Drug Brother     Alcohol/Drug Sister     Obesity Sister     Alcohol/Drug Sister     Prostate Cancer Sister     Cancer Other     Melanoma No family hx of        Physical Exam     Temp Readings from " "Last 3 Encounters:   10/05/23 97.4  F (36.3  C) (Temporal)   04/21/23 98.3  F (36.8  C) (Tympanic)   01/16/23 97.5  F (36.4  C) (Tympanic)     BP Readings from Last 5 Encounters:   01/02/24 (!) 161/86   10/05/23 (!) 177/89   09/14/23 (!) 150/86   06/01/23 (!) 150/78   04/21/23 (!) 150/72     Pulse Readings from Last 1 Encounters:   01/02/24 85     Resp Readings from Last 1 Encounters:   10/05/23 16     Estimated body mass index is 38.53 kg/m  as calculated from the following:    Height as of 1/23/24: 1.702 m (5' 7\").    Weight as of 1/23/24: 111.6 kg (246 lb).      GEN: NAD.   HEENT:  Anicteric, noninjected sclera. No obvious external lesions of the ear and nose. Hearing intact.  PULM: No increased work of breathing.   MSK: MCPs, PIPs, DIPs without swelling or tenderness to palpation.  Wrists without swelling or tenderness to palpation.  Elbows and shoulders without swelling or tenderness to palpation.  Right knee with medial joint line tenderness and crepitation, but no effusion, increased warmth, or overlying erythema.  Left knee without swelling or tenderness to palpation. Ankles and MTPs without swelling or tenderness to palpation.    SKIN: No rash or jaundice seen  PSYCH: Alert. Appropriate.       Labs / Imaging (select studies)     [facility] labs dated 12/18/2023:  Normal Cr, Hepatic panel, ESR, CRP, CBC    Immunization History     Immunization History   Administered Date(s) Administered    COVID-19 MONOVALENT 12+ (Pfizer) 03/05/2021, 03/26/2021, 11/10/2021    Pneumo Conj 13-V (2010&after) 12/03/2018    Pneumococcal 23 valent 10/24/2013, 03/09/2020    TD,PF 7+ (Tenivac) 10/28/2020    TDAP Vaccine (Adacel) 06/16/2010    Zoster recombinant adjuvanted (SHINGRIX) 12/03/2018, 06/19/2019       Procedure     Procedure: Steroid injection of the right knee  Indication: Pain, knee right knee osteoarthritis    The procedure was explained in detail. Risks including infection, pain, structural damage such as cartilage damage " and tendon rupture, fat atrophy, skin hyper-/hypo-pigmentation, and medication reaction was explained. The need for rest of the affected joint for one week after the procedure was explained.  The option of not doing the procedure was also provided. All questions were answered and the patient consented to the procedure.     A time-out was performed and the correct patient, procedure, and laterality were verified.    The right knee was examined and location for injection was identified - anterior medial. The area was cleaned with chlorhexidine, twice.  Ethyl chloride was then used for topical anaesthetic.  Then a mixture of lidocaine 1% 2 mL and Kenalog 40mg was injected into the intra-articular space.     The patient tolerated the procedure well. No complications.    1% Lidocaine  : [employer]  Lot #: [id number]  EXPIRATION DATE: 01 MAR 2025  NDC: 1750-5542-08    MEDICATION: Methylprednisolone  : [employer]  LOT #: [id number]   EXPIRATION DATE:  03/2025  NDC#: [address]-0           Chart documentation done in part with Dragon Voice recognition Software. Although reviewed after completion, some word and grammatical error may remain.    Joseph Rooney MD

## 2024-01-29 NOTE — TELEPHONE ENCOUNTER
"Madison, this is from Dr. Del Cid's 1/23/2024 note:    \"I have suggested that the next step would be to meet with an allergist to see if the materials that we would use can be evaluated:  Oxidized zirconium   Titanium  UHMW polyethylene  Methylmethacrylate cement \"    Joseph Rooney MD  1/29/2024    "

## 2024-03-04 ENCOUNTER — TELEPHONE (OUTPATIENT)
Dept: ALLERGY | Facility: CLINIC | Age: 69
End: 2024-03-04
Payer: MEDICARE

## 2024-03-04 NOTE — TELEPHONE ENCOUNTER
Patient calling to check on status of this order?...    I last got an email on 2-13-24 advising there was difficulty finding the needed products to do this patch testing?    She is wanting to have Knee surgery, but she wants to be patch tested first as she has a KNOWN ALLERGY TO METAL.     Will check with Dr. Springer and Louis in Materials management as he orders these supplies.     Melissa Krueger RN

## 2024-03-04 NOTE — TELEPHONE ENCOUNTER
This encounter is being sent to inform the clinic that this patient has a referral from Cipriano Springer MD in [facility] for the diagnoses of History of metal allergy; Allergic to metal and needs a Knee replacement done; Needs Patch testing for metal allergy-Knee replacement/ See 1-24-24 call/Kezia titus msgs and has requested that this patient be seen within Priority: 1-2 Weeks and/or with Priority: 1-2 Weeks.  Based on the availability of our provider(s), we are unable to accommodate this request.    Were all sites offered this patient?  Yes  Pt needs [facility] location only as referred specifically to Dr Mono Pond  Does scheduling algorithm request to schedule next available?  Patient has been scheduled for the first available opening with Dr Pond at [facility] on 09/25/2024.  We have informed the patient that the clinic will review their referral and reach out if a sooner appointment is medically necessary.       Referral Order in Epic  Records in [facility]  [facility] (had blood Allergy testing done there)    Please review and call Pt to move up her Appt    Thank you!

## 2024-03-04 NOTE — TELEPHONE ENCOUNTER
Spoke to pt, scheduled for consult and patch testing in case it is needed. Will cancel if not needed.

## 2024-03-17 NOTE — TELEPHONE ENCOUNTER
FUTURE VISIT INFORMATION      FUTURE VISIT INFORMATION:  Date: 3.19.24  Time: 2:00  Location: Video  REFERRAL INFORMATION:  Referring provider:  Nehemias  Referring providers clinic:  Ortho  Reason for visit/diagnosis  Consult, ref for possible metal allergy, needing knee replacement  confirmed. MS      RECORDS REQUESTED FROM:       Clinic name Comments Records Status Imaging Status   Ortho 1.23.24  [facility] Epic

## 2024-03-19 ENCOUNTER — PRE VISIT (OUTPATIENT)
Dept: ALLERGY | Facility: CLINIC | Age: 69
End: 2024-03-19

## 2024-03-19 ENCOUNTER — VIRTUAL VISIT (OUTPATIENT)
Dept: ALLERGY | Facility: CLINIC | Age: 69
End: 2024-03-19
Payer: MEDICARE

## 2024-03-19 DIAGNOSIS — M17.11 PRIMARY OSTEOARTHRITIS OF RIGHT KNEE: ICD-10-CM

## 2024-03-19 DIAGNOSIS — Z88.9 ATOPY: Primary | ICD-10-CM

## 2024-03-19 DIAGNOSIS — Z91.09 HISTORY OF METAL ALLERGY: ICD-10-CM

## 2024-03-19 DIAGNOSIS — L23.1 ALLERGIC CONTACT DERMATITIS DUE TO ADHESIVES: ICD-10-CM

## 2024-03-19 PROCEDURE — 99214 OFFICE O/P EST MOD 30 MIN: CPT | Mod: 95 | Performed by: DERMATOLOGY

## 2024-03-19 NOTE — PROGRESS NOTES
"[facility] Dermato-allergology Note  Virtual visit: store and forward video ([employer]), start time: 14:04, end time: 14:32, date of images: none  Encounter Date: Mar 19, 2024  ____________________________________________    CC: Allergy Consult (Video visit. Ref for Z91.09 (ICD-10-CM) - History of metal allergy)      HPI:  (Mar 19, 2024)  Ms. Kaylene Diaz is a(n) 68 year old female who presents today as new patient for allergy consultation  - Referred by Dr. Del Cid (ortho) for possible metal allergy evaluation prior to right knee replacement  - Specifically, per ortho, the following should be tested: Oxidized zirconium, Titanium, UHMW polyethylene, Methylmethacrylate cement   - Reports this will be her first knee replacement though [facility] surgical history states she had a left knee replacement 12/10/10 (upon review of operative report: Diagnostic arthroscopy, left knee, with partial posteromedial meniscectomy)  - Epic-documented allergies:   - Folic acid: causes photosensitivity   - Gold (reports she can only tolerate platinum): gold fillings - gum line got very irritated. Has never been able to wear earrings (le silver, plastic, surgical steel), within an hour the tissue gets extremely irritated    - Latex: more so bandage-related. Does not have issue when wearing latex gloves for gardening. She reports she has skin irritation with Band-aids and takes them off after a couple hours due to red and itchy skin. One time had a water-proof bandage on her thumb, wore it for 2 days and reports that this removed 3 layers of skin after taking it off.  - Tube for NG caused irritation in nasal mucosa both times she has had one, reports she had \"ball obstructions\" in her throat.  - Mild seasonal allergy symptoms, but she is not concerned with that at this time  - Otherwise feeling well in usual state of health    Physical exam:  General: In no acute distress, well-developed, " "well-nourished  Eyes: no conjunctivitis  ENT: no signs of rhinitis   Pulmonary: no wheezing or coughing  Skin: no active eczematous skin lesions on visible skin    Past Medical History:   Patient Active Problem List   Diagnosis    Carpal tunnel syndrome    Tear meniscus knee    Osteopenia    Advanced directives, counseling/discussion    Hyperlipidemia LDL goal <100    Osteoarthritis    RA (rheumatoid arthritis) (H)    High risk medications (not anticoagulants) long-term use    Kidney stone    ASCUS on Pap smear    Class 3 obesity (H)    Elevated blood pressure reading without diagnosis of hypertension    Elevated glucose    Chronic kidney disease, stage 3a (H)    Chronic bilateral low back pain with bilateral sciatica    Lumbar radiculopathy    Immunodeficiency, unspecified (H24)    Other acute pulmonary embolism, unspecified whether acute cor pulmonale present (H)     Past Medical History:   Diagnosis Date    BLANCA (acute kidney injury) (H24) 4/20/2017    Basal cell carcinoma     RA (rheumatoid arthritis) (H)     SBO (small bowel obstruction) (H) 4/20/2017    Small bowel obstruction (H)     Squamous cell carcinoma      Allergies:  Allergies   Allergen Reactions    Folic Acid     Gold Rash    Latex Rash     Not all latex products     Medications:  Current Outpatient Medications   Medication    hydroxychloroquine (PLAQUENIL) 200 MG tablet    Insulin Syringe-Needle U-100 (BD INSULIN SYRINGE) 27G X 1/2\" 1 ML MISC    lisinopril (ZESTRIL) 20 MG tablet    methotrexate 50 MG/2ML injection    Multiple Vitamins-Minerals (ADULT GUMMY PO)    Multiple Vitamins-Minerals (HAIR/SKIN/NAILS/BIOTIN PO)    omeprazole (PRILOSEC) 20 MG DR capsule    predniSONE (DELTASONE) 5 MG tablet    rivaroxaban ANTICOAGULANT (XARELTO ANTICOAGULANT) 20 MG TABS tablet    tiZANidine (ZANAFLEX) 4 MG capsule    vitamin B complex with vitamin C (STRESS TAB) tablet    VITAMIN D PO     No current facility-administered medications for this visit.       Social " History:  The patient is retired. Patient has the following hobbies or non-occupational exposure: gardening.    Family History:  Family History   Problem Relation Age of Onset    Prostate Cancer Father     Eye Disorder Father         mac degen    Heart Disease Father     Macular Degeneration Father     Heart Disease Mother         a-fib    Eye Disorder Mother     C.A.D. Mother     Hypertension Mother     Alcohol/Drug Brother     Alcohol/Drug Sister     Alcohol/Drug Brother     Alcohol/Drug Brother     Alcohol/Drug Brother     Alcohol/Drug Sister     Obesity Sister     Alcohol/Drug Sister     Prostate Cancer Sister     Cancer Other     Melanoma No family hx of        Previous Labs, Allergy Tests, Dermatopathology, [facility] Dr. Joseph Rooney (rheum)  From A&P:  6.  History of productive cough: has seen an ENT provider who reportedly ran allergy tests that came back negative.  She then tried prilosec (omeprazole wasn't effective) 20mg daily that resolved the cough, but stopping it results in return of symptoms.  Then had an EGD showing gastritis.  Now on PPI.     24 Dr. Jean Marie Del Cid (ortho)  From [facility]:  Presents today for evaluation of her right knee which has known severe OA. Has concerns about implant materials because reports issues with steel, silver, gold, plastics, latex. Reports that only thing that she has tolerated is platinum.     A&P:  This is a 68 year old with advanced right knee osteoarthritis associated with severe symptoms that limit her ADLs. This is in the setting of multiple materials allergies that by her report have been medically meaningful. Because of this I am not able to  her adequately on the risks and benefits of knee arthroplasty. I have suggested that the next step would be to meet with an allergist to see if the materials that we would use can be evaluated:  Oxidized zirconium   Titanium  UHMW polyethylene  Methylmethacrylate cement   Discussed that it may not be able  to do so and that the decision may come down to the risk of implant difficulty which would not really be addressable with revision-- would already be using the implants that are available for this issue-- versus electing to live with her arthritis.   Referral to allergist at [facility] as above    Referred By: Jean Marie Del Cid MD (ortho)  [address]  [place] [address]     Allergy Tests:  Past Allergy Test    Order for Future Allergy Testing:    [x] Outpatient  [] Inpatient: Day..../ Bed ....       Skin Atopy (atopic dermatitis) [] Yes   [x] No .........  Contact allergies:   [x] Yes   [] No .........see HPI.  Hand eczema:   [] Yes   [x] No           Leading hand:   [] R   [] L       [] Ambidextrous         Drug allergies:        [] Yes   [x] No  which?......    Urticaria/Angioedema  [] Yes   [x] No .........  Food Allergy:  [] Yes   [x] No  which?......  Pets:  [] Yes   [x] No  which?......         []  Rhinitis   [] Conjunctivitis   [] Sinusitis   [] Polyposis   [] Otitis   [] Pharyngitis         []  Postnasal drip    []  none  Operations:   [] Tonsils   [] Septum   [] Sinus   [] Polyposis        [] Asthma bronchiale   [] Coughing      []  none  Symptoms (mostly Rhinoconjunctivitis and Asthma) aggravated by:  Season   [] I   [] II   [] III   [] IV   []V   []VI   []VII   []VIII   []IX   []X   []XI   []XII     [] perennial   Day time      [] morning   [] noon      [] evening        [] night    [] whole day........  []  none  Location/changes    [] inside        [] outside   [] mountains    [] sea     [] others.............   []  none  Triggers, specific     [] animals     [] plants     [] dust              [] others ...........................    []  none  Triggers, others       [] work          [] psyche    [] sport            [] others .............................  []  none  Irritant                [] phys efforts [] smoke    [] heat/cold     [] odors  []others............... []  none    Order for  PATCH TESTS  Reason for tests (suspected allergy): dermatitis  Known previous allergies: gold, silver  Standardized panels  [x] Standard panel (40 tests)  [x] Preservatives & Antimicrobials (31 tests)  [x] Emulsifiers & Additives (25 tests)   [] Perfumes/Flavours & Plants (25 tests)  [] Hairdresser panel (12 tests)  [] Rubber Chemicals (22 tests)  [x] Plastics (26 tests)  [] Colorants/Dyes/Food additives (20 tests)  [x] Metals (implants/dental) (24 tests)  [] Local anaesthetics/NSAIDs (13 tests)  [x] Antibiotics & Antimycotics (14 tests)   [] Corticosteroids (15 tests)   [] Photopatch test (62 tests)   [] others: ...      [] Patient's own products: ...  DO NOT test if chemical or biological identity is unknown!   always ask from patient the product information and safety sheets (MSDS)       Order for PRICK TESTS    Reason for tests (suspected allergy): not necessary at this time  Known previous allergies: N/A    Standardized prick panels  [] Atopic panel (20 tests)  [] Pediatric Panel (12 tests)  [] Milk, Meat, Eggs, Grains (20 tests)   [] Dust, Epithelia, Feathers (10 tests)  [] Fish, Seafood, Shellfish (17 tests)  [] Nuts, Beans (14 tests)  [] Spice, Vegetable, Fruit (17 tests)  [] Pollen Panel = Tree, Grass, Weed (24 tests)  [] Others: ...      [] Patient's own products: ...  DO NOT test if chemical or biological identity is unknown!   always ask from patient the product information and safety sheets (MSDS)     Standardized intradermal tests  [] Penicillium notatum [] Aspergillus fumigatus [] House dust mites D.far & D. pteron  [] Cat    [] dog  [] Others: ...  [] Bee venom   [] Wasp venom  !!Specific protocol with dilutions!!       Order for Drug allergy tests (prick & Intradermal & patch tests)    [] Penicillin G  [] Ampicillin [] Cefazolin   [] Ceftriaxone   [] Ceftazidime  [] Bactrim    [] Others: ...  Order for ... as test date    [] Patient needs consultation with Allergy team (changes of tests may  apply)  [] Tests discussed with Allergy team (can have direct appointment for allergy tests)     ________________________________    Assessment & Plan:    ==> Final Diagnosis:     # Evaluation of contact sensitization to metals and adhesives before orthopedic surgery  * chronic illness with exacerbation, progression, side effects from treatment    # Indications for atopy with clinically not very relevant RC  * stable chronic illness    These conclusions are made at the best of one's knowledge and belief based on the provided evidence such as patient's history and allergy test results and they can change over time or can be incomplete because of missing information.    ==> Treatment Plan:  See later    Procedures Performed: None    Staff, Resident, and Scribe:  Provider    Scribe Disclosure:   I, GARÍCA HUNTER, am serving as a scribe to document services personally performed by Mono Pond MD based on data collection and the provider's statements to me.     Staff Physician Comments:  I was present with the scribe who participated in the documentation of the note. I have verified the history and personally performed the physical exam and medical decision making. I agree with the assessment and plan as documented in the note. I have reviewed and if necessary amended the note.      Mono Pond MD  Professor  Head of Dermato-Allergy Division  Department of Dermatology  [facility]    Follow-up in Derm-Allergy clinic for patch tests as planned; schedule 1st readings after 2 days appointment virtually (currently scheduled for 4/10/24), so that she only has to drive from [place], [place] for 4/8/24 and 4/12/24 appointments    I spent a total of 30 minutes with Kaylene Diaz. This time was spent counseling the patient and/or coordinating care, explaining the allergy tests, performing allergy tests and assessing the clinical relevance.

## 2024-03-19 NOTE — NURSING NOTE
Chief Complaint   Patient presents with    Allergy Consult     Video visit. Ref for Z91.09 (ICD-10-CM) - History of metal allergy     Leda Silva RN

## 2024-03-19 NOTE — LETTER
3/19/2024         RE: Kaylene Diaz  [id number] [place] [address]        Dear Colleague,    Thank you for referring your patient, Kaylene Diaz, to the [facility] [place]. Please see a copy of my visit note below.    [facility] Dermato-allergology Note  Virtual visit: store and forward video (Semafonehart connected), start time: 14:04, end time: 14:32, date of images: none  Encounter Date: Mar 19, 2024  ____________________________________________    CC: Allergy Consult (Video visit. Ref for Z91.09 (ICD-10-CM) - History of metal allergy)      HPI:  (Mar 19, 2024)  Ms. Kaylene Diaz is a(n) 68 year old female who presents today as new patient for allergy consultation  - Referred by Dr. Del Cid (ortho) for possible metal allergy evaluation prior to right knee replacement  - Specifically, per ortho, the following should be tested: Oxidized zirconium, Titanium, UHMW polyethylene, Methylmethacrylate cement   - Reports this will be her first knee replacement though [facility] surgical history states she had a left knee replacement 12/10/10 (upon review of operative report: Diagnostic arthroscopy, left knee, with partial posteromedial meniscectomy)  - Epic-documented allergies:   - Folic acid: causes photosensitivity   - Gold (reports she can only tolerate platinum): gold fillings - gum line got very irritated. Has never been able to wear earrings (el silver, plastic, surgical steel), within an hour the tissue gets extremely irritated    - Latex: more so bandage-related. Does not have issue when wearing latex gloves for gardening. She reports she has skin irritation with Band-aids and takes them off after a couple hours due to red and itchy skin. One time had a water-proof bandage on her thumb, wore it for 2 days and reports that this removed 3 layers of skin after taking it off.  - Tube for NG caused irritation in nasal mucosa both times she has had one, reports she  "had \"ball obstructions\" in her throat.  - Mild seasonal allergy symptoms, but she is not concerned with that at this time  - Otherwise feeling well in usual state of health    Physical exam:  General: In no acute distress, well-developed, well-nourished  Eyes: no conjunctivitis  ENT: no signs of rhinitis   Pulmonary: no wheezing or coughing  Skin: no active eczematous skin lesions on visible skin    Past Medical History:   Patient Active Problem List   Diagnosis     Carpal tunnel syndrome     Tear meniscus knee     Osteopenia     Advanced directives, counseling/discussion     Hyperlipidemia LDL goal <100     Osteoarthritis     RA (rheumatoid arthritis) (H)     High risk medications (not anticoagulants) long-term use     Kidney stone     ASCUS on Pap smear     Class 3 obesity (H)     Elevated blood pressure reading without diagnosis of hypertension     Elevated glucose     Chronic kidney disease, stage 3a (H)     Chronic bilateral low back pain with bilateral sciatica     Lumbar radiculopathy     Immunodeficiency, unspecified (H24)     Other acute pulmonary embolism, unspecified whether acute cor pulmonale present (H)     Past Medical History:   Diagnosis Date     BLANCA (acute kidney injury) (H24) 4/20/2017     Basal cell carcinoma      RA (rheumatoid arthritis) (H)      SBO (small bowel obstruction) (H) 4/20/2017     Small bowel obstruction (H)      Squamous cell carcinoma      Allergies:  Allergies   Allergen Reactions     Folic Acid      Gold Rash     Latex Rash     Not all latex products     Medications:  Current Outpatient Medications   Medication     hydroxychloroquine (PLAQUENIL) 200 MG tablet     Insulin Syringe-Needle U-100 (BD INSULIN SYRINGE) 27G X 1/2\" 1 ML MISC     lisinopril (ZESTRIL) 20 MG tablet     methotrexate 50 MG/2ML injection     Multiple Vitamins-Minerals (ADULT GUMMY PO)     Multiple Vitamins-Minerals (HAIR/SKIN/NAILS/BIOTIN PO)     omeprazole (PRILOSEC) 20 MG DR capsule     predniSONE " (DELTASONE) 5 MG tablet     rivaroxaban ANTICOAGULANT (XARELTO ANTICOAGULANT) 20 MG TABS tablet     tiZANidine (ZANAFLEX) 4 MG capsule     vitamin B complex with vitamin C (STRESS TAB) tablet     VITAMIN D PO     No current facility-administered medications for this visit.       Social History:  The patient is retired. Patient has the following hobbies or non-occupational exposure: gardening.    Family History:  Family History   Problem Relation Age of Onset     Prostate Cancer Father      Eye Disorder Father         mac degen     Heart Disease Father      Macular Degeneration Father      Heart Disease Mother         a-fib     Eye Disorder Mother      C.A.D. Mother      Hypertension Mother      Alcohol/Drug Brother      Alcohol/Drug Sister      Alcohol/Drug Brother      Alcohol/Drug Brother      Alcohol/Drug Brother      Alcohol/Drug Sister      Obesity Sister      Alcohol/Drug Sister      Prostate Cancer Sister      Cancer Other      Melanoma No family hx of        Previous Labs, Allergy Tests, Dermatopathology, Imagin24 Dr. Joseph Rooney (rheum)  From A&P:  6.  History of productive cough: has seen an ENT provider who reportedly ran allergy tests that came back negative.  She then tried prilosec (omeprazole wasn't effective) 20mg daily that resolved the cough, but stopping it results in return of symptoms.  Then had an EGD showing gastritis.  Now on PPI.     24 Dr. Jean Marie Del Cid (ortho)  From [facility]:  Presents today for evaluation of her right knee which has known severe OA. Has concerns about implant materials because reports issues with steel, silver, gold, plastics, latex. Reports that only thing that she has tolerated is platinum.     A&P:  This is a 68 year old with advanced right knee osteoarthritis associated with severe symptoms that limit her ADLs. This is in the setting of multiple materials allergies that by her report have been medically meaningful. Because of this I am not able to   her adequately on the risks and benefits of knee arthroplasty. I have suggested that the next step would be to meet with an allergist to see if the materials that we would use can be evaluated:  Oxidized zirconium   Titanium  UHMW polyethylene  Methylmethacrylate cement   Discussed that it may not be able to do so and that the decision may come down to the risk of implant difficulty which would not really be addressable with revision-- would already be using the implants that are available for this issue-- versus electing to live with her arthritis.   Referral to allergist at [facility] as above    Referred By: Jean Marie Del Cid MD (ortho)  [address] [address]     Allergy Tests:  Past Allergy Test    Order for Future Allergy Testing:    [x] Outpatient  [] Inpatient: Day..../ Bed ....       Skin Atopy (atopic dermatitis) [] Yes   [x] No .........  Contact allergies:   [x] Yes   [] No .........see HPI.  Hand eczema:   [] Yes   [x] No           Leading hand:   [] R   [] L       [] Ambidextrous         Drug allergies:        [] Yes   [x] No  which?......    Urticaria/Angioedema  [] Yes   [x] No .........  Food Allergy:  [] Yes   [x] No  which?......  Pets:  [] Yes   [x] No  which?......         []  Rhinitis   [] Conjunctivitis   [] Sinusitis   [] Polyposis   [] Otitis   [] Pharyngitis         []  Postnasal drip    []  none  Operations:   [] Tonsils   [] Septum   [] Sinus   [] Polyposis        [] Asthma bronchiale   [] Coughing      []  none  Symptoms (mostly Rhinoconjunctivitis and Asthma) aggravated by:  Season   [] I   [] II   [] III   [] IV   []V   []VI   []VII   []VIII   []IX   []X   []XI   []XII     [] perennial   Day time      [] morning   [] noon      [] evening        [] night    [] whole day........  []  none  Location/changes    [] inside        [] outside   [] mountains    [] sea     [] others.............   []  none  Triggers, specific     [] animals     [] plants     [] dust               [] others ...........................    []  none  Triggers, others       [] work          [] psyche    [] sport            [] others .............................  []  none  Irritant                [] phys efforts [] smoke    [] heat/cold     [] odors  []others............... []  none    Order for PATCH TESTS  Reason for tests (suspected allergy): dermatitis  Known previous allergies: gold, silver  Standardized panels  [x] Standard panel (40 tests)  [x] Preservatives & Antimicrobials (31 tests)  [x] Emulsifiers & Additives (25 tests)   [] Perfumes/Flavours & Plants (25 tests)  [] Hairdresser panel (12 tests)  [] Rubber Chemicals (22 tests)  [x] Plastics (26 tests)  [] Colorants/Dyes/Food additives (20 tests)  [x] Metals (implants/dental) (24 tests)  [] Local anaesthetics/NSAIDs (13 tests)  [x] Antibiotics & Antimycotics (14 tests)   [] Corticosteroids (15 tests)   [] Photopatch test (62 tests)   [] others: ...      [] Patient's own products: ...  DO NOT test if chemical or biological identity is unknown!   always ask from patient the product information and safety sheets (MSDS)       Order for PRICK TESTS    Reason for tests (suspected allergy): not necessary at this time  Known previous allergies: N/A    Standardized prick panels  [] Atopic panel (20 tests)  [] Pediatric Panel (12 tests)  [] Milk, Meat, Eggs, Grains (20 tests)   [] Dust, Epithelia, Feathers (10 tests)  [] Fish, Seafood, Shellfish (17 tests)  [] Nuts, Beans (14 tests)  [] Spice, Vegetable, Fruit (17 tests)  [] Pollen Panel = Tree, Grass, Weed (24 tests)  [] Others: ...      [] Patient's own products: ...  DO NOT test if chemical or biological identity is unknown!   always ask from patient the product information and safety sheets (MSDS)     Standardized intradermal tests  [] Penicillium notatum [] Aspergillus fumigatus [] House dust mites D.far & D. pteron  [] Cat    [] dog  [] Others: ...  [] Bee venom   [] Wasp venom  !!Specific protocol with  dilutions!!       Order for Drug allergy tests (prick & Intradermal & patch tests)    [] Penicillin G  [] Ampicillin [] Cefazolin   [] Ceftriaxone   [] Ceftazidime  [] Bactrim    [] Others: ...  Order for ... as test date    [] Patient needs consultation with Allergy team (changes of tests may apply)  [] Tests discussed with Allergy team (can have direct appointment for allergy tests)     ________________________________    Assessment & Plan:    ==> Final Diagnosis:     # Evaluation of contact sensitization to metals and adhesives before orthopedic surgery  * chronic illness with exacerbation, progression, side effects from treatment    # Indications for atopy with clinically not very relevant RC  * stable chronic illness    These conclusions are made at the best of one's knowledge and belief based on the provided evidence such as patient's history and allergy test results and they can change over time or can be incomplete because of missing information.    ==> Treatment Plan:  See later    Procedures Performed: None    Staff, Resident, and Scribe:  Provider    Scribe Disclosure:   I, GARCÍA HUNTER, am serving as a scribe to document services personally performed by Mono Pond MD based on data collection and the provider's statements to me.     Staff Physician Comments:  I was present with the scribe who participated in the documentation of the note. I have verified the history and personally performed the physical exam and medical decision making. I agree with the assessment and plan as documented in the note. I have reviewed and if necessary amended the note.      Mono Pond MD  Professor  Head of Dermato-Allergy Division  Department of Dermatology  [facility]    Follow-up in Derm-Allergy clinic for patch tests as planned; schedule 1st readings after 2 days appointment virtually (currently scheduled for 4/10/24), so that she only has to drive from [place], [place] for 4/8/24 and  4/12/24 appointments    I spent a total of 30 minutes with Kaylene Diaz. This time was spent counseling the patient and/or coordinating care, explaining the allergy tests, performing allergy tests and assessing the clinical relevance.      Again, thank you for allowing me to participate in the care of your patient.        Sincerely,        Mono Pond MD

## 2024-03-20 ENCOUNTER — TELEPHONE (OUTPATIENT)
Dept: ALLERGY | Facility: CLINIC | Age: 69
End: 2024-03-20
Payer: MEDICARE

## 2024-03-20 NOTE — TELEPHONE ENCOUNTER
[facility] Call Center    Phone Message    May a detailed message be left on voicemail: yes     Reason for Call: Other: Please return Pt's call - she wants to change her April 10th Appt to Video (and I was having hard time getting that to work) - and she wants to know if you can change the time at all for her in person Appt on April 12th - Please call her back to tweak her Allergy Appts - Thank you     Action Taken: Message routed to:  [facility] Allergy    Travel Screening: Not Applicable

## 2024-03-21 NOTE — TELEPHONE ENCOUNTER
Spoke to pt. Pt is able to have someone follow directions for patch removal on Wednesday appt and do virtually, will change to virtual. 4/12/24 appt, nothing available to change to at this time, will keep an eye on it as it gets closer and will change time if possible.

## 2024-03-22 ENCOUNTER — PATIENT OUTREACH (OUTPATIENT)
Dept: CARE COORDINATION | Facility: CLINIC | Age: 69
End: 2024-03-22
Payer: MEDICARE

## 2024-03-29 ENCOUNTER — TELEPHONE (OUTPATIENT)
Dept: ALLERGY | Facility: CLINIC | Age: 69
End: 2024-03-29
Payer: MEDICARE

## 2024-03-29 NOTE — TELEPHONE ENCOUNTER
Standardized panels  [x] Standard panel (40 tests)  [x] Preservatives & Antimicrobials (31 tests)  [x] Emulsifiers & Additives (25 tests)   [] Perfumes/Flavours & Plants (25 tests)  [] Hairdresser panel (12 tests)  [] Rubber Chemicals (22 tests)  [x] Plastics (26 tests)  [] Colorants/Dyes/Food additives (20 tests)  [x] Metals (implants/dental) (24 tests)  [] Local anaesthetics/NSAIDs (13 tests)  [x] Antibiotics & Antimycotics (14 tests)   [] Corticosteroids (15 tests)   [] Photopatch test (62 tests)   [] others: ...                         [] Patient's own products: ...  DO NOT test if chemical or biological identity is unknown!   always ask from patient the product information and safety sheets (MSDS)       STANDARD Series                                          # Substance 2 days 4 days remarks     1 Jm Mix [C] - -       2 Colophony - -       3  2-Mercaptobenzothiazole  - -       4 Methylisothiazolinone - -       5 Carba Mix - -       6 Thiuram Mix [A] - -       7 Bisphenol A Epoxy Resin - -       8 B-Cmpt-Cfwgrxxpyeo-Formaldehyde Resin - -       9 Mercapto Mix [A] - -       10 Black Rubber Mix- PPD [B] - -       11 Potassium Dichromate  -  -       12 Balsam of [place] (Myroxylon Pereirae Resin) - -       13 Nickel Sulphate Hexahydrate - -       14 Mixed Dialkyl Thiourea - -       15 Paraben Mix [B] - -       16 Methyldibromo Glutaronitrile - -       17 Fragrance Mix 8% - -       18 2-Bromo-2-Nitropropane-1,3-Diol (Bronopol) CT - -       19 Lyral - -       20 Tixocortol-21- Pivalate CT - -       21 Diazolidinyl urea (Germall II) - -        22 Methyl Methacrylate - -       23 Cobalt (II) Chloride Hexahydrate - -       24 Fragrance Mix II  - -       25 Compositae Mix - -       26 Benzoyl Peroxide - -       27 Bacitracin - -       28 Formaldehyde - -       29 Methylchloroisothiazolinone / Methylisothiazolinone - -       30 Corticosteroid Mix CT - -       31 Sodium Lauryl Sulfate - -       32 Lanolin Alcohol - -        33 Turpentine - -       34 Cetylstearylalcohol - -       35 Chlorhexidine Dicluconate - -       36 Budesonide - -       37 Imidazolidinyl Urea  - -       38 Ethyl-2 Cyanoacrylate - -       39 Quaternium 15 (Dowicil 200) - -       40 Decyl Glucoside - -      PRESERVATIVES & ANTIMICROBIALS        # Substance 2 days 4 days remarks   41 1  1,2-Benzisothiazoline-3-One, Sodium Salt - -     2  1,3,5-Rai (2-Hydroxyethyl) - Hexahydrotriazine (Grotan BK) - -     3 3-Djnexnkcmcakp-7-Nitro-1, 3-Propanediol - -     4  3, 4, 4' - Triclocarban - -    45 5 4 - Chloro - 3 - Cresol - -     6 4 - Chloro - 4 - Xylenol (PCMX) - -     7 7-Ethylbicyclooxazolidine (Bioban BI0204) - -     8 Benzalkonium Chloride CT - -     9 Benzyl Alcohol - -    50 10 Cetalkonium Chloride - -     11 Cetylpyrimidine Chloride  - -     12 Chloroacetamide - -     13 DMDM Hydantoin - -     14 Glutaraldehyde - -    55 15 Triclosan - -     16 Glyoxal Trimeric Dihydrate - -     17 Iodopropynyl Butylcarbamate - -     18 Octylisothiazoline - -     19 Bithionol CT - -    60 20 Bioban P 1487 (Nitrobutyl) Morpholine/(Ethylnitro-Trimethylene) Dimorpholine - -     21 Phenoxyethanol - -     22 Phenyl Salicylate - -     23 Povidone Iodine - -     24 Sodium Benzoate - -    65 25 Sodium Disulfite - -     26 Sorbic Acid - -     27 Thimerosal - -     28 Melamine Formaldehyde Resin - -     29 Ethylenediamine Dihydrochloride - -      Parabens      70 30 Butyl-P-Hydroxybenzoate - -     31 Ethyl-P-Hydroxybenzoate - -     32 Methyl-P-Hydroxybenzoate - -    73 33 Propyl-P-Hydroxybenzoate - -     EMULSIFIERS & ADDITIVES       # Substance 2 days 4 days remarks   74 1 Polyethylene Glycol-400 - -    75 2 Cocamidopropyl Betaine - -     3 Amerchol L101 - -     4 Propylene Glycol - -     5 Triethanolamine - -     6 Sorbitane Sesquiolate CT - -    80 7 Isopropylmyristate - -     8 Polysorbate 80 CT - -     9 Amidoamine   (Stearamidopropyl Dimethylamine) - -     10 Oleamidopropyl  Dimethylamine - -     11 Lauryl Glucoside - -    85 12 Coconut Diethanolamide  - -     13 2-Hydroxy-4-Methoxy Benzophenone (Oxybenzone) - -     14 Benzophenone-4 (Sulisobenzon) - -     15 Propolis - -     16 Dexpanthenol - -    90 17 Abitol - -     18 Tert-Butylhydroquinone - -     19 Benzyl Salicylate - -     20 Dimethylaminopropylamin (DMPA) - -     21 Zinc Pyrithione (Zinc Omadine)  - -    95 22 Rai(Hydroxymethyl) Nitromethane  - -      Antioxidant       23 Dodecyl Gallate - -     24 Butylhydroxyanisole (BHA) - -     25 Butylhydroxytoluene (BHT) - -     26 Di-Alpha-Tocopherol (Vit E) - -    100 27 Propyl Gallate - -     PLASTICS (Acrylates/Epoxy Systems)       # Substance 2 days 4 days remarks     Acrylates - -    101 1 2-Hydroxyethyl Methacrylate (HEMA) - -     2 1,4-Butandioldimethacrylate (BUDMA) - -     3  2-Ethylhexyl Acrylate - -     4 Bisphenol-A-Dimethacrylate  - -    105 5 Diurethane-Dimethacrylate - -     6 Ethyleneglycoldimethacrylate (EGDMA) - -     7 Pentaerythritoltriacrylate (LOVE) - -     8 Triethylene Glycol Dimethacrylate (TEGDMA) - -      Synthetic material/additives        9 R-Dxmp-Fbvsmunvnqg - -    110 10 Tricresyl Phosphate - -     11 6-Inmf-Uieleynxxrder - -     12 Dioctyl phtalate(DEHP,DOP) / (Dimethylhexylphthalate)  - -     13 Dibutylphthalate - -     14 Dimethylphthalate - -    115 15 Toluene-2,4-Diisocyanate - -     16 Diphenylmethane-4,4''-Diisocyanate - -      EPOXY RESIN SYSTEMS        Reactive Solvents - -     17 Cresyl Glycidyl Ether - -     18 Butyl Glycidyl Ether - -     19 Phenyl Glycidyl Ether - -    120 20 1,4-Butanediol Diglycidyl Ether - -     21 1,6-Hexanediole Diglycidyl Ether - -      Hardener / Accelerator - -     22 Triethylenetetramine - -     23 Diethylenetriamine - -     24 Isophorone Diamine (IPD) - -    125 25 N,N-Dimethyl-P-Toluidine - -    126 26 Isobornyl Acrylate - -     METALS (Implants / Dental)        # Substance 2 days 4 days remarks   127 1 Ammonium  Heptamolybdate (IV) - -     2 Ammonium Tetrachloroplatinate - -     3 Indium (III) Chloride - -    130 4 Iridium (III) Chloride - -     5 Ferric Chloride - -     6 Manganese (II) Chloride - -     7 Niobium (V) Chloride - -     8 Palladium Chloride - -    135 9 Silver Nitrate - -     10 Gold Sodium Thiosulfate - -     11 Tantal - -     12 Tin (II) Chloride - -     13 Titanium (IV) Oxide - -    140 14 Titanium - -     15 Tungstic Acid, Sod Salt Dihydrat - -     16 Vanadium Pentoxide - -     17 Wolfram - -     18 Zinc Chloride - -    145 19 Zirconium (IV) Oxide - -     20 Ammoniated Murcury - -     21 Copper Sulfate Pentahydrate - -     22 Amalgam  - -     23 Aluminum Hydroxide - -    150 24 Ammonium Hexachloroplatinate - -     ANTIBIOTICS & ANTIMYCOTICS    # Substance 2 days 4 days remarks   151 1 Erythromycin - -     2 Framycetin Sulfate - -     3 Fusidic Acid Sodium Salt - -     4 Gentamicin Sulfate - -    155 5 Neomycin Sulfate - -     6 Oxytetracycline  - -     7 Polymyxin B Sulfate - -     8 Tetracycline-HCL - -     9 Sulfanilamide - -    160 10 Metronidazole - -     11 Nitrofurazone - -     12 Nystatin - -     13 Clotrimazole - -     14 Clioquinol 5% - -    165 15 Miconazole  - -    166 16 Tobramycine          Results of patch tests:                         Interpretation:  - Negative                    A    = Allergic      (+) Erythema    TI   = Toxic/irritant   + E + Infiltration    RaP = Relevance at Present     ++ E/I + Papulovesicle   Rpr  = Relevance Previously     +++ E/I/P + Blister     nR   = No Relevance

## 2024-04-02 DIAGNOSIS — I10 PRIMARY HYPERTENSION: ICD-10-CM

## 2024-04-02 DIAGNOSIS — K22.719 BARRETT'S ESOPHAGUS WITH DYSPLASIA: ICD-10-CM

## 2024-04-02 DIAGNOSIS — I26.99 OTHER ACUTE PULMONARY EMBOLISM, UNSPECIFIED WHETHER ACUTE COR PULMONALE PRESENT (H): ICD-10-CM

## 2024-04-02 RX ORDER — RIVAROXABAN 20 MG/1
20 TABLET, FILM COATED ORAL
Qty: 90 TABLET | Refills: 0 | Status: SHIPPED | OUTPATIENT
Start: 2024-04-02 | End: 2024-05-06

## 2024-04-02 RX ORDER — LISINOPRIL 20 MG/1
20 TABLET ORAL DAILY
Qty: 90 TABLET | Refills: 0 | Status: SHIPPED | OUTPATIENT
Start: 2024-04-02 | End: 2024-05-06

## 2024-04-02 NOTE — TELEPHONE ENCOUNTER
Covering for primary/ordering provider:  One refill sent, patient needs to establish with new provider prior to further refills.  Rebecca Mendoza, CNP

## 2024-04-05 ENCOUNTER — PATIENT OUTREACH (OUTPATIENT)
Dept: CARE COORDINATION | Facility: CLINIC | Age: 69
End: 2024-04-05
Payer: MEDICARE

## 2024-04-06 NOTE — PROGRESS NOTES
[facility] Dermato-allergology Note  Office visit  Encounter Date: Apr 8, 2024  ____________________________________________    CC: No chief complaint on file.      HPI:  (Apr 8, 2024)  Ms. Kaylene Diaz is a(n) 68 year old female who presents today as a return patient for allergy tests as planned  - Follow-up in Derm-Allergy clinic for patch tests as planned; schedule 1st readings after 2 days appointment virtually (currently scheduled for 4/10/24), so that she only has to drive from [place], [place] for 4/8/24 and 4/12/24 appointments  - Otherwise feeling well in usual state of health    Physical Exam:  General: In no acute distress, well-developed, well-nourished  Eyes: no conjunctivitis  ENT: no signs of rhinitis   Pulmonary: no wheezing or coughing  Skin: Focused examination of the skin on test sites was performed = see test results below  No active eczematous skin lesions on tests sites, particularly back    Earlier History and Allergy Exams:  (Mar 19, 2024)  New patient for allergy consultation  - Referred by Dr. Del Cid (ortho) for possible metal allergy evaluation prior to right knee replacement  - Specifically, per ortho, the following should be tested: Oxidized zirconium, Titanium, UHMW polyethylene, Methylmethacrylate cement   - Reports this will be her first knee replacement though Epic surgical history states she had a left knee replacement 12/10/10 (upon review of operative report: Diagnostic arthroscopy, left knee, with partial posteromedial meniscectomy)  - Epic-documented allergies:   - Folic acid: causes photosensitivity   - Gold (reports she can only tolerate platinum): gold fillings - gum line got very irritated. Has never been able to wear earrings (el silver, plastic, surgical steel), within an hour the tissue gets extremely irritated    - Latex: more so bandage-related. Does not have issue when wearing latex gloves for gardening. She reports she has skin irritation with  "Band-aids and takes them off after a couple hours due to red and itchy skin. One time had a water-proof bandage on her thumb, wore it for 2 days and reports that this removed 3 layers of skin after taking it off.  - Tube for NG caused irritation in nasal mucosa both times she has had one, reports she had \"ball obstructions\" in her throat.  - Mild seasonal allergy symptoms, but she is not concerned with that at this time    Past Medical History:   Patient Active Problem List   Diagnosis    Carpal tunnel syndrome    Tear meniscus knee    Osteopenia    Advanced directives, counseling/discussion    Hyperlipidemia LDL goal <100    Osteoarthritis    RA (rheumatoid arthritis) (H)    High risk medications (not anticoagulants) long-term use    Kidney stone    ASCUS on Pap smear    Class 3 obesity (H)    Elevated blood pressure reading without diagnosis of hypertension    Elevated glucose    Chronic kidney disease, stage 3a (H)    Chronic bilateral low back pain with bilateral sciatica    Lumbar radiculopathy    Immunodeficiency, unspecified (H24)    Other acute pulmonary embolism, unspecified whether acute cor pulmonale present (H)     Past Medical History:   Diagnosis Date    BLANCA (acute kidney injury) (H24) 4/20/2017    Basal cell carcinoma     RA (rheumatoid arthritis) (H)     SBO (small bowel obstruction) (H) 4/20/2017    Small bowel obstruction (H)     Squamous cell carcinoma      Allergies:  Allergies   Allergen Reactions    Folic Acid     Gold Rash    Latex Rash     Not all latex products     Medications:  Current Outpatient Medications   Medication Sig Dispense Refill    hydroxychloroquine (PLAQUENIL) 200 MG tablet Take 2 tablets (400 mg) by mouth daily 180 tablet 2    Insulin Syringe-Needle U-100 (BD INSULIN SYRINGE) 27G X 1/2\" 1 ML MISC For once weekly methotrexate administration. 50 each 1    lisinopril (ZESTRIL) 20 MG tablet TAKE ONE TABLET BY MOUTH ONCE DAILY 90 tablet 0    methotrexate 50 MG/2ML injection Inject " 1 mL (25 mg) Subcutaneous every 7 days 8 mL 6    Multiple Vitamins-Minerals (ADULT GUMMY PO) Take 1 chew tab by mouth daily VitaCrave      Multiple Vitamins-Minerals (HAIR/SKIN/NAILS/BIOTIN PO) Take 1 capsule by mouth daily.  100 tablet 3    omeprazole (PRILOSEC) 20 MG DR capsule TAKE ONE CAPSULE BY MOUTH ONCE DAILY 90 capsule 0    predniSONE (DELTASONE) 5 MG tablet Take 1 tablet (5 mg) by mouth daily as needed for inflammatory arthritis symptoms.  Use sparingly 90 tablet 1    rivaroxaban ANTICOAGULANT (XARELTO ANTICOAGULANT) 20 MG TABS tablet TAKE ONE TABLET BY MOUTH ONCE DAILY WITH DINNER 90 tablet 0    tiZANidine (ZANAFLEX) 4 MG capsule Take 1 capsule (4 mg) by mouth 3 times daily as needed for muscle spasms 270 capsule 3    vitamin B complex with vitamin C (STRESS TAB) tablet Take 1 tablet by mouth daily  0    VITAMIN D PO Take by mouth daily       No current facility-administered medications for this visit.       Social History:  The patient is retired. Patient has the following hobbies or non-occupational exposure: gardening.    Family History:  Family History   Problem Relation Age of Onset    Prostate Cancer Father     Eye Disorder Father         mac degen    Heart Disease Father     Macular Degeneration Father     Heart Disease Mother         a-fib    Eye Disorder Mother     C.A.D. Mother     Hypertension Mother     Alcohol/Drug Brother     Alcohol/Drug Sister     Alcohol/Drug Brother     Alcohol/Drug Brother     Alcohol/Drug Brother     Alcohol/Drug Sister     Obesity Sister     Alcohol/Drug Sister     Prostate Cancer Sister     Cancer Other     Melanoma No family hx of        Previous Labs, Allergy Tests, Dermatopathology, [facility] Dr. Joseph Rooney (rheum)  From A&P:  6.  History of productive cough: has seen an ENT provider who reportedly ran allergy tests that came back negative.  She then tried prilosec (omeprazole wasn't effective) 20mg daily that resolved the cough, but stopping it results  in return of symptoms.  Then had an EGD showing gastritis.  Now on PPI.     1/23/24 Dr. Jean Marie Del Cid (ortho)  From [facility]:  Presents today for evaluation of her right knee which has known severe OA. Has concerns about implant materials because reports issues with steel, silver, gold, plastics, latex. Reports that only thing that she has tolerated is platinum.     A&P:  This is a 68 year old with advanced right knee osteoarthritis associated with severe symptoms that limit her ADLs. This is in the setting of multiple materials allergies that by her report have been medically meaningful. Because of this I am not able to  her adequately on the risks and benefits of knee arthroplasty. I have suggested that the next step would be to meet with an allergist to see if the materials that we would use can be evaluated:  Oxidized zirconium   Titanium  UHMW polyethylene  Methylmethacrylate cement   Discussed that it may not be able to do so and that the decision may come down to the risk of implant difficulty which would not really be addressable with revision-- would already be using the implants that are available for this issue-- versus electing to live with her arthritis.   Referral to allergist at [facility] as above    Referred By: Jean Marie Del Cid MD (ortho)  [facility] [address]     Allergy Tests:  Past Allergy Test    Order for Future Allergy Testing:    [x] Outpatient  [] Inpatient: Day..../ Bed ....       Skin Atopy (atopic dermatitis) [] Yes   [x] No .........  Contact allergies:   [x] Yes   [] No .........see HPI.  Hand eczema:   [] Yes   [x] No           Leading hand:   [] R   [] L       [] Ambidextrous         Drug allergies:        [] Yes   [x] No  which?......    Urticaria/Angioedema  [] Yes   [x] No .........  Food Allergy:  [] Yes   [x] No  which?......  Pets:  [] Yes   [x] No  which?......         []  Rhinitis   [] Conjunctivitis   [] Sinusitis   [] Polyposis   [] Otitis   []  Pharyngitis         []  Postnasal drip    []  none  Operations:   [] Tonsils   [] Septum   [] Sinus   [] Polyposis        [] Asthma bronchiale   [] Coughing      []  none  Symptoms (mostly Rhinoconjunctivitis and Asthma) aggravated by:  Season   [] I   [] II   [] III   [] IV   []V   []VI   []VII   []VIII   []IX   []X   []XI   []XII     [] perennial   Day time      [] morning   [] noon      [] evening        [] night    [] whole day........  []  none  Location/changes    [] inside        [] outside   [] mountains    [] sea     [] others.............   []  none  Triggers, specific     [] animals     [] plants     [] dust              [] others ...........................    []  none  Triggers, others       [] work          [] psyche    [] sport            [] others .............................  []  none  Irritant                [] phys efforts [] smoke    [] heat/cold     [] odors  []others............... []  none    Order for PATCH TESTS  Reason for tests (suspected allergy): dermatitis  Known previous allergies: gold, silver  Standardized panels  [x] Standard panel (40 tests)  [x] Preservatives & Antimicrobials (31 tests)  [x] Emulsifiers & Additives (25 tests)   [] Perfumes/Flavours & Plants (25 tests)  [] Hairdresser panel (12 tests)  [] Rubber Chemicals (22 tests)  [x] Plastics (26 tests)  [] Colorants/Dyes/Food additives (20 tests)  [x] Metals (implants/dental) (24 tests)  [] Local anaesthetics/NSAIDs (13 tests)  [x] Antibiotics & Antimycotics (14 tests)   [] Corticosteroids (15 tests)   [] Photopatch test (62 tests)   [] others: ...      [] Patient's own products: ...  DO NOT test if chemical or biological identity is unknown!   always ask from patient the product information and safety sheets (MSDS)       Order for PRICK TESTS    Reason for tests (suspected allergy): not necessary at this time  Known previous allergies: N/A    Standardized prick panels  [] Atopic panel (20 tests)  [] Pediatric Panel (12  tests)  [] Milk, Meat, Eggs, Grains (20 tests)   [] Dust, Epithelia, Feathers (10 tests)  [] Fish, Seafood, Shellfish (17 tests)  [] Nuts, Beans (14 tests)  [] Spice, Vegetable, Fruit (17 tests)  [] Pollen Panel = Tree, Grass, Weed (24 tests)  [] Others: ...      [] Patient's own products: ...  DO NOT test if chemical or biological identity is unknown!   always ask from patient the product information and safety sheets (MSDS)     Standardized intradermal tests  [] Penicillium notatum [] Aspergillus fumigatus [] House dust mites D.far & D. pteron  [] Cat    [] dog  [] Others: ...  [] Bee venom   [] Wasp venom  !!Specific protocol with dilutions!!       Order for Drug allergy tests (prick & Intradermal & patch tests)    [] Penicillin G  [] Ampicillin [] Cefazolin   [] Ceftriaxone   [] Ceftazidime  [] Bactrim    [] Others: ...  Order for ... as test date    [] Patient needs consultation with Allergy team (changes of tests may apply)  [] Tests discussed with Allergy team (can have direct appointment for allergy tests)     RESULTS & EVALUATION of PATCH TESTS    Apr 8, 2024 application of patch tests:    Patch test readings after     [x] 2 days, [] 3 days [x] 4 days, [] 5 days,  Other duration: ...    Standardized panels  [x] Standard panel (40 tests)  [x] Preservatives & Antimicrobials (31 tests)  [x] Emulsifiers & Additives (25 tests)   [] Perfumes/Flavours & Plants (25 tests)  [] Hairdresser panel (12 tests)  [] Rubber Chemicals (22 tests)  [x] Plastics (26 tests)  [] Colorants/Dyes/Food additives (20 tests)  [x] Metals (implants/dental) (24 tests)  [] Local anaesthetics/NSAIDs (13 tests)  [x] Antibiotics & Antimycotics (14 tests)   [] Corticosteroids (15 tests)   [] Photopatch test (62 tests)   [] others: ...                         [] Patient's own products: ...  DO NOT test if chemical or biological identity is unknown!   always ask from patient the product information and safety sheets (MSDS)       STANDARD Series                                           # Substance 2 days 4 days remarks     1 Jm Mix [C] - -       2 Colophony - -       3  2-Mercaptobenzothiazole  - -       4 Methylisothiazolinone - -       5 Carba Mix - -       6 Thiuram Mix [A] - -       7 Bisphenol A Epoxy Resin - -       8 F-Xban-Qpqkwebwygp-Formaldehyde Resin - -       9 Mercapto Mix [A] - -       10 Black Rubber Mix- PPD [B] - -       11 Potassium Dichromate  -  -       12 Balsam of [place] (Myroxylon Pereirae Resin) - -       13 Nickel Sulphate Hexahydrate - -       14 Mixed Dialkyl Thiourea - -       15 Paraben Mix [B] - -       16 Methyldibromo Glutaronitrile - -       17 Fragrance Mix 8% - -       18 2-Bromo-2-Nitropropane-1,3-Diol (Bronopol) CT - -       19 Lyral - -       20 Tixocortol-21- Pivalate CT - -       21 Diazolidinyl urea (Germall II) - -        22 Methyl Methacrylate - -       23 Cobalt (II) Chloride Hexahydrate - -       24 Fragrance Mix II  - -       25 Compositae Mix - -       26 Benzoyl Peroxide - -       27 Bacitracin - -       28 Formaldehyde - -       29 Methylchloroisothiazolinone / Methylisothiazolinone - -       30 Corticosteroid Mix CT - -       31 Sodium Lauryl Sulfate - -       32 Lanolin Alcohol - -       33 Turpentine - -       34 Cetylstearylalcohol - -       35 Chlorhexidine Dicluconate - -       36 Budesonide - -       37 Imidazolidinyl Urea  - -       38 Ethyl-2 Cyanoacrylate - -       39 Quaternium 15 (Dowicil 200) - -       40 Decyl Glucoside - -      PRESERVATIVES & ANTIMICROBIALS        # Substance 2 days 4 days remarks   41 1  1,2-Benzisothiazoline-3-One, Sodium Salt - -     2  1,3,5-Rai (2-Hydroxyethyl) - Hexahydrotriazine (Grotan BK) - -     3 4-Yktawwqazdpjp-4-Nitro-1, 3-Propanediol NA NA     4  3, 4, 4' - Triclocarban - -    45 5 4 - Chloro - 3 - Cresol - -     6 4 - Chloro - 4 - Xylenol (PCMX) - -     7 7-Ethylbicyclooxazolidine (Bioban [id number]) - -     8 Benzalkonium Chloride CT - -     9 Benzyl Alcohol - -     50 10 Cetalkonium Chloride - -     11 Cetylpyrimidine Chloride  - -     12 Chloroacetamide - -     13 DMDM Hydantoin - -     14 Glutaraldehyde - -    55 15 Triclosan - -     16 Glyoxal Trimeric Dihydrate - -     17 Iodopropynyl Butylcarbamate - -     18 Octylisothiazoline - -     19 Bithionol CT NA NA    60 20 Bioban P 1487 (Nitrobutyl) Morpholine/(Ethylnitro-Trimethylene) Dimorpholine - -     21 Phenoxyethanol - -     22 Phenyl Salicylate - -     23 Povidone Iodine - -     24 Sodium Benzoate - -    65 25 Sodium Disulfite - -     26 Sorbic Acid - -     27 Thimerosal - -     28 Melamine Formaldehyde Resin - -     29 Ethylenediamine Dihydrochloride - -      Parabens      70 30 Butyl-P-Hydroxybenzoate - -     31 Ethyl-P-Hydroxybenzoate - -     32 Methyl-P-Hydroxybenzoate - -    73 33 Propyl-P-Hydroxybenzoate - -     EMULSIFIERS & ADDITIVES       # Substance 2 days 4 days remarks   74 1 Polyethylene Glycol-400 - -    75 2 Cocamidopropyl Betaine - -     3 Amerchol L101 - -     4 Propylene Glycol - -     5 Triethanolamine - -     6 Sorbitane Sesquiolate CT - -    80 7 Isopropylmyristate - -     8 Polysorbate 80 CT - -     9 Amidoamine   (Stearamidopropyl Dimethylamine) - -     10 Oleamidopropyl Dimethylamine - -     11 Lauryl Glucoside - -    85 12 Coconut Diethanolamide  - -     13 2-Hydroxy-4-Methoxy Benzophenone (Oxybenzone) - -     14 Benzophenone-4 (Sulisobenzon) - -     15 Propolis - -     16 Dexpanthenol - -    90 17 Abitol - -     18 Tert-Butylhydroquinone - -     19 Benzyl Salicylate - -     20 Dimethylaminopropylamin (DMPA) - -     21 Zinc Pyrithione (Zinc Omadine)  - -    95 22 Rai(Hydroxymethyl) Nitromethane  - -      Antioxidant       23 Dodecyl Gallate - -     24 Butylhydroxyanisole (BHA) - -     25 Butylhydroxytoluene (BHT) - -     26 Di-Alpha-Tocopherol (Vit E) - -    100 27 Propyl Gallate - -     PLASTICS (Acrylates/Epoxy Systems)       # Substance 2 days 4 days remarks     Acrylates - -    101 1  2-Hydroxyethyl Methacrylate (HEMA) - -     2 1,4-Butandioldimethacrylate (BUDMA) - -     3  2-Ethylhexyl Acrylate - -     4 Bisphenol-A-Dimethacrylate  - -    105 5 Diurethane-Dimethacrylate - -     6 Ethyleneglycoldimethacrylate (EGDMA) - -     7 Pentaerythritoltriacrylate (LOVE) - -     8 Triethylene Glycol Dimethacrylate (TEGDMA) - -      Synthetic material/additives        9 H-Gwcu-Hcfichgkvsq - -    110 10 Tricresyl Phosphate - -     11 7-Lgkg-Ciswfgzhcxemb - -     12 Dioctyl phtalate(DEHP,DOP) / (Dimethylhexylphthalate)  - -     13 Dibutylphthalate - -     14 Dimethylphthalate - -    115 15 Toluene-2,4-Diisocyanate NA NA     16 Diphenylmethane-4,4''-Diisocyanate NA NA      EPOXY RESIN SYSTEMS        Reactive Solvents - -     17 Cresyl Glycidyl Ether - -     18 Butyl Glycidyl Ether - -     19 Phenyl Glycidyl Ether - -    120 20 1,4-Butanediol Diglycidyl Ether - -     21 1,6-Hexanediole Diglycidyl Ether - -      Hardener / Accelerator - -     22 Triethylenetetramine - -     23 Diethylenetriamine - -     24 Isophorone Diamine (IPD) - -    125 25 N,N-Dimethyl-P-Toluidine - -    126 26 Isobornyl Acrylate - -     METALS (Implants / Dental)        # Substance 2 days 4 days remarks   127 1 Ammonium Heptamolybdate (IV) - -     2 Ammonium Tetrachloroplatinate - -     3 Indium (III) Chloride - -    130 4 Iridium (III) Chloride - -     5 Ferric Chloride - -     6 Manganese (II) Chloride - -     7 Niobium (V) Chloride - -     8 Palladium Chloride - -    135 9 Silver Nitrate - -     10 Gold Sodium Thiosulfate - -     11 Tantal - -     12 Tin (II) Chloride - -     13 Titanium (IV) Oxide - -    140 14 Titanium - -     15 Tungstic Acid, Sod Salt Dihydrat - -     16 Vanadium Pentoxide - -     17 Wolfram - -     18 Zinc Chloride - -    145 19 Zirconium (IV) Oxide - -     20 Ammoniated Murcury - -     21 Copper Sulfate Pentahydrate - -     22 Amalgam  - -     23 Aluminum Hydroxide - -    150 24 Ammonium Hexachloroplatinate - -      ANTIBIOTICS & ANTIMYCOTICS    # Substance 2 days 4 days remarks   151 1 Erythromycin - -     2 Framycetin Sulfate - -     3 Fusidic Acid Sodium Salt - -     4 Gentamicin Sulfate - -    155 5 Neomycin Sulfate - -     6 Oxytetracycline  - -     7 Polymyxin B Sulfate - -     8 Tetracycline-HCL - -     9 Sulfanilamide - -    160 10 Metronidazole - -     11 Nitrofurazone - -     12 Nystatin - -     13 Clotrimazole - -     14 Clioquinol 5% - -    165 15 Miconazole  - -    166 16 Tobramycine            Results of patch tests:                         Interpretation:    - Negative                    A    = Allergic      (+) Erythema    TI   = Toxic/irritant   + E + Infiltration    RaP = Relevance at Present     ++ E/I + Papulovesicle   Rpr  = Relevance Previously     +++ E/I/P + Blister     nR   = No Relevance    [] No relevant allergic reaction observed    [] Allergic reaction diagnosed against following allergens:      Interpretation/Remarks:   See later    [] Patient information given   [] ACDS CAMP information (# ....) to following compounds: .....   [] General information to following compounds: ......      Assessment & Plan:    ==> Final Diagnosis:     # Evaluation of contact sensitization to metals and adhesives before orthopedic surgery  * chronic illness with exacerbation, progression, side effects from treatment    # Indications for atopy with clinically not very relevant RC  * stable chronic illness    These conclusions are made at the best of one's knowledge and belief based on the provided evidence such as patient's history and allergy test results and they can change over time or can be incomplete because of missing information.    ==> Treatment Plan:  See later     Procedures Performed: Allergy tests, including patch tests    Staff: : provider    Follow-up in Derm-Allergy clinic for 1st readings of patch tests after 2 days and 2nd readings and final conclusions after 4 days    ___________________________    I spent a total of 24 minutes with Kaylene Diaz during today s  visit. This time was spent discussing all the individual test results, correlating them to the clinical relevance, counseling the patient and/or coordinating care and performing allergy tests or procedures.

## 2024-04-08 ENCOUNTER — OFFICE VISIT (OUTPATIENT)
Dept: ALLERGY | Facility: CLINIC | Age: 69
End: 2024-04-08
Payer: MEDICARE

## 2024-04-08 DIAGNOSIS — Z88.9 ATOPY: ICD-10-CM

## 2024-04-08 DIAGNOSIS — Z91.09 HISTORY OF METAL ALLERGY: Primary | ICD-10-CM

## 2024-04-08 DIAGNOSIS — L23.1 ALLERGIC CONTACT DERMATITIS DUE TO ADHESIVES: ICD-10-CM

## 2024-04-08 PROCEDURE — 95044 PATCH/APPLICATION TESTS: CPT | Performed by: DERMATOLOGY

## 2024-04-08 NOTE — PATIENT INSTRUCTIONS
Removal of Patch Tests on Day 3:    Remove patches and tape from testing area on Wednesday 4/10/24, anytime is fine          Using the purple surgical markers provided (or other permanent marker), draw a grid around the test area so that the circular indentation is in the center of each square, as below. Try to be as neat as possible and keep the lines as straight as you can (you can use a ruler if you need to)          Redraw the number that was underneath the tape above the grids, as shown below. Try to print clearly.          Repeat the process for the remaining test areas.          Photograph the entire area then take close-up photos of any possible reactions. Examples of possible reactions are spots that look like these:          Return to clinic for evaluation as instructed/Log in to [employer] for video visit.  You should continue to avoid getting the area wet (no showering or strenuous exercise), exposing the area to UV light, using topical steroids, or scratching.         Who should I call with questions?  [facility], [place]: [phone]  For urgent needs outside of business hours call the [facility] at [phone] and ask for the dermatology resident on call      If you develop any serious or adverse allergic reaction after office hours please seek immediate medical assistance at the nearest clinic or emergency room

## 2024-04-08 NOTE — NURSING NOTE
Chief Complaint   Patient presents with    Allergy Testing Followup     Patch testing day 1     Leda Silva RN

## 2024-04-10 ENCOUNTER — VIRTUAL VISIT (OUTPATIENT)
Dept: ALLERGY | Facility: CLINIC | Age: 69
End: 2024-04-10
Payer: MEDICARE

## 2024-04-10 DIAGNOSIS — Z91.09 HISTORY OF METAL ALLERGY: Primary | ICD-10-CM

## 2024-04-10 DIAGNOSIS — L23.1 ALLERGIC CONTACT DERMATITIS DUE TO ADHESIVES: ICD-10-CM

## 2024-04-10 PROCEDURE — 99207 PR NO CHARGE LOS: CPT | Mod: 95 | Performed by: DERMATOLOGY

## 2024-04-10 NOTE — PROGRESS NOTES
[facility] Dermato-allergology Note  Virtual visit: store and forward video ([employer] connected), start time: 11:58, end time: 12:02, date of images: 4/10/24  Encounter Date: Apr 10, 2024  ____________________________________________    CC:     HPI:  (Apr 10, 2024)  Ms. Kaylene Diaz is a(n) 68 year old female who presents today as a return patient for allergy tests as planned  - Follow-up in Derm-Allergy clinic for 1st readings of patch tests after 2 days  - Otherwise feeling well in usual state of health    Physical Exam:  General: In no acute distress, well-developed, well-nourished  Eyes: no conjunctivitis  ENT: no signs of rhinitis   Pulmonary: no wheezing or coughing  Skin: Focused examination of the skin on test sites was performed = see test results below  No active eczematous skin lesions on tests sites, particularly back    Earlier History and Allergy Exams:  (Apr 8, 2024)  - Follow-up in Derm-Allergy clinic for patch tests as planned; schedule 1st readings after 2 days appointment virtually (currently scheduled for 4/10/24), so that she only has to drive from [place], [place] for 4/8/24 and 4/12/24 appointments    (Mar 19, 2024)  New patient for allergy consultation  - Referred by Dr. Del Cid (ortho) for possible metal allergy evaluation prior to right knee replacement  - Specifically, per ortho, the following should be tested: Oxidized zirconium, Titanium, UHMW polyethylene, Methylmethacrylate cement   - Reports this will be her first knee replacement though Epic surgical history states she had a left knee replacement 12/10/10 (upon review of operative report: Diagnostic arthroscopy, left knee, with partial posteromedial meniscectomy)  - Epic-documented allergies:   - Folic acid: causes photosensitivity   - Gold (reports she can only tolerate platinum): gold fillings - gum line got very irritated. Has never been able to wear earrings (el silver, plastic, surgical steel), within an hour  "the tissue gets extremely irritated    - Latex: more so bandage-related. Does not have issue when wearing latex gloves for gardening. She reports she has skin irritation with Band-aids and takes them off after a couple hours due to red and itchy skin. One time had a water-proof bandage on her thumb, wore it for 2 days and reports that this removed 3 layers of skin after taking it off.  - Tube for NG caused irritation in nasal mucosa both times she has had one, reports she had \"ball obstructions\" in her throat.  - Mild seasonal allergy symptoms, but she is not concerned with that at this time    Past Medical History:   Patient Active Problem List   Diagnosis    Carpal tunnel syndrome    Tear meniscus knee    Osteopenia    Advanced directives, counseling/discussion    Hyperlipidemia LDL goal <100    Osteoarthritis    RA (rheumatoid arthritis) (H)    High risk medications (not anticoagulants) long-term use    Kidney stone    ASCUS on Pap smear    Class 3 obesity (H)    Elevated blood pressure reading without diagnosis of hypertension    Elevated glucose    Chronic kidney disease, stage 3a (H)    Chronic bilateral low back pain with bilateral sciatica    Lumbar radiculopathy    Immunodeficiency, unspecified (H24)    Other acute pulmonary embolism, unspecified whether acute cor pulmonale present (H)     Past Medical History:   Diagnosis Date    BLANCA (acute kidney injury) (H24) 4/20/2017    Basal cell carcinoma     RA (rheumatoid arthritis) (H)     SBO (small bowel obstruction) (H) 4/20/2017    Small bowel obstruction (H)     Squamous cell carcinoma      Allergies:  Allergies   Allergen Reactions    Folic Acid     Gold Rash    Latex Rash     Not all latex products     Medications:  Current Outpatient Medications   Medication Sig Dispense Refill    hydroxychloroquine (PLAQUENIL) 200 MG tablet Take 2 tablets (400 mg) by mouth daily 180 tablet 2    Insulin Syringe-Needle U-100 (BD INSULIN SYRINGE) 27G X 1/2\" 1 ML MISC For " once weekly methotrexate administration. 50 each 1    lisinopril (ZESTRIL) 20 MG tablet TAKE ONE TABLET BY MOUTH ONCE DAILY 90 tablet 0    methotrexate 50 MG/2ML injection Inject 1 mL (25 mg) Subcutaneous every 7 days 8 mL 6    Multiple Vitamins-Minerals (ADULT GUMMY PO) Take 1 chew tab by mouth daily VitaCrave      Multiple Vitamins-Minerals (HAIR/SKIN/NAILS/BIOTIN PO) Take 1 capsule by mouth daily.  100 tablet 3    omeprazole (PRILOSEC) 20 MG DR capsule TAKE ONE CAPSULE BY MOUTH ONCE DAILY 90 capsule 0    predniSONE (DELTASONE) 5 MG tablet Take 1 tablet (5 mg) by mouth daily as needed for inflammatory arthritis symptoms.  Use sparingly 90 tablet 1    rivaroxaban ANTICOAGULANT (XARELTO ANTICOAGULANT) 20 MG TABS tablet TAKE ONE TABLET BY MOUTH ONCE DAILY WITH DINNER 90 tablet 0    tiZANidine (ZANAFLEX) 4 MG capsule Take 1 capsule (4 mg) by mouth 3 times daily as needed for muscle spasms 270 capsule 3    vitamin B complex with vitamin C (STRESS TAB) tablet Take 1 tablet by mouth daily  0    VITAMIN D PO Take by mouth daily       No current facility-administered medications for this visit.       Social History:  The patient is retired. Patient has the following hobbies or non-occupational exposure: gardening.    Family History:  Family History   Problem Relation Age of Onset    Prostate Cancer Father     Eye Disorder Father         mac degen    Heart Disease Father     Macular Degeneration Father     Heart Disease Mother         a-fib    Eye Disorder Mother     C.A.D. Mother     Hypertension Mother     Alcohol/Drug Brother     Alcohol/Drug Sister     Alcohol/Drug Brother     Alcohol/Drug Brother     Alcohol/Drug Brother     Alcohol/Drug Sister     Obesity Sister     Alcohol/Drug Sister     Prostate Cancer Sister     Cancer Other     Melanoma No family hx of        Previous Labs, Allergy Tests, Dermatopathology, [facility]agin[facility] Dr. Joseph Rooney (rheum)  From A&P:  6.  History of productive cough: has seen an ENT  provider who reportedly ran allergy tests that came back negative.  She then tried prilosec (omeprazole wasn't effective) 20mg daily that resolved the cough, but stopping it results in return of symptoms.  Then had an EGD showing gastritis.  Now on PPI.     1/23/24 Dr. Jean Marie Del Cid (ortho)  From [facility]:  Presents today for evaluation of her right knee which has known severe OA. Has concerns about implant materials because reports issues with steel, silver, gold, plastics, latex. Reports that only thing that she has tolerated is platinum.     A&P:  This is a 68 year old with advanced right knee osteoarthritis associated with severe symptoms that limit her ADLs. This is in the setting of multiple materials allergies that by her report have been medically meaningful. Because of this I am not able to  her adequately on the risks and benefits of knee arthroplasty. I have suggested that the next step would be to meet with an allergist to see if the materials that we would use can be evaluated:  Oxidized zirconium   Titanium  UHMW polyethylene  Methylmethacrylate cement   Discussed that it may not be able to do so and that the decision may come down to the risk of implant difficulty which would not really be addressable with revision-- would already be using the implants that are available for this issue-- versus electing to live with her arthritis.   Referral to allergist at [facility] as above    Referred By: Jean Marie Del Cid MD (ortho)  [address] [place] [id number]     Allergy Tests:  Past Allergy Test    Order for Future Allergy Testing:    [x] Outpatient  [] Inpatient: Day..../ Bed ....       Skin Atopy (atopic dermatitis) [] Yes   [x] No .........  Contact allergies:   [x] Yes   [] No .........see HPI.  Hand eczema:   [] Yes   [x] No           Leading hand:   [] R   [] L       [] Ambidextrous         Drug allergies:        [] Yes   [x] No  which?......    Urticaria/Angioedema  [] Yes   [x] No  .........  Food Allergy:  [] Yes   [x] No  which?......  Pets:  [] Yes   [x] No  which?......         []  Rhinitis   [] Conjunctivitis   [] Sinusitis   [] Polyposis   [] Otitis   [] Pharyngitis         []  Postnasal drip    []  none  Operations:   [] Tonsils   [] Septum   [] Sinus   [] Polyposis        [] Asthma bronchiale   [] Coughing      []  none  Symptoms (mostly Rhinoconjunctivitis and Asthma) aggravated by:  Season   [] I   [] II   [] III   [] IV   []V   []VI   []VII   []VIII   []IX   []X   []XI   []XII     [] perennial   Day time      [] morning   [] noon      [] evening        [] night    [] whole day........  []  none  Location/changes    [] inside        [] outside   [] mountains    [] sea     [] others.............   []  none  Triggers, specific     [] animals     [] plants     [] dust              [] others ...........................    []  none  Triggers, others       [] work          [] psyche    [] sport            [] others .............................  []  none  Irritant                [] phys efforts [] smoke    [] heat/cold     [] odors  []others............... []  none    Order for PATCH TESTS  Reason for tests (suspected allergy): dermatitis  Known previous allergies: gold, silver  Standardized panels  [x] Standard panel (40 tests)  [x] Preservatives & Antimicrobials (31 tests)  [x] Emulsifiers & Additives (25 tests)   [] Perfumes/Flavours & Plants (25 tests)  [] Hairdresser panel (12 tests)  [] Rubber Chemicals (22 tests)  [x] Plastics (26 tests)  [] Colorants/Dyes/Food additives (20 tests)  [x] Metals (implants/dental) (24 tests)  [] Local anaesthetics/NSAIDs (13 tests)  [x] Antibiotics & Antimycotics (14 tests)   [] Corticosteroids (15 tests)   [] Photopatch test (62 tests)   [] others: ...      [] Patient's own products: ...  DO NOT test if chemical or biological identity is unknown!   always ask from patient the product information and safety sheets (MSDS)       Order for PRICK  TESTS    Reason for tests (suspected allergy): not necessary at this time  Known previous allergies: N/A    Standardized prick panels  [] Atopic panel (20 tests)  [] Pediatric Panel (12 tests)  [] Milk, Meat, Eggs, Grains (20 tests)   [] Dust, Epithelia, Feathers (10 tests)  [] Fish, Seafood, Shellfish (17 tests)  [] Nuts, Beans (14 tests)  [] Spice, Vegetable, Fruit (17 tests)  [] Pollen Panel = Tree, Grass, Weed (24 tests)  [] Others: ...      [] Patient's own products: ...  DO NOT test if chemical or biological identity is unknown!   always ask from patient the product information and safety sheets (MSDS)     Standardized intradermal tests  [] Penicillium notatum [] Aspergillus fumigatus [] House dust mites D.far & D. pteron  [] Cat    [] dog  [] Others: ...  [] Bee venom   [] Wasp venom  !!Specific protocol with dilutions!!       Order for Drug allergy tests (prick & Intradermal & patch tests)    [] Penicillin G  [] Ampicillin [] Cefazolin   [] Ceftriaxone   [] Ceftazidime  [] Bactrim    [] Others: ...  Order for ... as test date    [] Patient needs consultation with Allergy team (changes of tests may apply)  [] Tests discussed with Allergy team (can have direct appointment for allergy tests)     RESULTS & EVALUATION of PATCH TESTS    Apr 8, 2024 application of patch tests:    Patch test readings after     [x] 2 days, [] 3 days [x] 4 days, [] 5 days,  Other duration: ...    Standardized panels  [x] Standard panel (40 tests)  [x] Preservatives & Antimicrobials (31 tests)  [x] Emulsifiers & Additives (25 tests)   [] Perfumes/Flavours & Plants (25 tests)  [] Hairdresser panel (12 tests)  [] Rubber Chemicals (22 tests)  [x] Plastics (26 tests)  [] Colorants/Dyes/Food additives (20 tests)  [x] Metals (implants/dental) (24 tests)  [] Local anaesthetics/NSAIDs (13 tests)  [x] Antibiotics & Antimycotics (14 tests)   [] Corticosteroids (15 tests)   [] Photopatch test (62 tests)   [] others: ...                         []  Patient's own products: ...  DO NOT test if chemical or biological identity is unknown!   always ask from patient the product information and safety sheets (MSDS)       STANDARD Series                                          # Substance 2 days 4 days remarks     1 Jm Mix [C] - -       2 Colophony - -       3  2-Mercaptobenzothiazole  - -       4 Methylisothiazolinone - -       5 Carba Mix - -       6 Thiuram Mix [A] - -       7 Bisphenol A Epoxy Resin - -       8 Z-Mqzr-Hewrpuxetxb-Formaldehyde Resin - -       9 Mercapto Mix [A] - -       10 Black Rubber Mix- PPD [B] - -       11 Potassium Dichromate  -  -       12 Balsam of [place] (Myroxylon Pereirae Resin) - -       13 Nickel Sulphate Hexahydrate - -       14 Mixed Dialkyl Thiourea - -       15 Paraben Mix [B] - -       16 Methyldibromo Glutaronitrile - -       17 Fragrance Mix 8% - -       18 2-Bromo-2-Nitropropane-1,3-Diol (Bronopol) CT - -       19 Lyral - -       20 Tixocortol-21- Pivalate CT - -       21 Diazolidinyl urea (Germall II) - -        22 Methyl Methacrylate - -       23 Cobalt (II) Chloride Hexahydrate - -       24 Fragrance Mix II  - -       25 Compositae Mix - -       26 Benzoyl Peroxide - -       27 Bacitracin - -       28 Formaldehyde - -       29 Methylchloroisothiazolinone / Methylisothiazolinone - -       30 Corticosteroid Mix CT - -       31 Sodium Lauryl Sulfate - -       32 Lanolin Alcohol - -       33 Turpentine - -       34 Cetylstearylalcohol - -       35 Chlorhexidine Dicluconate - -       36 Budesonide - -       37 Imidazolidinyl Urea  - -       38 Ethyl-2 Cyanoacrylate - -       39 Quaternium 15 (Dowicil 200) - -       40 Decyl Glucoside - -      PRESERVATIVES & ANTIMICROBIALS        # Substance 2 days 4 days remarks   41 1  1,2-Benzisothiazoline-3-One, Sodium Salt - -     2  1,3,5-Rai (2-Hydroxyethyl) - Hexahydrotriazine (Grotan BK) - -     3 5-Ilesaelpzberw-6-Nitro-1, 3-Propanediol NA NA     4  3, 4, 4' - Triclocarban - -    45  5 4 - Chloro - 3 - Cresol - -     6 4 - Chloro - 4 - Xylenol (PCMX) - -     7 7-Ethylbicyclooxazolidine (Bioban IQ1398) - -     8 Benzalkonium Chloride CT - -     9 Benzyl Alcohol - -    50 10 Cetalkonium Chloride - -     11 Cetylpyrimidine Chloride  - -     12 Chloroacetamide - -     13 DMDM Hydantoin - -     14 Glutaraldehyde - -    55 15 Triclosan - -     16 Glyoxal Trimeric Dihydrate - -     17 Iodopropynyl Butylcarbamate - -     18 Octylisothiazoline - -     19 Bithionol CT NA NA    60 20 Bioban P 1487 (Nitrobutyl) Morpholine/(Ethylnitro-Trimethylene) Dimorpholine - -     21 Phenoxyethanol - -     22 Phenyl Salicylate - -     23 Povidone Iodine - -     24 Sodium Benzoate - -    65 25 Sodium Disulfite - -     26 Sorbic Acid - -     27 Thimerosal - -     28 Melamine Formaldehyde Resin - -     29 Ethylenediamine Dihydrochloride - -      Parabens      70 30 Butyl-P-Hydroxybenzoate - -     31 Ethyl-P-Hydroxybenzoate - -     32 Methyl-P-Hydroxybenzoate - -    73 33 Propyl-P-Hydroxybenzoate - -     EMULSIFIERS & ADDITIVES       # Substance 2 days 4 days remarks   74 1 Polyethylene Glycol-400 - -    75 2 Cocamidopropyl Betaine - -     3 Amerchol L101 - -     4 Propylene Glycol - -     5 Triethanolamine - -     6 Sorbitane Sesquiolate CT - -    80 7 Isopropylmyristate - -     8 Polysorbate 80 CT - -     9 Amidoamine   (Stearamidopropyl Dimethylamine) - -     10 Oleamidopropyl Dimethylamine - -     11 Lauryl Glucoside - -    85 12 Coconut Diethanolamide  - -     13 2-Hydroxy-4-Methoxy Benzophenone (Oxybenzone) - -     14 Benzophenone-4 (Sulisobenzon) - -     15 Propolis - -     16 Dexpanthenol - -    90 17 Abitol - -     18 Tert-Butylhydroquinone - -     19 Benzyl Salicylate - -     20 Dimethylaminopropylamin (DMPA) - -     21 Zinc Pyrithione (Zinc Omadine)  - -    95 22 Rai(Hydroxymethyl) Nitromethane  - -      Antioxidant       23 Dodecyl Gallate - -     24 Butylhydroxyanisole (BHA) - -     25 Butylhydroxytoluene  (BHT) - -     26 Di-Alpha-Tocopherol (Vit E) - -    100 27 Propyl Gallate - -     PLASTICS (Acrylates/Epoxy Systems)       # Substance 2 days 4 days remarks     Acrylates - -    101 1 2-Hydroxyethyl Methacrylate (HEMA) - -     2 1,4-Butandioldimethacrylate (BUDMA) - -     3  2-Ethylhexyl Acrylate - -     4 Bisphenol-A-Dimethacrylate  - -    105 5 Diurethane-Dimethacrylate - -     6 Ethyleneglycoldimethacrylate (EGDMA) - -     7 Pentaerythritoltriacrylate (LOVE) - -     8 Triethylene Glycol Dimethacrylate (TEGDMA) - -      Synthetic material/additives        9 R-Vezd-Fcierspztze - -    110 10 Tricresyl Phosphate - -     11 5-Ytja-Oamfxkvqwfszn - -     12 Dioctyl phtalate(DEHP,DOP) / (Dimethylhexylphthalate)  - -     13 Dibutylphthalate - -     14 Dimethylphthalate - -    115 15 Toluene-2,4-Diisocyanate NA NA     16 Diphenylmethane-4,4''-Diisocyanate NA NA      EPOXY RESIN SYSTEMS        Reactive Solvents - -     17 Cresyl Glycidyl Ether - -     18 Butyl Glycidyl Ether - -     19 Phenyl Glycidyl Ether - -    120 20 1,4-Butanediol Diglycidyl Ether - -     21 1,6-Hexanediole Diglycidyl Ether - -      Hardener / Accelerator - -     22 Triethylenetetramine - -     23 Diethylenetriamine - -     24 Isophorone Diamine (IPD) - -    125 25 N,N-Dimethyl-P-Toluidine - -    126 26 Isobornyl Acrylate - -     METALS (Implants / Dental)        # Substance 2 days 4 days remarks   127 1 Ammonium Heptamolybdate (IV) - -     2 Ammonium Tetrachloroplatinate - -     3 Indium (III) Chloride - -    130 4 Iridium (III) Chloride - -     5 Ferric Chloride - -     6 Manganese (II) Chloride - -     7 Niobium (V) Chloride - -     8 Palladium Chloride - -    135 9 Silver Nitrate - -     10 Gold Sodium Thiosulfate - -     11 Tantal - -     12 Tin (II) Chloride - -     13 Titanium (IV) Oxide - -    140 14 Titanium - -     15 Tungstic Acid, Sod Salt Dihydrat - -     16 Vanadium Pentoxide - -     17 Wolfion - -     18 Zinc Chloride - -    145 19  Zirconium (IV) Oxide - -     20 Ammoniated Murcury - -     21 Copper Sulfate Pentahydrate - -     22 Amalgam  - -     23 Aluminum Hydroxide - -    150 24 Ammonium Hexachloroplatinate - -     ANTIBIOTICS & ANTIMYCOTICS    # Substance 2 days 4 days remarks   151 1 Erythromycin - -     2 Framycetin Sulfate - -     3 Fusidic Acid Sodium Salt - -     4 Gentamicin Sulfate - -    155 5 Neomycin Sulfate - -     6 Oxytetracycline  - -     7 Polymyxin B Sulfate - -     8 Tetracycline-HCL - -     9 Sulfanilamide - -    160 10 Metronidazole - -     11 Nitrofurazone - -     12 Nystatin - -     13 Clotrimazole - -     14 Clioquinol 5% - -    165 15 Miconazole  - -    166 16 Tobramycine            Results of patch tests:                         Interpretation:    - Negative                    A    = Allergic      (+) Erythema    TI   = Toxic/irritant   + E + Infiltration    RaP = Relevance at Present     ++ E/I + Papulovesicle   Rpr  = Relevance Previously     +++ E/I/P + Blister     nR   = No Relevance    [x] No relevant allergic reaction observed    [] Allergic reaction diagnosed against following allergens:      Interpretation/Remarks:   See later    [] Patient information given   [] ACDS CAMP information (# ....) to following compounds: .....   [] General information to following compounds: ......        Assessment & Plan:    ==> Final Diagnosis:     # Evaluation of contact sensitization to metals and adhesives before orthopedic surgery  * chronic illness with exacerbation, progression, side effects from treatment    # Indications for atopy with clinically not very relevant RC  * stable chronic illness    These conclusions are made at the best of one's knowledge and belief based on the provided evidence such as patient's history and allergy test results and they can change over time or can be incomplete because of missing information.    ==> Treatment Plan:  See later     Procedures Performed: None    Staff and Scribe: :  provider    Scribe Disclosure:   I, GARCÍA HUNTER, am serving as a scribe to document services personally performed by Mono Pond MD based on data collection and the provider's statements to me.     Staff Physician Comments:  I was present with the scribe who participated in the documentation of the note. I have verified the history and personally performed the physical exam and medical decision making. I agree with the assessment and plan as documented in the note. I have reviewed and if necessary amended the note.      Mono Pond MD  Professor  Head of Dermato-Allergy Division  Department of Dermatology  [facility]    Follow-up in Derm-Allergy clinic for 2nd readings and final conclusions after 4 days   ___________________________    I spent a total of 15 minutes with Kaylene Diaz during today s  visit. This time was spent discussing all the individual test results, correlating them to the clinical relevance, counseling the patient and/or coordinating care.

## 2024-04-11 NOTE — PROGRESS NOTES
[facility] Dermato-allergology Note  Office visit  Encounter Date: Apr 12, 2024  ____________________________________________    CC: Allergy Testing Followup (Patch day 5)      HPI:  (Apr 12, 2024)  Ms. Kaylene Diaz is a(n) 68 year old female who presents today as a return patient for allergy tests as planned  - Follow-up in Derm-Allergy clinic for 2nd readings and final conclusions after 4 days  - Otherwise feeling well in usual state of health    Physical Exam:  General: In no acute distress, well-developed, well-nourished  Eyes: no conjunctivitis  ENT: no signs of rhinitis   Pulmonary: no wheezing or coughing  Skin: Focused examination of the skin on test sites was performed = see test results below  No active eczematous skin lesions on tests sites, particularly back    Earlier History and Allergy Exams:  (Apr 10, 2024)  - Follow-up in Derm-Allergy clinic for 1st readings of patch tests after 2 days    (Apr 8, 2024)  - Follow-up in Derm-Allergy clinic for patch tests as planned; schedule 1st readings after 2 days appointment virtually (currently scheduled for 4/10/24), so that she only has to drive from [place], [place] for 4/8/24 and 4/12/24 appointments    (Mar 19, 2024)  New patient for allergy consultation  - Referred by Dr. Del Cid (ortho) for possible metal allergy evaluation prior to right knee replacement  - Specifically, per ortho, the following should be tested: Oxidized zirconium, Titanium, UHMW polyethylene, Methylmethacrylate cement   - Reports this will be her first knee replacement though Epic surgical history states she had a left knee replacement 12/10/10 (upon review of operative report: Diagnostic arthroscopy, left knee, with partial posteromedial meniscectomy)  - Epic-documented allergies:   - Folic acid: causes photosensitivity   - Gold (reports she can only tolerate platinum): gold fillings - gum line got very irritated. Has never been able to wear earrings (el silver,  "plastic, surgical steel), within an hour the tissue gets extremely irritated    - Latex: more so bandage-related. Does not have issue when wearing latex gloves for gardening. She reports she has skin irritation with Band-aids and takes them off after a couple hours due to red and itchy skin. One time had a water-proof bandage on her thumb, wore it for 2 days and reports that this removed 3 layers of skin after taking it off.  - Tube for NG caused irritation in nasal mucosa both times she has had one, reports she had \"ball obstructions\" in her throat.  - Mild seasonal allergy symptoms, but she is not concerned with that at this time    Past Medical History:   Patient Active Problem List   Diagnosis    Carpal tunnel syndrome    Tear meniscus knee    Osteopenia    Advanced directives, counseling/discussion    Hyperlipidemia LDL goal <100    Osteoarthritis    RA (rheumatoid arthritis) (H)    High risk medications (not anticoagulants) long-term use    Kidney stone    ASCUS on Pap smear    Class 3 obesity (H)    Elevated blood pressure reading without diagnosis of hypertension    Elevated glucose    Chronic kidney disease, stage 3a (H)    Chronic bilateral low back pain with bilateral sciatica    Lumbar radiculopathy    Immunodeficiency, unspecified (H24)    Other acute pulmonary embolism, unspecified whether acute cor pulmonale present (H)     Past Medical History:   Diagnosis Date    BLANCA (acute kidney injury) (H24) 4/20/2017    Basal cell carcinoma     RA (rheumatoid arthritis) (H)     SBO (small bowel obstruction) (H) 4/20/2017    Small bowel obstruction (H)     Squamous cell carcinoma      Allergies:  Allergies   Allergen Reactions    Folic Acid     Gold Rash    Latex Rash     Not all latex products     Medications:  Current Outpatient Medications   Medication Sig Dispense Refill    hydroxychloroquine (PLAQUENIL) 200 MG tablet Take 2 tablets (400 mg) by mouth daily 180 tablet 2    Insulin Syringe-Needle U-100 (BD " "INSULIN SYRINGE) 27G X 1/2\" 1 ML MISC For once weekly methotrexate administration. 50 each 1    lisinopril (ZESTRIL) 20 MG tablet TAKE ONE TABLET BY MOUTH ONCE DAILY 90 tablet 0    methotrexate 50 MG/2ML injection Inject 1 mL (25 mg) Subcutaneous every 7 days 8 mL 6    Multiple Vitamins-Minerals (ADULT GUMMY PO) Take 1 chew tab by mouth daily VitaCrave      Multiple Vitamins-Minerals (HAIR/SKIN/NAILS/BIOTIN PO) Take 1 capsule by mouth daily.  100 tablet 3    omeprazole (PRILOSEC) 20 MG DR capsule TAKE ONE CAPSULE BY MOUTH ONCE DAILY 90 capsule 0    predniSONE (DELTASONE) 5 MG tablet Take 1 tablet (5 mg) by mouth daily as needed for inflammatory arthritis symptoms.  Use sparingly 90 tablet 1    rivaroxaban ANTICOAGULANT (XARELTO ANTICOAGULANT) 20 MG TABS tablet TAKE ONE TABLET BY MOUTH ONCE DAILY WITH DINNER 90 tablet 0    tiZANidine (ZANAFLEX) 4 MG capsule Take 1 capsule (4 mg) by mouth 3 times daily as needed for muscle spasms 270 capsule 3    vitamin B complex with vitamin C (STRESS TAB) tablet Take 1 tablet by mouth daily  0    VITAMIN D PO Take by mouth daily       No current facility-administered medications for this visit.       Social History:  The patient is retired. Patient has the following hobbies or non-occupational exposure: gardening.    Family History:  Family History   Problem Relation Age of Onset    Prostate Cancer Father     Eye Disorder Father         mac degen    Heart Disease Father     Macular Degeneration Father     Heart Disease Mother         a-fib    Eye Disorder Mother     C.A.D. Mother     Hypertension Mother     Alcohol/Drug Brother     Alcohol/Drug Sister     Alcohol/Drug Brother     Alcohol/Drug Brother     Alcohol/Drug Brother     Alcohol/Drug Sister     Obesity Sister     Alcohol/Drug Sister     Prostate Cancer Sister     Cancer Other     Melanoma No family hx of        Previous Labs, Allergy Tests, Dermatopathology, [facility] Dr. Joseph Rooney (rheum)  From A&P:  6.  " History of productive cough: has seen an ENT provider who reportedly ran allergy tests that came back negative.  She then tried prilosec (omeprazole wasn't effective) 20mg daily that resolved the cough, but stopping it results in return of symptoms.  Then had an EGD showing gastritis.  Now on PPI.     1/23/24 Dr. Jean Marie Del Cid (ortho)  From [facility]:  Presents today for evaluation of her right knee which has known severe OA. Has concerns about implant materials because reports issues with steel, silver, gold, plastics, latex. Reports that only thing that she has tolerated is platinum.     A&P:  This is a 68 year old with advanced right knee osteoarthritis associated with severe symptoms that limit her ADLs. This is in the setting of multiple materials allergies that by her report have been medically meaningful. Because of this I am not able to  her adequately on the risks and benefits of knee arthroplasty. I have suggested that the next step would be to meet with an allergist to see if the materials that we would use can be evaluated:  Oxidized zirconium   Titanium  UHMW polyethylene  Methylmethacrylate cement   Discussed that it may not be able to do so and that the decision may come down to the risk of implant difficulty which would not really be addressable with revision-- would already be using the implants that are available for this issue-- versus electing to live with her arthritis.   Referral to allergist at [facility] as above    Referred By: Jean Marie Del Cid MD (ortho)  [address] [place] [id number]     Allergy Tests:  Past Allergy Test    Order for Future Allergy Testing:    [x] Outpatient  [] Inpatient: Day..../ Bed ....       Skin Atopy (atopic dermatitis) [] Yes   [x] No .........  Contact allergies:   [x] Yes   [] No .........see HPI.  Hand eczema:   [] Yes   [x] No           Leading hand:   [] R   [] L       [] Ambidextrous         Drug allergies:        [] Yes   [x] No   which?......    Urticaria/Angioedema  [] Yes   [x] No .........  Food Allergy:  [] Yes   [x] No  which?......  Pets:  [] Yes   [x] No  which?......         []  Rhinitis   [] Conjunctivitis   [] Sinusitis   [] Polyposis   [] Otitis   [] Pharyngitis         []  Postnasal drip    []  none  Operations:   [] Tonsils   [] Septum   [] Sinus   [] Polyposis        [] Asthma bronchiale   [] Coughing      []  none  Symptoms (mostly Rhinoconjunctivitis and Asthma) aggravated by:  Season   [] I   [] II   [] III   [] IV   []V   []VI   []VII   []VIII   []IX   []X   []XI   []XII     [] perennial   Day time      [] morning   [] noon      [] evening        [] night    [] whole day........  []  none  Location/changes    [] inside        [] outside   [] mountains    [] sea     [] others.............   []  none  Triggers, specific     [] animals     [] plants     [] dust              [] others ...........................    []  none  Triggers, others       [] work          [] psyche    [] sport            [] others .............................  []  none  Irritant                [] phys efforts [] smoke    [] heat/cold     [] odors  []others............... []  none    Order for PATCH TESTS  Reason for tests (suspected allergy): dermatitis  Known previous allergies: gold, silver  Standardized panels  [x] Standard panel (40 tests)  [x] Preservatives & Antimicrobials (31 tests)  [x] Emulsifiers & Additives (25 tests)   [] Perfumes/Flavours & Plants (25 tests)  [] Hairdresser panel (12 tests)  [] Rubber Chemicals (22 tests)  [x] Plastics (26 tests)  [] Colorants/Dyes/Food additives (20 tests)  [x] Metals (implants/dental) (24 tests)  [] Local anaesthetics/NSAIDs (13 tests)  [x] Antibiotics & Antimycotics (14 tests)   [] Corticosteroids (15 tests)   [] Photopatch test (62 tests)   [] others: ...      [] Patient's own products: ...  DO NOT test if chemical or biological identity is unknown!   always ask from patient the product information  and safety sheets (MSDS)       Order for PRICK TESTS    Reason for tests (suspected allergy): not necessary at this time  Known previous allergies: N/A    Standardized prick panels  [] Atopic panel (20 tests)  [] Pediatric Panel (12 tests)  [] Milk, Meat, Eggs, Grains (20 tests)   [] Dust, Epithelia, Feathers (10 tests)  [] Fish, Seafood, Shellfish (17 tests)  [] Nuts, Beans (14 tests)  [] Spice, Vegetable, Fruit (17 tests)  [] Pollen Panel = Tree, Grass, Weed (24 tests)  [] Others: ...      [] Patient's own products: ...  DO NOT test if chemical or biological identity is unknown!   always ask from patient the product information and safety sheets (MSDS)     Standardized intradermal tests  [] Penicillium notatum [] Aspergillus fumigatus [] House dust mites D.far & D. pteron  [] Cat    [] dog  [] Others: ...  [] Bee venom   [] Wasp venom  !!Specific protocol with dilutions!!       Order for Drug allergy tests (prick & Intradermal & patch tests)    [] Penicillin G  [] Ampicillin [] Cefazolin   [] Ceftriaxone   [] Ceftazidime  [] Bactrim    [] Others: ...  Order for ... as test date    [] Patient needs consultation with Allergy team (changes of tests may apply)  [] Tests discussed with Allergy team (can have direct appointment for allergy tests)     RESULTS & EVALUATION of PATCH TESTS    Apr 8, 2024 application of patch tests:    Patch test readings after     [x] 2 days, [] 3 days [x] 4 days, [] 5 days,  Other duration: ...    Standardized panels  [x] Standard panel (40 tests)  [x] Preservatives & Antimicrobials (31 tests)  [x] Emulsifiers & Additives (25 tests)   [] Perfumes/Flavours & Plants (25 tests)  [] Hairdresser panel (12 tests)  [] Rubber Chemicals (22 tests)  [x] Plastics (26 tests)  [] Colorants/Dyes/Food additives (20 tests)  [x] Metals (implants/dental) (24 tests)  [] Local anaesthetics/NSAIDs (13 tests)  [x] Antibiotics & Antimycotics (14 tests)   [] Corticosteroids (15 tests)   [] Photopatch test (62  tests)   [] others: ...                         [] Patient's own products: ...  DO NOT test if chemical or biological identity is unknown!   always ask from patient the product information and safety sheets (MSDS)       STANDARD Series                                          # Substance 2 days 4 days remarks     1 Jm Mix [C] - -       2 Colophony - -       3  2-Mercaptobenzothiazole  - -       4 Methylisothiazolinone - -       5 Carba Mix - -       6 Thiuram Mix [A] - -       7 Bisphenol A Epoxy Resin - -       8 K-Uupv-Ypebrcqnkhg-Formaldehyde Resin - -       9 Mercapto Mix [A] - -       10 Black Rubber Mix- PPD [B] - -       11 Potassium Dichromate  -  -       12 Balsam of [place] (Myroxylon Pereirae Resin) - -       13 Nickel Sulphate Hexahydrate - -       14 Mixed Dialkyl Thiourea - -       15 Paraben Mix [B] - -       16 Methyldibromo Glutaronitrile - -       17 Fragrance Mix 8% - +/++       18 2-Bromo-2-Nitropropane-1,3-Diol (Bronopol) CT - -       19 Lyral - -       20 Tixocortol-21- Pivalate CT - -       21 Diazolidinyl urea (Germall II) - -        22 Methyl Methacrylate - -       23 Cobalt (II) Chloride Hexahydrate - -       24 Fragrance Mix II  - -       25 Compositae Mix - -       26 Benzoyl Peroxide - -       27 Bacitracin - -       28 Formaldehyde - -       29 Methylchloroisothiazolinone / Methylisothiazolinone - -       30 Corticosteroid Mix CT - -       31 Sodium Lauryl Sulfate - -       32 Lanolin Alcohol - -       33 Turpentine - -       34 Cetylstearylalcohol - -       35 Chlorhexidine Dicluconate - -       36 Budesonide - -       37 Imidazolidinyl Urea  - -       38 Ethyl-2 Cyanoacrylate - -       39 Quaternium 15 (Dowicil 200) - -       40 Decyl Glucoside - -      PRESERVATIVES & ANTIMICROBIALS        # Substance 2 days 4 days remarks   41 1  1,2-Benzisothiazoline-3-One, Sodium Salt - -     2  1,3,5-Rai (2-Hydroxyethyl) - Hexahydrotriazine (Grotan BK) - -     3 7-Hbssimhrlqrqr-5-Nitro-1,  3-Propanediol NA NA     4  3, 4, 4' - Triclocarban - -    45 5 4 - Chloro - 3 - Cresol - -     6 4 - Chloro - 4 - Xylenol (PCMX) - -     7 7-Ethylbicyclooxazolidine (Bioban CV2394) - -     8 Benzalkonium Chloride CT - -     9 Benzyl Alcohol - -    50 10 Cetalkonium Chloride - -     11 Cetylpyrimidine Chloride  - -     12 Chloroacetamide - -     13 DMDM Hydantoin - -     14 Glutaraldehyde - -    55 15 Triclosan - -     16 Glyoxal Trimeric Dihydrate - -     17 Iodopropynyl Butylcarbamate - -     18 Octylisothiazoline - -     19 Bithionol CT NA NA    60 20 Bioban P 1487 (Nitrobutyl) Morpholine/(Ethylnitro-Trimethylene) Dimorpholine - -     21 Phenoxyethanol - -     22 Phenyl Salicylate - -     23 Povidone Iodine - -     24 Sodium Benzoate - -    65 25 Sodium Disulfite - -     26 Sorbic Acid - -     27 Thimerosal - -     28 Melamine Formaldehyde Resin - -     29 Ethylenediamine Dihydrochloride - -      Parabens      70 30 Butyl-P-Hydroxybenzoate - -     31 Ethyl-P-Hydroxybenzoate - -     32 Methyl-P-Hydroxybenzoate - -    73 33 Propyl-P-Hydroxybenzoate - -     EMULSIFIERS & ADDITIVES       # Substance 2 days 4 days remarks   74 1 Polyethylene Glycol-400 - -    75 2 Cocamidopropyl Betaine - -     3 Amerchol L101 - -     4 Propylene Glycol - -     5 Triethanolamine - -     6 Sorbitane Sesquiolate CT - -    80 7 Isopropylmyristate - -     8 Polysorbate 80 CT - -     9 Amidoamine   (Stearamidopropyl Dimethylamine) - -     10 Oleamidopropyl Dimethylamine - -     11 Lauryl Glucoside - -    85 12 Coconut Diethanolamide  - -     13 2-Hydroxy-4-Methoxy Benzophenone (Oxybenzone) - -     14 Benzophenone-4 (Sulisobenzon) - -    88 15 Propolis - +     16 Dexpanthenol - -    90 17 Abitol - -     18 Tert-Butylhydroquinone - -     19 Benzyl Salicylate - -     20 Dimethylaminopropylamin (DMPA) - -     21 Zinc Pyrithione (Zinc Omadine)  - -    95 22 Rai(Hydroxymethyl) Nitromethane  - -      Antioxidant       23 Dodecyl Gallate - -      24 Butylhydroxyanisole (BHA) - -     25 Butylhydroxytoluene (BHT) - -     26 Di-Alpha-Tocopherol (Vit E) - -    100 27 Propyl Gallate - -     PLASTICS (Acrylates/Epoxy Systems)       # Substance 2 days 4 days remarks     Acrylates - -    101 1 2-Hydroxyethyl Methacrylate (HEMA) - -     2 1,4-Butandioldimethacrylate (BUDMA) - -     3  2-Ethylhexyl Acrylate - -     4 Bisphenol-A-Dimethacrylate  - -    105 5 Diurethane-Dimethacrylate - -     6 Ethyleneglycoldimethacrylate (EGDMA) - -     7 Pentaerythritoltriacrylate (LOVE) - -     8 Triethylene Glycol Dimethacrylate (TEGDMA) - -      Synthetic material/additives        9 Y-Qxcy-Hhoxmvagvwb - -    110 10 Tricresyl Phosphate - -     11 0-Hdjb-Oeztrqxxzbgkw - -     12 Dioctyl phtalate(DEHP,DOP) / (Dimethylhexylphthalate)  - -     13 Dibutylphthalate - -     14 Dimethylphthalate - -    115 15 Toluene-2,4-Diisocyanate NA NA     16 Diphenylmethane-4,4''-Diisocyanate NA NA      EPOXY RESIN SYSTEMS        Reactive Solvents - -     17 Cresyl Glycidyl Ether - -     18 Butyl Glycidyl Ether - -     19 Phenyl Glycidyl Ether - -    120 20 1,4-Butanediol Diglycidyl Ether - -     21 1,6-Hexanediole Diglycidyl Ether - -      Hardener / Accelerator - -     22 Triethylenetetramine - -     23 Diethylenetriamine - -     24 Isophorone Diamine (IPD) - -    125 25 N,N-Dimethyl-P-Toluidine - -    126 26 Isobornyl Acrylate - -     METALS (Implants / Dental)        # Substance 2 days 4 days remarks   127 1 Ammonium Heptamolybdate (IV) - -     2 Ammonium Tetrachloroplatinate - -     3 Indium (III) Chloride - -    130 4 Iridium (III) Chloride - -    131 5 Ferric Chloride - -     6 Manganese (II) Chloride - -     7 Niobium (V) Chloride - -     8 Palladium Chloride - -    135 9 Silver Nitrate - -    136 10 Gold Sodium Thiosulfate - +     11 Tantal - -     12 Tin (II) Chloride - -    139 13 Titanium (IV) Oxide - -    140 14 Titanium - -     15 Tungstic Acid, Sod Salt Dihydrat - -     16 Vanadium  Pentoxide - -     17 Wolfram - -     18 Zinc Chloride - -    145 19 Zirconium (IV) Oxide - -     20 Ammoniated Murcury - -     21 Copper Sulfate Pentahydrate - -     22 Amalgam  - -     23 Aluminum Hydroxide - -    150 24 Ammonium Hexachloroplatinate - -     ANTIBIOTICS & ANTIMYCOTICS    # Substance 2 days 4 days remarks   151 1 Erythromycin - -     2 Framycetin Sulfate - -     3 Fusidic Acid Sodium Salt - -     4 Gentamicin Sulfate - -    155 5 Neomycin Sulfate - -     6 Oxytetracycline  - -     7 Polymyxin B Sulfate - -     8 Tetracycline-HCL - -     9 Sulfanilamide - -    160 10 Metronidazole - -     11 Nitrofurazone - -     12 Nystatin - -     13 Clotrimazole - -     14 Clioquinol 5% - -    165 15 Miconazole  - -    166 16 Tobramycin - -         Results of patch tests:                         Interpretation:    - Negative                    A    = Allergic      (+) Erythema    TI   = Toxic/irritant   + E + Infiltration    RaP = Relevance at Present     ++ E/I + Papulovesicle   Rpr  = Relevance Previously     +++ E/I/P + Blister     nR   = No Relevance    [] No relevant allergic reaction observed    [x] Allergic reaction diagnosed against following allergens:  Fragrances: +/++ Fragrance Mix 8%  Disinfectants: + Propolis  Metals: + Gold Sodium Thiosulfate    Interpretation/Remarks:   Patient has some sensitization to fragrance and propolis, which has some importance for the future, and therefore we will write CAMP susy for it. There is a slight reaction to gold, which explains why she had a reaction to gold crown. However, no reaction to metals that are relevant to implants, such as titanium, nickel, cobalt, and chrome. And therefore, there is no concern for implant.     [x] Patient information given   [x] ACDS CAMP information (# [id number], and [id number]) to following compounds: Fragrance Mix 8%, propolis   [] General information to following compounds: ......        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    ==> Treatment Plan:    >> Follow the recommendations of the CAMP sarika, using only products recommended on the sarika on skin and hair.     Procedures Performed: None    Staff, Resident, and Scribe: provider    Scribe Disclosure:   I, GARCÍA HUNTER, am serving as a scribe to document services personally performed by Mono Pond MD based on data collection and the provider's statements to me.     Staff Physician Comments:  I was present with the scribe who participated in the documentation of the note. I have verified the history and personally performed the physical exam and medical decision making. I agree with the assessment and plan as documented in the note. I have reviewed and if necessary amended the note.      Mono Pond MD  Professor  Head of Dermato-Allergy Division  Department of Dermatology  [facility]    Follow-up in Derm-Allergy clinic PRN  ___________________________    I spent a total of 32 minutes with Kaylene Diaz during today s  visit. This time was spent discussing all the individual test results, correlating them to the clinical relevance, counseling the patient and/or coordinating care. Moreover time was spent to install and explain the CAMP Sarika from [employer] with the informations on the individual allergens and propositions of products that can be used. Please see  Assessment and Plan for additional details.

## 2024-04-12 ENCOUNTER — OFFICE VISIT (OUTPATIENT)
Dept: ALLERGY | Facility: CLINIC | Age: 69
End: 2024-04-12
Payer: MEDICARE

## 2024-04-12 DIAGNOSIS — L23.89 ALLERGIC CONTACT DERMATITIS DUE TO FRAGRANCE: ICD-10-CM

## 2024-04-12 DIAGNOSIS — Z88.9 ATOPY: Primary | ICD-10-CM

## 2024-04-12 PROCEDURE — 99214 OFFICE O/P EST MOD 30 MIN: CPT | Performed by: DERMATOLOGY

## 2024-04-12 NOTE — NURSING NOTE
Chief Complaint   Patient presents with    Allergy Testing Followup     Patch day 5     Oleg Mathias RN

## 2024-04-17 ENCOUNTER — TELEPHONE (OUTPATIENT)
Dept: ALLERGY | Facility: CLINIC | Age: 69
End: 2024-04-17
Payer: MEDICARE

## 2024-04-17 NOTE — TELEPHONE ENCOUNTER
Pt called, confused how to log into ACDS CAMP susy. Writer walked pt through steps to get it downloaded on her computer as the susy is unavailable on androids as of now.  Pt was able to download the list for now. Instructed to try monthly to download the susy so as soon as its available, it is easier to use and stays updated.  Reminder to pt that the codes to log in are in the AVS from last Dr. Pond office visit

## 2024-04-19 DIAGNOSIS — M62.838 MUSCLE SPASM: ICD-10-CM

## 2024-04-30 SDOH — HEALTH STABILITY: PHYSICAL HEALTH: ON AVERAGE, HOW MANY MINUTES DO YOU ENGAGE IN EXERCISE AT THIS LEVEL?: PATIENT DECLINED

## 2024-04-30 SDOH — HEALTH STABILITY: PHYSICAL HEALTH: ON AVERAGE, HOW MANY DAYS PER WEEK DO YOU ENGAGE IN MODERATE TO STRENUOUS EXERCISE (LIKE A BRISK WALK)?: 0 DAYS

## 2024-04-30 ASSESSMENT — SOCIAL DETERMINANTS OF HEALTH (SDOH): HOW OFTEN DO YOU GET TOGETHER WITH FRIENDS OR RELATIVES?: PATIENT DECLINED

## 2024-04-30 NOTE — COMMUNITY RESOURCES LIST (ENGLISH)
April 30, 2024           YOUR PERSONALIZED LIST OF SERVICES & PROGRAMS           & RECREATION    Sports      [facility] - Adult Enrichment  Phone: [phone]  Website: [email or web address]  Language: English  Hours: Mon 7:30 AM - 4:00 PM Tue 7:30 AM - 4:00 PM Wed 7:30 AM - 4:00 PM Thu 7:30 AM - 4:00 PM Fri 7:30 AM - 4:00 PM    Classes/Groups      [facility] - Sit and Be Fit/[employer]  [address] [place] [address] (Distance: 23.0 miles)  Website: [email or web address]-and-[email or web address]  Language: English  Fee: Free      [facility] - Adult Enrichment  Phone: [phone]  Website: [email or web address]  Language: English  Hours: Mon 7:30 AM - 4:00 PM Tue 7:30 AM - 4:00 PM Wed 7:30 AM - 4:00 PM Thu 7:30 AM - 4:00 PM Fri 7:30 AM - 4:00 PM               IMPORTANT NUMBERS & WEBSITES        Emergency Services  911  .   [facility]  211 [email or web address].org  .   Poison Control  (564) 342-4191 http://mnpoison.org http://wis[place]poison.org  .     Suicide and Crisis Lifeline  988 http://988lifeline.org  .   Childhelp National Child Abuse Hotline  524.309.7496 http://Childhelphotline.org   .   National Sexual Assault Hotline  (299) 731-3897 (HOPE) http://Rainn.org   .     National Runaway Safeline  (137) 767-1656 (RUNAWAY) http://1800runaway.org  .   Pregnancy & Postpartum Support  Call/text 404-722-5117  [place]: http://ppsupportmn.org  [place]: http://[employer].com/wi  .   Substance Abuse National Helpline ([facility])  722-932-HELP (9641) http://Findtreatment.gov   .                DISCLAIMER: These resources have been generated via the FlatStack Platform. FlatStack does not endorse any service providers mentioned in this resource list. Flat[employer] does not guarantee that the services mentioned in this resource list will be available to you or will improve your health  or wellness.    [facility]

## 2024-05-02 NOTE — PROGRESS NOTES
"Kaylene Diaz  is a 68 year old year old who is being evaluated via a billable video visit.      How would you like to obtain your AVS? MyChart  If the video visit is dropped, the invitation should be resent by: Text to cell phone: [phone]   Will anyone else be joining your video visit? No     Rheumatology Video Visit      Kaylene Diaz MRN# [id number]   YOB: 1955 Age: 68 year old      Date of visit: 5/03/24  PCP:  LUCIA Grover    Chief Complaint   Patient presents with:  Rheumatoid Arthritis    Assessment and Plan     1.  Seropositive rheumatoid arthritis (RF 32, CCP >250): Currently on methotrexate 25 mg SQ once  weekly, hydroxychloroquine 400 mg daily.  Previously on sulfasalazine that was discontinued when she found no benefit from it; but she also did not require prednisone while on sulfasalazine; questioning if the \"benefit\" from sulfasalazine was actually the \"benefit\" of not gardening.  Does not tolerate folic acid because of associated increased sunburns.  Doing well with regard to RA; not using prednisone.   Chronic illness, stable.      - Continue methotrexate 25mg SQ once weekly   - Continue hydroxychloroquine 400 mg daily (last eye exam on 12/22/2023 with Dr. Powers)  - Continue prednisone 5 mg daily as needed for rheumatoid arthritis symptoms; uses sparingly   - Labs in mid-June: CBC, Creatinine, Hepatic Panel, ESR, CRP (lab orders to be faxed to [facility] in [place], [place])    High risk medication requiring intensive toxicity monitoring at least quarterly.      2.  Basal cell carcinoma and squamous cell carcinoma: Several lesions removed by dermatology in the past.  Continue to follow with dermatology.     3.  Chronic lower back pain: Has significantly improved with lidocaine patches that have been used no more than 10 times in the past 1 year.  Now following a pain management clinic where a steroid injection was helpful but symptoms are starting to return so she is going to consider " "repeat injection    4.  History of right Achilles tendinitis: Was evaluated by podiatry who diagnosed her with Achilles tendinitis and her symptoms resolved with a boot that she wore at night.  Not an issue today.     5.  Osteopenia without elevated FRAX: Based on 4/27/2022 DEXA.    6.  History of productive cough: has seen an ENT provider who reportedly ran davide o rgy tests that came back negative.  She then tried prilosec (omeprazole wasn't effective) 20mg daily that resolved the cough, but stopping it results in return of symptoms.  Then had an EGD showing gastritis.  Now on PPI.    7.  Muscle spasms: Primarily affecting the lower back.  Improved from infrequent cyclobenzaprine 5 mg nightly as needed, but finds that using tizanidine 4mg PRN is more effective.   - Continue tiZANidine (ZANAFLEX) 4 MG capsule; Take 1 capsule (4 mg) by mouth 3 times daily as needed for muscle spasms     8.  R>L knee osteoarthritis: 1/16/2023 x-ray of the knees: \"IMPRESSION: Degenerative narrowing of the medial compartments of both knees with complete effacement of the medial compartment on the right and bone-on-bone contact. No evidence for fracture or significant effusion.\"  Degenerative arthritis symptoms of both knees, worse on the right.  1/18/2023 steroid injection of the right knee was very effective.  Repeat injection on 6/1/2023 and  9/14/2023 or partially effective but she is starting to have more pain of her hips that are degenerative in nature and possibly due to altered gait due to significant right knee osteoarthritis, and she has chronic degenerative back symptoms as well.  She is planning to see an orthopedic surgeon in June 2024 for consideration of TKA.  Patient notes having had many allergies to different metals and therefore was tested by an allergist, with documentation in the chart from that.    9.  History of left shoulder pain: Consistent with impingement syndrome.  Reportedly has had similar issue many years " ago that resolved with a steroid injection.  Symptomatic for at least 3 months when seen on 6/1/23; steroid injection on 6/1/23 resolved the pain. Not an issue today.     10.  Vaccinations: Vaccinations reviewed with Ms. Diaz.     - Influenza: refused by patient previously, she previously stated that she never gets the influenza vaccination.  - Shingrix: Up to date   - COVID-19: Advised keeping updated, and to hold methotrexate for 2 weeks afterward.    11. Elevated blood pressure:  Kaylene to follow up with Primary Care provider regarding elevated blood pressure.     Total minutes spent in evaluation with patient, documentation, , and review of pertinent studies and chart notes: 18  The longitudinal plan of care for the rheumatology problem(s) were addressed during this visit.  Due to added complexity of care, we will continue to support the patient and the subsequent management of this condition with ongoing continuity of care.       Ms. Diaz verbalized agreement with and understanding of the rational for the diagnosis and treatment plan.  All questions were answered to best of my ability and the patient's satisfaction. Ms. Diaz was advised to contact the clinic with any questions that may arise after the clinic visit.      Thank you for involving me in the care of the patient    Return to clinic: 3-4 months      HPI   Kaylene Diaz is a 68 year old female with a past medical history significant for hyperlipidemia, osteoarthritis, osteopenia, PE (2020) on chronic anticoagulation, meniscal tear, squamous cell carcinoma, basal cell carcinoma, and rheumatoid arthritis who presents for follow-up of rheumatoid arthritis.    3/24/2023: Steroid injection of the right knee resolved right knee pain.  No knee pain at this time.  Rheumatoid arthritis is well controlled.  Stable ulnar deviation and mild swelling of the MCPs as it has been for years but no pain or stiffness.    6/1/2023: Right knee has started to  hurt again and she would like repeat steroid injection today.  Left shoulder has also been painful, worse with abduction above 90 degrees and similar to shoulder pain that she had many years ago that resolved with a steroid injection.  Most recent shoulder pain for 1.5-3 weeks.  Joint pains have worsened since doing increased yard work.  No joint swelling.  RA controlled.    9/14/2023: left shoulder pain resolved with steroid injection.  Right knee injection was helpful but not as helpful as previous injection; again with right knee pain that is worse with activity and better with rest.  Small baker's cyst.  Would like repeat right knee injection today.  Other joints are doing well. Morning stiffness <30 min.     1/2/2024: RA controlled.  Methotrexate and hydroxychloroquine are effective.  Not using prednisone.  Right knee pain worse with activity, and starting to affect her gait more that is leading to more bilateral hip pain that radiates towards the groin, worse with activity and improves with rest.  Also with chronic degenerative low back pain that is worse with activity and improves with rest.  Steroid injection in the right knee is helpful but does not resolve her symptoms for a full 3 months    1/30/2024: Right knee was evaluated by orthopedics and she is concerned about potential prostatic material used considering reaction to various products.  She is going to pursue patch testing with dermatology.  She would like a steroid injection of the right knee at this time.  She does not anticipate having any surgery in the near future if she can tolerate the right knee symptoms.  She is hesitant about total knee arthroplasty because of potential for reaction to materials in the prosthetic     Today, 5/3/2024: Plan to see an orthopedic surgeon in June 2024 for TKA consideration.  Low back pain that affects both lower legs is starting to return as she nears the 6-month neelam since her last steroid injection in her  back so she is considering having a repeat injection.  Planning to establish care with Dr. Can as a new primary care provider since her previous PCP has left.  RA symptoms in the hands are stable.    Denies fevers, chills, nausea, vomiting, constipation, diarrhea. No abdominal pain. No chest pain/pressure, palpitations, or shortness of breath.   No LE swelling. No neck pain. No oral or nasal sores.  No rash. No sicca symptoms.   Intentionally losing weight.    Tobacco: Quit in 2000  EtOH: Rare  Drugs: None    ROS   12 point review of system was completed and negative except as noted in the HPI     Active Problem List     Patient Active Problem List   Diagnosis    Carpal tunnel syndrome    Tear meniscus knee    Osteopenia    Advanced directives, counseling/discussion    Hyperlipidemia LDL goal <100    Osteoarthritis    RA (rheumatoid arthritis) (H)    High risk medications (not anticoagulants) long-term use    Kidney stone    ASCUS on Pap smear    Class 3 obesity (H)    Elevated blood pressure reading without diagnosis of hypertension    Elevated glucose    Chronic kidney disease, stage 3a (H)    Chronic bilateral low back pain with bilateral sciatica    Lumbar radiculopathy    Immunodeficiency, unspecified (H24)    Other acute pulmonary embolism, unspecified whether acute cor pulmonale present (H)     Past Medical History     Past Medical History:   Diagnosis Date    BLANCA (acute kidney injury) (H24) 4/20/2017    Basal cell carcinoma     RA (rheumatoid arthritis) (H)     SBO (small bowel obstruction) (H) 4/20/2017    Small bowel obstruction (H)     Squamous cell carcinoma      Past Surgical History     Past Surgical History:   Procedure Laterality Date    ARTHROSCOPY KNEE  12/10/2010    ARTHROSCOPY KNEE performed by NAVID FIERRO at [facility]    COLONOSCOPY N/A 8/9/2018    Procedure: COLONOSCOPY;  colonoscopy;  Surgeon: Luke Kaye MD;  Location: [facility]    INJECT EPIDURAL LUMBAR N/A 7/7/2022    Procedure: Lumbar  "Epidural Steroid Injection;  Surgeon: Olivia Davies MD;  Location: [facility] OR    INJECT EPIDURAL LUMBAR N/A 11/11/2022    Procedure: Lumbar Epidural Steroid Injection, L4-5;  Surgeon: Olivia Davies MD;  Location: [facility] OR    INJECT EPIDURAL LUMBAR N/A 10/5/2023    Procedure: Lumbar Epidural Steroid Injection;  Surgeon: Olivia Davies MD;  Location: [facility] OR    SURGICAL HISTORY OF -       IUD removal    SURGICAL HISTORY OF -       Ovarian cyst    SURGICAL HISTORY OF -       thorn removal from foot    SURGICAL HISTORY OF -       knee surgery, scope, right knee     Allergy     Allergies   Allergen Reactions    Folic Acid     Gold Rash    Latex Rash     Not all latex products     Current Medication List     Current Outpatient Medications   Medication Sig Dispense Refill    hydroxychloroquine (PLAQUENIL) 200 MG tablet Take 2 tablets (400 mg) by mouth daily 180 tablet 2    Insulin Syringe-Needle U-100 (BD INSULIN SYRINGE) 27G X 1/2\" 1 ML MISC For once weekly methotrexate administration. 50 each 1    lisinopril (ZESTRIL) 20 MG tablet TAKE ONE TABLET BY MOUTH ONCE DAILY 90 tablet 0    methotrexate 50 MG/2ML injection Inject 1 mL (25 mg) Subcutaneous every 7 days 8 mL 6    Multiple Vitamins-Minerals (ADULT GUMMY PO) Take 1 chew tab by mouth daily VitaCrave      Multiple Vitamins-Minerals (HAIR/SKIN/NAILS/BIOTIN PO) Take 1 capsule by mouth daily.  100 tablet 3    omeprazole (PRILOSEC) 20 MG DR capsule TAKE ONE CAPSULE BY MOUTH ONCE DAILY 90 capsule 0    predniSONE (DELTASONE) 5 MG tablet Take 1 tablet (5 mg) by mouth daily as needed for inflammatory arthritis symptoms.  Use sparingly 90 tablet 1    rivaroxaban ANTICOAGULANT (XARELTO ANTICOAGULANT) 20 MG TABS tablet TAKE ONE TABLET BY MOUTH ONCE DAILY WITH DINNER 90 tablet 0    tiZANidine (ZANAFLEX) 4 MG tablet TAKE ONE TABLET BY MOUTH THREE TIMES A DAY AS NEEDED FOR MUSCLE SPASMS 270 tablet 0    vitamin B complex with vitamin C (STRESS TAB) tablet Take 1 tablet by mouth " "daily  0    VITAMIN D PO Take by mouth daily       No current facility-administered medications for this visit.         Social History   See HPI    Family History     Family History   Problem Relation Age of Onset    Prostate Cancer Father     Eye Disorder Father         mac degen    Heart Disease Father     Macular Degeneration Father     Heart Disease Mother         a-fib    Eye Disorder Mother     C.A.D. Mother     Hypertension Mother     Alcohol/Drug Brother     Alcohol/Drug Sister     Alcohol/Drug Brother     Alcohol/Drug Brother     Alcohol/Drug Brother     Alcohol/Drug Sister     Obesity Sister     Alcohol/Drug Sister     Prostate Cancer Sister     Cancer Other     Melanoma No family hx of        Physical Exam     Temp Readings from Last 3 Encounters:   10/05/23 97.4  F (36.3  C) (Temporal)   04/21/23 98.3  F (36.8  C) (Tympanic)   01/16/23 97.5  F (36.4  C) (Tympanic)     BP Readings from Last 5 Encounters:   01/29/24 (!) 174/97   01/02/24 (!) 161/86   10/05/23 (!) 177/89   09/14/23 (!) 150/86   06/01/23 (!) 150/78     Pulse Readings from Last 1 Encounters:   01/29/24 78     Resp Readings from Last 1 Encounters:   01/29/24 16     Estimated body mass index is 38.84 kg/m  as calculated from the following:    Height as of 1/23/24: 1.702 m (5' 7\").    Weight as of 1/29/24: 112.5 kg (248 lb).      GEN: NAD.   HEENT:  Anicteric, noninjected sclera. No obvious external lesions of the ear and nose. Hearing intact.  PULM: No increased work of breathing  MSK:  Hands and wrists without swelling.  Ulnar deviation at the MCPs, similar to previous exam  SKIN: No rash or jaundice seen  PSYCH: Alert. Appropriate.      Labs / Imaging (select studies)     [facility] labs dated 12/18/2023:  Normal Cr, Hepatic panel, ESR, CRP, CBC    [facility] labs dated 3/14/2024 were okay, including hepatic panel, creatinine, CBC, ESR, and CRP    Immunization History     Immunization History   Administered Date(s) Administered    COVID-19 MONOVALENT " 12+ (Pfizer) 03/05/2021, 03/26/2021, 11/10/2021    Pneumo Conj 13-V (2010&after) 12/03/2018    Pneumococcal 23 valent 10/24/2013, 03/09/2020    TD,PF 7+ (Tenivac) 10/28/2020    TDAP Vaccine (Adacel) 06/16/2010    Zoster recombinant adjuvanted (SHINGRIX) 12/03/2018, 06/19/2019              Chart documentation done in part with Dragon Voice recognition Software. Although reviewed after completion, some word and grammatical error may remain.      Video-Visit Details    Type of service:  Video Visit    Video Start Time: 8:27 AM    Video End Time:8:43 AM    Originating Location (pt. Location): Home, [place]    Distant Location (provider location):  off site, [place]    Platform used for Video Visit: Miguel Angel Rooney MD

## 2024-05-02 NOTE — PATIENT INSTRUCTIONS
RHEUMATOLOGY    [facility]  [address] [place] [address]  [place] [address]    Phone number: [phone]  Fax number: [phone]    If you need a medication refill, please contact us as you may need lab work and/or a follow up visit prior to your refill.      Thank you for choosing [facility]!    Keke Finnegan CMA Rheumatology

## 2024-05-03 ENCOUNTER — VIRTUAL VISIT (OUTPATIENT)
Dept: RHEUMATOLOGY | Facility: CLINIC | Age: 69
End: 2024-05-03
Payer: MEDICARE

## 2024-05-03 DIAGNOSIS — M05.79 RHEUMATOID ARTHRITIS INVOLVING MULTIPLE SITES WITH POSITIVE RHEUMATOID FACTOR (H): Primary | ICD-10-CM

## 2024-05-03 DIAGNOSIS — M54.42 CHRONIC BILATERAL LOW BACK PAIN WITH BILATERAL SCIATICA: ICD-10-CM

## 2024-05-03 DIAGNOSIS — M54.41 CHRONIC BILATERAL LOW BACK PAIN WITH BILATERAL SCIATICA: ICD-10-CM

## 2024-05-03 DIAGNOSIS — M17.0 PRIMARY OSTEOARTHRITIS OF BOTH KNEES: ICD-10-CM

## 2024-05-03 DIAGNOSIS — G89.29 CHRONIC BILATERAL LOW BACK PAIN WITH BILATERAL SCIATICA: ICD-10-CM

## 2024-05-03 DIAGNOSIS — Z79.899 HIGH RISK MEDICATION USE: ICD-10-CM

## 2024-05-03 PROCEDURE — 99214 OFFICE O/P EST MOD 30 MIN: CPT | Mod: 95 | Performed by: INTERNAL MEDICINE

## 2024-05-03 PROCEDURE — G2211 COMPLEX E/M VISIT ADD ON: HCPCS | Mod: 95 | Performed by: INTERNAL MEDICINE

## 2024-05-03 RX ORDER — SYRINGE-NEEDLE,INSULIN,0.5 ML 27GX1/2"
SYRINGE, EMPTY DISPOSABLE MISCELLANEOUS
Qty: 50 EACH | Refills: 1 | Status: SHIPPED | OUTPATIENT
Start: 2024-05-03

## 2024-05-06 ENCOUNTER — OFFICE VISIT (OUTPATIENT)
Dept: FAMILY MEDICINE | Facility: CLINIC | Age: 69
End: 2024-05-06
Payer: MEDICARE

## 2024-05-06 VITALS
DIASTOLIC BLOOD PRESSURE: 82 MMHG | OXYGEN SATURATION: 99 % | BODY MASS INDEX: 40.5 KG/M2 | SYSTOLIC BLOOD PRESSURE: 152 MMHG | RESPIRATION RATE: 20 BRPM | HEART RATE: 76 BPM | TEMPERATURE: 97.7 F | HEIGHT: 66 IN | WEIGHT: 252 LBS

## 2024-05-06 DIAGNOSIS — M62.838 MUSCLE SPASM: ICD-10-CM

## 2024-05-06 DIAGNOSIS — N18.31 CHRONIC KIDNEY DISEASE, STAGE 3A (H): ICD-10-CM

## 2024-05-06 DIAGNOSIS — G89.29 CHRONIC BILATERAL LOW BACK PAIN WITHOUT SCIATICA: ICD-10-CM

## 2024-05-06 DIAGNOSIS — M54.50 CHRONIC BILATERAL LOW BACK PAIN WITHOUT SCIATICA: ICD-10-CM

## 2024-05-06 DIAGNOSIS — Z29.11 NEED FOR VACCINATION AGAINST RESPIRATORY SYNCYTIAL VIRUS: ICD-10-CM

## 2024-05-06 DIAGNOSIS — D84.9 IMMUNODEFICIENCY, UNSPECIFIED (H): ICD-10-CM

## 2024-05-06 DIAGNOSIS — E78.5 HYPERLIPIDEMIA LDL GOAL <100: ICD-10-CM

## 2024-05-06 DIAGNOSIS — E66.813 CLASS 3 OBESITY: ICD-10-CM

## 2024-05-06 DIAGNOSIS — Z12.31 ENCOUNTER FOR SCREENING MAMMOGRAM FOR MALIGNANT NEOPLASM OF BREAST: ICD-10-CM

## 2024-05-06 DIAGNOSIS — I26.99 OTHER ACUTE PULMONARY EMBOLISM, UNSPECIFIED WHETHER ACUTE COR PULMONALE PRESENT (H): ICD-10-CM

## 2024-05-06 DIAGNOSIS — Z00.00 ENCOUNTER FOR MEDICARE ANNUAL WELLNESS EXAM: Primary | ICD-10-CM

## 2024-05-06 DIAGNOSIS — I10 PRIMARY HYPERTENSION: ICD-10-CM

## 2024-05-06 DIAGNOSIS — K22.719 BARRETT'S ESOPHAGUS WITH DYSPLASIA: ICD-10-CM

## 2024-05-06 DIAGNOSIS — E87.5 SERUM POTASSIUM ELEVATED: ICD-10-CM

## 2024-05-06 LAB
ANION GAP SERPL CALCULATED.3IONS-SCNC: 7 MMOL/L (ref 7–15)
BUN SERPL-MCNC: 19.5 MG/DL (ref 8–23)
CALCIUM SERPL-MCNC: 9.7 MG/DL (ref 8.8–10.2)
CHLORIDE SERPL-SCNC: 106 MMOL/L (ref 98–107)
CHOLEST SERPL-MCNC: 205 MG/DL
CREAT SERPL-MCNC: 0.84 MG/DL (ref 0.51–0.95)
CREAT UR-MCNC: 96.7 MG/DL
DEPRECATED HCO3 PLAS-SCNC: 26 MMOL/L (ref 22–29)
EGFRCR SERPLBLD CKD-EPI 2021: 75 ML/MIN/1.73M2
FASTING STATUS PATIENT QL REPORTED: YES
GLUCOSE SERPL-MCNC: 92 MG/DL (ref 70–99)
HDLC SERPL-MCNC: 60 MG/DL
HGB BLD-MCNC: 13.6 G/DL (ref 11.7–15.7)
LDLC SERPL CALC-MCNC: 130 MG/DL
MICROALBUMIN UR-MCNC: <12 MG/L
MICROALBUMIN/CREAT UR: NORMAL MG/G{CREAT}
NONHDLC SERPL-MCNC: 145 MG/DL
POTASSIUM SERPL-SCNC: 5.4 MMOL/L (ref 3.4–5.3)
SODIUM SERPL-SCNC: 139 MMOL/L (ref 135–145)
TRIGL SERPL-MCNC: 77 MG/DL

## 2024-05-06 PROCEDURE — 80048 BASIC METABOLIC PNL TOTAL CA: CPT | Performed by: FAMILY MEDICINE

## 2024-05-06 PROCEDURE — 82570 ASSAY OF URINE CREATININE: CPT | Performed by: FAMILY MEDICINE

## 2024-05-06 PROCEDURE — 80061 LIPID PANEL: CPT | Performed by: FAMILY MEDICINE

## 2024-05-06 PROCEDURE — 36415 COLL VENOUS BLD VENIPUNCTURE: CPT | Performed by: FAMILY MEDICINE

## 2024-05-06 PROCEDURE — 85018 HEMOGLOBIN: CPT | Performed by: FAMILY MEDICINE

## 2024-05-06 PROCEDURE — G0439 PPPS, SUBSEQ VISIT: HCPCS | Performed by: FAMILY MEDICINE

## 2024-05-06 PROCEDURE — 99214 OFFICE O/P EST MOD 30 MIN: CPT | Mod: 25 | Performed by: FAMILY MEDICINE

## 2024-05-06 PROCEDURE — 82043 UR ALBUMIN QUANTITATIVE: CPT | Performed by: FAMILY MEDICINE

## 2024-05-06 RX ORDER — OXYCODONE HYDROCHLORIDE 5 MG/1
5 TABLET ORAL EVERY 6 HOURS PRN
Qty: 15 TABLET | Refills: 0 | Status: ON HOLD | OUTPATIENT
Start: 2024-05-06 | End: 2024-08-20

## 2024-05-06 RX ORDER — RESPIRATORY SYNCYTIAL VIRUS VACCINE 120MCG/0.5
0.5 KIT INTRAMUSCULAR ONCE
Qty: 1 EACH | Refills: 0 | Status: CANCELLED | OUTPATIENT
Start: 2024-05-06 | End: 2024-05-06

## 2024-05-06 RX ORDER — LISINOPRIL 30 MG/1
30 TABLET ORAL DAILY
Qty: 90 TABLET | Refills: 3 | Status: SHIPPED | OUTPATIENT
Start: 2024-05-06

## 2024-05-06 SDOH — HEALTH STABILITY: PHYSICAL HEALTH: ON AVERAGE, HOW MANY MINUTES DO YOU ENGAGE IN EXERCISE AT THIS LEVEL?: PATIENT DECLINED

## 2024-05-06 SDOH — HEALTH STABILITY: PHYSICAL HEALTH: ON AVERAGE, HOW MANY DAYS PER WEEK DO YOU ENGAGE IN MODERATE TO STRENUOUS EXERCISE (LIKE A BRISK WALK)?: 0 DAYS

## 2024-05-06 ASSESSMENT — SOCIAL DETERMINANTS OF HEALTH (SDOH): HOW OFTEN DO YOU GET TOGETHER WITH FRIENDS OR RELATIVES?: PATIENT DECLINED

## 2024-05-06 ASSESSMENT — PAIN SCALES - GENERAL: PAINLEVEL: NO PAIN (0)

## 2024-05-06 NOTE — COMMUNITY RESOURCES LIST (ENGLISH)
May 6, 2024           YOUR PERSONALIZED LIST OF SERVICES & PROGRAMS           & RECREATION    Sports      [facility] - Adult Enrichment  Phone: [phone]  Website: [email or web address]  Language: English  Hours: Mon 7:30 AM - 4:00 PM Tue 7:30 AM - 4:00 PM Wed 7:30 AM - 4:00 PM Thu 7:30 AM - 4:00 PM Fri 7:30 AM - 4:00 PM    Classes/Groups      [facility] - Sit and Be Fit/[employer]  [address] [place] [address] (Distance: 23.0 miles)  Website: [email or web address]/sit-and-be-fit/  Language: English  Fee: Free      - Online and Local Fitness Classes  Phone: [phone]  Website: [email or web address]  Language: English  Fee: Free               IMPORTANT NUMBERS & WEBSITES        Emergency Services  911  .   [facility]  211 [email or web address]  .   Poison Control  (647) 565-2425 http://mnpoison.org http://wis[place]poison.org  .     Suicide and Crisis Lifeline  988 http://988lifeline.org  .   Childhelp National Child Abuse Hotline  201.783.5486 http://Childhelphotline.org   .   National Sexual Assault Hotline  (980) 719-2348 (HOPE) http://Rainn.org   .     National Runaway Safeline  (114) 200-8691 (RUNAWAY) http://1800runaway.org  .   Pregnancy & Postpartum Support  Call/text 744-208-0960  [place]: http://ppsupportmn.org  [place]: http://[employer].com/wi  .   Substance Abuse National Helpline ([facility])  470-357-HELP (7011) http://Findtreatment.gov   .                DISCLAIMER: These resources have been generated via the Routezilla Platform. [employer] does not endorse any service providers mentioned in this resource list. [employer] does not guarantee that the services mentioned in this resource list will be available to you or will improve your health or wellness.    [facility]

## 2024-05-06 NOTE — PATIENT INSTRUCTIONS
Preventive Care Advice   This is general advice given by our system to help you stay healthy. However, your care team may have specific advice just for you. Please talk to your care team about your preventive care needs.  Nutrition  Eat 5 or more servings of fruits and vegetables each day.  Try wheat bread, brown rice and whole grain pasta (instead of white bread, rice, and pasta).  Get enough calcium and vitamin D. Check the label on foods and aim for 100% of the RDA (recommended daily allowance).  Lifestyle  Exercise at least 150 minutes each week   (30 minutes a day, 5 days a week).  Do muscle strengthening activities 2 days a week. These help control your weight and prevent disease.  No smoking.  Wear sunscreen to prevent skin cancer.  Have a dental exam and cleaning every 6 months.  Yearly exams  See your health care team every year to talk about:  Any changes in your health.  Any medicines your care team has prescribed.  Preventive care, family planning, and ways to prevent chronic diseases.  Shots (vaccines)   HPV shots (up to age 26), if you've never had them before.  Hepatitis B shots (up to age 59), if you've never had them before.  COVID-19 shot: Get this shot when it's due.  Flu shot: Get a flu shot every year.  Tetanus shot: Get a tetanus shot every 10 years.  Pneumococcal, hepatitis A, and RSV shots: Ask your care team if you need these based on your risk.  Shingles shot (for age 50 and up).  General health tests  Diabetes screening:  Starting at age 35, Get screened for diabetes at least every 3 years.  If you are younger than age 35, ask your care team if you should be screened for diabetes.  Cholesterol test: At age 39, start having a cholesterol test every 5 years, or more often if advised.  Bone density scan (DEXA): At age 50, ask your care team if you should have this scan for osteoporosis (brittle bones).  Hepatitis C: Get tested at least once in your life.  STIs (sexually transmitted  infections)  Before age 24: Ask your care team if you should be screened for STIs.  After age 24: Get screened for STIs if you're at risk. You are at risk for STIs (including HIV) if:  You are sexually active with more than one person.  You don't use condoms every time.  You or a partner was diagnosed with a sexually transmitted infection.  If you are at risk for HIV, ask about PrEP medicine to prevent HIV.  Get tested for HIV at least once in your life, whether you are at risk for HIV or not.  Cancer screening tests  Cervical cancer screening: If you have a cervix, begin getting regular cervical cancer screening tests at age 21. Most people who have regular screenings with normal results can stop after age 65. Talk about this with your provider.  Breast cancer scan (mammogram): If you've ever had breasts, begin having regular mammograms starting at age 40. This is a scan to check for breast cancer.  Colon cancer screening: It is important to start screening for colon cancer at age 45.  Have a colonoscopy test every 10 years (or more often if you're at risk) Or, ask your provider about stool tests like a FIT test every year or Cologuard test every 3 years.  To learn more about your testing options, visit: [email or web address].pdf.  For help making a decision, visit: https://bit.ly/kj24105.  Prostate cancer screening test: If you have a prostate and are age 55 to 69, ask your provider if you would benefit from a yearly prostate cancer screening test.  Lung cancer screening: If you are a current or former smoker age 50 to 80, ask your care team if ongoing lung cancer screenings are right for you.  For informational purposes only. Not to replace the advice of your health care provider. Copyright   2023 [employer]. All rights reserved. Clinically reviewed by the [facility]. [place] 636584 - REV 01/24.    Preventing Falls: Care Instructions  Injuries and health  problems such as trouble walking or poor eyesight can increase your risk of falling. So can some medicines. But there are things you can do to help prevent falls. You can exercise to get stronger. You can also arrange your home to make it safer.    Talk to your doctor about the medicines you take. Ask if any of them increase the risk of falls and whether they can be changed or stopped.   Try to exercise regularly. It can help improve your strength and balance. This can help lower your risk of falling.     Practice fall safety and prevention.    Wear low-heeled shoes that fit well and give your feet good support. Talk to your doctor if you have foot problems that make this hard.  Carry a cellphone or wear a medical alert device that you can use to call for help.  Use stepladders instead of chairs to reach high objects. Don't climb if you're at risk for falls. Ask for help, if needed.  Wear the correct eyeglasses, if you need them.    Make your home safer.    Remove rugs, cords, clutter, and furniture from walkways.  Keep your house well lit. Use night-lights in hallways and bathrooms.  Install and use sturdy handrails on stairways.  Wear nonskid footwear, even inside. Don't walk barefoot or in socks without shoes.    Be safe outside.    Use handrails, curb cuts, and ramps whenever possible.  Keep your hands free by using a shoulder bag or backpack.  Try to walk in well-lit areas. Watch out for uneven ground, changes in pavement, and debris.  Be careful in the winter. Walk on the grass or gravel when sidewalks are slippery. Use de-icer on steps and walkways. Add non-slip devices to shoes.    Put grab bars and nonskid mats in your shower or tub and near the toilet. Try to use a shower chair or bath bench when bathing.   Get into a tub or shower by putting in your weaker leg first. Get out with your strong side first. Have a phone or medical alert device in the bathroom with you.   Where can you learn more?  Go to  "https://www.[employer].net/patiented  Enter G117 in the search box to learn more about \"Preventing Falls: Care Instructions.\"  Current as of: July 17, 2023               Content Version: 14.0    0151-7486 [employer].   Care instructions adapted under license by your healthcare professional. If you have questions about a medical condition or this instruction, always ask your healthcare professional. [employer] disclaims any warranty or liability for your use of this information.      "

## 2024-05-06 NOTE — COMMUNITY RESOURCES LIST (ENGLISH)
May 6, 2024           YOUR PERSONALIZED LIST OF SERVICES & PROGRAMS           & RECREATION    Sports      [facility] - Adult Enrichment  Phone: [phone]  Website: [email or web address]  Language: English  Hours: Mon 7:30 AM - 4:00 PM Tue 7:30 AM - 4:00 PM Wed 7:30 AM - 4:00 PM Thu 7:30 AM - 4:00 PM Fri 7:30 AM - 4:00 PM    Classes/Groups      [facility] - Sit and Be Fit/[facility]  [address] [place] [address] (Distance: 23.0 miles)  Website: [email or web address]/sit-and-be-fit/  Language: English  Fee: Free      - Online and Local Fitness Classes  Phone: [phone]  Website: [email or web address]  Language: English  Fee: Free               IMPORTANT NUMBERS & WEBSITES        Emergency Services  911  .   [facility]  211 http://211unitedway.org  .   Poison Control  (402) 659-6100 http://[place]poison.org http://[place]poison.org  .     Suicide and Crisis Lifeline  988 http://988lifeline.org  .   Childhelp National Child Abuse Hotline  655.249.5143 http://Childhelphotline.org   .   National Sexual Assault Hotline  (546) 987-7077 (HOPE) http://Rainn.org   .     National Runaway Safeline  (737) 322-2549 (RUNAWAY) http://1800runaway.org  .   Pregnancy & Postpartum Support  Call/text 024-928-6728  [place]: http://ppsupportmn.org  [place]: http://Hiri.com/wi  .   Substance Abuse National Helpline ([facility])  814-196-HELP (4276) http://Findtreatment.gov   .                DISCLAIMER: These resources have been generated via the [employer] Platform. [employer] does not endorse any service providers mentioned in this resource list. [employer] does not guarantee that the services mentioned in this resource list will be available to you or will improve your health or wellness.    [facility]

## 2024-05-06 NOTE — PROGRESS NOTES
Preventive Care Visit  [facility]  Jamshid Can MD, Family Medicine  May 6, 2024      Assessment & Plan     Encounter for Medicare annual wellness exam  Patient was advised on recommended screening and preventive health recommendations.  He verbalized understanding and agreed to the plans below.     Muscle spasm  Advised safe use of muscle relaxants.  Has been taking this chronically.  - tiZANidine (ZANAFLEX) 4 MG tablet  Dispense: 270 tablet; Refill: 3  - OFFICE/OUTPT VISIT,EST,LEVL IV    Other acute pulmonary embolism, unspecified whether acute cor pulmonale present (H)  Stable. No acute concerns.  - rivaroxaban ANTICOAGULANT (XARELTO ANTICOAGULANT) 20 MG TABS tablet  Dispense: 90 tablet; Refill: 3  - OFFICE/OUTPT VISIT,EST,LEVL IV    Napoles's esophagus with dysplasia  Stable on PPI. Advised importance of GI ulcer prevention considering she is on anticoagulation.  - omeprazole (PRILOSEC) 20 MG DR capsule  Dispense: 90 capsule; Refill: 3  - OFFICE/OUTPT VISIT,EST,LEVL IV    Primary hypertension  Patient was advised BP uncontrolled today.  Reviewed meds with patient.  Increased lisinopril to optimize management.  Reinforced sodium restriction.  Exercise recommendations reviewed with patient.  Recheck with RN in 2 weeks    - lisinopril (ZESTRIL) 30 MG tablet  Dispense: 90 tablet; Refill: 3  - OFFICE/OUTPT VISIT,EST,LEVL IV    Need for vaccination against respiratory syncytial virus  Patient to obtain in the fall.    Chronic kidney disease, stage 3a (H)  - BASIC METABOLIC PANEL  - Albumin Random Urine Quantitative with Creat Ratio  - Hemoglobin  - BASIC METABOLIC PANEL  - Albumin Random Urine Quantitative with Creat Ratio  - Hemoglobin    Hyperlipidemia LDL goal <100  Reinforced heart healthy lifestyle.   - Lipid panel reflex to direct LDL Fasting  - Lipid panel reflex to direct LDL Fasting    Immunodeficiency, unspecified (H24)  Continues to be on methotrexate managed by  "rheumatology    Class 3 obesity (H)  Increases complexity of management of the above chronic conditions.  Patient was advised healthy diet recommendations.  Patient was advised weekly exercise recommendations and goals.     Encounter for screening mammogram for malignant neoplasm of breast  - *MA Screening Digital Bilateral    Chronic bilateral low back pain without sciatica  Uncontrolled.  PDMP with no concerns.  Sees pain clinic for ESIs primarily.   Discussed safe use of opiate analgesics. Patient agreed to take oxycodone only for severe pain episodes.  - oxyCODONE (ROXICODONE) 5 MG tablet  Dispense: 15 tablet; Refill: 0      Patient has been advised of split billing requirements and indicates understanding: Yes      BMI  Estimated body mass index is 40.98 kg/m  as calculated from the following:    Height as of this encounter: 1.67 m (5' 5.75\").    Weight as of this encounter: 114.3 kg (252 lb).   Weight management plan: Discussed healthy diet and exercise guidelines    Counseling  Appropriate preventive services were discussed with this patient, including applicable screening as appropriate for fall prevention, nutrition, physical activity, Tobacco-use cessation, weight loss and cognition.  Checklist reviewing preventive services available has been given to the patient.  Reviewed patient's diet, addressing concerns and/or questions.       Patient Instructions   Preventive Care Advice   This is general advice given by our system to help you stay healthy. However, your care team may have specific advice just for you. Please talk to your care team about your preventive care needs.  Nutrition  Eat 5 or more servings of fruits and vegetables each day.  Try wheat bread, brown rice and whole grain pasta (instead of white bread, rice, and pasta).  Get enough calcium and vitamin D. Check the label on foods and aim for 100% of the RDA (recommended daily allowance).  Lifestyle  Exercise at least 150 minutes each week   (30 " minutes a day, 5 days a week).  Do muscle strengthening activities 2 days a week. These help control your weight and prevent disease.  No smoking.  Wear sunscreen to prevent skin cancer.  Have a dental exam and cleaning every 6 months.  Yearly exams  See your health care team every year to talk about:  Any changes in your health.  Any medicines your care team has prescribed.  Preventive care, family planning, and ways to prevent chronic diseases.  Shots (vaccines)   HPV shots (up to age 26), if you've never had them before.  Hepatitis B shots (up to age 59), if you've never had them before.  COVID-19 shot: Get this shot when it's due.  Flu shot: Get a flu shot every year.  Tetanus shot: Get a tetanus shot every 10 years.  Pneumococcal, hepatitis A, and RSV shots: Ask your care team if you need these based on your risk.  Shingles shot (for age 50 and up).  General health tests  Diabetes screening:  Starting at age 35, Get screened for diabetes at least every 3 years.  If you are younger than age 35, ask your care team if you should be screened for diabetes.  Cholesterol test: At age 39, start having a cholesterol test every 5 years, or more often if advised.  Bone density scan (DEXA): At age 50, ask your care team if you should have this scan for osteoporosis (brittle bones).  Hepatitis C: Get tested at least once in your life.  STIs (sexually transmitted infections)  Before age 24: Ask your care team if you should be screened for STIs.  After age 24: Get screened for STIs if you're at risk. You are at risk for STIs (including HIV) if:  You are sexually active with more than one person.  You don't use condoms every time.  You or a partner was diagnosed with a sexually transmitted infection.  If you are at risk for HIV, ask about PrEP medicine to prevent HIV.  Get tested for HIV at least once in your life, whether you are at risk for HIV or not.  Cancer screening tests  Cervical cancer screening: If you have a cervix,  begin getting regular cervical cancer screening tests at age 21. Most people who have regular screenings with normal results can stop after age 65. Talk about this with your provider.  Breast cancer scan (mammogram): If you've ever had breasts, begin having regular mammograms starting at age 40. This is a scan to check for breast cancer.  Colon cancer screening: It is important to start screening for colon cancer at age 45.  Have a colonoscopy test every 10 years (or more often if you're at risk) Or, ask your provider about stool tests like a FIT test every year or Cologuard test every 3 years.  To learn more about your testing options, visit: https://www.[employer]zz[employer]/922246.pdf.  For help making a decision, visit: https://bit.[employer]/vn36691.  Prostate cancer screening test: If you have a prostate and are age 55 to 69, ask your provider if you would benefit from a yearly prostate cancer screening test.  Lung cancer screening: If you are a current or former smoker age 50 to 80, ask your care team if ongoing lung cancer screenings are right for you.  For informational purposes only. Not to replace the advice of your health care provider. Copyright   2023 [employer]. All rights reserved. Clinically reviewed by the official[employer]fm [employer]. BIOeCON 423942 - REV 01/24.    Preventing Falls: Care Instructions  Injuries and health problems such as trouble walking or poor eyesight can increase your risk of falling. So can some medicines. But there are things you can do to help prevent falls. You can exercise to get stronger. You can also arrange your home to make it safer.    Talk to your doctor about the medicines you take. Ask if any of them increase the risk of falls and whether they can be changed or stopped.   Try to exercise regularly. It can help improve your strength and balance. This can help lower your risk of falling.     Practice fall safety and prevention.    Wear low-heeled shoes that  "fit well and give your feet good support. Talk to your doctor if you have foot problems that make this hard.  Carry a cellphone or wear a medical alert device that you can use to call for help.  Use stepladders instead of chairs to reach high objects. Don't climb if you're at risk for falls. Ask for help, if needed.  Wear the correct eyeglasses, if you need them.    Make your home safer.    Remove rugs, cords, clutter, and furniture from walkways.  Keep your house well lit. Use night-lights in hallways and bathrooms.  Install and use sturdy handrails on stairways.  Wear nonskid footwear, even inside. Don't walk barefoot or in socks without shoes.    Be safe outside.    Use handrails, curb cuts, and ramps whenever possible.  Keep your hands free by using a shoulder bag or backpack.  Try to walk in well-lit areas. Watch out for uneven ground, changes in pavement, and debris.  Be careful in the winter. Walk on the grass or gravel when sidewalks are slippery. Use de-icer on steps and walkways. Add non-slip devices to shoes.    Put grab bars and nonskid mats in your shower or tub and near the toilet. Try to use a shower chair or bath bench when bathing.   Get into a tub or shower by putting in your weaker leg first. Get out with your strong side first. Have a phone or medical alert device in the bathroom with you.   Where can you learn more?  Go to https://www.Lendstar.net/patiented  Enter G117 in the search box to learn more about \"Preventing Falls: Care Instructions.\"  Current as of: July 17, 2023               Content Version: 14.0    7621-6508 [employer].   Care instructions adapted under license by your healthcare professional. If you have questions about a medical condition or this instruction, always ask your healthcare professional. [employer] disclaims any warranty or liability for your use of this information.         Fletcher Maurice is a 68 year old, presenting for the " "following:  Physical, Hypertension, Weight Problem (Pt would like to discuss possible medication options for weight loss), and Pain (Back and knee pain, pt would like to discuss possible pain management between specialty visits and possible future surgeries. )        5/6/2024     8:37 AM   Additional Questions   Roomed by Gabriella HAYES MA   Accompanied by Self         5/6/2024     8:37 AM   Patient Reported Additional Medications   Patient reports taking the following new medications None         Health Care Directive  Patient has a Health Care Directive on file  Advance care planning document is on file and is current.    Musculoskeletal Problem      Chronic pain: back mostly but also right knee  - worsened in last several weeks.  - no trauma per patient.  - moderate to severe dull to sharp pain low back  - was told \"lowest four spine are bad\".  - can make legs feel weak if pain is bad.  - denies tingling or numbness in pelvic area  - may affect sleep  - uses otc naproxen prn pain  - sees pain clinic and is lined up for [facility].  - also taking muscle relaxers.  PDMP reviewed - no opiate analgesics on record.    Hypertension Follow-up    Do you check your blood pressure regularly outside of the clinic? No - only on doctor visits   Are you following a low salt diet? No  Are your blood pressures ever more than 140 on the top number (systolic) OR more   than 90 on the bottom number (diastolic), for example 140/90? Yes    Refill of rivaroxaban  fro hx of PE.  No concerns from med.    Refill on omeprazole  No concerns.         5/6/2024   General Health   How would you rate your overall physical health? (!) FAIR   Feel stress (tense, anxious, or unable to sleep) Not at all         5/6/2024   Nutrition   Diet: Low salt    Breakfast skipped         5/6/2024   Exercise   Days per week of moderate/strenous exercise 0 days   Average minutes spent exercising at this level Patient declined   (!) EXERCISE CONCERN      5/6/2024   Social " Factors   Frequency of gathering with friends or relatives Patient declined   Worry food won't last until get money to buy more No   Food not last or not have enough money for food? No   Do you have housing?  Yes   Are you worried about losing your housing? No   Lack of transportation? No   Unable to get utilities (heat,electricity)? No         2024   Fall Risk   Fallen 2 or more times in the past year? No   Trouble with walking or balance? Yes   Gait Speed Test (Document in seconds) 5.19   Gait Speed Test Interpretation Greater than 5.01 seconds - ABNORMAL          2024   Activities of Daily Living- Home Safety   Needs help with the following daily activites None of the above   Safety concerns in the home None of the above         2024   Dental   Dentist two times every year? Yes         2024   Hearing Screening   Hearing concerns? None of the above         2024   Driving Risk Screening   Patient/family members have concerns about driving No         2024   General Alertness/Fatigue Screening   Have you been more tired than usual lately? No         2024   Urinary Incontinence Screening   Bothered by leaking urine in past 6 months No            Today's PHQ-2 Score:       2024     8:37 AM   PHQ-2 (  [employer])   Q1: Little interest or pleasure in doing things 0   Q2: Feeling down, depressed or hopeless 0   PHQ-2 Score 0   Q1: Little interest or pleasure in doing things Not at all   Q2: Feeling down, depressed or hopeless Not at all   PHQ-2 Score 0           2024   Substance Use   Alcohol more than 3/day or more than 7/wk No   Do you have a current opioid prescription? No   How severe/bad is pain from 1 to 10? 6/10   Do you use any other substances recreationally? No     Social History     Tobacco Use    Smoking status: Former     Current packs/day: 0.00     Types: Cigarettes     Start date: 1970     Quit date: 2000     Years since quittin.0    Smokeless tobacco:  Never   Vaping Use    Vaping status: Never Used   Substance Use Topics    Alcohol use: Yes     Comment: VERY RARE    Drug use: No           4/21/2023   LAST FHS-7 RESULTS   1st degree relative breast or ovarian cancer No   Any relative bilateral breast cancer No   Any male have breast cancer No   Any ONE woman have BOTH breast AND ovarian cancer No   Any woman with breast cancer before 50yrs No   2 or more relatives with breast AND/OR ovarian cancer No   2 or more relatives with breast AND/OR bowel cancer No        Mammogram Screening - Mammogram every 1-2 years updated in Health Maintenance based on mutual decision making    ASCVD Risk   The 10-year ASCVD risk score (Marie LOUISE, et al., 2019) is: 16.1%    Values used to calculate the score:      Age: 68 years      Sex: Female      Is Non- : No      Diabetic: No      Tobacco smoker: No      Systolic Blood Pressure: 152 mmHg      Is BP treated: Yes      HDL Cholesterol: 44 mg/dL      Total Cholesterol: 186 mg/dL            Reviewed and updated as needed this visit by Provider     Meds                Past Medical History:   Diagnosis Date    BLANCA (acute kidney injury) (H24) 4/20/2017    Basal cell carcinoma     RA (rheumatoid arthritis) (H)     SBO (small bowel obstruction) (H) 4/20/2017    Small bowel obstruction (H)     Squamous cell carcinoma      Past Surgical History:   Procedure Laterality Date    ARTHROSCOPY KNEE  12/10/2010    ARTHROSCOPY KNEE performed by NAVID FIERRO at [facility]    COLONOSCOPY N/A 8/9/2018    Procedure: COLONOSCOPY;  colonoscopy;  Surgeon: Luke Kaye MD;  Location: [facility]    INJECT EPIDURAL LUMBAR N/A 7/7/2022    Procedure: Lumbar Epidural Steroid Injection;  Surgeon: Olivia Davies MD;  [place] [facility] [place]    INJECT EPIDURAL LUMBAR N/A 11/11/2022    Procedure: Lumbar Epidural Steroid Injection, L4-5;  Surgeon: Olivia Davies MD;  Location: [facility] OR    INJECT EPIDURAL LUMBAR N/A 10/5/2023    Procedure:  Lumbar Epidural Steroid Injection;  Surgeon: Olivia Davies MD;  [place] [facility] [place]    SURGICAL HISTORY OF -       IUD removal    SURGICAL HISTORY OF -       Ovarian cyst    SURGICAL HISTORY OF -       thorn removal from foot    SURGICAL HISTORY OF -       knee surgery, scope, right knee     Patient Active Problem List   Diagnosis    Carpal tunnel syndrome    Tear meniscus knee    Osteopenia    Advanced directives, counseling/discussion    Hyperlipidemia LDL goal <100    Osteoarthritis    RA (rheumatoid arthritis) (H)    High risk medications (not anticoagulants) long-term use    Kidney stone    ASCUS on Pap smear    Class 3 obesity (H)    Elevated blood pressure reading without diagnosis of hypertension    Elevated glucose    Chronic kidney disease, stage 3a (H)    Chronic bilateral low back pain with bilateral sciatica    Lumbar radiculopathy    Immunodeficiency, unspecified (H24)    Other acute pulmonary embolism, unspecified whether acute cor pulmonale present (H)     Past Surgical History:   Procedure Laterality Date    ARTHROSCOPY KNEE  12/10/2010    ARTHROSCOPY KNEE performed by NAVID FIERRO at [facility]    COLONOSCOPY N/A 8/9/2018    Procedure: COLONOSCOPY;  colonoscopy;  Surgeon: Luke Kaye MD;  Location: [facility]    INJECT EPIDURAL LUMBAR N/A 7/7/2022    Procedure: Lumbar Epidural Steroid Injection;  Surgeon: Olivia Davies MD;  [place] [facility] [place]    INJECT EPIDURAL LUMBAR N/A 11/11/2022    Procedure: Lumbar Epidural Steroid Injection, L4-5;  Surgeon: Olivia Davies MD;  Location: [facility]    INJECT EPIDURAL LUMBAR N/A 10/5/2023    Procedure: Lumbar Epidural Steroid Injection;  Surgeon: Olivia Davies MD;  [place] [facility] [place]    SURGICAL HISTORY OF -       IUD removal    SURGICAL HISTORY OF -       Ovarian cyst    SURGICAL HISTORY OF -       thorn removal from foot    SURGICAL HISTORY OF -       knee surgery, scope, right knee       Social History     Tobacco Use    Smoking status: Former     Current  "packs/day: 0.00     Types: Cigarettes     Start date: 1970     Quit date: 2000     Years since quittin.0    Smokeless tobacco: Never   Substance Use Topics    Alcohol use: Yes     Comment: VERY RARE     Family History   Problem Relation Age of Onset    Prostate Cancer Father     Eye Disorder Father         mac degen    Heart Disease Father     Macular Degeneration Father     Heart Disease Mother         a-fib    Eye Disorder Mother     C.A.D. Mother     Hypertension Mother     Alcohol/Drug Brother     Alcohol/Drug Sister     Alcohol/Drug Brother     Alcohol/Drug Brother     Alcohol/Drug Brother     Alcohol/Drug Sister     Obesity Sister     Alcohol/Drug Sister     Prostate Cancer Sister     Cancer Other     Melanoma No family hx of          Current Outpatient Medications   Medication Sig Dispense Refill    hydroxychloroquine (PLAQUENIL) 200 MG tablet Take 2 tablets (400 mg) by mouth daily 180 tablet 2    Insulin Syringe-Needle U-100 (BD INSULIN SYRINGE) 27G X 1/2\" 1 ML MISC For once weekly methotrexate administration. 50 each 1    lisinopril (ZESTRIL) 30 MG tablet Take 1 tablet (30 mg) by mouth daily 90 tablet 3    methotrexate 50 MG/2ML injection Inject 1 mL (25 mg) Subcutaneous every 7 days 8 mL 6    Multiple Vitamins-Minerals (ADULT GUMMY PO) Take 1 chew tab by mouth daily VitaCrave      omeprazole (PRILOSEC) 20 MG DR capsule Take 1 capsule (20 mg) by mouth daily 90 capsule 3    oxyCODONE (ROXICODONE) 5 MG tablet Take 1 tablet (5 mg) by mouth every 6 hours as needed for severe pain 15 tablet 0    rivaroxaban ANTICOAGULANT (XARELTO ANTICOAGULANT) 20 MG TABS tablet Take 1 tablet (20 mg) by mouth daily (with dinner) 90 tablet 3    tiZANidine (ZANAFLEX) 4 MG tablet Take 1 tablet (4 mg) by mouth 3 times daily as needed for muscle spasms 270 tablet 3    vitamin B complex with vitamin C (STRESS TAB) tablet Take 1 tablet by mouth daily  0    VITAMIN D PO Take by mouth daily      Multiple Vitamins-Minerals " (HAIR/SKIN/NAILS/BIOTIN PO) Take 1 capsule by mouth daily.  (Patient not taking: Reported on 5/6/2024) 100 tablet 3    predniSONE (DELTASONE) 5 MG tablet Take 1 tablet (5 mg) by mouth daily as needed for inflammatory arthritis symptoms.  Use sparingly (Patient not taking: Reported on 5/6/2024) 90 tablet 1     Allergies   Allergen Reactions    Folic Acid     Gold Rash    Latex Rash     Not all latex products     Current providers sharing in care for this patient include:  Patient Care Team:  No Ref-Primary, Physician as PCP - General  Mitch Briceño [facility] as Pharmacist (Pharmacist Clinician- Clinical Pharmacy Specialist)  Joseph Rooney MD as Assigned Rheumatology Provider  Olivia Davies MD as MD (Anesthesiology)  Luiz Powers MD as MD (Ophthalmology)  Jean Marie Del Cid MD as Assigned Musculoskeletal Provider  Cipriano Springer MD as MD (Dermatology)  Cipriano Springer MD as Referring Physician (Dermatology)  Mono Pond MD as MD (Allergy & Immunology)  Jean Marie Del Cid MD as MD (Orthopaedic Surgery)  Hitesh Washington MD as Assigned PCP  Mono Pond MD as Assigned Surgical Provider    The following health maintenance items are reviewed in Epic and correct as of today:  Health Maintenance   Topic Date Due    URINE DRUG SCREEN  Never done    RSV VACCINE (Pregnancy & 60+) (1 - 1-dose 60+ series) Never done    COVID-19 Vaccine (4 - 2023-24 season) 09/01/2023    BMP  04/21/2024    LIPID  04/21/2024    MICROALBUMIN  04/21/2024    MEDICARE ANNUAL WELLNESS VISIT  04/21/2024    INFLUENZA VACCINE (Season Ended) 09/01/2024    MAMMO SCREENING  04/21/2025    ANNUAL REVIEW OF HM ORDERS  05/06/2025    FALL RISK ASSESSMENT  05/06/2025    HEMOGLOBIN  05/06/2025    GLUCOSE  04/21/2026    COLORECTAL CANCER SCREENING  08/09/2028    ADVANCE CARE PLANNING  05/06/2029    DTAP/TDAP/TD IMMUNIZATION (3 - Td or Tdap) 10/28/2030    DEXA  04/27/2037    HEPATITIS C SCREENING  Completed    PHQ-2  "(once per calendar year)  Completed    Pneumococcal Vaccine: 65+ Years  Completed    URINALYSIS  Completed    ZOSTER IMMUNIZATION  Completed    IPV IMMUNIZATION  Aged Out    HPV IMMUNIZATION  Aged Out    MENINGITIS IMMUNIZATION  Aged Out    RSV MONOCLONAL ANTIBODY  Aged Out         Review of Systems  Constitutional, HEENT, cardiovascular, pulmonary, GI, , musculoskeletal, neuro, skin, endocrine and psych systems are negative, except as otherwise noted.     Objective    Exam  BP (!) 152/82   Pulse 76   Temp 97.7  F (36.5  C) (Tympanic)   Resp 20   Ht 1.67 m (5' 5.75\")   Wt 114.3 kg (252 lb)   SpO2 99%   BMI 40.98 kg/m     Estimated body mass index is 40.98 kg/m  as calculated from the following:    Height as of this encounter: 1.67 m (5' 5.75\").    Weight as of this encounter: 114.3 kg (252 lb).    Physical Exam  GENERAL APPEARANCE: morbidly obese, ambulatory w/o assist, alert and no distress  EYES: pink conj, no icterus, PERRL, EOMI  HENT: ear canals and TM's normal, nose and mouth without ulcers or lesions, oropharynx clear and oral mucous membranes moist  NECK: no adenopathy, no asymmetry, masses, or scars and thyroid normal to palpation  RESP: lungs clear to auscultation - no rales, rhonchi or wheezes  CV: regular rates and rhythm, normal S1 S2, no S3 or S4, no murmur, click or rub, no peripheral edema and peripheral pulses strong  ABDOMEN: soft, nontender, no hepatosplenomegaly, no masses and bowel sounds normal  RECTAL: deferred  BREAST: deferred as patient will go for mammogram soon.  MS: no musculoskeletal defects are noted and gait is age appropriate without ataxia  SKIN: no suspicious lesions or rashes  NEURO: Normal strength and tone, sensory exam grossly normal, mentation intact and speech normal    BACK: slight exaggeration of lumbar lordosis, no tenderness, LROM due to pain, SLR negative bilaterally      5/6/2024   Mini Cog   Clock Draw Score 2 Normal   3 Item Recall 3 objects recalled   Mini " Cog Total Score 5              Signed Electronically by: Jamsihd Can MD

## 2024-05-09 ENCOUNTER — MYC MEDICAL ADVICE (OUTPATIENT)
Dept: FAMILY MEDICINE | Facility: CLINIC | Age: 69
End: 2024-05-09
Payer: MEDICARE

## 2024-05-10 ENCOUNTER — TELEPHONE (OUTPATIENT)
Dept: PALLIATIVE MEDICINE | Facility: CLINIC | Age: 69
End: 2024-05-10
Payer: MEDICARE

## 2024-05-10 DIAGNOSIS — M54.16 LUMBAR RADICULOPATHY: Primary | ICD-10-CM

## 2024-05-10 NOTE — TELEPHONE ENCOUNTER
Called patient to schedule procedure with Dr. Malcolm    Date of Procedure: 6/20/24    Arrival time given: Yes: Arrival Time 9:30am       Procedure Location: [facility]     Verified Location with Patient:  Yes  Address provided to the patient     Pre-op H&P Required:  No: Local Anesthesia        Post-Op/Follow Up Appt:  Not Indicated in Request      Informed patient they will need a  to drive them home:  Yes    Patients : Spouse (Moody)     Patient is aware that pre-op RN from the procedure center will call 2-3 days prior to scheduled procedure to confirm arrival time and review any instructions:  Yes       Additional Comments: N/A        Latia Ivey MA on 5/10/2024 at 9:43 AM      P: [phone]*

## 2024-05-10 NOTE — CONFIDENTIAL NOTE
RN called patient to review pre-procedure instructions sent via Node1. Patient verbalized understanding to holding appropriate medication per protocol and was agreeable to NPO policy and needing a .    Anticoagulant: Xarelto- 3 day hold      Gabi Cain RN

## 2024-05-14 NOTE — TELEPHONE ENCOUNTER
Lab order refaxed to [phone]-verified fax number with [facility].     Andrew Cueto, [facility]

## 2024-05-16 ENCOUNTER — TELEPHONE (OUTPATIENT)
Dept: ANESTHESIOLOGY | Facility: CLINIC | Age: 69
End: 2024-05-16
Payer: MEDICARE

## 2024-05-16 NOTE — CONFIDENTIAL NOTE
Patient is scheduled for a Lumbar Epidural Steroid Injection with Dr. Davies on 6/20/24.       This would require the patient to hold their Xarelto for 3 days prior to procedure.     Is this something that would be agreeable to you?      Please reach out to our staff if you have any questions or concerns.   Thank you for your time.       Gabi Cain RN  [facility]  [phone]

## 2024-05-16 NOTE — TELEPHONE ENCOUNTER
Okay to hold xarelto 3 days before procedure.   Resume day after as long as hemostasis is maintained.

## 2024-06-20 ENCOUNTER — ANESTHESIA EVENT (OUTPATIENT)
Dept: SURGERY | Facility: AMBULATORY SURGERY CENTER | Age: 69
End: 2024-06-20
Payer: MEDICARE

## 2024-06-20 ENCOUNTER — ANESTHESIA (OUTPATIENT)
Dept: SURGERY | Facility: AMBULATORY SURGERY CENTER | Age: 69
End: 2024-06-20
Payer: MEDICARE

## 2024-06-20 ENCOUNTER — ANCILLARY PROCEDURE (OUTPATIENT)
Dept: RADIOLOGY | Facility: AMBULATORY SURGERY CENTER | Age: 69
End: 2024-06-20
Attending: ANESTHESIOLOGY
Payer: MEDICARE

## 2024-06-20 ENCOUNTER — HOSPITAL ENCOUNTER (OUTPATIENT)
Facility: AMBULATORY SURGERY CENTER | Age: 69
Discharge: HOME | End: 2024-06-20
Attending: ANESTHESIOLOGY | Admitting: ANESTHESIOLOGY
Payer: MEDICARE

## 2024-06-20 VITALS
DIASTOLIC BLOOD PRESSURE: 85 MMHG | BODY MASS INDEX: 42.32 KG/M2 | RESPIRATION RATE: 16 BRPM | OXYGEN SATURATION: 98 % | SYSTOLIC BLOOD PRESSURE: 185 MMHG | TEMPERATURE: 98.6 F | HEIGHT: 65 IN | WEIGHT: 254 LBS

## 2024-06-20 DIAGNOSIS — R52 PAIN: ICD-10-CM

## 2024-06-20 PROCEDURE — 62323 NJX INTERLAMINAR LMBR/SAC: CPT

## 2024-06-20 RX ORDER — METHYLPREDNISOLONE ACETATE 40 MG/ML
INJECTION, SUSPENSION INTRA-ARTICULAR; INTRALESIONAL; INTRAMUSCULAR; SOFT TISSUE DAILY PRN
Status: DISCONTINUED | OUTPATIENT
Start: 2024-06-20 | End: 2024-06-20 | Stop reason: HOSPADM

## 2024-06-20 RX ORDER — LIDOCAINE HYDROCHLORIDE 10 MG/ML
INJECTION, SOLUTION EPIDURAL; INFILTRATION; INTRACAUDAL; PERINEURAL DAILY PRN
Status: DISCONTINUED | OUTPATIENT
Start: 2024-06-20 | End: 2024-06-20 | Stop reason: HOSPADM

## 2024-06-20 RX ORDER — BUPIVACAINE HYDROCHLORIDE 2.5 MG/ML
INJECTION, SOLUTION EPIDURAL; INFILTRATION; INTRACAUDAL DAILY PRN
Status: DISCONTINUED | OUTPATIENT
Start: 2024-06-20 | End: 2024-06-20 | Stop reason: HOSPADM

## 2024-06-20 RX ORDER — IOPAMIDOL 408 MG/ML
INJECTION, SOLUTION INTRATHECAL DAILY PRN
Status: DISCONTINUED | OUTPATIENT
Start: 2024-06-20 | End: 2024-06-20 | Stop reason: HOSPADM

## 2024-06-20 NOTE — DISCHARGE INSTRUCTIONS
Home Care Instructions after an Epidural Steroid Pain Injection    A lumbar epidural steroid injection delivers steroid medication directly into the area that may be causing your lower back pain and/or leg pain. A cervical or thoracic epidural steroid injection delivers steroids into the epidural space surrounding spinal nerve roots to help relieve pain in the upper spine/neck.    Activity  -Rest today  -Do not work today  -Resume normal activity tomorrow  -DO NOT shower for 24 hours  -DO NOT remove bandaid for 24 hours    Pain  -You may experience soreness at the injection site for one or two days  -You may use an ice pack for 20 minutes every 2 hours for the first 24 hours  -You may use a heating pad after the first 24 hours  -You may use Tylenol (acetaminophen) every 4 hours or other pain medicines as     directed by your physician    You may experience numbness radiating into your legs or arms (depending on the procedure location). This numbness may last several hours. Until sensation returns to normal; please use caution in walking, climbing stairs, and stepping out of your vehicle, etc.    Common side effects of steroids:  Not everyone will experience corticosteroid side effects. If side effects are experienced, they will gradually subside in the 7-10 day period following an injection. Most common side effects include:  -Flushed face and/or chest  -Feeling of warmth, particularly in the face but could be an overall feeling of warmth  -Increased blood sugar in diabetic patients  -Menstrual irregularities my occur. If taking hormone-based birth control an alternate method of birth control is recommended  -Sleep disturbances and/or mood swings are possible  -Leg cramps    Please contact us if you have:  -Severe pain  -Fever more than 101.5 degrees Fahrenheit  -Signs of infection at the injection site (redness, swelling, or drainage)    FOR PAIN CENTER PATIENTS:  If you have questions, please contact the Pain  Clinic at [phone] Option #1 between the hours of 7:00 am and 3:00 pm Monday through Friday. After office hours you can contact the on call provider by dialing [phone]. If you need immediate attention, we recommend that you go to a hospital emergency room or dial [phone].      FOR PM&R PATIENTS:  For patients seen by the PM and R service, please call [phone]. (Monday through Friday 8a-5p.  After business hours and weekends call [phone] and ask for the PM and R doctor on call). If you need to fax a pain diary to PM&R the fax number is [phone]. If you are unable to fax, uploading to [employer] is encouraged, then send to provider. If you have procedure scheduling questions please call [phone] Option #2.

## 2024-06-26 ENCOUNTER — HOSPITAL ENCOUNTER (OUTPATIENT)
Dept: MAMMOGRAPHY | Facility: CLINIC | Age: 69
Discharge: HOME | End: 2024-06-26
Attending: FAMILY MEDICINE | Admitting: FAMILY MEDICINE
Payer: MEDICARE

## 2024-06-26 DIAGNOSIS — Z12.31 ENCOUNTER FOR SCREENING MAMMOGRAM FOR MALIGNANT NEOPLASM OF BREAST: ICD-10-CM

## 2024-06-26 PROCEDURE — 77063 BREAST TOMOSYNTHESIS BI: CPT

## 2024-06-27 NOTE — H&P
ABBREVIATED H&P [facility]      Patient Name: Kaylene Diaz   MRN: [id number]   YOB: 1955     1. Reason for Procedure:  Procedure Summary       Date: 06/20/24 [place] [facility] [place] [facility]    Anesthesia Start:  Anesthesia Stop:     Procedure: Lumbar Epidural Steroid Injection (Spine) Diagnosis:       Lumbar radiculopathy      (Lumbar radiculopathy [M54.16])    Providers: Olivia Davies MD Responsible Provider:     Anesthesia Type: Not recorded ASA Status: Not recorded            2. History:   Past Medical History:   Diagnosis Date    BLANCA (acute kidney injury) (H24) 04/20/2017    Basal cell carcinoma     Hypertension     Obese     RA (rheumatoid arthritis) (H)     SBO (small bowel obstruction) (H) 04/20/2017    Small bowel obstruction (H)     Squamous cell carcinoma     Thrombosis        Comorbidities: None    Any history of sleep apnea? No    Any history of problems with sedation? No    3. Physical:    General: Normal  Skin:  Normal.  Respiratory: Clear to auscultation bilateral, no wheezing  Cardio:  Regular rate and rhythm  Abdomen: Soft, nontender, nondistended, no palpable masses.  Musculoskeletal: Normal  Neuro: Sensory exam normal, motor exam 5/5, bilateral upper and lower extremities       4. Current Medications (if not in Epic):   Current Outpatient Medications   Medication Sig Dispense Refill    hydroxychloroquine (PLAQUENIL) 200 MG tablet Take 2 tablets (400 mg) by mouth daily 180 tablet 2    lisinopril (ZESTRIL) 30 MG tablet Take 1 tablet (30 mg) by mouth daily 90 tablet 3    methotrexate 50 MG/2ML injection Inject 1 mL (25 mg) Subcutaneous every 7 days 8 mL 6    Multiple Vitamins-Minerals (ADULT GUMMY PO) Take 1 chew tab by mouth daily VitaCrave      Multiple Vitamins-Minerals (HAIR/SKIN/NAILS/BIOTIN PO) Take 1 capsule by mouth daily 100 tablet 3    omeprazole (PRILOSEC) 20 MG DR capsule  "Take 1 capsule (20 mg) by mouth daily 90 capsule 3    oxyCODONE (ROXICODONE) 5 MG tablet Take 1 tablet (5 mg) by mouth every 6 hours as needed for severe pain 15 tablet 0    predniSONE (DELTASONE) 5 MG tablet Take 1 tablet (5 mg) by mouth daily as needed for inflammatory arthritis symptoms.  Use sparingly 90 tablet 1    tiZANidine (ZANAFLEX) 4 MG tablet Take 1 tablet (4 mg) by mouth 3 times daily as needed for muscle spasms 270 tablet 3    vitamin B complex with vitamin C (STRESS TAB) tablet Take 1 tablet by mouth daily  0    VITAMIN D PO Take by mouth daily      Insulin Syringe-Needle U-100 (BD INSULIN SYRINGE) 27G X 1/2\" 1 ML MISC For once weekly methotrexate administration. 50 each 1    rivaroxaban ANTICOAGULANT (XARELTO ANTICOAGULANT) 20 MG TABS tablet Take 1 tablet (20 mg) by mouth daily (with dinner) 90 tablet 3        5. Allergies and Reactions:  is allergic to folic acid, gold, and latex.                   "

## 2024-06-27 NOTE — OP NOTE
PAIN MEDICINE CLINIC PROCEDURE NOTE     ATTENDING CLINICIAN:    Olivia Davies MD     ASSISTANT CLINICIAN:        PREPROCEDURE DIAGNOSES:  1.  Lumbar radiculopathy  2.  Chronic low back pain         PROCEDURE(S) PERFORMED:  1.  Interlaminar lumbar epidural steroid injection   2.  Fluoroscopic guidance for the above-named procedure(s)        ANESTHESIA:  Local.     INDICATIONS:  Kaylene Diaz is a 67 year old female with a history of  chronic low back pain secondary to bilateral lumbar radiculopathy .  The patient stated that the patient was in their usual state of health and denied recent anticoagulant use or recent infections.  Therefore, the plan is to perform above mentioned procedures.      Procedure Details:  The patient was met in the procedure room, where the patient was identified by name, medical record number and date of birth.  All of the patient s last minute questions were answered. Written informed consent was obtained and saved in the electronic medical record, after the risks, benefits, and alternatives were discussed with the patient.       A formal time-out procedure was performed, as per protocol, including patient name, title of procedure, and site of procedure, and all in the room concurred.  Routine monitors were applied.       The patient was placed in the prone position on the procedure room table.  All pressure points were checked and comfortably padded.  Routine monitors were placed.  Vital signs were stable.     A chlorhexidine prep was completed followed by sterile draping per standard procedure.      The AP fluoroscopic view was optimized for approach at  L5-S1 interspace.  The skin over the interspace was infiltrated with 4-5 mL of 1% Lidocaine using a 25 gauge, 1.5 inch needle.  A 20-gauge 3-1/2 inch Tuohy needle was advanced under fluoroscopic guidance with right paramedial approach until it touched lamina. Mutiple AP and lateral fluoroscopic images at this time  are taken as Tuohy  needle was advanced to the epidural space. The epidural space was identified, without evidence of blood, cerebrospinal fluid, or parasthesia throughout. Needle tip placement within the epidural space was further confirmed with 1-2 mL of nonionic contrast agent, with the epidural space visualized in the AP and lateral fluoroscopic view(s) with appropriate spread of the agent with fat vacuolization and no intravascular or intrathecal spread noted. Next, 6 mL of a treatment solution containing 2 mL of preservative free 0.25% bupivacaine, 1 mL of Depo-Medrol 40 mg/mL and 3 mL preservative free normal saline was administered slowly. The needle was withdrawn.        Light pressure was held at the puncture site(s) to prevent ecchymosis and oozing.  The patient's skin was cleansed, and hemostasis was confirmed.  Band-aids were applied to the needle injection site(s).       Condition:     The patient remained awake and alert throughout the procedure.  The patient tolerated the procedure well and was monitored for approximately 15 minutes afterward in the post procedure area.  There were no immediate post procedure complications noted.  The patient was then discharged to home as per protocol.       Pre-procedure pain score: 8/10  Post-procedure pain score: 0/10

## 2024-07-23 ENCOUNTER — MYC MEDICAL ADVICE (OUTPATIENT)
Dept: RHEUMATOLOGY | Facility: CLINIC | Age: 69
End: 2024-07-23
Payer: COMMERCIAL

## 2024-07-23 DIAGNOSIS — M05.79 RHEUMATOID ARTHRITIS INVOLVING MULTIPLE SITES WITH POSITIVE RHEUMATOID FACTOR (H): Primary | ICD-10-CM

## 2024-07-23 NOTE — TELEPHONE ENCOUNTER
Pt is in proper time frame for having labs done due to having them done early. Will see what labs MD wants.    ROMÁN GreenbergN RN 7/23/2024 1:02 PM

## 2024-07-24 ENCOUNTER — OFFICE VISIT (OUTPATIENT)
Dept: FAMILY MEDICINE | Facility: CLINIC | Age: 69
End: 2024-07-24
Payer: MEDICARE

## 2024-07-24 VITALS
WEIGHT: 256.2 LBS | SYSTOLIC BLOOD PRESSURE: 136 MMHG | BODY MASS INDEX: 42.69 KG/M2 | HEART RATE: 74 BPM | HEIGHT: 65 IN | DIASTOLIC BLOOD PRESSURE: 70 MMHG | RESPIRATION RATE: 20 BRPM | TEMPERATURE: 97.5 F | OXYGEN SATURATION: 97 %

## 2024-07-24 DIAGNOSIS — M05.79 RHEUMATOID ARTHRITIS INVOLVING MULTIPLE SITES WITH POSITIVE RHEUMATOID FACTOR (H): ICD-10-CM

## 2024-07-24 DIAGNOSIS — E87.5 SERUM POTASSIUM ELEVATED: ICD-10-CM

## 2024-07-24 DIAGNOSIS — N18.31 CHRONIC KIDNEY DISEASE, STAGE 3A (H): ICD-10-CM

## 2024-07-24 DIAGNOSIS — M17.11 PRIMARY OSTEOARTHRITIS OF RIGHT KNEE: ICD-10-CM

## 2024-07-24 DIAGNOSIS — Z79.01 LONG TERM CURRENT USE OF ANTICOAGULANT THERAPY: ICD-10-CM

## 2024-07-24 DIAGNOSIS — I26.99 OTHER ACUTE PULMONARY EMBOLISM, UNSPECIFIED WHETHER ACUTE COR PULMONALE PRESENT (H): ICD-10-CM

## 2024-07-24 DIAGNOSIS — R73.9 HYPERGLYCEMIA: ICD-10-CM

## 2024-07-24 DIAGNOSIS — I10 PRIMARY HYPERTENSION: ICD-10-CM

## 2024-07-24 DIAGNOSIS — E78.5 HYPERLIPIDEMIA LDL GOAL <100: ICD-10-CM

## 2024-07-24 DIAGNOSIS — Z01.818 PREOP GENERAL PHYSICAL EXAM: Primary | ICD-10-CM

## 2024-07-24 DIAGNOSIS — E66.813 CLASS 3 OBESITY: ICD-10-CM

## 2024-07-24 LAB
ALBUMIN SERPL BCG-MCNC: 3.9 G/DL (ref 3.5–5.2)
ALP SERPL-CCNC: 123 U/L (ref 40–150)
ALT SERPL W P-5'-P-CCNC: 20 U/L (ref 0–50)
ANION GAP SERPL CALCULATED.3IONS-SCNC: 8 MMOL/L (ref 7–15)
AST SERPL W P-5'-P-CCNC: 20 U/L (ref 0–45)
BASOPHILS # BLD AUTO: 0 10E3/UL (ref 0–0.2)
BASOPHILS NFR BLD AUTO: 0 %
BILIRUB DIRECT SERPL-MCNC: <0.2 MG/DL (ref 0–0.3)
BILIRUB SERPL-MCNC: 0.5 MG/DL
BUN SERPL-MCNC: 16.8 MG/DL (ref 8–23)
CALCIUM SERPL-MCNC: 9.3 MG/DL (ref 8.8–10.4)
CHLORIDE SERPL-SCNC: 108 MMOL/L (ref 98–107)
CREAT SERPL-MCNC: 0.81 MG/DL (ref 0.51–0.95)
CRP SERPL-MCNC: <3 MG/L
EGFRCR SERPLBLD CKD-EPI 2021: 78 ML/MIN/1.73M2
EOSINOPHIL # BLD AUTO: 0.1 10E3/UL (ref 0–0.7)
EOSINOPHIL NFR BLD AUTO: 1 %
ERYTHROCYTE [DISTWIDTH] IN BLOOD BY AUTOMATED COUNT: 12.7 % (ref 10–15)
ERYTHROCYTE [SEDIMENTATION RATE] IN BLOOD BY WESTERGREN METHOD: 11 MM/HR (ref 0–30)
GLUCOSE SERPL-MCNC: 91 MG/DL (ref 70–99)
HBA1C MFR BLD: 5.2 % (ref 0–5.6)
HCO3 SERPL-SCNC: 25 MMOL/L (ref 22–29)
HCT VFR BLD AUTO: 40.2 % (ref 35–47)
HGB BLD-MCNC: 13 G/DL (ref 11.7–15.7)
IMM GRANULOCYTES # BLD: 0 10E3/UL
IMM GRANULOCYTES NFR BLD: 0 %
LYMPHOCYTES # BLD AUTO: 1.2 10E3/UL (ref 0.8–5.3)
LYMPHOCYTES NFR BLD AUTO: 21 %
MCH RBC QN AUTO: 31.3 PG (ref 26.5–33)
MCHC RBC AUTO-ENTMCNC: 32.3 G/DL (ref 31.5–36.5)
MCV RBC AUTO: 97 FL (ref 78–100)
MONOCYTES # BLD AUTO: 0.5 10E3/UL (ref 0–1.3)
MONOCYTES NFR BLD AUTO: 8 %
NEUTROPHILS # BLD AUTO: 3.9 10E3/UL (ref 1.6–8.3)
NEUTROPHILS NFR BLD AUTO: 69 %
PLATELET # BLD AUTO: 223 10E3/UL (ref 150–450)
POTASSIUM SERPL-SCNC: 4.6 MMOL/L (ref 3.4–5.3)
PROT SERPL-MCNC: 6.9 G/DL (ref 6.4–8.3)
RBC # BLD AUTO: 4.16 10E6/UL (ref 3.8–5.2)
SODIUM SERPL-SCNC: 141 MMOL/L (ref 135–145)
WBC # BLD AUTO: 5.7 10E3/UL (ref 4–11)

## 2024-07-24 PROCEDURE — 99215 OFFICE O/P EST HI 40 MIN: CPT | Performed by: FAMILY MEDICINE

## 2024-07-24 PROCEDURE — 93000 ELECTROCARDIOGRAM COMPLETE: CPT | Performed by: FAMILY MEDICINE

## 2024-07-24 PROCEDURE — 85025 COMPLETE CBC W/AUTO DIFF WBC: CPT | Performed by: FAMILY MEDICINE

## 2024-07-24 PROCEDURE — 86140 C-REACTIVE PROTEIN: CPT | Performed by: FAMILY MEDICINE

## 2024-07-24 PROCEDURE — 85652 RBC SED RATE AUTOMATED: CPT | Performed by: FAMILY MEDICINE

## 2024-07-24 PROCEDURE — 82248 BILIRUBIN DIRECT: CPT | Performed by: FAMILY MEDICINE

## 2024-07-24 PROCEDURE — 80053 COMPREHEN METABOLIC PANEL: CPT | Performed by: FAMILY MEDICINE

## 2024-07-24 PROCEDURE — 36415 COLL VENOUS BLD VENIPUNCTURE: CPT | Performed by: FAMILY MEDICINE

## 2024-07-24 PROCEDURE — 83036 HEMOGLOBIN GLYCOSYLATED A1C: CPT | Performed by: FAMILY MEDICINE

## 2024-07-24 ASSESSMENT — PAIN SCALES - GENERAL: PAINLEVEL: MODERATE PAIN (4)

## 2024-07-24 NOTE — H&P (VIEW-ONLY)
Preoperative Evaluation  [facility]  [address] [address]  Phone: [phone]  Primary Provider: Jamshid Can MD  Pre-op Performing Provider: Jamshid Can MD  Jul 24, 2024 7/19/2024   Surgical Information   What procedure is being done? Total right knee replacement   [place] [facility] [place] doing the procedure / surgery?    Date of surgery / procedure: August 20 2024   Time of surgery / procedure: Unknown   Where do you plan to recover after surgery? Home        Fax number for surgical facility: Note does not need to be faxed, will be available electronically in Epic.    Assessment & Plan     The proposed surgical procedure is considered INTERMEDIATE risk.    Preop general physical exam    Primary osteoarthritis of right knee    Other acute pulmonary embolism, unspecified whether acute cor pulmonale present (H)  Asymptopmatic.  Advised when to hold rivaroxaban. Restart dependent on surgery outcome.    Primary hypertension  Controlled.  Low salt, low fat diet.   Exercise as tolerated.  Take meds as prescribed; call if with side effects.    - Basic metabolic panel  - EKG 12-lead complete w/read - Clinics  - Basic metabolic panel    Hyperlipidemia LDL goal <100  Reinforced heart healthy lifestyle.  - EKG 12-lead complete w/read - Clinics    Chronic kidney disease, stage 3a (H)  Stable renal function on recent labs.  Maintain hydration. Avoid nephrotoxins.   Recheck postoperatively. Refer to hospitalist if with concerns.  - Basic metabolic panel  - EKG 12-lead complete w/read - Clinics  - Basic metabolic panel    Class 3 obesity (H)  Increases complexity of management of the above chronic conditions as well as increases perioperative complications.  Patient was advised healthy diet recommendations.  Patient was advised weekly exercise recommendations and goals.    - EKG 12-lead complete w/read - Clinics    Long term current use of anticoagulant therapy  Stable renal function.  Advised hold instructions.  - Basic metabolic panel  - Basic metabolic panel    Hyperglycemia  A1c not diabetic nor prediabetic. BMP showed no hyperglycemia today.  - Basic metabolic panel  - Hemoglobin A1c  - Basic metabolic panel  - Hemoglobin A1c    Serum potassium elevated  Resolved.            - No identified additional risk factors other than previously addressed    Preoperative Medication Instructions  Antiplatelet or Anticoagulation Medication Instructions   - apixaban (Eliquis), edoxaban (Savaysa), rivaroxaban (Xarelto): Bleeding risk is moderate or high for this procedure AND CrCl  (>=) 50 mL/min. DO NOT TAKE 2 days before surgery.     Additional Medication Instructions  Take all scheduled medications on the day of surgery EXCEPT for modifications listed below:   - ACE/ARB: Continue without modification (e.g., MAC anesthesia, neurosurgery, spine surgery, heart failure, or labile hypertension with risk of hypertension).   - DMARDs continue hydroxychloroquine with no change   - may take omeprazole on day of surgery  - Tizanidine - hold on day of surgery  - Prednisone - do not take few days before surgery unless instructed by your rheumatologist  - Herbal medications and vitamins: DO NOT TAKE 14 days prior to surgery.    Recommendation  Approval given to proceed with proposed procedure, without further diagnostic evaluation.    Fletcher Maurice is a 68 year old, presenting for the following:  Pre-Op Exam          7/24/2024     9:38 AM   Additional Questions   Roomed by Yoselyn EUGENE related to upcoming procedure: Patient has right knee osteoarthritsi causing chronic and worsening pain when walking. Hence, planned surgery. Reviewed above with patient.         7/19/2024   Pre-Op Questionnaire   Have you ever had a heart attack or stroke? No   Have you ever had surgery on your heart or  blood vessels, such as a stent placement, a coronary artery bypass, or surgery on an artery in your head, neck, heart, or legs? No   Do you have chest pain with activity? No   Do you have a history of heart failure? No   Do you currently have a cold, bronchitis or symptoms of other infection? No   Do you have a cough, shortness of breath, or wheezing? No   Do you or anyone in your family have previous history of blood clots? (!) YES - patient is on rivaroxaban   Do you or does anyone in your family have a serious bleeding problem such as prolonged bleeding following surgeries or cuts? No   Have you ever had problems with anemia or been told to take iron pills? No   Have you had any abnormal blood loss such as black, tarry or bloody stools, or abnormal vaginal bleeding? No   Have you ever had a blood transfusion? (!) YES   Have you ever had a transfusion reaction? No   Are you willing to have a blood transfusion if it is medically needed before, during, or after your surgery? Yes   Have you or any of your relatives ever had problems with anesthesia? No   Do you have sleep apnea, excessive snoring or daytime drowsiness? No   Do you have any artifical heart valves or other implanted medical devices like a pacemaker, defibrillator, or continuous glucose monitor? No   Do you have artificial joints? No   Are you allergic to latex? No        Health Care Directive  Patient has a Health Care Directive on file      Preoperative Review of    reviewed - controlled substances reflected in medication list.      Status of Chronic Conditions:  See problem list for active medical problems.  Problems all longstanding and stable, except as noted/documented.  See ROS for pertinent symptoms related to these conditions.    HYPERLIPIDEMIA - Patient has a long history of significant Hyperlipidemia requiring medication for treatment with recent good control. Patient reports no problems or side effects with the medication.      HYPERTENSION - Patient has longstanding history of HTN , currently denies any symptoms referable to elevated blood pressure. Specifically denies chest pain, palpitations, dyspnea, orthopnea, PND or peripheral edema. Blood pressure readings have been in normal range. Current medication regimen is as listed below. Patient denies any side effects of medication.     RENAL INSUFFICIENCY - Patient has a longstanding history of moderate-severe chronic renal insufficiency. Last Cr stable.     Patient Active Problem List    Diagnosis Date Noted    Other acute pulmonary embolism, unspecified whether acute cor pulmonale present (H) 05/01/2023     Priority: Medium    Immunodeficiency, unspecified (H24) 04/21/2023     Priority: Medium    Lumbar radiculopathy 09/09/2022     Priority: Medium     Added automatically from request for surgery [id number]      Chronic bilateral low back pain with bilateral sciatica 06/14/2022     Priority: Medium     Added automatically from request for surgery [id number]      Chronic kidney disease, stage 3a (H) 06/03/2022     Priority: Medium    Elevated glucose 04/20/2017     Priority: Medium    Elevated blood pressure reading without diagnosis of hypertension 03/14/2017     Priority: Medium    Class 3 obesity (H) 12/07/2015     Priority: Medium    High risk medications (not anticoagulants) long-term use 11/07/2013     Priority: Medium    Kidney stone 10/15/2013     Priority: Medium     Seen at [facility] in [place], [place]      RA (rheumatoid arthritis) (H) 09/11/2013     Priority: Medium    Osteoarthritis 09/02/2013     Priority: Medium    Hyperlipidemia LDL goal <100 01/04/2012     Priority: Medium    ASCUS on Pap smear 12/27/2011     Priority: Medium     12/27/11: Pap - ASCUS, Neg HPV. Plan cotesting in 3 yrs.   3/21/14: Pap - NIL, Neg HPV. Routine screening        Tear meniscus knee 12/08/2010     Priority: Medium    Osteopenia 12/08/2010     Priority: Medium     Testing done at chiropracter office - told she  "has decreased density      Carpal tunnel syndrome 05/16/2007     Priority: Medium      Past Medical History:   Diagnosis Date    BLANCA (acute kidney injury) (H24) 04/20/2017    Basal cell carcinoma     Hypertension     Obese     RA (rheumatoid arthritis) (H)     SBO (small bowel obstruction) (H) 04/20/2017    Small bowel obstruction (H)     Squamous cell carcinoma     Thrombosis      Past Surgical History:   Procedure Laterality Date    ARTHROSCOPY KNEE  12/10/2010    ARTHROSCOPY KNEE performed by NAVID FIERRO at [facility]    COLONOSCOPY N/A 8/9/2018    Procedure: COLONOSCOPY;  colonoscopy;  Surgeon: Luke Kaye MD;  Location: [facility]    INJECT EPIDURAL LUMBAR N/A 7/7/2022    Procedure: Lumbar Epidural Steroid Injection;  Surgeon: Olivia Davies MD;  [place] [facility] [place]    INJECT EPIDURAL LUMBAR N/A 11/11/2022    Procedure: Lumbar Epidural Steroid Injection, L4-5;  Surgeon: Olivia Davies MD;  Location: [facility] OR    INJECT EPIDURAL LUMBAR N/A 10/5/2023    Procedure: Lumbar Epidural Steroid Injection;  Surgeon: Olivia Davies MD;  Location: [facility] OR    INJECT EPIDURAL LUMBAR N/A 6/20/2024    Procedure: Lumbar Epidural Steroid Injection;  Surgeon: Olivia Davies MD;  Location: [facility] OR    SURGICAL HISTORY OF -       IUD removal    SURGICAL HISTORY OF -       Ovarian cyst    SURGICAL HISTORY OF -       thorn removal from foot    SURGICAL HISTORY OF -       knee surgery, scope, right knee     Current Outpatient Medications   Medication Sig Dispense Refill    hydroxychloroquine (PLAQUENIL) 200 MG tablet Take 2 tablets (400 mg) by mouth daily 180 tablet 2    Insulin Syringe-Needle U-100 (BD INSULIN SYRINGE) 27G X 1/2\" 1 ML MISC For once weekly methotrexate administration. 50 each 1    lisinopril (ZESTRIL) 30 MG tablet Take 1 tablet (30 mg) by mouth daily 90 tablet 3    methotrexate 50 MG/2ML injection Inject 1 mL (25 mg) Subcutaneous every 7 days 8 mL 6    Multiple Vitamins-Minerals (ADULT GUMMY PO) Take 1 chew tab by " mouth daily VitaCrave      omeprazole (PRILOSEC) 20 MG DR capsule Take 1 capsule (20 mg) by mouth daily 90 capsule 3    oxyCODONE (ROXICODONE) 5 MG tablet Take 1 tablet (5 mg) by mouth every 6 hours as needed for severe pain 15 tablet 0    rivaroxaban ANTICOAGULANT (XARELTO ANTICOAGULANT) 20 MG TABS tablet Take 1 tablet (20 mg) by mouth daily (with dinner) 90 tablet 3    tiZANidine (ZANAFLEX) 4 MG tablet Take 1 tablet (4 mg) by mouth 3 times daily as needed for muscle spasms 270 tablet 3    vitamin B complex with vitamin C (STRESS TAB) tablet Take 1 tablet by mouth daily  0    VITAMIN D PO Take by mouth daily      Multiple Vitamins-Minerals (HAIR/SKIN/NAILS/BIOTIN PO) Take 1 capsule by mouth daily (Patient not taking: Reported on 2024) 100 tablet 3    predniSONE (DELTASONE) 5 MG tablet Take 1 tablet (5 mg) by mouth daily as needed for inflammatory arthritis symptoms.  Use sparingly (Patient not taking: Reported on 2024) 90 tablet 1       Allergies   Allergen Reactions    Folic Acid     Gold Rash    Latex Rash     Not all latex products        Social History     Tobacco Use    Smoking status: Former     Current packs/day: 0.00     Types: Cigarettes     Start date: 1970     Quit date: 2000     Years since quittin.2    Smokeless tobacco: Never   Substance Use Topics    Alcohol use: Yes     Comment: 1 per week     Family History   Problem Relation Age of Onset    Prostate Cancer Father     Eye Disorder Father         mac degen    Heart Disease Father     Macular Degeneration Father     Heart Disease Mother         a-fib    Eye Disorder Mother     C.A.D. Mother     Hypertension Mother     Alcohol/Drug Brother     Alcohol/Drug Sister     Alcohol/Drug Brother     Alcohol/Drug Brother     Alcohol/Drug Brother     Alcohol/Drug Sister     Obesity Sister     Alcohol/Drug Sister     Prostate Cancer Sister     Cancer Other     Melanoma No family hx of      History   Drug Use Unknown             Review of  "Systems  Constitutional, HEENT, cardiovascular, pulmonary, GI, , musculoskeletal, neuro, skin, endocrine and psych systems are negative, except as otherwise noted.    Objective    There were no vitals taken for this visit.   Estimated body mass index is 42.27 kg/m  as calculated from the following:    Height as of 6/20/24: 1.651 m (5' 5\").    Weight as of 6/20/24: 115.2 kg (254 lb).  Physical Exam  GENERAL APPEARANCE: morbidly obese, alert and no distress; ambulatory w/o assist  EYES: pink conj, no icterus, PERRL, EOMI  HENT: ear canals and TM's normal, nose and mouth without ulcers or lesions, oropharynx clear and oral mucous membranes moist  NECK: no adenopathy, no asymmetry, masses, or scars and thyroid normal to palpation  RESP: lungs clear to auscultation - no rales, rhonchi or wheezes  CV: regular rates and rhythm, normal S1 S2, no S3 or S4, no murmur, click or rub, no peripheral edema and peripheral pulses strong  ABDOMEN: soft, nontender, no hepatosplenomegaly, no masses and bowel sounds normal  MS: no musculoskeletal defects are noted and gait is age appropriate without ataxia  SKIN: good turgor, no rash/jaundice/ecchymosis  NEURO: Normal strength and tone, sensory exam grossly normal, mentation intact and speech normal     Recent Labs   Lab Test 05/06/24  0932   HGB 13.6      POTASSIUM 5.4*   CR 0.84        Diagnostics  Recent Results (from the past 24 hour(s))   ESR    Collection Time: 07/24/24 10:24 AM   Result Value Ref Range    Erythrocyte Sedimentation Rate 11 0 - 30 mm/hr   CRP inflammation    Collection Time: 07/24/24 10:24 AM   Result Value Ref Range    CRP Inflammation <3.00 <5.00 mg/L   Hepatic panel    Collection Time: 07/24/24 10:24 AM   Result Value Ref Range    Protein Total 6.9 6.4 - 8.3 g/dL    Albumin 3.9 3.5 - 5.2 g/dL    Bilirubin Total 0.5 <=1.2 mg/dL    Alkaline Phosphatase 123 40 - 150 U/L    AST 20 0 - 45 U/L    ALT 20 0 - 50 U/L    Bilirubin Direct <0.20 0.00 - 0.30 mg/dL "   Basic metabolic panel    Collection Time: 07/24/24 10:24 AM   Result Value Ref Range    Sodium 141 135 - 145 mmol/L    Potassium 4.6 3.4 - 5.3 mmol/L    Chloride 108 (H) 98 - 107 mmol/L    Carbon Dioxide (CO2) 25 22 - 29 mmol/L    Anion Gap 8 7 - 15 mmol/L    Urea Nitrogen 16.8 8.0 - 23.0 mg/dL    Creatinine 0.81 0.51 - 0.95 mg/dL    GFR Estimate 78 >60 mL/min/1.73m2    Calcium 9.3 8.8 - 10.4 mg/dL    Glucose 91 70 - 99 mg/dL   Hemoglobin A1c    Collection Time: 07/24/24 10:24 AM   Result Value Ref Range    Hemoglobin A1C 5.2 0.0 - 5.6 %   CBC with platelets and differential    Collection Time: 07/24/24 10:24 AM   Result Value Ref Range    WBC Count 5.7 4.0 - 11.0 10e3/uL    RBC Count 4.16 3.80 - 5.20 10e6/uL    Hemoglobin 13.0 11.7 - 15.7 g/dL    Hematocrit 40.2 35.0 - 47.0 %    MCV 97 78 - 100 fL    MCH 31.3 26.5 - 33.0 pg    MCHC 32.3 31.5 - 36.5 g/dL    RDW 12.7 10.0 - 15.0 %    Platelet Count 223 150 - 450 10e3/uL    % Neutrophils 69 %    % Lymphocytes 21 %    % Monocytes 8 %    % Eosinophils 1 %    % Basophils 0 %    % Immature Granulocytes 0 %    Absolute Neutrophils 3.9 1.6 - 8.3 10e3/uL    Absolute Lymphocytes 1.2 0.8 - 5.3 10e3/uL    Absolute Monocytes 0.5 0.0 - 1.3 10e3/uL    Absolute Eosinophils 0.1 0.0 - 0.7 10e3/uL    Absolute Basophils 0.0 0.0 - 0.2 10e3/uL    Absolute Immature Granulocytes 0.0 <=0.4 10e3/uL      EKG required for multiple cardiovascular risk factors and not completed in the last 90 days.   EKG: sinus rhythm, normal axis, normal intervals, no acute ST/T changes c/w ischemia, no LVH by voltage criteria, unchanged from previous tracings    Revised Cardiac Risk Index (RCRI)  The patient has the following serious cardiovascular risks for perioperative complications:   - No serious cardiac risks = 0 points     RCRI Interpretation: 0 points: Class I (very low risk - 0.4% complication rate)         Signed Electronically by: Jamshid Can MD  A copy of this evaluation report is  provided to the requesting physician.

## 2024-07-24 NOTE — PATIENT INSTRUCTIONS
How to Take Your Medication Before Surgery  Preoperative Medication Instructions   Antiplatelet or Anticoagulation Medication Instructions   - apixaban (Eliquis), edoxaban (Savaysa), rivaroxaban (Xarelto): Bleeding risk is moderate or high for this procedure AND CrCl  (>=) 50 mL/min. DO NOT TAKE 2 days before surgery.     Additional Medication Instructions  Take all scheduled medications on the day of surgery EXCEPT for modifications listed below:   - ACE/ARB: Continue without modification (e.g., MAC anesthesia, neurosurgery, spine surgery, heart failure, or labile hypertension with risk of hypertension).   - DMARDs continue hydroxychloroquine with no change   - may take omeprazole on day of surgery  - Tizanidine - hold on day of surgery  - Prednisone - do not take few days before surgery unless instructed by your rheumatologist  - Herbal medications and vitamins: DO NOT TAKE 14 days prior to surgery.     You will be contacted in 1-2 days for results of your lab tests.     Patient Education   Preparing for Your Surgery  Getting started  A nurse will call you to review your health history and instructions. They will give you an arrival time based on your scheduled surgery time. Please be ready to share:  Your doctor's clinic name and phone number  Your medical, surgical, and anesthesia history  A list of allergies and sensitivities  A list of medicines, including herbal treatments and over-the-counter drugs  Whether the patient has a legal guardian (ask how to send us the papers in advance)  Please tell us if you're pregnant--or if there's any chance you might be pregnant. Some surgeries may injure a fetus (unborn baby), so they require a pregnancy test. Surgeries that are safe for a fetus don't always need a test, and you can choose whether to have one.   If you have a child who's having surgery, please ask for a copy of Preparing for Your Child's Surgery.    Preparing for surgery  Within 10 to 30 days of surgery:  Have a pre-op exam (sometimes called an H&P, or History and Physical). This can be done at a clinic or pre-operative center.  If you're having a , you may not need this exam. Talk to your care team.  At your pre-op exam, talk to your care team about all medicines you take. If you need to stop any medicines before surgery, ask when to start taking them again.  We do this for your safety. Many medicines can make you bleed too much during surgery. Some change how well surgery (anesthesia) drugs work.  Call your insurance company to let them know you're having surgery. (If you don't have insurance, call [phone].)  Call your clinic if there's any change in your health. This includes signs of a cold or flu (sore throat, runny nose, cough, rash, fever). It also includes a scrape or scratch near the surgery site.  If you have questions on the day of surgery, call your hospital or surgery center.  Eating and drinking guidelines  For your safety: Unless your surgeon tells you otherwise, follow the guidelines below.  Eat and drink as usual until 8 hours before you arrive for surgery. After that, no food or milk.  Drink clear liquids until 2 hours before you arrive. These are liquids you can see through, like water, Gatorade, and Propel Water. They also include plain black coffee and tea (no cream or milk), candy, and breath mints. You can spit out gum when you arrive.  If you drink alcohol: Stop drinking it the night before surgery.  If your care team tells you to take medicine on the morning of surgery, it's okay to take it with a sip of water.  Preventing infection  Shower or bathe the night before and morning of your surgery. Follow the instructions your clinic gave you. (If no instructions, use regular soap.)  Don't shave or clip hair near your surgery site. We'll remove the hair if needed.  Don't smoke or vape the morning of surgery. You may chew nicotine gum up to 2 hours before surgery. A nicotine patch is  okay.  Note: Some surgeries require you to completely quit smoking and nicotine. Check with your surgeon.  Your care team will make every effort to keep you safe from infection. We will:  Clean our hands often with soap and water (or an alcohol-based hand rub).  Clean the skin at your surgery site with a special soap that kills germs.  Give you a special gown to keep you warm. (Cold raises the risk of infection.)  Wear special hair covers, masks, gowns and gloves during surgery.  Give antibiotic medicine, if prescribed. Not all surgeries need antibiotics.  What to bring on the day of surgery  Photo ID and insurance card  Copy of your health care directive, if you have one  Glasses and hearing aids (bring cases)  You can't wear contacts during surgery  Inhaler and eye drops, if you use them (tell us about these when you arrive)  CPAP machine or breathing device, if you use them  A few personal items, if spending the night  If you have . . .  A pacemaker, ICD (cardiac defibrillator) or other implant: Bring the ID card.  An implanted stimulator: Bring the remote control.  A legal guardian: Bring a copy of the certified (court-stamped) guardianship papers.  Please remove any jewelry, including body piercings. Leave jewelry and other valuables at home.  If you're going home the day of surgery  You must have a responsible adult drive you home. They should stay with you overnight as well.  If you don't have someone to stay with you, and you aren't safe to go home alone, we may keep you overnight. Insurance often won't pay for this.  After surgery  If it's hard to control your pain or you need more pain medicine, please call your surgeon's office.  Questions?   If you have any questions for your care team, list them here: _________________________________________________________________________________________________________________________________________________________________________  ____________________________________ ____________________________________ ____________________________________  For informational purposes only. Not to replace the advice of your health care provider. Copyright   2003, 2019 [employer]. All rights reserved. Clinically reviewed by Gladys Blake MD. [employer] [id number] - REV 12/22.

## 2024-07-24 NOTE — PROGRESS NOTES
Preoperative Evaluation  [facility]  [address] [address]  Phone: [phone]  Primary Provider: Jamshid Can MD  Pre-op Performing Provider: Jamshid Can MD  Jul 24, 2024 7/19/2024   Surgical Information   What procedure is being done? Total right knee replacement   [place] [facility] [place] doing the procedure / surgery?    Date of surgery / procedure: August 20 2024   Time of surgery / procedure: Unknown   Where do you plan to recover after surgery? Home        Fax number for surgical facility: Note does not need to be faxed, will be available electronically in Epic.    Assessment & Plan     The proposed surgical procedure is considered INTERMEDIATE risk.    Preop general physical exam    Primary osteoarthritis of right knee    Other acute pulmonary embolism, unspecified whether acute cor pulmonale present (H)  Asymptopmatic.  Advised when to hold rivaroxaban. Restart dependent on surgery outcome.    Primary hypertension  Controlled.  Low salt, low fat diet.   Exercise as tolerated.  Take meds as prescribed; call if with side effects.    - Basic metabolic panel  - EKG 12-lead complete w/read - Clinics  - Basic metabolic panel    Hyperlipidemia LDL goal <100  Reinforced heart healthy lifestyle.  - EKG 12-lead complete w/read - Clinics    Chronic kidney disease, stage 3a (H)  Stable renal function on recent labs.  Maintain hydration. Avoid nephrotoxins.   Recheck postoperatively. Refer to hospitalist if with concerns.  - Basic metabolic panel  - EKG 12-lead complete w/read - Clinics  - Basic metabolic panel    Class 3 obesity (H)  Increases complexity of management of the above chronic conditions as well as increases perioperative complications.  Patient was advised healthy diet recommendations.  Patient was advised weekly exercise recommendations and goals.    - EKG 12-lead complete w/read - Clinics    Long term current use of anticoagulant therapy  Stable renal function.  Advised hold instructions.  - Basic metabolic panel  - Basic metabolic panel    Hyperglycemia  A1c not diabetic nor prediabetic. BMP showed no hyperglycemia today.  - Basic metabolic panel  - Hemoglobin A1c  - Basic metabolic panel  - Hemoglobin A1c    Serum potassium elevated  Resolved.            - No identified additional risk factors other than previously addressed    Preoperative Medication Instructions  Antiplatelet or Anticoagulation Medication Instructions   - apixaban (Eliquis), edoxaban (Savaysa), rivaroxaban (Xarelto): Bleeding risk is moderate or high for this procedure AND CrCl  (>=) 50 mL/min. DO NOT TAKE 2 days before surgery.     Additional Medication Instructions  Take all scheduled medications on the day of surgery EXCEPT for modifications listed below:   - ACE/ARB: Continue without modification (e.g., MAC anesthesia, neurosurgery, spine surgery, heart failure, or labile hypertension with risk of hypertension).   - DMARDs continue hydroxychloroquine with no change   - may take omeprazole on day of surgery  - Tizanidine - hold on day of surgery  - Prednisone - do not take few days before surgery unless instructed by your rheumatologist  - Herbal medications and vitamins: DO NOT TAKE 14 days prior to surgery.    Recommendation  Approval given to proceed with proposed procedure, without further diagnostic evaluation.    Fletcher Maurice is a 68 year old, presenting for the following:  Pre-Op Exam          7/24/2024     9:38 AM   Additional Questions   Roomed by Yoselyn EUGENE related to upcoming procedure: Patient has right knee osteoarthritsi causing chronic and worsening pain when walking. Hence, planned surgery. Reviewed above with patient.         7/19/2024   Pre-Op Questionnaire   Have you ever had a heart attack or stroke? No   Have you ever had surgery on your heart or  blood vessels, such as a stent placement, a coronary artery bypass, or surgery on an artery in your head, neck, heart, or legs? No   Do you have chest pain with activity? No   Do you have a history of heart failure? No   Do you currently have a cold, bronchitis or symptoms of other infection? No   Do you have a cough, shortness of breath, or wheezing? No   Do you or anyone in your family have previous history of blood clots? (!) YES - patient is on rivaroxaban   Do you or does anyone in your family have a serious bleeding problem such as prolonged bleeding following surgeries or cuts? No   Have you ever had problems with anemia or been told to take iron pills? No   Have you had any abnormal blood loss such as black, tarry or bloody stools, or abnormal vaginal bleeding? No   Have you ever had a blood transfusion? (!) YES   Have you ever had a transfusion reaction? No   Are you willing to have a blood transfusion if it is medically needed before, during, or after your surgery? Yes   Have you or any of your relatives ever had problems with anesthesia? No   Do you have sleep apnea, excessive snoring or daytime drowsiness? No   Do you have any artifical heart valves or other implanted medical devices like a pacemaker, defibrillator, or continuous glucose monitor? No   Do you have artificial joints? No   Are you allergic to latex? No        Health Care Directive  Patient has a Health Care Directive on file      Preoperative Review of    reviewed - controlled substances reflected in medication list.      Status of Chronic Conditions:  See problem list for active medical problems.  Problems all longstanding and stable, except as noted/documented.  See ROS for pertinent symptoms related to these conditions.    HYPERLIPIDEMIA - Patient has a long history of significant Hyperlipidemia requiring medication for treatment with recent good control. Patient reports no problems or side effects with the medication.      HYPERTENSION - Patient has longstanding history of HTN , currently denies any symptoms referable to elevated blood pressure. Specifically denies chest pain, palpitations, dyspnea, orthopnea, PND or peripheral edema. Blood pressure readings have been in normal range. Current medication regimen is as listed below. Patient denies any side effects of medication.     RENAL INSUFFICIENCY - Patient has a longstanding history of moderate-severe chronic renal insufficiency. Last Cr stable.     Patient Active Problem List    Diagnosis Date Noted    Other acute pulmonary embolism, unspecified whether acute cor pulmonale present (H) 05/01/2023     Priority: Medium    Immunodeficiency, unspecified (H24) 04/21/2023     Priority: Medium    Lumbar radiculopathy 09/09/2022     Priority: Medium     Added automatically from request for surgery [id number]      Chronic bilateral low back pain with bilateral sciatica 06/14/2022     Priority: Medium     Added automatically from request for surgery [id number]      Chronic kidney disease, stage 3a (H) 06/03/2022     Priority: Medium    Elevated glucose 04/20/2017     Priority: Medium    Elevated blood pressure reading without diagnosis of hypertension 03/14/2017     Priority: Medium    Class 3 obesity (H) 12/07/2015     Priority: Medium    High risk medications (not anticoagulants) long-term use 11/07/2013     Priority: Medium    Kidney stone 10/15/2013     Priority: Medium     Seen at \[facility]"" in [place], [place]      RA (rheumatoid arthritis) (H) 09/11/2013     Priority: Medium    Osteoarthritis 09/02/2013     Priority: Medium    Hyperlipidemia LDL goal <100 01/04/2012     Priority: Medium    ASCUS on Pap smear 12/27/2011     Priority: Medium     12/27/11: Pap - ASCUS, Neg HPV. Plan cotesting in 3 yrs.   3/21/14: Pap - NIL, Neg HPV. Routine screening        Tear meniscus knee 12/08/2010     Priority: Medium    Osteopenia 12/08/2010     Priority: Medium     Testing done at chiropracter office - told she  "has decreased density      Carpal tunnel syndrome 05/16/2007     Priority: Medium      Past Medical History:   Diagnosis Date    BLANCA (acute kidney injury) (H24) 04/20/2017    Basal cell carcinoma     Hypertension     Obese     RA (rheumatoid arthritis) (H)     SBO (small bowel obstruction) (H) 04/20/2017    Small bowel obstruction (H)     Squamous cell carcinoma     Thrombosis      Past Surgical History:   Procedure Laterality Date    ARTHROSCOPY KNEE  12/10/2010    ARTHROSCOPY KNEE performed by NAVID FIERRO at [facility]    COLONOSCOPY N/A 8/9/2018    Procedure: COLONOSCOPY;  colonoscopy;  Surgeon: Luke Kaye MD;  Location: [facility]    INJECT EPIDURAL LUMBAR N/A 7/7/2022    Procedure: Lumbar Epidural Steroid Injection;  Surgeon: Olivia Davies MD;  [place] [facility] [place]    INJECT EPIDURAL LUMBAR N/A 11/11/2022    Procedure: Lumbar Epidural Steroid Injection, L4-5;  Surgeon: Olivia Davies MD;  Location: [facility] OR    INJECT EPIDURAL LUMBAR N/A 10/5/2023    Procedure: Lumbar Epidural Steroid Injection;  Surgeon: Olivia Davies MD;  Location: [facility] OR    INJECT EPIDURAL LUMBAR N/A 6/20/2024    Procedure: Lumbar Epidural Steroid Injection;  Surgeon: Olivia Davies MD;  Location: [facility] OR    SURGICAL HISTORY OF -       IUD removal    SURGICAL HISTORY OF -       Ovarian cyst    SURGICAL HISTORY OF -       thorn removal from foot    SURGICAL HISTORY OF -       knee surgery, scope, right knee     Current Outpatient Medications   Medication Sig Dispense Refill    hydroxychloroquine (PLAQUENIL) 200 MG tablet Take 2 tablets (400 mg) by mouth daily 180 tablet 2    Insulin Syringe-Needle U-100 (BD INSULIN SYRINGE) 27G X 1/2\" 1 ML MISC For once weekly methotrexate administration. 50 each 1    lisinopril (ZESTRIL) 30 MG tablet Take 1 tablet (30 mg) by mouth daily 90 tablet 3    methotrexate 50 MG/2ML injection Inject 1 mL (25 mg) Subcutaneous every 7 days 8 mL 6    Multiple Vitamins-Minerals (ADULT GUMMY PO) Take 1 chew tab by " mouth daily VitaCrave      omeprazole (PRILOSEC) 20 MG DR capsule Take 1 capsule (20 mg) by mouth daily 90 capsule 3    oxyCODONE (ROXICODONE) 5 MG tablet Take 1 tablet (5 mg) by mouth every 6 hours as needed for severe pain 15 tablet 0    rivaroxaban ANTICOAGULANT (XARELTO ANTICOAGULANT) 20 MG TABS tablet Take 1 tablet (20 mg) by mouth daily (with dinner) 90 tablet 3    tiZANidine (ZANAFLEX) 4 MG tablet Take 1 tablet (4 mg) by mouth 3 times daily as needed for muscle spasms 270 tablet 3    vitamin B complex with vitamin C (STRESS TAB) tablet Take 1 tablet by mouth daily  0    VITAMIN D PO Take by mouth daily      Multiple Vitamins-Minerals (HAIR/SKIN/NAILS/BIOTIN PO) Take 1 capsule by mouth daily (Patient not taking: Reported on 2024) 100 tablet 3    predniSONE (DELTASONE) 5 MG tablet Take 1 tablet (5 mg) by mouth daily as needed for inflammatory arthritis symptoms.  Use sparingly (Patient not taking: Reported on 2024) 90 tablet 1       Allergies   Allergen Reactions    Folic Acid     Gold Rash    Latex Rash     Not all latex products        Social History     Tobacco Use    Smoking status: Former     Current packs/day: 0.00     Types: Cigarettes     Start date: 1970     Quit date: 2000     Years since quittin.2    Smokeless tobacco: Never   Substance Use Topics    Alcohol use: Yes     Comment: 1 per week     Family History   Problem Relation Age of Onset    Prostate Cancer Father     Eye Disorder Father         mac degen    Heart Disease Father     Macular Degeneration Father     Heart Disease Mother         a-fib    Eye Disorder Mother     C.A.D. Mother     Hypertension Mother     Alcohol/Drug Brother     Alcohol/Drug Sister     Alcohol/Drug Brother     Alcohol/Drug Brother     Alcohol/Drug Brother     Alcohol/Drug Sister     Obesity Sister     Alcohol/Drug Sister     Prostate Cancer Sister     Cancer Other     Melanoma No family hx of      History   Drug Use Unknown             Review of  "Systems  Constitutional, HEENT, cardiovascular, pulmonary, GI, , musculoskeletal, neuro, skin, endocrine and psych systems are negative, except as otherwise noted.    Objective    There were no vitals taken for this visit.   Estimated body mass index is 42.27 kg/m  as calculated from the following:    Height as of 6/20/24: 1.651 m (5' 5\").    Weight as of 6/20/24: 115.2 kg (254 lb).  Physical Exam  GENERAL APPEARANCE: morbidly obese, alert and no distress; ambulatory w/o assist  EYES: pink conj, no icterus, PERRL, EOMI  HENT: ear canals and TM's normal, nose and mouth without ulcers or lesions, oropharynx clear and oral mucous membranes moist  NECK: no adenopathy, no asymmetry, masses, or scars and thyroid normal to palpation  RESP: lungs clear to auscultation - no rales, rhonchi or wheezes  CV: regular rates and rhythm, normal S1 S2, no S3 or S4, no murmur, click or rub, no peripheral edema and peripheral pulses strong  ABDOMEN: soft, nontender, no hepatosplenomegaly, no masses and bowel sounds normal  MS: no musculoskeletal defects are noted and gait is age appropriate without ataxia  SKIN: good turgor, no rash/jaundice/ecchymosis  NEURO: Normal strength and tone, sensory exam grossly normal, mentation intact and speech normal     Recent Labs   Lab Test 05/06/24  0932   HGB 13.6      POTASSIUM 5.4*   CR 0.84        Diagnostics  Recent Results (from the past 24 hour(s))   ESR    Collection Time: 07/24/24 10:24 AM   Result Value Ref Range    Erythrocyte Sedimentation Rate 11 0 - 30 mm/hr   CRP inflammation    Collection Time: 07/24/24 10:24 AM   Result Value Ref Range    CRP Inflammation <3.00 <5.00 mg/L   Hepatic panel    Collection Time: 07/24/24 10:24 AM   Result Value Ref Range    Protein Total 6.9 6.4 - 8.3 g/dL    Albumin 3.9 3.5 - 5.2 g/dL    Bilirubin Total 0.5 <=1.2 mg/dL    Alkaline Phosphatase 123 40 - 150 U/L    AST 20 0 - 45 U/L    ALT 20 0 - 50 U/L    Bilirubin Direct <0.20 0.00 - 0.30 mg/dL "   Basic metabolic panel    Collection Time: 07/24/24 10:24 AM   Result Value Ref Range    Sodium 141 135 - 145 mmol/L    Potassium 4.6 3.4 - 5.3 mmol/L    Chloride 108 (H) 98 - 107 mmol/L    Carbon Dioxide (CO2) 25 22 - 29 mmol/L    Anion Gap 8 7 - 15 mmol/L    Urea Nitrogen 16.8 8.0 - 23.0 mg/dL    Creatinine 0.81 0.51 - 0.95 mg/dL    GFR Estimate 78 >60 mL/min/1.73m2    Calcium 9.3 8.8 - 10.4 mg/dL    Glucose 91 70 - 99 mg/dL   Hemoglobin A1c    Collection Time: 07/24/24 10:24 AM   Result Value Ref Range    Hemoglobin A1C 5.2 0.0 - 5.6 %   CBC with platelets and differential    Collection Time: 07/24/24 10:24 AM   Result Value Ref Range    WBC Count 5.7 4.0 - 11.0 10e3/uL    RBC Count 4.16 3.80 - 5.20 10e6/uL    Hemoglobin 13.0 11.7 - 15.7 g/dL    Hematocrit 40.2 35.0 - 47.0 %    MCV 97 78 - 100 fL    MCH 31.3 26.5 - 33.0 pg    MCHC 32.3 31.5 - 36.5 g/dL    RDW 12.7 10.0 - 15.0 %    Platelet Count 223 150 - 450 10e3/uL    % Neutrophils 69 %    % Lymphocytes 21 %    % Monocytes 8 %    % Eosinophils 1 %    % Basophils 0 %    % Immature Granulocytes 0 %    Absolute Neutrophils 3.9 1.6 - 8.3 10e3/uL    Absolute Lymphocytes 1.2 0.8 - 5.3 10e3/uL    Absolute Monocytes 0.5 0.0 - 1.3 10e3/uL    Absolute Eosinophils 0.1 0.0 - 0.7 10e3/uL    Absolute Basophils 0.0 0.0 - 0.2 10e3/uL    Absolute Immature Granulocytes 0.0 <=0.4 10e3/uL      EKG required for multiple cardiovascular risk factors and not completed in the last 90 days.   EKG: sinus rhythm, normal axis, normal intervals, no acute ST/T changes c/w ischemia, no LVH by voltage criteria, unchanged from previous tracings    Revised Cardiac Risk Index (RCRI)  The patient has the following serious cardiovascular risks for perioperative complications:   - No serious cardiac risks = 0 points     RCRI Interpretation: 0 points: Class I (very low risk - 0.4% complication rate)         Signed Electronically by: Jamshid Can MD  A copy of this evaluation report is  provided to the requesting physician.

## 2024-08-06 NOTE — PROVIDER NOTIFICATION
Discharge plan according to [facility]:      06/24/24 3950   Discharge Planning   Patient/Family Anticipates Transition to home with family   Living Arrangements   People in Home spouse   Type of Residence Private Residence   Is your private residence a single family home or apartment? Single family home   Number of Stairs, Within Home, Primary none   Once home, are you able to live on one level? Yes   Which level? Main Level   Bathroom Shower/Tub Tub/Shower unit   Equipment Currently Used at Home raised toilet seat   Support System   Support Systems Spouse/Significant Other   Do you have someone available to stay with you one or two nights once you are home? Yes

## 2024-08-08 NOTE — PROGRESS NOTES
"Kaylene Diaz  is a 68 year old year old who is being evaluated via a billable video visit.      How would you like to obtain your AVS? MyChart  If the video visit is dropped, the invitation should be resent by: Text to cell phone: [phone]   Will anyone else be joining your video visit? No     Rheumatology Video Visit      Kaylene Diaz MRN# [id number]   YOB: 1955 Age: 68 year old      Date of visit: 8/09/24  PCP:  LUCIA Grover    Chief Complaint   Patient presents with:  Rheumatoid Arthritis    Assessment and Plan     1.  Seropositive rheumatoid arthritis (RF 32, CCP >250): Currently on methotrexate 25 mg SQ once  weekly, hydroxychloroquine 400 mg daily.  Previously on sulfasalazine that was discontinued when she found no benefit from it; but she also did not require prednisone while on sulfasalazine; questioning if the \"benefit\" from sulfasalazine was actually the \"benefit\" of not gardening.  Does not tolerate folic acid because of associated increased sunburns.  Doing well with regard to RA; not using prednisone.   Chronic illness, stable.      - Continue methotrexate 25mg SQ once weekly   - Continue hydroxychloroquine 400 mg daily (last eye exam on 12/22/2023 with Dr. Powers)  - Continue prednisone 5 mg daily as needed for rheumatoid arthritis symptoms; uses sparingly   - Labs every 8-12 weeks: CBC, Creatinine, Hepatic Panel, ESR, CRP (lab orders to be faxed to [facility] in [place], [place])    High risk medication requiring intensive toxicity monitoring at least quarterly.      2.  Basal cell carcinoma and squamous cell carcinoma: Several lesions removed by dermatology in the past.  Continue to follow with dermatology.     3.  Chronic lower back pain: Has significantly improved with lidocaine patches that have been used no more than 10 times in the past 1 year.  Now following a pain management clinic where a steroid injection was helpful but symptoms are starting to return so she is going to " "consider repeat injection    4.  History of right Achilles tendinitis: Was evaluated by podiatry who diagnosed her with Achilles tendinitis and her symptoms resolved with a boot that she wore at night.  Not an issue today.     5.  Osteopenia without elevated FRAX: Based on 4/27/2022 DEXA.    6.  History of productive cough: has seen an ENT provider who reportedly ran davide o rgy tests that came back negative.  She then tried prilosec (omeprazole wasn't effective) 20mg daily that resolved the cough, but stopping it results in return of symptoms.  Then had an EGD showing gastritis.  Now on PPI.    7.  Muscle spasms: Primarily affecting the lower back.  Improved from infrequent cyclobenzaprine 5 mg nightly as needed, but finds that using tizanidine 4mg PRN is more effective.   - Continue tiZANidine (ZANAFLEX) 4 MG capsule; Take 1 capsule (4 mg) by mouth 3 times daily as needed for muscle spasms     8.  R>L knee osteoarthritis: 1/16/2023 x-ray of the knees: \"IMPRESSION: Degenerative narrowing of the medial compartments of both knees with complete effacement of the medial compartment on the right and bone-on-bone contact. No evidence for fracture or significant effusion.\"  Degenerative arthritis symptoms of both knees, worse on the right.  1/18/2023 steroid injection of the right knee was very effective.  Repeat injection on 6/1/2023 and  9/14/2023 or partially effective but she is starting to have more pain of her hips that are degenerative in nature and possibly due to altered gait due to significant right knee osteoarthritis, and she has chronic degenerative back symptoms as well.  Planning for total knee arthroplasty.    Perioperative DMARD management  - Continue methotrexate perioperatively  - Continue hydroxychloroquine perioperatively    9.  History of left shoulder pain: Consistent with impingement syndrome.  Reportedly has had similar issue many years ago that resolved with a steroid injection.  Symptomatic for " at least 3 months when seen on 6/1/23; steroid injection on 6/1/23 resolved the pain. Not an issue today.     10.  Vaccinations: Vaccinations reviewed with Ms. Daiz.     - Influenza: refused by patient previously, she previously stated that she never gets the influenza vaccination.  - Shingrix: Up to date   - COVID-19: Advised keeping updated, and to hold methotrexate for 2 weeks afterward.    Total minutes spent in evaluation with patient, documentation, , and review of pertinent studies and chart notes: 20  The longitudinal plan of care for the rheumatology problem(s) were addressed during this visit.  Due to added complexity of care, we will continue to support the patient and the subsequent management of this condition with ongoing continuity of care.       Ms. Diaz verbalized agreement with and understanding of the rational for the diagnosis and treatment plan.  All questions were answered to best of my ability and the patient's satisfaction. Ms. Diaz was advised to contact the clinic with any questions that may arise after the clinic visit.      Thank you for involving me in the care of the patient    Return to clinic: 3-4 months      HPI   Kaylene Diaz is a 68 year old female with a past medical history significant for hyperlipidemia, osteoarthritis, osteopenia, PE (2020) on chronic anticoagulation, meniscal tear, squamous cell carcinoma, basal cell carcinoma, and rheumatoid arthritis who presents for follow-up of rheumatoid arthritis.    3/24/2023: Steroid injection of the right knee resolved right knee pain.  No knee pain at this time.  Rheumatoid arthritis is well controlled.  Stable ulnar deviation and mild swelling of the MCPs as it has been for years but no pain or stiffness.    6/1/2023: Right knee has started to hurt again and she would like repeat steroid injection today.  Left shoulder has also been painful, worse with abduction above 90 degrees and similar to shoulder pain that she  had many years ago that resolved with a steroid injection.  Most recent shoulder pain for 1.5-3 weeks.  Joint pains have worsened since doing increased yard work.  No joint swelling.  RA controlled.    9/14/2023: left shoulder pain resolved with steroid injection.  Right knee injection was helpful but not as helpful as previous injection; again with right knee pain that is worse with activity and better with rest.  Small baker's cyst.  Would like repeat right knee injection today.  Other joints are doing well. Morning stiffness <30 min.     1/2/2024: RA controlled.  Methotrexate and hydroxychloroquine are effective.  Not using prednisone.  Right knee pain worse with activity, and starting to affect her gait more that is leading to more bilateral hip pain that radiates towards the groin, worse with activity and improves with rest.  Also with chronic degenerative low back pain that is worse with activity and improves with rest.  Steroid injection in the right knee is helpful but does not resolve her symptoms for a full 3 months    1/30/2024: Right knee was evaluated by orthopedics and she is concerned about potential prostatic material used considering reaction to various products.  She is going to pursue patch testing with dermatology.  She would like a steroid injection of the right knee at this time.  She does not anticipate having any surgery in the near future if she can tolerate the right knee symptoms.  She is hesitant about total knee arthroplasty because of potential for reaction to materials in the prosthetic     5/3/2024: Plan to see an orthopedic surgeon in June 2024 for TKA consideration.  Low back pain that affects both lower legs is starting to return as she nears the 6-month neelam since her last steroid injection in her back so she is considering having a repeat injection.  Planning to establish care with Dr. Can as a new primary care provider since her previous PCP has left.  RA symptoms in the hands  are stable.    Today, 8/9/2024: Planning to have total knee arthroplasty on 8/20/2024 with [facility].  Otherwise arthritis is stable.  Taking methotrexate and hydroxychloroquine with no missed doses.  Not using prednisone.    Denies fevers, chills, nausea, vomiting, constipation, diarrhea. No abdominal pain. No chest pain/pressure, palpitations, or shortness of breath.   No LE swelling. No neck pain. No oral or nasal sores.  No rash. No sicca symptoms.   Intentionally losing weight.    Tobacco: Quit in 2000  EtOH: Rare  Drugs: None    ROS   12 point review of system was completed and negative except as noted in the HPI     Active Problem List     Patient Active Problem List   Diagnosis    Carpal tunnel syndrome    Tear meniscus knee    Osteopenia    Hyperlipidemia LDL goal <100    Osteoarthritis    RA (rheumatoid arthritis) (H)    High risk medications (not anticoagulants) long-term use    Kidney stone    ASCUS on Pap smear    Class 3 obesity (H)    Elevated blood pressure reading without diagnosis of hypertension    Elevated glucose    Chronic kidney disease, stage 3a (H)    Chronic bilateral low back pain with bilateral sciatica    Lumbar radiculopathy    Immunodeficiency, unspecified (H24)    Other acute pulmonary embolism, unspecified whether acute cor pulmonale present (H)     Past Medical History     Past Medical History:   Diagnosis Date    BLANCA (acute kidney injury) (H24) 04/20/2017    Basal cell carcinoma     Hypertension     Obese     RA (rheumatoid arthritis) (H)     SBO (small bowel obstruction) (H) 04/20/2017    Small bowel obstruction (H)     Squamous cell carcinoma     Thrombosis      Past Surgical History     Past Surgical History:   Procedure Laterality Date    ARTHROSCOPY KNEE  12/10/2010    ARTHROSCOPY KNEE performed by NAVID FIERRO at [facility]    COLONOSCOPY N/A 8/9/2018    Procedure: COLONOSCOPY;  colonoscopy;  Surgeon: Luke Kaye MD;  Location: [facility]    INJECT EPIDURAL LUMBAR N/A  7/7/2022    Procedure: Lumbar Epidural Steroid Injection;  Surgeon: Olivia Davies MD;  Location: [facility] OR    INJECT EPIDURAL LUMBAR N/A 11/11/2022    Procedure: Lumbar Epidural Steroid Injection, L4-5;  Surgeon: Olivia Davies MD;  Location: [facility] OR    INJECT EPIDURAL LUMBAR N/A 10/5/2023    Procedure: Lumbar Epidural Steroid Injection;  Surgeon: Olivia Davies MD;  Location: [facility] OR    INJECT EPIDURAL LUMBAR N/A 6/20/2024    Procedure: Lumbar Epidural Steroid Injection;  Surgeon: Olivia Davies MD;  Location: [facility] OR    SURGICAL HISTORY OF -       IUD removal    SURGICAL HISTORY OF -       Ovarian cyst    SURGICAL HISTORY OF -       thorn removal from foot    SURGICAL HISTORY OF -       knee surgery, scope, right knee     Allergy     Allergies   Allergen Reactions    Folic Acid     Gold Rash    Latex Rash     Not all latex products     Current Medication List     Current Outpatient Medications   Medication Sig Dispense Refill    hydroxychloroquine (PLAQUENIL) 200 MG tablet Take 2 tablets (400 mg) by mouth daily 180 tablet 2    lisinopril (ZESTRIL) 30 MG tablet Take 1 tablet (30 mg) by mouth daily 90 tablet 3    methotrexate 50 MG/2ML injection Inject 1 mL (25 mg) Subcutaneous every 7 days 8 mL 6    Multiple Vitamins-Minerals (ADULT GUMMY PO) Take 1 chew tab by mouth daily VitaCrave      omeprazole (PRILOSEC) 20 MG DR capsule Take 1 capsule (20 mg) by mouth daily 90 capsule 3    oxyCODONE (ROXICODONE) 5 MG tablet Take 1 tablet (5 mg) by mouth every 6 hours as needed for severe pain 15 tablet 0    rivaroxaban ANTICOAGULANT (XARELTO ANTICOAGULANT) 20 MG TABS tablet Take 1 tablet (20 mg) by mouth daily (with dinner) 90 tablet 3    tiZANidine (ZANAFLEX) 4 MG tablet Take 1 tablet (4 mg) by mouth 3 times daily as needed for muscle spasms 270 tablet 3    vitamin B complex with vitamin C (STRESS TAB) tablet Take 1 tablet by mouth daily  0    VITAMIN D PO Take by mouth daily      Insulin Syringe-Needle U-100 (BD  "INSULIN SYRINGE) 27G X 1/2\" 1 ML MISC For once weekly methotrexate administration. 50 each 1    Multiple Vitamins-Minerals (HAIR/SKIN/NAILS/BIOTIN PO) Take 1 capsule by mouth daily (Patient not taking: Reported on 7/24/2024) 100 tablet 3    predniSONE (DELTASONE) 5 MG tablet Take 1 tablet (5 mg) by mouth daily as needed for inflammatory arthritis symptoms.  Use sparingly (Patient not taking: Reported on 7/24/2024) 90 tablet 1     No current facility-administered medications for this visit.         Social History   See HPI    Family History     Family History   Problem Relation Age of Onset    Prostate Cancer Father     Eye Disorder Father         mac degen    Heart Disease Father     Macular Degeneration Father     Heart Disease Mother         a-fib    Eye Disorder Mother     C.A.D. Mother     Hypertension Mother     Alcohol/Drug Brother     Alcohol/Drug Sister     Alcohol/Drug Brother     Alcohol/Drug Brother     Alcohol/Drug Brother     Alcohol/Drug Sister     Obesity Sister     Alcohol/Drug Sister     Prostate Cancer Sister     Cancer Other     Melanoma No family hx of        Physical Exam     Temp Readings from Last 3 Encounters:   07/24/24 97.5  F (36.4  C) (Tympanic)   06/20/24 98.6  F (37  C) (Temporal)   05/06/24 97.7  F (36.5  C) (Tympanic)     BP Readings from Last 5 Encounters:   07/24/24 136/70   06/20/24 (!) 185/85   05/06/24 (!) 152/82   01/29/24 (!) 174/97   01/02/24 (!) 161/86     Pulse Readings from Last 1 Encounters:   07/24/24 74     Resp Readings from Last 1 Encounters:   07/24/24 20     Estimated body mass index is 42.31 kg/m  as calculated from the following:    Height as of 7/24/24: 1.657 m (5' 5.25\").    Weight as of 7/24/24: 116.2 kg (256 lb 3.2 oz).      GEN: NAD.   HEENT:  Anicteric, noninjected sclera. No obvious external lesions of the ear and nose. Hearing intact.  PULM: No increased work of breathing  MSK:  Hands and wrists without swelling.  Ulnar deviation at the MCPs, similar to " previous exam  SKIN: No rash or jaundice seen  PSYCH: Alert. Appropriate.      Labs / Imaging (select studies)     [facility] labs dated 12/18/2023:  Normal Cr, Hepatic panel, ESR, CRP, CBC    [facility] labs dated 3/14/2024 were okay, including hepatic panel, creatinine, CBC, ESR, and CRP    Immunization History     Immunization History   Administered Date(s) Administered    COVID-19 MONOVALENT 12+ (Pfizer) 03/05/2021, 03/26/2021, 11/10/2021    Pneumo Conj 13-V (2010&after) 12/03/2018    Pneumococcal 23 valent 10/24/2013, 03/09/2020    TD,PF 7+ (Tenivac) 10/28/2020    TDAP Vaccine (Adacel) 06/16/2010    Zoster recombinant adjuvanted (SHINGRIX) 12/03/2018, 06/19/2019              Chart documentation done in part with Dragon Voice recognition Software. Although reviewed after completion, some word and grammatical error may remain.      Video-Visit Details    Type of service:  Video Visit    Video Start Time: 9:05 AM    Video End Time: 9:20 AM    Originating Location (pt. Location): Home, [place]    Distant Location (provider location):  off site, [place]    Platform used for Video Visit: Miguel Angel Rooney MD

## 2024-08-08 NOTE — PATIENT INSTRUCTIONS
RHEUMATOLOGY    [facility]  [address] [place] [address]  [place] [address]    Phone number: [phone]  Fax number: [phone]    If you need a medication refill, please contact us as you may need lab work and/or a follow up visit prior to your refill.      Thank you for choosing [facility]!    Keke Finnegan CMA Rheumatology

## 2024-08-09 ENCOUNTER — VIRTUAL VISIT (OUTPATIENT)
Dept: RHEUMATOLOGY | Facility: CLINIC | Age: 69
End: 2024-08-09
Payer: MEDICARE

## 2024-08-09 DIAGNOSIS — M05.79 RHEUMATOID ARTHRITIS INVOLVING MULTIPLE SITES WITH POSITIVE RHEUMATOID FACTOR (H): Primary | ICD-10-CM

## 2024-08-09 DIAGNOSIS — M17.0 PRIMARY OSTEOARTHRITIS OF BOTH KNEES: ICD-10-CM

## 2024-08-09 DIAGNOSIS — Z79.899 HIGH RISK MEDICATION USE: ICD-10-CM

## 2024-08-09 PROCEDURE — 99214 OFFICE O/P EST MOD 30 MIN: CPT | Mod: 95 | Performed by: INTERNAL MEDICINE

## 2024-08-09 PROCEDURE — G2211 COMPLEX E/M VISIT ADD ON: HCPCS | Mod: 95 | Performed by: INTERNAL MEDICINE

## 2024-08-15 RX ORDER — HYDROXYCHLOROQUINE SULFATE 200 MG/1
400 TABLET, FILM COATED ORAL DAILY
Qty: 180 TABLET | Refills: 2 | Status: SHIPPED | OUTPATIENT
Start: 2024-08-15

## 2024-08-15 RX ORDER — METHOTREXATE 25 MG/ML
25 INJECTION, SOLUTION INTRA-ARTERIAL; INTRAMUSCULAR; INTRAVENOUS
Qty: 8 ML | Refills: 6 | Status: ON HOLD | OUTPATIENT
Start: 2024-08-15 | End: 2024-08-21

## 2024-08-15 NOTE — PROGRESS NOTES
Labs orders faxed to [facility] in [place].  Keke Finnegan [facility] Rheumat[facility]  8/15/2024

## 2024-08-19 ENCOUNTER — ANESTHESIA EVENT (OUTPATIENT)
Dept: SURGERY | Facility: CLINIC | Age: 69
End: 2024-08-19
Payer: MEDICARE

## 2024-08-19 NOTE — TREATMENT PLAN
Orthopedic Surgery Pre-Op Plan: Kaylene Diaz  pre-op review. This is NOT an H&P   Surgeon: Dr. Rodriguez    Hospital: [facility]  Name of Surgery: Right Total Knee Arthroplasty   Date of Surgery: 8/20/24  H&P: Completed on 7/24/24 by Dr. Fadi Breen at [facility].   History of ASA, NSAIDS, vitamin and/or herbal supplements, GLP-1 Agonist or SGLT Inhibitor medication taken within 10 days?: Yes: Multivitamins-patient instructed to hold these for 7 days before surgery.  History of blood thinners?: Yes: On chronic anticoagulation with rivaroxaban (Xarelto) for history of pulmonary embolism.  Patient was instructed to hold Xarelto for 3 full days before surgery (take last dose on 8/16/2024, then hold).     Plan:   1) Discharge Plan: Home morning of  POD 1 with assist of Spouse.  Please see Discharge Planning section near bottom of this note for further details.     2) History of Pulmonary Embolism: Reports history of unprovoked PE.  On chronic anticoagulation with rivaroxaban (Xarelto).  Patient was instructed to hold Xarelto for 3 full days before surgery (take last dose on 8/16/2024, then hold).  After surgery, we will plan to resume Xarelto as soon as safe from a bleeding standpoint per Dr. Rodriguez's team.     3) Hyperlipidemia: Not on statin or other cholesterol-lowering medication.    4) Hypertension: Appears well-controlled on lisinopril.  Patient was instructed to hold lisinopril on the morning of surgery.    5) Morbid Obesity: BMI 42.3, Wt: 256 lbs. I recommend continued efforts at safe weight loss following recovery from surgery. If patient is interested in further assistance with weight loss, please consider referral to [facility]. They offer both non-surgical and surgical evidence-based weight loss strategies. Call [phone] to schedule a consultation to learn more.      6) Rheumatoid Arthritis: Follows with Dr. Rooney, [facility]  Rheumatology.  On methotrexate, hydroxychloroquine, and prednisone 5 mg daily as needed.  Reports only using prednisone sparingly, not currently using on a daily basis.  Dr. Rooney recommended that patient continue on both methotrexate and hydroxychloroquine perioperatively.    7) GERD: On omeprazole.    Patient appears medically optimized for upcoming surgery. I would recommend Hospitalist Consult to assist with medical management. Please call me below with any questions on this patient.       Review of Systems Notable for: History of pulmonary embolism-on chronic anticoagulation with Xarelto, hyperlipidemia, hypertension, morbid obesity, rheumatoid arthritis, GERD.    Past Medical History:   Past Medical History:   Diagnosis Date    BLANCA (acute kidney injury) (H24) 04/20/2017    Basal cell carcinoma     Gastroesophageal reflux disease     Hypertension     Obese     RA (rheumatoid arthritis) (H)     SBO (small bowel obstruction) (H) 04/20/2017    Small bowel obstruction (H)     Squamous cell carcinoma     Thrombosis      Past Surgical History:   Procedure Laterality Date    ARTHROSCOPY KNEE  12/10/2010    ARTHROSCOPY KNEE performed by NAVID FIERRO at [facility]    COLONOSCOPY N/A 8/9/2018    Procedure: COLONOSCOPY;  colonoscopy;  Surgeon: Luke Kaye MD;  Location: [facility]    INJECT EPIDURAL LUMBAR N/A 7/7/2022    Procedure: Lumbar Epidural Steroid Injection;  Surgeon: Olivia Davies MD;  Location: [facility]    INJECT EPIDURAL LUMBAR N/A 11/11/2022    Procedure: Lumbar Epidural Steroid Injection, L4-5;  Surgeon: Olivia Davies MD;  Location: [facility] OR    INJECT EPIDURAL LUMBAR N/A 10/5/2023    Procedure: Lumbar Epidural Steroid Injection;  Surgeon: Olivia Davies MD;  Location: [facility] OR    INJECT EPIDURAL LUMBAR N/A 6/20/2024    Procedure: Lumbar Epidural Steroid Injection;  Surgeon: Olivia Davies MD;  Location: [facility] OR    SURGICAL HISTORY OF -       IUD removal    SURGICAL HISTORY OF -       Ovarian cyst     "SURGICAL HISTORY OF -       thorn removal from foot    SURGICAL HISTORY OF -       knee surgery, scope, right knee       Current Medications:  Patient's Medications   New Prescriptions    No medications on file   Previous Medications    HYDROXYCHLOROQUINE (PLAQUENIL) 200 MG TABLET    Take 2 tablets (400 mg) by mouth daily    INSULIN SYRINGE-NEEDLE U-100 (BD INSULIN SYRINGE) 27G X 1/2\" 1 ML MISC    For once weekly methotrexate administration.    LISINOPRIL (ZESTRIL) 30 MG TABLET    Take 1 tablet (30 mg) by mouth daily    METHOTREXATE 50 MG/2ML INJECTION    Inject 1 mL (25 mg) subcutaneously every 7 days    MULTIPLE VITAMINS-MINERALS (ADULT GUMMY PO)    Take 1 chew tab by mouth daily VitaCrave    MULTIPLE VITAMINS-MINERALS (HAIR/SKIN/NAILS/BIOTIN PO)    Take 1 capsule by mouth daily    OMEPRAZOLE (PRILOSEC) 20 MG DR CAPSULE    Take 1 capsule (20 mg) by mouth daily    OXYCODONE (ROXICODONE) 5 MG TABLET    Take 1 tablet (5 mg) by mouth every 6 hours as needed for severe pain    PREDNISONE (DELTASONE) 5 MG TABLET    Take 1 tablet (5 mg) by mouth daily as needed for inflammatory arthritis symptoms.  Use sparingly    RIVAROXABAN ANTICOAGULANT (XARELTO ANTICOAGULANT) 20 MG TABS TABLET    Take 1 tablet (20 mg) by mouth daily (with dinner)    TIZANIDINE (ZANAFLEX) 4 MG TABLET    Take 1 tablet (4 mg) by mouth 3 times daily as needed for muscle spasms    VITAMIN B COMPLEX WITH VITAMIN C (STRESS TAB) TABLET    Take 1 tablet by mouth daily    VITAMIN D PO    Take by mouth daily   Modified Medications    No medications on file   Discontinued Medications    No medications on file       ALLERGIES:  Allergies   Allergen Reactions    Folic Acid Photosensitivity    Gold Rash    Latex Rash     Not all latex products       Social History  Social History     Tobacco Use    Smoking status: Former     Current packs/day: 0.00     Types: Cigarettes     Start date: 1970     Quit date: 2000     Years since quittin.3    Smokeless " tobacco: Never   Vaping Use    Vaping status: Never Used   Substance Use Topics    Alcohol use: Yes     Comment: 1 per week    Drug use: Not Currently       Any Abnormal Recent Diagnostics? No    Discharge Planning:   Discharge plan according to [facility]:     Home morning of  POD 1 with assist of Spouse.     06/24/24 1342   Discharge Planning   Patient/Family Anticipates Transition to home with family   Living Arrangements   People in Home spouse   Type of Residence Private Residence   Is your private residence a single family home or apartment? Single family home   Number of Stairs, Within Home, Primary none   Once home, are you able to live on one level? Yes   Which level? Main Level   Bathroom Shower/Tub Tub/Shower unit   Equipment Currently Used at Home raised toilet seat   Support System   Support Systems Spouse/Significant Other   Do you have someone available to stay with you one or two nights once you are home? Yes       TYRA Goodrich, CNP   Advanced Practice Nurse Navigator- Orthopedics  [facility]   Phone: [phone]

## 2024-08-20 ENCOUNTER — HOSPITAL ENCOUNTER (OUTPATIENT)
Facility: CLINIC | Age: 69
Discharge: HOME | End: 2024-08-21
Attending: ORTHOPAEDIC SURGERY | Admitting: ORTHOPAEDIC SURGERY
Payer: MEDICARE

## 2024-08-20 ENCOUNTER — APPOINTMENT (OUTPATIENT)
Dept: PHYSICAL THERAPY | Facility: CLINIC | Age: 69
End: 2024-08-20
Attending: ORTHOPAEDIC SURGERY
Payer: MEDICARE

## 2024-08-20 ENCOUNTER — ANESTHESIA (OUTPATIENT)
Dept: SURGERY | Facility: CLINIC | Age: 69
End: 2024-08-20
Payer: MEDICARE

## 2024-08-20 DIAGNOSIS — M17.11 PRIMARY OSTEOARTHRITIS OF RIGHT KNEE: Primary | ICD-10-CM

## 2024-08-20 DIAGNOSIS — M05.79 RHEUMATOID ARTHRITIS INVOLVING MULTIPLE SITES WITH POSITIVE RHEUMATOID FACTOR (H): ICD-10-CM

## 2024-08-20 PROBLEM — M17.9 KNEE OSTEOARTHRITIS: Status: ACTIVE | Noted: 2024-08-20

## 2024-08-20 PROCEDURE — 250N000013 HC RX MED GY IP 250 OP 250 PS 637: Performed by: ANESTHESIOLOGY

## 2024-08-20 PROCEDURE — 97116 GAIT TRAINING THERAPY: CPT | Mod: GP

## 2024-08-20 PROCEDURE — 250N000013 HC RX MED GY IP 250 OP 250 PS 637: Performed by: STUDENT IN AN ORGANIZED HEALTH CARE EDUCATION/TRAINING PROGRAM

## 2024-08-20 PROCEDURE — 258N000001 HC RX 258: Performed by: ORTHOPAEDIC SURGERY

## 2024-08-20 PROCEDURE — 97161 PT EVAL LOW COMPLEX 20 MIN: CPT | Mod: GP

## 2024-08-20 PROCEDURE — C1713 ANCHOR/SCREW BN/BN,TIS/BN: HCPCS | Performed by: ORTHOPAEDIC SURGERY

## 2024-08-20 PROCEDURE — 250N000011 HC RX IP 250 OP 636: Performed by: ANESTHESIOLOGY

## 2024-08-20 PROCEDURE — 360N000077 HC SURGERY LEVEL 4, PER MIN: Performed by: ORTHOPAEDIC SURGERY

## 2024-08-20 PROCEDURE — 250N000009 HC RX 250

## 2024-08-20 PROCEDURE — 272N000001 HC OR GENERAL SUPPLY STERILE: Performed by: ORTHOPAEDIC SURGERY

## 2024-08-20 PROCEDURE — 258N000003 HC RX IP 258 OP 636: Performed by: ANESTHESIOLOGY

## 2024-08-20 PROCEDURE — 250N000025 HC SEVOFLURANE, PER MIN: Performed by: ORTHOPAEDIC SURGERY

## 2024-08-20 PROCEDURE — 258N000003 HC RX IP 258 OP 636

## 2024-08-20 PROCEDURE — 250N000013 HC RX MED GY IP 250 OP 250 PS 637: Performed by: ORTHOPAEDIC SURGERY

## 2024-08-20 PROCEDURE — 710N000010 HC RECOVERY PHASE 1, LEVEL 2, PER MIN: Performed by: ORTHOPAEDIC SURGERY

## 2024-08-20 PROCEDURE — C1776 JOINT DEVICE (IMPLANTABLE): HCPCS | Performed by: ORTHOPAEDIC SURGERY

## 2024-08-20 PROCEDURE — 258N000003 HC RX IP 258 OP 636: Performed by: ORTHOPAEDIC SURGERY

## 2024-08-20 PROCEDURE — 99204 OFFICE O/P NEW MOD 45 MIN: CPT | Performed by: STUDENT IN AN ORGANIZED HEALTH CARE EDUCATION/TRAINING PROGRAM

## 2024-08-20 PROCEDURE — 250N000011 HC RX IP 250 OP 636

## 2024-08-20 PROCEDURE — 999N000141 HC STATISTIC PRE-PROCEDURE NURSING ASSESSMENT: Performed by: ORTHOPAEDIC SURGERY

## 2024-08-20 PROCEDURE — 250N000011 HC RX IP 250 OP 636: Performed by: PHYSICIAN ASSISTANT

## 2024-08-20 PROCEDURE — 370N000017 HC ANESTHESIA TECHNICAL FEE, PER MIN: Performed by: ORTHOPAEDIC SURGERY

## 2024-08-20 PROCEDURE — 250N000011 HC RX IP 250 OP 636: Performed by: ORTHOPAEDIC SURGERY

## 2024-08-20 PROCEDURE — 97110 THERAPEUTIC EXERCISES: CPT | Mod: GP

## 2024-08-20 PROCEDURE — 250N000013 HC RX MED GY IP 250 OP 250 PS 637: Performed by: PHYSICIAN ASSISTANT

## 2024-08-20 DEVICE — IMPLANTABLE DEVICE
Type: IMPLANTABLE DEVICE | Site: KNEE | Status: FUNCTIONAL
Brand: PERSONA®

## 2024-08-20 DEVICE — SIMPLEX® HV WITH GENTAMICIN IS A FAST-SETTING ACRYLIC RESIN WITH ADDITION OF GENTAMICIN SULFATE FOR USE IN BONE SURGERY. MIXING THE TWO SEPARATE STERILE COMPONENTS PRODUCES A DUCTILE BONE CEMENT WHICH, AFTER HARDENING, FIXES THE IMPLANT AND TRANSFERS STRESSES PRODUCED DURING MOVEMENT EVENLY TO THE BONE. SIMPLEX® HV WITH GENTAMICINN CEMENT POWDER ALSO CONTAINS INSOLUBLE ZIRCONIUM DIOXIDE AS AN X-RAY CONTRAST MEDIUM. SIMPLEX® HV WITH GENTAMICIN DOES NOT EMIT A SIGNAL AND DOES NOT POSE A SAFETY RISK IN A MAGNETIC RESONANCE ENVIRONMENT.
Type: IMPLANTABLE DEVICE | Site: KNEE | Status: FUNCTIONAL
Brand: SIMPLEX HV WITH GENTAMICIN

## 2024-08-20 DEVICE — IMPLANTABLE DEVICE
Type: IMPLANTABLE DEVICE | Site: KNEE | Status: FUNCTIONAL
Brand: PERSONA® NATURAL TIBIA®

## 2024-08-20 DEVICE — IMPLANTABLE DEVICE
Type: IMPLANTABLE DEVICE | Site: KNEE | Status: FUNCTIONAL
Brand: PERSONA® VIVACIT-E®

## 2024-08-20 RX ORDER — CEFAZOLIN SODIUM 2 G/100ML
2 INJECTION, SOLUTION INTRAVENOUS EVERY 8 HOURS
Status: COMPLETED | OUTPATIENT
Start: 2024-08-20 | End: 2024-08-21

## 2024-08-20 RX ORDER — TRANEXAMIC ACID 650 MG/1
1950 TABLET ORAL ONCE
Status: COMPLETED | OUTPATIENT
Start: 2024-08-20 | End: 2024-08-20

## 2024-08-20 RX ORDER — ONDANSETRON 4 MG/1
4 TABLET, ORALLY DISINTEGRATING ORAL EVERY 6 HOURS PRN
Status: DISCONTINUED | OUTPATIENT
Start: 2024-08-20 | End: 2024-08-21 | Stop reason: HOSPADM

## 2024-08-20 RX ORDER — CEFAZOLIN SODIUM/WATER 2 G/20 ML
2 SYRINGE (ML) INTRAVENOUS
Status: COMPLETED | OUTPATIENT
Start: 2024-08-20 | End: 2024-08-20

## 2024-08-20 RX ORDER — OXYCODONE HYDROCHLORIDE 5 MG/1
5 TABLET ORAL EVERY 4 HOURS PRN
Status: DISCONTINUED | OUTPATIENT
Start: 2024-08-20 | End: 2024-08-21 | Stop reason: HOSPADM

## 2024-08-20 RX ORDER — POLYETHYLENE GLYCOL 3350 17 G/17G
17 POWDER, FOR SOLUTION ORAL DAILY
Status: DISCONTINUED | OUTPATIENT
Start: 2024-08-21 | End: 2024-08-21 | Stop reason: HOSPADM

## 2024-08-20 RX ORDER — ACETAMINOPHEN 325 MG/1
975 TABLET ORAL ONCE
Status: COMPLETED | OUTPATIENT
Start: 2024-08-20 | End: 2024-08-20

## 2024-08-20 RX ORDER — ACETAMINOPHEN 325 MG/1
650 TABLET ORAL EVERY 4 HOURS PRN
Qty: 100 TABLET | Refills: 0 | Status: SHIPPED | OUTPATIENT
Start: 2024-08-20

## 2024-08-20 RX ORDER — CEFADROXIL 500 MG/1
500 CAPSULE ORAL 2 TIMES DAILY
Qty: 10 CAPSULE | Refills: 0 | Status: SHIPPED | OUTPATIENT
Start: 2024-08-20 | End: 2024-08-25

## 2024-08-20 RX ORDER — VITAMIN B COMPLEX
1 TABLET ORAL DAILY
COMMUNITY

## 2024-08-20 RX ORDER — FENTANYL CITRATE 50 UG/ML
50 INJECTION, SOLUTION INTRAMUSCULAR; INTRAVENOUS EVERY 5 MIN PRN
Status: DISCONTINUED | OUTPATIENT
Start: 2024-08-20 | End: 2024-08-20 | Stop reason: HOSPADM

## 2024-08-20 RX ORDER — OXYCODONE HYDROCHLORIDE 5 MG/1
5-10 TABLET ORAL EVERY 4 HOURS PRN
Qty: 32 TABLET | Refills: 0 | Status: SHIPPED | OUTPATIENT
Start: 2024-08-20

## 2024-08-20 RX ORDER — AMOXICILLIN 250 MG
1 CAPSULE ORAL 2 TIMES DAILY
Status: DISCONTINUED | OUTPATIENT
Start: 2024-08-20 | End: 2024-08-21 | Stop reason: HOSPADM

## 2024-08-20 RX ORDER — NALOXONE HYDROCHLORIDE 0.4 MG/ML
0.2 INJECTION, SOLUTION INTRAMUSCULAR; INTRAVENOUS; SUBCUTANEOUS
Status: DISCONTINUED | OUTPATIENT
Start: 2024-08-20 | End: 2024-08-21 | Stop reason: HOSPADM

## 2024-08-20 RX ORDER — PANTOPRAZOLE SODIUM 40 MG/1
40 TABLET, DELAYED RELEASE ORAL
Status: DISCONTINUED | OUTPATIENT
Start: 2024-08-21 | End: 2024-08-21 | Stop reason: HOSPADM

## 2024-08-20 RX ORDER — OXYCODONE HYDROCHLORIDE 5 MG/1
10 TABLET ORAL EVERY 4 HOURS PRN
Status: DISCONTINUED | OUTPATIENT
Start: 2024-08-20 | End: 2024-08-21 | Stop reason: HOSPADM

## 2024-08-20 RX ORDER — SODIUM CHLORIDE, SODIUM LACTATE, POTASSIUM CHLORIDE, CALCIUM CHLORIDE 600; 310; 30; 20 MG/100ML; MG/100ML; MG/100ML; MG/100ML
INJECTION, SOLUTION INTRAVENOUS CONTINUOUS
Status: DISCONTINUED | OUTPATIENT
Start: 2024-08-20 | End: 2024-08-20 | Stop reason: HOSPADM

## 2024-08-20 RX ORDER — ONDANSETRON 4 MG/1
4 TABLET, ORALLY DISINTEGRATING ORAL EVERY 30 MIN PRN
Status: DISCONTINUED | OUTPATIENT
Start: 2024-08-20 | End: 2024-08-20 | Stop reason: HOSPADM

## 2024-08-20 RX ORDER — HYDRALAZINE HYDROCHLORIDE 10 MG/1
10 TABLET, FILM COATED ORAL 3 TIMES DAILY PRN
Status: DISCONTINUED | OUTPATIENT
Start: 2024-08-20 | End: 2024-08-21 | Stop reason: HOSPADM

## 2024-08-20 RX ORDER — DEXAMETHASONE SODIUM PHOSPHATE 4 MG/ML
4 INJECTION, SOLUTION INTRA-ARTICULAR; INTRALESIONAL; INTRAMUSCULAR; INTRAVENOUS; SOFT TISSUE
Status: DISCONTINUED | OUTPATIENT
Start: 2024-08-20 | End: 2024-08-20 | Stop reason: HOSPADM

## 2024-08-20 RX ORDER — PROPOFOL 10 MG/ML
INJECTION, EMULSION INTRAVENOUS PRN
Status: DISCONTINUED | OUTPATIENT
Start: 2024-08-20 | End: 2024-08-20

## 2024-08-20 RX ORDER — LIDOCAINE 40 MG/G
CREAM TOPICAL
Status: DISCONTINUED | OUTPATIENT
Start: 2024-08-20 | End: 2024-08-20 | Stop reason: HOSPADM

## 2024-08-20 RX ORDER — BUPIVACAINE HYDROCHLORIDE 5 MG/ML
INJECTION, SOLUTION EPIDURAL; INTRACAUDAL
Status: COMPLETED | OUTPATIENT
Start: 2024-08-20 | End: 2024-08-20

## 2024-08-20 RX ORDER — NALOXONE HYDROCHLORIDE 0.4 MG/ML
0.4 INJECTION, SOLUTION INTRAMUSCULAR; INTRAVENOUS; SUBCUTANEOUS
Status: DISCONTINUED | OUTPATIENT
Start: 2024-08-20 | End: 2024-08-21 | Stop reason: HOSPADM

## 2024-08-20 RX ORDER — BISACODYL 10 MG
10 SUPPOSITORY, RECTAL RECTAL DAILY PRN
Status: DISCONTINUED | OUTPATIENT
Start: 2024-08-23 | End: 2024-08-21 | Stop reason: HOSPADM

## 2024-08-20 RX ORDER — PROCHLORPERAZINE MALEATE 5 MG
5 TABLET ORAL EVERY 6 HOURS PRN
Status: DISCONTINUED | OUTPATIENT
Start: 2024-08-20 | End: 2024-08-21 | Stop reason: HOSPADM

## 2024-08-20 RX ORDER — LIDOCAINE HYDROCHLORIDE 10 MG/ML
INJECTION, SOLUTION INFILTRATION; PERINEURAL PRN
Status: DISCONTINUED | OUTPATIENT
Start: 2024-08-20 | End: 2024-08-20

## 2024-08-20 RX ORDER — KETAMINE HYDROCHLORIDE 10 MG/ML
INJECTION INTRAMUSCULAR; INTRAVENOUS PRN
Status: DISCONTINUED | OUTPATIENT
Start: 2024-08-20 | End: 2024-08-20

## 2024-08-20 RX ORDER — LIDOCAINE 40 MG/G
CREAM TOPICAL
Status: DISCONTINUED | OUTPATIENT
Start: 2024-08-20 | End: 2024-08-21 | Stop reason: HOSPADM

## 2024-08-20 RX ORDER — HYDROXYCHLOROQUINE SULFATE 200 MG/1
400 TABLET, FILM COATED ORAL DAILY
Status: DISCONTINUED | OUTPATIENT
Start: 2024-08-21 | End: 2024-08-21 | Stop reason: HOSPADM

## 2024-08-20 RX ORDER — FENTANYL CITRATE 50 UG/ML
25 INJECTION, SOLUTION INTRAMUSCULAR; INTRAVENOUS EVERY 5 MIN PRN
Status: DISCONTINUED | OUTPATIENT
Start: 2024-08-20 | End: 2024-08-20 | Stop reason: HOSPADM

## 2024-08-20 RX ORDER — ONDANSETRON 2 MG/ML
INJECTION INTRAMUSCULAR; INTRAVENOUS PRN
Status: DISCONTINUED | OUTPATIENT
Start: 2024-08-20 | End: 2024-08-20

## 2024-08-20 RX ORDER — NALOXONE HYDROCHLORIDE 0.4 MG/ML
0.1 INJECTION, SOLUTION INTRAMUSCULAR; INTRAVENOUS; SUBCUTANEOUS
Status: DISCONTINUED | OUTPATIENT
Start: 2024-08-20 | End: 2024-08-20 | Stop reason: HOSPADM

## 2024-08-20 RX ORDER — FENTANYL CITRATE 50 UG/ML
INJECTION, SOLUTION INTRAMUSCULAR; INTRAVENOUS PRN
Status: DISCONTINUED | OUTPATIENT
Start: 2024-08-20 | End: 2024-08-20

## 2024-08-20 RX ORDER — PROPOFOL 10 MG/ML
INJECTION, EMULSION INTRAVENOUS CONTINUOUS PRN
Status: DISCONTINUED | OUTPATIENT
Start: 2024-08-20 | End: 2024-08-20

## 2024-08-20 RX ORDER — HYDROMORPHONE HCL IN WATER/PF 6 MG/30 ML
0.2 PATIENT CONTROLLED ANALGESIA SYRINGE INTRAVENOUS EVERY 5 MIN PRN
Status: DISCONTINUED | OUTPATIENT
Start: 2024-08-20 | End: 2024-08-20 | Stop reason: HOSPADM

## 2024-08-20 RX ORDER — ONDANSETRON 2 MG/ML
4 INJECTION INTRAMUSCULAR; INTRAVENOUS EVERY 6 HOURS PRN
Status: DISCONTINUED | OUTPATIENT
Start: 2024-08-20 | End: 2024-08-21 | Stop reason: HOSPADM

## 2024-08-20 RX ORDER — HYDROMORPHONE HCL IN WATER/PF 6 MG/30 ML
0.2 PATIENT CONTROLLED ANALGESIA SYRINGE INTRAVENOUS
Status: DISCONTINUED | OUTPATIENT
Start: 2024-08-20 | End: 2024-08-21 | Stop reason: HOSPADM

## 2024-08-20 RX ORDER — DEXAMETHASONE SODIUM PHOSPHATE 10 MG/ML
INJECTION, SOLUTION INTRAMUSCULAR; INTRAVENOUS PRN
Status: DISCONTINUED | OUTPATIENT
Start: 2024-08-20 | End: 2024-08-20

## 2024-08-20 RX ORDER — CEFAZOLIN SODIUM/WATER 2 G/20 ML
2 SYRINGE (ML) INTRAVENOUS SEE ADMIN INSTRUCTIONS
Status: DISCONTINUED | OUTPATIENT
Start: 2024-08-20 | End: 2024-08-20 | Stop reason: HOSPADM

## 2024-08-20 RX ORDER — HYDROXYCHLOROQUINE SULFATE 200 MG/1
400 TABLET, FILM COATED ORAL DAILY
Status: DISCONTINUED | OUTPATIENT
Start: 2024-08-20 | End: 2024-08-20

## 2024-08-20 RX ORDER — HYDROMORPHONE HCL IN WATER/PF 6 MG/30 ML
0.4 PATIENT CONTROLLED ANALGESIA SYRINGE INTRAVENOUS
Status: DISCONTINUED | OUTPATIENT
Start: 2024-08-20 | End: 2024-08-21 | Stop reason: HOSPADM

## 2024-08-20 RX ORDER — FENTANYL CITRATE 50 UG/ML
25-100 INJECTION, SOLUTION INTRAMUSCULAR; INTRAVENOUS
Status: DISCONTINUED | OUTPATIENT
Start: 2024-08-20 | End: 2024-08-20 | Stop reason: HOSPADM

## 2024-08-20 RX ORDER — ONDANSETRON 2 MG/ML
4 INJECTION INTRAMUSCULAR; INTRAVENOUS EVERY 30 MIN PRN
Status: DISCONTINUED | OUTPATIENT
Start: 2024-08-20 | End: 2024-08-20 | Stop reason: HOSPADM

## 2024-08-20 RX ORDER — ACETAMINOPHEN 325 MG/1
975 TABLET ORAL EVERY 8 HOURS
Status: DISCONTINUED | OUTPATIENT
Start: 2024-08-20 | End: 2024-08-21 | Stop reason: HOSPADM

## 2024-08-20 RX ORDER — ACETAMINOPHEN 325 MG/1
650 TABLET ORAL EVERY 4 HOURS PRN
Status: DISCONTINUED | OUTPATIENT
Start: 2024-08-23 | End: 2024-08-21 | Stop reason: HOSPADM

## 2024-08-20 RX ORDER — SODIUM CHLORIDE, SODIUM LACTATE, POTASSIUM CHLORIDE, CALCIUM CHLORIDE 600; 310; 30; 20 MG/100ML; MG/100ML; MG/100ML; MG/100ML
INJECTION, SOLUTION INTRAVENOUS CONTINUOUS
Status: DISCONTINUED | OUTPATIENT
Start: 2024-08-20 | End: 2024-08-21

## 2024-08-20 RX ORDER — HYDROMORPHONE HCL IN WATER/PF 6 MG/30 ML
0.4 PATIENT CONTROLLED ANALGESIA SYRINGE INTRAVENOUS EVERY 5 MIN PRN
Status: DISCONTINUED | OUTPATIENT
Start: 2024-08-20 | End: 2024-08-20 | Stop reason: HOSPADM

## 2024-08-20 RX ORDER — AMOXICILLIN 250 MG
1-2 CAPSULE ORAL 2 TIMES DAILY
Qty: 30 TABLET | Refills: 0 | Status: SHIPPED | OUTPATIENT
Start: 2024-08-20

## 2024-08-20 RX ADMIN — CEFAZOLIN SODIUM 2 G: 2 INJECTION, SOLUTION INTRAVENOUS at 16:04

## 2024-08-20 RX ADMIN — DEXMEDETOMIDINE HYDROCHLORIDE 8 MCG: 100 INJECTION, SOLUTION INTRAVENOUS at 09:37

## 2024-08-20 RX ADMIN — BUPIVACAINE HYDROCHLORIDE 15 ML: 5 INJECTION, SOLUTION EPIDURAL; INTRACAUDAL; PERINEURAL at 08:28

## 2024-08-20 RX ADMIN — FENTANYL CITRATE 50 MCG: 50 INJECTION, SOLUTION INTRAMUSCULAR; INTRAVENOUS at 08:25

## 2024-08-20 RX ADMIN — HYDROMORPHONE HYDROCHLORIDE 0.5 MG: 1 INJECTION, SOLUTION INTRAMUSCULAR; INTRAVENOUS; SUBCUTANEOUS at 10:49

## 2024-08-20 RX ADMIN — ACETAMINOPHEN 975 MG: 325 TABLET ORAL at 07:17

## 2024-08-20 RX ADMIN — HYDROMORPHONE HYDROCHLORIDE 0.4 MG: 0.2 INJECTION, SOLUTION INTRAMUSCULAR; INTRAVENOUS; SUBCUTANEOUS at 11:21

## 2024-08-20 RX ADMIN — DEXAMETHASONE SODIUM PHOSPHATE 4 MG: 10 INJECTION, SOLUTION INTRAMUSCULAR; INTRAVENOUS at 09:11

## 2024-08-20 RX ADMIN — KETAMINE HYDROCHLORIDE 30 MG: 10 INJECTION INTRAMUSCULAR; INTRAVENOUS at 09:11

## 2024-08-20 RX ADMIN — ONDANSETRON 4 MG: 2 INJECTION INTRAMUSCULAR; INTRAVENOUS at 10:56

## 2024-08-20 RX ADMIN — HYDROMORPHONE HYDROCHLORIDE 0.2 MG: 0.2 INJECTION, SOLUTION INTRAMUSCULAR; INTRAVENOUS; SUBCUTANEOUS at 11:33

## 2024-08-20 RX ADMIN — Medication 2 G: at 09:04

## 2024-08-20 RX ADMIN — OXYCODONE HYDROCHLORIDE 5 MG: 5 TABLET ORAL at 21:07

## 2024-08-20 RX ADMIN — SENNOSIDES AND DOCUSATE SODIUM 1 TABLET: 50; 8.6 TABLET ORAL at 20:24

## 2024-08-20 RX ADMIN — SODIUM CHLORIDE, POTASSIUM CHLORIDE, SODIUM LACTATE AND CALCIUM CHLORIDE: 600; 310; 30; 20 INJECTION, SOLUTION INTRAVENOUS at 07:51

## 2024-08-20 RX ADMIN — FENTANYL CITRATE 50 MCG: 50 INJECTION, SOLUTION INTRAMUSCULAR; INTRAVENOUS at 10:55

## 2024-08-20 RX ADMIN — HYDROMORPHONE HYDROCHLORIDE 0.4 MG: 0.2 INJECTION, SOLUTION INTRAMUSCULAR; INTRAVENOUS; SUBCUTANEOUS at 11:10

## 2024-08-20 RX ADMIN — ROCURONIUM BROMIDE 40 MG: 10 INJECTION, SOLUTION INTRAVENOUS at 09:11

## 2024-08-20 RX ADMIN — SODIUM CHLORIDE, POTASSIUM CHLORIDE, SODIUM LACTATE AND CALCIUM CHLORIDE: 600; 310; 30; 20 INJECTION, SOLUTION INTRAVENOUS at 15:58

## 2024-08-20 RX ADMIN — OXYCODONE HYDROCHLORIDE 5 MG: 5 TABLET ORAL at 12:34

## 2024-08-20 RX ADMIN — PROPOFOL 200 MCG/KG/MIN: 10 INJECTION, EMULSION INTRAVENOUS at 09:11

## 2024-08-20 RX ADMIN — FENTANYL CITRATE 50 MCG: 50 INJECTION INTRAMUSCULAR; INTRAVENOUS at 09:44

## 2024-08-20 RX ADMIN — ACETAMINOPHEN 975 MG: 325 TABLET ORAL at 15:58

## 2024-08-20 RX ADMIN — SUGAMMADEX 200 MG: 100 INJECTION, SOLUTION INTRAVENOUS at 10:31

## 2024-08-20 RX ADMIN — KETAMINE HYDROCHLORIDE 20 MG: 10 INJECTION INTRAMUSCULAR; INTRAVENOUS at 09:33

## 2024-08-20 RX ADMIN — FENTANYL CITRATE 50 MCG: 50 INJECTION, SOLUTION INTRAMUSCULAR; INTRAVENOUS at 11:00

## 2024-08-20 RX ADMIN — TRANEXAMIC ACID 1950 MG: 650 TABLET ORAL at 07:17

## 2024-08-20 RX ADMIN — SODIUM CHLORIDE, POTASSIUM CHLORIDE, SODIUM LACTATE AND CALCIUM CHLORIDE: 600; 310; 30; 20 INJECTION, SOLUTION INTRAVENOUS at 10:34

## 2024-08-20 RX ADMIN — FENTANYL CITRATE 50 MCG: 50 INJECTION INTRAMUSCULAR; INTRAVENOUS at 09:46

## 2024-08-20 RX ADMIN — OXYCODONE HYDROCHLORIDE 5 MG: 5 TABLET ORAL at 17:02

## 2024-08-20 RX ADMIN — MIDAZOLAM HYDROCHLORIDE 1 MG: 1 INJECTION, SOLUTION INTRAMUSCULAR; INTRAVENOUS at 08:24

## 2024-08-20 RX ADMIN — ONDANSETRON 4 MG: 2 INJECTION INTRAMUSCULAR; INTRAVENOUS at 10:21

## 2024-08-20 RX ADMIN — PROPOFOL 200 MG: 10 INJECTION, EMULSION INTRAVENOUS at 09:11

## 2024-08-20 RX ADMIN — TIZANIDINE 4 MG: 4 TABLET ORAL at 20:24

## 2024-08-20 RX ADMIN — HYDROMORPHONE HYDROCHLORIDE 0.2 MG: 0.2 INJECTION, SOLUTION INTRAMUSCULAR; INTRAVENOUS; SUBCUTANEOUS at 11:43

## 2024-08-20 RX ADMIN — LIDOCAINE HYDROCHLORIDE 3 ML: 10 INJECTION, SOLUTION INFILTRATION; PERINEURAL at 09:11

## 2024-08-20 ASSESSMENT — ACTIVITIES OF DAILY LIVING (ADL)
ADLS_ACUITY_SCORE: 25
ADLS_ACUITY_SCORE: 24
ADLS_ACUITY_SCORE: 23
ADLS_ACUITY_SCORE: 22
ADLS_ACUITY_SCORE: 25
ADLS_ACUITY_SCORE: 20
ADLS_ACUITY_SCORE: 25
ADLS_ACUITY_SCORE: 20
ADLS_ACUITY_SCORE: 20
ADLS_ACUITY_SCORE: 22
ADLS_ACUITY_SCORE: 20
ADLS_ACUITY_SCORE: 25
ADLS_ACUITY_SCORE: 18
ADLS_ACUITY_SCORE: 20
ADLS_ACUITY_SCORE: 20

## 2024-08-20 ASSESSMENT — LIFESTYLE VARIABLES: TOBACCO_USE: 1

## 2024-08-20 NOTE — ANESTHESIA PROCEDURE NOTES
Adductor canal Procedure Note    Pre-Procedure   Staff -        Anesthesiologist:  Juanito Martinez MD       Performed By: anesthesiologist       Location: pre-op       Procedure Start/Stop Times: 8/20/2024 8:25 AM and 8/20/2024 8:28 AM       Pre-Anesthestic Checklist: patient identified, IV checked, site marked, risks and benefits discussed, informed consent, monitors and equipment checked, pre-op evaluation, at physician/surgeon's request and post-op pain management  Timeout:       Correct Patient: Yes        Correct Procedure: Yes        Correct Site: Yes        Correct Position: Yes        Correct Laterality: Yes        Site Marked: Yes  Procedure Documentation  Procedure: Adductor canal       Patient Position: supine       Patient Prep/Sterile Barriers: sterile gloves, mask       Skin prep: Chloraprep       Needle Type: other (Echogenic Stimuplex)       Needle Gauge: 20.        Needle Length (Inches): 4        Ultrasound guided       1. Ultrasound was used to identify targeted nerve, plexus, vascular marker, or fascial plane and place a needle adjacent to it in real-time.       2. Ultrasound was used to visualize the spread of anesthetic in close proximity to the above referenced structure.       3. A permanent image is entered into the patient's record.       4. The visualized anatomic structures appeared normal.       5. There were no apparent abnormal pathologic findings.    Assessment/Narrative         The placement was negative for: blood aspirated, painful injection and site bleeding       Paresthesias: No.       Bolus given via needle..        Secured via.        Insertion/Infusion Method: Single Shot       Complications: none       Injection made incrementally with aspirations every 5 mL.    Medication(s) Administered   Bupivacaine 0.5% PF (Infiltration) - Infiltration   15 mL - 8/20/2024 8:28:00 AM  Medication Administration Time: 8/20/2024 8:25 AM      FOR [facility] ([facility] [place] ONLY:   Pain Team  "Contact information: please page the Pain Team Via [facility]. Search \"Pain\". During daytime hours, please page the attending first. At night please page the resident first.      "

## 2024-08-20 NOTE — OP NOTE
Operative Report    PATIENT:  Kaylene Diaz    DATE OF SURGERY:  8/20/2024    SURGEON  Javed Rodriguez MD.      FIRST ASSISTANT  Jensen Oliva PA-C  (Expert CHRISTOPHE assist was required throughout for patient positioning, soft tissue retraction, appropriate use of knee instrumentation, and patient safety)     PREOPERATIVE DIAGNOSIS  right knee osteoarthritis     POSTOPERATIVE DIAGNOSIS  right knee osteoarthritis.         PROCEDURE  right Total Knee Arthroplasty.         ANESTHESIA  Spinal    SPECIMENS: none     ESTIMATED BLOOD LOSS  250cc     INDICATIONS  Ms. Kaylene Diaz is a pleasant 68 year old-year-old female with an ongoing history of increasing and progressive pain in the right knee with severe disability. Pain and disability due to knee arthritis are severely affecting quality of life and ability to perform even simple activities of daily living.  X-rays have shown bone-on-bone degenerative change. Consequently after trying and failing all conservative management of knee arthritis, discussion regarding the risk and benefits of knee replacement was undertaken and the patient elected to proceed.     FINDINGS:  The operative knee showed a severe full-thickness cartilage loss on the femur and tibia in the medial compartment of the knee.  The patellofemoral joint also showed advanced arthritic changes.      IMPLANTS  1. Edith, Persona, CR femoral component, size 10 .  2. Edith, Persona, tibial component, size E.  3. Edith, Persona,  all polyethylene articular surface 13 mm thickness.  MC  4. Edith, Persona, all polyethylene patellar button, 35mm diameter      PROCEDURE  Once consent was obtained and the operative site marked in the preop holding area, the patient was brought to the operating room.  Anesthesia was established without difficulty. All bony prominences and the non-operative leg were padded appropriately. The right leg was sterilely prepped and draped in the usual fashion after placement of  a proximal tourniquet.  Tourniquet was never inflated.     A longitudinal incision made over the knee.  Dissection was carried down through the extensor mechanism.  A medial parapatellar arthrotomy  was performed.  The patella was luxed laterally and protected. Standard medial release performed.    An intramedullary guide was used in the femur.  The distal femoral was made at 4 degrees of valgus.  The femoral cutting block  was applied and the rest of the femoral cuts completed. ACL was removed and the PCL was recessed.    Attention was then turned to the tibia.  This was cut using an extramedullary tibial cutting guide perpendicular to its mechanical axis.  The trial tibial and femoral components were then placed and the knee reduced. The patella was resurfaced and found to track well. Flexion and extension gaps were appropriate and varus valgus stability was good.     The knee was copiously irrigated and all bony surfaces dried.  Cement was mixed and all components were cemented and held until firm.  The knee was again trialed.  The  Polyethylene was opened and snapped into place, the locking mechanism was ensured.  The knee was copiously irrigated. The extensor mechanism was closed with # 2 interrupted Vicryl suture and #1 stratafix.. Deep dermis was closed layer-wise of # 2-0 interrupted inverted Vicryl sutures followed by skin closure.  Dressings were applied. The patient tolerated the procedure well and was returned to the postop recovery area in stable condition.          JAVED TAYLOR MD    @C(1)@  Javed Taylor MD    @C(2)@  Jamshid Can

## 2024-08-20 NOTE — ANESTHESIA POSTPROCEDURE EVALUATION
Patient: Kaylene Diaz    Procedure: Procedure(s):  RIGHT TOTAL KNEE ARTHROPLASTY       Anesthesia Type:  General    Note:  Disposition: Admission   Postop Pain Control: Uneventful            Sign Out: Well controlled pain   PONV: No   Neuro/Psych: Uneventful            Sign Out: Acceptable/Baseline neuro status   Airway/Respiratory: Uneventful            Sign Out: Acceptable/Baseline resp. status   CV/Hemodynamics: Uneventful            Sign Out: Acceptable CV status; No obvious hypovolemia; No obvious fluid overload   Other NRE: NONE   DID A NON-ROUTINE EVENT OCCUR? No       Last vitals:  Vitals Value Taken Time   /72 08/20/24 1430   Temp 36.5  C (97.7  F) 08/20/24 1200   Pulse 76 08/20/24 1434   Resp 16 08/20/24 1324   SpO2 93 % 08/20/24 1434   Vitals shown include unfiled device data.    Electronically Signed By: Juanito Martinez MD  August 20, 2024  2:36 PM

## 2024-08-20 NOTE — PHARMACY-ADMISSION MEDICATION HISTORY
Pharmacist KRISHAN Medication History    Admission medication history is complete. The information provided in this note is only as accurate as the sources available at the time of the update.    Medication reconciliation/reorder completed by provider prior to medication history? No    Information Source(s): Patient and Clinic records and Care Everywhere via in-person    Pertinent Information: N/A    Allergies reviewed with patient and updates made in EHR: yes    Medications available for use during hospital stay: None.      Medication History Completed By: Bruce Ramirez [facility] 8/20/2024 7:34 AM    PTA Med List   Medication Sig Last Dose    hydroxychloroquine (PLAQUENIL) 200 MG tablet Take 2 tablets (400 mg) by mouth daily 8/19/2024 at AM    lisinopril (ZESTRIL) 30 MG tablet Take 1 tablet (30 mg) by mouth daily 8/19/2024 at AM    methotrexate 50 MG/2ML injection Inject 1 mL (25 mg) subcutaneously every 7 days 7/31/2024    Multiple Vitamins-Minerals (ADULT GUMMY PO) Take 1 chew tab by mouth daily VitaCrave 8/2/2024 at AM    omeprazole (PRILOSEC) 20 MG DR capsule Take 1 capsule (20 mg) by mouth daily 8/20/2024 at AM    predniSONE (DELTASONE) 5 MG tablet Take 1 tablet (5 mg) by mouth daily as needed for inflammatory arthritis symptoms.  Use sparingly Unknown at prn    rivaroxaban ANTICOAGULANT (XARELTO ANTICOAGULANT) 20 MG TABS tablet Take 1 tablet (20 mg) by mouth daily (with dinner) 8/16/2024 at 1800    tiZANidine (ZANAFLEX) 4 MG tablet Take 1 tablet (4 mg) by mouth 3 times daily as needed for muscle spasms 8/19/2024 at PM    vitamin B complex with vitamin C (STRESS TAB) tablet Take 1 tablet by mouth daily 8/2/2024 at AM    Vitamin D3 (CHOLECALCIFEROL) 25 mcg (1000 units) tablet Take 1 tablet by mouth daily 8/2/2024 at AM    [DISCONTINUED] hydroxychloroquine (PLAQUENIL) 200 MG tablet Take 2 tablets (400 mg) by mouth daily     [DISCONTINUED] methotrexate 50 MG/2ML injection Inject 1 mL (25 mg) Subcutaneous every 7  days

## 2024-08-20 NOTE — PROGRESS NOTES
08/20/24 2264   Appointment Info   Signing Clinician's Name / Credentials (PT) Luke Aguilar, CHRISTIE   Quick Adds   Quick Adds Certification   Living Environment   People in Home spouse   Current Living Arrangements house   Home Accessibility stairs to enter home;stairs within home   Number of Stairs, Main Entrance 3   Stair Railings, Main Entrance railings safe and in good condition   Number of Stairs, Within Home, Primary two   Stair Railings, Within Home, Primary railings safe and in good condition   Self-Care   Usual Activity Tolerance moderate   Current Activity Tolerance moderate   Fall history within last six months no   Activity/Exercise/Self-Care Comment Indep with all  I ADL's   General Information   Onset of Illness/Injury or Date of Surgery 08/20/24   Pertinent History of Current Problem (include personal factors and/or comorbidities that impact the POC) R TKA   Weight-Bearing Status - RLE weight-bearing as tolerated   Cognition   Affect/Mental Status (Cognition) WFL   Range of Motion (ROM)   ROM Comment decreased ROM s/p  R TKA   Strength (Manual Muscle Testing)   Strength (Manual Muscle Testing) No deficits observed during functional mobility   Transfers   Transfers sit-stand transfer   Sit-Stand Transfer   Sit-Stand [place] (Transfers) contact guard;verbal cues   Assistive Device (Sit-Stand Transfers) walker, front-wheeled   Gait/Stairs (Locomotion)   [place] Level (Gait) contact guard;verbal cues   Assistive Device (Gait) walker, front-wheeled   Distance in Feet (Gait) 20   Pattern (Gait) step-to   Deviations/Abnormal Patterns (Gait) gait speed decreased;david decreased;stride length decreased   Balance   Balance no deficits were identified   Clinical Impression   Criteria for Skilled Therapeutic Intervention Yes, treatment indicated   PT Diagnosis (PT) impaired functional mobility   Influenced by the following impairments pain, decreased ROM, impaired balance, decreased strength    Functional limitations due to impairments gait, stairs, transfers   Clinical Presentation (PT Evaluation Complexity) stable   Clinical Presentation Rationale pt presents as medically diagnosed   Clinical Decision Making (Complexity) low complexity   Planned Therapy Interventions (PT) gait training;home exercise program;patient/family education;stair training;transfer training   Risk & Benefits of therapy have been explained care plan/treatment goals reviewed;patient   PT Total Evaluation Time   PT Eval, Low Complexity Minutes (56310) 10   Therapy Certification   Start of care date 08/20/24   Certification date from 08/20/24   Certification date to 09/19/24   Physical Therapy Goals   PT Frequency Daily   PT Predicted Duration/Target Date for Goal Attainment 08/22/24   PT Goals PT Goal 1;Transfers;Gait;Stairs   PT: Transfers Modified independent;Sit to/from stand;Assistive device   PT: Gait Modified independent;Rolling walker;100 feet   PT: Stairs 4 stairs;Within precautions;Minimal assist;Rail on both sides   PT: Goal 1 Independent with written HEP for LE strengthening and ROM   Interventions   Interventions Quick Adds Therapeutic Procedure;Therapeutic Activity;Gait Training   Therapeutic Procedure/Exercise   Ther. Procedure: strength, endurance, ROM, flexibillity Minutes (15126) 20   Symptoms Noted During/After Treatment none   Treatment Detail/Skilled Intervention TKA protocol therex x10 reps, cueing for technique,   Therapeutic Activity   Treatment Detail/Skilled Intervention sit to/from stand, cueing for safe hand placement and LE positioning, Mod I following education   Gait Training   Gait Training Minutes (95491) 14   Symptoms Noted During/After Treatment (Gait Training) fatigue   Treatment Detail/Skilled Intervention slow gait, stable, vc for reciprocal steps and heel strike, up/down 4 steps   Distance in Feet 140   [place] Level (Gait Training) contact guard   Physical Assistance Level (Gait Training)  verbal cues;supervision;1 person assist   Weight Bearing (Gait Training) weight-bearing as tolerated   Assistive Device (Gait Training) rolling walker   Pattern Analysis (Gait Training) swing-through gait   Gait Analysis Deviations decreased david;decreased velocity of limb motion;decreased step length;decreased stride length   Impairments (Gait Analysis/Training) pain;strength decreased;ROM decreased   Stair Railings present at both sides   Physical Assist/Nonphysical Assist (Stairs) verbal cues;supervision;1 person assist   Level of [place] (Stairs) contact guard   PT Discharge Planning   PT Plan Progress per TKA protocol   PT Discharge Recommendation (DC Rec) other (see comments)  (per ortho MD)   PT Rationale for DC Rec Patient CGA with gait/transfers and has good home setup and support   PT Brief overview of current status CGA for gait/transfers, 140'   PT Equipment Needed at Discharge cane, straight;walker, rolling   Total Session Time   Timed Code Treatment Minutes 34   Total Session Time (sum of timed and untimed services) 44   [facility]  OUTPATIENT PHYSICAL THERAPY EVALUATION  PLAN OF TREATMENT FOR OUTPATIENT REHABILITATION  (COMPLETE FOR INITIAL CLAIMS ONLY)  Patient's Last Name, First Name, M.I.  YOB: 1955  Kaylene Diaz                        Provider's Name  [facility] Medical Record No.  [id number]                             Onset Date:  08/20/24   Start of Care Date:  08/20/24   Type:     _X_PT   ___OT   ___SLP Medical Diagnosis:                 PT Diagnosis:  impaired functional mobility Visits from SOC:  1     See note for plan of treatment, functional goals and certification details    I CERTIFY THE NEED FOR THESE SERVICES FURNISHED UNDER        THIS PLAN OF TREATMENT AND WHILE UNDER MY CARE     (Physician co-signature of this document indicates review and certification of the therapy plan).

## 2024-08-20 NOTE — ANESTHESIA CARE TRANSFER NOTE
Patient: Kaylene Diaz    Procedure: Procedure(s):  RIGHT TOTAL KNEE ARTHROPLASTY       Diagnosis: Osteoarthritis of right knee [M17.11]  Diagnosis Additional Information: No value filed.    Anesthesia Type:   General     Note:    Oropharynx: oropharynx clear of all foreign objects and spontaneously breathing  Level of Consciousness: drowsy  Oxygen Supplementation: face mask  Level of Supplemental Oxygen (L/min / FiO2): 10  Independent Airway: airway patency satisfactory and stable  Dentition: dentition unchanged  Vital Signs Stable: post-procedure vital signs reviewed and stable  Report to RN Given: handoff report given  Patient transferred to: PACU    Handoff Report: Identifed the Patient, Identified the Reponsible Provider, Reviewed the pertinent medical history, Discussed the surgical course, Reviewed Intra-OP anesthesia mangement and issues during anesthesia, Set expectations for post-procedure period and Allowed opportunity for questions and acknowledgement of understanding      Vitals:  Vitals Value Taken Time   /62 08/20/24 1045   Temp 35.7  C (96.26  F) 08/20/24 1049   Pulse 70 08/20/24 1049   Resp 21 08/20/24 1049   SpO2 100 % 08/20/24 1049   Vitals shown include unfiled device data.    Electronically Signed By: TYRA Vieira CRNA  August 20, 2024  10:50 AM

## 2024-08-20 NOTE — ANESTHESIA PREPROCEDURE EVALUATION
Anesthesia Pre-Procedure Evaluation    Patient: Kaylene Diaz   MRN: [id number] : 1955        Procedure : Procedure(s):  RIGHT TOTAL KNEE ARTHROPLASTY          Past Medical History:   Diagnosis Date     BLANCA (acute kidney injury) (H24) 2017     Basal cell carcinoma      Gastroesophageal reflux disease      Hypertension      Motion sickness      Obese      RA (rheumatoid arthritis) (H)      SBO (small bowel obstruction) (H) 2017     Small bowel obstruction (H)      Squamous cell carcinoma      Thrombosis       Past Surgical History:   Procedure Laterality Date     ARTHROSCOPY KNEE  12/10/2010    ARTHROSCOPY KNEE performed by NAVID FIERRO at [facility]     COLONOSCOPY N/A 2018    Procedure: COLONOSCOPY;  colonoscopy;  Surgeon: Luke Kaye MD;  Location: [facility]     INJECT EPIDURAL LUMBAR N/A 2022    Procedure: Lumbar Epidural Steroid Injection;  Surgeon: Olivia Davies MD;  [place] [facility] [place]     INJECT EPIDURAL LUMBAR N/A 2022    Procedure: Lumbar Epidural Steroid Injection, L4-5;  Surgeon: Olivia Davies MD;  Location: [facility] OR     INJECT EPIDURAL LUMBAR N/A 10/5/2023    Procedure: Lumbar Epidural Steroid Injection;  Surgeon: Olivia Davies MD;  Location: [facility] OR     INJECT EPIDURAL LUMBAR N/A 2024    Procedure: Lumbar Epidural Steroid Injection;  Surgeon: Olivia Davies MD;  Location: [facility] OR     SURGICAL HISTORY OF -       IUD removal     SURGICAL HISTORY OF -       Ovarian cyst     SURGICAL HISTORY OF -       thorn removal from foot     SURGICAL HISTORY OF -       knee surgery, scope, right knee      Allergies   Allergen Reactions     Folic Acid Photosensitivity     Gold Rash     Latex Rash     Not all latex products      Social History     Tobacco Use     Smoking status: Former     Current packs/day: 0.00     Types: Cigarettes     Start date: 1970     Quit date: 2000     Years since quittin.3     Smokeless tobacco: Never   Substance Use Topics      Alcohol use: Yes     Comment: 1 per week      Wt Readings from Last 1 Encounters:   08/20/24 116.1 kg (256 lb)        Anesthesia Evaluation   Pt has had prior anesthetic.     History of anesthetic complications  - motion sickness.      ROS/MED HX  ENT/Pulmonary:     (+)                tobacco use, Past use,                       Neurologic:  - neg neurologic ROS     Cardiovascular:     (+) Dyslipidemia hypertension- -   -  - -                                      METS/Exercise Tolerance: >4 METS    Hematologic:     (+) History of blood clots (h/o PE),    pt is not anticoagulated,           Musculoskeletal:   (+)  arthritis (RA),             GI/Hepatic: Comment: Previous h/o SBO    (+) GERD, Asymptomatic on medication,                  Renal/Genitourinary:     (+) renal disease, type: CRI,            Endo:     (+)               Obesity (BMI 42),       Psychiatric/Substance Use:       Infectious Disease:       Malignancy:       Other:      (+)  , H/O Chronic Pain (low back),         Physical Exam    Airway        Mallampati: I   TM distance: > 3 FB   Neck ROM: full   Mouth opening: > 3 cm    Respiratory Devices and Support         Dental     Comment: Good        Cardiovascular   cardiovascular exam normal          Pulmonary   pulmonary exam normal            OUTSIDE LABS:  CBC:   Lab Results   Component Value Date    WBC 5.7 07/24/2024    WBC 6.5 06/01/2023    HGB 13.0 07/24/2024    HGB 13.6 05/06/2024    HCT 40.2 07/24/2024    HCT 40.3 06/01/2023     07/24/2024     06/01/2023     BMP:   Lab Results   Component Value Date     07/24/2024     05/06/2024    POTASSIUM 4.6 07/24/2024    POTASSIUM 5.4 (H) 05/06/2024    CHLORIDE 108 (H) 07/24/2024    CHLORIDE 106 05/06/2024    CO2 25 07/24/2024    CO2 26 05/06/2024    BUN 16.8 07/24/2024    BUN 19.5 05/06/2024    CR 0.81 07/24/2024    CR 0.84 05/06/2024    GLC 91 07/24/2024    GLC 92 05/06/2024     COAGS:   Lab Results   Component Value Date    PTT  "30 04/29/2008    INR 1.00 04/29/2008     POC: No results found for: \"BGM\", \"HCG\", \"HCGS\"  HEPATIC:   Lab Results   Component Value Date    ALBUMIN 3.9 07/24/2024    PROTTOTAL 6.9 07/24/2024    ALT 20 07/24/2024    AST 20 07/24/2024    ALKPHOS 123 07/24/2024    BILITOTAL 0.5 07/24/2024     OTHER:   Lab Results   Component Value Date    LACT 1.5 04/20/2017    A1C 5.2 07/24/2024    DEAN 9.3 07/24/2024    LIPASE 217 04/20/2017    TSH 2.52 04/28/2017    CRP 5.1 10/28/2019    SED 11 07/24/2024       Anesthesia Plan    ASA Status:  3    NPO Status:  NPO Appropriate    Anesthesia Type: General.     - Airway: ETT   Induction: Propofol, Intravenous.   Maintenance: TIVA.   Techniques and Equipment:     - Airway: Video-Laryngoscope       Consents    Anesthesia Plan(s) and associated risks, benefits, and realistic alternatives discussed. Questions answered and patient/representative(s) expressed understanding.     - Discussed: Risks, Benefits and Alternatives for BOTH SEDATION and the PROCEDURE were discussed     - Discussed with:  Patient       - Patient is DNR/DNI Status: No          Postoperative Care    Pain management: Multi-modal analgesia, Peripheral nerve block (Single Shot).   PONV prophylaxis: Ondansetron (or other 5HT-3), Dexamethasone or Solumedrol     Comments:    Other Comments: Pt is requesting general anesthesia.    Right adductor canal block for post op pain    TIVA  Ketamine 20 mg    Glidescope                 Juanito Martinez MD    I have reviewed the pertinent notes and labs in the chart from the past 30 days and (re)examined the patient.  Any updates or changes from those notes are reflected in this note.            # Drug Induced Coagulation Defect: home medication list includes an anticoagulant medication   # Severe Obesity: Estimated body mass index is 42.27 kg/m  as calculated from the following:    Height as of this encounter: 1.657 m (5' 5.25\").    Weight as of this encounter: 116.1 kg (256 lb).      "

## 2024-08-20 NOTE — ANESTHESIA PROCEDURE NOTES
Airway       Patient location during procedure: OR       Procedure Start/Stop Times: 8/20/2024 9:15 AM  Staff -        Anesthesiologist:  Juanito Martinez MD       CRNA: Robin Aguilar APRN CRNA       Performed By: CRNA  Consent for Airway        Urgency: elective  Indications and Patient Condition       Indications for airway management: shmuel-procedural       Induction type:intravenous       Mask difficulty assessment: 1 - vent by mask    Final Airway Details       Final airway type: endotracheal airway       Successful airway: ETT - single and Oral  Endotracheal Airway Details        ETT size (mm): 7.0       Cuffed: yes       Cuff volume (mL): 8       Successful intubation technique: video laryngoscopy       VL Blade Size: Glidescope 3       Grade View of Cords: 1       Adjucts: stylet       Position: Right       Measured from: lips       Secured at (cm): 22       Bite block used: None    Post intubation assessment        Placement verified by: capnometry, equal breath sounds and chest rise        Number of attempts at approach: 1       Number of other approaches attempted: 0       Secured with: tape       Ease of procedure: easy       Dentition: Intact and Unchanged    Medication(s) Administered   Medication Administration Time: 8/20/2024 9:15 AM

## 2024-08-20 NOTE — INTERVAL H&P NOTE
"I have reviewed the surgical (or preoperative) H&P that is linked to this encounter, and examined the patient. There are no significant changes    Clinical Conditions Present on Arrival:  Clinically Significant Risk Factors Present on Admission                # Drug Induced Coagulation Defect: home medication list includes an anticoagulant medication       # Severe Obesity: Estimated body mass index is 42.27 kg/m  as calculated from the following:    Height as of this encounter: 1.657 m (5' 5.25\").    Weight as of this encounter: 116.1 kg (256 lb).       "

## 2024-08-20 NOTE — CONSULTS
[facility] MEDICINE CONSULT NOTE   Physician requesting consult: Javed Rodriguez MD    Reason for consult: Postoperative medical management of medical co-morbidities as below    Identification/Summary:   Kaylene Diaz is a 68 year old female with a PMH of hypertension, hyperlipidemia, CKD 3A, obesity, history of PE. Underwent right TKA on 8/20/2024.    Assessment and Plan:    Primary osteoarthritis of right knee  Pain management per surgery    Other acute pulmonary embolism, unspecified whether acute cor pulmonale present (H)  Rivaroxaban on hold, resume timing per surgery     Primary hypertension  Controlled.  Restart lisinopril on 8/21/2024     Hyperlipidemia LDL goal <100  Not on medicine     Chronic kidney disease, stage 3a (H)  Continue IV fluid per surgery     Class 3 obesity (H)      Anticoagulation.  Deferred to surgery      Code status:Prior   [facility] service was asked to evaluate patient for postoperative medical management as follows below. Please resume the home medications as reconciled and further noted with ordered hold parameters.  Thank you for this consult; we will continue to follow this patient until discharge.    Procedure(s):  RIGHT TOTAL KNEE ARTHROPLASTY  Day of Surgery  Estimated Blood Loss:  250 mL  Hospital Problem List   No problem-specific Assessment & Plan notes found for this encounter.    Active Problems:    Knee osteoarthritis      -Reviewed the patient's preoperative H and P and updated missing elements.  -Home medication reconciliation has been reviewed.  Medications have been ordered as noted from the home list and changes are documented above     Clinically Significant Risk Factors Present on Admission               # Drug Induced Coagulation Defect: home medication list includes an anticoagulant medication    # Hypertension: Home medication list includes antihypertensive(s)         # Severe Obesity: Estimated body mass index is 42.27 kg/m  as  "calculated from the following:    Height as of this encounter: 1.657 m (5' 5.25\").    Weight as of this encounter: 116.1 kg (256 lb).                    HISTORY     Kaylene Diaz is a 68 year old female with a PMH of hypertension, hyperlipidemia, CKD 3A, obesity, history of PE. Underwent right TKA on 8/20/2024.    She had terrible pain when she woke up from surgery. The pain is close to none now after pain medicine. She denies dyspnea, chest pain or nausea. She wants to void.    Past Medical History     Past Medical History:  04/20/2017: BLANCA (acute kidney injury) (H24)  No date: Basal cell carcinoma  No date: Gastroesophageal reflux disease  No date: Hypertension  No date: Motion sickness  No date: Obese  No date: RA (rheumatoid arthritis) (H)  04/20/2017: SBO (small bowel obstruction) (H)  No date: Small bowel obstruction (H)  No date: Squamous cell carcinoma  No date: Thrombosis     Patient Active Problem List    Diagnosis Date Noted    Knee osteoarthritis 08/20/2024     Priority: Medium    Other acute pulmonary embolism, unspecified whether acute cor pulmonale present (H) 05/01/2023     Priority: Medium    Immunodeficiency, unspecified (H24) 04/21/2023     Priority: Medium    Lumbar radiculopathy 09/09/2022     Priority: Medium     Added automatically from request for surgery [id number]      Chronic bilateral low back pain with bilateral sciatica 06/14/2022     Priority: Medium     Added automatically from request for surgery [id number]      Chronic kidney disease, stage 3a (H) 06/03/2022     Priority: Medium    Elevated glucose 04/20/2017     Priority: Medium    Elevated blood pressure reading without diagnosis of hypertension 03/14/2017     Priority: Medium    Class 3 obesity (H) 12/07/2015     Priority: Medium    High risk medications (not anticoagulants) long-term use 11/07/2013     Priority: Medium    Kidney stone 10/15/2013     Priority: Medium     [facility] [place], [place]      RA (rheumatoid arthritis) (H) " 09/11/2013     Priority: Medium    Osteoarthritis 09/02/2013     Priority: Medium    Hyperlipidemia LDL goal <100 01/04/2012     Priority: Medium    ASCUS on Pap smear 12/27/2011     Priority: Medium     12/27/11: Pap - ASCUS, Neg HPV. Plan cotesting in 3 yrs.   3/21/14: Pap - NIL, Neg HPV. Routine screening        Tear meniscus knee 12/08/2010     Priority: Medium    Osteopenia 12/08/2010     Priority: Medium     Testing done at chiropracter office - told she has decreased density      Carpal tunnel syndrome 05/16/2007     Priority: Medium     Surgical History     Past Surgical History:   Procedure Laterality Date    ARTHROSCOPY KNEE  12/10/2010    ARTHROSCOPY KNEE performed by NAVID FIERRO at [facility]    COLONOSCOPY N/A 8/9/2018    Procedure: COLONOSCOPY;  colonoscopy;  Surgeon: Luke Kaye MD;  Location: [facility]    INJECT EPIDURAL LUMBAR N/A 7/7/2022    Procedure: Lumbar Epidural Steroid Injection;  Surgeon: Olivia Davies MD;  [place] [facility] [place]    INJECT EPIDURAL LUMBAR N/A 11/11/2022    Procedure: Lumbar Epidural Steroid Injection, L4-5;  Surgeon: Olivia Davies MD;  [place] [facility] [place]    INJECT EPIDURAL LUMBAR N/A 10/5/2023    Procedure: Lumbar Epidural Steroid Injection;  Surgeon: Olivia Davies MD;  Location: [facility] OR    INJECT EPIDURAL LUMBAR N/A 6/20/2024    Procedure: Lumbar Epidural Steroid Injection;  Surgeon: Olivia Davies MD;  [place] [facility] [place]    SURGICAL HISTORY OF -       IUD removal    SURGICAL HISTORY OF -       Ovarian cyst    SURGICAL HISTORY OF -       thorn removal from foot    SURGICAL HISTORY OF -       knee surgery, scope, right knee     Family History      Family History   Problem Relation Age of Onset    Prostate Cancer Father     Eye Disorder Father         mac degen    Heart Disease Father     Macular Degeneration Father     Heart Disease Mother         a-fib    Eye Disorder Mother     C.A.D. Mother     Hypertension Mother     Alcohol/Drug Brother     Alcohol/Drug Sister      "Alcohol/Drug Brother     Alcohol/Drug Brother     Alcohol/Drug Brother     Alcohol/Drug Sister     Obesity Sister     Alcohol/Drug Sister     Prostate Cancer Sister     Cancer Other     Melanoma No family hx of       Social History      Social History     Tobacco Use    Smoking status: Former     Current packs/day: 0.00     Types: Cigarettes     Start date: 1970     Quit date: 2000     Years since quittin.3    Smokeless tobacco: Never   Vaping Use    Vaping status: Never Used   Substance Use Topics    Alcohol use: Yes     Comment: 1 per week    Drug use: Not Currently      Allergies     Allergies   Allergen Reactions    Folic Acid Photosensitivity    Gold Rash    Latex Rash     Not all latex products       Prior to Admission Medications      Prior to Admission Medications   Prescriptions Last Dose Informant Patient Reported? Taking?   Insulin Syringe-Needle U-100 (BD INSULIN SYRINGE) 27G X 1/2\" 1 ML MISC   No No   Sig: For once weekly methotrexate administration.   Multiple Vitamins-Minerals (ADULT GUMMY PO) 2024 at AM Self Yes Yes   Sig: Take 1 chew tab by mouth daily VitaCrave   Vitamin D3 (CHOLECALCIFEROL) 25 mcg (1000 units) tablet 2024 at AM  Yes Yes   Sig: Take 1 tablet by mouth daily   hydroxychloroquine (PLAQUENIL) 200 MG tablet 2024 at AM  No Yes   Sig: Take 2 tablets (400 mg) by mouth daily   lisinopril (ZESTRIL) 30 MG tablet 2024 at AM  No Yes   Sig: Take 1 tablet (30 mg) by mouth daily   methotrexate 50 MG/2ML injection 2024  No Yes   Sig: Inject 1 mL (25 mg) subcutaneously every 7 days   omeprazole (PRILOSEC) 20 MG DR capsule 2024 at AM  No Yes   Sig: Take 1 capsule (20 mg) by mouth daily   predniSONE (DELTASONE) 5 MG tablet Unknown at prn  No Yes   Sig: Take 1 tablet (5 mg) by mouth daily as needed for inflammatory arthritis symptoms.  Use sparingly   rivaroxaban ANTICOAGULANT (XARELTO ANTICOAGULANT) 20 MG TABS tablet 2024 at 1800  No Yes   Sig: Take 1 " tablet (20 mg) by mouth daily (with dinner)   tiZANidine (ZANAFLEX) 4 MG tablet 8/19/2024 at PM  No Yes   Sig: Take 1 tablet (4 mg) by mouth 3 times daily as needed for muscle spasms   vitamin B complex with vitamin C (STRESS TAB) tablet 8/2/2024 at AM Self Yes Yes   Sig: Take 1 tablet by mouth daily      Facility-Administered Medications: None      Review of Systems     A 12 point comprehensive review of systems was negative except as noted above in HPI.    OBJECTIVE         Physical Exam   Temp:  [95.5  F (35.3  C)-98.2  F (36.8  C)] 97.7  F (36.5  C)  Pulse:  [63-89] 73  Resp:  [12-22] 16  BP: (111-207)/(60-91) 156/70  SpO2:  [95 %-100 %] 97 %  Body mass index is 42.27 kg/m .  General Appearance: Alert and wake, not in distress  Respiratory: clear lungs, no crackles or wheezing  Cardiovascular: rhythmic, normal S1 and S2, no murmur  GI: soft, non-tender, normal bowel sound  Neurology: oriented x 3  Psych: cooperative and calm, normal affect    I reviewed patient's preoperative evaluation note on 7/24/2024 by Dr. Can  I reviewed patient's preoperative lab tests on 7/24/2024      Thank you for this consultation.  Appreciate the opportunity to participate in the care of Kaylene Diaz, please feel free to contact us for any questions or concerns.    CRISTIN ALLEN MD  [facility]  Phone: #[phone]

## 2024-08-21 ENCOUNTER — APPOINTMENT (OUTPATIENT)
Dept: PHYSICAL THERAPY | Facility: CLINIC | Age: 69
End: 2024-08-21
Attending: ORTHOPAEDIC SURGERY
Payer: MEDICARE

## 2024-08-21 ENCOUNTER — APPOINTMENT (OUTPATIENT)
Dept: OCCUPATIONAL THERAPY | Facility: CLINIC | Age: 69
End: 2024-08-21
Attending: ORTHOPAEDIC SURGERY
Payer: MEDICARE

## 2024-08-21 VITALS
TEMPERATURE: 98 F | RESPIRATION RATE: 18 BRPM | OXYGEN SATURATION: 95 % | DIASTOLIC BLOOD PRESSURE: 64 MMHG | SYSTOLIC BLOOD PRESSURE: 133 MMHG | WEIGHT: 256 LBS | HEIGHT: 65 IN | HEART RATE: 72 BPM | BODY MASS INDEX: 42.65 KG/M2

## 2024-08-21 LAB
GLUCOSE BLDC GLUCOMTR-MCNC: 106 MG/DL (ref 70–99)
HGB BLD-MCNC: 11.2 G/DL (ref 11.7–15.7)
HOLD SPECIMEN: NORMAL

## 2024-08-21 PROCEDURE — 97116 GAIT TRAINING THERAPY: CPT | Mod: GP

## 2024-08-21 PROCEDURE — 250N000011 HC RX IP 250 OP 636: Mod: JZ | Performed by: ORTHOPAEDIC SURGERY

## 2024-08-21 PROCEDURE — 97110 THERAPEUTIC EXERCISES: CPT | Mod: GP

## 2024-08-21 PROCEDURE — 82962 GLUCOSE BLOOD TEST: CPT

## 2024-08-21 PROCEDURE — 250N000013 HC RX MED GY IP 250 OP 250 PS 637: Performed by: STUDENT IN AN ORGANIZED HEALTH CARE EDUCATION/TRAINING PROGRAM

## 2024-08-21 PROCEDURE — 99214 OFFICE O/P EST MOD 30 MIN: CPT | Performed by: STUDENT IN AN ORGANIZED HEALTH CARE EDUCATION/TRAINING PROGRAM

## 2024-08-21 PROCEDURE — 85018 HEMOGLOBIN: CPT | Performed by: STUDENT IN AN ORGANIZED HEALTH CARE EDUCATION/TRAINING PROGRAM

## 2024-08-21 PROCEDURE — 97535 SELF CARE MNGMENT TRAINING: CPT | Mod: GO,XU

## 2024-08-21 PROCEDURE — 250N000013 HC RX MED GY IP 250 OP 250 PS 637: Performed by: PHYSICIAN ASSISTANT

## 2024-08-21 PROCEDURE — 36415 COLL VENOUS BLD VENIPUNCTURE: CPT | Performed by: STUDENT IN AN ORGANIZED HEALTH CARE EDUCATION/TRAINING PROGRAM

## 2024-08-21 PROCEDURE — 97150 GROUP THERAPEUTIC PROCEDURES: CPT | Mod: GP

## 2024-08-21 PROCEDURE — 97166 OT EVAL MOD COMPLEX 45 MIN: CPT | Mod: GO

## 2024-08-21 PROCEDURE — 250N000013 HC RX MED GY IP 250 OP 250 PS 637: Performed by: ORTHOPAEDIC SURGERY

## 2024-08-21 RX ORDER — HYDROXYZINE HYDROCHLORIDE 10 MG/1
10 TABLET, FILM COATED ORAL 3 TIMES DAILY PRN
Status: DISCONTINUED | OUTPATIENT
Start: 2024-08-21 | End: 2024-08-21 | Stop reason: HOSPADM

## 2024-08-21 RX ORDER — METHOTREXATE 25 MG/ML
25 INJECTION, SOLUTION INTRA-ARTERIAL; INTRAMUSCULAR; INTRAVENOUS
Qty: 8 ML | Refills: 6 | Status: SHIPPED | OUTPATIENT
Start: 2024-09-04

## 2024-08-21 RX ADMIN — PANTOPRAZOLE SODIUM 40 MG: 40 TABLET, DELAYED RELEASE ORAL at 06:40

## 2024-08-21 RX ADMIN — POLYETHYLENE GLYCOL 3350 17 G: 17 POWDER, FOR SOLUTION ORAL at 09:26

## 2024-08-21 RX ADMIN — RIVAROXABAN 20 MG: 10 TABLET, FILM COATED ORAL at 09:31

## 2024-08-21 RX ADMIN — ACETAMINOPHEN 975 MG: 325 TABLET ORAL at 06:40

## 2024-08-21 RX ADMIN — OXYCODONE HYDROCHLORIDE 5 MG: 5 TABLET ORAL at 05:19

## 2024-08-21 RX ADMIN — ACETAMINOPHEN 975 MG: 325 TABLET ORAL at 01:05

## 2024-08-21 RX ADMIN — HYDROXYCHLOROQUINE SULFATE 400 MG: 200 TABLET, FILM COATED ORAL at 09:26

## 2024-08-21 RX ADMIN — OXYCODONE HYDROCHLORIDE 5 MG: 5 TABLET ORAL at 01:05

## 2024-08-21 RX ADMIN — LISINOPRIL 30 MG: 10 TABLET ORAL at 09:27

## 2024-08-21 RX ADMIN — SENNOSIDES AND DOCUSATE SODIUM 1 TABLET: 50; 8.6 TABLET ORAL at 09:26

## 2024-08-21 RX ADMIN — CEFAZOLIN SODIUM 2 G: 2 INJECTION, SOLUTION INTRAVENOUS at 01:05

## 2024-08-21 RX ADMIN — OXYCODONE HYDROCHLORIDE 5 MG: 5 TABLET ORAL at 09:41

## 2024-08-21 RX ADMIN — HYDROXYZINE HYDROCHLORIDE 10 MG: 10 TABLET, FILM COATED ORAL at 11:03

## 2024-08-21 ASSESSMENT — ACTIVITIES OF DAILY LIVING (ADL)
ADLS_ACUITY_SCORE: 25

## 2024-08-21 NOTE — PROGRESS NOTES
08/21/24 1000   Appointment Info   Signing Clinician's Name / Credentials (OT) Alma Delia Smith OTR/L   Rehab Comments (OT) OT rizwanal   Quick Adds   Quick Adds Certification   Living Environment   People in Home spouse   Current Living Arrangements house   Living Environment Comments Pt able to stay on one level   Self-Care   Usual Activity Tolerance good   Current Activity Tolerance moderate   Equipment Currently Used at Home raised toilet seat;shower chair;tub bench   Activity/Exercise/Self-Care Comment Pt independent w/ ADL and IADL at baseline.   General Information   Onset of Illness/Injury or Date of Surgery 08/20/24   Referring Physician Javed Rodriguez MD   Patient/Family Therapy Goal Statement (OT) wants to go home   Additional Occupational Profile Info/Pertinent History of Current Problem TKA   Existing Precautions/Restrictions weight bearing   Cognitive Status Examination   Orientation Status orientation to person, place and time   Affect/Mental Status (Cognitive) WNL   Visual Perception   Visual Impairment/Limitations WFL   Sensory   Sensory Quick Adds sensation intact   Posture   Posture not impaired   Range of Motion Comprehensive   General Range of Motion no range of motion deficits identified   Strength Comprehensive (MMT)   General Manual Muscle Testing (MMT) Assessment no strength deficits identified   Muscle Tone Assessment   Muscle Tone Quick Adds No deficits were identified   Coordination   Upper Extremity Coordination No deficits were identified   Bed Mobility   Bed Mobility supine-sit;sit-supine   Comment (Bed Mobility) SBA-CGA   Transfers   Transfers bed-chair transfer;sit-stand transfer;toilet transfer;shower transfer   Transfer Comments SBA-CGA   Balance   Balance Comments decreased   Clinical Impression   Criteria for Skilled Therapeutic Interventions Met (OT) Yes, treatment indicated   OT Diagnosis decreased ADLs   Influenced by the following impairments TKA   OT Problem  List-Impairments impacting ADL activity tolerance impaired;pain;mobility   Assessment of Occupational Performance 3-5 Performance Deficits   Identified Performance Deficits dressing, bathing, functional mobility, toileting   Planned Therapy Interventions (OT) ADL retraining;bed mobility training;transfer training   Clinical Decision Making Complexity (OT) detailed assessment/moderate complexity   Risk & Benefits of therapy have been explained evaluation/treatment results reviewed;patient   OT Total Evaluation Time   OT Eval, Moderate Complexity Minutes (99268) 10   Therapy Certification   Medical Diagnosis L TKA   Start of Care Date 08/21/24   Certification date from 08/21/24   Certification date to 09/21/24   OT Goals   Therapy Frequency (OT) One time eval and treatment   OT Predicted Duration/Target Date for Goal Attainment 08/21/24   OT Goals Lower Body Dressing;Bed Mobility;Toilet Transfer/Toileting   OT: Lower Body Dressing Modified independent;Goal Met   OT: Bed Mobility Modified independent;Goal Met   OT: Toilet Transfer/Toileting Modified independent;Goal Met   Interventions   Interventions Quick Adds Self-Care/Home Management   Self-Care/Home Management   Self-Care/Home Mgmt/ADL, Compensatory, Meal Prep Minutes (52805) 24   Treatment Detail/Skilled Intervention Pt edu on compensatory strategies for LE dressing - pt completed Mod I after instruction. STS w/ SBA and amb. ~15 ft to BR w/ FWW, pain w/ mobility. Pt edu on safety technique for toilet/walkin shower transfer - completed each Mod I. Pt instructed on bed mobility techniques - completed Mod I. Pt edu on sleeping positionand bed mob-pt completed x2 reps w/mod I. Ed on car transfers - pt verbalized understanding. Mild dizziness towards end of session, pt got back in bed, /62, pt reported symptoms subsiding, RN notified.   OT Discharge Planning   OT Plan OT d/c   OT Discharge Recommendation (DC Rec) other (see comments)   OT Rationale for DC Rec Pt  is mod I all ADLs and fx transfers. Has good set up and suppport at home.   OT Brief overview of current status Mod I      [facility]  OUTPATIENT OCCUPATIONAL THERAPY  EVALUATION  PLAN OF TREATMENT FOR OUTPATIENT REHABILITATION  (COMPLETE FOR INITIAL CLAIMS ONLY)  Patient's Last Name, First Name, M.I.  YOB: 1955  Kaylene Diaz                          Provider's Name  [facility] Medical Record No.  [id number]                             Onset Date:  08/20/24   Start of Care Date:  (P) 08/21/24   Type:     ___PT   _X_OT   ___SLP Medical Diagnosis:  (P) L TKA                    OT Diagnosis:  decreased ADLs Visits from SOC:  1     See note for plan of treatment, functional goals and certification details    I CERTIFY THE NEED FOR THESE SERVICES FURNISHED UNDER        THIS PLAN OF TREATMENT AND WHILE UNDER MY CARE     (Physician co-signature of this document indicates review and certification of the therapy plan).                            Occupational Therapy Discharge Summary    Reason for therapy discharge:    All goals and outcomes met, no further needs identified.    Progress towards therapy goal(s). See goals on Care Plan in [facility] electronic health record for goal details.  Goals met    Therapy recommendation(s):    No further therapy is recommended.

## 2024-08-21 NOTE — DISCHARGE INSTRUCTIONS
Hold Methotrexate for another 2 weeks after surgery if you are able to tolerate the rheumatoid arthritis. Talk to your rheumatologist for further help.

## 2024-08-21 NOTE — PROGRESS NOTES
"[facility] MEDICINE  PROGRESS NOTE       Securely message me with Laurie (more info)    Code Status: Full Code  Procedure(s):  RIGHT TOTAL KNEE ARTHROPLASTY  1 Day Post-Op  Identification/Summary:   Kaylene Diaz is a 68 year old female with a PMH of hypertension, hyperlipidemia, CKD 3A, obesity, history of PE. Underwent right TKA on 8/20/2024.     Assessment and Plan:     Primary osteoarthritis of right knee  Pain management per surgery     Other acute pulmonary embolism, unspecified whether acute cor pulmonale present (H)  Rivaroxaban on hold, resume timing per surgery     Primary hypertension  Controlled.  Restart lisinopril on 8/21/2024     Hyperlipidemia LDL goal <100  Not on medicine     Chronic kidney disease, stage 3a (H)  Continue IV fluid per surgery     Class 3 obesity (H)     Rheumatoid arthritis  Methotrexate on hold for surgery, defer to surgery about when to resume it  Continue Plaquenil    Anticoagulation   Orders (Includes Only Anticoagulants)  rivaroxaban ANTICOAGULANT, 20 mg, Oral, Daily with supper      Therapy: PT, OT  Reeves:Not present  Lines: None       Current Diet  Orders Placed This Encounter      Discharge Instruction - Regular Diet Adult      Regular Diet Adult        Clinically Significant Risk Factors Present on Admission               # Drug Induced Coagulation Defect: home medication list includes an anticoagulant medication    # Hypertension: Home medication list includes antihypertensive(s)         # Severe Obesity: Estimated body mass index is 42.27 kg/m  as calculated from the following:    Height as of this encounter: 1.657 m (5' 5.25\").    Weight as of this encounter: 116.1 kg (256 lb).                    Interval History/Subjective:  She only has mild pain when he is on the ground. No other complaint.      Last 24H PRN:     hydrOXYzine HCl (ATARAX) tablet 10 mg, 10 mg at 08/21/24 1103    oxyCODONE (ROXICODONE) tablet 5 mg, 5 mg at 08/21/24 " 0941 **OR** oxyCODONE (ROXICODONE) tablet 10 mg    tiZANidine (ZANAFLEX) tablet 4 mg, 4 mg at 08/20/24 2024    Physical Exam/Objective:  Temp:  [97.9  F (36.6  C)-98.2  F (36.8  C)] 98  F (36.7  C)  Pulse:  [69-77] 72  Resp:  [16-20] 18  BP: (132-179)/(62-99) 133/64  SpO2:  [93 %-100 %] 95 %  Wt Readings from Last 4 Encounters:   08/20/24 116.1 kg (256 lb)   07/24/24 116.2 kg (256 lb 3.2 oz)   06/20/24 115.2 kg (254 lb)   05/06/24 114.3 kg (252 lb)     Body mass index is 42.27 kg/m .    General Appearance: Alert and wake, not in distress  Respiratory: clear lungs, no crackles or wheezing  Cardiovascular: rhythmic, normal S1 and S2, no murmur  Neurology: oriented x 3  Psych: cooperative and calm, normal affect    Medications:   Personally Reviewed.  Medications   Current Facility-Administered Medications   Medication Dose Route Frequency Provider Last Rate Last Admin     Current Facility-Administered Medications   Medication Dose Route Frequency Provider Last Rate Last Admin    acetaminophen (TYLENOL) tablet 975 mg  975 mg Oral Q8H Javed Rodriguez MD   975 mg at 08/21/24 0640    hydroxychloroquine (PLAQUENIL) tablet 400 mg  400 mg Oral Daily Vanesa Martel MD   400 mg at 08/21/24 0926    lisinopril (ZESTRIL) tablet 30 mg  30 mg Oral Daily Vanesa Martel MD   30 mg at 08/21/24 0927    pantoprazole (PROTONIX) EC tablet 40 mg  40 mg Oral QAM AC Javed Rodriguez MD   40 mg at 08/21/24 0640    polyethylene glycol (MIRALAX) Packet 17 g  17 g Oral Daily Javed Rodriguez MD   17 g at 08/21/24 0926    rivaroxaban ANTICOAGULANT (XARELTO) tablet 20 mg  20 mg Oral Daily with supper Javed Rodriguez MD   20 mg at 08/21/24 0931    senna-docusate (SENOKOT-S/PERICOLACE) 8.6-50 MG per tablet 1 tablet  1 tablet Oral BID Javed Rodriguez MD   1 tablet at 08/21/24 0911    sodium chloride (PF) 0.9% PF flush 3 mL  3 mL Intracatheter Q8H Javed Rodriguez MD   3 mL at 08/21/24 0641       Data reviewed today: I personally  reviewed all new medications, labs, imaging/diagnostics reports over the past 24 hours. Pertinent findings include:    Imaging:   No results found for this or any previous visit (from the past 24 hour(s)).    Labs:  POC US Guidance Needle Placement   Final Result        Recent Results (from the past 24 hour(s))   Hemoglobin    Collection Time: 08/21/24  6:29 AM   Result Value Ref Range    Hemoglobin 11.2 (L) 11.7 - 15.7 g/dL   Extra Green Top (Lithium Heparin) Tube    Collection Time: 08/21/24  6:29 AM   Result Value Ref Range    Hold Specimen JIC    Glucose by meter    Collection Time: 08/21/24  6:57 AM   Result Value Ref Range    GLUCOSE BY METER POCT 106 (H) 70 - 99 mg/dL       Pending Labs:  Unresulted Labs Ordered in the Past 30 Days of this Admission       No orders found for last 31 day(s).            I spoke with magaly Nowak to discuss patient's care.    CRISTIN ALLEN MD  [facility]  Phone: #[phone]    Securely message me with Laurie (more info)

## 2024-08-21 NOTE — PROGRESS NOTES
Physical Therapy Discharge Summary    Reason for therapy discharge:    All goals and outcomes met, no further needs identified.  No further expectations of functional progress.    Progress towards therapy goal(s). See goals on Care Plan in [facility] electronic health record for goal details.  Goals met    Therapy recommendation(s):    Continue home exercise program.

## 2024-08-21 NOTE — PROGRESS NOTES
"Orthopedic Progress Note      Assessment: 1 Day Post-Op  S/P Procedure(s):  RIGHT TOTAL KNEE ARTHROPLASTY     Plan:   - Continue PT/OT.   - Weightbearing status: WBAT.  - Anticoagulation: Xarelto in addition to SCDs, christoph stockings and early ambulation.  - Discharge planning: Discharge to home today.     Subjective:  Pain: Well controlled on Tylenol & oxycodone.  Nausea, Vomiting:  No  Chest pain: No  Lightheadedness, Dizziness:  No  Neuro:  Patient denies new onset numbness or paresthesias in right lower extremity     Patient is doing well on POD #1. Ambulating, tolerating oral intake, voiding & pain is controlled with oral medication. Ready for discharge. Hgb 11.2     Objective:  /62 (BP Location: Right arm)   Pulse 77   Temp 98.2  F (36.8  C) (Oral)   Resp 17   Ht 1.657 m (5' 5.25\")   Wt 116.1 kg (256 lb)   SpO2 100%   BMI 42.27 kg/m    The patient is A&Ox3. Appears comfortable, sitting up at bedside.  Sensation is intact to light touch & equal bilaterally in the L2 through S1 dermatomes.  Calves are soft and non-tender.  Dorsiflexion and plantar flexion is intact bilaterally.  Appropriate flexion and extension of the toes bilaterally.   Brisk capillary refill in the toes bilaterally.   Palpable right dorsalis pedis pulse.  right knee dressing C/D/I.       Pertinent Labs   Lab Results: personally reviewed.   Lab Results   Component Value Date    INR 1.00 04/29/2008     Lab Results   Component Value Date    WBC 5.7 07/24/2024    HGB 11.2 (L) 08/21/2024    HCT 40.2 07/24/2024    MCV 97 07/24/2024     07/24/2024     Lab Results   Component Value Date     07/24/2024    CO2 25 07/24/2024         Report completed by:  She Kwok PA-C/Dr. [facility]    Date: 8/21/2024  Time: 8:37 AM   "

## 2024-08-22 NOTE — DISCHARGE SUMMARY
"ORTHOPEDIC DISCHARGE SUMMARY       Kaylene Diaz,  1955, MRN [id number]    Admission Date: 2024      Admission Diagnoses: Osteoarthritis of right knee [M17.11]     Discharge Date: 2024     Post-operative Day:  2 Days Post-Op    Reason for Admission: The patient was admitted for the following: Procedure(s):  RIGHT TOTAL KNEE ARTHROPLASTY    BRIEF HOSPITAL COURSE   Kaylene Diaz is a pleasant 68 year old female who underwent the aforementioned procedure with Dr. Rodriguez on 24. There were no intraoperative complications and the patient was transferred to the recovery room and later the orthopedic unit in stable condition. Once the patient reached the orthopedic floor our orthopedic pain protocol was implemented along with the following:    Anticoagulation Medications: Xarelto  Therapy: PT and OT  Activity: WBAT  Bracing: None    Consultations during Admission: Hospitalist service for medical management     COMPLICATIONS/SIGNIFICANT FINDINGS        DISCHARGE INFORMATION   Condition at discharge: Good  Discharge destination: Home  Patient was seen by myself on the date of discharge.    FOLLOW UP CARE   Follow up with orthopedics in 2 weeks or sooner should the need arise. Ortho will continue to manage pain control, post op anticoagulation and incision care.     Follow up with your PCP for management of chronic medical problems and to evaluate for post op medical complications including constipation, nausea/vomiting, DVT/PE, anemia, changes in blood pressure, fevers/chills, urinary retention and atelectasis/pneumonia.     Subjective   Patient is doing well on POD #1. Pain is well controlled with oral medications. Ambulating. Tolerating oral intake.     Physical Exam   /64 (BP Location: Right arm, Patient Position: Semi-Ponce's, Cuff Size: Adult Regular)   Pulse 72   Temp 98  F (36.7  C) (Oral)   Resp 18   Ht 1.657 m (5' 5.25\")   Wt 116.1 kg (256 lb)   SpO2 95%   BMI 42.27 kg/m    The " patient is A&Ox3. Appears comfortable, sitting up at bedside.  Sensation is intact to light touch & equal bilaterally in the L2 through S1 dermatomes.  Calves are soft and non-tender.  Dorsiflexion and plantar flexion is intact bilaterally.  Appropriate flexion and extension of the toes bilaterally.   Brisk capillary refill in the toes bilaterally.   Palpable right dorsalis pedis pulse.  right knee dressing C/D/I.     Pertinent Results at Discharge     Hemoglobin   Date/Time Value Ref Range Status   08/21/2024 06:29 AM 11.2 (L) 11.7 - 15.7 g/dL Final   07/24/2024 10:24 AM 13.0 11.7 - 15.7 g/dL Final   05/06/2024 09:32 AM 13.6 11.7 - 15.7 g/dL Final   04/22/2020 11:56 AM 14.4 11.7 - 15.7 g/dL Final   10/28/2019 10:52 AM 13.9 11.7 - 15.7 g/dL Final   08/29/2019 08:55 AM 14.2 11.7 - 15.7 g/dL Final     INR   Date/Time Value Ref Range Status   04/29/2008 10:11 AM 1.00 0.86 - 1.14 Final     Platelet Count   Date/Time Value Ref Range Status   07/24/2024 10:24  150 - 450 10e3/uL Final   06/01/2023 04:07  150 - 450 10e3/uL Final   04/22/2020 11:56  150 - 450 10e9/L Final   10/28/2019 10:52  150 - 450 10e9/L Final   08/29/2019 08:55  150 - 450 10e9/L Final       Problem List   Active Problems:    Knee osteoarthritis      She Kwok PA-C/Dr. Rodriguez  [facility]  [phone]  Date: 8/22/2024  Time: 1:29 PM

## 2024-10-02 ENCOUNTER — OFFICE VISIT (OUTPATIENT)
Dept: DERMATOLOGY | Facility: CLINIC | Age: 69
End: 2024-10-02
Payer: MEDICARE

## 2024-10-02 DIAGNOSIS — L82.1 SEBORRHEIC KERATOSES: ICD-10-CM

## 2024-10-02 DIAGNOSIS — Z85.828 HISTORY OF SKIN CANCER: ICD-10-CM

## 2024-10-02 DIAGNOSIS — D23.9 DERMAL NEVUS: Primary | ICD-10-CM

## 2024-10-02 DIAGNOSIS — L81.4 LENTIGO: ICD-10-CM

## 2024-10-02 DIAGNOSIS — D18.01 ANGIOMA OF SKIN: ICD-10-CM

## 2024-10-02 DIAGNOSIS — L57.0 AK (ACTINIC KERATOSIS): ICD-10-CM

## 2024-10-02 PROCEDURE — 17000 DESTRUCT PREMALG LESION: CPT | Performed by: DERMATOLOGY

## 2024-10-02 PROCEDURE — 99213 OFFICE O/P EST LOW 20 MIN: CPT | Mod: 25 | Performed by: DERMATOLOGY

## 2024-10-02 NOTE — LETTER
10/2/2024      Kaylene Diaz  [address] [place] [id number]      Dear Colleague,    Thank you for referring your patient, Kaylene Diaz, to the [facility]. Please see a copy of my visit note below.    Kaylene Diaz is an extremely pleasant 68 year old year old female patient here today for hx of non-melanoma skin cancer.   Patient has no other skin complaints today.  Remainder of the HPI, Meds, PMH, Allergies, FH, and SH was reviewed in chart.      Past Medical History:   Diagnosis Date     BLANCA (acute kidney injury) (H) 04/20/2017     Basal cell carcinoma      Gastroesophageal reflux disease      Hypertension      Motion sickness      Obese      RA (rheumatoid arthritis) (H)      SBO (small bowel obstruction) (H) 04/20/2017     Small bowel obstruction (H)      Squamous cell carcinoma      Thrombosis        Past Surgical History:   Procedure Laterality Date     ARTHROPLASTY KNEE Right 8/20/2024    Procedure: RIGHT TOTAL KNEE ARTHROPLASTY;  Surgeon: Javed Rodriguez MD;  Location: [facility] Main OR     ARTHROSCOPY KNEE  12/10/2010    ARTHROSCOPY KNEE performed by NAVID FIERRO at [facility] OR     COLONOSCOPY N/A 8/9/2018    Procedure: COLONOSCOPY;  colonoscopy;  Surgeon: Luke Kaye MD;  Location: [facility]     INJECT EPIDURAL LUMBAR N/A 7/7/2022    Procedure: Lumbar Epidural Steroid Injection;  Surgeon: Olivia Davies MD;  Location: [facility] OR     INJECT EPIDURAL LUMBAR N/A 11/11/2022    Procedure: Lumbar Epidural Steroid Injection, L4-5;  Surgeon: Olivia Davies MD;  Location: [facility] OR     INJECT EPIDURAL LUMBAR N/A 10/5/2023    Procedure: Lumbar Epidural Steroid Injection;  Surgeon: Olivia Davies MD;  Location: [facility] OR     INJECT EPIDURAL LUMBAR N/A 6/20/2024    Procedure: Lumbar Epidural Steroid Injection;  Surgeon: Olivia Davies MD;  Location: [facility] OR     SURGICAL HISTORY OF -       IUD removal     SURGICAL HISTORY OF -       Ovarian cyst     SURGICAL HISTORY OF -       thorn  removal from foot     SURGICAL HISTORY OF -       knee surgery, scope, right knee        Family History   Problem Relation Age of Onset     Prostate Cancer Father      Eye Disorder Father         mac degen     Heart Disease Father      Macular Degeneration Father      Heart Disease Mother         a-fib     Eye Disorder Mother      C.A.D. Mother      Hypertension Mother      Alcohol/Drug Brother      Alcohol/Drug Sister      Alcohol/Drug Brother      Alcohol/Drug Brother      Alcohol/Drug Brother      Alcohol/Drug Sister      Obesity Sister      Alcohol/Drug Sister      Prostate Cancer Sister      Cancer Other      Melanoma No family hx of        Social History     Socioeconomic History     Marital status:      Spouse name: Not on file     Number of children: Not on file     Years of education: Not on file     Highest education level: Not on file   Occupational History     Employer: UNEMPLOYED   Tobacco Use     Smoking status: Former     Current packs/day: 0.00     Types: Cigarettes     Start date: 1970     Quit date: 2000     Years since quittin.4     Smokeless tobacco: Never   Vaping Use     Vaping status: Never Used   Substance and Sexual Activity     Alcohol use: Yes     Comment: 1 per week     Drug use: Not Currently     Sexual activity: Not Currently     Partners: Male   Other Topics Concern     Parent/sibling w/ CABG, MI or angioplasty before 65F 55M? No   Social History Narrative     Not on file     Social Determinants of Health     Financial Resource Strain: Low Risk  (2024)    Financial Resource Strain      Within the past 12 months, have you or your family members you live with been unable to get utilities (heat, electricity) when it was really needed?: No   Food Insecurity: Low Risk  (2024)    Food Insecurity      Within the past 12 months, did you worry that your food would run out before you got money to buy more?: No      Within the past 12 months, did the food you bought  "just not last and you didn t have money to get more?: No   Transportation Needs: Low Risk  (5/6/2024)    Transportation Needs      Within the past 12 months, has lack of transportation kept you from medical appointments, getting your medicines, non-medical meetings or appointments, work, or from getting things that you need?: No   Physical Activity: Unknown (5/6/2024)    Exercise Vital Sign      Days of Exercise per Week: 0 days      Minutes of Exercise per Session: Patient declined   Stress: No Stress Concern Present (5/6/2024)    [employer] [place] [employer] - Occupational Stress Questionnaire      Feeling of Stress : Not at all   Social Connections: Unknown (8/31/2024)    Received from [facility]    Social Connections      Frequency of Communication with Friends and Family: Not on file   Interpersonal Safety: Low Risk  (5/6/2024)    Interpersonal Safety      Do you feel physically and emotionally safe where you currently live?: Yes      Within the past 12 months, have you been hit, slapped, kicked or otherwise physically hurt by someone?: No      Within the past 12 months, have you been humiliated or emotionally abused in other ways by your partner or ex-partner?: No   Housing Stability: Low Risk  (5/6/2024)    Housing Stability      Do you have housing? : Yes      Are you worried about losing your housing?: No       Outpatient Encounter Medications as of 10/2/2024   Medication Sig Dispense Refill     acetaminophen (TYLENOL) 325 MG tablet Take 2 tablets (650 mg) by mouth every 4 hours as needed for other (mild pain) 100 tablet 0     hydroxychloroquine (PLAQUENIL) 200 MG tablet Take 2 tablets (400 mg) by mouth daily 180 tablet 2     Insulin Syringe-Needle U-100 (BD INSULIN SYRINGE) 27G X 1/2\" 1 ML MISC For once weekly methotrexate administration. 50 each 1     lisinopril (ZESTRIL) 30 MG tablet Take 1 tablet (30 mg) by mouth daily 90 tablet 3     methotrexate 50 MG/2ML " injection Inject 1 mL (25 mg) subcutaneously every 7 days. Do not start before September 4, 2024. 8 mL 6     Multiple Vitamins-Minerals (ADULT GUMMY PO) Take 1 chew tab by mouth daily VitaCrave       omeprazole (PRILOSEC) 20 MG DR capsule Take 1 capsule (20 mg) by mouth daily 90 capsule 3     oxyCODONE (ROXICODONE) 5 MG tablet Take 1-2 tablets (5-10 mg) by mouth every 4 hours as needed for moderate to severe pain 32 tablet 0     predniSONE (DELTASONE) 5 MG tablet Take 1 tablet (5 mg) by mouth daily as needed for inflammatory arthritis symptoms.  Use sparingly 90 tablet 1     rivaroxaban ANTICOAGULANT (XARELTO ANTICOAGULANT) 20 MG TABS tablet Take 1 tablet (20 mg) by mouth daily (with dinner) 90 tablet 3     senna-docusate (SENOKOT-S/PERICOLACE) 8.6-50 MG tablet Take 1-2 tablets by mouth 2 times daily Take while on oral narcotics to prevent or treat constipation. 30 tablet 0     tiZANidine (ZANAFLEX) 4 MG tablet Take 1 tablet (4 mg) by mouth 3 times daily as needed for muscle spasms 270 tablet 3     vitamin B complex with vitamin C (STRESS TAB) tablet Take 1 tablet by mouth daily  0     Vitamin D3 (CHOLECALCIFEROL) 25 mcg (1000 units) tablet Take 1 tablet by mouth daily       No facility-administered encounter medications on file as of 10/2/2024.             O:   NAD, WDWN, Alert & Oriented, Mood & Affect wnl, Vitals stable   General appearance normal   Vitals stable   Alert, oriented and in no acute distress     L upper lip gritty scaly papule    Stuck on papules and brown macules on trunk and ext   Red papules on trunk  Flesh colored papules on trunk     The remainder of the full exam was normal; the following areas were examined:  conjunctiva/lids, , neck, peripheral vascular system, abdomen, lymph nodes, digits/nails, eccrine and apocrine glands, scalp/hair, face, neck, chest, abdomen, buttocks, back, RUE, LUE, RLE, LLE       Eyes: Conjunctivae/lids:Normal     ENT: Lips, mucosa:  normal    MSK:Normal    Cardiovascular: peripheral edema none    Pulm: Breathing Normal    Lymph Nodes: No Head and Neck Lymphadenopathy     Neuro/Psych: Orientation:Alert and Orientedx3 ; Mood/Affect:normal       A/P:  1. Seborrheic keratosis, lentigo, angioma, dermal nevus, hx of non-melanoma skin cancer,   2. Actinic keratosis lip  LN2:  Treated with LN2 for 5s for 1-2 cycles. Warned risks of blistering, pain, pigment change, scarring, and incomplete resolution.  Advised patient to return if lesions do not completely resolve.  Wound care sheet given.  It was a pleasure speaking to Kaylene Diaz today.  Previous clinic notes and pertinent laboratory tests were reviewed prior to Kaylene Diaz's visit.  Signs and Symptoms of skin cancer discussed with patient.  Patient encouraged to perform monthly skin exams.  UV precautions reviewed with patient.  Risks of non-melanoma skin cancer discussed with patient   Return to clinic 12 months      Again, thank you for allowing me to participate in the care of your patient.        Sincerely,        Cipriano Springer MD

## 2024-10-02 NOTE — PROGRESS NOTES
Kaylene Diaz is an extremely pleasant 68 year old year old female patient here today for hx of non-melanoma skin cancer.   Patient has no other skin complaints today.  Remainder of the HPI, Meds, PMH, Allergies, FH, and SH was reviewed in chart.      Past Medical History:   Diagnosis Date    BLANCA (acute kidney injury) (H) 04/20/2017    Basal cell carcinoma     Gastroesophageal reflux disease     Hypertension     Motion sickness     Obese     RA (rheumatoid arthritis) (H)     SBO (small bowel obstruction) (H) 04/20/2017    Small bowel obstruction (H)     Squamous cell carcinoma     Thrombosis        Past Surgical History:   Procedure Laterality Date    ARTHROPLASTY KNEE Right 8/20/2024    Procedure: RIGHT TOTAL KNEE ARTHROPLASTY;  Surgeon: Javed Rodriguez MD;  Location: [facility] Main OR    ARTHROSCOPY KNEE  12/10/2010    ARTHROSCOPY KNEE performed by NAVID FIERRO at [facility] OR    COLONOSCOPY N/A 8/9/2018    Procedure: COLONOSCOPY;  colonoscopy;  Surgeon: Luke Kaye MD;  Location: [facility]    INJECT EPIDURAL LUMBAR N/A 7/7/2022    Procedure: Lumbar Epidural Steroid Injection;  Surgeon: Olivia Davies MD;  Location: [facility] OR    INJECT EPIDURAL LUMBAR N/A 11/11/2022    Procedure: Lumbar Epidural Steroid Injection, L4-5;  Surgeon: Olivia Davies MD;  Location: [facility] OR    INJECT EPIDURAL LUMBAR N/A 10/5/2023    Procedure: Lumbar Epidural Steroid Injection;  Surgeon: Olivia Davies MD;  Location: [facility] OR    INJECT EPIDURAL LUMBAR N/A 6/20/2024    Procedure: Lumbar Epidural Steroid Injection;  Surgeon: Olivia Davies MD;  Location: [facility] OR    SURGICAL HISTORY OF -       IUD removal    SURGICAL HISTORY OF -       Ovarian cyst    SURGICAL HISTORY OF -       thorn removal from foot    SURGICAL HISTORY OF -       knee surgery, scope, right knee        Family History   Problem Relation Age of Onset    Prostate Cancer Father     Eye Disorder Father         mac degen    Heart Disease Father     Macular Degeneration  Father     Heart Disease Mother         a-fib    Eye Disorder Mother     C.A.D. Mother     Hypertension Mother     Alcohol/Drug Brother     Alcohol/Drug Sister     Alcohol/Drug Brother     Alcohol/Drug Brother     Alcohol/Drug Brother     Alcohol/Drug Sister     Obesity Sister     Alcohol/Drug Sister     Prostate Cancer Sister     Cancer Other     Melanoma No family hx of        Social History     Socioeconomic History    Marital status:      Spouse name: Not on file    Number of children: Not on file    Years of education: Not on file    Highest education level: Not on file   Occupational History     Employer: UNEMPLOYED   Tobacco Use    Smoking status: Former     Current packs/day: 0.00     Types: Cigarettes     Start date: 1970     Quit date: 2000     Years since quittin.4    Smokeless tobacco: Never   Vaping Use    Vaping status: Never Used   Substance and Sexual Activity    Alcohol use: Yes     Comment: 1 per week    Drug use: Not Currently    Sexual activity: Not Currently     Partners: Male   Other Topics Concern    Parent/sibling w/ CABG, MI or angioplasty before 65F 55M? No   Social History Narrative    Not on file     Social Determinants of Health     Financial Resource Strain: Low Risk  (2024)    Financial Resource Strain     Within the past 12 months, have you or your family members you live with been unable to get utilities (heat, electricity) when it was really needed?: No   Food Insecurity: Low Risk  (2024)    Food Insecurity     Within the past 12 months, did you worry that your food would run out before you got money to buy more?: No     Within the past 12 months, did the food you bought just not last and you didn t have money to get more?: No   Transportation Needs: Low Risk  (2024)    Transportation Needs     Within the past 12 months, has lack of transportation kept you from medical appointments, getting your medicines, non-medical meetings or appointments, work,  "or from getting things that you need?: No   Physical Activity: Unknown (5/6/2024)    Exercise Vital Sign     Days of Exercise per Week: 0 days     Minutes of Exercise per Session: Patient declined   Stress: No Stress Concern Present (5/6/2024)    [employer] [place] [employer] - Occupational Stress Questionnaire     Feeling of Stress : Not at all   Social Connections: Unknown (8/31/2024)    Received from [facility]    Social Connections     Frequency of Communication with Friends and Family: Not on file   Interpersonal Safety: Low Risk  (5/6/2024)    Interpersonal Safety     Do you feel physically and emotionally safe where you currently live?: Yes     Within the past 12 months, have you been hit, slapped, kicked or otherwise physically hurt by someone?: No     Within the past 12 months, have you been humiliated or emotionally abused in other ways by your partner or ex-partner?: No   Housing Stability: Low Risk  (5/6/2024)    Housing Stability     Do you have housing? : Yes     Are you worried about losing your housing?: No       Outpatient Encounter Medications as of 10/2/2024   Medication Sig Dispense Refill    acetaminophen (TYLENOL) 325 MG tablet Take 2 tablets (650 mg) by mouth every 4 hours as needed for other (mild pain) 100 tablet 0    hydroxychloroquine (PLAQUENIL) 200 MG tablet Take 2 tablets (400 mg) by mouth daily 180 tablet 2    Insulin Syringe-Needle U-100 (BD INSULIN SYRINGE) 27G X 1/2\" 1 ML MISC For once weekly methotrexate administration. 50 each 1    lisinopril (ZESTRIL) 30 MG tablet Take 1 tablet (30 mg) by mouth daily 90 tablet 3    methotrexate 50 MG/2ML injection Inject 1 mL (25 mg) subcutaneously every 7 days. Do not start before September 4, 2024. 8 mL 6    Multiple Vitamins-Minerals (ADULT GUMMY PO) Take 1 chew tab by mouth daily VitaCrave      omeprazole (PRILOSEC) 20 MG DR capsule Take 1 capsule (20 mg) by mouth daily 90 capsule 3    oxyCODONE " (ROXICODONE) 5 MG tablet Take 1-2 tablets (5-10 mg) by mouth every 4 hours as needed for moderate to severe pain 32 tablet 0    predniSONE (DELTASONE) 5 MG tablet Take 1 tablet (5 mg) by mouth daily as needed for inflammatory arthritis symptoms.  Use sparingly 90 tablet 1    rivaroxaban ANTICOAGULANT (XARELTO ANTICOAGULANT) 20 MG TABS tablet Take 1 tablet (20 mg) by mouth daily (with dinner) 90 tablet 3    senna-docusate (SENOKOT-S/PERICOLACE) 8.6-50 MG tablet Take 1-2 tablets by mouth 2 times daily Take while on oral narcotics to prevent or treat constipation. 30 tablet 0    tiZANidine (ZANAFLEX) 4 MG tablet Take 1 tablet (4 mg) by mouth 3 times daily as needed for muscle spasms 270 tablet 3    vitamin B complex with vitamin C (STRESS TAB) tablet Take 1 tablet by mouth daily  0    Vitamin D3 (CHOLECALCIFEROL) 25 mcg (1000 units) tablet Take 1 tablet by mouth daily       No facility-administered encounter medications on file as of 10/2/2024.             O:   NAD, WDWN, Alert & Oriented, Mood & Affect wnl, Vitals stable   General appearance normal   Vitals stable   Alert, oriented and in no acute distress     L upper lip gritty scaly papule    Stuck on papules and brown macules on trunk and ext   Red papules on trunk  Flesh colored papules on trunk     The remainder of the full exam was normal; the following areas were examined:  conjunctiva/lids, , neck, peripheral vascular system, abdomen, lymph nodes, digits/nails, eccrine and apocrine glands, scalp/hair, face, neck, chest, abdomen, buttocks, back, RUE, LUE, RLE, LLE       Eyes: Conjunctivae/lids:Normal     ENT: Lips, mucosa: normal    MSK:Normal    Cardiovascular: peripheral edema none    Pulm: Breathing Normal    Lymph Nodes: No Head and Neck Lymphadenopathy     Neuro/Psych: Orientation:Alert and Orientedx3 ; Mood/Affect:normal       A/P:  1. Seborrheic keratosis, lentigo, angioma, dermal nevus, hx of non-melanoma skin cancer,   2. Actinic keratosis lip  LN2:   Treated with LN2 for 5s for 1-2 cycles. Warned risks of blistering, pain, pigment change, scarring, and incomplete resolution.  Advised patient to return if lesions do not completely resolve.  Wound care sheet given.  It was a pleasure speaking to Kaylene Diaz today.  Previous clinic notes and pertinent laboratory tests were reviewed prior to Kaylene Diaz's visit.  Signs and Symptoms of skin cancer discussed with patient.  Patient encouraged to perform monthly skin exams.  UV precautions reviewed with patient.  Risks of non-melanoma skin cancer discussed with patient   Return to clinic 12 months

## 2024-10-11 ENCOUNTER — OFFICE VISIT (OUTPATIENT)
Dept: FAMILY MEDICINE | Facility: CLINIC | Age: 69
End: 2024-10-11
Payer: MEDICARE

## 2024-10-11 ENCOUNTER — ANCILLARY PROCEDURE (OUTPATIENT)
Dept: GENERAL RADIOLOGY | Facility: CLINIC | Age: 69
End: 2024-10-11
Attending: FAMILY MEDICINE
Payer: MEDICARE

## 2024-10-11 VITALS
HEIGHT: 62 IN | WEIGHT: 248.4 LBS | HEART RATE: 81 BPM | DIASTOLIC BLOOD PRESSURE: 68 MMHG | OXYGEN SATURATION: 99 % | BODY MASS INDEX: 45.71 KG/M2 | SYSTOLIC BLOOD PRESSURE: 128 MMHG | TEMPERATURE: 97.9 F | RESPIRATION RATE: 20 BRPM

## 2024-10-11 DIAGNOSIS — R05.2 SUBACUTE COUGH: Primary | ICD-10-CM

## 2024-10-11 DIAGNOSIS — R05.2 SUBACUTE COUGH: ICD-10-CM

## 2024-10-11 PROCEDURE — 71046 X-RAY EXAM CHEST 2 VIEWS: CPT | Mod: TC | Performed by: RADIOLOGY

## 2024-10-11 PROCEDURE — 99213 OFFICE O/P EST LOW 20 MIN: CPT | Performed by: FAMILY MEDICINE

## 2024-10-11 PROCEDURE — G2211 COMPLEX E/M VISIT ADD ON: HCPCS | Performed by: FAMILY MEDICINE

## 2024-10-11 RX ORDER — PANTOPRAZOLE SODIUM 40 MG/1
40 TABLET, DELAYED RELEASE ORAL DAILY
Qty: 30 TABLET | Refills: 1 | Status: SHIPPED | OUTPATIENT
Start: 2024-10-11

## 2024-10-11 ASSESSMENT — PAIN SCALES - GENERAL: PAINLEVEL: NO PAIN (0)

## 2024-10-11 ASSESSMENT — ENCOUNTER SYMPTOMS: COUGH: 1

## 2024-10-11 NOTE — PROGRESS NOTES
"  Assessment & Plan     Subacute cough  68-year-old female presented with ongoing cough, congestion which she has been experiencing for last 6 weeks or so.  Patient was treated with prednisone, doxycycline and albuterol inhaler on August 31, 2024.  Physical examination overall unremarkable.  Differentials discussed in detail including gastroesophageal reflux disease.  Chest x-ray findings reviewed independently, no acute abnormality noted.  Dietary counseling provided and suggested to follow-up with PCP in 7 to 10 days or earlier if needed.  All questions answered.  - XR Chest 2 Views; Future  - pantoprazole (PROTONIX) 40 MG EC tablet; Take 1 tablet (40 mg) by mouth daily.      Subjective   Kaylene is a 68 year old, presenting for the following health issues:  Cough        10/11/2024     3:04 PM   Additional Questions   Roomed by Gabriella SANDOVAL LPN   Accompanied by self     History of Present Illness       Reason for visit:  Coughing excess phlem..since Aug 27      Foamy white phlem with every cough. Has A respiratory infection in Aug. Chest X-Ray showed congestion. Started  Prednisone, ABX. No longer feels sick, feels like it is something with her Acid reflex. No coughing when laying down. Can have congestion at times.        Review of Systems  Constitutional, neuro, ENT, endocrine, pulmonary, cardiac, gastrointestinal, genitourinary, musculoskeletal, integument and psychiatric systems are negative, except as otherwise noted.      Objective    /68   Pulse 81   Temp 97.9  F (36.6  C) (Tympanic)   Resp 20   Ht 1.581 m (5' 2.25\")   Wt 112.7 kg (248 lb 6.4 oz)   SpO2 99%   BMI 45.07 kg/m    Body mass index is 45.07 kg/m .  Physical Exam   GENERAL: alert and no distress  EYES: Eyes grossly normal to inspection, PERRL and conjunctivae and sclerae normal  HENT: normal cephalic/atraumatic, nose and mouth without ulcers or lesions, oropharynx clear, and oral mucous membranes moist  NECK: no adenopathy, no asymmetry, " masses, or scars  RESP: lungs clear to auscultation - no rales, rhonchi or wheezes  CV: regular rate and rhythm, normal S1 S2, no S3 or S4, no murmur, click or rub, no peripheral edema  ABDOMEN: soft, nontender  MS: no gross musculoskeletal defects noted, no edema  NEURO: Normal strength and tone, mentation intact and speech normal  PSYCH: mentation appears normal, affect normal/bright          Signed Electronically by: Jonathan Oliveira MD

## 2024-10-21 ENCOUNTER — OFFICE VISIT (OUTPATIENT)
Dept: FAMILY MEDICINE | Facility: CLINIC | Age: 69
End: 2024-10-21
Payer: MEDICARE

## 2024-10-21 VITALS
DIASTOLIC BLOOD PRESSURE: 70 MMHG | HEART RATE: 73 BPM | WEIGHT: 248 LBS | HEIGHT: 62 IN | BODY MASS INDEX: 45.64 KG/M2 | RESPIRATION RATE: 16 BRPM | TEMPERATURE: 97.9 F | SYSTOLIC BLOOD PRESSURE: 140 MMHG | OXYGEN SATURATION: 99 %

## 2024-10-21 DIAGNOSIS — R05.3 CHRONIC COUGH: ICD-10-CM

## 2024-10-21 DIAGNOSIS — J98.6 ELEVATED DIAPHRAGM: ICD-10-CM

## 2024-10-21 DIAGNOSIS — I10 UNCONTROLLED HYPERTENSION: ICD-10-CM

## 2024-10-21 DIAGNOSIS — R09.82 POSTNASAL DRIP: Primary | ICD-10-CM

## 2024-10-21 DIAGNOSIS — K76.0 HEPATIC STEATOSIS: ICD-10-CM

## 2024-10-21 PROCEDURE — 99214 OFFICE O/P EST MOD 30 MIN: CPT | Performed by: FAMILY MEDICINE

## 2024-10-21 RX ORDER — FLUTICASONE PROPIONATE 50 MCG
1 SPRAY, SUSPENSION (ML) NASAL DAILY
Qty: 16 G | Refills: 11 | Status: SHIPPED | OUTPATIENT
Start: 2024-10-21

## 2024-10-21 ASSESSMENT — PAIN SCALES - GENERAL: PAINLEVEL: NO PAIN (0)

## 2024-10-21 ASSESSMENT — ENCOUNTER SYMPTOMS: COUGH: 1

## 2024-10-21 NOTE — PATIENT INSTRUCTIONS
Schedule nurse only visit to check blood pressure in 3 weeks.    Start flonase nasal spray - use everyday until first hard freeze.    To schedule the liver ultrasound, call [phone].

## 2024-10-21 NOTE — PROGRESS NOTES
Assessment & Plan     Postnasal drip  Patient was advised on causes, expected course, symptoms and treatment of condition.  Discussed use of Fluticasone nasal spray and expected response.  Push oral fluids.  May continue antitussives prn.  Return precautions discussed and given to patient.   - fluticasone (FLONASE) 50 MCG/ACT nasal spray  Dispense: 16 g; Refill: 11    Chronic cough  Likely due to the above. Less suspect for ACE-I cough for now, but can reconsider if cough persists.  - fluticasone (FLONASE) 50 MCG/ACT nasal spray  Dispense: 16 g; Refill: 11    Uncontrolled hypertension  Borderline uncontrolled.  RN BP check in 2-3 weeks.  Reinforced sodium restrictions. Continue current meds.    Hepatic steatosis  Found on review of previous imaging. Most recent hepatic panel normal.  Discussed follow up US due to incidental finding of elevated hemidiaphragm. Patient agreed to schedule.  - US Abdomen Limited    Elevated diaphragm  Unclear etiology. Found on recent CXR - reviewed other imaging studies in past but no mention of this.  Does have hepatic steatosis on CT scan abdomen in 2022.  - US Abdomen Limited              Patient Instructions   Schedule nurse only visit to check blood pressure in 3 weeks.    Start flonase nasal spray - use everyday until first hard freeze.    To schedule the liver ultrasound, call [phone].         Subjective   Kaylene is a 68 year old, presenting for the following health issues:  Cough (Follow up)        10/21/2024     9:46 AM   Additional Questions   Roomed by Gabriella HAYES MA   Accompanied by self         10/21/2024     9:46 AM   Patient Reported Additional Medications   Patient reports taking the following new medications none     History of Present Illness       Reason for visit:  Coughing excess phlem..since Aug 27   She is taking medications regularly.         Concern - cough with phlegm  Onset: 2 months ago  Description: still spitting up white phlegm that runs in back of  "throat.  Intensity: mild, moderate  Progression of Symptoms:  intermittent and waxing and waning  Accompanying Signs & Symptoms: denies fever, weight loss, hemoptysis, chest pain or acute dyspnea.  Previous history of similar problem: none  Precipitating factors:        Worsened by: none  Alleviating factors:        Improved by: none  Therapies tried and outcome: albuterol inhaler still uses now; has been given antibiotics at [facility].; has changed PPIs.    Has not used nasal sprays.  Patient is on lisinopril for hypertension.    CXR done earlier this summer showed clear lungs but with elevated right hemidiaphragm.          Review of Systems  Constitutional, HEENT, cardiovascular, pulmonary, GI, , musculoskeletal, neuro, skin, endocrine and psych systems are negative, except as otherwise noted.      Objective    BP (!) 140/70   Pulse 73   Temp 97.9  F (36.6  C)   Resp 16   Ht 1.581 m (5' 2.25\")   Wt 112.5 kg (248 lb)   SpO2 99%   BMI 45.00 kg/m    Body mass index is 45 kg/m .  Physical Exam   GENERAL: morbidly obese, alert and no distress  EYES: Eyes grossly normal to inspection, extraocular movements - intact, and PERRL  HENT: Ears - tympanic membrane intact and nonerythematous; Nose - pink swollen turbinates, small amt of clear nasal mucus prsent, no sinus tenderness bilaterally; throat nonerythematous  NECK: nontender anterior cervical lymphadenopathy present.  RESP: lungs clear to auscultation - no rales, no rhonchi, no wheezes  CV: regular rates and rhythm, normal S1 S2, no S3 or S4 and no murmur  SKIN:no rashes   ABD: protuberant, soft, no tenderness on palpation; liver edge non-palpable    No results found for any visits on 10/21/24.        Signed Electronically by: Jamshid Can MD    "

## 2024-10-24 ENCOUNTER — HOSPITAL ENCOUNTER (OUTPATIENT)
Dept: ULTRASOUND IMAGING | Facility: CLINIC | Age: 69
Discharge: HOME | End: 2024-10-24
Attending: FAMILY MEDICINE | Admitting: FAMILY MEDICINE
Payer: MEDICARE

## 2024-10-24 DIAGNOSIS — K76.0 HEPATIC STEATOSIS: ICD-10-CM

## 2024-10-24 DIAGNOSIS — J98.6 ELEVATED DIAPHRAGM: ICD-10-CM

## 2024-10-24 PROCEDURE — 76705 ECHO EXAM OF ABDOMEN: CPT

## 2024-11-14 NOTE — PATIENT INSTRUCTIONS
RHEUMATOLOGY    [facility]  [address] [place] [address]  [place] [address]    Phone number: [phone]  Fax number: [phone]    If you need a medication refill, please contact us as you may need lab work and/or a follow up visit prior to your refill.      Thank you for choosing [facility]!    Keke Finnegan CMA Rheumatology

## 2024-11-14 NOTE — PROGRESS NOTES
"Kaylene Diaz  is a 68 year old year old who is being evaluated via a billable video visit.      How would you like to obtain your AVS? MyChart  If the video visit is dropped, the invitation should be resent by: Text to cell phone: [phone]   Will anyone else be joining your video visit? No       Rheumatology Video Visit      Kaylene Diaz MRN# [id number]   YOB: 1955 Age: 68 year old      Date of visit: 11/15/24  PCP:  LUCIA Grover    Chief Complaint   Patient presents with:  Rheumatoid Arthritis    Assessment and Plan     1.  Seropositive rheumatoid arthritis (RF 32, CCP >250): Currently on methotrexate 25 mg SQ once  weekly, hydroxychloroquine 400 mg daily.  Previously on sulfasalazine that was discontinued when she found no benefit from it; but she also did not require prednisone while on sulfasalazine; questioning if the \"benefit\" from sulfasalazine was actually the \"benefit\" of not gardening.  Does not tolerate folic acid because of associated increased sunburns.  Doing well with regard to RA; not using prednisone.   Chronic illness, stable.      - Continue methotrexate 25mg SQ once weekly   - Continue hydroxychloroquine 400 mg daily (last eye exam on 12/22/2023 with Dr. Powers)  - Continue prednisone 5 mg daily as needed for rheumatoid arthritis symptoms; uses sparingly   - Labs every 8-12 weeks: CBC, Creatinine, Hepatic Panel, ESR, CRP (standing lab orders previously faxed to [facility] in [place], [place])    High risk medication requiring intensive toxicity monitoring at least quarterly.      2.  Basal cell carcinoma and squamous cell carcinoma: Several lesions removed by dermatology in the past.  Continue to follow with dermatology.     3.  Chronic lower back pain: Has significantly improved with lidocaine patches that have been used no more than 10 times in the past 1 year.  Has followed in the pain management clinic where steroid injections were helpful.  Patient reports that she was seen by a " "spine surgeon who advised being seen at the [facility] if surgery was to be pursued because she has such extensive degenerative changes.    4.  History of right Achilles tendinitis: Was evaluated by podiatry who diagnosed her with Achilles tendinitis and her symptoms resolved with a boot that she wore at night.  Not an issue today.     5.  Osteopenia without elevated FRAX: Based on 4/27/2022 DEXA.    6.  History of productive cough: has seen an ENT provider who reportedly ran davide o rgy tests that came back negative.  She then tried prilosec (omeprazole wasn't effective) 20mg daily that resolved the cough, but stopping it results in return of symptoms.  Then had an EGD showing gastritis.  Now on PPI and doing well.    7.  Muscle spasms: Primarily affecting the lower back.  Improved from infrequent cyclobenzaprine 5 mg nightly as needed, but finds that using tizanidine 4mg PRN is more effective.   - Continue tiZANidine (ZANAFLEX) 4 MG capsule; Take 1 capsule (4 mg) by mouth 3 times daily as needed for muscle spasms     8.  R>L knee osteoarthritis: 1/16/2023 x-ray of the knees: \"IMPRESSION: Degenerative narrowing of the medial compartments of both knees with complete effacement of the medial compartment on the right and bone-on-bone contact. No evidence for fracture or significant effusion.\"  Degenerative arthritis symptoms of both knees, worse on the right.  1/18/2023 steroid injection of the right knee was very effective.  Repeat injection on 6/1/2023 and  9/14/2023 or partially effective but she is starting to have more pain of her hips that are degenerative in nature and possibly due to altered gait due to significant right knee osteoarthritis, and she has chronic degenerative back symptoms as well.  Now status-post right TKA in 2024 with significant improvement    9.  Hip osteoarthritis: Patient reports that she is following with orthopedics and a steroid injection to the hip was very " effective.    10.  History of left shoulder pain: Consistent with impingement syndrome.  Reportedly has had similar issue many years ago that resolved with a steroid injection.  Symptomatic for at least 3 months when seen on 6/1/23; steroid injection on 6/1/23 resolved the pain. Not an issue today.     11.  Vaccinations: Vaccinations reviewed with Ms. Diaz.     - Influenza: refused by patient previously, she previously stated that she never gets the influenza vaccination.  - Shingrix: Up to date   - COVID-19: Advised keeping updated, and to hold methotrexate for 2 weeks afterward.    Total minutes spent in evaluation with patient, documentation, , and review of pertinent studies and chart notes: 16  The longitudinal plan of care for the rheumatology problem(s) were addressed during this visit.  Due to added complexity of care, we will continue to support the patient and the subsequent management of this condition with ongoing continuity of care.       Ms. Diaz verbalized agreement with and understanding of the rational for the diagnosis and treatment plan.  All questions were answered to best of my ability and the patient's satisfaction. Ms. Diaz was advised to contact the clinic with any questions that may arise after the clinic visit.      Thank you for involving me in the care of the patient    Return to clinic: 3-4 months      HPI   Kaylene Diaz is a 68 year old female with a past medical history significant for hyperlipidemia, osteoarthritis, osteopenia, PE (2020) on chronic anticoagulation, meniscal tear, squamous cell carcinoma, basal cell carcinoma, and rheumatoid arthritis who presents for follow-up of rheumatoid arthritis.    3/24/2023: Steroid injection of the right knee resolved right knee pain.  No knee pain at this time.  Rheumatoid arthritis is well controlled.  Stable ulnar deviation and mild swelling of the MCPs as it has been for years but no pain or stiffness.    6/1/2023: Right knee  has started to hurt again and she would like repeat steroid injection today.  Left shoulder has also been painful, worse with abduction above 90 degrees and similar to shoulder pain that she had many years ago that resolved with a steroid injection.  Most recent shoulder pain for 1.5-3 weeks.  Joint pains have worsened since doing increased yard work.  No joint swelling.  RA controlled.    9/14/2023: left shoulder pain resolved with steroid injection.  Right knee injection was helpful but not as helpful as previous injection; again with right knee pain that is worse with activity and better with rest.  Small baker's cyst.  Would like repeat right knee injection today.  Other joints are doing well. Morning stiffness <30 min.     1/2/2024: RA controlled.  Methotrexate and hydroxychloroquine are effective.  Not using prednisone.  Right knee pain worse with activity, and starting to affect her gait more that is leading to more bilateral hip pain that radiates towards the groin, worse with activity and improves with rest.  Also with chronic degenerative low back pain that is worse with activity and improves with rest.  Steroid injection in the right knee is helpful but does not resolve her symptoms for a full 3 months    1/30/2024: Right knee was evaluated by orthopedics and she is concerned about potential prostatic material used considering reaction to various products.  She is going to pursue patch testing with dermatology.  She would like a steroid injection of the right knee at this time.  She does not anticipate having any surgery in the near future if she can tolerate the right knee symptoms.  She is hesitant about total knee arthroplasty because of potential for reaction to materials in the prosthetic     5/3/2024: Plan to see an orthopedic surgeon in June 2024 for TKA consideration.  Low back pain that affects both lower legs is starting to return as she nears the 6-month neelam since her last steroid injection in  her back so she is considering having a repeat injection.  Planning to establish care with Dr. Can as a new primary care provider since her previous PCP has left.  RA symptoms in the hands are stable.    8/9/2024: Planning to have total knee arthroplasty on 8/20/2024 with [facility].  Otherwise arthritis is stable.  Taking methotrexate and hydroxychloroquine with no missed doses.  Not using prednisone.    Today, 11/15/2024: Reports that she is doing very well with regard to the right TKA.  Continues to do physical therapy exercises on her own.  Not requiring pain medications.  Very happy with how well she is doing.  She then went to a clinic to discuss her chronic degenerative back pain where it was determined that she would need to go to the [facility] if anything was needed because she has such extensive degenerative changes, per patient.  However, they did find that she had hip osteoarthritis and this has improved with a steroid injection.  RA doing well despite holding methotrexate for 5 weeks perioperatively; she says that she is typically the one to get infections and they were worried about infection so it was held during that time.  Back on methotrexate now.  RA controlled.  No swelling in the hands.  Morning stiffness for less than 20 minutes.    Denies fevers, chills, nausea, vomiting, constipation, diarrhea. No abdominal pain. No chest pain/pressure, palpitations, or shortness of breath.   No LE swelling. No neck pain. No oral or nasal sores.  No rash. No sicca symptoms.   Intentionally losing weight.    Tobacco: Quit in 2000  EtOH: Rare  Drugs: None    ROS   12 point review of system was completed and negative except as noted in the HPI     Active Problem List     Patient Active Problem List   Diagnosis    Carpal tunnel syndrome    Tear meniscus knee    Osteopenia    Hyperlipidemia LDL goal <100    Osteoarthritis    RA (rheumatoid arthritis) (H)    High risk medications (not  anticoagulants) long-term use    Kidney stone    ASCUS on Pap smear    Class 3 obesity    Elevated blood pressure reading without diagnosis of hypertension    Elevated glucose    Chronic kidney disease, stage 3a (H)    Chronic bilateral low back pain with bilateral sciatica    Lumbar radiculopathy    Immunodeficiency, unspecified (H)    Other acute pulmonary embolism, unspecified whether acute cor pulmonale present (H)    Knee osteoarthritis     Past Medical History     Past Medical History:   Diagnosis Date    BLANCA (acute kidney injury) (H) 04/20/2017    Basal cell carcinoma     Gastroesophageal reflux disease     Hypertension     Motion sickness     Obese     RA (rheumatoid arthritis) (H)     SBO (small bowel obstruction) (H) 04/20/2017    Small bowel obstruction (H)     Squamous cell carcinoma     Thrombosis      Past Surgical History     Past Surgical History:   Procedure Laterality Date    ARTHROPLASTY KNEE Right 8/20/2024    Procedure: RIGHT TOTAL KNEE ARTHROPLASTY;  Surgeon: Javed Rodriguez MD;  Location: [facility] OR    ARTHROSCOPY KNEE  12/10/2010    ARTHROSCOPY KNEE performed by NAVID FIERRO at [facility]    COLONOSCOPY N/A 8/9/2018    Procedure: COLONOSCOPY;  colonoscopy;  Surgeon: Luke Kaye MD;  Location: [facility]    INJECT EPIDURAL LUMBAR N/A 7/7/2022    Procedure: Lumbar Epidural Steroid Injection;  Surgeon: Olivia Davies MD;  [place] [facility]    INJECT EPIDURAL LUMBAR N/A 11/11/2022    Procedure: Lumbar Epidural Steroid Injection, L4-5;  Surgeon: Olivia Davies MD;  Location: [facility] OR    INJECT EPIDURAL LUMBAR N/A 10/5/2023    Procedure: Lumbar Epidural Steroid Injection;  Surgeon: Olivia Davies MD;  Location: [facility] OR    INJECT EPIDURAL LUMBAR N/A 6/20/2024    Procedure: Lumbar Epidural Steroid Injection;  Surgeon: Olivia Davies MD;  [place] [facility]    SURGICAL HISTORY OF -       IUD removal    SURGICAL HISTORY OF -       Ovarian cyst    SURGICAL HISTORY OF -       thorn removal  "from foot    SURGICAL HISTORY OF -       knee surgery, scope, right knee     Allergy     Allergies   Allergen Reactions    Folic Acid Photosensitivity    Gold Rash    Latex Rash     Not all latex products     Current Medication List     Current Outpatient Medications   Medication Sig Dispense Refill    fluticasone (FLONASE) 50 MCG/ACT nasal spray Spray 1 spray into both nostrils daily. 16 g 11    hydroxychloroquine (PLAQUENIL) 200 MG tablet Take 2 tablets (400 mg) by mouth daily 180 tablet 2    lisinopril (ZESTRIL) 30 MG tablet Take 1 tablet (30 mg) by mouth daily 90 tablet 3    methotrexate 50 MG/2ML injection Inject 1 mL (25 mg) subcutaneously every 7 days. Do not start before September 4, 2024. 8 mL 6    Multiple Vitamins-Minerals (ADULT GUMMY PO) Take 1 chew tab by mouth daily VitaCrave      pantoprazole (PROTONIX) 40 MG EC tablet Take 1 tablet (40 mg) by mouth daily. 30 tablet 1    predniSONE (DELTASONE) 5 MG tablet Take 1 tablet (5 mg) by mouth daily as needed for inflammatory arthritis symptoms.  Use sparingly 90 tablet 1    rivaroxaban ANTICOAGULANT (XARELTO ANTICOAGULANT) 20 MG TABS tablet Take 1 tablet (20 mg) by mouth daily (with dinner) 90 tablet 3    tiZANidine (ZANAFLEX) 4 MG tablet Take 1 tablet (4 mg) by mouth 3 times daily as needed for muscle spasms 270 tablet 3    vitamin B complex with vitamin C (STRESS TAB) tablet Take 1 tablet by mouth daily  0    Vitamin D3 (CHOLECALCIFEROL) 25 mcg (1000 units) tablet Take 1 tablet by mouth daily      Insulin Syringe-Needle U-100 (BD INSULIN SYRINGE) 27G X 1/2\" 1 ML MISC For once weekly methotrexate administration. 50 each 1     No current facility-administered medications for this visit.         Social History   See HPI    Family History     Family History   Problem Relation Age of Onset    Prostate Cancer Father     Eye Disorder Father         mac degen    Heart Disease Father     Macular Degeneration Father     Heart Disease Mother         a-fib    Eye " "Disorder Mother     C.A.D. Mother     Hypertension Mother     Alcohol/Drug Brother     Alcohol/Drug Sister     Alcohol/Drug Brother     Alcohol/Drug Brother     Alcohol/Drug Brother     Alcohol/Drug Sister     Obesity Sister     Alcohol/Drug Sister     Prostate Cancer Sister     Cancer Other     Melanoma No family hx of        Physical Exam     Temp Readings from Last 3 Encounters:   10/21/24 97.9  F (36.6  C)   10/11/24 97.9  F (36.6  C) (Tympanic)   08/21/24 98  F (36.7  C) (Oral)     BP Readings from Last 5 Encounters:   10/21/24 (!) 140/70   10/11/24 128/68   08/21/24 133/64   07/24/24 136/70   06/20/24 (!) 185/85     Pulse Readings from Last 1 Encounters:   10/21/24 73     Resp Readings from Last 1 Encounters:   10/21/24 16     Estimated body mass index is 45 kg/m  as calculated from the following:    Height as of 10/21/24: 1.581 m (5' 2.25\").    Weight as of 10/21/24: 112.5 kg (248 lb).      GEN: NAD.   HEENT:  Anicteric, noninjected sclera. No obvious external lesions of the ear and nose. Hearing intact.  PULM: No increased work of breathing  MSK:  Hands and wrists without swelling.  Ulnar deviation at the right hand MCPs, similar to previous exam  SKIN: No rash or jaundice seen  PSYCH: Alert. Appropriate.      Labs / Imaging (select studies)     [facility] labs dated 12/18/2023:  Normal Cr, Hepatic panel, ESR, CRP, CBC    [facility] labs dated 3/14/2024 were okay, including hepatic panel, creatinine, CBC, ESR, and CRP    [facility] / [facility] labs dated 11/4/2024: Elevated alkaline phosphatase of 128 (), ESR and CRP normal, creatinine 0.86, and normal CBC.    CBC  Recent Labs   Lab Test 08/21/24  0629 07/24/24  1024 05/06/24  0932 06/01/23  1607 04/21/23  1139 04/22/20  1156 10/28/19  1052 08/29/19  0855   WBC  --  5.7  --  6.5  --  8.2 5.3 5.8   RBC  --  4.16  --  4.21  --  4.59 4.44 4.60   HGB 11.2* 13.0 13.6 13.3   < > 14.4 13.9 14.2   HCT  --  40.2  --  40.3  --  44.1 42.1 43.2   MCV  --  97  --  96  --  96 95 94 "   RDW  --  12.7  --  13.9  --  12.8 14.4 14.0   PLT  --  223  --  243  --  312 225 240   MCH  --  31.3  --  31.6  --  31.4 31.3 30.9   MCHC  --  32.3  --  33.0  --  32.7 33.0 32.9   NEUTROPHIL  --  69  --  73  --  70.6 71.0 73.2   LYMPH  --  21  --  19  --  16.3 19.9 15.9   MONOCYTE  --  8  --  7  --  11.2 7.2 8.7   EOSINOPHIL  --  1  --  1  --  1.5 1.7 1.9   BASOPHIL  --  0  --  0  --  0.4 0.2 0.3   ANEU  --   --   --   --   --  5.8 3.8 4.3   ALYM  --   --   --   --   --  1.3 1.1 0.9   PEEWEE  --   --   --   --   --  0.9 0.4 0.5   AEOS  --   --   --   --   --  0.1 0.1 0.1   ABAS  --   --   --   --   --  0.0 0.0 0.0   ANEUTAUTO  --  3.9  --  4.8  --   --   --   --    ALYMPAUTO  --  1.2  --  1.2  --   --   --   --    AMONOAUTO  --  0.5  --  0.5  --   --   --   --    AEOSAUTO  --  0.1  --  0.1  --   --   --   --    ABSBASO  --  0.0  --  0.0  --   --   --   --     < > = values in this interval not displayed.     CMP  Recent Labs   Lab Test 08/21/24  0657 07/24/24  1024 05/06/24  0932 06/01/23  1607 04/21/23  1139 04/21/23  1139 03/09/21  0000 12/22/20  0000 04/22/20  1156   NA  --  141 139  --   --  140  --   --  138   POTASSIUM  --  4.6 5.4*  --   --  5.0  --   --  4.2   CHLORIDE  --  108* 106  --   --  106  --   --  107   CO2  --  25 26  --   --  22  --   --  27   ANIONGAP  --  8 7  --   --  12  --   --  4   * 91 92  --    < > 83  --   --  81   BUN  --  16.8 19.5  --   --  17.7  --   --  22   CR  --  0.81 0.84 0.83   < > 0.75 1.01 0.98 0.74   GFRESTIMATED  --  78 75 76   < > 86 55* 57* 85   GFRESTBLACK  --   --   --   --   --   --  >60 >60 >90   DEAN  --  9.3 9.7  --   --  10.0  --   --  9.6   BILITOTAL  --  0.5  --  0.6  --   --   --   --  0.4   ALBUMIN  --  3.9  --  4.2  --   --   --   --  3.6   PROTTOTAL  --  6.9  --  7.3  --   --   --   --  8.1   ALKPHOS  --  123  --  128*  --   --   --   --  118   AST  --  20  --  30  --   --  15 15 13   ALT  --  20  --  21  --   --  26 28 22    < > = values in this interval  not displayed.     Calcium/VitaminD  Recent Labs   Lab Test 07/24/24  1024 05/06/24  0932 04/21/23  1139   DEAN 9.3 9.7 10.0     ESR/CRP  Recent Labs   Lab Test 07/24/24  1024 06/01/23  1607 10/28/19  1052 08/29/19  0855 10/23/18  1000   SED 11 11 15 11 14   CRP  --   --  5.1 7.3 4.7   CRPI <3.00 3.66  --   --   --        Immunization History     Immunization History   Administered Date(s) Administered    COVID-19 MONOVALENT 12+ (Pfizer) 03/05/2021, 03/26/2021, 11/10/2021    Pneumo Conj 13-V (2010&after) 12/03/2018    Pneumococcal 23 valent 10/24/2013, 03/09/2020    TD,PF 7+ (Tenivac) 10/28/2020    TDAP Vaccine (Adacel) 06/16/2010    Zoster recombinant adjuvanted (SHINGRIX) 12/03/2018, 06/19/2019              Chart documentation done in part with Dragon Voice recognition Software. Although reviewed after completion, some word and grammatical error may remain.      Video-Visit Details    Type of service:  Video Visit    Video Start Time: 11:13 AM    Video End Time:11:26 AM    Originating Location (pt. Location): Home, [place]    Distant Location (provider location):  off site, [place]    Platform used for Video Visit: Gloria Rooney MD

## 2024-11-15 ENCOUNTER — VIRTUAL VISIT (OUTPATIENT)
Dept: RHEUMATOLOGY | Facility: CLINIC | Age: 69
End: 2024-11-15
Payer: COMMERCIAL

## 2024-11-15 DIAGNOSIS — M05.79 RHEUMATOID ARTHRITIS INVOLVING MULTIPLE SITES WITH POSITIVE RHEUMATOID FACTOR (H): Primary | ICD-10-CM

## 2024-11-15 DIAGNOSIS — Z79.899 HIGH RISK MEDICATION USE: ICD-10-CM

## 2024-11-15 DIAGNOSIS — M15.0 PRIMARY OSTEOARTHRITIS INVOLVING MULTIPLE JOINTS: ICD-10-CM

## 2024-11-15 PROCEDURE — 99214 OFFICE O/P EST MOD 30 MIN: CPT | Mod: 95 | Performed by: INTERNAL MEDICINE

## 2024-11-15 PROCEDURE — G2211 COMPLEX E/M VISIT ADD ON: HCPCS | Mod: 95 | Performed by: INTERNAL MEDICINE

## 2024-12-02 DIAGNOSIS — R05.2 SUBACUTE COUGH: ICD-10-CM

## 2024-12-02 RX ORDER — PANTOPRAZOLE SODIUM 40 MG/1
40 TABLET, DELAYED RELEASE ORAL DAILY
Qty: 30 TABLET | Refills: 1 | Status: SHIPPED | OUTPATIENT
Start: 2024-12-02

## 2025-01-02 DIAGNOSIS — R05.2 SUBACUTE COUGH: ICD-10-CM

## 2025-01-02 RX ORDER — PANTOPRAZOLE SODIUM 40 MG/1
40 TABLET, DELAYED RELEASE ORAL DAILY
Qty: 30 TABLET | Refills: 2 | Status: SHIPPED | OUTPATIENT
Start: 2025-01-02

## 2025-02-01 ENCOUNTER — MYC MEDICAL ADVICE (OUTPATIENT)
Dept: FAMILY MEDICINE | Facility: CLINIC | Age: 70
End: 2025-02-01
Payer: MEDICARE

## 2025-02-01 DIAGNOSIS — M54.50 CHRONIC BILATERAL LOW BACK PAIN WITHOUT SCIATICA: ICD-10-CM

## 2025-02-01 DIAGNOSIS — G89.29 CHRONIC BILATERAL LOW BACK PAIN WITHOUT SCIATICA: ICD-10-CM

## 2025-02-03 NOTE — TELEPHONE ENCOUNTER
Dr Can    Pt sends mychart requesting refill of pain medication given in May for same issue. Would you prefer an appointment for this?  Pended order    Chronic bilateral low back pain without sciatica  Uncontrolled.  PDMP with no concerns.  Sees pain clinic for ESIs primarily.   Discussed safe use of opiate analgesics. Patient agreed to take oxycodone only for severe pain episodes.  - oxyCODONE (ROXICODONE) 5 MG tablet  Dispense: 15 tablet; Refill: 0         Dexter Griffiths RN

## 2025-02-04 RX ORDER — OXYCODONE HYDROCHLORIDE 5 MG/1
5 TABLET ORAL EVERY 6 HOURS PRN
Qty: 15 TABLET | Refills: 0 | Status: SHIPPED | OUTPATIENT
Start: 2025-02-04

## 2025-02-10 ENCOUNTER — TRANSFERRED RECORDS (OUTPATIENT)
Dept: HEALTH INFORMATION MANAGEMENT | Facility: CLINIC | Age: 70
End: 2025-02-10
Payer: MEDICARE

## 2025-04-02 DIAGNOSIS — I10 PRIMARY HYPERTENSION: ICD-10-CM

## 2025-04-02 RX ORDER — LISINOPRIL 30 MG/1
30 TABLET ORAL DAILY
Qty: 90 TABLET | Refills: 0 | Status: SHIPPED | OUTPATIENT
Start: 2025-04-02

## 2025-04-30 ENCOUNTER — HOSPITAL ENCOUNTER (OUTPATIENT)
Dept: BONE DENSITY | Facility: CLINIC | Age: 70
Discharge: HOME | End: 2025-04-30
Attending: INTERNAL MEDICINE
Payer: MEDICARE

## 2025-04-30 DIAGNOSIS — Z79.52 CURRENT CHRONIC USE OF SYSTEMIC STEROIDS: ICD-10-CM

## 2025-04-30 DIAGNOSIS — M85.80 OSTEOPENIA, UNSPECIFIED LOCATION: ICD-10-CM

## 2025-04-30 DIAGNOSIS — Z78.0 POST-MENOPAUSAL: ICD-10-CM

## 2025-04-30 DIAGNOSIS — M05.79 RHEUMATOID ARTHRITIS INVOLVING MULTIPLE SITES WITH POSITIVE RHEUMATOID FACTOR (H): ICD-10-CM

## 2025-04-30 PROCEDURE — 77080 DXA BONE DENSITY AXIAL: CPT

## 2025-05-08 DIAGNOSIS — M62.838 MUSCLE SPASM: ICD-10-CM

## 2025-05-09 ENCOUNTER — RESULTS FOLLOW-UP (OUTPATIENT)
Dept: RHEUMATOLOGY | Facility: CLINIC | Age: 70
End: 2025-05-09

## 2025-05-09 SDOH — HEALTH STABILITY: PHYSICAL HEALTH: ON AVERAGE, HOW MANY MINUTES DO YOU ENGAGE IN EXERCISE AT THIS LEVEL?: 0 MIN

## 2025-05-09 SDOH — HEALTH STABILITY: PHYSICAL HEALTH: ON AVERAGE, HOW MANY DAYS PER WEEK DO YOU ENGAGE IN MODERATE TO STRENUOUS EXERCISE (LIKE A BRISK WALK)?: 0 DAYS

## 2025-05-09 ASSESSMENT — SOCIAL DETERMINANTS OF HEALTH (SDOH): HOW OFTEN DO YOU GET TOGETHER WITH FRIENDS OR RELATIVES?: ONCE A WEEK

## 2025-05-14 ENCOUNTER — RESULTS FOLLOW-UP (OUTPATIENT)
Dept: FAMILY MEDICINE | Facility: CLINIC | Age: 70
End: 2025-05-14

## 2025-05-14 ENCOUNTER — OFFICE VISIT (OUTPATIENT)
Dept: FAMILY MEDICINE | Facility: CLINIC | Age: 70
End: 2025-05-14
Payer: MEDICARE

## 2025-05-14 VITALS
DIASTOLIC BLOOD PRESSURE: 60 MMHG | SYSTOLIC BLOOD PRESSURE: 136 MMHG | TEMPERATURE: 98.3 F | HEIGHT: 67 IN | BODY MASS INDEX: 39.39 KG/M2 | HEART RATE: 75 BPM | WEIGHT: 251 LBS | RESPIRATION RATE: 8 BRPM | OXYGEN SATURATION: 98 %

## 2025-05-14 DIAGNOSIS — E87.5 HYPERKALEMIA: Primary | ICD-10-CM

## 2025-05-14 DIAGNOSIS — Z86.39 HISTORY OF HYPERGLYCEMIA: ICD-10-CM

## 2025-05-14 DIAGNOSIS — E78.5 HYPERLIPIDEMIA LDL GOAL <100: ICD-10-CM

## 2025-05-14 DIAGNOSIS — H61.21 IMPACTED CERUMEN OF RIGHT EAR: ICD-10-CM

## 2025-05-14 DIAGNOSIS — Z00.00 ENCOUNTER FOR MEDICARE ANNUAL WELLNESS EXAM: Primary | ICD-10-CM

## 2025-05-14 DIAGNOSIS — N18.31 CHRONIC KIDNEY DISEASE, STAGE 3A (H): ICD-10-CM

## 2025-05-14 DIAGNOSIS — E66.01 CLASS 2 SEVERE OBESITY WITH SERIOUS COMORBIDITY AND BODY MASS INDEX (BMI) OF 39.0 TO 39.9 IN ADULT, UNSPECIFIED OBESITY TYPE (H): ICD-10-CM

## 2025-05-14 DIAGNOSIS — Z79.899 HIGH RISK MEDICATION USE: ICD-10-CM

## 2025-05-14 DIAGNOSIS — E66.812 CLASS 2 SEVERE OBESITY WITH SERIOUS COMORBIDITY AND BODY MASS INDEX (BMI) OF 39.0 TO 39.9 IN ADULT, UNSPECIFIED OBESITY TYPE (H): ICD-10-CM

## 2025-05-14 LAB
ALBUMIN SERPL BCG-MCNC: 4.1 G/DL (ref 3.5–5.2)
ALP SERPL-CCNC: 175 U/L (ref 40–150)
ALT SERPL W P-5'-P-CCNC: 15 U/L (ref 0–50)
ANION GAP SERPL CALCULATED.3IONS-SCNC: 6 MMOL/L (ref 7–15)
AST SERPL W P-5'-P-CCNC: 19 U/L (ref 0–45)
BASOPHILS # BLD AUTO: 0 10E3/UL (ref 0–0.2)
BASOPHILS NFR BLD AUTO: 1 %
BILIRUB DIRECT SERPL-MCNC: 0.17 MG/DL (ref 0–0.3)
BILIRUB SERPL-MCNC: 0.5 MG/DL
BUN SERPL-MCNC: 17.7 MG/DL (ref 8–23)
CALCIUM SERPL-MCNC: 9.8 MG/DL (ref 8.8–10.4)
CHLORIDE SERPL-SCNC: 108 MMOL/L (ref 98–107)
CHOLEST SERPL-MCNC: 174 MG/DL
CREAT SERPL-MCNC: 0.88 MG/DL (ref 0.51–0.95)
CREAT SERPL-MCNC: 0.9 MG/DL (ref 0.51–0.95)
CREAT UR-MCNC: 216.5 MG/DL
CRP SERPL-MCNC: <3 MG/L
EGFRCR SERPLBLD CKD-EPI 2021: 68 ML/MIN/1.73M2
EGFRCR SERPLBLD CKD-EPI 2021: 70 ML/MIN/1.73M2
EOSINOPHIL # BLD AUTO: 0.1 10E3/UL (ref 0–0.7)
EOSINOPHIL NFR BLD AUTO: 1 %
ERYTHROCYTE [DISTWIDTH] IN BLOOD BY AUTOMATED COUNT: 12.9 % (ref 10–15)
ERYTHROCYTE [SEDIMENTATION RATE] IN BLOOD BY WESTERGREN METHOD: 15 MM/HR (ref 0–30)
EST. AVERAGE GLUCOSE BLD GHB EST-MCNC: 97 MG/DL
FASTING STATUS PATIENT QL REPORTED: YES
FASTING STATUS PATIENT QL REPORTED: YES
GLUCOSE SERPL-MCNC: 98 MG/DL (ref 70–99)
HBA1C MFR BLD: 5 % (ref 0–5.6)
HCO3 SERPL-SCNC: 29 MMOL/L (ref 22–29)
HCT VFR BLD AUTO: 41.9 % (ref 35–47)
HDLC SERPL-MCNC: 50 MG/DL
HGB BLD-MCNC: 13.8 G/DL (ref 11.7–15.7)
IMM GRANULOCYTES # BLD: 0 10E3/UL
IMM GRANULOCYTES NFR BLD: 0 %
LDLC SERPL CALC-MCNC: 109 MG/DL
LYMPHOCYTES # BLD AUTO: 1.2 10E3/UL (ref 0.8–5.3)
LYMPHOCYTES NFR BLD AUTO: 20 %
MCH RBC QN AUTO: 31.3 PG (ref 26.5–33)
MCHC RBC AUTO-ENTMCNC: 32.9 G/DL (ref 31.5–36.5)
MCV RBC AUTO: 95 FL (ref 78–100)
MICROALBUMIN UR-MCNC: 25 MG/L
MICROALBUMIN/CREAT UR: 11.55 MG/G CR (ref 0–25)
MONOCYTES # BLD AUTO: 0.5 10E3/UL (ref 0–1.3)
MONOCYTES NFR BLD AUTO: 8 %
NEUTROPHILS # BLD AUTO: 4.4 10E3/UL (ref 1.6–8.3)
NEUTROPHILS NFR BLD AUTO: 71 %
NONHDLC SERPL-MCNC: 124 MG/DL
PLATELET # BLD AUTO: 257 10E3/UL (ref 150–450)
POTASSIUM SERPL-SCNC: 5.5 MMOL/L (ref 3.4–5.3)
PROT SERPL-MCNC: 7.3 G/DL (ref 6.4–8.3)
RBC # BLD AUTO: 4.41 10E6/UL (ref 3.8–5.2)
SODIUM SERPL-SCNC: 143 MMOL/L (ref 135–145)
TRIGL SERPL-MCNC: 73 MG/DL
WBC # BLD AUTO: 6.3 10E3/UL (ref 4–11)

## 2025-05-14 PROCEDURE — 82565 ASSAY OF CREATININE: CPT | Mod: 59 | Performed by: FAMILY MEDICINE

## 2025-05-14 PROCEDURE — 80061 LIPID PANEL: CPT | Performed by: FAMILY MEDICINE

## 2025-05-14 PROCEDURE — 80053 COMPREHEN METABOLIC PANEL: CPT | Performed by: FAMILY MEDICINE

## 2025-05-14 PROCEDURE — 1125F AMNT PAIN NOTED PAIN PRSNT: CPT | Performed by: FAMILY MEDICINE

## 2025-05-14 PROCEDURE — 36415 COLL VENOUS BLD VENIPUNCTURE: CPT | Performed by: FAMILY MEDICINE

## 2025-05-14 PROCEDURE — 85652 RBC SED RATE AUTOMATED: CPT | Performed by: FAMILY MEDICINE

## 2025-05-14 PROCEDURE — 82043 UR ALBUMIN QUANTITATIVE: CPT | Performed by: FAMILY MEDICINE

## 2025-05-14 PROCEDURE — 3078F DIAST BP <80 MM HG: CPT | Performed by: FAMILY MEDICINE

## 2025-05-14 PROCEDURE — 3075F SYST BP GE 130 - 139MM HG: CPT | Performed by: FAMILY MEDICINE

## 2025-05-14 PROCEDURE — 82570 ASSAY OF URINE CREATININE: CPT | Performed by: FAMILY MEDICINE

## 2025-05-14 PROCEDURE — 86140 C-REACTIVE PROTEIN: CPT | Performed by: FAMILY MEDICINE

## 2025-05-14 PROCEDURE — G0439 PPPS, SUBSEQ VISIT: HCPCS | Performed by: FAMILY MEDICINE

## 2025-05-14 PROCEDURE — 82248 BILIRUBIN DIRECT: CPT | Performed by: FAMILY MEDICINE

## 2025-05-14 PROCEDURE — 99214 OFFICE O/P EST MOD 30 MIN: CPT | Mod: 25 | Performed by: FAMILY MEDICINE

## 2025-05-14 PROCEDURE — 85025 COMPLETE CBC W/AUTO DIFF WBC: CPT | Performed by: FAMILY MEDICINE

## 2025-05-14 PROCEDURE — 83036 HEMOGLOBIN GLYCOSYLATED A1C: CPT | Performed by: FAMILY MEDICINE

## 2025-05-14 ASSESSMENT — PAIN SCALES - GENERAL: PAINLEVEL_OUTOF10: SEVERE PAIN (7)

## 2025-05-14 NOTE — PROGRESS NOTES
Preventive Care Visit  [facility]  Jamshid Can MD, Family Medicine  May 14, 2025      Assessment & Plan     Encounter for Medicare annual wellness exam  Patient was advised on recommended screening and preventive health recommendations.  He verbalized understanding and agreed to the plans below.   She declined covid19 vaccine  Advised RSV vaccine for the fall - patient did not commit to it and did not acknowledge the advice.    Hyperlipidemia LDL goal <100  Reinforced heart healthy lifestyle.   - Lipid panel reflex to direct LDL Fasting  - Lipid panel reflex to direct LDL Fasting    Chronic kidney disease, stage 3a (H)  Stable renal function on recent labs.  Maintain hydration. Avoid nephrotoxins.   - Albumin Random Urine Quantitative with Creat Ratio  - Basic metabolic panel  (Ca, Cl, CO2, Creat, Gluc, K, Na, BUN)  - Basic metabolic panel  (Ca, Cl, CO2, Creat, Gluc, K, Na, BUN)  - Albumin Random Urine Quantitative with Creat Ratio    Impacted cerumen of right ear  Plan to soften cerumen prior to irrigation.  Advised debrox use.  CMA visit in 1 week.  - carbamide peroxide (DEBROX) 6.5 % otic solution  Dispense: 15 mL; Refill: 0  - OFFICE/OUTPT VISIT,EST,LEVL IV  - NH REMOVAL IMPACTED CERUMEN IRRIGATION/LVG UNILAT    Class 2 severe obesity with serious comorbidity and body mass index (BMI) of 39.0 to 39.9 in adult, unspecified obesity type (H)  Increases complexity of management of the above chronic conditions.  Patient was advised healthy diet recommendations.  Patient was advised weekly exercise recommendations and goals.   Discussed options for MWM. Not a good candidate for phentermine due to cardiovascular risk. Topiramate may not be ideal due to possible cognitive side effects.   Discussed use of GLP1 agonists. Patient is open to these in spite of preference not to use injections. She will check insurance about coverage first.  - Basic metabolic panel  (Ca, Cl, CO2, Creat,  "Gluc, K, Na, BUN)  - Hemoglobin A1c  - OFFICE/OUTPT VISIT,EST,LEVL IV  - Basic metabolic panel  (Ca, Cl, CO2, Creat, Gluc, K, Na, BUN)  - Hemoglobin A1c    History of hyperglycemia  Recheck glucose and A1c. If diabetic, may increase chance to get covered for GLP1 agonists.  - Basic metabolic panel  (Ca, Cl, CO2, Creat, Gluc, K, Na, BUN)  - Hemoglobin A1c  - Basic metabolic panel  (Ca, Cl, CO2, Creat, Gluc, K, Na, BUN)  - Hemoglobin A1c    High risk medication use  Released labs from rheum orders.  - CBC with Platelets & Differential  - Creatinine  - Erythrocyte sedimentation rate auto  - CRP inflammation  - Hepatic function panel    The longitudinal plan of care for the diagnosis(es)/condition(s) as documented were addressed during this visit. Due to the added complexity in care, I will continue to support Kaylene in the subsequent management and with ongoing continuity of care.    Patient has been advised of split billing requirements and indicates understanding: Yes        BMI  Estimated body mass index is 39.31 kg/m  as calculated from the following:    Height as of this encounter: 1.702 m (5' 7\").    Weight as of this encounter: 113.9 kg (251 lb).   Weight management plan: Discussed healthy diet and exercise guidelines  Patient will check insurance for coverage for GLP1 agonists.      Counseling  Appropriate preventive services were addressed with this patient via screening, questionnaire, or discussion as appropriate for fall prevention, nutrition, physical activity, Tobacco-use cessation, social engagement, weight loss and cognition.  Checklist reviewing preventive services available has been given to the patient.  Reviewed patient's diet, addressing concerns and/or questions.   Discussed possible causes of fatigue. Information on urinary incontinence and treatment options given to patient.   I have reviewed Opioid Use Disorder and Substance Use Disorder risk factors and made any needed referrals. "       Follow-up   Return in about 1 year (around 5/14/2026) for In-person visit for next wellness exam.     Follow-up Visit   Expected date:  May 21, 2026 (Approximate)      Follow Up Appointment Details:     Follow-up with whom?: PCP    Follow-Up for what?: Medicare Wellness    Welcome or Annual?: Annual Wellness    How?: In Person                 Subjective   Kaylene is a 69 year old, presenting for the following:  Physical        5/14/2025    10:03 AM   Additional Questions   Roomed by Jaqueline ZAMARRIPA   Accompanied by self         5/14/2025    10:03 AM   Patient Reported Additional Medications   Patient reports taking the following new medications magnesium 250mg, cinnamon 1200, cod liver oil 50, zinc 50           HPI  Patient is requesting to have ears checked for wax.    Patient is interested in possibly getting into MWM.  Patient has not had luck with finding out what is covered for MWM.     Advance Care Planning    Document on file is a Health Care Directive or POLST.        5/9/2025   General Health   How would you rate your overall physical health? (!) FAIR   Feel stress (tense, anxious, or unable to sleep) Not at all         5/9/2025   Nutrition   Diet: Low salt    Carbohydrate counting       Multiple values from one day are sorted in reverse-chronological order         5/9/2025   Exercise   Days per week of moderate/strenous exercise 0 days   Average minutes spent exercising at this level 0 min   (!) EXERCISE CONCERN      5/9/2025   Social Factors   Frequency of gathering with friends or relatives Once a week   Worry food won't last until get money to buy more No   Food not last or not have enough money for food? No   Do you have housing? (Housing is defined as stable permanent housing and does not include staying outside in a car, in a tent, in an abandoned building, in an overnight shelter, or couch-surfing.) Yes   Are you worried about losing your housing? No   Lack of transportation? No   Unable to get  utilities (heat,electricity)? No         2025   Fall Risk   Reason Gait Speed Test Not Completed Patient declines   Reason for decline Patient currently has fracture of lower leg and knee strain on the other leg, using walker          2025   Activities of Daily Living- Home Safety   Needs help with the following daily activites None of the above   Safety concerns in the home None of the above         2025   Dental   Dentist two times every year? Yes         2025   Hearing Screening   Hearing concerns? None of the above         2025   Driving Risk Screening   Patient/family members have concerns about driving No         2025   General Alertness/Fatigue Screening   Have you been more tired than usual lately? (!) YES         2025   Urinary Incontinence Screening   Bothered by leaking urine in past 6 months Yes         Today's PHQ-2 Score:       2025    10:03 AM   PHQ-2 (  [employer])   Q1: Little interest or pleasure in doing things 0    Q2: Feeling down, depressed or hopeless 0    PHQ-2 Score 0    Q1: Little interest or pleasure in doing things Not at all   Q2: Feeling down, depressed or hopeless Not at all   PHQ-2 Score 0       Proxy-reported           2025   Substance Use   Alcohol more than 3/day or more than 7/wk No   Do you have a current opioid prescription? (!) YES   How severe/bad is pain from 1 to 10? 6/10   Do you use any other substances recreationally? No          No data to display              Low Risk (0-3)  Moderate Risk (4-7)  High Risk (>8)  Social History     Tobacco Use    Smoking status: Former     Current packs/day: 0.00     Types: Cigarettes     Start date: 1970     Quit date: 2000     Years since quittin.0    Smokeless tobacco: Never   Vaping Use    Vaping status: Never Used   Substance Use Topics    Alcohol use: Yes     Comment: 1 per week    Drug use: Not Currently           2024   LAST FHS-7 RESULTS   1st degree relative breast or  ovarian cancer No   Any relative bilateral breast cancer No   Any male have breast cancer No   Any ONE woman have BOTH breast AND ovarian cancer No   Any woman with breast cancer before 50yrs No   2 or more relatives with breast AND/OR ovarian cancer No   2 or more relatives with breast AND/OR bowel cancer No        Mammogram Screening - Mammogram every 1-2 years updated in Health Maintenance based on mutual decision making      History of abnormal Pap smear: YES - reflected in Problem List and Health Maintenance accordingly        3/21/2014    12:00 AM 12/27/2011     4:34 PM 12/8/2010    12:00 AM   PAP / HPV   PAP (Historical) NIL  ASC-US  NIL      ASCVD Risk   The 10-year ASCVD risk score (Marie DK, et al., 2019) is: 14%    Values used to calculate the score:      Age: 69 years      Sex: Female      Is Non- : No      Diabetic: No      Tobacco smoker: No      Systolic Blood Pressure: 136 mmHg      Is BP treated: Yes      HDL Cholesterol: 60 mg/dL      Total Cholesterol: 205 mg/dL            Reviewed and updated as needed this visit by Provider     Meds                Past Medical History:   Diagnosis Date    BLANCA (acute kidney injury) 04/20/2017    Basal cell carcinoma     Gastroesophageal reflux disease     Hypertension     Motion sickness     Obese     RA (rheumatoid arthritis) (H)     SBO (small bowel obstruction) (H) 04/20/2017    Small bowel obstruction (H)     Squamous cell carcinoma     Thrombosis      Past Surgical History:   Procedure Laterality Date    ARTHROPLASTY KNEE Right 8/20/2024    Procedure: RIGHT TOTAL KNEE ARTHROPLASTY;  Surgeon: Javed Rodriguez MD;  Location: [facility] OR    ARTHROSCOPY KNEE  12/10/2010    ARTHROSCOPY KNEE performed by NAVID FIERRO at [facility]    COLONOSCOPY N/A 8/9/2018    Procedure: COLONOSCOPY;  colonoscopy;  Surgeon: Luke Kaye MD;  Location: [facility]    INJECT EPIDURAL LUMBAR N/A 7/7/2022    Procedure: Lumbar Epidural Steroid  Injection;  Surgeon: Olivia Davies MD;  Location: [facility] OR    INJECT EPIDURAL LUMBAR N/A 11/11/2022    Procedure: Lumbar Epidural Steroid Injection, L4-5;  Surgeon: Olviia Davies MD;  Location: [facility] OR    INJECT EPIDURAL LUMBAR N/A 10/5/2023    Procedure: Lumbar Epidural Steroid Injection;  Surgeon: Olivia Davies MD;  Location: [facility] OR    INJECT EPIDURAL LUMBAR N/A 6/20/2024    Procedure: Lumbar Epidural Steroid Injection;  Surgeon: Olivia Davies MD;  Location: [facility] OR    SURGICAL HISTORY OF -       IUD removal    SURGICAL HISTORY OF -       Ovarian cyst    SURGICAL HISTORY OF -       thorn removal from foot    SURGICAL HISTORY OF -       knee surgery, scope, right knee     Patient Active Problem List   Diagnosis    Carpal tunnel syndrome    Tear meniscus knee    Osteopenia    Hyperlipidemia LDL goal <100    Osteoarthritis    RA (rheumatoid arthritis) (H)    High risk medications (not anticoagulants) long-term use    Kidney stone    ASCUS on Pap smear    Class 3 obesity (H)    Elevated blood pressure reading without diagnosis of hypertension    Elevated glucose    Chronic kidney disease, stage 3a (H)    Chronic bilateral low back pain with bilateral sciatica    Lumbar radiculopathy    Immunodeficiency, unspecified    Other acute pulmonary embolism, unspecified whether acute cor pulmonale present (H)    Knee osteoarthritis     Past Surgical History:   Procedure Laterality Date    ARTHROPLASTY KNEE Right 8/20/2024    Procedure: RIGHT TOTAL KNEE ARTHROPLASTY;  Surgeon: Javed Rodriguez MD;  Location: [facility] OR    ARTHROSCOPY KNEE  12/10/2010    ARTHROSCOPY KNEE performed by NAVID FIERRO at [facility]    COLONOSCOPY N/A 8/9/2018    Procedure: COLONOSCOPY;  colonoscopy;  Surgeon: Luke Kaye MD;  Location: [facility]    INJECT EPIDURAL LUMBAR N/A 7/7/2022    Procedure: Lumbar Epidural Steroid Injection;  Surgeon: Olivia Davies MD;  Location[place] [facility]    INJECT EPIDURAL LUMBAR N/A 11/11/2022     "Procedure: Lumbar Epidural Steroid Injection, L4-5;  Surgeon: Olivia Davies MD;  Location: [facility] OR    INJECT EPIDURAL LUMBAR N/A 10/5/2023    Procedure: Lumbar Epidural Steroid Injection;  Surgeon: Olivia Davies MD;  Location: [facility] OR    INJECT EPIDURAL LUMBAR N/A 2024    Procedure: Lumbar Epidural Steroid Injection;  Surgeon: Olivia Davies MD;  Location: [facility] OR    SURGICAL HISTORY OF -       IUD removal    SURGICAL HISTORY OF -       Ovarian cyst    SURGICAL HISTORY OF -       thorn removal from foot    SURGICAL HISTORY OF -       knee surgery, scope, right knee       Social History     Tobacco Use    Smoking status: Former     Current packs/day: 0.00     Types: Cigarettes     Start date: 1970     Quit date: 2000     Years since quittin.0    Smokeless tobacco: Never   Substance Use Topics    Alcohol use: Yes     Comment: 1 per week     Family History   Problem Relation Age of Onset    Prostate Cancer Father     Eye Disorder Father         mac degen    Heart Disease Father     Macular Degeneration Father     Heart Disease Mother         a-fib    Eye Disorder Mother     C.A.D. Mother     Hypertension Mother     Alcohol/Drug Brother     Alcohol/Drug Sister     Alcohol/Drug Brother     Alcohol/Drug Brother     Alcohol/Drug Brother     Alcohol/Drug Sister     Obesity Sister     Alcohol/Drug Sister     Prostate Cancer Sister     Cancer Other     Melanoma No family hx of          Current Outpatient Medications   Medication Sig Dispense Refill    carbamide peroxide (DEBROX) 6.5 % otic solution Place 5 drops into the right ear daily. 15 mL 0    fluticasone (FLONASE) 50 MCG/ACT nasal spray Spray 1 spray into both nostrils daily. 16 g 11    hydroxychloroquine (PLAQUENIL) 200 MG tablet Take 2 tablets (400 mg) by mouth daily. 180 tablet 2    Insulin Syringe-Needle U-100 (BD INSULIN SYRINGE) 27G X 1/2\" 1 ML MISC For once weekly methotrexate administration. 50 each 1    lisinopril (ZESTRIL) 30 MG " tablet TAKE ONE TABLET BY MOUTH ONCE DAILY 90 tablet 0    methotrexate 50 MG/2ML injection Inject 1 mL (25 mg) subcutaneously every 7 days. 8 mL 6    Multiple Vitamins-Minerals (ADULT GUMMY PO) Take 1 chew tab by mouth daily [employer]ita[employer]ve      oxyCODONE (ROXICODONE) 5 MG tablet Take 1 tablet (5 mg) by mouth every 6 hours as needed for severe pain. 15 tablet 0    pantoprazole (PROTONIX) 40 MG EC tablet TAKE ONE TABLET BY MOUTH ONCE DAILY 90 tablet 2    predniSONE (DELTASONE) 5 MG tablet Take 1 tablet (5 mg) by mouth daily as needed for inflammatory arthritis symptoms.  Use sparingly 90 tablet 1    rivaroxaban ANTICOAGULANT (XARELTO ANTICOAGULANT) 20 MG TABS tablet Take 1 tablet (20 mg) by mouth daily (with dinner) 90 tablet 3    tiZANidine (ZANAFLEX) 4 MG tablet Take 1 tablet (4 mg) by mouth 3 times daily as needed for muscle spasms 270 tablet 3    vitamin B complex with vitamin C (STRESS TAB) tablet Take 1 tablet by mouth daily  0    Vitamin D3 (CHOLECALCIFEROL) 25 mcg (1000 units) tablet Take 1 tablet by mouth daily       Allergies   Allergen Reactions    Folic Acid Photosensitivity    Gold Rash    Latex Rash     Not all latex products     Current providers sharing in care for this patient include:  Patient Care Team:  Jamshid Can MD as PCP - General (Family Medicine)  Mitch Briceño [facility] as Pharmacist (Pharmacist Clinician- Clinical Pharmacy Specialist)  Joseph Rooney MD as Assigned Rheumatology Provider  Olivia Davies MD as MD (Anesthesiology)  Luiz Powers MD as MD (Ophthalmology)  Jean Marie Del Cid MD as Assigned Musculoskeletal Provider  Cipriano Springer MD as MD (Dermatology)  Cipriano Springer MD as Referring Physician (Dermatology)  Mono Pond MD as MD (Allergy & Immunology)  Jean Marie Del Cid MD as MD (Orthopaedic Surgery)  Jamshid Can MD as Assigned PCP  Luiz Powers MD as Assigned Surgical Provider  Mono Pond MD as  "Assigned Dermatology Provider    The following health maintenance items are reviewed in Epic and correct as of today:  Health Maintenance   Topic Date Due    URINE DRUG SCREEN  Never done    RSV VACCINE (1 - Risk 60-74 years 1-dose series) Never done    COVID-19 Vaccine (4 - 2024-25 season) 09/01/2024    LIPID  05/06/2025    MICROALBUMIN  05/06/2025    BMP  07/24/2025    INFLUENZA VACCINE (Season Ended) 09/01/2025    MEDICARE ANNUAL WELLNESS VISIT  05/14/2026    ANNUAL REVIEW OF HM ORDERS  05/14/2026    FALL RISK ASSESSMENT  05/14/2026    HEMOGLOBIN  05/14/2026    MAMMO SCREENING  06/26/2026    DIABETES SCREENING  05/14/2028    COLORECTAL CANCER SCREENING  08/09/2028    ADVANCE CARE PLANNING  05/14/2030    DTAP/TDAP/TD IMMUNIZATION (3 - Td or Tdap) 10/28/2030    DEXA  04/30/2040    HEPATITIS C SCREENING  Completed    PHQ-2 (once per calendar year)  Completed    Pneumococcal Vaccine: 50+ Years  Completed    URINALYSIS  Completed    ZOSTER IMMUNIZATION  Completed    HPV IMMUNIZATION  Aged Out    MENINGITIS IMMUNIZATION  Aged Out         Review of Systems  Constitutional, HEENT, cardiovascular, pulmonary, GI, , musculoskeletal, neuro, skin, endocrine and psych systems are negative, except as otherwise noted.     Objective    Exam  /60   Pulse 75   Temp 98.3  F (36.8  C) (Tympanic)   Resp (!) 8   Ht 1.702 m (5' 7\")   Wt 113.9 kg (251 lb)   SpO2 98%   BMI 39.31 kg/m     Estimated body mass index is 39.31 kg/m  as calculated from the following:    Height as of this encounter: 1.702 m (5' 7\").    Weight as of this encounter: 113.9 kg (251 lb).    Physical Exam  GENERAL APPEARANCE: obese, ambulatory with a walker but able to transfer independently from chair to exam bed, alert and no distress  EYES: pink conj, no icterus, PERRL, EOMI  HENT: ear canal on right with dry impacted cerumen; EAM on left cleawr; tympanic membrane on left normal, tympanic membrane on right not visualized today;  nose no congestion,  " mouth without ulcers or lesions, oropharynx clear and oral mucous membranes moist  NECK: no adenopathy, no asymmetry, masses, or scars and thyroid normal to palpation  RESP: lungs clear to auscultation - no rales, rhonchi or wheezes  CV: normal rate, regular rhythm, no murmur,   ABDOMEN: soft, nontender, no hepatosplenomegaly, no masses and bowel sounds normal  DIRECT RECTAL EXAM: deferred  MS: no musculoskeletal defects are noted and gait is age appropriate without ataxia; trace bipedal edema  SKIN: no suspicious lesions or rashes  NEURO: Normal strength and tone, sensory exam grossly normal, mentation intact and speech normal         5/14/2025   Mini Cog   Clock Draw Score 2 Normal   3 Item Recall 3 objects recalled   Mini Cog Total Score 5              Signed Electronically by: Jamshid Can MD

## 2025-05-14 NOTE — PATIENT INSTRUCTIONS
Patient Education     Apply debrox 5-10 drops to right ear canal 2 times a day for next 2 weeks. Return to clinic after 7-10 days for recheck and possible earwax removal.     Check with insurance if they would cover weight management medications like semaglutide or tirzapetide.    Be consistent with low salt, low trans fat and low saturated fat diet.  Eat food rich in omega-3-fatty acids as you tolerate. (salmon, olive oil)  Eat 5 cups of vegetables, fruits and whole grains per day.  Limit starchy food (white rice, white bread, white pasta, white potatoes) to less than a cup per meal.  Minimize sweets, junk food and fastfood. Limit soda beverages to one serving per day; best to avoid it altogether though.    Get covid19 vaccine update ASAP due to your high risk condition.  Get RSV vaccine with your flu shot in the fall.    Exercise: moderate intensity sustained for at least 30 mins per episode, goal of 150 mins per week at least  Combine cardiovascular and resistance exercises.  These exercise recommendations are in addition to your daily activity at work or home.  Work on losing weight.    Blood tests: You will be contacted in 1-2 days for results of your lab tests.     Preventative Care Visits include: Yearly physicals, Well-child visits, Welcome to Medicare visits, & Medicare yearly wellness exams.    The purpose of these visits is to discuss your medical history and prevent health problems before you are sick.  You may need to pay a copay, coinsurance or deductible if your visit today includes testing or treating for a new or existing condition.    Additional charges may be incurred for today's visit. If you have questions about what your insurance plan covers, please contact your Insurance Company's member service department.  If you have questions specific to a bill you have already received from [facility], please contact the [facility] Billing Office at [phone].       Preventive Care Advice   This is  general advice given by our system to help you stay healthy. However, your care team may have specific advice just for you. Please talk to your care team about your preventive care needs.  Nutrition  Eat 5 or more servings of fruits and vegetables each day.  Try wheat bread, brown rice and whole grain pasta (instead of white bread, rice, and pasta).  Get enough calcium and vitamin D. Check the label on foods and aim for 100% of the RDA (recommended daily allowance).  Lifestyle  Exercise at least 150 minutes each week  (30 minutes a day, 5 days a week).  Do muscle strengthening activities 2 days a week. These help control your weight and prevent disease.  No smoking.  Wear sunscreen to prevent skin cancer.  Have a dental exam and cleaning every 6 months.  Yearly exams  See your health care team every year to talk about:  Any changes in your health.  Any medicines your care team has prescribed.  Preventive care, family planning, and ways to prevent chronic diseases.  Shots (vaccines)   HPV shots (up to age 26), if you've never had them before.  Hepatitis B shots (up to age 59), if you've never had them before.  COVID-19 shot: Get this shot when it's due.  Flu shot: Get a flu shot every year.  Tetanus shot: Get a tetanus shot every 10 years.  Pneumococcal, hepatitis A, and RSV shots: Ask your care team if you need these based on your risk.  Shingles shot (for age 50 and up)  General health tests  Diabetes screening:  Starting at age 35, Get screened for diabetes at least every 3 years.  If you are younger than age 35, ask your care team if you should be screened for diabetes.  Cholesterol test: At age 39, start having a cholesterol test every 5 years, or more often if advised.  Bone density scan (DEXA): At age 50, ask your care team if you should have this scan for osteoporosis (brittle bones).  Hepatitis C: Get tested at least once in your life.  STIs (sexually transmitted infections)  Before age 24: Ask your care  team if you should be screened for STIs.  After age 24: Get screened for STIs if you're at risk. You are at risk for STIs (including HIV) if:  You are sexually active with more than one person.  You don't use condoms every time.  You or a partner was diagnosed with a sexually transmitted infection.  If you are at risk for HIV, ask about PrEP medicine to prevent HIV.  Get tested for HIV at least once in your life, whether you are at risk for HIV or not.  Cancer screening tests  Cervical cancer screening: If you have a cervix, begin getting regular cervical cancer screening tests starting at age 21.  Breast cancer scan (mammogram): If you've ever had breasts, begin having regular mammograms starting at age 40. This is a scan to check for breast cancer.  Colon cancer screening: It is important to start screening for colon cancer at age 45.  Have a colonoscopy test every 10 years (or more often if you're at risk) Or, ask your provider about stool tests like a FIT test every year or Cologuard test every 3 years.  To learn more about your testing options, visit:   .  For help making a decision, visit:   https://bit.ly/gx15335.  Prostate cancer screening test: If you have a prostate, ask your care team if a prostate cancer screening test (PSA) at age 55 is right for you.  Lung cancer screening: If you are a current or former smoker ages 50 to 80, ask your care team if ongoing lung cancer screenings are right for you.  For informational purposes only. Not to replace the advice of your health care provider. Copyright   2023 [facility]. All rights reserved. Clinically reviewed by the [facility] Transitions Program. [employer] II 103454 - REV 01/24.  Preventing Falls: Care Instructions  Injuries and health problems such as trouble walking or poor eyesight can increase your risk of falling. So can some medicines. But there are things you can do to help prevent falls. You can exercise to get stronger. You can  "also arrange your home to make it safer.    Talk to your doctor about the medicines you take. Ask if any of them increase the risk of falls and whether they can be changed or stopped.   Try to exercise regularly. It can help improve your strength and balance. This can help lower your risk of falling.         Practice fall safety and prevention.   Wear low-heeled shoes that fit well and give your feet good support. Talk to your doctor if you have foot problems that make this hard.  Carry a cellphone or wear a medical alert device that you can use to call for help.  Use stepladders instead of chairs to reach high objects. Don't climb if you're at risk for falls. Ask for help, if needed.  Wear the correct eyeglasses, if you need them.        Make your home safer.   Remove rugs, cords, clutter, and furniture from walkways.  Keep your house well lit. Use night-lights in hallways and bathrooms.  Install and use sturdy handrails on stairways.  Wear nonskid footwear, even inside. Don't walk barefoot or in socks without shoes.        Be safe outside.   Use handrails, curb cuts, and ramps whenever possible.  Keep your hands free by using a shoulder bag or backpack.  Try to walk in well-lit areas. Watch out for uneven ground, changes in pavement, and debris.  Be careful in the winter. Walk on the grass or gravel when sidewalks are slippery. Use de-icer on steps and walkways. Add non-slip devices to shoes.    Put grab bars and nonskid mats in your shower or tub and near the toilet. Try to use a shower chair or bath bench when bathing.   Get into a tub or shower by putting in your weaker leg first. Get out with your strong side first. Have a phone or medical alert device in the bathroom with you.   Where can you learn more?  Go to https://www.healthwise.net/patiented  Enter G117 in the search box to learn more about \"Preventing Falls: Care Instructions.\"  Current as of: July 31, 2024  Content Version: 14.4    5972-8315 [employer].   Care instructions adapted under license by your healthcare professional. If you have questions about a medical condition or this instruction, always ask your healthcare professional. [facility] [employer] disclaims any warranty or liability for your use of this information.    Learning About Sleeping Well  What does sleeping well mean?     Sleeping well means getting enough sleep to feel good and stay healthy. How much sleep is enough varies among people.  The number of hours you sleep and how you feel when you wake up are both important. If you do not feel refreshed, you probably need more sleep. Another sign of not getting enough sleep is feeling tired during the day.  Experts recommend that adults get at least 7 or more hours of sleep per day. Children and older adults need more sleep.  Why is getting enough sleep important?  Getting enough quality sleep is a basic part of good health. When your sleep suffers, your physical health, mood, and your thoughts can suffer too. You may find yourself feeling more grumpy or stressed. Not getting enough sleep also can lead to serious problems, including injury, accidents, anxiety, and depression.  What might cause poor sleeping?  Many things can cause sleep problems, including:  Changes to your sleep schedule.  Stress. Stress can be caused by fear about a single event, such as giving a speech. Or you may have ongoing stress, such as worry about work or school.  Depression, anxiety, and other mental or emotional conditions.  Changes in your sleep habits or surroundings. This includes changes that happen where you sleep, such as noise, light, or sleeping in a different bed. It also includes changes in your sleep pattern, such as having jet lag or working a late shift.  Health problems, such as pain, breathing problems, and restless legs syndrome.  Lack of regular exercise.  Using alcohol, nicotine, or caffeine before bed.  How can you help yourself?  Here  "are some tips that may help you sleep more soundly and wake up feeling more refreshed.  Your sleeping area   Use your bedroom only for sleeping and sex. A bit of light reading may help you fall asleep. But if it doesn't, do your reading elsewhere in the house. Try not to use your TV, computer, smartphone, or tablet while you are in bed.  Be sure your bed is big enough to stretch out comfortably, especially if you have a sleep partner.  Keep your bedroom quiet, dark, and cool. Use curtains, blinds, or a sleep mask to block out light. To block out noise, use earplugs, soothing music, or a \"white noise\" machine.  Your evening and bedtime routine   Create a relaxing bedtime routine. You might want to take a warm shower or bath, or listen to soothing music.  Go to bed at the same time every night. And get up at the same time every morning, even if you feel tired.  What to avoid   Limit caffeine (coffee, tea, caffeinated sodas) during the day, and don't have any for at least 6 hours before bedtime.  Avoid drinking alcohol before bedtime. Alcohol can cause you to wake up more often during the night.  Try not to smoke or use tobacco, especially in the evening. Nicotine can keep you awake.  Limit naps during the day, especially close to bedtime.  Avoid lying in bed awake for too long. If you can't fall asleep or if you wake up in the middle of the night and can't get back to sleep within about 20 minutes, get out of bed and go to another room until you feel sleepy.  Avoid taking medicine right before bed that may keep you awake or make you feel hyper or energized. Your doctor can tell you if your medicine may do this and if you can take it earlier in the day.  If you can't sleep   Imagine yourself in a peaceful, pleasant scene. Focus on the details and feelings of being in a place that is relaxing.  Get up and do a quiet or boring activity until you feel sleepy.  Avoid drinking any liquids before going to bed to help prevent " "waking up often to use the bathroom.  Where can you learn more?  Go to https://www.[employer].net/patiented  Enter J942 in the search box to learn more about \"Learning About Sleeping Well.\"  Current as of: July 31, 2024  Content Version: 14.4    6051-0706 [employer].   Care instructions adapted under license by your healthcare professional. If you have questions about a medical condition or this instruction, always ask your healthcare professional. [employer] disclaims any warranty or liability for your use of this information.    Bladder Training: Care Instructions  Your Care Instructions     Bladder training is used to treat urge incontinence and stress incontinence. Urge incontinence means that the need to urinate comes on so fast that you can't get to a toilet in time. Stress incontinence means that you leak urine because of pressure on your bladder. For example, it may happen when you laugh, cough, or lift something heavy.  Bladder training can increase how long you can wait before you have to urinate. It can also help your bladder hold more urine. And it can give you better control over the urge to urinate.  It is important to remember that bladder training takes a few weeks to a few months to make a difference. You may not see results right away, but don't give up.  Follow-up care is a key part of your treatment and safety. Be sure to make and go to all appointments, and call your doctor if you are having problems. It's also a good idea to know your test results and keep a list of the medicines you take.  How can you care for yourself at home?  Work with your doctor to come up with a bladder training program that is right for you. You may use one or more of the following methods.  Delayed urination  In the beginning, try to keep from urinating for 5 minutes after you first feel the need to go.  While you wait, take deep, slow breaths to relax. Kegel exercises can also help you delay " "the need to go to the bathroom.  After some practice, when you can easily wait 5 minutes to urinate, try to wait 10 minutes before you urinate.  Slowly increase the waiting period until you are able to control when you have to urinate.  Scheduled urination  Empty your bladder when you first wake up in the morning.  Schedule times throughout the day when you will urinate.  Start by going to the bathroom every hour, even if you don't need to go.  Slowly increase the time between trips to the bathroom.  When you have found a schedule that works well for you, keep doing it.  If you wake up during the night and have to urinate, do it. Apply your schedule to waking hours only.  Kegel exercises  These tighten and strengthen pelvic muscles, which can help you control the flow of urine. (If doing these exercises causes pain, stop doing them and talk with your doctor.) To do Kegel exercises:  Squeeze your muscles as if you were trying not to pass gas. Or squeeze your muscles as if you were stopping the flow of urine. Your belly, legs, and buttocks shouldn't move.  Hold the squeeze for 3 seconds, then relax for 5 to 10 seconds.  Start with 3 seconds, then add 1 second each week until you are able to squeeze for 10 seconds.  Repeat the exercise 10 times a session. Do 3 to 8 sessions a day.  When should you call for help?  Watch closely for changes in your health, and be sure to contact your doctor if:    Your incontinence is getting worse.     You do not get better as expected.   Where can you learn more?  Go to https://www.[employer].net/patiented  Enter V684 in the search box to learn more about \"Bladder Training: Care Instructions.\"  Current as of: April 30, 2024  Content Version: 14.4    3207-6465 [employer].   Care instructions adapted under license by your healthcare professional. If you have questions about a medical condition or this instruction, always ask your healthcare professional. [employer] disclaims any warranty or liability for your use of this information.    Chronic Pain: Care Instructions  Your Care Instructions     Chronic pain is pain that lasts a long time (months or even years) and may or may not have a clear cause. It is different from acute pain, which usually does have a clear cause--like an injury or illness--and gets better over time. Chronic pain:  Lasts over time but may vary from day to day.  Does not go away despite efforts to end it.  May disrupt your sleep and lead to fatigue.  May cause depression or anxiety.  May make your muscles tense, causing more pain.  Can disrupt your work, hobbies, home life, and relationships with friends and family.  Chronic pain is a very real condition. It is not just in your head. Treatment can help and usually includes several methods used together, such as medicines, physical therapy, exercise, and other treatments. Learning how to relax and changing negative thought patterns can also help you cope.  Chronic pain is complex. Taking an active role in your treatment will help you better manage your pain. Tell your doctor if you have trouble dealing with your pain. You may have to try several things before you find what works best for you.  Follow-up care is a key part of your treatment and safety. Be sure to make and go to all appointments, and call your doctor if you are having problems. It's also a good idea to know your test results and keep a list of the medicines you take.  How can you care for yourself at home?  Pace yourself. Break up large jobs into smaller tasks. Save harder tasks for days when you have less pain, or go back and forth between hard tasks and easier ones. Take rest breaks.  Relax, and reduce stress. Relaxation techniques such as deep breathing or meditation can help.  Keep moving. Gentle, daily exercise can help reduce pain over the long run. Try low- or no-impact exercises such as walking, swimming, and stationary biking.  Do stretches to stay flexible.  Try heat, cold packs, and massage.  Get enough sleep. Chronic pain can make you tired and drain your energy. Talk with your doctor if you have trouble sleeping because of pain.  Think positive. Your thoughts can affect your pain level. Do things that you enjoy to distract yourself when you have pain instead of focusing on the pain. See a movie, read a book, listen to music, or spend time with a friend.  If you think you are depressed, talk to your doctor about treatment.  Keep a daily pain diary. Record how your moods, thoughts, sleep patterns, activities, and medicine affect your pain. You may find that your pain is worse during or after certain activities or when you are feeling a certain emotion. Having a record of your pain can help you and your doctor find the best ways to treat your pain.  Take pain medicines exactly as directed.  If the doctor gave you a prescription medicine for pain, take it as prescribed.  If you are not taking a prescription pain medicine, ask your doctor if you can take an over-the-counter medicine.  Reducing constipation caused by pain medicine  Talk to your doctor about a laxative. If a laxative doesn't work, your doctor may suggest a prescription medicine.  Include fruits, vegetables, beans, and whole grains in your diet each day. These foods are high in fiber.  If your doctor recommends it, get more exercise. Walking is a good choice. Bit by bit, increase the amount you walk every day. Try for at least 30 minutes on most days of the week.  Schedule time each day for a bowel movement. A daily routine may help. Take your time and do not strain when having a bowel movement.  When should you call for help?   Call your doctor now or seek immediate medical care if:    Your pain gets worse or is out of control.     You feel down or blue, or you do not enjoy things like you once did. You may be depressed, which is common in people with chronic pain. Depression  "can be treated.     You have vomiting or cramps for more than 2 hours.   Watch closely for changes in your health, and be sure to contact your doctor if:    You cannot sleep because of pain.     You are very worried or anxious about your pain.     You have trouble taking your pain medicine.     You have any concerns about your pain medicine.     You have trouble with bowel movements, such as:  No bowel movement in 3 days.  Blood in the anal area, in your stool, or on the toilet paper.  Diarrhea for more than 24 hours.   Where can you learn more?  Go to https://www.[employer].net/patiented  Enter N004 in the search box to learn more about \"Chronic Pain: Care Instructions.\"  Current as of: July 31, 2024  Content Version: 14.4    5736-9494 [employer].   Care instructions adapted under license by your healthcare professional. If you have questions about a medical condition or this instruction, always ask your healthcare professional. [employer] disclaims any warranty or liability for your use of this information.       "

## 2025-06-02 ENCOUNTER — LAB (OUTPATIENT)
Dept: LAB | Facility: CLINIC | Age: 70
End: 2025-06-02
Payer: MEDICARE

## 2025-06-02 ENCOUNTER — ALLIED HEALTH/NURSE VISIT (OUTPATIENT)
Dept: FAMILY MEDICINE | Facility: CLINIC | Age: 70
End: 2025-06-02
Payer: MEDICARE

## 2025-06-02 ENCOUNTER — RESULTS FOLLOW-UP (OUTPATIENT)
Dept: FAMILY MEDICINE | Facility: CLINIC | Age: 70
End: 2025-06-02

## 2025-06-02 DIAGNOSIS — E87.5 HYPERKALEMIA: ICD-10-CM

## 2025-06-02 DIAGNOSIS — H61.21 IMPACTED CERUMEN OF RIGHT EAR: ICD-10-CM

## 2025-06-02 DIAGNOSIS — M85.80 OSTEOPENIA, UNSPECIFIED LOCATION: ICD-10-CM

## 2025-06-02 LAB
ANION GAP SERPL CALCULATED.3IONS-SCNC: 10 MMOL/L (ref 7–15)
BUN SERPL-MCNC: 17.6 MG/DL (ref 8–23)
CALCIUM SERPL-MCNC: 9.5 MG/DL (ref 8.8–10.4)
CHLORIDE SERPL-SCNC: 106 MMOL/L (ref 98–107)
CREAT SERPL-MCNC: 0.9 MG/DL (ref 0.51–0.95)
EGFRCR SERPLBLD CKD-EPI 2021: 68 ML/MIN/1.73M2
GLUCOSE SERPL-MCNC: 100 MG/DL (ref 70–99)
HCO3 SERPL-SCNC: 24 MMOL/L (ref 22–29)
POTASSIUM SERPL-SCNC: 5.1 MMOL/L (ref 3.4–5.3)
PTH-INTACT SERPL-MCNC: 41 PG/ML (ref 15–65)
SODIUM SERPL-SCNC: 140 MMOL/L (ref 135–145)

## 2025-06-02 PROCEDURE — 99207 PR NO CHARGE NURSE ONLY: CPT

## 2025-06-02 PROCEDURE — 83970 ASSAY OF PARATHORMONE: CPT

## 2025-06-02 PROCEDURE — 82306 VITAMIN D 25 HYDROXY: CPT

## 2025-06-02 PROCEDURE — 36415 COLL VENOUS BLD VENIPUNCTURE: CPT

## 2025-06-02 PROCEDURE — 80048 BASIC METABOLIC PNL TOTAL CA: CPT

## 2025-06-02 NOTE — PROGRESS NOTES
Patient identified using two patient identifiers.  Ear exam showing wax occlusion completed by RN.  H202/H20 was placed in the right ear(s) via irrigation tool: elephant ear.      Ear wax successfully removed without issue.

## 2025-06-04 LAB — VIT D+METAB SERPL-MCNC: 69 NG/ML (ref 20–50)

## 2025-06-06 ENCOUNTER — TRANSFERRED RECORDS (OUTPATIENT)
Dept: HEALTH INFORMATION MANAGEMENT | Facility: CLINIC | Age: 70
End: 2025-06-06
Payer: MEDICARE

## 2025-06-10 ENCOUNTER — TELEPHONE (OUTPATIENT)
Dept: RHEUMATOLOGY | Facility: CLINIC | Age: 70
End: 2025-06-10
Payer: MEDICARE

## 2025-06-10 NOTE — TELEPHONE ENCOUNTER
[facility] Call Center    Phone Message    May a detailed message be left on voicemail: yes     Reason for Call: Requesting Results     Name/type of test:Labs Vitamin D level high please advise   Date of test: 6/2/25  Was test done at a location other than [facility] (Please fill in the location if not [facility])?: No    Action Taken: Other: Rheum     Travel Screening: Not Applicable     Date of Service:

## 2025-06-13 DIAGNOSIS — I26.99 OTHER ACUTE PULMONARY EMBOLISM, UNSPECIFIED WHETHER ACUTE COR PULMONALE PRESENT (H): ICD-10-CM

## 2025-06-13 NOTE — TELEPHONE ENCOUNTER
Requested Prescriptions   Pending Prescriptions Disp Refills    XARELTO ANTICOAGULANT 20 MG TABS tablet [Pharmacy Med Name: XARELTO 20MG TABS] 90 tablet 3     Sig: TAKE ONE TABLET BY MOUTH ONCE DAILY (WITH DINNER)       Anticoagulant Agents Failed - 6/13/2025  1:14 PM        Failed - Medication is active on med list and the sig matches. RN to manually verify dose and sig if red X/fail.     If the protocol passes (green check), you do not need to verify med dose and sig.    A prescription matches if they are the same clinical intention.    For Example: once daily and every morning are the same.    The protocol can not identify upper and lower case letters as matching and will fail.     For Example: Take 1 tablet (50 mg) by mouth daily     TAKE 1 TABLET (50 MG) BY MOUTH DAILY    For all fails (red x), verify dose and sig.    If the refill does match what is on file, the RN can still proceed to approve the refill request.       If they do not match, route to the appropriate provider.             Passed - Normal Platelets on file in past 12 months     Recent Labs   Lab Test 05/14/25  1111                  Passed - Creatinine Clearance greater than 50 ml/min on file in past 12 mos     No lab results found.          Passed - Weight is greater than 60 kg for the past year     Wt Readings from Last 3 Encounters:   05/14/25 113.9 kg (251 lb)   10/21/24 112.5 kg (248 lb)   10/11/24 112.7 kg (248 lb 6.4 oz)             Passed - GFR on file in the past 12 months and most recent GFR is normal        Passed - Recent (6 month) or future (90 days) visit with the authorizing provider's specialty (provided they have been seen in the past 9 months)        Passed - Medication indicated for associated diagnosis     Medication is associated with one or more of the following diagnoses:  Atrial fibrillation  Deep venous thrombosis  Heparin-induced thrombocytopenia  Pulmonary embolism  Venous thromboembolism  H/O: Pulmonary  embolus  Atrial flutter  Coronary artery finding  Transient cerebral ischemia  Percutaneous transluminal coronary angioplasty  Stable angina  Intermittent claudication  Arteriosclerotic vascular disease          Passed - Patient is 18 years of age or older        Passed - No active pregnancy on record        Passed - No positive pregnancy test within past 12 months

## 2025-06-16 PROBLEM — Z86.711 HISTORY OF PULMONARY EMBOLISM: Status: ACTIVE | Noted: 2023-05-01

## 2025-06-16 RX ORDER — RIVAROXABAN 20 MG/1
TABLET, FILM COATED ORAL
Qty: 90 TABLET | Refills: 3 | Status: SHIPPED | OUTPATIENT
Start: 2025-06-16

## 2025-06-16 NOTE — TELEPHONE ENCOUNTER
RN: Please call to notify Kaylene Diaz that the vitamin D is mildly elevated.  The parathyroid hormone and calcium are normal.  No change is needed based on these labs alone.    Joseph Rooney MD  6/16/2025

## 2025-06-23 ENCOUNTER — TELEPHONE (OUTPATIENT)
Dept: RHEUMATOLOGY | Facility: CLINIC | Age: 70
End: 2025-06-23
Payer: MEDICARE

## 2025-06-23 DIAGNOSIS — M05.79 RHEUMATOID ARTHRITIS INVOLVING MULTIPLE SITES WITH POSITIVE RHEUMATOID FACTOR (H): ICD-10-CM

## 2025-06-23 NOTE — TELEPHONE ENCOUNTER
[facility] Call Center    Phone Message    May a detailed message be left on voicemail: yes     Reason for Call: Medication Refill Request    Has the patient contacted the pharmacy for the refill? Yes   Name of medication being requested: hydroxychloroquine (PLAQUENIL) 200 MG tablet   Provider who prescribed the medication: Joseph Rooney MD   [facility] - [place], [place] - [address]  Date medication is needed: ASAP      Action Taken: Other: Rheum    Travel Screening: Not Applicable     Date of Service: 6/23/25

## 2025-06-24 RX ORDER — HYDROXYCHLOROQUINE SULFATE 200 MG/1
400 TABLET, FILM COATED ORAL DAILY
Qty: 180 TABLET | Refills: 1 | Status: SHIPPED | OUTPATIENT
Start: 2025-06-24

## 2025-06-24 NOTE — TELEPHONE ENCOUNTER
Patient is up to date on appointments, labs and eye exams. The pharmacy should have refills, but they stated they were cancelled for some reason. Sending new Rx.     Domonique LEAHY RN, [facility] 06/24/25 8:57 AM

## 2025-07-01 DIAGNOSIS — I10 PRIMARY HYPERTENSION: ICD-10-CM

## 2025-07-01 DIAGNOSIS — H61.21 IMPACTED CERUMEN OF RIGHT EAR: ICD-10-CM

## 2025-07-01 RX ORDER — ADHESIVE BANDAGE 3/4"
BANDAGE TOPICAL
Qty: 15 ML | Refills: 0 | Status: SHIPPED | OUTPATIENT
Start: 2025-07-01

## 2025-07-01 RX ORDER — LISINOPRIL 30 MG/1
30 TABLET ORAL DAILY
Qty: 90 TABLET | Refills: 3 | Status: SHIPPED | OUTPATIENT
Start: 2025-07-01

## 2025-07-03 DIAGNOSIS — M62.838 MUSCLE SPASM: ICD-10-CM

## 2025-07-03 NOTE — TELEPHONE ENCOUNTER
Medication Question or Refill        What medication are you calling about (include dose and sig)?: Tizanidine     [facility]  [id number] - [place], [place] - [address]  [address] [address]  Phone: [phone] Fax: [phone]      Controlled Substance Agreement on file:   CSA -- Patient Level:    CSA: None found at the patient level.       Who prescribed the medication?: Jamshid Can      Do you need a refill? Yes        Could we send this information to you in [facility] or would you prefer to receive a phone call?:   Patient would prefer a phone call   Okay to leave a detailed message?: Yes at Cell number on file:    Telephone Information:   Mobile [phone]     ANGELA Cummings

## 2025-07-09 ENCOUNTER — TRANSFERRED RECORDS (OUTPATIENT)
Dept: HEALTH INFORMATION MANAGEMENT | Facility: CLINIC | Age: 70
End: 2025-07-09
Payer: MEDICARE

## 2025-08-19 DIAGNOSIS — H61.21 IMPACTED CERUMEN OF RIGHT EAR: ICD-10-CM

## 2025-08-27 RX ORDER — ADHESIVE BANDAGE 3/4"
BANDAGE TOPICAL
Qty: 15 ML | Refills: 0 | OUTPATIENT
Start: 2025-08-27

## (undated) DEVICE — PREP CHLORAPREP W/ORANGE TINT 10.5ML 260715

## (undated) DEVICE — Device

## (undated) DEVICE — SYR 10ML PERFIX LL 332152

## (undated) DEVICE — SUCTION MANIFOLD NEPTUNE 2 SYS 4 PORT 0702-020-000

## (undated) DEVICE — TRAY PAIN INJECTION 97A 640

## (undated) DEVICE — HOOD SURG T7 PLUS STRL LF DISP 0416-801-200

## (undated) DEVICE — SOL NACL 0.9% IRRIG 1000ML BOTTLE 2F7124

## (undated) DEVICE — SUTURE MONOCRYL 3-0 18 PS2 UND MCP497G

## (undated) DEVICE — GLOVE UNDER INDICATOR PI SZ 7.0 LF 41670

## (undated) DEVICE — GLOVE PROTEXIS MICRO 6.0  2D73PM60

## (undated) DEVICE — GLOVE UNDER INDICATOR PI SZ 8.5 LF 41685

## (undated) DEVICE — GOWN IMPERVIOUS ZONED XLG 9041

## (undated) DEVICE — DECANTER VIAL 2006S

## (undated) DEVICE — SUTURE VICRYL+ 2 27IN CP UNDYED VCP195H

## (undated) DEVICE — NDL EPIDURAL TUOHY 20GA 3.5" 405028

## (undated) DEVICE — NDL EPIDURAL TUOHY 22GA 3.5" 4911-22

## (undated) DEVICE — SU STRATAFIX PDS PLUS 1 CT-1 18" SXPP1A404

## (undated) DEVICE — GLOVE BIOGEL PI MICRO SZ 6.0 48560

## (undated) DEVICE — SOL WATER IRRIG 1000ML BOTTLE 2F7114

## (undated) DEVICE — CUSTOM PACK TOTAL KNEE ACCESSORY SOP5BTAHEA

## (undated) DEVICE — SUTURE VICRYL+ 1 27IN CT-1 UND VCP261H

## (undated) DEVICE — DRAPE SHEET REV FOLD 3/4 9349

## (undated) DEVICE — SU DERMABOND ADVANCED .7ML DNX12

## (undated) DEVICE — SOL NACL 0.9% INJ 1000ML BAG 2B1324X

## (undated) DEVICE — GLOVE BIOGEL PI SZ 7.0 40870

## (undated) DEVICE — ELECTRODE PATIENT RETURN ADULT L10 FT 2 PLATE CORD 0855C

## (undated) DEVICE — BLADE SAW SAGITTAL STRK DUAL CUT 4118-135-090

## (undated) DEVICE — GLOVE BIOGEL PI INDICATOR 8.0 LF 41680

## (undated) DEVICE — GLOVE BIOGEL PI ULTRATOUCH G SZ 8.5 42185

## (undated) DEVICE — GLOVE BIOGEL PI ULTRATOUCH G SZ 8.0 42180

## (undated) DEVICE — HOLDER LIMB VELCRO OR 0814-1533

## (undated) DEVICE — CUSTOM PACK TOTAL KNEE SOP5BTKHEC

## (undated) RX ORDER — DEXAMETHASONE SODIUM PHOSPHATE 10 MG/ML
INJECTION, EMULSION INTRAMUSCULAR; INTRAVENOUS
Status: DISPENSED
Start: 2024-08-20

## (undated) RX ORDER — FENTANYL CITRATE-0.9 % NACL/PF 10 MCG/ML
PLASTIC BAG, INJECTION (ML) INTRAVENOUS
Status: DISPENSED
Start: 2024-08-20

## (undated) RX ORDER — DEXAMETHASONE SODIUM PHOSPHATE 4 MG/ML
INJECTION, SOLUTION INTRA-ARTICULAR; INTRALESIONAL; INTRAMUSCULAR; INTRAVENOUS; SOFT TISSUE
Status: DISPENSED
Start: 2024-08-20

## (undated) RX ORDER — PROPOFOL 10 MG/ML
INJECTION, EMULSION INTRAVENOUS
Status: DISPENSED
Start: 2024-08-20

## (undated) RX ORDER — FENTANYL CITRATE 50 UG/ML
INJECTION, SOLUTION INTRAMUSCULAR; INTRAVENOUS
Status: DISPENSED
Start: 2024-08-20

## (undated) RX ORDER — GLYCOPYRROLATE 0.2 MG/ML
INJECTION, SOLUTION INTRAMUSCULAR; INTRAVENOUS
Status: DISPENSED
Start: 2018-08-09

## (undated) RX ORDER — ONDANSETRON 2 MG/ML
INJECTION INTRAMUSCULAR; INTRAVENOUS
Status: DISPENSED
Start: 2024-08-20

## (undated) RX ORDER — LIDOCAINE HYDROCHLORIDE 10 MG/ML
INJECTION, SOLUTION EPIDURAL; INFILTRATION; INTRACAUDAL; PERINEURAL
Status: DISPENSED
Start: 2024-08-20